# Patient Record
Sex: MALE | Race: WHITE
[De-identification: names, ages, dates, MRNs, and addresses within clinical notes are randomized per-mention and may not be internally consistent; named-entity substitution may affect disease eponyms.]

---

## 2017-02-08 ENCOUNTER — HOSPITAL ENCOUNTER (OUTPATIENT)
Dept: HOSPITAL 75 - LAB | Age: 77
End: 2017-02-08
Attending: INTERNAL MEDICINE
Payer: MEDICARE

## 2017-02-08 ENCOUNTER — HOSPITAL ENCOUNTER (OUTPATIENT)
Dept: HOSPITAL 75 - LAB | Age: 77
End: 2017-02-08
Payer: MEDICARE

## 2017-02-08 DIAGNOSIS — I10: ICD-10-CM

## 2017-02-08 DIAGNOSIS — I48.92: ICD-10-CM

## 2017-02-08 DIAGNOSIS — I21.3: ICD-10-CM

## 2017-02-08 DIAGNOSIS — I25.10: Primary | ICD-10-CM

## 2017-02-08 DIAGNOSIS — I73.9: ICD-10-CM

## 2017-02-08 DIAGNOSIS — I25.10: ICD-10-CM

## 2017-02-08 DIAGNOSIS — I10: Primary | ICD-10-CM

## 2017-02-08 DIAGNOSIS — I48.91: ICD-10-CM

## 2017-02-08 DIAGNOSIS — I47.2: ICD-10-CM

## 2017-02-08 LAB
ALBUMIN SERPL-MCNC: 3.9 G/DL (ref 3.2–4.5)
ALT SERPL-CCNC: 23 U/L (ref 0–55)
ANION GAP SERPL CALC-SCNC: 12 MMOL/L (ref 5–14)
AST SERPL-CCNC: 27 U/L (ref 5–34)
BILIRUB SERPL-MCNC: 0.4 MG/DL (ref 0.1–1)
BUN SERPL-MCNC: 16 MG/DL (ref 7–18)
BUN/CREAT SERPL: 17
CALCIUM SERPL-MCNC: 9.3 MG/DL (ref 8.5–10.1)
CHLORIDE SERPL-SCNC: 101 MMOL/L (ref 98–107)
CHOLEST SERPL-MCNC: 147 MG/DL (ref ?–200)
CO2 SERPL-SCNC: 26 MMOL/L (ref 21–32)
CREAT SERPL-MCNC: 0.92 MG/DL (ref 0.6–1.3)
GFR SERPLBLD BASED ON 1.73 SQ M-ARVRAT: > 60 ML/MIN
GLUCOSE SERPL-MCNC: 125 MG/DL (ref 70–105)
LDLC SERPL DIRECT ASSAY-MCNC: 102 MG/DL (ref 1–129)
MAGNESIUM SERPL-MCNC: 1.9 MG/DL (ref 1.8–2.4)
POTASSIUM SERPL-SCNC: 3.4 MMOL/L (ref 3.6–5)
POTASSIUM SERPL-SCNC: 3.4 MMOL/L (ref 3.6–5)
PROT SERPL-MCNC: 7.6 G/DL (ref 6.4–8.2)
SODIUM SERPL-SCNC: 139 MMOL/L (ref 135–145)
TRIGL SERPL-MCNC: 174 MG/DL (ref ?–150)
VLDLC SERPL CALC-MCNC: 35 MG/DL (ref 5–40)

## 2017-02-08 PROCEDURE — 84132 ASSAY OF SERUM POTASSIUM: CPT

## 2017-02-08 PROCEDURE — 80061 LIPID PANEL: CPT

## 2017-02-08 PROCEDURE — 83735 ASSAY OF MAGNESIUM: CPT

## 2017-02-08 PROCEDURE — 80053 COMPREHEN METABOLIC PANEL: CPT

## 2017-02-08 PROCEDURE — 36415 COLL VENOUS BLD VENIPUNCTURE: CPT

## 2017-02-24 ENCOUNTER — HOSPITAL ENCOUNTER (INPATIENT)
Dept: HOSPITAL 75 - ER | Age: 77
LOS: 6 days | Discharge: NURSING, REHAB OR LONG-TERM CARE | DRG: 481 | End: 2017-03-02
Attending: INTERNAL MEDICINE | Admitting: INTERNAL MEDICINE
Payer: MEDICARE

## 2017-02-24 VITALS — WEIGHT: 207.4 LBS | BODY MASS INDEX: 33.33 KG/M2 | HEIGHT: 66 IN

## 2017-02-24 VITALS — DIASTOLIC BLOOD PRESSURE: 72 MMHG | SYSTOLIC BLOOD PRESSURE: 157 MMHG

## 2017-02-24 DIAGNOSIS — J45.909: ICD-10-CM

## 2017-02-24 DIAGNOSIS — K21.9: ICD-10-CM

## 2017-02-24 DIAGNOSIS — G47.30: ICD-10-CM
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
## 2017-02-24 DIAGNOSIS — F03.90: ICD-10-CM

## 2017-02-24 DIAGNOSIS — R53.81: ICD-10-CM

## 2017-02-24 DIAGNOSIS — S72.142A: Primary | ICD-10-CM

## 2017-02-24 DIAGNOSIS — T40.2X5A: ICD-10-CM

## 2017-02-24 DIAGNOSIS — I25.5: ICD-10-CM

## 2017-02-24 DIAGNOSIS — Z87.891: ICD-10-CM

## 2017-02-24 DIAGNOSIS — I48.91: ICD-10-CM

## 2017-02-24 DIAGNOSIS — I25.2: ICD-10-CM

## 2017-02-24 DIAGNOSIS — J81.1: ICD-10-CM

## 2017-02-24 DIAGNOSIS — E87.6: ICD-10-CM

## 2017-02-24 DIAGNOSIS — Z79.01: ICD-10-CM

## 2017-02-24 DIAGNOSIS — Z95.810: ICD-10-CM

## 2017-02-24 DIAGNOSIS — J98.11: ICD-10-CM

## 2017-02-24 DIAGNOSIS — Z95.5: ICD-10-CM

## 2017-02-24 DIAGNOSIS — I25.10: ICD-10-CM

## 2017-02-24 DIAGNOSIS — Z99.81: ICD-10-CM

## 2017-02-24 DIAGNOSIS — G62.9: ICD-10-CM

## 2017-02-24 DIAGNOSIS — K59.03: ICD-10-CM

## 2017-02-24 DIAGNOSIS — Z95.1: ICD-10-CM

## 2017-02-24 DIAGNOSIS — J44.9: ICD-10-CM

## 2017-02-24 DIAGNOSIS — D50.9: ICD-10-CM

## 2017-02-24 LAB
ALBUMIN SERPL-MCNC: 3.9 G/DL (ref 3.2–4.5)
ALT SERPL-CCNC: 31 U/L (ref 0–55)
ANION GAP SERPL CALC-SCNC: 12 MMOL/L (ref 5–14)
APTT BLD: 27 SEC (ref 24–35)
AST SERPL-CCNC: 31 U/L (ref 5–34)
BASOPHILS # BLD AUTO: 0 10^3/UL (ref 0–0.1)
BASOPHILS NFR BLD AUTO: 0 % (ref 0–10)
BILIRUB SERPL-MCNC: 0.5 MG/DL (ref 0.1–1)
BUN SERPL-MCNC: 13 MG/DL (ref 7–18)
BUN/CREAT SERPL: 15
CALCIUM SERPL-MCNC: 9.4 MG/DL (ref 8.5–10.1)
CHLORIDE SERPL-SCNC: 98 MMOL/L (ref 98–107)
CO2 SERPL-SCNC: 28 MMOL/L (ref 21–32)
CREAT SERPL-MCNC: 0.87 MG/DL (ref 0.6–1.3)
EOSINOPHIL # BLD AUTO: 0.2 10^3/UL (ref 0–0.3)
EOSINOPHIL NFR BLD AUTO: 2 % (ref 0–10)
ERYTHROCYTE [DISTWIDTH] IN BLOOD BY AUTOMATED COUNT: 14.9 % (ref 10–14.5)
GFR SERPLBLD BASED ON 1.73 SQ M-ARVRAT: > 60 ML/MIN
GLUCOSE SERPL-MCNC: 133 MG/DL (ref 70–105)
INR PPP: 0.9 (ref 0.8–1.4)
LYMPHOCYTES # BLD AUTO: 1.7 X 10^3 (ref 1–4)
LYMPHOCYTES NFR BLD AUTO: 19 % (ref 12–44)
MCH RBC QN AUTO: 33 PG (ref 25–34)
MCHC RBC AUTO-ENTMCNC: 35 G/DL (ref 32–36)
MCV RBC AUTO: 94 FL (ref 80–99)
MONOCYTES # BLD AUTO: 1.3 X 10^3 (ref 0–1)
MONOCYTES NFR BLD AUTO: 14 % (ref 0–12)
NEUTROPHILS # BLD AUTO: 6.1 X 10^3 (ref 1.8–7.8)
NEUTROPHILS NFR BLD AUTO: 65 % (ref 42–75)
PLATELET # BLD: 154 10^3/UL (ref 130–400)
PMV BLD AUTO: 10.4 FL (ref 7.4–10.4)
POTASSIUM SERPL-SCNC: 3.1 MMOL/L (ref 3.6–5)
PROT SERPL-MCNC: 7.9 G/DL (ref 6.4–8.2)
PROTHROMBIN TIME: 12.2 SEC (ref 12.2–14.7)
RBC # BLD AUTO: 4.5 10^6/UL (ref 4.35–5.85)
SODIUM SERPL-SCNC: 138 MMOL/L (ref 135–145)
WBC # BLD AUTO: 9.4 10^3/UL (ref 4.3–11)

## 2017-02-24 PROCEDURE — 71010: CPT

## 2017-02-24 PROCEDURE — 93005 ELECTROCARDIOGRAM TRACING: CPT

## 2017-02-24 PROCEDURE — 83735 ASSAY OF MAGNESIUM: CPT

## 2017-02-24 PROCEDURE — 85025 COMPLETE CBC W/AUTO DIFF WBC: CPT

## 2017-02-24 PROCEDURE — 72170 X-RAY EXAM OF PELVIS: CPT

## 2017-02-24 PROCEDURE — 36415 COLL VENOUS BLD VENIPUNCTURE: CPT

## 2017-02-24 PROCEDURE — 94640 AIRWAY INHALATION TREATMENT: CPT

## 2017-02-24 PROCEDURE — 86901 BLOOD TYPING SEROLOGIC RH(D): CPT

## 2017-02-24 PROCEDURE — 83540 ASSAY OF IRON: CPT

## 2017-02-24 PROCEDURE — 80053 COMPREHEN METABOLIC PANEL: CPT

## 2017-02-24 PROCEDURE — 82962 GLUCOSE BLOOD TEST: CPT

## 2017-02-24 PROCEDURE — 80048 BASIC METABOLIC PNL TOTAL CA: CPT

## 2017-02-24 PROCEDURE — 86900 BLOOD TYPING SEROLOGIC ABO: CPT

## 2017-02-24 PROCEDURE — 85027 COMPLETE CBC AUTOMATED: CPT

## 2017-02-24 PROCEDURE — 96375 TX/PRO/DX INJ NEW DRUG ADDON: CPT

## 2017-02-24 PROCEDURE — 87081 CULTURE SCREEN ONLY: CPT

## 2017-02-24 PROCEDURE — 94760 N-INVAS EAR/PLS OXIMETRY 1: CPT

## 2017-02-24 PROCEDURE — 94664 DEMO&/EVAL PT USE INHALER: CPT

## 2017-02-24 PROCEDURE — 73502 X-RAY EXAM HIP UNI 2-3 VIEWS: CPT

## 2017-02-24 PROCEDURE — 84100 ASSAY OF PHOSPHORUS: CPT

## 2017-02-24 PROCEDURE — 82728 ASSAY OF FERRITIN: CPT

## 2017-02-24 PROCEDURE — 96365 THER/PROPH/DIAG IV INF INIT: CPT

## 2017-02-24 PROCEDURE — 85610 PROTHROMBIN TIME: CPT

## 2017-02-24 PROCEDURE — 85730 THROMBOPLASTIN TIME PARTIAL: CPT

## 2017-02-24 PROCEDURE — 86850 RBC ANTIBODY SCREEN: CPT

## 2017-02-24 RX ADMIN — FENTANYL CITRATE PRN MCG: 50 INJECTION, SOLUTION INTRAMUSCULAR; INTRAVENOUS at 21:26

## 2017-02-24 NOTE — DIAGNOSTIC IMAGING REPORT
INDICATION: Status post fall, left hip pain.



EXAMINATION: Pelvis, 2/24/2017.



FINDINGS: Frontal pelvis.



There is a fracture through the intertrochanteric aspect of the

left hip. Mild foreshortening at the fracture site is seen. The

hip joint is intact. The right hip joint is intact as well. No

other fractures are identified with degenerative findings seen

in both hip joints.



IMPRESSION: Acute left intertrochanteric fracture, other

findings as above.



Dictated by:



Dictated on workstation # [id number]
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
## 2017-02-25 VITALS — SYSTOLIC BLOOD PRESSURE: 111 MMHG | DIASTOLIC BLOOD PRESSURE: 84 MMHG

## 2017-02-25 VITALS — DIASTOLIC BLOOD PRESSURE: 68 MMHG | SYSTOLIC BLOOD PRESSURE: 133 MMHG

## 2017-02-25 VITALS — SYSTOLIC BLOOD PRESSURE: 144 MMHG | DIASTOLIC BLOOD PRESSURE: 71 MMHG

## 2017-02-25 VITALS — DIASTOLIC BLOOD PRESSURE: 67 MMHG | SYSTOLIC BLOOD PRESSURE: 118 MMHG

## 2017-02-25 VITALS — SYSTOLIC BLOOD PRESSURE: 158 MMHG | DIASTOLIC BLOOD PRESSURE: 73 MMHG

## 2017-02-25 VITALS — SYSTOLIC BLOOD PRESSURE: 121 MMHG | DIASTOLIC BLOOD PRESSURE: 76 MMHG

## 2017-02-25 VITALS — SYSTOLIC BLOOD PRESSURE: 129 MMHG | DIASTOLIC BLOOD PRESSURE: 69 MMHG

## 2017-02-25 VITALS — DIASTOLIC BLOOD PRESSURE: 66 MMHG | SYSTOLIC BLOOD PRESSURE: 118 MMHG

## 2017-02-25 VITALS — DIASTOLIC BLOOD PRESSURE: 69 MMHG | SYSTOLIC BLOOD PRESSURE: 113 MMHG

## 2017-02-25 VITALS — DIASTOLIC BLOOD PRESSURE: 76 MMHG | SYSTOLIC BLOOD PRESSURE: 130 MMHG

## 2017-02-25 VITALS — DIASTOLIC BLOOD PRESSURE: 75 MMHG | SYSTOLIC BLOOD PRESSURE: 135 MMHG

## 2017-02-25 VITALS — DIASTOLIC BLOOD PRESSURE: 65 MMHG | SYSTOLIC BLOOD PRESSURE: 117 MMHG

## 2017-02-25 VITALS — SYSTOLIC BLOOD PRESSURE: 141 MMHG | DIASTOLIC BLOOD PRESSURE: 68 MMHG

## 2017-02-25 LAB
ANION GAP SERPL CALC-SCNC: 15 MMOL/L (ref 5–14)
BUN SERPL-MCNC: 11 MG/DL (ref 7–18)
BUN/CREAT SERPL: 14
CALCIUM SERPL-MCNC: 8.7 MG/DL (ref 8.5–10.1)
CHLORIDE SERPL-SCNC: 101 MMOL/L (ref 98–107)
CO2 SERPL-SCNC: 22 MMOL/L (ref 21–32)
CREAT SERPL-MCNC: 0.76 MG/DL (ref 0.6–1.3)
GFR SERPLBLD BASED ON 1.73 SQ M-ARVRAT: > 60 ML/MIN
GLUCOSE SERPL-MCNC: 146 MG/DL (ref 70–105)
POTASSIUM SERPL-SCNC: 3 MMOL/L (ref 3.6–5)
SODIUM SERPL-SCNC: 138 MMOL/L (ref 135–145)

## 2017-02-25 PROCEDURE — 0QS706Z REPOSITION LEFT UPPER FEMUR WITH INTRAMEDULLARY INTERNAL FIXATION DEVICE, OPEN APPROACH: ICD-10-PCS | Performed by: ORTHOPAEDIC SURGERY

## 2017-02-25 RX ADMIN — FENTANYL CITRATE PRN MCG: 50 INJECTION, SOLUTION INTRAMUSCULAR; INTRAVENOUS at 01:11

## 2017-02-25 RX ADMIN — SODIUM CHLORIDE, SODIUM LACTATE, POTASSIUM CHLORIDE, AND CALCIUM CHLORIDE SCH MLS/HR: 600; 310; 30; 20 INJECTION, SOLUTION INTRAVENOUS at 11:06

## 2017-02-25 RX ADMIN — POTASSIUM CHLORIDE SCH MLS/HR: 200 INJECTION, SOLUTION INTRAVENOUS at 15:51

## 2017-02-25 RX ADMIN — SODIUM CHLORIDE, SODIUM LACTATE, POTASSIUM CHLORIDE, AND CALCIUM CHLORIDE SCH MLS/HR: 600; 310; 30; 20 INJECTION, SOLUTION INTRAVENOUS at 12:40

## 2017-02-25 RX ADMIN — SODIUM CHLORIDE SCH MLS/HR: 900 INJECTION, SOLUTION INTRAVENOUS at 15:57

## 2017-02-25 RX ADMIN — FENTANYL CITRATE PRN MCG: 50 INJECTION, SOLUTION INTRAMUSCULAR; INTRAVENOUS at 03:55

## 2017-02-25 RX ADMIN — FENTANYL CITRATE PRN MCG: 50 INJECTION, SOLUTION INTRAMUSCULAR; INTRAVENOUS at 22:06

## 2017-02-25 RX ADMIN — POTASSIUM CHLORIDE SCH MLS/HR: 200 INJECTION, SOLUTION INTRAVENOUS at 10:14

## 2017-02-25 RX ADMIN — IPRATROPIUM BROMIDE AND ALBUTEROL SULFATE PRN ML: .5; 3 SOLUTION RESPIRATORY (INHALATION) at 19:34

## 2017-02-25 RX ADMIN — POTASSIUM CHLORIDE SCH MLS/HR: 200 INJECTION, SOLUTION INTRAVENOUS at 09:10

## 2017-02-25 RX ADMIN — MORPHINE SULFATE PRN MG: 10 INJECTION, SOLUTION INTRAMUSCULAR; INTRAVENOUS at 13:28

## 2017-02-25 RX ADMIN — SODIUM CHLORIDE SCH MLS/HR: 900 INJECTION, SOLUTION INTRAVENOUS at 09:08

## 2017-02-25 RX ADMIN — HYDROCODONE BITARTRATE AND ACETAMINOPHEN PRN TAB: 5; 325 TABLET ORAL at 22:07

## 2017-02-25 RX ADMIN — IPRATROPIUM BROMIDE AND ALBUTEROL SULFATE PRN ML: .5; 3 SOLUTION RESPIRATORY (INHALATION) at 13:35

## 2017-02-25 RX ADMIN — POTASSIUM CHLORIDE SCH MLS/HR: 200 INJECTION, SOLUTION INTRAVENOUS at 14:24

## 2017-02-25 RX ADMIN — SENNOSIDES SCH MG: 8.6 TABLET, FILM COATED ORAL at 22:07

## 2017-02-25 RX ADMIN — FENTANYL CITRATE PRN MCG: 50 INJECTION, SOLUTION INTRAMUSCULAR; INTRAVENOUS at 07:08

## 2017-02-25 RX ADMIN — MORPHINE SULFATE PRN MG: 10 INJECTION, SOLUTION INTRAMUSCULAR; INTRAVENOUS at 13:35

## 2017-02-25 NOTE — PROGRESS NOTE-POST OPERATIVE
Post-Operative Progess Note


Assistant


none





Pre-Operative Diagnosis


left femur intertroch fracture





Post-Operative Diagnosis





Same





Post-Op Procedure Note


Date of Procedure:  Feb 25, 2017


Name of Procedure:  


Open Reduction and Internal Fixation of left femur


Procedure Note/Findings


easily reduced. placed Synthes TFN. no locking screw needed distally


Anesthesia Type


general endo tracheal


Estimated blood loss (mL):  150ml


Packing:  


no


Specimen(s) collected


no








JENNIE ROBERTS MD Feb 25, 2017 4:48 pm

## 2017-02-25 NOTE — DIAGNOSTIC IMAGING REPORT
Fluoroscopy.



INDICATION: Left hip pain.



Fluoroscopic assistance was provided for Dr. Martinez during his

left hip pinning procedure. 3 minutes and 24 seconds of

fluoroscopy time was utilized. Four spot films of the left hip

and femur were received from the OR. The left hip exam performed

on 02/24/2017 did note a displaced intertrochanteric fracture of

the left femur. On this exam, there is now an intramedullary mani

in place. There is also an orthopedic fixation screw obliquely

traversing the femoral head and neck. The orthopedic hardware

appears to be in good position and the main fracture fragments

are near anatomic.



IMPRESSION: Stable postoperative left hip.



Dictated by:



Dictated on workstation # [id number]
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
## 2017-02-25 NOTE — CONSULTATION-CARDIOLOGY
HPI-Cardiology


Cardiology Consultation:


Date of Consultation


17


Date of Admission





Attending Physician


Belkys Monte DO


Admitting Physician


Raúl Gonzalez MD


Consulting Physician


PEREZ OGLESBY MD





HPI:


Chief Complaint:


cardiovascular preoperative evaluation


this is a 76-year-old gentleman with extensive cardiac history who had a 

mechanical fall and subsequent hip fracture.  Hip surgery is planned for today.

  Cardiology is consulted for cardiovascular preop risk assessment.





The patient has history of coronary artery disease and CABG.  The CABG was in 

.  He had stents in the last few years as well; last stents were placed in 

 to the best of my knowledge.  However, none of the stents were placed in 

the last one year.  The patient also has a defibrillator placed. which was 

placed for ischemic cardiomyopathy. Lexiscan nuclear stress test was performed 

in  which showed no evidence of myocardial ischemia and an EF of 54 

percent. 





The patient denies any significant symptoms including chest pain or shortness 

of breath.





Review of Systems-Cardiology


Review of Systems


Constitutional:  No As described under HPI, No no symptoms reported, No chills, 

No fever, No lightheadedness, No malaise, No tiredness, No weight loss, No 

weight gain, No other


Eyes:  No As described under HPI, No no symptoms reported, No blindness, No 

blurred vision, No contact lenses, No drainage, No decreased acuity, No foreign 

body sensation, No glasses, No inflammation, No pain, No photophobia, No 

previous injury, No shadows, No tunnel vision, No other, No vision change


Ears/Nose/Throat:  No As described under HPI, No no symptoms reported, No 

chronic hearing loss, No epistaxis, No ear discharge, No ear pain, No loose 

teeth, No mouth pain, No mouth swelling, No nasal drainage, No nose pain, No 

recent hearing loss, No throat pain, No throat swelling, No ulcerations, No 

other


Respiratory:   no symptoms reportedNo As described under HPI, No cough, No 

orthopnea, No shortness of breath, No SOB with excertion, No SOB at rest, No 

stridor, No wheezing, No other


Cardiovascular:   no symptoms reportedNo As described under HPI, No chest pain, 

No edema, No irregular heart rate, No lightheadedness, No palpitations, No 

syncope, No other


Gastrointestinal:  No no symptoms reported, No As described under HPI, No 

abdomen distended, No abdominal pain, No blood streaked bowels, No constipation

, No diarrhea, No difficulty swallowing, No nausea, No poor appetite, No poor 

fluid intake, No rectal bleeding, No vomiting, No other, No nausea/vomiting/

diarrhea, No stool coloration changes


Genitourinary:  No no symptoms reported, No As described under HPI, No burning, 

No dysuria, No discharge, No frequency, No flank pain, No hematuria, No 

incontinence, No pain, No urgency, No other, No urine frequency changes, No 

urine coloration changes


Musculoskeletal:  No no symptoms reported, No As describe under HPI, No back 

pain, No gout,  joint painNo joint swelling, No muscle pain, No muscle stiffness

, No neck pain, No other


Skin:  No no symptoms reported, No As described under HPI, No change in color, 

No change in hair/nails, No dryness, No lesions, No lumps, No rash, No other, 

No skin related problems, No ulcerations, No rash on exposed areas, No 

ulcerations on exposed areas





PMH-Social-Family Hx


Patient Social History


Alcohol Use:  Denies Use


Recreational Drug Use:  No


Smoking Status:  Former Smoker


Former smoker/When Quit:  1988


Type Used:  Cigarettes


Recent Foreign Travel:  No


Recent Infectious Disease Expo:  No


Physical Abuse Screen:  No


Sexual Abuse:  No





Immunizations Up To Date


Tetanus Booster (TDap):  More than 5yrs


Date of Pneumonia Vaccine:  Oct 1, 2014


Date of Influenza Vaccine:  Dec 5, 2016





Past Medical History


PMH


As described under Assessment.





Family Medical History


Family History:  


Cardiovascular disease


  19 MOTHER


  G8 BROTHER


  G8 SISTER


Cervical cancer


  19 MOTHER





Allergies and Home Medications


Allergies


Coded Allergies:  


     Penicillins (Verified  Allergy, Severe, HIVES, SOB, 12/4/15)


     codeine (Verified  Allergy, Severe, SOB, HIVES, 12/4/15)





Home Medications


Amiodarone HCl 200 Mg Tablet 200 MG PO DAILY (Reported) 


Amiodarone HCl 200 Mg Tablet 400 MG PO saturday et  (Reported) 


Amlodipine Besylate 5 Mg Tablet 5 MG PO HS (Reported) 


Aspirin 81 Mg Tablet.dr 162 MG PO BID (Reported) 


Digoxin 125 Mcg Tablet 125 MCG PO DAILY (Reported) 


Esomeprazole Magnesium 40 Mg Cap 40 MG PO DAILY (Reported) 


Fluticasone Propionate 1 Ea Aero 2 PUFF IH PRN (Reported) 


Furosemide 40 Mg Tablet 40 MG PO DAILY (Reported) 


Losartan/Hydrochlorothiazide 1 Each Tablet 1 TAB PO DAILY (Reported) 


Metoprolol Tartrate 50 Mg Tablet 25 MG PO BID (Reported) 


   TAKES 1/2 OF A (50 MG) TABLET 


Mexiletine HCl 150 Mg Cap #100 150 MG PO TID (Reported) 


Potassium Chloride 8 Meq Tablet.er 8 MEQ PO DAILY (Reported) 


Sulfamethoxazole/Trimethoprim 1 Each Tablet #14 1 EA PO BID WITH MEALS 


   Prescribed by: TAMIR BUCHANAN on 12/7/15 1308





Physical Exam-Cardiology


Physical Exam


Vital Signs/I&O





 Vital Sign - Last 12Hours








 17





 00:00 04:00 08:15 08:55


 


Temp 99.4 98.7 98.8 


 


Pulse 102 100 99 


 


Resp 24 24 18 


 


B/P 144/71 141/68 158/73 


 


Pulse Ox 94 93 92 92


 


O2 Delivery Nasal Cannula Nasal Cannula Nasal Cannula 


 


O2 Flow Rate 2.00 2.00 2.00 2.00














 Intake and Output 


 


 17





 00:00


 


Intake Total 100 ml


 


Output Total 250 ml


 


Balance -150 ml





Capillary Refill : Less Than 3 SecondsLess Than 3 Seconds


Constitutional:  No appears stated age, No AAO x 3, No apparent distress, No 

PERRL, No well-developed, No well-nourished, No other


HEENT:  No PERRL, No normal ENT inspection, No TMs normal, No pharynx normal, 

No scleral icterus (R), No scleral icterus (L), No pale conjunctivae (R), No 

pale conjunctivae (L), No photophobia, No TM abnormal (R), No TM abnormal (L), 

No pharyngeal erythema, No tonsillar exudate, No other, No discharge, No EOMI, 

No hearing is well preserved, No hard of hearing, No oral hygience is good, No 

ulceration, No xanthelasmas are seen


Neck:  No non-tender, No full range of motion, No supple, No normal inspection, 

No carotid bruit, No limited range of motion, No lymphadenopathy (R), No 

lymphadenopathy (L), No tender lateral, No tender midline, No thyromegaly, No 

other, No carotid pulses are 2 + bilaterally, No with good upstrokes


Respiratory:  No accessory muscle use, No respiratory distress, No chest tender

, No chest expansion is symmetric, No chest is bilaterally symmetric, No lungs 

clear to percussion, No lungs clear to auscultation, No crackles, No rhonchi, 

No rales, No stridor, No wheezing, No pleural rub, No other


Cardiovascular:  No regular rate-rhythm, No irregularly irregular, No extra 

beats, No parasternal heave is noted, No JVD, No edema, No bradycardia, No 

tachycardia, No point of maximal impulse, No cardiac thrills are palpable, No 

S1 and S2, No gallop/S3, No gallop/S4, No diastolic murmur, No systolic murmur, 

No friction rub, No click, No other


Gastrointestinal:  No tender, No soft, No round, No distended, No pulsatile mass

, No organomegaly, No guarding, No rebound, No tenderness, No hernia, No mass, 

No audible bowel sounds, No abnormal bowel sounds, No abdominal bruits, No 

spleenomegaly, No other


Rectal:   deferred


Extremities:  No normal range of motion, No non-tender, No normal inspection, 

No pedal edema, No calf tenderness, No normal capillary refill, No pelvis stable

, No calf tenderness, No inflammation, No pedal edema, No slow capillary refill

, No swelling, No other, No abrasion, No clubbing, No cyanosis, No ecchymosis, 

No laceration, No no lower extremity edema bilateral, No significant edema, No 

tenderness, No wound


Neurologic/Psychiatric:  No CNs II-XII nml as tested, No no motor/sensory 

deficits, No alert, No normal mood/affect, No oriented x 3, No abnormal 

cerebellar tests, No abnormal CNs II-XII, No abnormal gait, No aphasia, No EOM 

palsy, No facial droop, No motor weakness, No sensory deficit, No depressed 

affect, No disoriented x 3, No other, No grossly intact, No power is 5/5 both 

on sides


Skin:  No normal color, No warm/dry, No cyanosis, No cool, No diaphoresis, No 

damp, No ecchymosis, No jaundice, No mottled, No pallor, No rash, No tattoos/

piercings, No ulcerations, No rash on exposed areas, No ulcerations on exposed 

areas, No other





Data Review


Labs


 Laboratory Tests


17 16:40: 


Activated Partial Thromboplast Time 27, Alanine Aminotransferase (ALT/SGPT) 31, 

Albumin 3.9, Alkaline Phosphatase 79, Anion Gap 12, Aspartate Amino Transf (AST/

SGOT) 31, BUN/Creatinine Ratio 15, Basophils # (Auto) 0.0, Basophils (%) (Auto) 

0, Blood Urea Nitrogen 13, Calcium Level 9.4, Carbon Dioxide Level 28, Chloride 

Level 98, Creatinine 0.87, Eosinophils # (Auto) 0.2, Eosinophils (%) (Auto) 2, 

Estimat Glomerular Filtration Rate > 60, Glucose Level 133H, Hematocrit 42, 

Hemoglobin 14.7, INR Comment 0.9, Lymphocytes # (Auto) 1.7, Lymphocytes (%) (

Auto) 19, Mean Corpuscular Hemoglobin 33, Mean Corpuscular Hemoglobin Concent 35

, Mean Corpuscular Volume 94, Mean Platelet Volume 10.4, Monocytes # (Auto) 1.3H

, Monocytes (%) (Auto) 14H, Neutrophils # (Auto) 6.1, Neutrophils (%) (Auto) 65

, Platelet Count 154, Potassium Level 3.1L, Prothrombin Time 12.2, Red Blood 

Count 4.50, Red Cell Distribution Width 14.9H, Sodium Level 138, Total 

Bilirubin 0.5, Total Protein 7.9, White Blood Count 9.4


17 04:10: 


Anion Gap 15H, BUN/Creatinine Ratio 14, Blood Urea Nitrogen 11, Calcium Level 

8.7, Carbon Dioxide Level 22, Chloride Level 101, Creatinine 0.76, Estimat 

Glomerular Filtration Rate > 60, Glucose Level 146H, Potassium Level 3.0L, 

Sodium Level 138








A/P-Cardiology


Assessment/Admission Diagnosis


preoperative cardiovascular risk assessment,


coronary artery disease,


Ischemic cardiomyopathy


ICD


previous history of atrial fibrillation





Plan


patient is at moderate risk for perioperative major adverse cardiac events 

undergoing an intermediate risk noncardiac surgery.  I see no acute cardiac 

contraindication to hip surgery.  I discussed with the patient and family that 

he does have extensive cardiac history and explained the risk of major adverse 

cardiac events in the perioperative period; however, hip surgery is required as 

well since the morbidity and mortality associated with non-operated hip 

fracture is also very high.  It may be prudent to keep the patient in the ICU 

postoperatively.





In the postoperative period, gradually his cardiac medications will be 

reintroduced.








Thank you for your consultation. Please call me if you have any questions.








JULIENNE Oglesby MD, FACP, FACC, FSCAI, FHRS, CCDS


Interventional Cardiology


Cardiac Electrophysiology


Vascular Medicine and Endovascular Interventions





Clinical Quality Measures


DVT/VTE Risk/Contraindication:


Risk Factor Score Per Nursin


RFS Level Per Nursing on Admit:  4+=Very High








PEREZ OGLESBY MD 2017 10:39 am

## 2017-02-26 VITALS — SYSTOLIC BLOOD PRESSURE: 114 MMHG | DIASTOLIC BLOOD PRESSURE: 64 MMHG

## 2017-02-26 VITALS — SYSTOLIC BLOOD PRESSURE: 110 MMHG | DIASTOLIC BLOOD PRESSURE: 63 MMHG

## 2017-02-26 VITALS — DIASTOLIC BLOOD PRESSURE: 68 MMHG | SYSTOLIC BLOOD PRESSURE: 125 MMHG

## 2017-02-26 VITALS — SYSTOLIC BLOOD PRESSURE: 106 MMHG | DIASTOLIC BLOOD PRESSURE: 69 MMHG

## 2017-02-26 VITALS — SYSTOLIC BLOOD PRESSURE: 123 MMHG | DIASTOLIC BLOOD PRESSURE: 65 MMHG

## 2017-02-26 VITALS — DIASTOLIC BLOOD PRESSURE: 64 MMHG | SYSTOLIC BLOOD PRESSURE: 127 MMHG

## 2017-02-26 VITALS — DIASTOLIC BLOOD PRESSURE: 85 MMHG | SYSTOLIC BLOOD PRESSURE: 99 MMHG

## 2017-02-26 VITALS — SYSTOLIC BLOOD PRESSURE: 119 MMHG | DIASTOLIC BLOOD PRESSURE: 60 MMHG

## 2017-02-26 VITALS — DIASTOLIC BLOOD PRESSURE: 58 MMHG | SYSTOLIC BLOOD PRESSURE: 118 MMHG

## 2017-02-26 VITALS — SYSTOLIC BLOOD PRESSURE: 159 MMHG | DIASTOLIC BLOOD PRESSURE: 101 MMHG

## 2017-02-26 VITALS — SYSTOLIC BLOOD PRESSURE: 112 MMHG | DIASTOLIC BLOOD PRESSURE: 59 MMHG

## 2017-02-26 VITALS — DIASTOLIC BLOOD PRESSURE: 62 MMHG | SYSTOLIC BLOOD PRESSURE: 112 MMHG

## 2017-02-26 VITALS — DIASTOLIC BLOOD PRESSURE: 65 MMHG | SYSTOLIC BLOOD PRESSURE: 121 MMHG

## 2017-02-26 VITALS — SYSTOLIC BLOOD PRESSURE: 118 MMHG | DIASTOLIC BLOOD PRESSURE: 77 MMHG

## 2017-02-26 VITALS — DIASTOLIC BLOOD PRESSURE: 77 MMHG | SYSTOLIC BLOOD PRESSURE: 134 MMHG

## 2017-02-26 LAB
ALBUMIN SERPL-MCNC: 3.1 G/DL (ref 3.2–4.5)
ALT SERPL-CCNC: 22 U/L (ref 0–55)
ANION GAP SERPL CALC-SCNC: 11 MMOL/L (ref 5–14)
AST SERPL-CCNC: 31 U/L (ref 5–34)
BILIRUB SERPL-MCNC: 0.8 MG/DL (ref 0.1–1)
BUN SERPL-MCNC: 10 MG/DL (ref 7–18)
BUN/CREAT SERPL: 13
CALCIUM SERPL-MCNC: 8.2 MG/DL (ref 8.5–10.1)
CHLORIDE SERPL-SCNC: 104 MMOL/L (ref 98–107)
CO2 SERPL-SCNC: 24 MMOL/L (ref 21–32)
CREAT SERPL-MCNC: 0.76 MG/DL (ref 0.6–1.3)
ERYTHROCYTE [DISTWIDTH] IN BLOOD BY AUTOMATED COUNT: 15.6 % (ref 10–14.5)
GFR SERPLBLD BASED ON 1.73 SQ M-ARVRAT: > 60 ML/MIN
GLUCOSE SERPL-MCNC: 139 MG/DL (ref 70–105)
INR PPP: 1.2 (ref 0.8–1.4)
MCH RBC QN AUTO: 32 PG (ref 25–34)
MCHC RBC AUTO-ENTMCNC: 34 G/DL (ref 32–36)
MCV RBC AUTO: 96 FL (ref 80–99)
PLATELET # BLD: 149 10^3/UL (ref 130–400)
PMV BLD AUTO: 10.8 FL (ref 7.4–10.4)
POTASSIUM SERPL-SCNC: 2.9 MMOL/L (ref 3.6–5)
PROT SERPL-MCNC: 6 G/DL (ref 6.4–8.2)
PROTHROMBIN TIME: 15.3 SEC (ref 12.2–14.7)
RBC # BLD AUTO: 3.47 10^6/UL (ref 4.35–5.85)
SODIUM SERPL-SCNC: 139 MMOL/L (ref 135–145)
WBC # BLD AUTO: 11.9 10^3/UL (ref 4.3–11)

## 2017-02-26 RX ADMIN — SODIUM CHLORIDE SCH MLS/HR: 900 INJECTION, SOLUTION INTRAVENOUS at 06:58

## 2017-02-26 RX ADMIN — IPRATROPIUM BROMIDE AND ALBUTEROL SULFATE SCH ML: .5; 3 SOLUTION RESPIRATORY (INHALATION) at 19:11

## 2017-02-26 RX ADMIN — POTASSIUM CHLORIDE SCH MEQ: 1500 TABLET, EXTENDED RELEASE ORAL at 14:05

## 2017-02-26 RX ADMIN — POTASSIUM CHLORIDE SCH MEQ: 1500 TABLET, EXTENDED RELEASE ORAL at 05:41

## 2017-02-26 RX ADMIN — SODIUM CHLORIDE SCH MLS/HR: 900 INJECTION, SOLUTION INTRAVENOUS at 00:33

## 2017-02-26 RX ADMIN — MAGNESIUM SULFATE IN DEXTROSE SCH MLS/HR: 10 INJECTION, SOLUTION INTRAVENOUS at 08:10

## 2017-02-26 RX ADMIN — HYDROCODONE BITARTRATE AND ACETAMINOPHEN PRN TAB: 5; 325 TABLET ORAL at 20:28

## 2017-02-26 RX ADMIN — IPRATROPIUM BROMIDE AND ALBUTEROL SULFATE PRN ML: .5; 3 SOLUTION RESPIRATORY (INHALATION) at 12:45

## 2017-02-26 RX ADMIN — POTASSIUM CHLORIDE SCH MEQ: 1500 TABLET, EXTENDED RELEASE ORAL at 11:09

## 2017-02-26 RX ADMIN — SODIUM CHLORIDE SCH MLS/HR: 900 INJECTION, SOLUTION INTRAVENOUS at 05:00

## 2017-02-26 RX ADMIN — IPRATROPIUM BROMIDE AND ALBUTEROL SULFATE PRN ML: .5; 3 SOLUTION RESPIRATORY (INHALATION) at 02:39

## 2017-02-26 RX ADMIN — WARFARIN SODIUM SCH MG: 5 TABLET ORAL at 17:01

## 2017-02-26 RX ADMIN — HYDROCODONE BITARTRATE AND ACETAMINOPHEN PRN TAB: 5; 325 TABLET ORAL at 16:27

## 2017-02-26 RX ADMIN — SENNOSIDES SCH MG: 8.6 TABLET, FILM COATED ORAL at 20:09

## 2017-02-26 RX ADMIN — SENNOSIDES SCH MG: 8.6 TABLET, FILM COATED ORAL at 11:09

## 2017-02-26 RX ADMIN — AMLODIPINE BESYLATE SCH MG: 5 TABLET ORAL at 20:09

## 2017-02-26 RX ADMIN — METOPROLOL TARTRATE SCH MG: 50 TABLET, FILM COATED ORAL at 20:09

## 2017-02-26 RX ADMIN — MAGNESIUM SULFATE IN DEXTROSE SCH MLS/HR: 10 INJECTION, SOLUTION INTRAVENOUS at 06:58
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
## 2017-02-26 NOTE — ANESTHESIA-GENERAL POST-OP
General


Patient Condition


Mental Status/LOC:  Same as Preop


Cardiovascular:  Satisfactory


Nausea/Vomiting:  Absent


Respiratory:  Satisfactory


Pain:  Controlled


Complications:  Absent





Post Op Complications


Complications


None





Follow Up Care/Instructions


Patient Instructions


None needed.





Anesthesia/Patient Condition


Patient Condition


Patient is doing well, no complaints, stable vital signs, no apparent adverse 

anesthesia problems.   


No complications reported per nursing.








SARAI DORANTES CRNA Feb 26, 2017 13:39
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
## 2017-02-26 NOTE — CARDIOLOGY PROGRESS NOTE
Cardiology SOAP Progress Note


Subjective:


no chest pain or shortness of breath.





Objective:


I&O/Vital Signs





 Vital Sign - Last 12Hours








 2/26/17 2/26/17 2/26/17 2/26/17





 10:00 11:00 12:00 12:00


 


Pulse 104 101  107


 


B/P 159/101 127/64  134/77


 


Pulse Ox 93 93 95 93


 


O2 Delivery Nasal Cannula Nasal Cannula Nasal Cannula Nasal Cannula


 


O2 Flow Rate 4.00 4.00 4.00 4.00





 2/26/17 2/26/17 2/26/17 2/26/17





 12:50 13:00 13:00 14:00


 


Pulse  105 104 


 


B/P  106/69  


 


Pulse Ox 92 94  


 


O2 Delivery  Nasal Cannula  Nasal Cannula


 


O2 Flow Rate 3.00   4.00





 2/26/17 2/26/17 2/26/17 2/26/17





 16:03 16:20 19:00 19:11


 


Temp  100.8  


 


Pulse  115 99 


 


Resp  22  


 


B/P  114/64  


 


Pulse Ox 94 93  93


 


O2 Delivery  Nasal Cannula  


 


O2 Flow Rate  4.00  4.00


 


    





 2/26/17   





 19:53   


 


Temp 99.3   


 


Pulse 99   


 


Resp 22   


 


B/P 118/58   


 


Pulse Ox 93   


 


O2 Delivery Nasal Cannula   


 


O2 Flow Rate 4.00   














 Intake and Output 


 


 2/26/17





 00:00


 


Intake Total 2300 ml


 


Output Total 725 ml


 


Balance 1575 ml








Weight (Pounds):  207


Weight (Ounces):  6.4


Weight (Calculated Kilograms):  94.021491


Constitutional:  No appears stated age, No AAO x 3, No apparent distress, No 

PERRL, No well-developed, No well-nourished, No other


Respiratory:  No accessory muscle use, No respiratory distress, No chest tender

, No chest expansion is symmetric, No chest is bilaterally symmetric, No lungs 

clear to percussion, No lungs clear to auscultation, No crackles, No rhonchi, 

No rales, No stridor, No wheezing, No pleural rub, No other


Cardiovascular:  No regular rate-rhythm, No irregularly irregular, No extra 

beats, No parasternal heave is noted, No JVD, No edema, No bradycardia, No 

tachycardia, No point of maximal impulse, No cardiac thrills are palpable, No 

S1 and S2, No gallop/S3, No gallop/S4, No diastolic murmur, No systolic murmur, 

No friction rub, No click, No other


Gastrointestional:  No tender, No soft, No round, No distended, No pulsatile 

mass, No organomegaly, No guarding, No rebound, No tenderness, No hernia, No 

mass, No audible bowel sounds, No abnormal bowel sounds, No abdominal bruits, 

No spleenomegaly, No other


Extremities:  No normal range of motion, No non-tender, No normal inspection, 

No pedal edema, No calf tenderness, No normal capillary refill, No pelvis stable

, No calf tenderness, No inflammation, No pedal edema, No slow capillary refill

, No swelling, No other, No abrasion, No clubbing, No cyanosis, No ecchymosis, 

No laceration, No no lower extremity edema bilateral, No significant edema, No 

tenderness, No wound


Neurologic/Psychiatric:  No CNs II-XII nml as tested, No no motor/sensory 

deficits, No alert, No normal mood/affect, No oriented x 3, No abnormal 

cerebellar tests, No abnormal CNs II-XII, No abnormal gait, No aphasia, No EOM 

palsy, No facial droop, No motor weakness, No sensory deficit, No depressed 

affect, No disoriented x 3, No other, No grossly intact, No power is 5/5 both 

on sides


Skin:  No normal color, No warm/dry, No cyanosis, No cool, No diaphoresis, No 

damp, No ecchymosis, No jaundice, No mottled, No pallor, No rash, No tattoos/

piercings, No ulcerations, No rash on exposed areas, No ulcerations on exposed 

areas, No other





Results/Procedures:


Labs


 Laboratory Tests


2/26/17 03:45: 


Alanine Aminotransferase (ALT/SGPT) 22, Albumin 3.1L, Alkaline Phosphatase 60, 

Anion Gap 11, Aspartate Amino Transf (AST/SGOT) 31, BUN/Creatinine Ratio 13, 

Blood Urea Nitrogen 10, Calcium Level 8.2L, Carbon Dioxide Level 24, Chloride 

Level 104, Creatinine 0.76, Estimat Glomerular Filtration Rate > 60, Glucose 

Level 139H, Hematocrit 33L, Hemoglobin 11.2#L, INR Comment 1.2, Magnesium Level 

1.4L, Mean Corpuscular Hemoglobin 32, Mean Corpuscular Hemoglobin Concent 34, 

Mean Corpuscular Volume 96, Mean Platelet Volume 10.8H, Phosphorus Level 2.2L, 

Platelet Count 149, Potassium Level 2.9L, Prothrombin Time 15.3H, Red Blood 

Count 3.47L, Red Cell Distribution Width 15.6H, Sodium Level 139, Total 

Bilirubin 0.8, Total Protein 6.0L, White Blood Count 11.9H





 Microbiology


2/24/17 MRSA Screen - Final, Complete


          MRSA not isolated








A/P:


Assessment/Dx:





coronary artery disease,


Ischemic cardiomyopathy


ICD


previous history of atrial fibrillation


Plan:


ischemic cardiomyopathy, ICD, history of AF: restart his outpatient medications 

when ok with surgery and primary team. 


follow up with his cardiologist as outpatient.











PEREZ OGLESBY MD Feb 26, 2017 12:15


JULIENNE Oglesby MD, FACP, FACC, FSCAI, FHRS, CCDS


Interventional Cardiology


Cardiac Electrophysiology


Vascular Medicine and Endovascular Interventions








PEREZ OGLESBY MD Feb 26, 2017 12:15


he does have extensive cardiac history and explained the risk of major adverse 

cardiac events in the perioperative period; however, hip surgery is required as 

well since the morbidity and mortality associated with non-operated hip 

fracture is also very high.  It may be prudent to keep the patient in the ICU 

postoperatively.





In the postoperative period, gradually his cardiac medications will be 

reintroduced.








Thank you for your consultation. Please call me if you have any questions.








JULIENNE Oglesby MD, FACP, FACC, FSCAI, FHRS, CCDS


Interventional Cardiology


Cardiac Electrophysiology


Vascular Medicine and Endovascular Interventions








PEREZ OGLESBY MD Feb 26, 2017 12:15

## 2017-02-26 NOTE — PROGRESS NOTE-STANDARD
Standard Progress Note


Progress Notes/Assess & Plan


Progress/Assessment & Plan


S - Pain level ok. "Doing Good"


Nursing states CXR showed infiltrates and he needs 4L Nasal cannula O2. 

Receoved 2 doses of post op Ancef.





O - dressing mild sanguinous drainage. Gentle PROM of L hip tolerated.





Lab 11.2 hgb





A - Ortho stable PODay 1 after L hip intertroch Fx ORIF , needs O2, Post Op 

Anemia





P - Con't PT attempts, Daily dressing change,








JENNIE ROBERTS MD Feb 26, 2017 11:22 am

## 2017-02-26 NOTE — DIAGNOSTIC IMAGING REPORT
INDICATION: COPD.



COMPARISON: Comparison made with prior examination 2/24/2017.



FINDINGS: There is cardiomegaly. There is some patchy right

basilar atelectasis and/or pneumonitis. There is no

pneumothorax. There has been a previous median sternotomy.

Pacemaker overlies the left hemithorax.



IMPRESSION: Cardiomegaly and some patchy right basilar

atelectasis and/or pneumonitis.



Dictated by:



Dictated on workstation # [id number]

## 2017-02-26 NOTE — OPERATIVE REPORT
PROCEDURE PHYSICIAN:   JENNIE ROBERTS

 

DATE OF PROCEDURE:  

 

 

PREOPERATIVE DIAGNOSIS: 

Left hip intertrochanteric fracture.

 

POSTOPERATIVE DIAGNOSIS: 

Left hip intertrochanteric fracture.

 

PROCEDURE:

Open reduction, internal fixation of left hip intertrochanteric

fracture using Synthes TFN nail.

 

ANESTHESIA TYPE: 

 

 

ESTIMATED BLOOD LOSS: 

150 mL.  

 

The patient was taken to the operating room after standard

nursing and anesthesia preoperative identification evaluation and

counseling.  The left lower extremity was prepared and draped in

the usual orthopedic fashion after I measured out the length of

the needed nail by using the C-arm.  At the time it was

completed, local anesthetic had been given in the approached area

for the skin incision using 0.25% Marcaine and epinephrine.  A

total of 60 mL of this solution were used for postoperative pain

control and help control bleeding.  

 

The patient did receive his preoperative antibiotics and also

received tranexamic acid preoperatively to help reduce blood

loss.

 

Attention was then turned to the patient and a longitudinal

incision was made above the greater trochanter.  Soft tissue

dissection was accomplished down through the tensor fascia lily

and tip of the greater trochanter was palpated.  A guidepin was

then placed in this tip and I took a few extra lateral x-rays to

get the position just right but this wire was run down the femur.

 This was followed by the large approach of drill and reamer. 

Once this was in place, the premeasured IM nail was then also

placed down the femur, care taken to make sure it was in the

right position by getting AP and lateral x-rays distally.  We

impacted the nail to the appropriate position.  The angle was of

anterior posterior rotation for the pins was determined with

x-ray and the guidewire was then run up the neck of the femur,

staying a little inferior  and trying to stay right in the

center.  This was followed by a reamer out of the outside cortex,

followed by a helical screw.  The helical screw was then fixed

and then compression was placed on the helical screw.  It

compressed the fracture after lending the tension off of the foot

and ankle.  Wounds were irrigated.  The wounds were closed in two

layers using Vicryl deep and staples superficially.  The patient

was then awakened and taken to the recovery room in stable

condition.    

 

 

 

 

 

Job ID: [id number]

Dictated Date: 02/25/2017 16:42:56 

Transcription Date: 02/26/2017 16:30:54 / jaime

## 2017-02-27 VITALS — SYSTOLIC BLOOD PRESSURE: 117 MMHG | DIASTOLIC BLOOD PRESSURE: 64 MMHG

## 2017-02-27 VITALS — SYSTOLIC BLOOD PRESSURE: 113 MMHG | DIASTOLIC BLOOD PRESSURE: 54 MMHG

## 2017-02-27 VITALS — DIASTOLIC BLOOD PRESSURE: 59 MMHG | SYSTOLIC BLOOD PRESSURE: 129 MMHG

## 2017-02-27 VITALS — DIASTOLIC BLOOD PRESSURE: 56 MMHG | SYSTOLIC BLOOD PRESSURE: 113 MMHG

## 2017-02-27 VITALS — DIASTOLIC BLOOD PRESSURE: 67 MMHG | SYSTOLIC BLOOD PRESSURE: 107 MMHG

## 2017-02-27 VITALS — DIASTOLIC BLOOD PRESSURE: 56 MMHG | SYSTOLIC BLOOD PRESSURE: 116 MMHG

## 2017-02-27 LAB
ANION GAP SERPL CALC-SCNC: 11 MMOL/L (ref 5–14)
BASOPHILS # BLD AUTO: 0 10^3/UL (ref 0–0.1)
BASOPHILS NFR BLD AUTO: 0 % (ref 0–10)
BUN SERPL-MCNC: 10 MG/DL (ref 7–18)
BUN/CREAT SERPL: 14
CALCIUM SERPL-MCNC: 8.4 MG/DL (ref 8.5–10.1)
CHLORIDE SERPL-SCNC: 106 MMOL/L (ref 98–107)
CO2 SERPL-SCNC: 23 MMOL/L (ref 21–32)
CREAT SERPL-MCNC: 0.71 MG/DL (ref 0.6–1.3)
EOSINOPHIL # BLD AUTO: 0.3 10^3/UL (ref 0–0.3)
EOSINOPHIL NFR BLD AUTO: 2 % (ref 0–10)
ERYTHROCYTE [DISTWIDTH] IN BLOOD BY AUTOMATED COUNT: 15.6 % (ref 10–14.5)
GFR SERPLBLD BASED ON 1.73 SQ M-ARVRAT: > 60 ML/MIN
GLUCOSE SERPL-MCNC: 126 MG/DL (ref 70–105)
INR PPP: 1.3 (ref 0.8–1.4)
LYMPHOCYTES # BLD AUTO: 1.3 X 10^3 (ref 1–4)
LYMPHOCYTES NFR BLD AUTO: 12 % (ref 12–44)
MCH RBC QN AUTO: 33 PG (ref 25–34)
MCHC RBC AUTO-ENTMCNC: 33 G/DL (ref 32–36)
MCV RBC AUTO: 99 FL (ref 80–99)
MONOCYTES # BLD AUTO: 1.4 X 10^3 (ref 0–1)
MONOCYTES NFR BLD AUTO: 13 % (ref 0–12)
NEUTROPHILS # BLD AUTO: 7.7 X 10^3 (ref 1.8–7.8)
NEUTROPHILS NFR BLD AUTO: 72 % (ref 42–75)
PLATELET # BLD: 123 10^3/UL (ref 130–400)
PMV BLD AUTO: 10.3 FL (ref 7.4–10.4)
POTASSIUM SERPL-SCNC: 3.8 MMOL/L (ref 3.6–5)
PROTHROMBIN TIME: 16 SEC (ref 12.2–14.7)
RBC # BLD AUTO: 3.2 10^6/UL (ref 4.35–5.85)
SODIUM SERPL-SCNC: 140 MMOL/L (ref 135–145)
WBC # BLD AUTO: 10.7 10^3/UL (ref 4.3–11)

## 2017-02-27 RX ADMIN — IPRATROPIUM BROMIDE AND ALBUTEROL SULFATE SCH ML: .5; 3 SOLUTION RESPIRATORY (INHALATION) at 11:18

## 2017-02-27 RX ADMIN — AMLODIPINE BESYLATE SCH MG: 5 TABLET ORAL at 20:40

## 2017-02-27 RX ADMIN — WARFARIN SODIUM SCH MG: 5 TABLET ORAL at 17:30

## 2017-02-27 RX ADMIN — IPRATROPIUM BROMIDE AND ALBUTEROL SULFATE SCH ML: .5; 3 SOLUTION RESPIRATORY (INHALATION) at 19:34

## 2017-02-27 RX ADMIN — LOSARTAN POTASSIUM SCH MG: 50 TABLET, FILM COATED ORAL at 08:55

## 2017-02-27 RX ADMIN — HYDROCODONE BITARTRATE AND ACETAMINOPHEN PRN TAB: 5; 325 TABLET ORAL at 13:40

## 2017-02-27 RX ADMIN — SENNOSIDES SCH MG: 8.6 TABLET, FILM COATED ORAL at 08:56

## 2017-02-27 RX ADMIN — AMIODARONE HYDROCHLORIDE SCH MG: 200 TABLET ORAL at 08:55

## 2017-02-27 RX ADMIN — PANTOPRAZOLE SODIUM SCH MG: 40 TABLET, DELAYED RELEASE ORAL at 06:17

## 2017-02-27 RX ADMIN — IPRATROPIUM BROMIDE AND ALBUTEROL SULFATE SCH ML: .5; 3 SOLUTION RESPIRATORY (INHALATION) at 07:14

## 2017-02-27 RX ADMIN — IPRATROPIUM BROMIDE AND ALBUTEROL SULFATE SCH ML: .5; 3 SOLUTION RESPIRATORY (INHALATION) at 14:12

## 2017-02-27 RX ADMIN — HYDROCHLOROTHIAZIDE SCH MG: 25 TABLET ORAL at 08:55

## 2017-02-27 RX ADMIN — METOPROLOL TARTRATE SCH MG: 50 TABLET, FILM COATED ORAL at 20:40

## 2017-02-27 RX ADMIN — DIGOXIN SCH MG: 125 TABLET ORAL at 08:56

## 2017-02-27 RX ADMIN — SENNOSIDES SCH MG: 8.6 TABLET, FILM COATED ORAL at 20:40

## 2017-02-27 RX ADMIN — FUROSEMIDE SCH MG: 40 TABLET ORAL at 08:55

## 2017-02-27 RX ADMIN — OXYCODONE HYDROCHLORIDE AND ACETAMINOPHEN PRN TAB: 5; 325 TABLET ORAL at 15:50

## 2017-02-27 RX ADMIN — POTASSIUM CHLORIDE SCH MEQ: 600 CAPSULE, EXTENDED RELEASE ORAL at 06:17

## 2017-02-27 RX ADMIN — IPRATROPIUM BROMIDE AND ALBUTEROL SULFATE PRN ML: .5; 3 SOLUTION RESPIRATORY (INHALATION) at 03:00

## 2017-02-27 RX ADMIN — METOPROLOL TARTRATE SCH MG: 50 TABLET, FILM COATED ORAL at 08:56

## 2017-02-27 RX ADMIN — HYDROCODONE BITARTRATE AND ACETAMINOPHEN PRN TAB: 5; 325 TABLET ORAL at 02:52
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
## 2017-02-27 NOTE — CARDIOLOGY PROGRESS NOTE
Cardiology SOAP Progress Note


Subjective:


No significant cardiac symptoms.





Objective:


I&O/Vital Signs





 Vital Sign - Last 12Hours








 2/27/17 2/27/17 2/27/17 2/27/17





 03:01 04:00 07:00 07:14


 


Temp  100.6  


 


Pulse  94 93 


 


Resp  20  


 


B/P  107/67  


 


Pulse Ox 93 92  90


 


O2 Delivery  Nasal Cannula  


 


O2 Flow Rate 4.50 4.00  4.50


 


    





 2/27/17 2/27/17 2/27/17 2/27/17





 08:00 11:18 12:00 13:00


 


Temp 98.9  100.2 


 


Pulse 95  86 86


 


Resp 20  22 


 


B/P 113/56  116/56 


 


Pulse Ox 91 91 90 


 


O2 Delivery Nasal Cannula  Nasal Cannula 


 


O2 Flow Rate 4.00 4.50 4.00 


 


    





 2/27/17   





 14:12   


 


Pulse Ox 93   


 


O2 Flow Rate 4.50   














 Intake and Output 


 


 2/27/17





 00:00


 


Intake Total 420 ml


 


Output Total 250 ml


 


Balance 170 ml








Weight (Pounds):  207


Weight (Ounces):  6.4


Weight (Calculated Kilograms):  94.701818


Constitutional:  No appears stated age, No AAO x 3, No apparent distress, No 

PERRL, No well-developed, No well-nourished, No other


Respiratory:  No accessory muscle use, No respiratory distress, No chest tender

, No chest expansion is symmetric, No chest is bilaterally symmetric, No lungs 

clear to percussion, No lungs clear to auscultation, No crackles, No rhonchi, 

No rales, No stridor, No wheezing, No pleural rub, No other


Cardiovascular:  No regular rate-rhythm, No irregularly irregular, No extra 

beats, No parasternal heave is noted, No JVD, No edema, No bradycardia, No 

tachycardia, No point of maximal impulse, No cardiac thrills are palpable, No 

S1 and S2, No gallop/S3, No gallop/S4, No diastolic murmur, No systolic murmur, 

No friction rub, No click, No other


Gastrointestional:  No tender, No soft, No round, No distended, No pulsatile 

mass, No organomegaly, No guarding, No rebound, No tenderness, No hernia, No 

mass, No audible bowel sounds, No abnormal bowel sounds, No abdominal bruits, 

No spleenomegaly, No other


Extremities:  No normal range of motion, No non-tender, No normal inspection, 

No pedal edema, No calf tenderness, No normal capillary refill, No pelvis stable

, No calf tenderness, No inflammation, No pedal edema, No slow capillary refill

, No swelling, No other, No abrasion, No clubbing, No cyanosis, No ecchymosis, 

No laceration, No no lower extremity edema bilateral, No significant edema, No 

tenderness, No wound


Neurologic/Psychiatric:  No CNs II-XII nml as tested, No no motor/sensory 

deficits, No alert, No normal mood/affect, No oriented x 3, No abnormal 

cerebellar tests, No abnormal CNs II-XII, No abnormal gait, No aphasia, No EOM 

palsy, No facial droop, No motor weakness, No sensory deficit, No depressed 

affect, No disoriented x 3, No other, No grossly intact, No power is 5/5 both 

on sides


Skin:  No normal color, No warm/dry, No cyanosis, No cool, No diaphoresis, No 

damp, No ecchymosis, No jaundice, No mottled, No pallor, No rash, No tattoos/

piercings, No ulcerations, No rash on exposed areas, No ulcerations on exposed 

areas, No other





Results/Procedures:


Labs


 Laboratory Tests


2/27/17 05:15: 


Anion Gap 11, BUN/Creatinine Ratio 14, Basophils # (Auto) 0.0, Basophils (%) (

Auto) 0, Blood Urea Nitrogen 10, Calcium Level 8.4L, Carbon Dioxide Level 23, 

Chloride Level 106, Creatinine 0.71, Eosinophils # (Auto) 0.3, Eosinophils (%) (

Auto) 2, Estimat Glomerular Filtration Rate > 60, Glucose Level 126H, 

Hematocrit 32L, Hemoglobin 10.5L, INR Comment 1.3, Lymphocytes # (Auto) 1.3, 

Lymphocytes (%) (Auto) 12, Mean Corpuscular Hemoglobin 33, Mean Corpuscular 

Hemoglobin Concent 33, Mean Corpuscular Volume 99, Mean Platelet Volume 10.3, 

Monocytes # (Auto) 1.4H, Monocytes (%) (Auto) 13H, Neutrophils # (Auto) 7.7, 

Neutrophils (%) (Auto) 72, Platelet Count 123L, Potassium Level 3.8, 

Prothrombin Time 16.0H, Red Blood Count 3.20L, Red Cell Distribution Width 15.6H

, Sodium Level 140, White Blood Count 10.7





 Microbiology


2/24/17 MRSA Screen - Final, Complete


          MRSA not isolated








A/P:


Assessment/Dx:





coronary artery disease,


Ischemic cardiomyopathy


ICD


previous history of atrial fibrillation


Plan:


ischemic cardiomyopathy, ICD, history of AF: Outpatient medications restarted 

including Coumadin, amiodarone. 


Dr. Chin to follow-up tomorrow morning if still inpatient.








Thank you for your consultation. Please call me if you have any questions.








JULIENNE Oglesby MD, FACP, FACC, FSCAI, FHRS, CCDS


Interventional Cardiology


Cardiac Electrophysiology


Vascular Medicine and Endovascular Interventions








PEREZ OGLESBY MD Feb 27, 2017 14:35
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
## 2017-02-27 NOTE — PHYSICAL THERAPY DAILY NOTE
PT Daily Note-Current


Subjective


Patient in bed pre tx, agrees to PT, has no complaints of pain.





Appearance


Patient in recliner at bedside post tx with chair alarm and legs elevated, has 

nurse call, phone, tray, wife in room.





Mental Status


Patient Orientation:  Person, Place, Situation


Attachments:  Oxygen


5L of O2 nasal canula





Transfers


              Functional [place] Measure


0=Not Assessed/NA   4=Minimal Assistance


1=Total Assistance   5=Supervision or Setup


2=Maximal Assistance   6=Modified [place]


3=Moderate Assistance   7=Complete IndependenceIRFPAI Quality Coding Scale











6 Independent with activity with or without an assistive device


 


5  Patient requires set up or clean up by helper.  Patient completes activity  

by  themselves


 


4 Supervision or touching assist (CGA). [place] provide cues , steadying assist


 


3 The helper provides less than half the effort to complete the activity


 


2 The helper provides more than half the effort to complete the activity


 


1 Dependent.  The helper does all the effort to complete an activity 


 


7 Patient refused to complete or attempt activity


 


9 The patient did not perform the activity before the current illness or injury


 


88 Not attempted due to Medical conditions or safety concerns








Transfers (B, C, W/C) (FIM):  1


Scootin


Rollin


Supine to/from Sit:  2


Sit to/from Stand:  1


Bed to/from Chair:  1


Patient very weak, almost dependent for bed mobility and transfers.  Patient 

could only help just a little when going supine to sit.  He wants to try to 

perform everything himself but he cannot.  Safety at the edge of the bed is poor

, he tends to slide forward on the bed trying to perform transfer and needed 

quick intervention from therapist in order to keep him from falling and perform 

the transfer.  Recommended to nurse to use the sit to stand machine for the 

transfer back when it is time.





Assessment


Current Status:  Poor Progress


Poor mobility, strength, safety awareness.





PT Long Term Goals


Long Term Goals


PT Long Term Goals Time Frame:  Mar 2, 2017


Transfers (B,C,W/C) (FIM):  4


Gait (FIM):  2


Gait distance (FIM):  8=258-12 ft


Gait Assistive Device:  FWW





PT Plan


Problem List


Problem List:  Activity Tolerance, Functional Strength, Safety, Balance, Gait, 

Transfer, Bed Mobility, ROM





Treatment/Plan


Treatment Plan:  Continue Plan of Care


Treatment Plan:  Bed Mobility, Functional Activity Farnaz, Functional Strength, 

Gait, Safety, Therapeutic Exercise, Transfers


Treatment Duration:  Mar 2, 2017


Visits Per Week:  11





Safety Risks/Education


Patient Education:  Transfer Techniques, Correct Positioning, Safety Issues


Teaching Recipient:  Patient


Teaching Methods:  Demonstration, Discussion


Response to Teaching:  Reinforcement Needed





Time/GCodes


Time In:  900


Time Out:  925


Total Billed Treatment Time:  30


Total Billed Treatment


1 visit


FA 25 min








JANET PEARSON PT 2017 09:31

## 2017-02-27 NOTE — PULMONARY CONSULTATION
History of Present Illness


History of Present Illness


Date of Consultation


17


 07:45


Date of Admission





History of Present Illness


77yo WM with hx of COPD oxygen dependent at night, CAD from [facility] 

presented to ED on  s/p fall out of his scooter and found to have left hip 

fracture.  He is s/p open reduction and internal fixation of left femur. CXR 

shows LLL atelectasis and questionable pneumonia. I am consulted for pulmonary 

management.





Allergies and Home Medications


Allergies


Coded Allergies:  


     Penicillins (Verified  Allergy, Severe, HIVES, SOB, 12/4/15)


     codeine (Verified  Allergy, Severe, SOB, HIVES, 12/4/15)





Home Medications


Amiodarone HCl 200 Mg Tablet 200 MG PO DAILY (Reported) 


Amiodarone HCl 200 Mg Tablet 400 MG PO saturday et  (Reported) 


Amlodipine Besylate 5 Mg Tablet 5 MG PO HS (Reported) 


Aspirin 81 Mg Tablet.dr 162 MG PO BID (Reported) 


Digoxin 125 Mcg Tablet 125 MCG PO DAILY (Reported) 


Esomeprazole Magnesium 40 Mg Cap 40 MG PO DAILY (Reported) 


Fluticasone Propionate 1 Ea Aero 2 PUFF IH PRN (Reported) 


Furosemide 40 Mg Tablet 40 MG PO DAILY (Reported) 


Losartan/Hydrochlorothiazide 1 Each Tablet 1 TAB PO DAILY (Reported) 


Metoprolol Tartrate 50 Mg Tablet 25 MG PO BID (Reported) 


   TAKES 1/2 OF A (50 MG) TABLET 


Mexiletine HCl 150 Mg Cap #100 150 MG PO TID (Reported) 


Potassium Chloride 8 Meq Tablet.er 8 MEQ PO DAILY (Reported) 


Sulfamethoxazole/Trimethoprim 1 Each Tablet #14 1 EA PO BID WITH MEALS 


   Prescribed by: TAMIR BUCHANAN on 12/7/15 1308





Past Medical-Social-Family Hx


Patient Social History


Alcohol Use:  Denies Use


Recreational Drug Use:  No


Smoking Status:  Former Smoker


Type Used:  Cigarettes


Former Smoker/When Quit:  1988


Recent Foreign Travel:  No


Contact w/Someone Who Travel:  No


Recent Infectious Disease Expo:  No


Recent Hopitalizations:  No


Physical Abuse Screen:  No


Sexual Abuse:  No





Immunizations Up To Date


Tetanus Booster (TDap):  More than 5yrs


Date of Pneumonia Vaccine:  Oct 1, 2014


Date of Influenza Vaccine:  Dec 5, 2016





Seasonal Allergies


Seasonal Allergies:  No





Surgeries


HX Surgeries:  Yes (skin cancer removal, cataract surgery, cardiac stents )


Surgeries:  CABG, Coronary Stent, Defibrillator, Eye Surgery





Respiratory


Hx Respiratory Disorders:  Yes


Respiratory Disorders:  Asthma, Chronic Bronchitis, Sleep Apnea





Cardiovascular


Hx Cardiac Disorders:  Yes (CABG)


Cardiac Disorders:  Coronary Artery Disease, Heart Attack





Neurological


Hx Neurological Disorders:  Yes


Neurological Disorders:  Dementia, Neuropathy





Reproductive System


Hx Reproductive Disorders:  Yes (unable to have children- mumps as a child)


Sexually Transmitted Disease:  No


HIV/AIDS:  No





Genitourinary


Hx Genitourinary Disorders:  Yes (current uti)


Genitourinary Disorders:  Kidney Stones





Gastrointestinal


Hx Gastrointestinal Disorders:  Yes


Gastrointestinal Disorders:  Gastroesophageal Reflux, Ulcer





Musculoskeletal


Hx Musculoskeletal Disorders:  Yes


Musculoskeletal Disorders:  Arthritis, Fractures





Endocrine


Hx Endocrine Disorders:  No





HEENT


HX ENT Disorders:  Yes


HEENT Disorders:  Cataract, Glaucoma


Loss of Vision:  Bilateral


Hearing Impairment:  Hard of Hearing





Cancer


Hx Cancer:  Yes


Cancer:  Skin





Psychosocial


Hx Psychiatric Problems:  Yes


Behavioral Health Disorders:  Depression





Integumentary


HX Skin/Integumentary Disorder:  Yes (shingles )





Blood Transfusions


Hx Blood Disorders:  No


Adverse Reaction to a Blood Tr:  No





Family Medical History


Significant Family History:  No Pertinent Family Hx


Family Medial History:  


Cardiovascular disease


  19 MOTHER


  G8 BROTHER


  G8 SISTER


Cervical cancer


  19 MOTHER





Exam


Exam





 Vital Signs








  Date Time  Temp Pulse Resp B/P Pulse Ox O2 Delivery O2 Flow Rate FiO2


 


17 07:14     90  4.50 


 


17 07:00  93      


 


17 04:00 100.6 94 20 107/67 92 Nasal Cannula 4.00 


 


17 03:01     93  4.50 


 


17 01:00  89      


 


17 00:00 98.5 86 18 117/64 92 Nasal Cannula 4.00 


 


17 20:00      Nasal Cannula 4.00 


 


17 19:53 99.3 99 22 118/58 93 Nasal Cannula 4.00 


 


17 19:11     93  4.00 


 


17 19:00  99      


 


17 16:20 100.8 115 22 114/64 93 Nasal Cannula 4.00 


 


17 16:03     94   


 


17 14:00      Nasal Cannula 4.00 


 


17 13:00  104      


 


17 13:00  105  106/69 94 Nasal Cannula  


 


17 12:50     92  3.00 


 


17 12:00  107  134/77 93 Nasal Cannula 4.00 


 


17 12:00     95 Nasal Cannula 4.00 


 


17 11:00  101  127/64 93 Nasal Cannula 4.00 


 


17 10:00  104  159/101 93 Nasal Cannula 4.00 


 


17 09:00  110  99/85 91 Nasal Cannula 4.00 


 


17 08:00     93 Nasal Cannula 4.00 


 


17 08:00 98.7 98 22 123/65 92 Nasal Cannula 4.00 














 I & O 


 


 17





 07:00


 


Intake Total 1100 ml


 


Output Total 725 ml


 


Balance 375 ml








General Appearance:   No Apparent Distress WD/WN Chronically ill Obese Other (

severely chronically ill)


HEENT:   PERRL/EOMI Normal ENT Inspection Pharynx Normal


Neck:   Full Range of Motion Normal Inspection Non Tender Supple Carotid Bruit


Respiratory:   Chest Non Tender No Accessory Muscle Use No Respiratory Distress 

Crackles (subtle in the bases) Decreased Breath Sounds


Cardiovascular:   No Edema No Gallop No JVD No Murmur Normal Peripheral Pulses 

Irregularly Irregular


Capillary Refill:  Less Than 3 Seconds


Gastrointestinal:   non tender soft


Extremity:   Normal Capillary Refill Normal Inspection Non Tender No Calf 

Tenderness No Pedal Edema Other (minimal movement of pelvis due to left hip pain

)


Neurologic/Psychiatric:   Alert Oriented x3 No Motor/Sensory Deficits 

Disoriented x3


Skin:   Normal Color Warm/Dry


Lymphatic:   No Adenopathy





Results


Lab


Laboratory Tests


17 03:45








17 05:15











Assessment/Plan


Assessment/Plan


Acute left hip fracture sustained in fall from scooter


Severe COPD nighttime oxygen


   -SVNS, oxygen 


pulmonary edema


   -lasix


Severe CAD with ischemic cardiomyopathy with defibrillator placement 1 year ago


Atrial fibrillation


Severe debility with poor prognosis long-term





Clinical Quality Measures


DVT/VTE Risk/Contraindication:


Risk Factor Score Per Nursin


RFS Level Per Nursing on Admit:  4+=Very High








JOSE A ZAMORA DO 2017 07:51

## 2017-02-27 NOTE — PHYSICAL THERAPY DAILY NOTE
PT Daily Note-Current


Subjective


Patient and wife states that he just was put back to bed, he had been up all 

morning.  He does not want to get back up but will participate in bed 

exercises.  Patient states he has no pain at rest.





Appearance


Patient in bed post tx with nurse call, phone, tray, SCD's on, wife in room.





Mental Status


Patient Orientation:  Person, Situation





Transfers


              Functional [place] Measure


0=Not Assessed/NA   4=Minimal Assistance


1=Total Assistance   5=Supervision or Setup


2=Maximal Assistance   6=Modified [place]


3=Moderate Assistance   7=Complete IndependenceIRFPAI Quality Coding Scale











6 Independent with activity with or without an assistive device


 


5  Patient requires set up or clean up by helper.  Patient completes activity  

by  themselves


 


4 Supervision or touching assist (CGA). [place] provide cues , steadying assist


 


3 The helper provides less than half the effort to complete the activity


 


2 The helper provides more than half the effort to complete the activity


 


1 Dependent.  The helper does all the effort to complete an activity 


 


7 Patient refused to complete or attempt activity


 


9 The patient did not perform the activity before the current illness or injury


 


88 Not attempted due to Medical conditions or safety concerns











Exercises


Supine Ex:  Ankle pumps, Quad Set, Heel Slides, Short Arc Quads, Straight leg 

raise, Hip abd/add


Supine Reps:  10





Treatments


functional strengthening, ROM





Assessment


Current Status:  Poor Progress


no change in mobility, patient resists practically any movement in his left leg





PT Long Term Goals


Long Term Goals


PT Long Term Goals Time Frame:  Mar 2, 2017


Transfers (B,C,W/C) (FIM):  4


Gait (FIM):  2


Gait distance (FIM):  2=646-03 ft


Gait Assistive Device:  FWW





PT Plan


Problem List


Problem List:  Activity Tolerance, Functional Strength, Safety, Balance, Gait, 

Transfer, Bed Mobility, ROM





Treatment/Plan


Treatment Plan:  Continue Plan of Care


Treatment Plan:  Bed Mobility, Functional Activity Farnaz, Functional Strength, 

Gait, Safety, Therapeutic Exercise, Transfers


Treatment Duration:  Mar 2, 2017


Visits Per Week:  11





Safety Risks/Education


Patient Education:  Disease Process


Teaching Recipient:  Patient, Significant Other


Teaching Methods:  Demonstration, Discussion


Response to Teaching:  Reinforcement Needed





Time/GCodes


Time In:  1345


Time Out:  1400


Total Billed Treatment Time:  15


Total Billed Treatment


1 visit


EX 15 min








JANET PEARSON PT Feb 27, 2017 14:06

## 2017-02-27 NOTE — DIAGNOSTIC IMAGING REPORT
INDICATION: COPD. Compared 2/26/2017.



FINDINGS:



There is poor inspiratory volume crowding the lung markings. The

interstitial opacities bilaterally showed mild progressive

density. This may merely be reflective of the decreased lung

volumes; however, increased edema or interstitial pneumonia

could not be excluded. Elevation of the right diaphragm again

noted. The heart mildly increased in size. There is mild

vascular congestion.



IMPRESSION: Slight increased heart size and venous caliber.

Decreased lung volumes. Elevated right diaphragm. Interstitial

opacities showing mild progression as discussed. No

pneumothorax.



Dictated by:



Dictated on workstation # [id number]

## 2017-02-27 NOTE — DIAGNOSTIC IMAGING REPORT
EXAMINATION:

AP and frog lateral views of the left hip.



INDICATION:

Left hip fracture status post ORIF.



FINDINGS:

There is an intertrochanteric fracture with minimal

displacement. There is an internal fixation medullary nail in

the femur with an interlocking screw into the femoral neck. No

subluxation or dislocation. Hip joint moderate osteoarthritis is

seen.



IMPRESSION:

Intact appearing hardware is noted with a reduced left

intertrochanteric fracture. There is minimal remaining

displacement.



Dictated by:



Dictated on workstation # [id number]

## 2017-02-27 NOTE — PROGRESS NOTE (SOAP)
Subjective


Subjective/Events-last exam


Kendall is a 75 y/o male who is POD #1 s/p left TFN which he tolerated well.  He 

has a significant hx of CAD with cardiomyopathy and Dr Joya and Dr Chin 

have been consulted.  He has not yet been OOB.  He is on coumadin.


Review of Systems


General:  No Chills, No Night Sweats


HEENT:  No Head Aches, No Visual Changes


Pulmonary:   Dyspnea CoughNo Pleuritic Chest Pain


Cardiovascular:  No: Chest Pain, Palpitations


Gastrointestinal:  No: Abdominal Pain, Nausea, Vomiting


Genitourinary:  No Dysuria,  Frequency Incontinence Hematuria Retention Other


Musculoskeletal:  : leg pain


Neurological:  : WeaknessNo: Change in speech, Confusion, Incoordination, 

Numbness





Objective


Exam





 Vital Signs








  Date Time  Temp Pulse Resp B/P Pulse Ox O2 Delivery O2 Flow Rate FiO2


 


17 07:14     90  4.50 


 


17 04:00 100.6 94 20 107/67 92 Nasal Cannula 4.00 


 


17 03:01     93  4.50 


 


17 01:00  89      


 


17 00:00 98.5 86 18 117/64 92 Nasal Cannula 4.00 


 


17 20:00      Nasal Cannula 4.00 


 


17 19:53 99.3 99 22 118/58 93 Nasal Cannula 4.00 


 


17 19:11     93  4.00 


 


17 19:00  99      


 


17 16:20 100.8 115 22 114/64 93 Nasal Cannula 4.00 


 


17 16:03     94   


 


17 14:00      Nasal Cannula 4.00 


 


17 13:00  104      


 


17 13:00  105  106/69 94 Nasal Cannula  


 


17 12:50     92  3.00 


 


17 12:00  107  134/77 93 Nasal Cannula 4.00 


 


17 12:00     95 Nasal Cannula 4.00 


 


17 11:00  101  127/64 93 Nasal Cannula 4.00 


 


17 10:00  104  159/101 93 Nasal Cannula 4.00 


 


17 09:00  110  99/85 91 Nasal Cannula 4.00 


 


17 08:00     93 Nasal Cannula 4.00 


 


17 08:00 98.7 98 22 123/65 92 Nasal Cannula 4.00 














 I & O 


 


 17





 07:00


 


Intake Total 1100 ml


 


Output Total 725 ml


 


Balance 375 ml





Capillary Refill : Less Than 3 SecondsLess Than 3 Seconds


General Appearance:   WD/WN Chronically ill Mild Distress


Neck:   Normal Inspection


Respiratory:   Chest Non Tender No Accessory Muscle Use Rhonci Wheezing


Cardiovascular:   Regular Rate, Rhythm No Edema


Gastrointestinal:   normal bowel sounds non tender soft no pulsatile mass


Extremity:   Normal Inspection No Calf Tenderness No Pedal Edema Other (bandage 

to left hip)


Neurologic/Psychiatric:   Alert Oriented x3





Results


Lab


 Laboratory Tests


17 05:15: 


Anion Gap 11, BUN/Creatinine Ratio 14, Basophils # (Auto) 0.0, Basophils (%) (

Auto) 0, Blood Urea Nitrogen 10, Calcium Level 8.4L, Carbon Dioxide Level 23, 

Chloride Level 106, Creatinine 0.71, Eosinophils # (Auto) 0.3, Eosinophils (%) (

Auto) 2, Estimat Glomerular Filtration Rate > 60, Glucose Level 126H, 

Hematocrit 32L, Hemoglobin 10.5L, INR Comment 1.3, Lymphocytes # (Auto) 1.3, 

Lymphocytes (%) (Auto) 12, Mean Corpuscular Hemoglobin 33, Mean Corpuscular 

Hemoglobin Concent 33, Mean Corpuscular Volume 99, Mean Platelet Volume 10.3, 

Monocytes # (Auto) 1.4H, Monocytes (%) (Auto) 13H, Neutrophils # (Auto) 7.7, 

Neutrophils (%) (Auto) 72, Platelet Count 123L, Potassium Level 3.8, 

Prothrombin Time 16.0H, Red Blood Count 3.20L, Red Cell Distribution Width 15.6H

, Sodium Level 140, White Blood Count 10.7





 Microbiology


17 MRSA Screen - Final, Complete


          MRSA not isolated





Assessment/Plan


Assessment/Plan


Assess & Plan/Chief Complaint


POD #1 s/p left TFN


CAD with cardiomyopathy


COPD





Plan:





OOB with PT full WBAT


pain control


compression stockings for DVT prophylaxis - has resumed coumadin


IS at bedside and encouraged


left hip x-ray today


Diagnosis/Problems:  





Clinical Quality Measures


DVT/VTE Risk/Contraindication:


Risk Factor Score Per Nursin


RFS Level Per Nursing on Admit:  4+=Very High








CHAPIS SCHOFIELD 2017 07:46

## 2017-02-28 VITALS — SYSTOLIC BLOOD PRESSURE: 111 MMHG | DIASTOLIC BLOOD PRESSURE: 62 MMHG

## 2017-02-28 VITALS — DIASTOLIC BLOOD PRESSURE: 73 MMHG | SYSTOLIC BLOOD PRESSURE: 134 MMHG

## 2017-02-28 VITALS — DIASTOLIC BLOOD PRESSURE: 55 MMHG | SYSTOLIC BLOOD PRESSURE: 114 MMHG

## 2017-02-28 VITALS — DIASTOLIC BLOOD PRESSURE: 58 MMHG | SYSTOLIC BLOOD PRESSURE: 112 MMHG

## 2017-02-28 VITALS — DIASTOLIC BLOOD PRESSURE: 57 MMHG | SYSTOLIC BLOOD PRESSURE: 124 MMHG

## 2017-02-28 VITALS — DIASTOLIC BLOOD PRESSURE: 72 MMHG | SYSTOLIC BLOOD PRESSURE: 135 MMHG

## 2017-02-28 LAB
%SAT TOTAL IRON BINDING CAPIC: 6 % (ref 15–50)
ANION GAP SERPL CALC-SCNC: 11 MMOL/L (ref 5–14)
BASOPHILS # BLD AUTO: 0 10^3/UL (ref 0–0.1)
BASOPHILS NFR BLD AUTO: 0 % (ref 0–10)
BUN SERPL-MCNC: 19 MG/DL (ref 7–18)
BUN/CREAT SERPL: 23
CALCIUM SERPL-MCNC: 8.7 MG/DL (ref 8.5–10.1)
CHLORIDE SERPL-SCNC: 105 MMOL/L (ref 98–107)
CO2 SERPL-SCNC: 24 MMOL/L (ref 21–32)
CREAT SERPL-MCNC: 0.82 MG/DL (ref 0.6–1.3)
EOSINOPHIL # BLD AUTO: 0.5 10^3/UL (ref 0–0.3)
EOSINOPHIL NFR BLD AUTO: 5 % (ref 0–10)
ERYTHROCYTE [DISTWIDTH] IN BLOOD BY AUTOMATED COUNT: 15.8 % (ref 10–14.5)
GFR SERPLBLD BASED ON 1.73 SQ M-ARVRAT: > 60 ML/MIN
GLUCOSE SERPL-MCNC: 127 MG/DL (ref 70–105)
INR PPP: 1.4 (ref 0.8–1.4)
LYMPHOCYTES # BLD AUTO: 1.4 X 10^3 (ref 1–4)
LYMPHOCYTES NFR BLD AUTO: 16 % (ref 12–44)
MCH RBC QN AUTO: 32 PG (ref 25–34)
MCHC RBC AUTO-ENTMCNC: 32 G/DL (ref 32–36)
MCV RBC AUTO: 100 FL (ref 80–99)
MONOCYTES # BLD AUTO: 1 X 10^3 (ref 0–1)
MONOCYTES NFR BLD AUTO: 11 % (ref 0–12)
NEUTROPHILS # BLD AUTO: 6.1 X 10^3 (ref 1.8–7.8)
NEUTROPHILS NFR BLD AUTO: 68 % (ref 42–75)
PLATELET # BLD: 137 10^3/UL (ref 130–400)
PMV BLD AUTO: 10.6 FL (ref 7.4–10.4)
POTASSIUM SERPL-SCNC: 3.7 MMOL/L (ref 3.6–5)
PROTHROMBIN TIME: 17.2 SEC (ref 12.2–14.7)
RBC # BLD AUTO: 3.09 10^6/UL (ref 4.35–5.85)
SODIUM SERPL-SCNC: 140 MMOL/L (ref 135–145)
TIBC SERPL-MCNC: 196 UG/DL (ref 280–380)
UIBC SERPL-MCNC: 185 UG/DL (ref 55–450)
WBC # BLD AUTO: 8.9 10^3/UL (ref 4.3–11)

## 2017-02-28 RX ADMIN — HYDROCODONE BITARTRATE AND ACETAMINOPHEN PRN TAB: 5; 325 TABLET ORAL at 01:06

## 2017-02-28 RX ADMIN — HYDROCODONE BITARTRATE AND ACETAMINOPHEN PRN TAB: 5; 325 TABLET ORAL at 16:00

## 2017-02-28 RX ADMIN — IPRATROPIUM BROMIDE AND ALBUTEROL SULFATE SCH ML: .5; 3 SOLUTION RESPIRATORY (INHALATION) at 15:23

## 2017-02-28 RX ADMIN — SENNOSIDES SCH MG: 8.6 TABLET, FILM COATED ORAL at 20:21

## 2017-02-28 RX ADMIN — HYDROCHLOROTHIAZIDE SCH MG: 25 TABLET ORAL at 09:01

## 2017-02-28 RX ADMIN — IPRATROPIUM BROMIDE AND ALBUTEROL SULFATE PRN ML: .5; 3 SOLUTION RESPIRATORY (INHALATION) at 03:06

## 2017-02-28 RX ADMIN — SENNOSIDES SCH MG: 8.6 TABLET, FILM COATED ORAL at 09:02

## 2017-02-28 RX ADMIN — METOPROLOL TARTRATE SCH MG: 50 TABLET, FILM COATED ORAL at 09:01

## 2017-02-28 RX ADMIN — WARFARIN SODIUM SCH MG: 5 TABLET ORAL at 19:04

## 2017-02-28 RX ADMIN — LOSARTAN POTASSIUM SCH MG: 50 TABLET, FILM COATED ORAL at 09:01

## 2017-02-28 RX ADMIN — IPRATROPIUM BROMIDE AND ALBUTEROL SULFATE SCH ML: .5; 3 SOLUTION RESPIRATORY (INHALATION) at 10:18

## 2017-02-28 RX ADMIN — HYDROCODONE BITARTRATE AND ACETAMINOPHEN PRN TAB: 5; 325 TABLET ORAL at 06:01

## 2017-02-28 RX ADMIN — METOPROLOL TARTRATE SCH MG: 50 TABLET, FILM COATED ORAL at 20:21

## 2017-02-28 RX ADMIN — AMIODARONE HYDROCHLORIDE SCH MG: 200 TABLET ORAL at 09:01

## 2017-02-28 RX ADMIN — AMLODIPINE BESYLATE SCH MG: 5 TABLET ORAL at 20:21

## 2017-02-28 RX ADMIN — FUROSEMIDE SCH MG: 40 TABLET ORAL at 09:02

## 2017-02-28 RX ADMIN — POTASSIUM CHLORIDE SCH MEQ: 600 CAPSULE, EXTENDED RELEASE ORAL at 06:01

## 2017-02-28 RX ADMIN — PANTOPRAZOLE SODIUM SCH MG: 40 TABLET, DELAYED RELEASE ORAL at 06:01

## 2017-02-28 RX ADMIN — DIGOXIN SCH MG: 125 TABLET ORAL at 09:01

## 2017-02-28 RX ADMIN — IPRATROPIUM BROMIDE AND ALBUTEROL SULFATE SCH ML: .5; 3 SOLUTION RESPIRATORY (INHALATION) at 19:38

## 2017-02-28 RX ADMIN — IPRATROPIUM BROMIDE AND ALBUTEROL SULFATE SCH ML: .5; 3 SOLUTION RESPIRATORY (INHALATION) at 06:55

## 2017-02-28 NOTE — PHYSICAL THERAPY DAILY NOTE
PT Daily Note-Current


Subjective


Patient in bed pre tx, he and his wife state that nursing just got him back to 

bed using the sit to stand machine and he will not be able to get up right now.

  Patient agrees to hip protocol in bed.





Pain





   Numeric Pain Scale:  3


   Location:  Left


   Location Body Site:  Hip





Appearance


Patient in bed post tx with nurse call, phone, tray, wife in room.





Mental Status


Patient Orientation:  Person, Place, Situation


Attachments:  Oxygen





Transfers


              Functional [place] Measure


0=Not Assessed/NA   4=Minimal Assistance


1=Total Assistance   5=Supervision or Setup


2=Maximal Assistance   6=Modified [place]


3=Moderate Assistance   7=Complete IndependenceIRFPAI Quality Coding Scale











6 Independent with activity with or without an assistive device


 


5  Patient requires set up or clean up by helper.  Patient completes activity  

by  themselves


 


4 Supervision or touching assist (CGA). [place] provide cues , steadying assist


 


3 The helper provides less than half the effort to complete the activity


 


2 The helper provides more than half the effort to complete the activity


 


1 Dependent.  The helper does all the effort to complete an activity 


 


7 Patient refused to complete or attempt activity


 


9 The patient did not perform the activity before the current illness or injury


 


88 Not attempted due to Medical conditions or safety concerns











Exercises


Supine Ex:  Ankle pumps, Quad Set, Glut sets, Heel Slides, Short Arc Quads, 

Straight leg raise, Hip abd/add


Supine Reps:  10





Treatments


functional strengthening, patient states his left heel is getting sore, no 

excessive redness or sores upon inspection, pillow used to elevate left heel 

post tx





Assessment


Current Status:  Poor Progress


no change in mobility, patient is barely able to more his left leg and resists 

movement due to pain at the end of tx he states "I think I'm doing pretty good, 

what do you think".  Still difficulty with breathing just laying in bed.





PT Long Term Goals


Long Term Goals


PT Long Term Goals Time Frame:  Mar 2, 2017


Transfers (B,C,W/C) (FIM):  4


Gait (FIM):  2


Gait distance (FIM):  0=586-84 ft


Gait Assistive Device:  FWW





PT Plan


Problem List


Problem List:  Activity Tolerance, Functional Strength, Safety, Balance, Gait, 

Transfer, Bed Mobility, ROM





Treatment/Plan


Treatment Plan:  Continue Plan of Care


Treatment Plan:  Bed Mobility, Functional Activity Farnaz, Functional Strength, 

Gait, Safety, Therapeutic Exercise, Transfers


Treatment Duration:  Mar 2, 2017


Visits Per Week:  11





Safety Risks/Education


Patient Education:  Correct Positioning, Safety Issues


Teaching Recipient:  Patient


Teaching Methods:  Demonstration, Discussion


Response to Teaching:  Reinforcement Needed





Time/GCodes


Time In:  1430


Time Out:  1445


Total Billed Treatment Time:  15


Total Billed Treatment


1 visit


EX 15 min








JANET PEARSON PT Feb 28, 2017 14:54

## 2017-02-28 NOTE — PHYSICIAN QUERY
PQ-Link Manifestation-Etiology


Admission/Discharge


Admission Date: Feb 24, 2017 at 18:44 


Discharge Date:


The medical record reflects the following clinical scenario:


History/Risk Factors:


Ischemic cardiomyopathy,Cardiac defibrillator and Severe COPD.


Clinical Findings:


Pulmonary edema per your 2/27 progress note, 2/28 chest x ray impression: 

Improving congestive heart failure.


Treatment:


40mg Lasix, IS, DuoNeb breathing treatments.





Question: 1.  Can you specify if the pulmonary edema is due to/associated with 

congestive heart failure?


                2.  If so, please specify, acute,chronic or acute on chronic.


                3.  If so, please specify if systolic, diastolic, combined or 

unspecified or unable to clinically determine?


                4.  If pulmonary edema only, please clarify if acute,chronic, 

unspecified or unable to clinically determine? Please document a response below


Manifestation due to/assoic:  Clinically undetermined














In responding to this query, please exercise your independent professional 

judgment.  The purpose of this communication is to more accurately reflect the 

complexity of your patients condition. The fact that a question is asked does 

not imply that any particular answer is desired or expected.  





Thank you for your timely response to this clarification.      


   


Requestors name: Danita Gonzalez [facility]   





Phone # ext. [phone] or [phone]








THIS PHYSICIAN QUERY FORM IS A PERMANENT PART OF THE MEDICAL RECORD











DANITA GONZALEZ Feb 28, 2017 11:34


JOSE A ZAMORA DO Mar 29, 2017 09:38

## 2017-02-28 NOTE — PULMONARY PROGRESS NOTE
Subjective


Subjective/Events-last exam


NO complications noted. Pt denies SOB or productive cough..





Exam


Exam





 Vital Signs








  Date Time  Temp Pulse Resp B/P Pulse Ox O2 Delivery O2 Flow Rate FiO2


 


17 07:09     93  5.00 


 


17 04:00 99.5 85 18 112/58 95 Nasal Cannula 4.00 


 


17 03:06     95  5.00 


 


17 01:00  86      


 


17 00:00 100.2 85 18 111/62 94 Nasal Cannula 4.00 


 


17 21:01 98.8 85 22 113/54 95 Nasal Cannula 4.00 


 


17 20:59 98.8       


 


17 20:40      Nasal Cannula 4.00 


 


17 19:34     94  5.00 


 


17 19:00  80      


 


17 16:20 99.4       


 


17 15:50 100.2       


 


17 15:29 100.2 90 22 129/59 94 Nasal Cannula 4.00 


 


17 14:12     93  4.50 


 


17 13:00  86      


 


17 12:00 100.2 86 22 116/56 90 Nasal Cannula 4.00 


 


17 11:18     91  4.50 


 


17 08:37     94 Nasal Cannula 4.00 


 


17 08:00 98.9 95 20 113/56 91 Nasal Cannula 4.00 














 I & O 


 


 17





 07:00


 


Intake Total 1840 ml


 


Output Total 925 ml


 


Balance 915 ml








General Appearance:   No Apparent Distress WD/WN Chronically ill Obese Other (

severely chronically ill)


HEENT:   PERRL/EOMI Normal ENT Inspection Pharynx Normal


Neck:   Full Range of Motion Normal Inspection Non Tender Supple Carotid Bruit


Respiratory:   Chest Non Tender No Accessory Muscle Use No Respiratory Distress 

Crackles (subtle in the bases) Decreased Breath Sounds


Cardiovascular:   No Edema No Gallop No JVD No Murmur Normal Peripheral Pulses 

Irregularly Irregular


Capillary Refill:  Less Than 3 Seconds


Gastrointestinal:   non tender soft


Extremity:   Normal Capillary Refill Normal Inspection Non Tender No Calf 

Tenderness No Pedal Edema Other (minimal movement of pelvis due to left hip pain

)


Neurologic/Psychiatric:   Alert Oriented x3 No Motor/Sensory Deficits 

Disoriented x3


Skin:   Normal Color Warm/Dry


Lymphatic:   No Adenopathy





Results


Lab


Laboratory Tests


17 05:15








17 04:40











Assessment/Plan


Assessment/Plan


Acute left hip fracture sustained in fall from scooter


Severe COPD nighttime oxygen


   -SVNS, oxygen 


Atelectasis


   -increase activity, IS


pulmonary edema


   -lasix


Severe CAD with ischemic cardiomyopathy with defibrillator placement 1 year ago


Atrial fibrillation


Severe debility with poor prognosis long-term








Pt is ok for discharge from pulmonary standpoint.





Clinical Quality Measures


DVT/VTE Risk/Contraindication:


Risk Factor Score Per Nursin


RFS Level Per Nursing on Admit:  4+=Very High








JOSE A ZAMORA DO 2017 07:36

## 2017-02-28 NOTE — PROGRESS NOTE (SOAP)
Subjective


Subjective/Events-last exam


Increasing productive cough at this time. Not able to titrate down on oxygen at 

all. Tolerating PO. Pain well controlled on PO medications. BM yesterday.


Date seen by provider:  2017





Objective


Exam


Last Set of Vital Signs





Vital Signs








  Date Time  Temp Pulse Resp B/P Pulse Ox O2 Delivery O2 Flow Rate FiO2


 


17 10:19     88  5.00 


 


17 08:37 97.3 84 20 124/57  Nasal Cannula  





Capillary Refill : Less Than 3 SecondsLess Than 3 Seconds


I&O











 Intake and Output 


 


 17





 00:00


 


Intake Total 1740 ml


 


Output Total 850 ml


 


Balance 890 ml


 


 


 


Intake Oral 1740 ml


 


Output Urine Total 850 ml








General:  Alert, Oriented X3, Cooperative, No Acute Distress


Neck:  Supple, No JVD


Lungs:  Other (+ wheezing scattered)


Heart:  Regular Rate, Normal S1, Normal S2, No Murmurs


Abdomen:  Normal Bowel Sounds, Soft, No Tenderness, No Hepatosplenomegaly


Skin:  No Rashes


Psych/Mental Status:  Mental Status NL, Mood NL





Results/Procedures


Lab


 Laboratory Tests


17 04:40: 


Anion Gap 11, BUN/Creatinine Ratio 23, Basophils # (Auto) 0.0, Basophils (%) (

Auto) 0, Blood Urea Nitrogen 19H, Calcium Level 8.7, Carbon Dioxide Level 24, 

Chloride Level 105, Creatinine 0.82, Eosinophils # (Auto) 0.5H, Eosinophils (%) 

(Auto) 5, Estimat Glomerular Filtration Rate > 60, Glucose Level 127H, 

Hematocrit 31L, Hemoglobin 9.9L, INR Comment 1.4, Lymphocytes # (Auto) 1.4, 

Lymphocytes (%) (Auto) 16, Mean Corpuscular Hemoglobin 32, Mean Corpuscular 

Hemoglobin Concent 32, Mean Corpuscular Volume 100H, Mean Platelet Volume 10.6H

, Monocytes # (Auto) 1.0, Monocytes (%) (Auto) 11, Neutrophils # (Auto) 6.1, 

Neutrophils (%) (Auto) 68, Platelet Count 137, Potassium Level 3.7, Prothrombin 

Time 17.2H, Red Blood Count 3.09L, Red Cell Distribution Width 15.8H, Sodium 

Level 140, White Blood Count 8.9





 Microbiology


17 MRSA Screen - Final, Complete


          MRSA not isolated





Assessment/Plan


Assessment/Plan


Plan


77 yo M that was admitted after he fell from his scooter and was found to have 

Left hip fracture, now POD #3





Plan


Left hip fracture s/p ORIF by Ortho now POD #3


   - Pain is well controlled, Continue Stool softeners and stimulates


   - Encouraged up to chair at least TID


   - Continue IS


   - Accepted by Inpatient Rehab


   - Continue Coumadin, PT/INR tomorrow


CAD with ischemic Cardiomyopathy


   - Denies chest pain, Stable post surgery


COPD with oxygen dependence at night


   - Currently on oxygen during the day as well


   - Continue PRN and scheduled breathing treatments


   - Patient high risk for Post op PNA, Encouraged activity and frequent IS


   - Started on steroid burst today


Severe Debility


   - patient to inpatient rehab


FEN: Heart healthy diet


DVT: Lovenox/coumadin


Dispo: D/c to IRF tomorrow


Diagnosis/Problems:  





Clinical Quality Measures


DVT/VTE Risk/Contraindication:


Risk Factor Score Per Nursin


RFS Level Per Nursing on Admit:  4+=Very High








ARSEN LAWS MD 2017 12:04

## 2017-02-28 NOTE — PHYSICAL THERAPY DAILY NOTE
PT Daily Note-Current


Subjective


Patient in bed pre tx, agrees to PT, states he has pain of 8/10 in left hip.  

Patient is wheezing and SOB just laying in the bed.





Appearance


Patient in recliner post tx with nurse call, phone, tray, wife in room.





Mental Status


Patient Orientation:  Person, Place, Situation


Attachments:  Oxygen





Transfers


              Functional [place] Measure


0=Not Assessed/NA   4=Minimal Assistance


1=Total Assistance   5=Supervision or Setup


2=Maximal Assistance   6=Modified [place]


3=Moderate Assistance   7=Complete IndependenceIRFPAI Quality Coding Scale











6 Independent with activity with or without an assistive device


 


5  Patient requires set up or clean up by helper.  Patient completes activity  

by  themselves


 


4 Supervision or touching assist (CGA). [place] provide cues , steadying assist


 


3 The helper provides less than half the effort to complete the activity


 


2 The helper provides more than half the effort to complete the activity


 


1 Dependent.  The helper does all the effort to complete an activity 


 


7 Patient refused to complete or attempt activity


 


9 The patient did not perform the activity before the current illness or injury


 


88 Not attempted due to Medical conditions or safety concerns








Transfers (B, C, W/C) (FIM):  1


Scootin


Rollin


Supine to/from Sit:  2


Sit to/from Stand:  1


Bed to/from Chair:  1


Patient was transferred to a chair from the bed with therapist doing about 90% 

of the work.  After the transfer patient states "well that wasn't too bad, how 

did I do".





Exercises


Seated Therapy Exercises:  Ankle pumps, Long arc quads, Hip flexion


Seated Reps:  20





Treatments


bed mobility and transfers, functional strengthening





Assessment


Current Status:  Poor Progress


Patient has had no changes in mobility.  He seems unaware of his deficits.  

Patient has a lot of trouble breathing.





PT Long Term Goals


Long Term Goals


PT Long Term Goals Time Frame:  Mar 2, 2017


Transfers (B,C,W/C) (FIM):  4


Gait (FIM):  2


Gait distance (FIM):  7=079-79 ft


Gait Assistive Device:  FWW





PT Plan


Problem List


Problem List:  Activity Tolerance, Functional Strength, Safety, Balance, Gait, 

Transfer, Bed Mobility, ROM





Treatment/Plan


Treatment Plan:  Continue Plan of Care


Treatment Plan:  Bed Mobility, Functional Activity Farnaz, Functional Strength, 

Gait, Safety, Therapeutic Exercise, Transfers


Treatment Duration:  Mar 2, 2017


Visits Per Week:  11





Safety Risks/Education


Patient Education:  Transfer Techniques, Correct Positioning, Safety Issues


Teaching Recipient:  Patient


Teaching Methods:  Demonstration, Discussion


Response to Teaching:  Reinforcement Needed





Time/GCodes


Time In:  930


Time Out:  950


Total Billed Treatment Time:  20


Total Billed Treatment


1 visit


FA 20 min








JANET PEARSON PT 2017 09:54

## 2017-02-28 NOTE — PROGRESS NOTE (SOAP)
Subjective


Subjective/Events-last exam


POD #2 s/p left TFN.  Has some hip pain.  He has hx of COPD and CAD and has 

been seen by Dr Van who is ok with d/c.  Will likely need rehab placement 

for strengthening.


Review of Systems


General:  No Chills, No Night Sweats


Pulmonary:   Cough


Cardiovascular:  No: Chest Pain, Palpitations


Gastrointestinal:  No: Abdominal Pain, Nausea, Vomiting


Musculoskeletal:  : leg pain


Neurological:  : WeaknessNo: Change in speech, Confusion, Incoordination, 

Numbness





Objective


Exam





 Vital Signs








  Date Time  Temp Pulse Resp B/P Pulse Ox O2 Delivery O2 Flow Rate FiO2


 


17 10:19     88  5.00 


 


17 08:37 97.3 84 20 124/57 91 Nasal Cannula 4.00 


 


17 08:00     91 Nasal Cannula 4.00 


 


17 07:09     93  5.00 


 


17 07:00  80      


 


17 04:00 99.5 85 18 112/58 95 Nasal Cannula 4.00 


 


17 03:06     95  5.00 


 


17 01:00  86      


 


17 00:00 100.2 85 18 111/62 94 Nasal Cannula 4.00 


 


17 21:01 98.8 85 22 113/54 95 Nasal Cannula 4.00 


 


17 20:59 98.8       


 


17 20:40      Nasal Cannula 4.00 


 


17 19:34     94  5.00 


 


17 19:00  80      


 


17 16:20 99.4       


 


17 15:50 100.2       


 


17 15:29 100.2 90 22 129/59 94 Nasal Cannula 4.00 


 


17 14:12     93  4.50 


 


17 13:00  86      


 


17 12:00 100.2 86 22 116/56 90 Nasal Cannula 4.00 














 I & O 


 


 17





 07:00


 


Intake Total 1840 ml


 


Output Total 925 ml


 


Balance 915 ml





Capillary Refill : Less Than 3 SecondsLess Than 3 Seconds


General Appearance:   No Apparent Distress


Neck:   Normal Inspection


Respiratory:   No Respiratory Distress


Cardiovascular:   Normal Peripheral Pulses


Peripheral Pulses:  2+ Dorsalis Pedis (R), 2+ Left Dors-Pedis (L)


Gastrointestinal:   soft no pulsatile mass


Extremity:   Non Tender No Calf Tenderness


Neurologic/Psychiatric:   Alert Oriented x3 No Motor/Sensory Deficits Normal 

Mood/Affect


Skin:   Normal Color Warm/Dry





Results


Lab


 Laboratory Tests


17 04:40: 


Anion Gap 11, BUN/Creatinine Ratio 23, Basophils # (Auto) 0.0, Basophils (%) (

Auto) 0, Blood Urea Nitrogen 19H, Calcium Level 8.7, Carbon Dioxide Level 24, 

Chloride Level 105, Creatinine 0.82, Eosinophils # (Auto) 0.5H, Eosinophils (%) 

(Auto) 5, Estimat Glomerular Filtration Rate > 60, Glucose Level 127H, 

Hematocrit 31L, Hemoglobin 9.9L, INR Comment 1.4, Lymphocytes # (Auto) 1.4, 

Lymphocytes (%) (Auto) 16, Mean Corpuscular Hemoglobin 32, Mean Corpuscular 

Hemoglobin Concent 32, Mean Corpuscular Volume 100H, Mean Platelet Volume 10.6H

, Monocytes # (Auto) 1.0, Monocytes (%) (Auto) 11, Neutrophils # (Auto) 6.1, 

Neutrophils (%) (Auto) 68, Platelet Count 137, Potassium Level 3.7, Prothrombin 

Time 17.2H, Red Blood Count 3.09L, Red Cell Distribution Width 15.8H, Sodium 

Level 140, White Blood Count 8.9





 Microbiology


17 MRSA Screen - Final, Complete


          MRSA not isolated





Assessment/Plan


Assessment/Plan


Assess & Plan/Chief Complaint


POD #1 s/p left TFN


CAD with cardiomyopathy


COPD





Plan:





OOB with PT full WBAT


continue working with PT


pain control


compression stockings for DVT prophylaxis - has resumed coumadin


IS at bedside and encouraged


follow up in clinic in 2 weeks


Diagnosis/Problems:  





Clinical Quality Measures


DVT/VTE Risk/Contraindication:


Risk Factor Score Per Nursin


RFS Level Per Nursing on Admit:  4+=Very High








CHAPIS SCHOFIELD 2017 11:55

## 2017-02-28 NOTE — DIAGNOSTIC IMAGING REPORT
INDICATION:

COPD, ICU management.



COMPARISON STUDY:

Chest from yesterday.



FINDINGS:

A portable upright view of the chest demonstrates stable

cardiomegaly and post operative changes. Some edema and

pulmonary congestion have decreased.



IMPRESSION:

Improving congestive heart failure.



Dictated by:



Dictated on workstation # [id number]

## 2017-02-28 NOTE — CARDIOLOGY PROGRESS NOTE
Cardiology SOAP Progress Note


Subjective:


No cardiac complaints





Objective:


I&O/Vital Signs





 Vital Sign - Last 12Hours








 2/28/17 2/28/17 2/28/17 2/28/17





 01:00 03:06 04:00 07:00


 


Temp   99.5 


 


Pulse 86  85 80


 


Resp   18 


 


B/P   112/58 


 


Pulse Ox  95 95 


 


O2 Delivery   Nasal Cannula 


 


O2 Flow Rate  5.00 4.00 


 


    





 2/28/17 2/28/17 2/28/17 2/28/17





 07:09 08:00 08:37 10:19


 


Temp   97.3 


 


Pulse   84 


 


Resp   20 


 


B/P   124/57 


 


Pulse Ox 93 91 91 88


 


O2 Delivery  Nasal Cannula Nasal Cannula 


 


O2 Flow Rate 5.00 4.00 4.00 5.00














 Intake and Output 


 


 2/28/17





 00:00


 


Intake Total 1540 ml


 


Output Total 600 ml


 


Balance 940 ml








Weight (Pounds):  207


Weight (Ounces):  6.4


Weight (Calculated Kilograms):  94.459962


Constitutional:  No appears stated age, No AAO x 3, No apparent distress, No 

PERRL, No well-developed, No well-nourished, No other


Respiratory:  No accessory muscle use, No respiratory distress, No chest tender

, No chest expansion is symmetric, No chest is bilaterally symmetric, No lungs 

clear to percussion, No lungs clear to auscultation, No crackles, No rhonchi, 

No rales, No stridor, No wheezing, No pleural rub, No other


Cardiovascular:  No regular rate-rhythm, No irregularly irregular, No extra 

beats, No parasternal heave is noted, No JVD, No edema, No bradycardia, No 

tachycardia, No point of maximal impulse, No cardiac thrills are palpable, No 

S1 and S2, No gallop/S3, No gallop/S4, No diastolic murmur, No systolic murmur, 

No friction rub, No click, No other


Gastrointestional:  No tender, No soft, No round, No distended, No pulsatile 

mass, No organomegaly, No guarding, No rebound, No tenderness, No hernia, No 

mass, No audible bowel sounds, No abnormal bowel sounds, No abdominal bruits, 

No spleenomegaly, No other


Extremities:  No normal range of motion, No non-tender, No normal inspection, 

No pedal edema, No calf tenderness, No normal capillary refill, No pelvis stable

, No calf tenderness, No inflammation, No pedal edema, No slow capillary refill

, No swelling, No other, No abrasion, No clubbing, No cyanosis, No ecchymosis, 

No laceration, No no lower extremity edema bilateral, No significant edema, No 

tenderness, No wound


Neurologic/Psychiatric:  No CNs II-XII nml as tested, No no motor/sensory 

deficits, No alert, No normal mood/affect, No oriented x 3, No abnormal 

cerebellar tests, No abnormal CNs II-XII, No abnormal gait, No aphasia, No EOM 

palsy, No facial droop, No motor weakness, No sensory deficit, No depressed 

affect, No disoriented x 3, No other, No grossly intact, No power is 5/5 both 

on sides


Skin:  No normal color, No warm/dry, No cyanosis, No cool, No diaphoresis, No 

damp, No ecchymosis, No jaundice, No mottled, No pallor, No rash, No tattoos/

piercings, No ulcerations, No rash on exposed areas, No ulcerations on exposed 

areas, No other





Results/Procedures:


Labs


 Laboratory Tests


2/28/17 04:40: 


Anion Gap 11, BUN/Creatinine Ratio 23, Basophils # (Auto) 0.0, Basophils (%) (

Auto) 0, Blood Urea Nitrogen 19H, Calcium Level 8.7, Carbon Dioxide Level 24, 

Chloride Level 105, Creatinine 0.82, Eosinophils # (Auto) 0.5H, Eosinophils (%) 

(Auto) 5, Estimat Glomerular Filtration Rate > 60, Glucose Level 127H, 

Hematocrit 31L, Hemoglobin 9.9L, INR Comment 1.4, Lymphocytes # (Auto) 1.4, 

Lymphocytes (%) (Auto) 16, Mean Corpuscular Hemoglobin 32, Mean Corpuscular 

Hemoglobin Concent 32, Mean Corpuscular Volume 100H, Mean Platelet Volume 10.6H

, Monocytes # (Auto) 1.0, Monocytes (%) (Auto) 11, Neutrophils # (Auto) 6.1, 

Neutrophils (%) (Auto) 68, Platelet Count 137, Potassium Level 3.7, Prothrombin 

Time 17.2H, Red Blood Count 3.09L, Red Cell Distribution Width 15.8H, Sodium 

Level 140, White Blood Count 8.9





 Microbiology


2/24/17 MRSA Screen - Final, Complete


          MRSA not isolated








A/P:


Assessment/Dx:





coronary artery disease,


Ischemic cardiomyopathy


ICD


previous history of atrial fibrillation


Plan:


ischemic cardiomyopathy, ICD, history of AF: Outpatient medications restarted 

including Coumadin, amiodarone.








Thank you for your consultation. Please call me if you have any questions.








JULIENNE Oglesby MD, FACP, FACC, FSCAI, FHRS, CCDS


Interventional Cardiology


Cardiac Electrophysiology


Vascular Medicine and Endovascular Interventions








PEREZ OGLESBY MD Feb 28, 2017 12:39

## 2017-02-28 NOTE — OCCUPATIONAL THER DAILY NOTE
OT Current Status-Daily Note


Subjective


t seen in room, up in recliner, agreeable to OT. No pain mentioned





Appearance


Alert, cooperative, easily fatigued and SOB





Mental Status/Objective


              Functional [place] Measure


0=Not Assessed/NA   4=Minimal Assistance


1=Total Assistance   5=Supervision or Setup


2=Maximal Assistance   6=Modified [place]


3=Moderate Assistance   7=Complete [place]


Attachments:  Novoa Catheter, IV, Oxygen, Telemetry





ADL-Treatment


Pt agreed to sponge bath while seated up in recliner. Some difficulty leaning 

forward to sit upright edge of chair - he was able to maintain position once 

there. Pt was able to wash face and hands, arms, chest, thighs. (50%). Pt was 

unable to wash under tummy or in groin/netta area. No redness noted under tummy 

folds or groin. Unable to wash penis/scrotum and inner thighs due to edema. Pt'

s breathing was gurgly and he had to take multiple rest breaks throughout the 

process. Pt was able to get hospital gown on. ADLs took much longer than usual 

due to decreased activity tolerance.  Question if patient will be able to 

tolerate the intensity of inpatient rehab. Pt left up in recliner, all needs 

met.





Education


OT Patient Education:  Progress toward Goal/Update tx plan


Teaching Recipient:  Patient


Teaching Methods:  Discussion





OT Short Term Goals


Short Term Goals


1=Demonstrate adherence to instructed precautions during ADL tasks.


2=Patient will verbalize/demonstrate understanding of assistive devices/

modifications for ADL.


3=Patient will improve strength/tolerance for activity to enable patient to 

perform ADL's.





OT Long Term Goals


Long Term Goals


Time Frame:  Mar 6, 2017


Eating (FIM):  6


Grooming(FIM):  5


Bathing(FIM):  3


Upper Body Dressing(FIM):  5


Lower Body Dressing(FIM):  3


Toileting(FIM):  3


Toilet/Commode Transfer(FIM):  3


Additional Goals:  2-Verbalize Understanding, 3-ImproveStrength/Farnaz


1=Demonstrate adherence to instructed precautions during ADL tasks.


2=Patient will verbalize/demonstrate understanding of assistive devices/

modifications for ADL.


3=Patient will improve strength/tolerance for activity to enable patient to 

perform ADL's.





OT Education/Plan


Problem List/Assessment


PT zarina benefit from skilled OT to increase his independence in basic self care 

to allow him to return home to live safely with wife after a fall with L hip fx 

and repain





Discharge Recommendations


Plan/Recommendations:  Continue POC





Treatment Plan/Plan of Care


Patient would benefit from OT for education, treatment and training to promote 

independence in ADL's, mobility, safety and/or upper extremity function for ADL'

s.


Plan of Care:  ADL Retraining, Functional Mobility, UE Funct Exercise/Act, UE 

Neuromus Re-Ed/Coord


Treatment Duration:  Mar 6, 2017


Visits Per Week:  5


Agreement:  Yes


Rehab Potential:  Fair





Time/GCodes


Start Time:  11:30


Stop Time:  11:59


Total Time Billed (hr/min):  29


Billed Treatment Time


visit, 29 minutes ADL








JOCELINE MORALES OT Feb 28, 2017 13:03

## 2017-03-01 VITALS — DIASTOLIC BLOOD PRESSURE: 75 MMHG | SYSTOLIC BLOOD PRESSURE: 136 MMHG

## 2017-03-01 VITALS — SYSTOLIC BLOOD PRESSURE: 131 MMHG | DIASTOLIC BLOOD PRESSURE: 71 MMHG

## 2017-03-01 VITALS — DIASTOLIC BLOOD PRESSURE: 55 MMHG | SYSTOLIC BLOOD PRESSURE: 113 MMHG

## 2017-03-01 VITALS — DIASTOLIC BLOOD PRESSURE: 57 MMHG | SYSTOLIC BLOOD PRESSURE: 122 MMHG

## 2017-03-01 VITALS — DIASTOLIC BLOOD PRESSURE: 73 MMHG | SYSTOLIC BLOOD PRESSURE: 132 MMHG

## 2017-03-01 VITALS — SYSTOLIC BLOOD PRESSURE: 129 MMHG | DIASTOLIC BLOOD PRESSURE: 70 MMHG

## 2017-03-01 LAB
ANION GAP SERPL CALC-SCNC: 12 MMOL/L (ref 5–14)
BASOPHILS # BLD AUTO: 0 10^3/UL (ref 0–0.1)
BASOPHILS NFR BLD AUTO: 0 % (ref 0–10)
BUN SERPL-MCNC: 25 MG/DL (ref 7–18)
BUN/CREAT SERPL: 32
CALCIUM SERPL-MCNC: 9.1 MG/DL (ref 8.5–10.1)
CHLORIDE SERPL-SCNC: 104 MMOL/L (ref 98–107)
CO2 SERPL-SCNC: 26 MMOL/L (ref 21–32)
CREAT SERPL-MCNC: 0.79 MG/DL (ref 0.6–1.3)
EOSINOPHIL # BLD AUTO: 0 10^3/UL (ref 0–0.3)
EOSINOPHIL NFR BLD AUTO: 1 % (ref 0–10)
ERYTHROCYTE [DISTWIDTH] IN BLOOD BY AUTOMATED COUNT: 15.4 % (ref 10–14.5)
FERRITIN SERPL-MCNC: 382 NG/ML (ref 25–300)
GFR SERPLBLD BASED ON 1.73 SQ M-ARVRAT: > 60 ML/MIN
GLUCOSE SERPL-MCNC: 115 MG/DL (ref 70–105)
INR PPP: 1.5 (ref 0.8–1.4)
LYMPHOCYTES # BLD AUTO: 0.9 X 10^3 (ref 1–4)
LYMPHOCYTES NFR BLD AUTO: 11 % (ref 12–44)
MCH RBC QN AUTO: 32 PG (ref 25–34)
MCHC RBC AUTO-ENTMCNC: 32 G/DL (ref 32–36)
MCV RBC AUTO: 99 FL (ref 80–99)
MONOCYTES # BLD AUTO: 1 X 10^3 (ref 0–1)
MONOCYTES NFR BLD AUTO: 12 % (ref 0–12)
NEUTROPHILS # BLD AUTO: 6.5 X 10^3 (ref 1.8–7.8)
NEUTROPHILS NFR BLD AUTO: 77 % (ref 42–75)
PLATELET # BLD: 162 10^3/UL (ref 130–400)
PMV BLD AUTO: 10.6 FL (ref 7.4–10.4)
POTASSIUM SERPL-SCNC: 4.1 MMOL/L (ref 3.6–5)
PROTHROMBIN TIME: 17.6 SEC (ref 12.2–14.7)
RBC # BLD AUTO: 3.02 10^6/UL (ref 4.35–5.85)
SODIUM SERPL-SCNC: 142 MMOL/L (ref 135–145)
WBC # BLD AUTO: 8.5 10^3/UL (ref 4.3–11)

## 2017-03-01 RX ADMIN — METOPROLOL TARTRATE SCH MG: 50 TABLET, FILM COATED ORAL at 20:27

## 2017-03-01 RX ADMIN — IPRATROPIUM BROMIDE AND ALBUTEROL SULFATE SCH ML: .5; 3 SOLUTION RESPIRATORY (INHALATION) at 15:13

## 2017-03-01 RX ADMIN — HYDROCODONE BITARTRATE AND ACETAMINOPHEN PRN TAB: 5; 325 TABLET ORAL at 00:13

## 2017-03-01 RX ADMIN — IPRATROPIUM BROMIDE AND ALBUTEROL SULFATE SCH ML: .5; 3 SOLUTION RESPIRATORY (INHALATION) at 10:42

## 2017-03-01 RX ADMIN — LOSARTAN POTASSIUM SCH MG: 50 TABLET, FILM COATED ORAL at 09:16

## 2017-03-01 RX ADMIN — WARFARIN SODIUM SCH MG: 5 TABLET ORAL at 18:08

## 2017-03-01 RX ADMIN — SENNOSIDES SCH MG: 8.6 TABLET, FILM COATED ORAL at 20:27

## 2017-03-01 RX ADMIN — SENNOSIDES SCH MG: 8.6 TABLET, FILM COATED ORAL at 09:16

## 2017-03-01 RX ADMIN — POTASSIUM CHLORIDE SCH MEQ: 600 CAPSULE, EXTENDED RELEASE ORAL at 06:17

## 2017-03-01 RX ADMIN — METOPROLOL TARTRATE SCH MG: 50 TABLET, FILM COATED ORAL at 09:17

## 2017-03-01 RX ADMIN — IPRATROPIUM BROMIDE AND ALBUTEROL SULFATE SCH ML: .5; 3 SOLUTION RESPIRATORY (INHALATION) at 20:02

## 2017-03-01 RX ADMIN — HYDROCODONE BITARTRATE AND ACETAMINOPHEN PRN TAB: 5; 325 TABLET ORAL at 15:28

## 2017-03-01 RX ADMIN — HYDROCODONE BITARTRATE AND ACETAMINOPHEN PRN TAB: 5; 325 TABLET ORAL at 09:17

## 2017-03-01 RX ADMIN — FUROSEMIDE SCH MG: 40 TABLET ORAL at 09:16

## 2017-03-01 RX ADMIN — HYDROCHLOROTHIAZIDE SCH MG: 25 TABLET ORAL at 09:16

## 2017-03-01 RX ADMIN — DIGOXIN SCH MG: 125 TABLET ORAL at 09:16

## 2017-03-01 RX ADMIN — AMIODARONE HYDROCHLORIDE SCH MG: 200 TABLET ORAL at 09:16

## 2017-03-01 RX ADMIN — PANTOPRAZOLE SODIUM SCH MG: 40 TABLET, DELAYED RELEASE ORAL at 06:17

## 2017-03-01 RX ADMIN — AMLODIPINE BESYLATE SCH MG: 5 TABLET ORAL at 20:27

## 2017-03-01 RX ADMIN — IPRATROPIUM BROMIDE AND ALBUTEROL SULFATE SCH ML: .5; 3 SOLUTION RESPIRATORY (INHALATION) at 07:00

## 2017-03-01 NOTE — DIAGNOSTIC IMAGING REPORT
EXAMINATION: Portable upright radiograph of the chest.



INDICATION: Shortness of breath and cough.



COMPARISON: 2/28/17.



FINDINGS:

There is moderate cardiomegaly with decreased lung volumes and

with pulmonary vascular congestion, minimally increased from the

prior exam. There is right basilar subsegmental infiltrate or

atelectasis seen. Air density under the right hemidiaphragm is

probably related to colonic air rather than pneumoperitoneum.

The pacemaker and associated cardiac leads and sternotomy wires

are again noted.



IMPRESSION:

1. Cardiomegaly with pulmonary vascular congestion. There is

subsegmental atelectasis or infiltrate in the right lung base

and low lung volumes.



2. Air density under the right hemidiaphragm is the favored to

be related to colonic interposition.

If there is clinical suspicion for from pneumoperitoneum, then

an upright or a right lateral decubitus views of the abdomen are

suggested.



Report was faxed to the [facility] by mary

at 8:10 am.



Dictated by:



Dictated on workstation # [id number]

## 2017-03-01 NOTE — OCCUPATIONAL THER DAILY NOTE
OT Current Status-Daily Note


Subjective


Pt seen in room, up in recliner, agreeable to OT. No pain mentioned.





Mental Status/Objective


              Functional [place] Measure


0=Not Assessed/NA   4=Minimal Assistance


1=Total Assistance   5=Supervision or Setup


2=Maximal Assistance   6=Modified [place]


3=Moderate Assistance   7=Complete [place]





ADL-Treatment


Pt still has Novoa catheter. Will not do lower body ADLs because removing pants 

will be difficult if pt needs to have BM and get on BSC, due to decreased 

mobility. Pt cannot reach his feet - has difficulty coming to sitting away from 

back of recliner.





Other Treatment


Pt education several different bilat UE exercises done without additional 

resistance, to help strengthen arms to help with sit to stand and transfers. Pt 

did 12 reps each exercise and his wife followed along (she was encouraged to 

lock the brakes on her w/c for stability), working on shoulders, elbows, 

forearms and wrists. Pt also did bilat chair pushups x 12 reps, with visible 

shoulder muscle contractions, to stabilize shoulders for transfers. Pt left up 

in recliner, all needs met.





Education


OT Patient Education:  Exercise program


Teaching Recipient:  Patient


Teaching Methods:  Demonstration


Response to Teaching:  Return Demonstration, Reinforcement Needed





OT Short Term Goals


Short Term Goals


1=Demonstrate adherence to instructed precautions during ADL tasks.


2=Patient will verbalize/demonstrate understanding of assistive devices/

modifications for ADL.


3=Patient will improve strength/tolerance for activity to enable patient to 

perform ADL's.





OT Long Term Goals


Long Term Goals


Time Frame:  Mar 6, 2017


Eating (FIM):  6


Grooming(FIM):  5


Bathing(FIM):  3


Upper Body Dressing(FIM):  5


Lower Body Dressing(FIM):  3


Toileting(FIM):  3


Toilet/Commode Transfer(FIM):  3


Additional Goals:  2-Verbalize Understanding, 3-ImproveStrength/Farnaz


1=Demonstrate adherence to instructed precautions during ADL tasks.


2=Patient will verbalize/demonstrate understanding of assistive devices/

modifications for ADL.


3=Patient will improve strength/tolerance for activity to enable patient to 

perform ADL's.





OT Education/Plan


Problem List/Assessment


PT zarina benefit from skilled OT to increase his independence in basic self care 

to allow him to return home to live safely with wife after a fall with L hip fx 

and repain





Discharge Recommendations


Plan/Recommendations:  Continue POC





Treatment Plan/Plan of Care


Patient would benefit from OT for education, treatment and training to promote 

independence in ADL's, mobility, safety and/or upper extremity function for ADL'

s.


Plan of Care:  ADL Retraining, Functional Mobility, UE Funct Exercise/Act, UE 

Neuromus Re-Ed/Coord


Treatment Duration:  Mar 6, 2017


Visits Per Week:  5


Agreement:  Yes


Rehab Potential:  Fair





Time/GCodes


Start Time:  10:45


Stop Time:  11:05


Total Time Billed (hr/min):  20


Billed Treatment Time


visit, 20 minutes exercise








JOCELINE MORALES OT Mar 1, 2017 11:19

## 2017-03-01 NOTE — PROGRESS NOTE (SOAP)
Subjective


Subjective/Events-last exam


States that he feels the same this AM. States that he is still having coughing. 

Not really moving around very much. Seating up in chair today.


Date seen by provider:  Mar 1, 2017





Objective


Exam


Last Set of Vital Signs





Vital Signs








  Date Time  Temp Pulse Resp B/P Pulse Ox O2 Delivery O2 Flow Rate FiO2


 


3/1/17 12:35  76      


 


3/1/17 12:13 97.6  12 113/55 91 Nasal Cannula 5.00 





Capillary Refill : Less Than 3 SecondsLess Than 3 Seconds


I&O











 Intake and Output 


 


 3/1/17





 00:00


 


Intake Total 1900 ml


 


Output Total 1375 ml


 


Balance 525 ml


 


 


 


Intake Oral 1900 ml


 


Output Urine Total 1375 ml








General:  Alert, Oriented X3, Cooperative, Mild Distress


Lungs:  Clear to Auscultation, Normal Air Movement


Heart:  Regular Rate, Normal S1, Normal S2


Abdomen:  Normal Bowel Sounds, Soft, No Tenderness, No Hepatosplenomegaly


Extremities:  No Tenderness/Swelling, Other (trace edema bilaterally, [place]wey hose 

in place)


Neuro:  Normal Speech, Sensation Intact


Psych/Mental Status:  Mental Status NL, Mood NL





Results/Procedures


Lab


 Laboratory Tests


17 19:13: Glucometer 209H


3/1/17 04:30: 


Anion Gap 12, BUN/Creatinine Ratio 32, Basophils # (Auto) 0.0, Basophils (%) (

Auto) 0, Blood Urea Nitrogen 25H, Calcium Level 9.1, Carbon Dioxide Level 26, 

Chloride Level 104, Creatinine 0.79, Eosinophils # (Auto) 0.0, Eosinophils (%) (

Auto) 1, Estimat Glomerular Filtration Rate > 60, Glucose Level 115H, 

Hematocrit 30L, Hemoglobin 9.6L, INR Comment 1.5H, Lymphocytes # (Auto) 0.9L, 

Lymphocytes (%) (Auto) 11L, Mean Corpuscular Hemoglobin 32, Mean Corpuscular 

Hemoglobin Concent 32, Mean Corpuscular Volume 99, Mean Platelet Volume 10.6H, 

Monocytes # (Auto) 1.0, Monocytes (%) (Auto) 12, Neutrophils # (Auto) 6.5, 

Neutrophils (%) (Auto) 77H, Platelet Count 162, Potassium Level 4.1, 

Prothrombin Time 17.6H, Red Blood Count 3.02L, Red Cell Distribution Width 15.4H

, Sodium Level 142, White Blood Count 8.5





 Microbiology


17 MRSA Screen - Final, Complete


          MRSA not isolated





Assessment/Plan


Assessment/Plan


Plan


75 yo M that was admitted after he fell from his scooter and was found to have 

Left hip fracture, now POD #4





Plan


Left hip fracture s/p ORIF by Ortho now POD #4


   - Pain is well controlled, Continue Stool softeners and stimulates


   - Encouraged up to chair at least TID


   - Continue IS


   - Accepted by Inpatient Rehab


   - Continue Coumadin, INR trending up, not to goal yet


CAD with ischemic Cardiomyopathy


   - Denies chest pain, Stable post surgery


COPD with oxygen dependence at night


   - Currently on oxygen during the day as well


   - Continue PRN and scheduled breathing treatments


   - Patient high risk for Post op PNA, Encouraged activity and frequent IS


   - Continue steroid burst


Fe deficiency Anemia


   - Will give IV iron replacement


Severe Debility


   - patient to inpatient rehab but may need swing bed in the meantime


FEN: Heart healthy diet


DVT: Lovenox/coumadin


Dispo: Swing bed evaluation pending


Diagnosis/Problems:  





Clinical Quality Measures


DVT/VTE Risk/Contraindication:


Risk Factor Score Per Nursin


RFS Level Per Nursing on Admit:  4+=Very High








ARSEN LAWS MD Mar 1, 2017 14:52

## 2017-03-01 NOTE — PHYSICAL THERAPY DAILY NOTE
PT Daily Note-Current


Subjective


Patient in bed pre tx, agrees to PT, states he has pain of 5/10  in left hip.





Appearance


Patient in recliner post tx with nurse call, phone, tray, wife in the room.





Mental Status


Patient Orientation:  Person, Place, Situation


Attachments:  Oxygen





Transfers


              Functional [place] Measure


0=Not Assessed/NA   4=Minimal Assistance


1=Total Assistance   5=Supervision or Setup


2=Maximal Assistance   6=Modified [place]


3=Moderate Assistance   7=Complete [place]IRFPA[place] Quality Coding Scale











6 Independent with activity with or without an assistive device


 


5  Patient requires set up or clean up by helper.  Patient completes activity  

by  themselves


 


4 Supervision or touching assist (CGA). [place] provide cues , steadying assist


 


3 The helper provides less than half the effort to complete the activity


 


2 The helper provides more than half the effort to complete the activity


 


1 Dependent.  The helper does all the effort to complete an activity 


 


7 Patient refused to complete or attempt activity


 


9 The patient did not perform the activity before the current illness or injury


 


88 Not attempted due to Medical conditions or safety concerns








Transfers (B, C, W/C) (FIM):  1


Scootin


Rollin


Supine to/from Sit:  2


Sit to/from Stand:  1


Bed to/from Chair:  1


Patient did seem to be able to bear a little weight through his left leg this 

time but transfer was still more than max assist.





Exercises


Seated Therapy Exercises:  Ankle pumps, Long arc quads, Hip flexion, Hamstring 

Curls


Seated Reps:  10





Treatments


bed mobility and transfers, functional strengthening





Assessment


Current Status:  Poor Progress


Patient is not making gains with mobility, in fact after PT left the room we 

were called back to his room because he needed assistance to scoot back in the 

recliner a little more, even with his legs elevated.





PT Long Term Goals


Long Term Goals


PT Long Term Goals Time Frame:  Mar 2, 2017


Transfers (B,C,W/C) (FIM):  4


Gait (FIM):  2


Gait distance (FIM):  4=012-95 ft


Gait Assistive Device:  FWW





PT Plan


Problem List


Problem List:  Activity Tolerance, Functional Strength, Safety, Balance, Gait, 

Transfer, Bed Mobility, ROM





Treatment/Plan


Treatment Plan:  Continue Plan of Care


Treatment Plan:  Bed Mobility, Education, Functional Activity Farnaz, Functional 

Strength, Gait, Safety, Therapeutic Exercise, Transfers


Treatment Duration:  Mar 2, 2017


Visits Per Week:  11





Safety Risks/Education


Patient Education:  Transfer Techniques, Correct Positioning, Safety Issues


Teaching Recipient:  Patient


Teaching Methods:  Demonstration, Discussion


Response to Teaching:  Reinforcement Needed





Time/GCodes


Time In:  920


Time Out:  940


Total Billed Treatment Time:  20


Total Billed Treatment


1 visit


FA 20 min








JANET PEARSON PT Mar 1, 2017 09:41

## 2017-03-01 NOTE — PHYSICAL THERAPY DAILY NOTE
PT Daily Note-Current


Subjective


Patient in recliner pre tx, asks to help him get back to bed.  States pain is 8/

10 in left hip.





Appearance


Patient in bed post tx with nurse call, phone, tray, wife in room, has SCD's on

, pillow underneath left foot for heel elevation because he says it is sore.





Mental Status


Patient Orientation:  Person, Place, Situation


Attachments:  Oxygen





Transfers


              Functional [place] Measure


0=Not Assessed/NA   4=Minimal Assistance


1=Total Assistance   5=Supervision or Setup


2=Maximal Assistance   6=Modified [place]


3=Moderate Assistance   7=Complete IndependenceIRFPAI Quality Coding Scale











6 Independent with activity with or without an assistive device


 


5  Patient requires set up or clean up by helper.  Patient completes activity  

by  themselves


 


4 Supervision or touching assist (CGA). [place] provide cues , steadying assist


 


3 The helper provides less than half the effort to complete the activity


 


2 The helper provides more than half the effort to complete the activity


 


1 Dependent.  The helper does all the effort to complete an activity 


 


7 Patient refused to complete or attempt activity


 


9 The patient did not perform the activity before the current illness or injury


 


88 Not attempted due to Medical conditions or safety concerns








Transfers (B, C, W/C) (FIM):  1


Scootin


Rollin


Supine to/from Sit:  1


Sit to/from Stand:  1


Bed to/from Chair:  1


patient was very tired/fatigued but seemed to be able to bear a little weight 

through his legs during the transfer, he could not take any steps and for the 

most part resisted movement





Exercises


Supine Ex:  Ankle pumps, Quad Set, Heel Slides, Short Arc Quads, Straight leg 

raise, Hip abd/add


Supine Reps:  10





Treatments


bed mobility and transfers, functional strengthening





Assessment


Current Status:  Poor Progress


no functional change in mobility





PT Long Term Goals


Long Term Goals


PT Long Term Goals Time Frame:  Mar 2, 2017


Transfers (B,C,W/C) (FIM):  4


Gait (FIM):  2


Gait distance (FIM):  0=049-75 ft


Gait Assistive Device:  FWW





PT Plan


Problem List


Problem List:  Activity Tolerance, Functional Strength, Safety, Balance, Gait, 

Transfer, Bed Mobility, ROM





Treatment/Plan


Treatment Plan:  Continue Plan of Care


Treatment Plan:  Bed Mobility, Education, Functional Activity Farnaz, Functional 

Strength, Gait, Safety, Therapeutic Exercise, Transfers


Treatment Duration:  Mar 2, 2017


Visits Per Week:  11





Safety Risks/Education


Patient Education:  Transfer Techniques, Correct Positioning, Safety Issues


Teaching Recipient:  Patient


Teaching Methods:  Demonstration, Discussion


Response to Teaching:  Reinforcement Needed





Time/GCodes


Time In:  1310


Time Out:  1330


Total Billed Treatment Time:  20


Total Billed Treatment


1 visit


FA 20 min








JANET PEARSON PT Mar 1, 2017 13:35

## 2017-03-02 ENCOUNTER — HOSPITAL ENCOUNTER (INPATIENT)
Dept: HOSPITAL 75 - 4TH | Age: 77
LOS: 6 days | Discharge: NURSING, REHAB OR LONG-TERM CARE | DRG: 191 | End: 2017-03-08
Attending: FAMILY MEDICINE | Admitting: FAMILY MEDICINE
Payer: MEDICARE

## 2017-03-02 VITALS — SYSTOLIC BLOOD PRESSURE: 133 MMHG | DIASTOLIC BLOOD PRESSURE: 62 MMHG

## 2017-03-02 VITALS — DIASTOLIC BLOOD PRESSURE: 60 MMHG | SYSTOLIC BLOOD PRESSURE: 127 MMHG

## 2017-03-02 VITALS — DIASTOLIC BLOOD PRESSURE: 64 MMHG | SYSTOLIC BLOOD PRESSURE: 111 MMHG

## 2017-03-02 VITALS — DIASTOLIC BLOOD PRESSURE: 75 MMHG | SYSTOLIC BLOOD PRESSURE: 163 MMHG

## 2017-03-02 VITALS — DIASTOLIC BLOOD PRESSURE: 68 MMHG | SYSTOLIC BLOOD PRESSURE: 122 MMHG

## 2017-03-02 VITALS — WEIGHT: 207.4 LBS | BODY MASS INDEX: 33.33 KG/M2 | HEIGHT: 66 IN

## 2017-03-02 DIAGNOSIS — I48.91: ICD-10-CM

## 2017-03-02 DIAGNOSIS — S72.142D: ICD-10-CM

## 2017-03-02 DIAGNOSIS — Z99.81: ICD-10-CM

## 2017-03-02 DIAGNOSIS — G62.9: ICD-10-CM

## 2017-03-02 DIAGNOSIS — Z95.5: ICD-10-CM

## 2017-03-02 DIAGNOSIS — K59.00: ICD-10-CM

## 2017-03-02 DIAGNOSIS — F03.90: ICD-10-CM
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
## 2017-03-02 DIAGNOSIS — Z95.1: ICD-10-CM

## 2017-03-02 DIAGNOSIS — K21.9: ICD-10-CM

## 2017-03-02 DIAGNOSIS — Z87.891: ICD-10-CM

## 2017-03-02 DIAGNOSIS — Z79.01: ICD-10-CM

## 2017-03-02 DIAGNOSIS — I25.2: ICD-10-CM

## 2017-03-02 DIAGNOSIS — J44.1: Primary | ICD-10-CM

## 2017-03-02 DIAGNOSIS — Z95.810: ICD-10-CM

## 2017-03-02 DIAGNOSIS — J98.11: ICD-10-CM

## 2017-03-02 DIAGNOSIS — I25.5: ICD-10-CM

## 2017-03-02 DIAGNOSIS — I25.10: ICD-10-CM

## 2017-03-02 DIAGNOSIS — G47.30: ICD-10-CM

## 2017-03-02 DIAGNOSIS — J45.909: ICD-10-CM

## 2017-03-02 LAB
ANION GAP SERPL CALC-SCNC: 13 MMOL/L (ref 5–14)
BASOPHILS # BLD AUTO: 0 10^3/UL (ref 0–0.1)
BASOPHILS NFR BLD AUTO: 0 % (ref 0–10)
BUN SERPL-MCNC: 21 MG/DL (ref 7–18)
BUN/CREAT SERPL: 28
CALCIUM SERPL-MCNC: 9.1 MG/DL (ref 8.5–10.1)
CHLORIDE SERPL-SCNC: 102 MMOL/L (ref 98–107)
CO2 SERPL-SCNC: 26 MMOL/L (ref 21–32)
CREAT SERPL-MCNC: 0.76 MG/DL (ref 0.6–1.3)
EOSINOPHIL # BLD AUTO: 0.1 10^3/UL (ref 0–0.3)
EOSINOPHIL NFR BLD AUTO: 2 % (ref 0–10)
ERYTHROCYTE [DISTWIDTH] IN BLOOD BY AUTOMATED COUNT: 15 % (ref 10–14.5)
GFR SERPLBLD BASED ON 1.73 SQ M-ARVRAT: > 60 ML/MIN
GLUCOSE SERPL-MCNC: 100 MG/DL (ref 70–105)
LYMPHOCYTES # BLD AUTO: 1.3 X 10^3 (ref 1–4)
LYMPHOCYTES NFR BLD AUTO: 17 % (ref 12–44)
MCH RBC QN AUTO: 32 PG (ref 25–34)
MCHC RBC AUTO-ENTMCNC: 33 G/DL (ref 32–36)
MCV RBC AUTO: 98 FL (ref 80–99)
MONOCYTES # BLD AUTO: 1 X 10^3 (ref 0–1)
MONOCYTES NFR BLD AUTO: 12 % (ref 0–12)
NEUTROPHILS # BLD AUTO: 5.5 X 10^3 (ref 1.8–7.8)
NEUTROPHILS NFR BLD AUTO: 69 % (ref 42–75)
PLATELET # BLD: 198 10^3/UL (ref 130–400)
PMV BLD AUTO: 10.1 FL (ref 7.4–10.4)
POTASSIUM SERPL-SCNC: 3.7 MMOL/L (ref 3.6–5)
RBC # BLD AUTO: 3.15 10^6/UL (ref 4.35–5.85)
SODIUM SERPL-SCNC: 141 MMOL/L (ref 135–145)
WBC # BLD AUTO: 8 10^3/UL (ref 4.3–11)

## 2017-03-02 PROCEDURE — 71020: CPT

## 2017-03-02 PROCEDURE — 36415 COLL VENOUS BLD VENIPUNCTURE: CPT

## 2017-03-02 PROCEDURE — 85025 COMPLETE CBC W/AUTO DIFF WBC: CPT

## 2017-03-02 PROCEDURE — 94760 N-INVAS EAR/PLS OXIMETRY 1: CPT

## 2017-03-02 PROCEDURE — 80053 COMPREHEN METABOLIC PANEL: CPT

## 2017-03-02 PROCEDURE — 85610 PROTHROMBIN TIME: CPT

## 2017-03-02 PROCEDURE — 80048 BASIC METABOLIC PNL TOTAL CA: CPT

## 2017-03-02 PROCEDURE — 94640 AIRWAY INHALATION TREATMENT: CPT

## 2017-03-02 RX ADMIN — POTASSIUM CHLORIDE SCH MEQ: 600 CAPSULE, EXTENDED RELEASE ORAL at 06:04

## 2017-03-02 RX ADMIN — POLYETHYLENE GLYCOL (3350) SCH GM: 17 POWDER, FOR SOLUTION ORAL at 15:14

## 2017-03-02 RX ADMIN — SENNOSIDES SCH MG: 8.6 TABLET, FILM COATED ORAL at 20:51

## 2017-03-02 RX ADMIN — IPRATROPIUM BROMIDE AND ALBUTEROL SULFATE SCH ML: .5; 3 SOLUTION RESPIRATORY (INHALATION) at 10:24

## 2017-03-02 RX ADMIN — WARFARIN SODIUM SCH MG: 5 TABLET ORAL at 18:11

## 2017-03-02 RX ADMIN — HYDROCHLOROTHIAZIDE SCH MG: 25 TABLET ORAL at 09:00

## 2017-03-02 RX ADMIN — METOPROLOL TARTRATE SCH MG: 50 TABLET, FILM COATED ORAL at 09:00

## 2017-03-02 RX ADMIN — DOCUSATE SODIUM SCH MG: 100 CAPSULE ORAL at 20:51

## 2017-03-02 RX ADMIN — SENNOSIDES SCH MG: 8.6 TABLET, FILM COATED ORAL at 09:00

## 2017-03-02 RX ADMIN — IPRATROPIUM BROMIDE AND ALBUTEROL SULFATE SCH ML: .5; 3 SOLUTION RESPIRATORY (INHALATION) at 15:38

## 2017-03-02 RX ADMIN — ALFUZOSIN HYDROCHLORIDE SCH MG: 10 TABLET, EXTENDED RELEASE ORAL at 18:11

## 2017-03-02 RX ADMIN — OXYCODONE HYDROCHLORIDE AND ACETAMINOPHEN PRN TAB: 5; 325 TABLET ORAL at 23:56

## 2017-03-02 RX ADMIN — OXYCODONE HYDROCHLORIDE AND ACETAMINOPHEN PRN TAB: 5; 325 TABLET ORAL at 06:05

## 2017-03-02 RX ADMIN — AMIODARONE HYDROCHLORIDE SCH MG: 200 TABLET ORAL at 09:00

## 2017-03-02 RX ADMIN — IPRATROPIUM BROMIDE AND ALBUTEROL SULFATE SCH ML: .5; 3 SOLUTION RESPIRATORY (INHALATION) at 20:39

## 2017-03-02 RX ADMIN — POLYETHYLENE GLYCOL (3350) SCH GM: 17 POWDER, FOR SOLUTION ORAL at 20:51

## 2017-03-02 RX ADMIN — PANTOPRAZOLE SODIUM SCH MG: 40 TABLET, DELAYED RELEASE ORAL at 06:05

## 2017-03-02 RX ADMIN — IPRATROPIUM BROMIDE AND ALBUTEROL SULFATE SCH ML: .5; 3 SOLUTION RESPIRATORY (INHALATION) at 06:37

## 2017-03-02 RX ADMIN — METOPROLOL TARTRATE SCH MG: 50 TABLET, FILM COATED ORAL at 20:51

## 2017-03-02 RX ADMIN — DIGOXIN SCH MG: 125 TABLET ORAL at 09:00

## 2017-03-02 RX ADMIN — LOSARTAN POTASSIUM SCH MG: 50 TABLET, FILM COATED ORAL at 09:00

## 2017-03-02 RX ADMIN — AMLODIPINE BESYLATE SCH MG: 5 TABLET ORAL at 20:50

## 2017-03-02 RX ADMIN — FUROSEMIDE SCH MG: 40 TABLET ORAL at 09:00

## 2017-03-02 NOTE — PHYSICAL THERAPY EVALUATION
PT Evaluation-General


Medical Diagnosis


Admission Date


Mar 2, 2017 at 12:38


Medical Diagnosis:  Left hip fx, s/p ORIF


Onset Date:  2017





Therapy Diagnosis


Therapy Diagnosis:  weakness; abn gait





Height/Weight


Height (Feet):  5


Height (Inches):  6.00


Weight (Pounds):  207


Weight (Ounces):  6.4





Precautions


Precautions/Isolations:  Standard Precautions





Weight Bear Status


Weight Bearing Restriction:  Weight Bearing/Tolerated


Location Restriction:  L LE





Referral


Physician:  Dr. Daniels


Reason for Referral:  Evaluation/Treatment





Medical History


Pertinent Medical History:  Atrial Fib, Arthritis, CABG, CAD, COPD, Dementia, 

GERD, MI, Neuropathy


Current History


Post repair of left hip fracture; transferred to [facility] status for continued 

medical management as well as for therapy services to address functional 

mobility progression.


Reviewed History:  Yes





Social History


Home:  Apartment


Current Living Status:  Spouse


Entry Into Home:  Level Entry





Prior/Core FIM


Prior Level of Function


              Functional [place] Measure


0=Not Assessed/NA   4=Minimal Assistance


1=Total Assistance   5=Supervision or Setup


2=Maximal Assistance   6=Modified [place]


3=Moderate Assistance   7=Complete [place]


Bed Mobility:  7


Transfers (B,C,W/C) (FIM):  6


Gait:  5 (household)


Locomotion:  6 (scooter outdoor surfaces)


Wheelchair Mobility:  6


Pt was ambulatory in his apartment with FWW; uses a scooter outside of the home.





PT Evaluation-Current


Subjective


Pt agreeable and voices that he needs to get stronger.





Pain





   Numeric Pain Scale:  5-Moderate Pain


   Location:  Left


   Location Body Site:  Hip


   Pain Description:  Ache


   Comment:  with standing and transfers





Pt/Family Goals


Pt's goals are to return home as before.





Objective


Patient Orientation:  Person, Place, Time, Situation


Problem Solving:  Fair


Attachments:  Oxygen (in situ during and post treatmeht)





ROM/Strength


ROM Lower Extremities


WFL


Strenght Lower Extremities


right LE grossly 4-/5; left LE grossly 3-/5





Integumentary/Posture


Integumentary


refer to nursing notes.


Bowel Incontinence:  No


Posture


rounded shoulders in standing.





Neuromuscular


(Tone, Coordination, Reflexes)


intact and functional





Sensory


Vision:  Wears Glasses


Hearing:  Functional


Hand Dominance:  Right


Sensation Right Lower Extremit:  Intact


Sensation Left Lower Extremity:  Intact





Transfers


              Functional [place] Measure


0=Not Assessed/NA   4=Minimal Assistance


1=Total Assistance   5=Supervision or Setup


2=Maximal Assistance   6=Modified [place]


3=Moderate Assistance   7=Complete [place]


Transfers (B, C, W/C) (FIM):  2


Scootin


Rolling:  3


Supine to/from Sit:  3 (asssit with both legs and trunk to sit up EOB)


Sit to/from Stand:  3 (mod assist to come to a stand. )


bed t/f WC(FIM only if WC use):  3 (mod assist to assist with maintaining 

standing. )


Sit to Lying (QC):  3


Lying to Sitting/Side of Bed(Q:  3


Sit to Stand (QC):  3


Chair/Bed-to-Chair Xfer(QC):  3


Pt requires heavy cues and assist with transfers at this time.  Becomes SOA 

quickly but recovers quickly as well.   Pt needs cues for sequencing.


SPT x 2 with mod assist; used fWW once and dance style once.  Sit to  /

/ bars with min assist and cues to sequence





Gait


Does the Patient Walk?:  Yes


Mode of Locomotion:  Both


Anticipated Mode of Locomotion:  Walk


Gait (FIM):  1


Distance:  steps only in // bars


Walk 50 ft with 2 Turns(QC):  88 (unable to ambulate )


Walk 150 ft (QC):  88


Comments/Gait Description


Pt took 2-3 steps in // bars, short and small steps with close CGA x 2 reps.





Wheelchair Training


Wheel 50 ft with 2 turns (QC):  88


Wheel 150 ft (QC):  88





Balance


Sitting Static:  Good


Sitting Dynamic:  Fair


Standing Static:  Fair


 Standing Dynamic:  Fair





Treatment


Functional transfers; progression of gait in // bars; bed mobility with 

transfer.





Assessment/Needs


Post fall from scooter and sustained left hip fracture that has been repaired.  

He will benefit from skilled PT to work on functional transfers and mobility to 

allow him to return home as before.  Pt has B LE weakness, balance deficits, 

transfer deficits, unable to ambulate and limited functional act tolerance.  

His presentation is changing at this time as he continues to require medical 

management .  He will benefit from skilled PT services and has potential to 

make functional gains.


Rehab Potential:  Good





PT Short Term Goals


Short Term Goals


Time Frame:  Mar 7, 2017


Transfers (B,C,W/C) (FIM):  4


Gait (FIM):  2


Distance (FIM):  9=341-44 ft


Gait Assistive Device:  FWW





PT Long Term Goals


Long Term Goals


PT Long Term Goals Time Frame:  Mar 17, 2017


Transfers (B,C,W/C) (FIM):  5


Sit to Lying (QC):  5


Lying-Sitting on Side/Bed(QC):  5


Sit to Stand (QC):  5


Chair/Bed-to-Chair Xfer(QC):  5


Does the Patient Walk:  Yes


Gait (FIM):  4


Gait distance (FIM):  5=235-47 ft


Walk 50ft with 2 Turns (QC):  4


Walk 150 ft (QC):  88


Gait Assistive Device:  FWW


Does the Pt use WC or Scooter?:  Yes


Wheelchair (FIM):  88 (NT)





PT Plan


Problem List


Problem List:  Activity Tolerance, Functional Strength, Safety, Balance, Gait, 

Transfer, Bed Mobility





Treatment/Plan


Treatment Plan:  Continue Plan of Care


Treatment Plan:  Bed Mobility, Education, Functional Activity Farnaz, Functional 

Strength, Gait, Safety, Therapeutic Exercise, Transfers


Treatment Duration:  Mar 17, 2017


# of days/week


5-6


Visits Per Week:  11


Pt/Family Agrees w/Plan:  Yes





Safety Risks/Education


Patient Education:  Transfer Techniques, Safety Issues


Teaching Recipient:  Patient


Teaching Methods:  Demonstration, Discussion


Response to Teaching:  Return Demonstration, Reinforcement Needed





Time/GCodes


Time In:  1336


Time Out:  1408


Total Billed Treatment Time:  32


Total Billed Treatment


visit


EVM 15


FA 17








ELMER CATALAN PT Mar 2, 2017 15:17

## 2017-03-02 NOTE — PHYSICAL THERAPY DAILY NOTE
PT Daily Note-Current


Subjective


Patient in bed pre tx, agrees to PT, he states that he doesnt really want to 

but he will anyway.  Patient states he has pain of 8/10 in left hip.





Appearance


Patient in recliner post tx with nurse call, tray, wife in room.  Legs elevated.





Mental Status


Patient Orientation:  Person, Place, Situation


Attachments:  Oxygen





Transfers


              Functional [place] Measure


0=Not Assessed/NA   4=Minimal Assistance


1=Total Assistance   5=Supervision or Setup


2=Maximal Assistance   6=Modified [place]


3=Moderate Assistance   7=Complete IndependenceIRFPAI Quality Coding Scale











6 Independent with activity with or without an assistive device


 


5  Patient requires set up or clean up by helper.  Patient completes activity  

by  themselves


 


4 Supervision or touching assist (CGA). [place] provide cues , steadying assist


 


3 The helper provides less than half the effort to complete the activity


 


2 The helper provides more than half the effort to complete the activity


 


1 Dependent.  The helper does all the effort to complete an activity 


 


7 Patient refused to complete or attempt activity


 


9 The patient did not perform the activity before the current illness or injury


 


88 Not attempted due to Medical conditions or safety concerns








Transfers (B, C, W/C) (FIM):  3


Scootin


Rollin


Supine to/from Sit:  3


Sit to/from Stand:  4


Bed to/from Chair:  3


Patient needs assist with both legs getting to the edge of the bed, min assist 

for sit to stand and mod assist to transfer using a rolling walker, he tends to 

lean to the right side





Gait Training


Gait (FIM):  1


Distance:  2'


Gait Level of Assist:  3


Gait Persons Needed:  2


Gait Assistive Device:  FWW


antalgic, patient has a lot of difficulty stepping with either leg, tends to 

lean over the walker and to the right side





Exercises


Seated Therapy Exercises:  Ankle pumps, Long arc quads, Hip flexion, Hamstring 

Curls


Seated Reps:  20





Treatments


bed mobility and transfers, ambulation, functional strengthening





Assessment


Current Status:  Fair Progress


patient did make some progress with mobility, he still has a lot of difficulty 

breathing during minimal activity





PT Long Term Goals


Long Term Goals


PT Long Term Goals Time Frame:  Mar 2, 2017


Transfers (B,C,W/C) (FIM):  4


Gait (FIM):  2


Gait distance (FIM):  1=268-10 ft


Gait Assistive Device:  FWW





PT Plan


Problem List


Problem List:  Activity Tolerance, Functional Strength, Safety, Balance, Gait, 

Transfer, Bed Mobility, ROM





Treatment/Plan


Treatment Plan:  Continue Plan of Care


Treatment Plan:  Bed Mobility, Education, Functional Activity Farnaz, Functional 

Strength, Gait, Safety, Therapeutic Exercise, Transfers


Treatment Duration:  Mar 2, 2017


Visits Per Week:  11





Safety Risks/Education


Patient Education:  Gait Training, Transfer Techniques, Safety Issues


Teaching Recipient:  Patient


Teaching Methods:  Demonstration, Discussion


Response to Teaching:  Reinforcement Needed





Time/GCodes


Time In:  845


Time Out:  900


Total Billed Treatment Time:  15


Total Billed Treatment


1 visit


FA 15 min








JANET PEARSON PT Mar 2, 2017 09:07

## 2017-03-02 NOTE — DIAGNOSTIC IMAGING REPORT
INDICATION:  COPD.



TECHNIQUE:  Single view chest 4:40 AM.



CORRELATION STUDY:  3/1/2017



FINDINGS:

Patient is poststernotomy. Heart size enlarged but stable.

Vasculature is mildly prominent, less severe from prior study.

Overall, low ventilation with atelectasis or infiltrate

increasing about the right lung base and perihilar region. Left

lung relatively clear.



IMPRESSION:

1. Cardiac enlargement with vasculature improved on followup.

2. Increasing atelectasis and/or infiltrate at the right lung

base and perihilar region.



Dictated by:



Dictated on workstation # [id number]

## 2017-03-02 NOTE — OCCUPATIONAL THERAPY EVAL
OT Evaluation-General/PLF


Medical Diagnosis


Admission Date


Mar 2, 2017 at 12:38


Medical Diagnosis:  Left hip fx, s/p ORIF


Onset Date:  2017





Therapy Diagnosis


Therapy Diagnosis:  Weakness





Height/Weight


Height (Feet):  5


Height (Inches):  6.00


Weight (Pounds):  207


Weight (Ounces):  6.4





Weight Bear Status


Weight Bearing Restriction:  Weight Bearing/Tolerated





Referral


Physician:  Dr. Daniels


Referral Reason:  Activity Tolerance, Self Care, Evaluation/Treatment, 

Strengthening/ROM





Medical History


Pertinent Medical History:  Atrial Fib, Arthritis, CABG, CAD, COPD, Dementia, 

GERD, MI, Neuropathy


Current History


Pt. was ambulating outside.  Was knocked over by [employer].  Lives at 

[facility] with his spouse.


Reviewed History:  Yes





Social History


Home:  Apartment


Current Living Status:  Spouse


Entry Into Home:  Level Entry





ADL-Prior Level of Function


ADL PLOF Comments


Pt. was independent with daily tasks.  Has someone that drives and cleans for 

him and his spouse.


DME/Equipment:  Bath Chair, Grab Bars, Tub/Shower


DME/Equipment Comments


Pt. uses a walker.


Drive Self:  No





OT Current Status


Subjective


No pain reported at this time.





Appearance


Pt. is in bed.  Nursing had just helped him back to bed after toileting on [place].





Mental Status/Objective


Patient Orientation:  Confused


Spouse answered most questions for pt.  When he did answer them, occasionally 

she would correct him, as he had stated it wrong.





Current


Hand Dominance:  Right


Upper Extremity ROM


WFL


Upper Extremity Strength


5/5 bilateral UE throughout.





ADL-Treatment


              Functional [place] Measure


0=Not Assessed/NA   4=Minimal Assistance


1=Total Assistance   5=Supervision or Setup


2=Maximal Assistance   6=Modified [place]


3=Moderate Assistance   7=Complete IndependenceIRFPAI Quality Coding Scale











6 Independent with activity with or without an assistive device


 


5  Patient requires set up or clean up by helper.  Patient completes activity  

by  themselves


 


4 Supervision or touching assist (CGA). [place] provide cues , steadying assist


 


3 The helper provides less than half the effort to complete the activity


 


2 The helper provides more than half the effort to complete the activity


 


1 Dependent.  The helper does all the effort to complete an activity 


 


7 Patient refused to complete or attempt activity


 


9 The patient did not perform the activity before the current illness or injury


 


88 Not attempted due to Medical conditions or safety concerns








Pt. had just returned to bed after toileting.  Nursing reported that pt. was 

dependent for toileting.  Spouse states that she has brought lots of clothing 

items in for pt.  Educated that it might be more beneficial to wear pants when 

has gained a little more strength, and is more mobile, such as for toileting.  

Pt. is able to participate in UE assessment with therapist.  Note that he 

demonstrates 5/5 strength throughout in bilateral UE.  Pt. reports that he has 

been feeding himself.  Spouse and nursing both state that pt. is somewhat 

"fearful" when up during transfer.  They report what sounds like retropulsion 

during transfers, in which pt. pushes away.  Pt. smiles throughout evaluation, 

but does not verbalize much, as his spouse does speak for him.  Pt.'s spouse 

states that her goals are for him to return home.  Pt. does indicate that this 

is his goal as well.  All needs are met in room.  Call light and phone within 

reach, bed rails up.





Education


OT Patient Education:  Correct positioning, Instructions to caregiver, Progress 

toward Goal/Update tx plan, Purpose of tx/functional activities, Reviewed 

precautions, Rehab process


Teaching Recipient:  Patient, Significant Other


Teaching Methods:  Demonstration, Discussion


Response to Teaching:  Verbalize Understanding, Return Demonstration





OT Short Term Goals


Short Term Goals


Time Frame:  Mar 9, 2017


Eating(FIM):  5


Grooming(FIM):  4


Bathing(FIM):  3


Upper Body Dressing(FIM):  5


Lower Body Dressing(FIM):  3


Toileting(FIM):  3


Transfers (B,C,W/C) (FIM):  4


Toilet/Commode Transfer(FIM):  4


Additional Short Term Goals:  1-Demonstrate ADL Tasks, 2-Verbalize Understanding

, 3-ImproveStrength/Farnaz


1=Demonstrate adherence to instructed precautions during ADL tasks.


2=Patient will verbalize/demonstrate understanding of assistive devices/

modifications for ADL.


3=Patient will improve strength/tolerance for activity to enable patient to 

perform ADL's.





OT Long Term Goals


Long Term Goals


Time Frame:  Mar 16, 2017


Eating (FIM):  6


Eating (QC):  6


Groomin


Oral Hygiene (QC):  5


Bathing(FIM):  4


Upper Body Dressing(FIM):  5


Lower Body Dressing(FIM):  5


Toileting(FIM):  5


Toileting Hygiene (QC):  5


Transfers (B,C,W/C) (FIM):  5


Toilet/Commode Transfer(FIM):  5


Toilet/Commode Transfer (QC):  5


Additional Goals:  1-Demonstrate ADL Tasks, 2-Verbalize Understanding, 3-

ImproveStrength/Farnaz


1=Demonstrate adherence to instructed precautions during ADL tasks.


2=Patient will verbalize/demonstrate understanding of assistive devices/

modifications for ADL.


3=Patient will improve strength/tolerance for activity to enable patient to 

perform ADL's.





OT Education/Plan


Problem List/Assessment


Assessment:  Decreased Activ Tolerance, Decreased Safety Aware, Decreased UE 

Strength, Dependent Transfers, Impaired Bed Mobility, Impaired Funct Balance, 

Impaired I ADL's, Impaired Self-Care Skills, Restricted Funct UE ROM





Discharge Recommendations


Plan/Recommendations:  Continue POC


Therapy D/C Recommendations:  Home w/ Family Support, Occupational Therapy Home 

Care, Scheduled Assistance


Equpiment Recommendations-D/C:  Extended Bath Bench, Hip Kit


Barriers to Progress


Cognition, fear, pain.


Patient/Family Goals


Pt. and spouse state that their goal is for him to return home.





Treatment Plan/Plan of Care


Treatment,Training & Education:  Yes


Patient would benefit from OT for education, treatment and training to promote 

independence in ADL's, mobility, safety and/or upper extremity function for ADL'

s.


Plan of Care:  ADL Retraining, Caregiver Training, Functional Mobility


Treatment Duration:  Mar 16, 2017


# of days/week


5-6


Visits Per Week:  5-6


Agreement:  Yes


Rehab Potential:  Fair





Time/GCodes


Start Time:  13:00


Stop Time:  13:15


Total Time Billed (hr/min):  15


Billed Treatment Time


1, ALCIDES San OT Mar 2, 2017 14:45

## 2017-03-02 NOTE — PROGRESS NOTE (SOAP)
Subjective


Subjective/Events-last exam


Patient states that he is still having significant shortness of breath. Wife 

states that he has not had a bowel movement in several days. Tolerating PO 

diet. Still needing significant help with transfers.


Date seen by provider:  Mar 2, 2017





Objective


Exam


Last Set of Vital Signs





Vital Signs








  Date Time  Temp Pulse Resp B/P Pulse Ox O2 Delivery O2 Flow Rate FiO2


 


3/2/17 10:26     95  4.00 


 


3/2/17 08:03 97.5 78 12 133/62  Nasal Cannula  





Capillary Refill : Less Than 3 SecondsLess Than 3 Seconds


I&O











 Intake and Output 


 


 3/2/17





 00:00


 


Intake Total 2750 ml


 


Output Total 2400 ml


 


Balance 350 ml


 


 


 


Intake Oral 2750 ml


 


Output Urine Total 2400 ml








General:  Alert, Oriented X3, Cooperative, Mild Distress (with minimal activity)


Lungs:  Other (+ diffuse wheezing with some basilar crackles)


Heart:  Regular Rate, Normal S1, Normal S2


Abdomen:  Normal Bowel Sounds, Soft, No Tenderness


Extremities:  No Tenderness/Swelling, Other (pedro hose present bilaterally)


Skin:  No Rashes


Neuro:  Normal Speech, Cranial Nerves 3-12 NL


Psych/Mental Status:  Mental Status NL, Mood NL





Results/Procedures


Lab


 Laboratory Tests


3/2/17 06:15: 


Anion Gap 13, BUN/Creatinine Ratio 28, Basophils # (Auto) 0.0, Basophils (%) (

Auto) 0, Blood Urea Nitrogen 21H, Calcium Level 9.1, Carbon Dioxide Level 26, 

Chloride Level 102, Creatinine 0.76, Eosinophils # (Auto) 0.1, Eosinophils (%) (

Auto) 2, Estimat Glomerular Filtration Rate > 60, Glucose Level 100, Hematocrit 

31L, Hemoglobin 10.0L, Lymphocytes # (Auto) 1.3, Lymphocytes (%) (Auto) 17, 

Mean Corpuscular Hemoglobin 32, Mean Corpuscular Hemoglobin Concent 33, Mean 

Corpuscular Volume 98, Mean Platelet Volume 10.1, Monocytes # (Auto) 1.0, 

Monocytes (%) (Auto) 12, Neutrophils # (Auto) 5.5, Neutrophils (%) (Auto) 69, 

Platelet Count 198, Potassium Level 3.7, Red Blood Count 3.15L, Red Cell 

Distribution Width 15.0H, Sodium Level 141, White Blood Count 8.0





 Microbiology


17 MRSA Screen - Final, Complete


          MRSA not isolated





Assessment/Plan


Assessment/Plan


Plan


75 yo M that was admitted after he fell from his scooter and was found to have 

Left hip fracture, now POD #5





Plan


Left hip fracture s/p ORIF by Ortho now POD #5


   - Pain is well controlled, Continue Stool softeners and stimulates


   - Encouraged up to chair at least TID


   - Continue IS


   - Accepted by Inpatient Rehab but not able to complete 3 hrs of therapy per 

day


   - Continue Coumadin, INR trending up, not to goal yet


   - Novoa D/c, use bedside commode, Started flomax


Constipation


   - Started Colace BID and Miralax TID until BM then will titrate down


   - Good bowel signs


   - Encourage activity


CAD with ischemic Cardiomyopathy


   - Denies chest pain, Stable post surgery


COPD with oxygen dependence at night


   - Currently on oxygen during the day as well


   - Continue PRN and scheduled breathing treatments


   - Patient high risk for Post op PNA, Encouraged activity and frequent IS


   - Continue steroid burst


   - Will start Levaquin due to developing consolidation seen on CXR


Fe deficiency Anemia: hgb stabilized


   - Will give IV iron replacement


Severe Debility


   - patient to inpatient rehab but may need swing bed in the meantime


FEN: Heart healthy diet


DVT: Lovenox/coumadin


Dispo: Swing bed evaluation pending


Diagnosis/Problems:  





Clinical Quality Measures


DVT/VTE Risk/Contraindication:


Risk Factor Score Per Nursin


RFS Level Per Nursing on Admit:  4+=Very High








ARSEN LAWS MD Mar 2, 2017 12:12

## 2017-03-03 VITALS — DIASTOLIC BLOOD PRESSURE: 58 MMHG | SYSTOLIC BLOOD PRESSURE: 131 MMHG

## 2017-03-03 VITALS — SYSTOLIC BLOOD PRESSURE: 134 MMHG | DIASTOLIC BLOOD PRESSURE: 71 MMHG

## 2017-03-03 VITALS — DIASTOLIC BLOOD PRESSURE: 61 MMHG | SYSTOLIC BLOOD PRESSURE: 112 MMHG

## 2017-03-03 RX ADMIN — POTASSIUM CHLORIDE SCH MEQ: 600 CAPSULE, EXTENDED RELEASE ORAL at 06:14

## 2017-03-03 RX ADMIN — LOSARTAN POTASSIUM SCH MG: 50 TABLET, FILM COATED ORAL at 08:33

## 2017-03-03 RX ADMIN — OXYCODONE HYDROCHLORIDE AND ACETAMINOPHEN PRN TAB: 5; 325 TABLET ORAL at 13:51

## 2017-03-03 RX ADMIN — IPRATROPIUM BROMIDE AND ALBUTEROL SULFATE SCH ML: .5; 3 SOLUTION RESPIRATORY (INHALATION) at 14:41

## 2017-03-03 RX ADMIN — DOCUSATE SODIUM SCH MG: 100 CAPSULE ORAL at 08:31

## 2017-03-03 RX ADMIN — IPRATROPIUM BROMIDE AND ALBUTEROL SULFATE SCH ML: .5; 3 SOLUTION RESPIRATORY (INHALATION) at 07:14

## 2017-03-03 RX ADMIN — DIGOXIN SCH MG: 125 TABLET ORAL at 08:31

## 2017-03-03 RX ADMIN — POLYETHYLENE GLYCOL (3350) SCH GM: 17 POWDER, FOR SOLUTION ORAL at 08:33

## 2017-03-03 RX ADMIN — SENNOSIDES SCH MG: 8.6 TABLET, FILM COATED ORAL at 08:32

## 2017-03-03 RX ADMIN — AMLODIPINE BESYLATE SCH MG: 5 TABLET ORAL at 20:22

## 2017-03-03 RX ADMIN — SENNOSIDES SCH MG: 8.6 TABLET, FILM COATED ORAL at 20:22

## 2017-03-03 RX ADMIN — METOPROLOL TARTRATE SCH MG: 50 TABLET, FILM COATED ORAL at 20:23

## 2017-03-03 RX ADMIN — METOPROLOL TARTRATE SCH MG: 50 TABLET, FILM COATED ORAL at 08:31

## 2017-03-03 RX ADMIN — IPRATROPIUM BROMIDE AND ALBUTEROL SULFATE SCH ML: .5; 3 SOLUTION RESPIRATORY (INHALATION) at 18:52

## 2017-03-03 RX ADMIN — POLYETHYLENE GLYCOL (3350) SCH GM: 17 POWDER, FOR SOLUTION ORAL at 20:22

## 2017-03-03 RX ADMIN — HYDROCHLOROTHIAZIDE SCH MG: 25 TABLET ORAL at 08:33

## 2017-03-03 RX ADMIN — PANTOPRAZOLE SODIUM SCH MG: 40 TABLET, DELAYED RELEASE ORAL at 06:14

## 2017-03-03 RX ADMIN — DOCUSATE SODIUM SCH MG: 100 CAPSULE ORAL at 20:22

## 2017-03-03 RX ADMIN — LEVOFLOXACIN SCH MG: 750 TABLET, FILM COATED ORAL at 13:51

## 2017-03-03 RX ADMIN — IPRATROPIUM BROMIDE AND ALBUTEROL SULFATE SCH ML: .5; 3 SOLUTION RESPIRATORY (INHALATION) at 11:31

## 2017-03-03 RX ADMIN — ALFUZOSIN HYDROCHLORIDE SCH MG: 10 TABLET, EXTENDED RELEASE ORAL at 18:09

## 2017-03-03 RX ADMIN — FUROSEMIDE SCH MG: 40 TABLET ORAL at 08:33

## 2017-03-03 RX ADMIN — WARFARIN SODIUM SCH MG: 5 TABLET ORAL at 18:09

## 2017-03-03 RX ADMIN — AMIODARONE HYDROCHLORIDE SCH MG: 200 TABLET ORAL at 08:33

## 2017-03-03 RX ADMIN — POLYETHYLENE GLYCOL (3350) SCH GM: 17 POWDER, FOR SOLUTION ORAL at 13:51

## 2017-03-03 NOTE — PHYSICAL THERAPY DAILY NOTE
PT Daily Note-Current


Subjective


Patient in bed pre tx, with nursing just finishing up.  Patient does not want 

to get out of bed, he states he has just gotten a shower and nursing just got 

him back to bed after using the bedside commode, nursing states this is true.  

Patient states he is just too worn out to participate out of bed.  Will perform 

bed exercises.





Pain





   Numeric Pain Scale:  6


   Location:  Left


   Location Body Site:  Hip





Appearance


Patient in bed post tx with nurse call, phone, tray, all needs met.  Wife in 

the room.





Mental Status


Patient Orientation:  Person, Place, Situation


Attachments:  Oxygen





Transfers


              Functional [place] Measure


0=Not Assessed/NA   4=Minimal Assistance


1=Total Assistance   5=Supervision or Setup


2=Maximal Assistance   6=Modified [place]


3=Moderate Assistance   7=Complete IndependenceIRFPAI Quality Coding Scale











6 Independent with activity with or without an assistive device


 


5  Patient requires set up or clean up by helper.  Patient completes activity  

by  themselves


 


4 Supervision or touching assist (CGA). [place] provide cues , steadying assist


 


3 The helper provides less than half the effort to complete the activity


 


2 The helper provides more than half the effort to complete the activity


 


1 Dependent.  The helper does all the effort to complete an activity 


 


7 Patient refused to complete or attempt activity


 


9 The patient did not perform the activity before the current illness or injury


 


88 Not attempted due to Medical conditions or safety concerns











Exercises


Supine Ex:  Ankle pumps, Quad Set, Glut sets, Heel Slides, Short Arc Quads, 

Straight leg raise, Hip abd/add


Supine Reps:  10





Treatments


functional strengthening





Assessment


Current Status:  Fair Progress


patient is making progress with mobility, pillow put underneath left lower leg 

post tx to keep heel off of bed





PT Short Term Goals


Short Term Goals


Time Frame:  Mar 7, 2017


Transfers (B,C,W/C) (FIM):  4


Gait (FIM):  2


Distance (FIM):  9=640-43 ft


Gait Assistive Device:  FWW





PT Long Term Goals


Long Term Goals


PT Long Term Goals Time Frame:  Mar 17, 2017


Transfers (B,C,W/C) (FIM):  5


Sit to Lying (QC):  5


Lying-Sitting on Side/Bed(QC):  5


Sit to Stand (QC):  5


Rolling:  3


Chair/Bed-to-Chair Xfer(QC):  5


Does the Patient Walk:  Yes


Gait (FIM):  4


Gait distance (FIM):  8=942-24 ft


Walk 50ft with 2 Turns (QC):  4


Walk 150 ft (QC):  88


Gait Assistive Device:  FWW


Does the Pt use WC or Scooter?:  Yes


Wheelchair (FIM):  88 (NT)





PT Plan


Problem List


Problem List:  Activity Tolerance, Functional Strength, Safety, Balance, Gait, 

Transfer, Bed Mobility, ROM





Treatment/Plan


Treatment Plan:  Continue Plan of Care


Treatment Plan:  Bed Mobility, Education, Functional Activity Farnaz, Functional 

Strength, Gait, Safety, Therapeutic Exercise, Transfers


Treatment Duration:  Mar 17, 2017


Visits Per Week:  11





Safety Risks/Education


Patient Education:  Correct Positioning, Disease Process, Safety Issues


Teaching Recipient:  Patient


Teaching Methods:  Demonstration, Discussion


Response to Teaching:  Reinforcement Needed





Time/GCodes


Time In:  1430


Time Out:  1445


Total Billed Treatment Time:  15


Total Billed Treatment


1 visit


EX 15 min








JANET PEARSON PT Mar 3, 2017 14:49

## 2017-03-03 NOTE — ST COGNITIVE LINGUISTIC EVAL
Speech Evaluation-General


Medical Diagnosis


Left hip fx, s/p ORIF


Onset Date:  Feb 27, 2017





Therapy Diagnosis


Therapy Diagnosis:  Moderate Cognitive Impairment





Precautions


Precautions/Isolations:  Fall Prevention, Standard Precautions





Referral


Referring Physician:  Dr. Alba Daniels


Reason for Referral:  Evaluation/Treatment


Cognitive Assessment





Medical History


Pertinent Medical History:  Atrial Fib, Arthritis, CABG, CAD, COPD, Dementia, 

GERD, MI, Neuropathy


Reviewed History:  Yes





Social History


Current Living Status:  Spouse





Speech PLF-Current Status


Prior Level of Function





The patient denied prior challenges with speech, language, and cognition,


however, the clinician questions the patient's reliability regarding his


past medical history.





Subjective


The patient was recently admitted to [facility] following a left hip 

fracture with repair. The patient greeted the clinician appropriately and 

agreed to participate in the cognitive evaluation on this date.





Language Eval: Auditory


Comprehends Simple Yes/No Ques:  Mild (The patient was able to reply accurately 

to simple yes/no, however, demonstrated reduced accuracy with complex yes/no 

questions.)


Indent/Objects Multiple Fields:  Functional


Ident/Pics in Multiple Fields:  Mild (The patient was able to identify two of 

three black and white photographs provided.)


Follows 1-Step Commands:  Mild


Follows Complex Directions:  Moderate


Follows General Conversations:  Moderate (Frequent repetition and redirection 

to conversational topics were provided by the clinician.)





Language Eval: Verbal Language


Completes Spontaneous Greeting:  Functional


Produces Auto, Serial Info:  Mild


Imitates Simple Words/Phrases:  Moderate (The patient was able to repeat single 

words, however, was unable to repeat short phrases (immediately).)


Word Finding:  Moderate


Requests Basic Needs:  Mild


States Basic Personal Info:  Mild


Expresses Complex Ideas:  Moderate





Cognitive


Patient Orientation


The patient was oriented to month, year, place, and city. The patient was 

unable to state the date or day of week.





Objective Cognitive Domain


Attention:  Moderate


Memory:  Severe


Problem Solving:  Moderate


Executive Functions:  Moderate


Clock Drawing Severity Rating:  Moderate





Objective


Formal/Standardized Tests


[place] Cognitive Assessment (MoCA) Version One


Oral Motor/Speech Production


The patient demonstrated mildly imprecise articulation (slurred) speech.


Impression


The patient demonstrated moderate cognitive impairment in the areas of 

executive functioning, memory (immediate and delayed recall), attention, and 

language. The patient demonstrated a score of +12/30 on the MoCA.





Speech Short Term Goals


Short Term Goals


Short Term Goals


1. The patient will recall and demonstrate two functional memory strategies for 

use at home.


2. The patient will accurately sequence simple events contained within his 

environment.


3. The patient will demonstrate 90% accuracy with basic safety problem solving.


Time Frame-STG:  Two Weeks





Speech Long Term Goals


Long Term Goals


1. The patient will demonstrate improved cognitive linguistic skills for 

increased function and safety with ADL's.


Time Frame:  One Month





Speech-Plan


Treatment Plan


Speech Therapy Treatment Plan:  Continue Plan of Care


Continue skilled speech services to target sequencing, problem solving, and 

memory.


Treatment Duration:  Mar 31, 2017


# of days/week


One to Three


Visits Per Week:  One to Three


Minutes/Day (M-F):  20


Rehab Potential:  Guarded





Safety Risks/Education


Teaching Recipient:  Patient


Teaching Methods:  Discussion


Response to Teaching:  Verbalize Understanding, Reinforcement Needed


Education Topics Provided:  


Plan of Care





Time


Speech Therapy Time In:  09:45


Speech Therapy Time Out:  10:00


Total Billed Time:  15


Billed Treatment Time


1, MELY BUNCH Mar 3, 2017 11:01

## 2017-03-03 NOTE — PHYSICAL THERAPY DAILY NOTE
PT Daily Note-Current


Subjective


Agrees to PT.  "I'm tired of this hip".





Mental Status


Patient Orientation:  Person, Place, Time, Situation





Transfers


              Functional [place] Measure


0=Not Assessed/NA   4=Minimal Assistance


1=Total Assistance   5=Supervision or Setup


2=Maximal Assistance   6=Modified [place]


3=Moderate Assistance   7=Complete IndependenceIRFPAI Quality Coding Scale











6 Independent with activity with or without an assistive device


 


5  Patient requires set up or clean up by helper.  Patient completes activity  

by  themselves


 


4 Supervision or touching assist (CGA). [place] provide cues , steadying assist


 


3 The helper provides less than half the effort to complete the activity


 


2 The helper provides more than half the effort to complete the activity


 


1 Dependent.  The helper does all the effort to complete an activity 


 


7 Patient refused to complete or attempt activity


 


9 The patient did not perform the activity before the current illness or injury


 


88 Not attempted due to Medical conditions or safety concerns








Transfers (B, C, W/C) (FIM):  3


Roll Left to Right (QC):  3


Supine to/from Sit:  3 (mod assist to assist with trunk and left LE)


Sit to/from Stand:  4 (CGA with skilled cues to sequence)


Sit to Stand (QC):  4


Chair/Bed-to-Chair Xfer(QC):  4


SPT x 1 with FWW with min assist and cues for sequencing and safety.  Sit to 

stand x 3 in // bars with min assist.





Weight Bearing


Weight Bearing Restriction:  Weight Bearing/Tolerated


Location Restriction:  L LE





Gait Training


Gait in // bars 5ft x 3 reps with close CGA; short step length and foot flat 

steps.  Unsteady and fatigued with 3rd trial.





Treatments


transfer and gait in // bars. Pt with OT post treatment.





Assessment


Current Status:  Good Progress


Mobility and transfers are improving.  Pt making functional gains towards est 

goals.





PT Short Term Goals


Short Term Goals


Time Frame:  Mar 7, 2017


Transfers (B,C,W/C) (FIM):  4


Gait (FIM):  2


Distance (FIM):  6=148-78 ft


Gait Assistive Device:  FWW





PT Long Term Goals


Long Term Goals


PT Long Term Goals Time Frame:  Mar 17, 2017


Transfers (B,C,W/C) (FIM):  5


Sit to Lying (QC):  5


Lying-Sitting on Side/Bed(QC):  5


Sit to Stand (QC):  5


Rolling:  3


Chair/Bed-to-Chair Xfer(QC):  5


Does the Patient Walk:  Yes


Gait (FIM):  4


Gait distance (FIM):  1=760-34 ft


Walk 50ft with 2 Turns (QC):  4


Walk 150 ft (QC):  88


Gait Assistive Device:  FWW


Does the Pt use WC or Scooter?:  Yes


Wheelchair (FIM):  88 (NT)





PT Plan


Problem List


Problem List:  Activity Tolerance, Functional Strength, Safety, Gait, Transfer, 

Bed Mobility





Treatment/Plan


Treatment Plan:  Continue Plan of Care


Treatment Plan:  Bed Mobility, Education, Functional Activity Farnaz, Functional 

Strength, Gait, Safety, Therapeutic Exercise, Transfers


Treatment Duration:  Mar 17, 2017


Visits Per Week:  11





Safety Risks/Education


Patient Education:  Transfer Techniques, Safety Issues


Teaching Recipient:  Patient


Teaching Methods:  Demonstration, Discussion


Response to Teaching:  Reinforcement Needed





Time/GCodes


Time In:  1034


Time Out:  1102


Total Billed Treatment Time:  28


Total Billed Treatment


visit


FA 28








ELMER CATALAN PT Mar 3, 2017 11:23

## 2017-03-03 NOTE — OCCUPATIONAL THER DAILY NOTE
OT Current Status-Daily Note


Subjective


Pt seen in gym after PT. Agreeable to OT. No pain mentioned





Appearance


Alert, cooperative





Mental Status/Objective


              Functional [place] Measure


0=Not Assessed/NA   4=Minimal Assistance


1=Total Assistance   5=Supervision or Setup


2=Maximal Assistance   6=Modified [place]


3=Moderate Assistance   7=Complete [place]





ADL-Treatment


              Functional [place] Measure


0=Not Assessed/NA   4=Minimal Assistance


1=Total Assistance   5=Supervision or Setup


2=Maximal Assistance   6=Modified [place]


3=Moderate Assistance   7=Complete IndependenceIRFPAI Quality Coding Scale











6 Independent with activity with or without an assistive device


 


5  Patient requires set up or clean up by helper.  Patient completes activity  

by  themselves


 


4 Supervision or touching assist (CGA). [place] provide cues , steadying assist


 


3 The helper provides less than half the effort to complete the activity


 


2 The helper provides more than half the effort to complete the activity


 


1 Dependent.  The helper does all the effort to complete an activity 


 


7 Patient refused to complete or attempt activity


 


9 The patient did not perform the activity before the current illness or injury


 


88 Not attempted due to Medical conditions or safety concerns











Other Treatment


In gym, pt did 6 minutes bilat UE exercise on arm bike set at 10W resistance. 

He said that he uses this device when he goes to pulmonary rehab and 3 to 6 

minutes is his usual length of exercise. He was transported back to room per w/

 and positioned for transfer to recliner. Pt transferred from w/ to recliner 

with mod assist but with 2 people for safety. Pt did stand pivot transfer mod 

assist moving to R side, taking tiny steps. Cues for safety to sit back down in 

recliner - he tended to "plop" into chair. legs elevated, pillow under lower 

legs to float heels. Discussed showering with his nurse and setup shower chair. 

Also shared information on how best to transfer him to the shower chair (to be 

bathed in shower room - shower in his room not accessible to him due to 

decreased transfer skills). Also changed out BSC to one that is wider, since 

Novoa catheter has been removed.  All needs met.





Education


OT Patient Education:  Exercise program, Instructions to caregiver, Progress 

toward Goal/Update tx plan, Purpose of tx/functional activities, Transfer 

techniques, Use of adapted equipment


Teaching Recipient:  Patient


Teaching Methods:  Discussion


Response to Teaching:  Verbalize Understanding, Return Demonstration, 

Reinforcement Needed





OT Short Term Goals


Short Term Goals


Time Frame:  Mar 9, 2017


Eating(FIM):  5


Grooming(FIM):  4


Bathing(FIM):  3


Upper Body Dressing(FIM):  5


Lower Body Dressing(FIM):  3


Toileting(FIM):  3


Transfers (B,C,W/C) (FIM):  4


Toilet/Commode Transfer(FIM):  4


Additional Short Term Goals:  1-Demonstrate ADL Tasks, 2-Verbalize Understanding

, 3-ImproveStrength/Farnaz


1=Demonstrate adherence to instructed precautions during ADL tasks.


2=Patient will verbalize/demonstrate understanding of assistive devices/

modifications for ADL.


3=Patient will improve strength/tolerance for activity to enable patient to 

perform ADL's.





OT Long Term Goals


Long Term Goals


Time Frame:  Mar 16, 2017


Eating (FIM):  6


Eating (QC):  6


Groomin


Oral Hygiene (QC):  5


Bathing(FIM):  4


Upper Body Dressing(FIM):  5


Lower Body Dressing(FIM):  5


Toileting(FIM):  5


Toileting Hygiene (QC):  5


Transfers (B,C,W/C) (FIM):  5


Toilet/Commode Transfer(FIM):  5


Toilet/Commode Transfer (QC):  5


Additional Goals:  1-Demonstrate ADL Tasks, 2-Verbalize Understanding, 3-

ImproveStrength/Farnaz


1=Demonstrate adherence to instructed precautions during ADL tasks.


2=Patient will verbalize/demonstrate understanding of assistive devices/

modifications for ADL.


3=Patient will improve strength/tolerance for activity to enable patient to 

perform ADL's.





OT Education/Plan


Discharge Recommendations


Plan/Recommendations:  Continue POC





Treatment Plan/Plan of Care


Patient would benefit from OT for education, treatment and training to promote 

independence in ADL's, mobility, safety and/or upper extremity function for ADL'

s.


Plan of Care:  ADL Retraining, Caregiver Training, Functional Mobility


Treatment Duration:  Mar 16, 2017


Visits Per Week:  5-6


Agreement:  Yes


Rehab Potential:  Guarded





Time/GCodes


Start Time:  11:02


Stop Time:  11:32


Total Time Billed (hr/min):  30


Billed Treatment Time


visit, exercise 15 minutes, functional activity 15 minutes








JOCELINE MORALES OT Mar 3, 2017 11:55

## 2017-03-04 VITALS — SYSTOLIC BLOOD PRESSURE: 112 MMHG | DIASTOLIC BLOOD PRESSURE: 66 MMHG

## 2017-03-04 VITALS — SYSTOLIC BLOOD PRESSURE: 131 MMHG | DIASTOLIC BLOOD PRESSURE: 67 MMHG

## 2017-03-04 LAB
ANION GAP SERPL CALC-SCNC: 12 MMOL/L (ref 5–14)
BASOPHILS # BLD AUTO: 0 10^3/UL (ref 0–0.1)
BASOPHILS NFR BLD AUTO: 0 % (ref 0–10)
BUN SERPL-MCNC: 25 MG/DL (ref 7–18)
BUN/CREAT SERPL: 31
CALCIUM SERPL-MCNC: 9.2 MG/DL (ref 8.5–10.1)
CHLORIDE SERPL-SCNC: 99 MMOL/L (ref 98–107)
CO2 SERPL-SCNC: 27 MMOL/L (ref 21–32)
CREAT SERPL-MCNC: 0.8 MG/DL (ref 0.6–1.3)
EOSINOPHIL # BLD AUTO: 0.1 10^3/UL (ref 0–0.3)
EOSINOPHIL NFR BLD AUTO: 2 % (ref 0–10)
ERYTHROCYTE [DISTWIDTH] IN BLOOD BY AUTOMATED COUNT: 15.1 % (ref 10–14.5)
GFR SERPLBLD BASED ON 1.73 SQ M-ARVRAT: > 60 ML/MIN
GLUCOSE SERPL-MCNC: 106 MG/DL (ref 70–105)
INR PPP: 2.3 (ref 0.8–1.4)
LYMPHOCYTES # BLD AUTO: 1.3 X 10^3 (ref 1–4)
LYMPHOCYTES NFR BLD AUTO: 16 % (ref 12–44)
MCH RBC QN AUTO: 32 PG (ref 25–34)
MCHC RBC AUTO-ENTMCNC: 33 G/DL (ref 32–36)
MCV RBC AUTO: 98 FL (ref 80–99)
MONOCYTES # BLD AUTO: 1 X 10^3 (ref 0–1)
MONOCYTES NFR BLD AUTO: 13 % (ref 0–12)
NEUTROPHILS # BLD AUTO: 5.7 X 10^3 (ref 1.8–7.8)
NEUTROPHILS NFR BLD AUTO: 70 % (ref 42–75)
PLATELET # BLD: 258 10^3/UL (ref 130–400)
PMV BLD AUTO: 9.9 FL (ref 7.4–10.4)
POTASSIUM SERPL-SCNC: 3.8 MMOL/L (ref 3.6–5)
PROTHROMBIN TIME: 25.3 SEC (ref 12.2–14.7)
RBC # BLD AUTO: 3.21 10^6/UL (ref 4.35–5.85)
SODIUM SERPL-SCNC: 138 MMOL/L (ref 135–145)
WBC # BLD AUTO: 8.2 10^3/UL (ref 4.3–11)

## 2017-03-04 RX ADMIN — IPRATROPIUM BROMIDE AND ALBUTEROL SULFATE SCH ML: .5; 3 SOLUTION RESPIRATORY (INHALATION) at 07:16

## 2017-03-04 RX ADMIN — LOSARTAN POTASSIUM SCH MG: 50 TABLET, FILM COATED ORAL at 09:41

## 2017-03-04 RX ADMIN — HYDROCHLOROTHIAZIDE SCH MG: 25 TABLET ORAL at 09:41

## 2017-03-04 RX ADMIN — DOCUSATE SODIUM SCH MG: 100 CAPSULE ORAL at 20:50

## 2017-03-04 RX ADMIN — POLYETHYLENE GLYCOL (3350) SCH GM: 17 POWDER, FOR SOLUTION ORAL at 09:41

## 2017-03-04 RX ADMIN — SENNOSIDES SCH MG: 8.6 TABLET, FILM COATED ORAL at 20:50

## 2017-03-04 RX ADMIN — IPRATROPIUM BROMIDE AND ALBUTEROL SULFATE SCH ML: .5; 3 SOLUTION RESPIRATORY (INHALATION) at 14:17

## 2017-03-04 RX ADMIN — METOPROLOL TARTRATE SCH MG: 50 TABLET, FILM COATED ORAL at 09:41

## 2017-03-04 RX ADMIN — ALFUZOSIN HYDROCHLORIDE SCH MG: 10 TABLET, EXTENDED RELEASE ORAL at 17:59

## 2017-03-04 RX ADMIN — OXYCODONE HYDROCHLORIDE AND ACETAMINOPHEN PRN TAB: 5; 325 TABLET ORAL at 11:43

## 2017-03-04 RX ADMIN — AMLODIPINE BESYLATE SCH MG: 5 TABLET ORAL at 20:50

## 2017-03-04 RX ADMIN — PANTOPRAZOLE SODIUM SCH MG: 40 TABLET, DELAYED RELEASE ORAL at 06:31

## 2017-03-04 RX ADMIN — DIGOXIN SCH MG: 125 TABLET ORAL at 09:41

## 2017-03-04 RX ADMIN — POLYETHYLENE GLYCOL (3350) SCH GM: 17 POWDER, FOR SOLUTION ORAL at 20:50

## 2017-03-04 RX ADMIN — SENNOSIDES SCH MG: 8.6 TABLET, FILM COATED ORAL at 09:41

## 2017-03-04 RX ADMIN — FUROSEMIDE SCH MG: 40 TABLET ORAL at 09:42

## 2017-03-04 RX ADMIN — POLYETHYLENE GLYCOL (3350) SCH GM: 17 POWDER, FOR SOLUTION ORAL at 13:15

## 2017-03-04 RX ADMIN — WARFARIN SODIUM SCH MG: 5 TABLET ORAL at 17:59

## 2017-03-04 RX ADMIN — AMIODARONE HYDROCHLORIDE SCH MG: 200 TABLET ORAL at 09:41

## 2017-03-04 RX ADMIN — LEVOFLOXACIN SCH MG: 750 TABLET, FILM COATED ORAL at 13:15

## 2017-03-04 RX ADMIN — IPRATROPIUM BROMIDE AND ALBUTEROL SULFATE SCH ML: .5; 3 SOLUTION RESPIRATORY (INHALATION) at 11:04

## 2017-03-04 RX ADMIN — IPRATROPIUM BROMIDE AND ALBUTEROL SULFATE SCH ML: .5; 3 SOLUTION RESPIRATORY (INHALATION) at 19:14

## 2017-03-04 RX ADMIN — POTASSIUM CHLORIDE SCH MEQ: 600 CAPSULE, EXTENDED RELEASE ORAL at 06:30

## 2017-03-04 RX ADMIN — DOCUSATE SODIUM SCH MG: 100 CAPSULE ORAL at 09:41

## 2017-03-04 RX ADMIN — METOPROLOL TARTRATE SCH MG: 50 TABLET, FILM COATED ORAL at 20:50

## 2017-03-04 NOTE — PHYSICAL THERAPY DAILY NOTE
PT Daily Note-Current


Subjective


Patient in bed pre tx, agrees to PT with some encouragement.  Patient states he 

has pain of 5/10 in left hip.





Appearance


Patient in recliner post tx with nurse call, phone, tray, wife in room, all 

needs met.





Mental Status


Patient Orientation:  Person, Place, Situation


Attachments:  Oxygen





Transfers


              Functional [place] Measure


0=Not Assessed/NA   4=Minimal Assistance


1=Total Assistance   5=Supervision or Setup


2=Maximal Assistance   6=Modified [place]


3=Moderate Assistance   7=Complete IndependenceIRFPAI Quality Coding Scale











6 Independent with activity with or without an assistive device


 


5  Patient requires set up or clean up by helper.  Patient completes activity  

by  themselves


 


4 Supervision or touching assist (CGA). [place] provide cues , steadying assist


 


3 The helper provides less than half the effort to complete the activity


 


2 The helper provides more than half the effort to complete the activity


 


1 Dependent.  The helper does all the effort to complete an activity 


 


7 Patient refused to complete or attempt activity


 


9 The patient did not perform the activity before the current illness or injury


 


88 Not attempted due to Medical conditions or safety concerns








Transfers (B, C, W/C) (FIM):  4


Scootin


Supine to/from Sit:  4


Sit to/from Stand:  4


Bed to/from Chair:  4


Patient min assist for bed mobility and transfers, he does need cues for safety 

and hand placement, he tends to pull up from the walker and he tries to sit 

before turning completely





Gait Training


Gait (FIM):  1


Distance:  8', 5'x2


Gait Level of Assist:  4


Gait Persons Needed:  1


Gait Assistive Device:  FWW


wheelchair follow, slow, antalgic, does not step through with right leg, 

patient gets very SOB





Treatments


bed mobility and transfer training, ambulation





Assessment


Current Status:  Fair Progress


improving mobility and gait





PT Short Term Goals


Short Term Goals


Time Frame:  Mar 7, 2017


Transfers (B,C,W/C) (FIM):  4


Gait (FIM):  2


Distance (FIM):  1=669-34 ft


Gait Assistive Device:  FWW





PT Long Term Goals


Long Term Goals


PT Long Term Goals Time Frame:  Mar 17, 2017


Transfers (B,C,W/C) (FIM):  5


Sit to Lying (QC):  5


Lying-Sitting on Side/Bed(QC):  5


Sit to Stand (QC):  5


Rolling:  3


Chair/Bed-to-Chair Xfer(QC):  5


Does the Patient Walk:  Yes


Gait (FIM):  4


Gait distance (FIM):  7=153-54 ft


Walk 50ft with 2 Turns (QC):  4


Walk 150 ft (QC):  88


Gait Assistive Device:  FWW


Does the Pt use WC or Scooter?:  Yes


Wheelchair (FIM):  88 (NT)





PT Plan


Problem List


Problem List:  Activity Tolerance, Functional Strength, Safety, Balance, Gait, 

Transfer, Bed Mobility, ROM





Treatment/Plan


Treatment Plan:  Continue Plan of Care


Treatment Plan:  Bed Mobility, Education, Functional Activity Farnaz, Functional 

Strength, Gait, Safety, Therapeutic Exercise, Transfers


Treatment Duration:  Mar 17, 2017


Visits Per Week:  11





Safety Risks/Education


Patient Education:  Gait Training, Transfer Techniques, Correct Positioning, 

Safety Issues


Teaching Recipient:  Patient


Teaching Methods:  Demonstration, Discussion


Response to Teaching:  Reinforcement Needed





Time/GCodes


Time In:  950


Time Out:  1005


Total Billed Treatment Time:  15


Total Billed Treatment


1 visit


GT 15 min








JANET PEARSON PT Mar 4, 2017 10:10

## 2017-03-05 VITALS — SYSTOLIC BLOOD PRESSURE: 100 MMHG | DIASTOLIC BLOOD PRESSURE: 62 MMHG

## 2017-03-05 VITALS — SYSTOLIC BLOOD PRESSURE: 124 MMHG | DIASTOLIC BLOOD PRESSURE: 62 MMHG

## 2017-03-05 VITALS — SYSTOLIC BLOOD PRESSURE: 100 MMHG | DIASTOLIC BLOOD PRESSURE: 58 MMHG

## 2017-03-05 VITALS — SYSTOLIC BLOOD PRESSURE: 97 MMHG | DIASTOLIC BLOOD PRESSURE: 50 MMHG

## 2017-03-05 RX ADMIN — OXYCODONE HYDROCHLORIDE AND ACETAMINOPHEN PRN TAB: 5; 325 TABLET ORAL at 14:30

## 2017-03-05 RX ADMIN — POLYETHYLENE GLYCOL (3350) SCH GM: 17 POWDER, FOR SOLUTION ORAL at 09:00

## 2017-03-05 RX ADMIN — PANTOPRAZOLE SODIUM SCH MG: 40 TABLET, DELAYED RELEASE ORAL at 06:22

## 2017-03-05 RX ADMIN — ALFUZOSIN HYDROCHLORIDE SCH MG: 10 TABLET, EXTENDED RELEASE ORAL at 17:37

## 2017-03-05 RX ADMIN — IPRATROPIUM BROMIDE AND ALBUTEROL SULFATE SCH ML: .5; 3 SOLUTION RESPIRATORY (INHALATION) at 06:49

## 2017-03-05 RX ADMIN — LEVOFLOXACIN SCH MG: 750 TABLET, FILM COATED ORAL at 13:38

## 2017-03-05 RX ADMIN — HYDROCHLOROTHIAZIDE SCH MG: 25 TABLET ORAL at 09:28

## 2017-03-05 RX ADMIN — IPRATROPIUM BROMIDE AND ALBUTEROL SULFATE SCH ML: .5; 3 SOLUTION RESPIRATORY (INHALATION) at 19:40

## 2017-03-05 RX ADMIN — IPRATROPIUM BROMIDE AND ALBUTEROL SULFATE SCH ML: .5; 3 SOLUTION RESPIRATORY (INHALATION) at 14:09

## 2017-03-05 RX ADMIN — POLYETHYLENE GLYCOL (3350) SCH GM: 17 POWDER, FOR SOLUTION ORAL at 21:00

## 2017-03-05 RX ADMIN — IPRATROPIUM BROMIDE AND ALBUTEROL SULFATE SCH ML: .5; 3 SOLUTION RESPIRATORY (INHALATION) at 11:17

## 2017-03-05 RX ADMIN — METOPROLOL TARTRATE SCH MG: 50 TABLET, FILM COATED ORAL at 09:28

## 2017-03-05 RX ADMIN — WARFARIN SODIUM SCH MG: 5 TABLET ORAL at 17:37

## 2017-03-05 RX ADMIN — AMLODIPINE BESYLATE SCH MG: 5 TABLET ORAL at 21:00

## 2017-03-05 RX ADMIN — FUROSEMIDE SCH MG: 40 TABLET ORAL at 09:29

## 2017-03-05 RX ADMIN — SENNOSIDES SCH MG: 8.6 TABLET, FILM COATED ORAL at 09:28

## 2017-03-05 RX ADMIN — METOPROLOL TARTRATE SCH MG: 50 TABLET, FILM COATED ORAL at 21:00

## 2017-03-05 RX ADMIN — SENNOSIDES SCH MG: 8.6 TABLET, FILM COATED ORAL at 21:00

## 2017-03-05 RX ADMIN — POLYETHYLENE GLYCOL (3350) SCH GM: 17 POWDER, FOR SOLUTION ORAL at 13:00

## 2017-03-05 RX ADMIN — DOCUSATE SODIUM SCH MG: 100 CAPSULE ORAL at 21:00

## 2017-03-05 RX ADMIN — AMIODARONE HYDROCHLORIDE SCH MG: 200 TABLET ORAL at 09:29

## 2017-03-05 RX ADMIN — IPRATROPIUM BROMIDE AND ALBUTEROL SULFATE SCH ML: .5; 3 SOLUTION RESPIRATORY (INHALATION) at 22:15

## 2017-03-05 RX ADMIN — DOCUSATE SODIUM SCH MG: 100 CAPSULE ORAL at 09:28

## 2017-03-05 RX ADMIN — POTASSIUM CHLORIDE SCH MEQ: 600 CAPSULE, EXTENDED RELEASE ORAL at 06:22

## 2017-03-05 RX ADMIN — LOSARTAN POTASSIUM SCH MG: 50 TABLET, FILM COATED ORAL at 09:28

## 2017-03-05 RX ADMIN — DIGOXIN SCH MG: 125 TABLET ORAL at 09:28
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
## 2017-03-06 VITALS — SYSTOLIC BLOOD PRESSURE: 110 MMHG | DIASTOLIC BLOOD PRESSURE: 61 MMHG

## 2017-03-06 VITALS — SYSTOLIC BLOOD PRESSURE: 132 MMHG | DIASTOLIC BLOOD PRESSURE: 62 MMHG

## 2017-03-06 LAB
BASOPHILS # BLD AUTO: 0 10^3/UL (ref 0–0.1)
BASOPHILS NFR BLD AUTO: 0 % (ref 0–10)
EOSINOPHIL # BLD AUTO: 0.1 10^3/UL (ref 0–0.3)
EOSINOPHIL NFR BLD AUTO: 1 % (ref 0–10)
ERYTHROCYTE [DISTWIDTH] IN BLOOD BY AUTOMATED COUNT: 15.4 % (ref 10–14.5)
INR PPP: 2.6 (ref 0.8–1.4)
LYMPHOCYTES # BLD AUTO: 1.7 X 10^3 (ref 1–4)
LYMPHOCYTES NFR BLD AUTO: 18 % (ref 12–44)
MCH RBC QN AUTO: 32 PG (ref 25–34)
MCHC RBC AUTO-ENTMCNC: 33 G/DL (ref 32–36)
MCV RBC AUTO: 98 FL (ref 80–99)
MONOCYTES # BLD AUTO: 0.9 X 10^3 (ref 0–1)
MONOCYTES NFR BLD AUTO: 9 % (ref 0–12)
NEUTROPHILS # BLD AUTO: 7 X 10^3 (ref 1.8–7.8)
NEUTROPHILS NFR BLD AUTO: 73 % (ref 42–75)
PLATELET # BLD: 287 10^3/UL (ref 130–400)
PMV BLD AUTO: 9.7 FL (ref 7.4–10.4)
PROTHROMBIN TIME: 27.8 SEC (ref 12.2–14.7)
RBC # BLD AUTO: 3.33 10^6/UL (ref 4.35–5.85)
WBC # BLD AUTO: 9.7 10^3/UL (ref 4.3–11)

## 2017-03-06 RX ADMIN — AMLODIPINE BESYLATE SCH MG: 5 TABLET ORAL at 20:08

## 2017-03-06 RX ADMIN — LEVOFLOXACIN SCH MG: 750 TABLET, FILM COATED ORAL at 13:10

## 2017-03-06 RX ADMIN — POLYETHYLENE GLYCOL (3350) SCH GM: 17 POWDER, FOR SOLUTION ORAL at 13:00

## 2017-03-06 RX ADMIN — DIGOXIN SCH MG: 125 TABLET ORAL at 08:51

## 2017-03-06 RX ADMIN — FUROSEMIDE SCH MG: 40 TABLET ORAL at 08:51

## 2017-03-06 RX ADMIN — PANTOPRAZOLE SODIUM SCH MG: 40 TABLET, DELAYED RELEASE ORAL at 06:24

## 2017-03-06 RX ADMIN — HYDROCHLOROTHIAZIDE SCH MG: 25 TABLET ORAL at 08:51

## 2017-03-06 RX ADMIN — OXYCODONE HYDROCHLORIDE AND ACETAMINOPHEN PRN TAB: 5; 325 TABLET ORAL at 23:31

## 2017-03-06 RX ADMIN — IPRATROPIUM BROMIDE AND ALBUTEROL SULFATE SCH ML: .5; 3 SOLUTION RESPIRATORY (INHALATION) at 02:21

## 2017-03-06 RX ADMIN — LOSARTAN POTASSIUM SCH MG: 50 TABLET, FILM COATED ORAL at 08:51

## 2017-03-06 RX ADMIN — WARFARIN SODIUM SCH MG: 5 TABLET ORAL at 17:42

## 2017-03-06 RX ADMIN — METOPROLOL TARTRATE SCH MG: 50 TABLET, FILM COATED ORAL at 20:08

## 2017-03-06 RX ADMIN — DOCUSATE SODIUM SCH MG: 100 CAPSULE ORAL at 20:09

## 2017-03-06 RX ADMIN — OXYCODONE HYDROCHLORIDE AND ACETAMINOPHEN PRN TAB: 5; 325 TABLET ORAL at 14:54

## 2017-03-06 RX ADMIN — POTASSIUM CHLORIDE SCH MEQ: 600 CAPSULE, EXTENDED RELEASE ORAL at 06:24

## 2017-03-06 RX ADMIN — POLYETHYLENE GLYCOL (3350) SCH GM: 17 POWDER, FOR SOLUTION ORAL at 20:09

## 2017-03-06 RX ADMIN — SENNOSIDES SCH MG: 8.6 TABLET, FILM COATED ORAL at 20:09

## 2017-03-06 RX ADMIN — IPRATROPIUM BROMIDE AND ALBUTEROL SULFATE SCH ML: .5; 3 SOLUTION RESPIRATORY (INHALATION) at 06:46

## 2017-03-06 RX ADMIN — POLYETHYLENE GLYCOL (3350) SCH GM: 17 POWDER, FOR SOLUTION ORAL at 08:51

## 2017-03-06 RX ADMIN — AMIODARONE HYDROCHLORIDE SCH MG: 200 TABLET ORAL at 08:50

## 2017-03-06 RX ADMIN — METOPROLOL TARTRATE SCH MG: 50 TABLET, FILM COATED ORAL at 08:51

## 2017-03-06 RX ADMIN — OXYCODONE HYDROCHLORIDE AND ACETAMINOPHEN PRN TAB: 5; 325 TABLET ORAL at 00:02

## 2017-03-06 RX ADMIN — IPRATROPIUM BROMIDE AND ALBUTEROL SULFATE SCH ML: .5; 3 SOLUTION RESPIRATORY (INHALATION) at 11:13

## 2017-03-06 RX ADMIN — IPRATROPIUM BROMIDE AND ALBUTEROL SULFATE SCH ML: .5; 3 SOLUTION RESPIRATORY (INHALATION) at 14:59

## 2017-03-06 RX ADMIN — SENNOSIDES SCH MG: 8.6 TABLET, FILM COATED ORAL at 08:52

## 2017-03-06 RX ADMIN — DOCUSATE SODIUM SCH MG: 100 CAPSULE ORAL at 08:52

## 2017-03-06 RX ADMIN — IPRATROPIUM BROMIDE AND ALBUTEROL SULFATE SCH ML: .5; 3 SOLUTION RESPIRATORY (INHALATION) at 19:03

## 2017-03-06 RX ADMIN — OXYCODONE HYDROCHLORIDE AND ACETAMINOPHEN PRN TAB: 5; 325 TABLET ORAL at 06:25

## 2017-03-06 RX ADMIN — IPRATROPIUM BROMIDE AND ALBUTEROL SULFATE SCH ML: .5; 3 SOLUTION RESPIRATORY (INHALATION) at 22:28

## 2017-03-06 RX ADMIN — ALFUZOSIN HYDROCHLORIDE SCH MG: 10 TABLET, EXTENDED RELEASE ORAL at 17:42

## 2017-03-06 NOTE — PHYSICAL THERAPY DAILY NOTE
PT Daily Note-Current


Subjective


Patient is up in recliner and agrees to PT.  Patient rates left hip pain 5/10.





Pain





   Numeric Pain Scale:  5-Moderate Pain


   Location:  Left


   Location Body Site:  Hip


   Pain Description:  Ache, Acute





Mental Status


Patient Orientation:  Person, Time, Situation


Attachments:  Oxygen (1.5 L O2)





Transfers


              Functional [place] Measure


0=Not Assessed/NA   4=Minimal Assistance


1=Total Assistance   5=Supervision or Setup


2=Maximal Assistance   6=Modified [place]


3=Moderate Assistance   7=Complete IndependenceIRFPAI Quality Coding Scale











6 Independent with activity with or without an assistive device


 


5  Patient requires set up or clean up by helper.  Patient completes activity  

by  themselves


 


4 Supervision or touching assist (CGA). [place] provide cues , steadying assist


 


3 The helper provides less than half the effort to complete the activity


 


2 The helper provides more than half the effort to complete the activity


 


1 Dependent.  The helper does all the effort to complete an activity 


 


7 Patient refused to complete or attempt activity


 


9 The patient did not perform the activity before the current illness or injury


 


88 Not attempted due to Medical conditions or safety concerns








Transfers (B, C, W/C) (FIM):  4


Scootin


Sit to/from Stand:  4


Sit to Stand (QC):  4


Patient requires verbal instruction for hand placement for transfers.  Patient 

moves to use FWW to stand and does not reach back  to chair and is unaware of 

safety concerns.





Weight Bearing


Weight Bearing Restriction:  Weight Bearing/Tolerated


Location Restriction:  L LE





Gait Training


Does the Patient Walk?:  Yes


Gait (FIM):  2


Distance (FIM):  0=159-01 ft


Distance:  75'


Gait Level of Assist:  2


Gait Persons Needed:  1


Gait Assistive Device:  FWW


decrease ruma and step length.  No foot clearance bilaterally and presents 

with step to gait sequence.  Patient places most of weight through bilateral UE'

s with FWW and limites weight bearing bilateral LE's. Patient fatigued toward 

the last 25' and required max assist to return to room and sit at the end of 

the bed due to extreme weakness and inability to perform minimal activity.





Exercises


Supine Ex:  Ankle pumps, Quad Set, Heel Slides, Straight leg raise


Supine Reps:  10 (in recliner; AAROM SLR)


Seated Therapy Exercises:  Ankle pumps, Long arc quads


Seated Reps:  20





Assessment


Patient tolerates minimal activity and wished to cease treatment after 10 

minutes, however, PT encouraged patient to continue.  Patient requires AAROM 

with SLR and is unable to actively perform.  Patient also continues to present 

with increased SOA with minimal activity on 2L O2 NC in place with activity.  

From a PT standpoint, patient will require LTC to allow gradual progression due 

to patient inability to tolerate intense treatment.





PT Short Term Goals


Short Term Goals


Time Frame:  Mar 7, 2017


Transfers (B,C,W/C) (FIM):  4


Gait (FIM):  2


Distance (FIM):  8=457-18 ft


Gait Assistive Device:  FWW





PT Long Term Goals


Long Term Goals


PT Long Term Goals Time Frame:  Mar 17, 2017


Transfers (B,C,W/C) (FIM):  5


Sit to Lying (QC):  5


Lying-Sitting on Side/Bed(QC):  5


Sit to Stand (QC):  5


Rolling:  3


Chair/Bed-to-Chair Xfer(QC):  5


Does the Patient Walk:  Yes


Gait (FIM):  4


Gait distance (FIM):  5=682-44 ft


Walk 50ft with 2 Turns (QC):  4


Walk 150 ft (QC):  88


Gait Assistive Device:  FWW


Does the Pt use WC or Scooter?:  Yes


Wheelchair (FIM):  88 (NT)





PT Plan


Treatment/Plan


Treatment Plan:  Continue Plan of Care


Treatment Plan:  Bed Mobility, Education, Functional Activity Farnaz, Functional 

Strength, Gait, Safety, Therapeutic Exercise, Transfers


Treatment Duration:  Mar 17, 2017


Visits Per Week:  11





Safety Risks/Education


Patient Education:  Gait Training


Teaching Recipient:  Patient, Significant Other


Teaching Methods:  Demonstration, Discussion


Response to Teaching:  Verbalize Understanding, Reinforcement Needed





Discharge Recommendations


Therapy D/C Recommendations:  Skilled Nursing (TCU/NH)





Time/GCodes


Time In:  910


Time Out:  933


Total Billed Treatment Time:  23


Total Billed Treatment


1 visit


EX 10 min


GT 13 min








MIKAYLA JOHNSON PT Mar 6, 2017 10:13

## 2017-03-06 NOTE — SPEECH THERAPY DAILY NOTE
Speech Daily Progress Note


Subjective


The patient was sitting upright in recliner upon entrance. The patient's wife 

was present throughout the session. The patient agreed to participate in the 

cognitive treatment session on this date.





Objective


Functional Memory Strategies: External memory strategies were discussed on this 

date. External memory strategies included calendars, planners, address books, 

memory journals, routines, pill boxes, and alarms. Per patient (and wife), the 

patient uses physician cards, calendars, and his  (who records 

appointments in her phone) to recall appointments. At this time, the patient 

does not use a pill box and is not interested in using one in the future (per 

wife, "I am his pill box. It works fine.").





Assessment


Assessment Current Status:  Fair Progress





Treatment Plan


Continue Plan of Care





Speech Short Term Goals


Short Term Goals


Short Term Goals


1. The patient will recall and demonstrate two functional memory strategies for 

use at home.


2. The patient will accurately sequence simple events contained within his 

environment.


3. The patient will demonstrate 90% accuracy with basic safety problem solving.


Time Frame-STG:  Two Weeks





Speech Long Term Goals


Long Term Goals


1. The patient will demonstrate improved cognitive linguistic skills for 

increased function and safety with ADL's.


Time Frame:  One Month





Speech-Plan


Treatment Plan


Speech Therapy Treatment Plan:  Continue Plan of Care


Continue skilled speech therapy to target functional memory strategies, 

sequencing, and problem solving.


Treatment Duration:  Mar 31, 2017


# of days/week


One to three.


Visits Per Week:  One to Three


Minutes/Day (M-F):  20


Rehab Potential:  Guarded





Safety Risks/Education


Teaching Recipient:  Patient


Teaching Methods:  Demonstration, Handout, Discussion


Response to Teaching:  Verbalize Understanding, Reinforcement Needed


Education Topics Provided:  


External Memory Strategies





Time


Speech Therapy Time In:  09:50


Speech Therapy Time Out:  10:10


Total Billed Time:  20


Billed Treatment Time


1ANNABEL ELIZABETH  Mar 6, 2017 10:28

## 2017-03-06 NOTE — PHYSICAL THERAPY DAILY NOTE
PT Daily Note-Current


Subjective


Agrees to PT.  His wife reports that at home he was able to clear his feet when 

walking.





Mental Status


Patient Orientation:  Person, Place, Time, Situation


Attachments:  Oxygen ( 3 l/min; in situ during and post treatment)





Transfers


              Functional [place] Measure


0=Not Assessed/NA   4=Minimal Assistance


1=Total Assistance   5=Supervision or Setup


2=Maximal Assistance   6=Modified [place]


3=Moderate Assistance   7=Complete IndependenceIRFPAI Quality Coding Scale











6 Independent with activity with or without an assistive device


 


5  Patient requires set up or clean up by helper.  Patient completes activity  

by  themselves


 


4 Supervision or touching assist (CGA). [place] provide cues , steadying assist


 


3 The helper provides less than half the effort to complete the activity


 


2 The helper provides more than half the effort to complete the activity


 


1 Dependent.  The helper does all the effort to complete an activity 


 


7 Patient refused to complete or attempt activity


 


9 The patient did not perform the activity before the current illness or injury


 


88 Not attempted due to Medical conditions or safety concerns








Sit to stand with min assist before gait.  


Practiced sit to stand transfers post gait x 5 reps with CGA and skilled cues 

for hand placement and sequencing.





Weight Bearing


Weight Bearing Restriction:  Weight Bearing/Tolerated


Location Restriction:  L LE





Gait Training


Does the Patient Walk?:  Yes


Gait (FIM):  2


Distance:  20 ft x 3


Gait Level of Assist:  4 (close CGA to min assit)


Gait Assistive Device:  FWW


slow gait with limited heel strike and toe off and limited foot clearance 

bilaterally.  Step to gait with the right LE due to difficulty with WB through 

the left LE during stance phase of gait.





Exercises


Seated Therapy Exercises:  Ankle pumps, Long arc quads, Hip flexion, Hamstring 

Curls


Seated Reps:  10 (2 sets)





Assessment


Current Status:  Good Progress


Progressing with ability to transfer and mobilize.  Gait very unsafe with 

limited foot clearance while walking and seems unsteady.  Very close CGA 

provided with gait with skilled cues for safety.





PT Short Term Goals


Short Term Goals


Time Frame:  Mar 7, 2017


Transfers (B,C,W/C) (FIM):  4


Gait (FIM):  2


Distance (FIM):  4=347-54 ft


Gait Assistive Device:  FWW





PT Long Term Goals


Long Term Goals


PT Long Term Goals Time Frame:  Mar 17, 2017


Transfers (B,C,W/C) (FIM):  5


Sit to Lying (QC):  5


Lying-Sitting on Side/Bed(QC):  5


Sit to Stand (QC):  5


Rolling:  3


Chair/Bed-to-Chair Xfer(QC):  5


Does the Patient Walk:  Yes


Gait (FIM):  4


Gait distance (FIM):  4=864-50 ft


Walk 50ft with 2 Turns (QC):  4


Walk 150 ft (QC):  88


Gait Assistive Device:  FWW


Does the Pt use WC or Scooter?:  Yes


Wheelchair (FIM):  88 (NT)





PT Plan


Problem List


Problem List:  Activity Tolerance, Functional Strength, Safety, Balance, Gait, 

Transfer, Bed Mobility





Treatment/Plan


Treatment Plan:  Continue Plan of Care


Treatment Plan:  Bed Mobility, Education, Functional Activity Farnaz, Functional 

Strength, Gait, Safety, Therapeutic Exercise, Transfers


Treatment Duration:  Mar 17, 2017


Visits Per Week:  11





Safety Risks/Education


Patient Education:  Transfer Techniques, Safety Issues


Teaching Recipient:  Patient


Teaching Methods:  Demonstration, Discussion


Response to Teaching:  Reinforcement Needed





Time/GCodes


Time In:  1310


Time Out:  1341


Total Billed Treatment Time:  31


Total Billed Treatment


visit


EX 15


GT 16








ELMER CATALAN PT Mar 6, 2017 14:00

## 2017-03-06 NOTE — PROGRESS NOTE-HOSPITALIST
Progress Note


Progress Notes/Assess & Plan


Date Seen


3/6/17


Diagonsis/Assessment & Plan


Chart Review:


No fever


Vitals stable


WBC 9.7


Hgb 10.8


CMP normal


INR 2.6





RN Review:


RN states that pt will likely not qualify for rehab, and a nursing facility is 

being sought.





Patient Interview:


Pt states that he is unable to work with PT.


Pt reports regular BMs and urination.


Physical exam stable.


Pt is using IS and receiving breathing treatments.


Dr. Monte discusses nursing facility options. Pt's wife states that she is 

concerned regarding finances.





no fever, vital signs stable, fatigued, sitting in chair, wife at bedside in 

her wheelchair


regular rhythm, clear to auscultation bilaterally but diminished in the bases


Trace edema





Plan:


SW consult regarding nursing facility.


PT/OT


Neb treatments and oxygen supplementation


Coumadin for DVT prophylaxis longer term due to severe debility





Scribed by Buddy Heard under the direct supervision of Dr. Monte.








MICHELE MONTE DO Mar 6, 2017 10:52

## 2017-03-06 NOTE — OCCUPATIONAL THER DAILY NOTE
OT Current Status-Daily Note


Subjective


Pt seen in room, up in recliner, agreeable to OT. No pain mentioned





Appearance


Alert, cooperative, still SOB, on O2





Mental Status/Objective


              Functional [place] Measure


0=Not Assessed/NA   4=Minimal Assistance


1=Total Assistance   5=Supervision or Setup


2=Maximal Assistance   6=Modified [place]


3=Moderate Assistance   7=Complete [place]





ADL-Treatment


Pt was seen at 11:30 but getting ready to shower with nursing. Pt given 

"homework" to wash arms, chest, abdomen, thighs and netta in front. After lunch, 

pt reported that he did his "homework". He was able to feed himself. Pt able to 

turn t shirt right side out and don t shirt with setup. pt educ modified 

technique for lower body dressing. Pt was able to get pants on L foot but 

needed a little help on R foot. Unable to get slipper socks off or slippers on. 

Became SOB when leaning forward. Sit to stand min assist and pt was able to 

balance CGA, FWW to pull pants up. Followed skilled cues for hand placement to 

reach back before sitting. Pt's wife had already brushed his teeth. Pt required 

recovery period between pants legs and after shirt and then pants. Decreased 

activity tolerance.  Pt's feet elevated in recliner, pillow underneath to float 

heels. All needs met. O2 in place except when donning shirt.


              Functional [place] Measure


0=Not Assessed/NA   4=Minimal Assistance


1=Total Assistance   5=Supervision or Setup


2=Maximal Assistance   6=Modified [place]


3=Moderate Assistance   7=Complete IndependenceIRFPAI Quality Coding Scale











6 Independent with activity with or without an assistive device


 


5  Patient requires set up or clean up by helper.  Patient completes activity  

by  themselves


 


4 Supervision or touching assist (CGA). [place] provide cues , steadying assist


 


3 The helper provides less than half the effort to complete the activity


 


2 The helper provides more than half the effort to complete the activity


 


1 Dependent.  The helper does all the effort to complete an activity 


 


7 Patient refused to complete or attempt activity


 


9 The patient did not perform the activity before the current illness or injury


 


88 Not attempted due to Medical conditions or safety concerns











OT Short Term Goals


Short Term Goals


Time Frame:  Mar 9, 2017


Eating(FIM):  5


Grooming(FIM):  4


Bathing(FIM):  3


Upper Body Dressing(FIM):  5


Lower Body Dressing(FIM):  3


Toileting(FIM):  3


Transfers (B,C,W/C) (FIM):  4


Toilet/Commode Transfer(FIM):  4


Additional Short Term Goals:  1-Demonstrate ADL Tasks, 2-Verbalize Understanding

, 3-ImproveStrength/Farnaz


1=Demonstrate adherence to instructed precautions during ADL tasks.


2=Patient will verbalize/demonstrate understanding of assistive devices/

modifications for ADL.


3=Patient will improve strength/tolerance for activity to enable patient to 

perform ADL's.





OT Long Term Goals


Long Term Goals


Time Frame:  Mar 16, 2017


Eating (FIM):  6


Eating (QC):  6


Groomin


Oral Hygiene (QC):  5


Bathing(FIM):  4


Upper Body Dressing(FIM):  5


Lower Body Dressing(FIM):  5


Toileting(FIM):  5


Toileting Hygiene (QC):  5


Transfers (B,C,W/C) (FIM):  5


Toilet/Commode Transfer(FIM):  5


Toilet/Commode Transfer (QC):  5


Additional Goals:  1-Demonstrate ADL Tasks, 2-Verbalize Understanding, 3-

ImproveStrength/Farnaz


1=Demonstrate adherence to instructed precautions during ADL tasks.


2=Patient will verbalize/demonstrate understanding of assistive devices/

modifications for ADL.


3=Patient will improve strength/tolerance for activity to enable patient to 

perform ADL's.





OT Education/Plan


Discharge Recommendations


Plan/Recommendations:  Continue POC





Treatment Plan/Plan of Care


Patient would benefit from OT for education, treatment and training to promote 

independence in ADL's, mobility, safety and/or upper extremity function for ADL'

s.


Plan of Care:  ADL Retraining, Caregiver Training, Functional Mobility


Treatment Duration:  Mar 16, 2017


Visits Per Week:  5-6


Agreement:  Yes


Rehab Potential:  Guarded





Time/GCodes


Start Time:  12:37


Stop Time:  13:00


Total Time Billed (hr/min):  23


Billed Treatment Time


visit, 23 minutes ADL








JOCELINE MORALES OT Mar 6, 2017 13:15

## 2017-03-07 VITALS — SYSTOLIC BLOOD PRESSURE: 113 MMHG | DIASTOLIC BLOOD PRESSURE: 55 MMHG

## 2017-03-07 VITALS — DIASTOLIC BLOOD PRESSURE: 52 MMHG | SYSTOLIC BLOOD PRESSURE: 103 MMHG

## 2017-03-07 RX ADMIN — DOCUSATE SODIUM SCH MG: 100 CAPSULE ORAL at 09:34

## 2017-03-07 RX ADMIN — SENNOSIDES SCH MG: 8.6 TABLET, FILM COATED ORAL at 09:00

## 2017-03-07 RX ADMIN — ALFUZOSIN HYDROCHLORIDE SCH MG: 10 TABLET, EXTENDED RELEASE ORAL at 17:56

## 2017-03-07 RX ADMIN — IPRATROPIUM BROMIDE AND ALBUTEROL SULFATE SCH ML: .5; 3 SOLUTION RESPIRATORY (INHALATION) at 22:42

## 2017-03-07 RX ADMIN — POTASSIUM CHLORIDE SCH MEQ: 600 CAPSULE, EXTENDED RELEASE ORAL at 06:06

## 2017-03-07 RX ADMIN — PANTOPRAZOLE SODIUM SCH MG: 40 TABLET, DELAYED RELEASE ORAL at 06:06

## 2017-03-07 RX ADMIN — LEVOFLOXACIN SCH MG: 750 TABLET, FILM COATED ORAL at 13:19

## 2017-03-07 RX ADMIN — METOPROLOL TARTRATE SCH MG: 50 TABLET, FILM COATED ORAL at 09:37

## 2017-03-07 RX ADMIN — SENNOSIDES SCH MG: 8.6 TABLET, FILM COATED ORAL at 20:18

## 2017-03-07 RX ADMIN — DOCUSATE SODIUM SCH MG: 100 CAPSULE ORAL at 20:18

## 2017-03-07 RX ADMIN — OXYCODONE HYDROCHLORIDE AND ACETAMINOPHEN PRN TAB: 5; 325 TABLET ORAL at 09:34

## 2017-03-07 RX ADMIN — LOSARTAN POTASSIUM SCH MG: 50 TABLET, FILM COATED ORAL at 09:34

## 2017-03-07 RX ADMIN — IPRATROPIUM BROMIDE AND ALBUTEROL SULFATE SCH ML: .5; 3 SOLUTION RESPIRATORY (INHALATION) at 19:07

## 2017-03-07 RX ADMIN — FUROSEMIDE SCH MG: 40 TABLET ORAL at 09:34

## 2017-03-07 RX ADMIN — WARFARIN SODIUM SCH MG: 5 TABLET ORAL at 17:56

## 2017-03-07 RX ADMIN — METOPROLOL TARTRATE SCH MG: 50 TABLET, FILM COATED ORAL at 20:17

## 2017-03-07 RX ADMIN — POLYETHYLENE GLYCOL (3350) SCH GM: 17 POWDER, FOR SOLUTION ORAL at 09:00

## 2017-03-07 RX ADMIN — IPRATROPIUM BROMIDE AND ALBUTEROL SULFATE SCH ML: .5; 3 SOLUTION RESPIRATORY (INHALATION) at 02:46

## 2017-03-07 RX ADMIN — IPRATROPIUM BROMIDE AND ALBUTEROL SULFATE SCH ML: .5; 3 SOLUTION RESPIRATORY (INHALATION) at 14:41

## 2017-03-07 RX ADMIN — HYDROCHLOROTHIAZIDE SCH MG: 25 TABLET ORAL at 09:34

## 2017-03-07 RX ADMIN — OXYCODONE HYDROCHLORIDE AND ACETAMINOPHEN PRN TAB: 5; 325 TABLET ORAL at 13:33

## 2017-03-07 RX ADMIN — AMLODIPINE BESYLATE SCH MG: 5 TABLET ORAL at 20:17

## 2017-03-07 RX ADMIN — IPRATROPIUM BROMIDE AND ALBUTEROL SULFATE SCH ML: .5; 3 SOLUTION RESPIRATORY (INHALATION) at 10:28

## 2017-03-07 RX ADMIN — DIGOXIN SCH MG: 125 TABLET ORAL at 09:34

## 2017-03-07 RX ADMIN — AMIODARONE HYDROCHLORIDE SCH MG: 200 TABLET ORAL at 09:34

## 2017-03-07 RX ADMIN — IPRATROPIUM BROMIDE AND ALBUTEROL SULFATE SCH ML: .5; 3 SOLUTION RESPIRATORY (INHALATION) at 06:41

## 2017-03-07 NOTE — PHYSICAL THERAPY DAILY NOTE
PT Daily Note-Current


Subjective


Patient reports he is tired, however, agrees to PT.





Pain





   Numeric Pain Scale:  5-Moderate Pain


   Location:  Left


   Location Body Site:  Hip


   Pain Description:  Acute





Mental Status


Patient Orientation:  Normal For Age


Attachments:  Oxygen





Transfers


              Functional [place] Measure


0=Not Assessed/NA   4=Minimal Assistance


1=Total Assistance   5=Supervision or Setup


2=Maximal Assistance   6=Modified [place]


3=Moderate Assistance   7=Complete IndependenceIRFPAI Quality Coding Scale











6 Independent with activity with or without an assistive device


 


5  Patient requires set up or clean up by helper.  Patient completes activity  

by  themselves


 


4 Supervision or touching assist (CGA). [place] provide cues , steadying assist


 


3 The helper provides less than half the effort to complete the activity


 


2 The helper provides more than half the effort to complete the activity


 


1 Dependent.  The helper does all the effort to complete an activity 


 


7 Patient refused to complete or attempt activity


 


9 The patient did not perform the activity before the current illness or injury


 


88 Not attempted due to Medical conditions or safety concerns








Transfers (B, C, W/C) (FIM):  3


Scooting:  3


Sit to/from Stand:  3


Sit to Stand (QC):  3





Weight Bearing


Weight Bearing Restriction:  Weight Bearing/Tolerated


Location Restriction:  L LE





Gait Training


Does the Patient Walk?:  Yes


Gait (FIM):  1


Distance (FIM):  1=up to 49 ft


Distance:  15' x 2; 5' x 1


Gait Level of Assist:  3


Gait Persons Needed:  2


Gait Assistive Device:  FWW


step to, shuffle gait, minimal to no weight shifting to left to advance right LE





Exercises


Seated Therapy Exercises:  Ankle pumps, Long arc quads


Seated Reps:  15 (x 2 sets)





Assessment


Patient tolerates minimal activity and displays increase SOA with activity.  PT 

to continue to increase activity as tolerated.





PT Short Term Goals


Short Term Goals


Time Frame:  Mar 7, 2017


Transfers (B,C,W/C) (FIM):  4


Gait (FIM):  2


Distance (FIM):  9=384-89 ft


Gait Assistive Device:  FWW





PT Long Term Goals


Long Term Goals


PT Long Term Goals Time Frame:  Mar 17, 2017


Transfers (B,C,W/C) (FIM):  5


Sit to Lying (QC):  5


Lying-Sitting on Side/Bed(QC):  5


Sit to Stand (QC):  5


Rolling:  3


Chair/Bed-to-Chair Xfer(QC):  5


Does the Patient Walk:  Yes


Gait (FIM):  4


Gait distance (FIM):  2=336-82 ft


Walk 50ft with 2 Turns (QC):  4


Walk 150 ft (QC):  88


Gait Assistive Device:  FWW


Does the Pt use WC or Scooter?:  Yes


Wheelchair (FIM):  88 (NT)





PT Plan


Treatment/Plan


Treatment Plan:  Continue Plan of Care


Treatment Plan:  Bed Mobility, Education, Functional Activity Farnaz, Functional 

Strength, Gait, Safety, Therapeutic Exercise, Transfers


Treatment Duration:  Mar 17, 2017


Visits Per Week:  11





Time/GCodes


Time In:  1445


Time Out:  1508


Total Billed Treatment Time:  23


Total Billed Treatment


1 visit


GT 15 min


EX 8 min








MIKAYLA JOHNSON PT Mar 7, 2017 15:23

## 2017-03-07 NOTE — PHYSICAL THERAPY DAILY NOTE
PT Daily Note-Current


Subjective


Patient sitting in recliner pre tx, agrees to PT reluctantly.  Wife encourages 

him.  Patient states he has pain in his left hip at 5-6/10.  He also states his 

right leg has been giving him trouble because it is weak.





Appearance


Patient in recliner post tx with nurse call, phone, tray, wife in room, all 

needs met.





Mental Status


Patient Orientation:  Person, Place, Situation





Transfers


              Functional [place] Measure


0=Not Assessed/NA   4=Minimal Assistance


1=Total Assistance   5=Supervision or Setup


2=Maximal Assistance   6=Modified [place]


3=Moderate Assistance   7=Complete IndependenceIRFPAI Quality Coding Scale











6 Independent with activity with or without an assistive device


 


5  Patient requires set up or clean up by helper.  Patient completes activity  

by  themselves


 


4 Supervision or touching assist (CGA). [place] provide cues , steadying assist


 


3 The helper provides less than half the effort to complete the activity


 


2 The helper provides more than half the effort to complete the activity


 


1 Dependent.  The helper does all the effort to complete an activity 


 


7 Patient refused to complete or attempt activity


 


9 The patient did not perform the activity before the current illness or injury


 


88 Not attempted due to Medical conditions or safety concerns








Transfers (B, C, W/C) (FIM):  4


Sit to/from Stand:  4


Patient performed sit to stand transfer with CGA but needs cues for safety and 

hand placement.





Gait Training


Gait (FIM):  1


Distance:  20', 5', 10'


Gait Level of Assist:  4


Gait Persons Needed:  1


Gait Assistive Device:  FWW


Wheelchair follow.  Patient had a lot of trouble ambulating today, he could 

barely advance his right leg.  He would state over and over "I just can't do it

" even with encouragement.  [place]alg[place], poor endurance.





Exercises


Seated Therapy Exercises:  Ankle pumps, Long arc quads, Hip flexion


Seated Reps:  20





Treatments


transfers, ambulation, functional strengthening





Assessment


Current Status:  Fair Progress


Patient did a little worse today, but overall he has seen improvement in 

mobility.





PT Short Term Goals


Short Term Goals


Time Frame:  Mar 7, 2017


Transfers (B,C,W/C) (FIM):  4


Gait (FIM):  2


Distance (FIM):  6=111-33 ft


Gait Assistive Device:  FWW





PT Long Term Goals


Long Term Goals


PT Long Term Goals Time Frame:  Mar 17, 2017


Transfers (B,C,W/C) (FIM):  5


Sit to Lying (QC):  5


Lying-Sitting on Side/Bed(QC):  5


Sit to Stand (QC):  5


Rolling:  3


Chair/Bed-to-Chair Xfer(QC):  5


Does the Patient Walk:  Yes


Gait (FIM):  4


Gait distance (FIM):  0=074-48 ft


Walk 50ft with 2 Turns (QC):  4


Walk 150 ft (QC):  88


Gait Assistive Device:  FWW


Does the Pt use WC or Scooter?:  Yes


Wheelchair (FIM):  88 (NT)





PT Plan


Problem List


Problem List:  Activity Tolerance, Functional Strength, Safety, Balance, Gait, 

Transfer, Bed Mobility, ROM





Treatment/Plan


Treatment Plan:  Continue Plan of Care


Treatment Plan:  Bed Mobility, Education, Functional Activity Farnaz, Functional 

Strength, Gait, Safety, Therapeutic Exercise, Transfers


Treatment Duration:  Mar 17, 2017


Visits Per Week:  11





Safety Risks/Education


Patient Education:  Gait Training, Transfer Techniques, Correct Positioning, 

Safety Issues


Teaching Recipient:  Patient


Teaching Methods:  Demonstration, Discussion


Response to Teaching:  Reinforcement Needed





Time/GCodes


Time In:  1100


Time Out:  1125


Total Billed Treatment Time:  25


Total Billed Treatment


1 visit


GT 15 min


EX 10 min








JANET PEARSON PT Mar 7, 2017 11:30

## 2017-03-07 NOTE — PULMONARY PROGRESS NOTE
Subjective


Subjective/Events-last exam


Pt feels improved and is looking better.





Exam


Exam





 Vital Signs








  Date Time  Temp Pulse Resp B/P Pulse Ox O2 Delivery O2 Flow Rate FiO2


 


3/7/17 06:41     96  3.00 


 


3/7/17 02:47     92  3.00 


 


3/6/17 22:28     94  3.00 


 


3/6/17 20:10      Nasal Cannula 3.00 


 


3/6/17 19:05     96  3.00 


 


3/6/17 18:00 98.9 82 22 110/61 96 Nasal Cannula 3.00 


 


3/6/17 14:59     92  3.00 


 


3/6/17 11:13     96  3.00 


 


3/6/17 08:00      Nasal Cannula 3.00 














 I & O 


 


 3/7/17





 07:00


 


Intake Total 1630 ml


 


Output Total 1525 ml


 


Balance 105 ml








General Appearance:   No Apparent Distress WD/WN Anxious


Respiratory:   Chest Non Tender Crackles Decreased Breath Sounds


Cardiovascular:   Regular Rate, Rhythm


Neurologic/Psychiatric:   Alert Oriented x3


Skin:   Normal Color Warm/Dry





Results


Lab


Laboratory Tests


3/6/17 06:25











Assessment/Plan


Assessment/Plan


Acute left hip fracture sustained in fall from scooter


Severe COPD nighttime oxygen


   -SVNS, oxygen 


   -D/C Abx after tomorrow 


   -decrease prednisone to 10mg and continue to taper


Atelectasis


   -increase activity, IS


pulmonary edema


   -lasix


Severe CAD with ischemic cardiomyopathy with defibrillator placement 1 year ago


Atrial fibrillation


Severe debility with poor prognosis long-term








JOSE A ZAMORA DO Mar 7, 2017 07:34

## 2017-03-07 NOTE — OCCUPATIONAL THER DAILY NOTE
OT Current Status-Daily Note


Subjective


Pt alert, sitting in recliner.  Wife present in room.  Pt agreed to therapy.  

No c/o pain at this time.





Mental Status/Objective


Patient Orientation:  Person, Place


              Functional [place] Measure


0=Not Assessed/NA   4=Minimal Assistance


1=Total Assistance   5=Supervision or Setup


2=Maximal Assistance   6=Modified [place]


3=Moderate Assistance   7=Complete [place]





ADL-Treatment


Pt was set up for sponge bath.  Pt was able to cleanse/dry upper body by self.  

SBA to cleanse and dry netta area/buttocks. Sitting to bathe LE by self, assist 

for feet.  Pt was able don/doff shirt by self then SBA to don/doff pants when 

standing to hike over hips.  Assist to don socks/shoes.  Pt was able to wash 

hands and face with cloth and comb hair.  After therapy, pt sitting in recliner 

with nrsg and wife present in room.  All needs met in room.


              Functional [place] Measure


0=Not Assessed/NA   4=Minimal Assistance


1=Total Assistance   5=Supervision or Setup


2=Maximal Assistance   6=Modified [place]


3=Moderate Assistance   7=Complete IndependenceIRFPAI Quality Coding Scale











6 Independent with activity with or without an assistive device


 


5  Patient requires set up or clean up by helper.  Patient completes activity  

by  themselves


 


4 Supervision or touching assist (CGA). [place] provide cues , steadying assist


 


3 The helper provides less than half the effort to complete the activity


 


2 The helper provides more than half the effort to complete the activity


 


1 Dependent.  The helper does all the effort to complete an activity 


 


7 Patient refused to complete or attempt activity


 


9 The patient did not perform the activity before the current illness or injury


 


88 Not attempted due to Medical conditions or safety concerns








Grooming (FIM):  5


Bathing (FIM):  4


Bathing Location:  L Arm, R Arm, L Upper Leg, R Upper Leg, Chest, Abdomen, 

Buttocks, Perineal Area


Upper Body (FIM):  5


Lower Body Dressing (FIM):  4





OT Short Term Goals


Short Term Goals


Time Frame:  Mar 9, 2017


Eating(FIM):  5


Grooming(FIM):  4


Bathing(FIM):  3


Upper Body Dressing(FIM):  5


Lower Body Dressing(FIM):  3


Toileting(FIM):  3


Transfers (B,C,W/C) (FIM):  4


Toilet/Commode Transfer(FIM):  4


Additional Short Term Goals:  1-Demonstrate ADL Tasks, 2-Verbalize Understanding

, 3-ImproveStrength/Farnaz


1=Demonstrate adherence to instructed precautions during ADL tasks.


2=Patient will verbalize/demonstrate understanding of assistive devices/

modifications for ADL.


3=Patient will improve strength/tolerance for activity to enable patient to 

perform ADL's.





OT Long Term Goals


Long Term Goals


Time Frame:  Mar 16, 2017


Eating (FIM):  6


Eating (QC):  6


Groomin


Oral Hygiene (QC):  5


Bathing(FIM):  4


Upper Body Dressing(FIM):  5


Lower Body Dressing(FIM):  5


Toileting(FIM):  5


Toileting Hygiene (QC):  5


Transfers (B,C,W/C) (FIM):  5


Toilet/Commode Transfer(FIM):  5


Toilet/Commode Transfer (QC):  5


Additional Goals:  1-Demonstrate ADL Tasks, 2-Verbalize Understanding, 3-

ImproveStrength/Farnaz


1=Demonstrate adherence to instructed precautions during ADL tasks.


2=Patient will verbalize/demonstrate understanding of assistive devices/

modifications for ADL.


3=Patient will improve strength/tolerance for activity to enable patient to 

perform ADL's.





OT Education/Plan


Discharge Recommendations


Plan/Recommendations:  Continue POC





Treatment Plan/Plan of Care


Patient would benefit from OT for education, treatment and training to promote 

independence in ADL's, mobility, safety and/or upper extremity function for ADL'

s.


Plan of Care:  ADL Retraining, Caregiver Training, Functional Mobility


Treatment Duration:  Mar 16, 2017


Visits Per Week:  5-6


Agreement:  Yes


Rehab Potential:  Guarded





Time/GCodes


Start Time:  09:25


Stop Time:  09:48


Total Time Billed (hr/min):  23


Billed Treatment Time


1 visit-ADL 2 (23 min)








ELMER WHITING Mar 7, 2017 09:48

## 2017-03-07 NOTE — PROGRESS NOTE-HOSPITALIST
Progress Note


Progress Notes/Assess & Plan


Date Seen


3/7/17


Diagonsis/Assessment & Plan


Patient Interview:


Pt states that he has not seen SW yet.


Pt states that he feels better today, but is still sore.


Physical exam stable.


Pt states that he is having regular BMs.





no fever, vital signs stable, fatigued, sitting in chair, wife at bedside in 

her wheelchair


regular rhythm, clear to auscultation bilaterally but diminished in the bases 

with subtle crackles


Trace edema





Assessment:


Severe debility following hip fracture repair


Chronic debility


Severe COPD with exacerbation placed on prednisone and Levaquin for bronchitis 

completing tomorrow


Sleep apnea


Slow recovery





Plan:


SW consult regarding nursing facility.


PT/OT


Neb treatments and oxygen supplementation


Coumadin for DVT prophylaxis longer term due to severe debility


check labs in a.m.





Scribed by Buddy Heard under the direct supervision of Dr. Monte.








MICHELE MONTE DO Mar 7, 2017 11:01

## 2017-03-08 VITALS — SYSTOLIC BLOOD PRESSURE: 176 MMHG | DIASTOLIC BLOOD PRESSURE: 84 MMHG

## 2017-03-08 VITALS — DIASTOLIC BLOOD PRESSURE: 84 MMHG | SYSTOLIC BLOOD PRESSURE: 176 MMHG

## 2017-03-08 LAB
ALBUMIN SERPL-MCNC: 3.1 G/DL (ref 3.2–4.5)
ALT SERPL-CCNC: 22 U/L (ref 0–55)
ANION GAP SERPL CALC-SCNC: 10 MMOL/L (ref 5–14)
AST SERPL-CCNC: 24 U/L (ref 5–34)
BASOPHILS # BLD AUTO: 0 10^3/UL (ref 0–0.1)
BASOPHILS NFR BLD AUTO: 0 % (ref 0–10)
BILIRUB SERPL-MCNC: 0.6 MG/DL (ref 0.1–1)
BUN SERPL-MCNC: 26 MG/DL (ref 7–18)
BUN/CREAT SERPL: 30
CALCIUM SERPL-MCNC: 8.7 MG/DL (ref 8.5–10.1)
CHLORIDE SERPL-SCNC: 100 MMOL/L (ref 98–107)
CO2 SERPL-SCNC: 27 MMOL/L (ref 21–32)
CREAT SERPL-MCNC: 0.86 MG/DL (ref 0.6–1.3)
EOSINOPHIL # BLD AUTO: 0.1 10^3/UL (ref 0–0.3)
EOSINOPHIL NFR BLD AUTO: 1 % (ref 0–10)
ERYTHROCYTE [DISTWIDTH] IN BLOOD BY AUTOMATED COUNT: 15.7 % (ref 10–14.5)
GFR SERPLBLD BASED ON 1.73 SQ M-ARVRAT: > 60 ML/MIN
GLUCOSE SERPL-MCNC: 98 MG/DL (ref 70–105)
INR PPP: 2.8 (ref 0.8–1.4)
LYMPHOCYTES # BLD AUTO: 1.5 X 10^3 (ref 1–4)
LYMPHOCYTES NFR BLD AUTO: 17 % (ref 12–44)
MCH RBC QN AUTO: 32 PG (ref 25–34)
MCHC RBC AUTO-ENTMCNC: 33 G/DL (ref 32–36)
MCV RBC AUTO: 98 FL (ref 80–99)
MONOCYTES # BLD AUTO: 0.7 X 10^3 (ref 0–1)
MONOCYTES NFR BLD AUTO: 8 % (ref 0–12)
NEUTROPHILS # BLD AUTO: 6.7 X 10^3 (ref 1.8–7.8)
NEUTROPHILS NFR BLD AUTO: 74 % (ref 42–75)
PLATELET # BLD: 274 10^3/UL (ref 130–400)
PMV BLD AUTO: 9.7 FL (ref 7.4–10.4)
POTASSIUM SERPL-SCNC: 3.5 MMOL/L (ref 3.6–5)
PROT SERPL-MCNC: 6.2 G/DL (ref 6.4–8.2)
PROTHROMBIN TIME: 29.1 SEC (ref 12.2–14.7)
RBC # BLD AUTO: 3.27 10^6/UL (ref 4.35–5.85)
SODIUM SERPL-SCNC: 137 MMOL/L (ref 135–145)
WBC # BLD AUTO: 9.1 10^3/UL (ref 4.3–11)

## 2017-03-08 RX ADMIN — IPRATROPIUM BROMIDE AND ALBUTEROL SULFATE SCH ML: .5; 3 SOLUTION RESPIRATORY (INHALATION) at 02:38

## 2017-03-08 RX ADMIN — DIGOXIN SCH MG: 125 TABLET ORAL at 10:01

## 2017-03-08 RX ADMIN — IPRATROPIUM BROMIDE AND ALBUTEROL SULFATE SCH ML: .5; 3 SOLUTION RESPIRATORY (INHALATION) at 10:40

## 2017-03-08 RX ADMIN — METOPROLOL TARTRATE SCH MG: 50 TABLET, FILM COATED ORAL at 10:00

## 2017-03-08 RX ADMIN — OXYCODONE HYDROCHLORIDE AND ACETAMINOPHEN PRN TAB: 5; 325 TABLET ORAL at 00:24

## 2017-03-08 RX ADMIN — AMIODARONE HYDROCHLORIDE SCH MG: 200 TABLET ORAL at 10:00

## 2017-03-08 RX ADMIN — PANTOPRAZOLE SODIUM SCH MG: 40 TABLET, DELAYED RELEASE ORAL at 06:12

## 2017-03-08 RX ADMIN — LOSARTAN POTASSIUM SCH MG: 50 TABLET, FILM COATED ORAL at 10:00

## 2017-03-08 RX ADMIN — FUROSEMIDE SCH MG: 40 TABLET ORAL at 10:00

## 2017-03-08 RX ADMIN — POTASSIUM CHLORIDE SCH MEQ: 600 CAPSULE, EXTENDED RELEASE ORAL at 06:12

## 2017-03-08 RX ADMIN — DOCUSATE SODIUM SCH MG: 100 CAPSULE ORAL at 09:00

## 2017-03-08 RX ADMIN — POLYETHYLENE GLYCOL (3350) SCH GM: 17 POWDER, FOR SOLUTION ORAL at 09:00

## 2017-03-08 RX ADMIN — IPRATROPIUM BROMIDE AND ALBUTEROL SULFATE SCH ML: .5; 3 SOLUTION RESPIRATORY (INHALATION) at 07:35

## 2017-03-08 RX ADMIN — HYDROCHLOROTHIAZIDE SCH MG: 25 TABLET ORAL at 10:00

## 2017-03-08 RX ADMIN — SENNOSIDES SCH MG: 8.6 TABLET, FILM COATED ORAL at 09:00

## 2017-03-08 NOTE — SPEECH THERAPY DAILY NOTE
Speech Daily Progress Note


Subjective


The patient was seated upright in recliner upon entrance. The patient's wife 

was present at bedside. The patient reported he would be discharging to [facility] on this date and agreed to participate in cognitive therapy 

with the clinician.





Objective


Safety Problem Solving: The patient was provided pictures depicting safety 

concerns in common environments. The patient was asked to identify the safety 

concern and provide an appropriate solution to each. The patient demonstrated 

fair to good accuracy with this task with mild clinician verbal cueing. To note

, the patient's wife continued to answer questions for the patient, however, 

the patient would add additional information following.





Assessment


Assessment Current Status:  Fair Progress





Treatment Plan


Continue Plan of Care





Speech Short Term Goals


Short Term Goals


Short Term Goals


1. The patient will recall and demonstrate two functional memory strategies for 

use at home.


2. The patient will accurately sequence simple events contained within his 

environment.


3. The patient will demonstrate 90% accuracy with basic safety problem solving.


Time Frame-STG:  Two Weeks





Speech Long Term Goals


Long Term Goals


1. The patient will demonstrate improved cognitive linguistic skills for 

increased function and safety with ADL's.


Time Frame:  One Month





Speech-Plan


Treatment Plan


Speech Therapy Treatment Plan:  Continue Plan of Care


Continue skilled speech therapy to target functional problem solving and memory 

strategies.


Treatment Duration:  Mar 31, 2017


# of days/week


One to Three.


Visits Per Week:  One to Three


Minutes/Day (M-F):  20


Rehab Potential:  Guarded





Safety Risks/Education


Teaching Recipient:  Patient, Significant Other


Teaching Methods:  Discussion


Response to Teaching:  Return Demonstration, Reinforcement Needed


Education Topics Provided:  


Safety Procedures for Home (Life Alert, 911)





Time


Speech Therapy Time In:  10:20


Speech Therapy Time Out:  10:35


Total Billed Time:  15


Billed Treatment Time


ThiANNABEL ELIZABETH ST Mar 8, 2017 11:09

## 2017-03-08 NOTE — THERAPY TEAM DISCHARGE SUMMARY
Therapy Discharge Summary


Discharge Recommendations


Date of Discharge


3-8-17


Therapy D/C Recommendations:  Skilled Nursing (TCU/NH)





Occupational Therapy


Pt. has been seen by occupational therapy to increase overall strength and 

mobility through functional tasks.  During ADLs yesterday, pt. was able to 

bathe and dress with spongebath with min/set up assist only.  Per physical 

therapy note, pt. required mod assist for transfers and mobility.  Not all 

goals were met at this time, as pt. is discharging to [facility] for 

skilled care, to increase strength to return home with spouse.  Please see goal 

list for goals met and not met.





PT Long Term Goals


Long Term Goals


PT Long Term Goals Time Frame:  Mar 17, 2017


Transfers (B,C,W/C) (FIM):  5


Sit to Lying (QC):  5


Lying-Sitting on Side/Bed(QC):  5


Sit to Stand (QC):  5


Rolling:  3


Chair/Bed-to-Chair Xfer(QC):  5


Does the Patient Walk:  Yes


Gait (FIM):  4


Gait distance (FIM):  0=209-08 ft


Walk 50ft with 2 Turns (QC):  4


Walk 150 ft (QC):  88


Gait Assistive Device:  FWW


Does the Pt use WC or Scooter?:  Yes


Wheelchair (FIM):  88 (NT)





OT Long Term Goals


Long Term Goals


Time Frame:  Mar 16, 2017


Eating (FIM):  6 (not met)


Eating (QC):  6 (not met)


Groomin (met)


Oral Hygiene (QC):  5 (met)


Bathing(FIM):  4 (met)


Upper Body Dressing(FIM):  5 (met)


Lower Body Dressing(FIM):  5 (not met)


Toileting(FIM):  5 (not met)


Toileting Hygiene (QC):  5 (not met)


Transfers (B,C,W/C) (FIM):  5 (not met)


Toilet/Commode Transfer(FIM):  5 (not met)


Toilet/Commode Transfer (QC):  5 (not met)


Additional Goals:  1-Demonstrate ADL Tasks, 2-Verbalize Understanding, 3-

ImproveStrength/Farnaz


1=Demonstrate adherence to instructed precautions during ADL tasks.


2=Patient will verbalize/demonstrate understanding of assistive devices/

modifications for ADL.


3=Patient will improve strength/tolerance for activity to enable patient to 

perform ADL's.





Speech Long Term Goals


Long Term Goals


1. The patient will demonstrate improved cognitive linguistic skills for 

increased function and safety with ADL's.


Time Frame:  One Month








ALCIDES OSUNA OT Mar 8, 2017 14:35

## 2017-03-08 NOTE — DISCHARGE INST-SKILLED NURSING
Discharge Inst-Skilled NF


Patient Instructions


Patient Problems:  


Debility


Hip fracture


COPD


DVT Prophylaxis with Coumadin


Goal:  


Return to independent living





Consult/Follow Up/Orders


Follow Up Appt.:  


[facility] in 1 week


Skilled NF Admit to:  [facility]


Certification ([facility])


I certify that SNF services are required to be given on an inpatient basis 

because of the above named patient's need for skilled nursing care on a 

continuing basis for the conditions(s) for which he/she was receiving inpatient 

hospital services prior to his/her transfer to the SNF.


Skilled Nursing Facility Order:  Nursing Services, Occupational Ther-Evaluate & 

Treat, Physical Therapy-Evaluate & Treat, Speech Language-Evaluate & Treat


Discharge Diet:  Cardiac Diet


Daily Activity as Tolerated:  Yes





New & Resume Previous Orders


New Medications:  


Tamsulosin HCl (Flomax) 0.4 Mg Cap


0.4 MG PO HS Days 30 CAP


Amiodarone HCl (Amiodarone HCl) 200 Mg Tablet


200 MG PO DAILY Days 30 TAB


Losartan Potassium (Losartan Potassium) 50 Mg Tablet


100 MG PO DAILY Days 30 TAB


Metoprolol Tartrate (Metoprolol Tartrate) 50 Mg Tablet


25 MG PO BID Days 30 TAB


Oxycodone HCl/Acetaminophen (Oxycodone-Acetaminophen 5-325) 1 Each Tablet


1-2 TAB PO Q4H PRN SEVERE PAIN #60 TAB


Polyethylene Glycol 3350 (Polyethylene Glycol 3350) 17 Gm Powd.pack


17 GM PO DAILY Days 30 EACH


Sennosides (Senna) 8.6 Mg Tablet


8.6 MG PO BID Days 30 TAB


Tramadol HCl (Tramadol HCl) 50 Mg Tablet


50 MG PO Q4H PRN MODERATE PAIN #30 TAB


Warfarin Sodium (Coumadin) 5 Mg Tablet


5 MG PO DAILY@1800 Days 30 TAB


 


Continued Medications:  


Amlodipine Besylate (Amlodipine Besylate) 5 Mg Tablet


5 MG PO HS


Aspirin ([place] Aspirin) 81 Mg Tablet.dr


162 MG PO BID


Digoxin (Digoxin) 125 Mcg Tablet


125 MCG PO DAILY


Esomeprazole Magnesium (Nexium) 40 Mg Cap


40 MG PO DAILY


Fluticasone Propionate (Flovent Hfa 220 mcg) 1 Ea Aero


2 PUFF IH PRN EA


Furosemide (Furosemide) 40 Mg Tablet


40 MG PO DAILY TAB


Mexiletine HCl (Mexiletine HCl) 150 Mg Cap


150 MG PO TID


Potassium Chloride (Potassium Chloride) 8 Meq Tablet.er


8 MEQ PO DAILY


 


Discontinued Medications:  


Amiodarone HCl (Pacerone) 200 Mg Tablet


200 MG PO MoTuWeThFr TAB


Amiodarone HCl (Amiodarone HCl) 200 Mg Tablet


400 MG PO SuSa TAKES 2 (200 MG) TABLETS TAB


Losartan/Hydrochlorothiazide (Losartan-Hctz 100-25 mg Tab) 1 Each Tablet


1 TAB PO DAILY


Metoprolol Tartrate (Metoprolol Tartrate) 50 Mg Tablet


25 MG PO BID TAKES 1/2 OF A (50 MG) TABLET





Belkys Monte 


Mar 8, 2017 


11:41








BELKYS MONTE DO Mar 8, 2017 11:43

## 2017-03-08 NOTE — DISCHARGE SUMMARY-HOSPITALIST
Diagnosis/Chief Complaint


Date of Admission


Mar 2, 2017 at 12:38


Date of Discharge





Discharge Date:  Mar 8, 2017


Discharge Diagnosis


Patient Interview:


Pt states that he has not seen SW yet.


Pt states that he feels better today, but is still sore.


Physical exam stable.


Pt states that he is having regular BMs.





no fever, vital signs stable, fatigued, sitting in chair, wife at bedside in 

her wheelchair


regular rhythm, clear to auscultation bilaterally but diminished in the bases 

with subtle crackles


Trace edema





Assessment:


Severe debility following hip fracture repair


Chronic debility


Severe COPD with exacerbation placed on prednisone and Levaquin for bronchitis 

completing tomorrow


Sleep apnea


Slow recovery





Plan:


SW consult regarding nursing facility.


PT/OT


Neb treatments and oxygen supplementation


Coumadin for DVT prophylaxis longer term due to severe debility


check labs in a.m.





Scribed by Buddy Heard under the direct supervision of Dr. Monte.








Reason Hospital Visit/Course


Hospital course: Patient had a lengthy hospital course on swing bed but severe 

debility required nursing home transfer since he was so slow to recover and 

just overall comorbidities precluded anything other than a long-term poor 

prognosis.  He completed Levaquin and steroid taper while hospitalized in swing 

bed and overall improved enough to be able to go over to [facility] 

for skilled therapy.





Discharge Summary


Discharge Physical Examination


Allergies:  


Coded Allergies:  


     Penicillins (Verified  Allergy, Severe, HIVES, SOB, 12/4/15)


     codeine (Verified  Allergy, Severe, SOB, HIVES, 12/4/15)


Vitals & I&Os





Vital Signs








  Date Time  Temp Pulse Resp B/P Pulse Ox O2 Delivery O2 Flow Rate FiO2


 


3/8/17 10:41       1.00 96


 


3/8/17 08:00 98.4 95 20 176/84 97 Nasal Cannula  











Hospital Course


Labs (last 24 hrs)


 Laboratory Tests


3/8/17 04:32: 


Alanine Aminotransferase (ALT/SGPT) 22, Albumin 3.1L, Alkaline Phosphatase 81, 

Anion Gap 10, Aspartate Amino Transf (AST/SGOT) 24, BUN/Creatinine Ratio 30, 

Blood Urea Nitrogen 26H, Calcium Level 8.7, Carbon Dioxide Level 27, Chloride 

Level 100, Creatinine 0.86, Estimat Glomerular Filtration Rate > 60, Glucose 

Level 98, Potassium Level 3.5L, Sodium Level 137, Total Bilirubin 0.6, Total 

Protein 6.2L


3/8/17 04:37: 


Basophils # (Auto) 0.0, Basophils (%) (Auto) 0, Eosinophils # (Auto) 0.1, 

Eosinophils (%) (Auto) 1, Hematocrit 32L, Hemoglobin 10.4L, INR Comment 2.8H, 

Lymphocytes # (Auto) 1.5, Lymphocytes (%) (Auto) 17, Mean Corpuscular 

Hemoglobin 32, Mean Corpuscular Hemoglobin Concent 33, Mean Corpuscular Volume 

98, Mean Platelet Volume 9.7, Monocytes # (Auto) 0.7, Monocytes (%) (Auto) 8, 

Neutrophils # (Auto) 6.7, Neutrophils (%) (Auto) 74, Platelet Count 274, 

Prothrombin Time 29.1H, Red Blood Count 3.27L, Red Cell Distribution Width 15.7H

, White Blood Count 9.1





Pending Labs








Discharge


Home Medications:





 Active Scripts


 Active


Iprat-Albut 0.5-3(2.5) mg/3 ml (Ipratropium/Albuterol Sulfate) 3 Ml Ampul.neb 3 

Ml INH RTQ4HR 30 Days


Senna (Sennosides) 8.6 Mg Tablet 8.6 Mg PO BID 30 Days


Polyethylene Glycol 3350 17 Gm Powd.pack 17 Gm PO DAILY 30 Days


Tramadol HCl 50 Mg Tablet 50 Mg PO Q4H PRN


Oxycodone-Acetaminophen 5-325 (Oxycodone HCl/Acetaminophen) 1 Each Tablet 1-2 

Tab PO Q4H PRN


Losartan Potassium 50 Mg Tablet 100 Mg PO DAILY 30 Days


Metoprolol Tartrate 50 Mg Tablet 25 Mg PO BID 30 Days


Amiodarone HCl 200 Mg Tablet 200 Mg PO DAILY 30 Days


Flomax (Tamsulosin HCl) 0.4 Mg Cap 0.4 Mg PO HS 30 Days


Coumadin (Warfarin Sodium) 5 Mg Tablet 5 Mg PO DAILY@1800 30 Days


 Reported


Digoxin 125 Mcg Tablet 125 Mcg PO DAILY


Mexiletine HCl 150 Mg Cap 150 Mg PO TID


Flovent Hfa 220 mcg (Fluticasone Propionate) 1 Ea Aero 2 Puff IH PRN


Potassium Chloride 8 Meq Tablet.er 8 Meq PO DAILY


Amlodipine Besylate 5 Mg Tablet 5 Mg PO HS


Nexium (Esomeprazole Magnesium) 40 Mg Cap 40 Mg PO DAILY


Furosemide 40 Mg Tablet 40 Mg PO DAILY


[employer] Aspirin (Aspirin) 81 Mg Tablet.dr 162 Mg PO BID





Instructions to patient/family


Please see electonic discharge instructions given to patient.








MICHELE MONTE DO Mar 8, 2017 11:46


Potassium Chloride 8 Meq Tablet.er 8 Meq PO DAILY


Metoprolol Tartrate 50 Mg Tablet 25 Mg PO BID


     TAKES 1/2 OF A (50 MG) TABLET


Losartan-Hctz 100-25 mg Tab (Losartan/Hydrochlorothiazide) 1 Each Tablet 1 Tab 

PO DAILY


Amlodipine Besylate 5 Mg Tablet 5 Mg PO HS


Nexium (Esomeprazole Magnesium) 40 Mg Cap 40 Mg PO DAILY


Furosemide 40 Mg Tablet 40 Mg PO DAILY


Pacerone (Amiodarone HCl) 200 Mg Tablet 200 Mg PO [facility] Aspirin (Aspirin) 81 Mg Tablet.dr 162 Mg PO BID





Instructions to patient/family


Please see electonic discharge instructions given to patient.








MICHELE MONTE DO Mar 8, 2017 11:46

## 2017-05-16 ENCOUNTER — HOSPITAL ENCOUNTER (OUTPATIENT)
Dept: HOSPITAL 75 - HH | Age: 77
End: 2017-05-16
Attending: INTERNAL MEDICINE
Payer: MEDICARE

## 2017-05-16 DIAGNOSIS — I50.9: Primary | ICD-10-CM

## 2017-05-16 LAB
ANION GAP SERPL CALC-SCNC: 10 MMOL/L (ref 5–14)
BUN SERPL-MCNC: 10 MG/DL (ref 7–18)
BUN/CREAT SERPL: 13
CALCIUM SERPL-MCNC: 9.7 MG/DL (ref 8.5–10.1)
CHLORIDE SERPL-SCNC: 96 MMOL/L (ref 98–107)
CO2 SERPL-SCNC: 31 MMOL/L (ref 21–32)
CREAT SERPL-MCNC: 0.79 MG/DL (ref 0.6–1.3)
GFR SERPLBLD BASED ON 1.73 SQ M-ARVRAT: > 60 ML/MIN
GLUCOSE SERPL-MCNC: 100 MG/DL (ref 70–105)
POTASSIUM SERPL-SCNC: 3.1 MMOL/L (ref 3.6–5)
SODIUM SERPL-SCNC: 137 MMOL/L (ref 135–145)

## 2017-05-16 PROCEDURE — 80048 BASIC METABOLIC PNL TOTAL CA: CPT

## 2017-06-01 ENCOUNTER — HOSPITAL ENCOUNTER (OUTPATIENT)
Dept: HOSPITAL 75 - HH | Age: 77
End: 2017-06-01
Attending: INTERNAL MEDICINE
Payer: MEDICARE

## 2017-06-01 DIAGNOSIS — I50.32: Primary | ICD-10-CM

## 2017-06-01 LAB
ANION GAP SERPL CALC-SCNC: 14 MMOL/L (ref 5–14)
BUN SERPL-MCNC: 13 MG/DL (ref 7–18)
BUN/CREAT SERPL: 14
CALCIUM SERPL-MCNC: 9.5 MG/DL (ref 8.5–10.1)
CHLORIDE SERPL-SCNC: 97 MMOL/L (ref 98–107)
CO2 SERPL-SCNC: 26 MMOL/L (ref 21–32)
CREAT SERPL-MCNC: 0.94 MG/DL (ref 0.6–1.3)
GFR SERPLBLD BASED ON 1.73 SQ M-ARVRAT: > 60 ML/MIN
GLUCOSE SERPL-MCNC: 162 MG/DL (ref 70–105)
POTASSIUM SERPL-SCNC: 3 MMOL/L (ref 3.6–5)
SODIUM SERPL-SCNC: 137 MMOL/L (ref 135–145)

## 2017-06-01 PROCEDURE — 80048 BASIC METABOLIC PNL TOTAL CA: CPT

## 2017-08-19 ENCOUNTER — HOSPITAL ENCOUNTER (EMERGENCY)
Dept: HOSPITAL 75 - ER | Age: 77
Discharge: HOME | End: 2017-08-19
Payer: MEDICARE

## 2017-08-19 VITALS — HEIGHT: 66 IN | WEIGHT: 207.4 LBS | BODY MASS INDEX: 33.33 KG/M2

## 2017-08-19 VITALS — DIASTOLIC BLOOD PRESSURE: 70 MMHG | SYSTOLIC BLOOD PRESSURE: 150 MMHG

## 2017-08-19 DIAGNOSIS — K21.9: ICD-10-CM

## 2017-08-19 DIAGNOSIS — Z95.5: ICD-10-CM

## 2017-08-19 DIAGNOSIS — Z95.1: ICD-10-CM

## 2017-08-19 DIAGNOSIS — Z87.19: ICD-10-CM

## 2017-08-19 DIAGNOSIS — Z95.810: ICD-10-CM

## 2017-08-19 DIAGNOSIS — I25.10: ICD-10-CM

## 2017-08-19 DIAGNOSIS — Z87.442: ICD-10-CM

## 2017-08-19 DIAGNOSIS — R04.0: Primary | ICD-10-CM

## 2017-08-19 DIAGNOSIS — Z87.891: ICD-10-CM

## 2017-08-19 DIAGNOSIS — Z79.01: ICD-10-CM

## 2017-08-19 DIAGNOSIS — M19.90: ICD-10-CM

## 2017-08-19 DIAGNOSIS — G47.30: ICD-10-CM

## 2017-08-19 DIAGNOSIS — Z79.82: ICD-10-CM

## 2017-08-19 DIAGNOSIS — I25.2: ICD-10-CM

## 2017-08-19 DIAGNOSIS — F03.90: ICD-10-CM

## 2017-08-19 DIAGNOSIS — F32.9: ICD-10-CM

## 2017-08-19 DIAGNOSIS — Z82.49: ICD-10-CM

## 2017-08-19 DIAGNOSIS — J45.909: ICD-10-CM

## 2017-08-19 DIAGNOSIS — Z85.828: ICD-10-CM

## 2017-08-19 DIAGNOSIS — N39.0: ICD-10-CM

## 2017-08-19 LAB
BASOPHILS # BLD AUTO: 0 10^3/UL (ref 0–0.1)
BASOPHILS NFR BLD AUTO: 0 % (ref 0–10)
BILIRUB UR QL STRIP: NEGATIVE
EOSINOPHIL # BLD AUTO: 0.3 10^3/UL (ref 0–0.3)
EOSINOPHIL NFR BLD AUTO: 3 % (ref 0–10)
ERYTHROCYTE [DISTWIDTH] IN BLOOD BY AUTOMATED COUNT: 17.8 % (ref 10–14.5)
KETONES UR QL STRIP: NEGATIVE
LEUKOCYTE ESTERASE UR QL STRIP: (no result)
LYMPHOCYTES # BLD AUTO: 1.4 X 10^3 (ref 1–4)
LYMPHOCYTES NFR BLD AUTO: 15 % (ref 12–44)
MCH RBC QN AUTO: 29 PG (ref 25–34)
MCHC RBC AUTO-ENTMCNC: 32 G/DL (ref 32–36)
MCV RBC AUTO: 92 FL (ref 80–99)
MONOCYTES # BLD AUTO: 1.1 X 10^3 (ref 0–1)
MONOCYTES NFR BLD AUTO: 12 % (ref 0–12)
NEUTROPHILS # BLD AUTO: 6.6 X 10^3 (ref 1.8–7.8)
NEUTROPHILS NFR BLD AUTO: 70 % (ref 42–75)
NITRITE UR QL STRIP: POSITIVE
PH UR STRIP: 7 [PH] (ref 5–9)
PLATELET # BLD: 228 10^3/UL (ref 130–400)
PMV BLD AUTO: 9.9 FL (ref 7.4–10.4)
PROT UR QL STRIP: (no result)
RBC # BLD AUTO: 4.46 10^6/UL (ref 4.35–5.85)
SP GR UR STRIP: 1.01 (ref 1.02–1.02)
UROBILINOGEN UR-MCNC: NORMAL MG/DL
WBC # BLD AUTO: 9.4 10^3/UL (ref 4.3–11)
WBC #/AREA URNS HPF: (no result) /HPF

## 2017-08-19 PROCEDURE — 87186 SC STD MICRODIL/AGAR DIL: CPT

## 2017-08-19 PROCEDURE — 81000 URINALYSIS NONAUTO W/SCOPE: CPT

## 2017-08-19 PROCEDURE — 36415 COLL VENOUS BLD VENIPUNCTURE: CPT

## 2017-08-19 PROCEDURE — 87088 URINE BACTERIA CULTURE: CPT

## 2017-08-19 PROCEDURE — 99283 EMERGENCY DEPT VISIT LOW MDM: CPT

## 2017-08-19 PROCEDURE — 87077 CULTURE AEROBIC IDENTIFY: CPT

## 2017-08-19 PROCEDURE — 85025 COMPLETE CBC W/AUTO DIFF WBC: CPT

## 2017-08-19 NOTE — ED EENT
History of Present Illness


General


Chief Complaint:  Nasal Problems


Stated Complaint:  NOSE BLEED


Nursing Triage Note:  


Third nose bleed this week.  On O2 at night without humidity.  When ems arrived

, 


family was dabbing nose, no pressure, trickle of blood noted.  pressure held 


times 10 min for hemostasis, nose has crusting blood but no active bleed.  


denies c/o


Source:  patient, spouse


Exam Limitations:  no limitations





History of Present Illness


Time seen by provider:  12:18





Allergies and Home Medications


Allergies


Coded Allergies:  


     Penicillins (Verified  Allergy, Severe, HIVES, SOB, 12/4/15)


     codeine (Verified  Allergy, Severe, SOB, HIVES, 12/4/15)





Home Medications


Amiodarone HCl 200 Mg Tablet, 200 MG PO DAILY for 30 Days


   Prescribed by: MICHELE WILKINS on 3/8/17 1140


Amlodipine Besylate 5 Mg Tablet, 5 MG PO HS, (Reported)


Aspirin 81 Mg Tablet.dr, 162 MG PO BID, (Reported)


Digoxin 125 Mcg Tablet, 125 MCG PO DAILY, (Reported)


Esomeprazole Magnesium 40 Mg Cap, 40 MG PO DAILY, (Reported)


Fluticasone Propionate 1 Ea Aero, 2 PUFF IH PRN, (Reported)


Furosemide 40 Mg Tablet, 40 MG PO DAILY, (Reported)


Ipratropium/Albuterol Sulfate 3 Ml Ampul.neb, 3 ML INH RTQ4HR for 30 Days


   Prescribed by: MICHELE WILKINS on 3/8/17 1146


Losartan Potassium 50 Mg Tablet, 100 MG PO DAILY for 30 Days


   Prescribed by: MICHELE WILKINS on 3/8/17 1140


Metoprolol Tartrate 50 Mg Tablet, 25 MG PO BID for 30 Days


   Prescribed by: MICHELE WILKINS on 3/8/17 1140


Mexiletine HCl 150 Mg Cap, 150 MG PO TID, (Reported)


Oxycodone HCl/Acetaminophen 1 Each Tablet, 1-2 TAB PO Q4H PRN for SEVERE PAIN, #

60


   Prescribed by: MICHELE WILKINS on 3/8/17 1140


Polyethylene Glycol 3350 17 Gm Powd.pack, 17 GM PO DAILY for 30 Days


   Prescribed by: MICHELE WILKINS on 3/8/17 1140


Potassium Chloride 8 Meq Tablet.er, 8 MEQ PO DAILY, (Reported)


Sennosides 8.6 Mg Tablet, 8.6 MG PO BID for 30 Days


   Prescribed by: MICHELE WILKINS on 3/8/17 1140


Tamsulosin HCl 0.4 Mg Cap, 0.4 MG PO HS for 30 Days


   Prescribed by: MICHELE WILKINS on 3/8/17 1140


Tramadol HCl 50 Mg Tablet, 50 MG PO Q4H PRN for MODERATE PAIN, #30


   Prescribed by: MICHELE WILKINS on 3/8/17 1140


Warfarin Sodium 5 Mg Tablet, 5 MG PO DAILY@1800 for 30 Days


   Prescribed by: MICHELE WILKINS on 3/8/17 1140





Past Medical-Social-Family Hx


Patient Social History


Alcohol Use:  Denies Use


Recreational Drug Use:  No


Type Used:  Cigarettes


Former Smoker/When Quit:  Jan 4, 1988


2nd Hand Smoke Exposure:  No


Recent Foreign Travel:  No


Contact w/Someone Who Travel:  No


Recent Infectious Disease Expo:  No


Recent Hopitalizations:  Yes (fx hip)





Immunizations Up To Date


Tetanus Booster (TDap):  More than 5yrs


Date of Pneumonia Vaccine:  Oct 1, 2014


Date of Influenza Vaccine:  Dec 5, 2016





Seasonal Allergies


Seasonal Allergies:  No





Surgeries


HX Surgeries:  Yes (skin cancer removal, cataract surgery, cardiac stents )


Surgeries:  CABG, Coronary Stent, Defibrillator, Eye Surgery





Respiratory


Hx Respiratory Disorders:  Yes


Respiratory Disorders:  Asthma, Chronic Bronchitis, Sleep Apnea





Cardiovascular


Hx Cardiac Disorders:  Yes (CABG)


Cardiac Disorders:  Coronary Artery Disease, Heart Attack





Neurological


Hx Neurological Disorders:  Yes


Neurological Disorders:  Dementia, Neuropathy





Reproductive System


Hx Reproductive Disorders:  Yes (unable to have children- mumps as a child)


Sexually Transmitted Disease:  No


HIV/AIDS:  No





Genitourinary


Hx Genitourinary Disorders:  Yes (current uti)


Genitourinary Disorders:  Kidney Stones





Gastrointestinal


Hx Gastrointestinal Disorders:  Yes


Gastrointestinal Disorders:  Gastroesophageal Reflux, Ulcer





Musculoskeletal


Hx Musculoskeletal Disorders:  Yes


Musculoskeletal Disorders:  Arthritis, Fractures





Endocrine


Hx Endocrine Disorders:  No





HEENT


HX ENT Disorders:  Yes


HEENT Disorders:  Cataract, Glaucoma


Loss of Vision:  Bilateral


Hearing Impairment:  Hard of Hearing





Cancer


Hx Cancer:  Yes


Cancer:  Skin





Psychosocial


Hx Psychiatric Problems:  Yes


Behavioral Health Disorders:  Depression





Integumentary


HX Skin/Integumentary Disorder:  Yes (shingles )





Blood Transfusions


Hx Blood Disorders:  No


Adverse Reaction to a Blood Tr:  No





Family Medical History


Significant Family History:  No Pertinent Family Hx


Family Medial History:  


Cardiovascular disease


  19 MOTHER


  G8 BROTHER


  G8 SISTER


Cervical cancer


  19 MOTHER





Physical Exam


Vital Signs





Vital Sign - Last 12Hours








 8/19/17





 11:20


 


Temp 98.0


 


Pulse 94


 


Resp 18


 


B/P (MAP) 164/84


 


Pulse Ox 91











Progress/Results/Core Measures


Results/Orders


Lab Results





Laboratory Tests








Test


  8/19/17


11:30 8/19/17


11:32 Range/Units


 


 


White Blood Count


  9.4 


  


  4.3-11.0


10^3/uL


 


Red Blood Count


  4.46 


  


  4.35-5.85


10^6/uL


 


Hemoglobin 13.1 L  13.3-17.7  G/DL


 


Hematocrit 41   40-54  %


 


Mean Corpuscular Volume 92   80-99  FL


 


Mean Corpuscular Hemoglobin 29   25-34  PG


 


Mean Corpuscular Hemoglobin


Concent 32 


  


  32-36  G/DL


 


 


Red Cell Distribution Width 17.8 H  10.0-14.5  %


 


Platelet Count


  228 


  


  130-400


10^3/uL


 


Mean Platelet Volume 9.9   7.4-10.4  FL


 


Neutrophils (%) (Auto) 70   42-75  %


 


Lymphocytes (%) (Auto) 15   12-44  %


 


Monocytes (%) (Auto) 12   0-12  %


 


Eosinophils (%) (Auto) 3   0-10  %


 


Basophils (%) (Auto) 0   0-10  %


 


Neutrophils # (Auto) 6.6   1.8-7.8  X 10^3


 


Lymphocytes # (Auto) 1.4   1.0-4.0  X 10^3


 


Monocytes # (Auto) 1.1 H  0.0-1.0  X 10^3


 


Eosinophils # (Auto)


  0.3 


  


  0.0-0.3


10^3/uL


 


Basophils # (Auto)


  0.0 


  


  0.0-0.1


10^3/uL


 


Urine Color  YELLOW   


 


Urine Clarity


  


  SLIGHTLY


CLOUDY  


 


 


Urine pH  7  5-9  


 


Urine Specific Gravity  1.010 L 1.016-1.022  


 


Urine Protein  1+ H NEGATIVE  


 


Urine Glucose (UA)  NEGATIVE  NEGATIVE  


 


Urine Ketones  NEGATIVE  NEGATIVE  


 


Urine Nitrite  POSITIVE H NEGATIVE  


 


Urine Bilirubin  NEGATIVE  NEGATIVE  


 


Urine Urobilinogen  NORMAL  NORMAL  MG/DL


 


Urine Leukocyte Esterase  3+ H NEGATIVE  


 


Urine RBC (Auto)  4+ H NEGATIVE  


 


Urine RBC  NONE   /HPF


 


Urine WBC  TNTC H  /HPF


 


Urine Crystals  NONE   /LPF


 


Urine Bacteria  LARGE H  /HPF


 


Urine Casts  NONE   /LPF


 


Urine Mucus  NEGATIVE   /LPF


 


Urine Culture Indicated  YES   








My Orders





Orders - KATE MORALES


Oxymetazoline 0.05% Nasal Spry (Afrin 0. (8/19/17 21:00)


Benzonatate Capsule (Tessalon Perles) (8/19/17 12:45)





Vital Signs/I&O





Vital Sign - Last 12Hours








 8/19/17





 11:20


 


Temp 98.0


 


Pulse 94


 


Resp 18


 


B/P (MAP) 164/84


 


Pulse Ox 91














Blood Pressure Mean:  110











Departure


Impression


Impression:  


 Primary Impression:  


 Epistaxis


 Additional Impressions:  


 Urinary tract infection


 History of COPD


Disposition:  01 HOME, SELF-CARE


Condition:  Improved





Departure-Patient Inst.


Decision time for Depature:  12:57


Referrals:  


MAHOGANY GREENWOOD MD (PCP/Family)


Primary Care Physician


Patient Instructions:  Nosebleeds (DC), Urinary Tract Infection, Adult (DC)





Add. Discharge Instructions:  


All discharge instructions reviewed with patient and/or family. Voiced 

understanding.  Medications as instructed.


Stop aspirin for 7 days.


Do NOT blow your nose for 3-4 days. 


If nosebleed recurs, apply as clamp for 5-10 minutes and apply an ice pack.


Use humidified air with your oxygen.


Afrin nasal spray 2 sprays in each nostril twice daily for 3 days.


Saline nasal spray over-the-counter as 4-6 times daily as needed.


Follow-up with Dr. Greenwood as previously scheduled.


Return to the emergency department for worsened symptoms or any other concerns.


Scripts


Benzonatate (Benzonatate) 200 Mg Capsule


200 MG PO BID Y for COUGH, #30 CAP 0 Refills


   Prov: KATE MORALES         8/19/17 


Nitrofurantoin Monohyd/M-Cryst (Macrobid 100 mg Capsule) 100 Mg Capsule


1 TAB PO BID, #20 CAP 0 Refills


   Prov: KATE MORALES         8/19/17











KATE MORALES Aug 19, 2017 12:18

## 2017-08-20 NOTE — XMS REPORT
[facility]

 Created on: 2016



Kendall Snowden

External Reference #: [id number]

: 1940

Sex: Male



Demographics







 Address  [address] [place]  [address]

 

 Home Phone  [phone]

 

 Preferred Language  Unknown

 

 Marital Status  Unknown

 

 Sabianist Affiliation  Unknown

 

 Race   or 

 

 Ethnic Group  Not  or 





Author







 Author  DANIELLA REMY

 

 [facility]  [employer]

 

 Address  Unknown

 

 Phone  Unavailable







Care Team Providers







 Care Team Member Name  Role  Phone

 

 DANIELLA REMY  CP  Unavailable



                                                                



Allergies, Adverse Reactions, Alerts

          





 Substance  Reaction  Event Type

 

 Penicillin V Potassium  Info Not Available  Drug Allergy

 

 Codeine Phosphate  Info Not Available  Drug Allergy



                                                                               
                   



Problems

          





 Problem Type  Condition  Code  Onset Dates  Condition Status

 

 Problem  Chronic congestive heart failure, unspecified congestive heart 
failure type  I50.9     Active

 

 Problem  Cardiac defibrillator in place  Z95.810     Active

 

 Problem  Coronary artery disease involving native coronary artery of native 
heart without angina pectoris  I25.10     Active

 

 Assessment  Acute deep vein thrombosis (DVT) of femoral vein of right lower 
extremity  I82.411     Active

 

 Assessment  Pain of right lower extremity  M79.604     Active

 

 Assessment  History of atrial fibrillation without current medication  Z86.79 
    Active



                                                                               
                                                           



Medications

          





 Medication  Code System  Code  Instructions  Start Date  End Date  Status  
Dosage

 

 Metoprolol Tartrate  NDC  [address]-93  50 mg Orally Twice a day           1/
2 tablet with food

 

 Amlodipine Besylate  NDC  [address]-50  5 MG Orally Once a day           1 
tablet

 

 Furosemide  NDC  [address]-60  40 MG Orally Once a day           1 tablet

 

 Potassium  NDC  0   Oral            1 tab

 

 Losartan Potassium-HCTZ  NDC  [address]-77  100-25 MG Orally Once a day      
     1 tablet

 

 Aspirin  NDC  [address]-20  81 MG Orally Once a day           1 tablet

 

 Amiodarone HCl  NDC  [address]-87  200 MG Orally Once a day           1 tablet

 

 Flovent HFA  NDC  [address]-84  220 MCG/ACT Inhalation Twice a day           
1 puff

 

 Clobetasol Prop Emollient Base  NDC  [address]-15  0.05 % Externally Twice a 
day           1 application to affected area

 

 Nexium  NDC  [address]-77  40 MG Orally Once a day           1 capsule

 

 Digoxin  NDC  [address]-53  125 MCG Orally Once a day           1 tablet



                                                                               
                                                                               
                              



Procedures

          





 Procedure  Coding System  Code  Date

 

 Office Visit, Est Pt., Level 3  CPT-4  30978  2016

 

 [facility] VISIT ESTABLISHED PATIENT  CPT-4    2016



                                                                               
                             



Vital Signs

          





 Date/Time:  2016

 

 Cardiac Monitoring Heart Rate  84 bpm

 

 Weight  216.0 lbs

 

 Height  65 in

 

 BMI  35.94 Index

 

 Blood Pressure Diastolic  78 mmHg

 

 Blood Pressure Systolic  150 mmHg



                                                                    



Results

          





 Name  Result  Date  Reference Range  Unit  Abnormality Flag

 

 Ultrasound : Arterial Doppler, Lower Extremity               



                                                                    



Summary Purpose

          eClinicalWorks Submission

## 2017-08-20 NOTE — XMS REPORT
[facility]

 Created on: 2016



Kendall Snowden

External Reference #: [id number]

: 1940

Sex: Male



Demographics







 Address  [address] [place]  [address]

 

 Home Phone  [phone]

 

 Preferred Language  Unknown

 

 Marital Status  Unknown

 

 Confucianism Affiliation  Unknown

 

 Race   or 

 

 Ethnic Group  Not  or 





Author







 Author  DANIELLA REMY

 

 [facility]  [employer]

 

 Address  Unknown

 

 Phone  Unavailable







Care Team Providers







 Care Team Member Name  Role  Phone

 

 DANIELLA REMY    Unavailable



                                                                



Allergies

          No Known Allergies                                                   
                                     



Problems

          





 Problem Type  Condition  Code  Onset Dates  Condition Status

 

 Problem  Chronic congestive heart failure, unspecified congestive heart 
failure type  I50.9     Active

 

 Problem  Cardiac defibrillator in place  Z95.810     Active

 

 Problem  Coronary artery disease involving native coronary artery of native 
heart without angina pectoris  I25.10     Active



                                                                               
                             



Medications

          No Known Medications                                                 
                             



Results

          No Known Results                                                     
               



Summary Purpose

          eClinicalWorks Submission

## 2017-08-20 NOTE — XMS REPORT
[facility]

 Created on: 2016



Kendall Snowden

External Reference #: [id number]

: 1940

Sex: Male



Demographics







 Address  [address] [place]  [address]

 

 Home Phone  [phone]

 

 Preferred Language  Unknown

 

 Marital Status  Unknown

 

 Congregational Affiliation  Unknown

 

 Race   or 

 

 Ethnic Group  Not  or 





Author







 Author  MAHOGANY GREENWOOD

 

 Organization  [employer]

 

 Address  Unknown

 

 Phone  Unavailable







Care Team Providers







 Care Team Member Name  Role  Phone

 

 MAHOGANY GREENWOOD    Unavailable



                                                                



Allergies

          No Known Allergies                                                   
                                     



Problems

          





 Problem Type  Condition  Code  Onset Dates  Condition Status

 

 Problem  Chronic congestive heart failure, unspecified congestive heart 
failure type  I50.9     Active

 

 Problem  Cardiac defibrillator in place  Z95.810     Active

 

 Problem  Coronary artery disease involving native coronary artery of native 
heart without angina pectoris  I25.10     Active



                                                                               
                             



Medications

          No Known Medications                                                 
                             



Results

          No Known Results                                                     
               



Summary Purpose

          eClinicalWorks Submission fever control; pmd follow up; s/s dehydration

## 2017-08-20 NOTE — XMS REPORT
[facility]

 Created on: 2016



Kendall Snowden

External Reference #: [id number]

: 1940

Sex: Male



Demographics







 Address  [address] [place]  [address]

 

 Home Phone  [phone]

 

 Preferred Language  Unknown

 

 Marital Status  Unknown

 

 Buddhist Affiliation  Unknown

 

 Race   or 

 

 Ethnic Group  Not  or 





Author







 Author  MAHOGANY GREENWOOD

 

 Organization  [employer]

 

 Address  Unknown

 

 Phone  Unavailable







Care Team Providers







 Care Team Member Name  Role  Phone

 

 MAHOGANY GREENWOOD    Unavailable



                                                                



Allergies

          No Known Allergies                                                   
                                     



Problems

          





 Problem Type  Condition  Code  Onset Dates  Condition Status

 

 Problem  Chronic congestive heart failure, unspecified congestive heart 
failure type  I50.9     Active

 

 Problem  Cardiac defibrillator in place  Z95.810     Active

 

 Problem  Coronary artery disease involving native coronary artery of native 
heart without angina pectoris  I25.10     Active



                                                                               
                             



Medications

          No Known Medications                                                 
                             



Results

          No Known Results                                                     
               



Summary Purpose

          [employer] Submission

## 2017-08-20 NOTE — XMS REPORT
[facility]

 Created on: 2017



Kendall Snowden

External Reference #: [id number]

: 1940

Sex: Male



Demographics







 Address  [address] [place]  [address]

 

 Preferred Language  Unknown

 

 Marital Status  Unknown

 

 Sikhism Affiliation  Unknown

 

 Race  Unknown

 

 Ethnic Group  Unknown





Author







 Author  MAHOGANY GREENWOOD

 

 [facility]

 

 Address  [address] [place]  [address]



 

 Phone  [phone]







Care Team Providers







 Care Team Member Name  Role  Phone

 

 MAHOGANY GREENWOOD  Unavailable  [phone]







PROBLEMS







 Type  Condition  ICD9-CM Code  VXB72-ET Code  Onset Dates  Condition Status  
SNOMED Code

 

 Problem  Stasis dermatitis of both legs     I87.2     Active  75953170

 

 Problem  Chronic obstructive pulmonary disease, unspecified COPD type     
J44.9     Active  79640487

 

 Problem  Chronic congestive heart failure, unspecified congestive heart 
failure type     I50.9     Active  76591863

 

 Problem  Cardiac defibrillator in place     Z95.810     Active  572654685

 

 Problem  Essential hypertension     I10     Active  59771596

 

 Problem  Coronary artery disease involving native coronary artery of native 
heart without angina pectoris     I25.10     Active  [id number]







ALLERGIES

Unknown Allergies



SOCIAL HISTORY

No smoking Hx information available



PLAN OF CARE





VITAL SIGNS





MEDICATIONS

Unknown Medications



RESULTS

No Results



PROCEDURES

No Known procedures



IMMUNIZATIONS

No Known Immunizations

## 2017-08-20 NOTE — XMS REPORT
[facility]

 Created on: 2016



Kendall Snowden

External Reference #: [id number]

: 1940

Sex: Male



Demographics







 Address  [address] [place]  [address]

 

 Home Phone  [phone]

 

 Preferred Language  Unknown

 

 Marital Status  Unknown

 

 Yazidi Affiliation  Unknown

 

 Race   or 

 

 Ethnic Group  Not  or 





Author







 Author  DANIELLA REMY

 

 [facility]  [employer]

 

 Address  Unknown

 

 Phone  Unavailable







Care Team Providers







 Care Team Member Name  Role  Phone

 

 DANIELLA REMY    Unavailable



                                                                



Allergies

          No Known Allergies                                                   
                                     



Problems

          





 Problem Type  Condition  Code  Onset Dates  Condition Status

 

 Problem  Chronic congestive heart failure, unspecified congestive heart 
failure type  I50.9     Active

 

 Problem  Cardiac defibrillator in place  Z95.810     Active

 

 Problem  Coronary artery disease involving native coronary artery of native 
heart without angina pectoris  I25.10     Active



                                                                               
                             



Medications

          No Known Medications                                                 
                             



Results

          No Known Results                                                     
               



Summary Purpose

          [employer] Submission

## 2017-08-20 NOTE — XMS REPORT
[facility]

 Created on: 2016



Kendall Snowden

External Reference #: [id number]

: 1940

Sex: Male



Demographics







 Address  [address] [place]  [address]

 

 Home Phone  [phone]

 

 Preferred Language  Unknown

 

 Marital Status  Unknown

 

 Anglican Affiliation  Unknown

 

 Race   or 

 

 Ethnic Group  Not  or 





Author







 Author  DANIELLA REMY

 

 [facility]  [employer]

 

 Address  Unknown

 

 Phone  Unavailable







Care Team Providers







 Care Team Member Name  Role  Phone

 

 DANIELLA REMY    Unavailable



                                                                



Allergies

          No Known Allergies                                                   
                                     



Problems

          





 Problem Type  Condition  Code  Onset Dates  Condition Status

 

 Problem  Chronic congestive heart failure, unspecified congestive heart 
failure type  I50.9     Active

 

 Problem  Cardiac defibrillator in place  Z95.810     Active

 

 Problem  Coronary artery disease involving native coronary artery of native 
heart without angina pectoris  I25.10     Active



                                                                               
                             



Medications

          No Known Medications                                                 
                             



Results

          No Known Results                                                     
               



Summary Purpose

          [employer] Submission

## 2017-08-20 NOTE — XMS REPORT
[facility]

 Created on: 2017



Kendall Snowden

External Reference #: [id number]

: 1940

Sex: Male



Demographics







 Address  [address] [place]  [address]

 

 Preferred Language  Unknown

 

 Marital Status  Unknown

 

 Sabianism Affiliation  Unknown

 

 Race  Unknown

 

 Ethnic Group  Unknown





Author







 Author  MAHOGANY GREENWOOD

 

 [facility]

 

 Address  [address] [place]  [address]



 

 Phone  [phone]







Care Team Providers







 Care Team Member Name  Role  Phone

 

 MAHOGANY GREENWOOD  Unavailable  [phone]







PROBLEMS







 Type  Condition  ICD9-CM Code  HLQ58-FQ Code  Onset Dates  Condition Status  
SNOMED Code

 

 Problem  Coronary artery disease involving native coronary artery of native 
heart without angina pectoris     I25.10     Active  [id number]

 

 Problem  Chronic congestive heart failure, unspecified congestive heart 
failure type     I50.9     Active  63815756

 

 Problem  Cardiac defibrillator in place     Z95.810     Active  936861180

 

 Assessment  Chronic congestive heart failure, unspecified congestive heart 
failure type     I50.9    Active  36722149







ALLERGIES







 Substance  Reaction  Event Type  Date  Status

 

 Penicillin V Potassium  Unknown  Drug Allergy    Active

 

 Codeine Phosphate  Unknown  Drug Allergy    Active







SOCIAL HISTORY

No smoking Hx information available



PLAN OF CARE





VITAL SIGNS







 Height  65 in  2016

 

 Weight  214.5 lbs  2016

 

 Heart Rate  84 bpm  2016

 

 Respiratory Rate  22   2016

 

 BMI  35.69 kg/m2  2016

 

 Blood pressure systolic  134 mmHg  2016

 

 Blood pressure diastolic  80 mmHg  2016







MEDICATIONS







 Medication  Instructions  Dosage  Frequency  Start Date  End Date  Duration  
Status

 

 Amlodipine Besylate 5 MG  Orally Once a day  1 tablet  24h           Active

 

 Metoprolol Tartrate 50 mg  Orally Twice a day  1/2 tablet with food  12h      
     Active

 

 Digoxin 125 MCG  Orally Once a day  1 tablet  24h           Active

 

 Clobetasol Prop Emollient Base 0.05 %  Externally Twice a day  1 application 
to affected area  12h           Active

 

 Nexium 40 MG  Orally Once a day  1 capsule  24h           Active

 

 Losartan Potassium-HCTZ 100-25 MG  Orally Once a day  1 tablet  24h           
Active

 

 Potassium     1 tab              Active

 

 Furosemide 40 MG  Orally Once a day  1 tablet  24h           Active

 

 Aspirin 81 MG  Orally Once a day  1 tablet  24h           Active

 

 Amiodarone HCl 200 MG  Orally Once a day  1 tablet  24h           Active

 

 Flovent  MCG/ACT  Inhalation Twice a day  1 puff  12h           Active







RESULTS

No Results



PROCEDURES







 Procedure  Date Ordered  Related Diagnosis  Body Site

 

 [facility] VISIT ESTABLISHED PATIENT  2016      

 

 Office Visit, Est Pt., Level 2  2016      







IMMUNIZATIONS

No Known Immunizations

## 2017-08-20 NOTE — XMS REPORT
[facility]

 Created on: 2017



Kendall Snowden

External Reference #: [id number]

: 1940

Sex: Male



Demographics







 Address  [address] [place]  [address]

 

 Preferred Language  Unknown

 

 Marital Status  Unknown

 

 Moravian Affiliation  Unknown

 

 Race  Unknown

 

 Ethnic Group  Unknown





Author







 Author  MAHOGANY GREENWOOD

 

 [facility]

 

 Address  [address] [place]  [address]



 

 Phone  [phone]







Care Team Providers







 Care Team Member Name  Role  Phone

 

 MAHOGANY GREENWOOD  Unavailable  [phone]







PROBLEMS







 Type  Condition  ICD9-CM Code  WJD04-JT Code  Onset Dates  Condition Status  
SNOMED Code

 

 Problem  Stasis dermatitis of both legs     I87.2     Active  30916384

 

 Problem  Chronic obstructive pulmonary disease, unspecified COPD type     
J44.9     Active  52838203

 

 Problem  Chronic congestive heart failure, unspecified congestive heart 
failure type     I50.9     Active  54231893

 

 Problem  Cardiac defibrillator in place     Z95.810     Active  068947615

 

 Problem  Essential hypertension     I10     Active  13583128

 

 Problem  Coronary artery disease involving native coronary artery of native 
heart without angina pectoris     I25.10     Active  [id number]







ALLERGIES

Unknown Allergies



SOCIAL HISTORY

No smoking Hx information available



PLAN OF CARE





VITAL SIGNS





MEDICATIONS

Unknown Medications



RESULTS

No Results



PROCEDURES

No Known procedures



IMMUNIZATIONS

No Known Immunizations

## 2017-11-23 ENCOUNTER — HOSPITAL ENCOUNTER (INPATIENT)
Dept: HOSPITAL 75 - ER | Age: 77
LOS: 2 days | Discharge: HOME HEALTH | DRG: 871 | End: 2017-11-25
Attending: INTERNAL MEDICINE | Admitting: INTERNAL MEDICINE
Payer: MEDICARE

## 2017-11-23 VITALS — SYSTOLIC BLOOD PRESSURE: 144 MMHG | DIASTOLIC BLOOD PRESSURE: 67 MMHG

## 2017-11-23 VITALS — HEIGHT: 65 IN | BODY MASS INDEX: 34.66 KG/M2 | WEIGHT: 208 LBS

## 2017-11-23 VITALS — DIASTOLIC BLOOD PRESSURE: 68 MMHG | SYSTOLIC BLOOD PRESSURE: 149 MMHG

## 2017-11-23 VITALS — DIASTOLIC BLOOD PRESSURE: 76 MMHG | SYSTOLIC BLOOD PRESSURE: 168 MMHG

## 2017-11-23 DIAGNOSIS — Z95.5: ICD-10-CM

## 2017-11-23 DIAGNOSIS — F03.90: ICD-10-CM

## 2017-11-23 DIAGNOSIS — F17.210: ICD-10-CM

## 2017-11-23 DIAGNOSIS — N39.0: ICD-10-CM

## 2017-11-23 DIAGNOSIS — I25.10: ICD-10-CM

## 2017-11-23 DIAGNOSIS — I87.8: ICD-10-CM

## 2017-11-23 DIAGNOSIS — Z95.1: ICD-10-CM

## 2017-11-23 DIAGNOSIS — Z95.810: ICD-10-CM

## 2017-11-23 DIAGNOSIS — J44.9: ICD-10-CM

## 2017-11-23 DIAGNOSIS — I10: ICD-10-CM

## 2017-11-23 DIAGNOSIS — K21.9: ICD-10-CM

## 2017-11-23 DIAGNOSIS — A41.9: Primary | ICD-10-CM

## 2017-11-23 DIAGNOSIS — J18.9: ICD-10-CM

## 2017-11-23 DIAGNOSIS — I50.22: ICD-10-CM

## 2017-11-23 DIAGNOSIS — E78.00: ICD-10-CM

## 2017-11-23 LAB
ALBUMIN SERPL-MCNC: 3.9 GM/DL (ref 3.2–4.5)
ALT SERPL-CCNC: 23 U/L (ref 0–55)
ANION GAP SERPL CALC-SCNC: 13 MMOL/L (ref 5–14)
ANISOCYTOSIS BLD QL SMEAR: SLIGHT
APTT BLD: 31 SEC (ref 24–35)
AST SERPL-CCNC: 27 U/L (ref 5–34)
BASOPHILS # BLD AUTO: 0 10^3/UL (ref 0–0.1)
BASOPHILS NFR BLD AUTO: 0 % (ref 0–10)
BASOPHILS NFR BLD MANUAL: 0 %
BILIRUB SERPL-MCNC: 1 MG/DL (ref 0.1–1)
BILIRUB UR QL STRIP: NEGATIVE
BUN SERPL-MCNC: 12 MG/DL (ref 7–18)
BUN/CREAT SERPL: 12
CALCIUM SERPL-MCNC: 9.5 MG/DL (ref 8.5–10.1)
CHLORIDE SERPL-SCNC: 96 MMOL/L (ref 98–107)
CO2 SERPL-SCNC: 28 MMOL/L (ref 21–32)
CREAT SERPL-MCNC: 0.99 MG/DL (ref 0.6–1.3)
DIGOXIN SERPL-MCNC: 0.75 NG/ML (ref 0.8–2)
EOSINOPHIL # BLD AUTO: 0 10^3/UL (ref 0–0.3)
EOSINOPHIL NFR BLD AUTO: 0 % (ref 0–10)
EOSINOPHIL NFR BLD MANUAL: 0 %
ERYTHROCYTE [DISTWIDTH] IN BLOOD BY AUTOMATED COUNT: 16.4 % (ref 10–14.5)
GFR SERPLBLD BASED ON 1.73 SQ M-ARVRAT: > 60 ML/MIN
GLUCOSE SERPL-MCNC: 104 MG/DL (ref 70–105)
INR PPP: 1.1 (ref 0.8–1.4)
KETONES UR QL STRIP: NEGATIVE
LEUKOCYTE ESTERASE UR QL STRIP: (no result)
LYMPHOCYTES # BLD AUTO: 1.5 X 10^3 (ref 1–4)
LYMPHOCYTES NFR BLD AUTO: 10 % (ref 12–44)
MAGNESIUM SERPL-MCNC: 1.6 MG/DL (ref 1.8–2.4)
MCH RBC QN AUTO: 30 PG (ref 25–34)
MCHC RBC AUTO-ENTMCNC: 33 G/DL (ref 32–36)
MCV RBC AUTO: 89 FL (ref 80–99)
MONOCYTES # BLD AUTO: 1.8 X 10^3 (ref 0–1)
MONOCYTES NFR BLD AUTO: 12 % (ref 0–12)
NEUTROPHILS # BLD AUTO: 11.8 X 10^3 (ref 1.8–7.8)
NEUTROPHILS NFR BLD AUTO: 78 % (ref 42–75)
NEUTS BAND NFR BLD MANUAL: 74 %
NEUTS BAND NFR BLD: 0 %
NITRITE UR QL STRIP: NEGATIVE
PH UR STRIP: 7 [PH] (ref 5–9)
PLATELET # BLD: 200 10^3/UL (ref 130–400)
PMV BLD AUTO: 10.5 FL (ref 7.4–10.4)
POTASSIUM SERPL-SCNC: 3.4 MMOL/L (ref 3.6–5)
PROT SERPL-MCNC: 8.7 GM/DL (ref 6.4–8.2)
PROT UR QL STRIP: (no result)
PROTHROMBIN TIME: 14.2 SEC (ref 12.2–14.7)
RBC # BLD AUTO: 4.52 10^6/UL (ref 4.35–5.85)
SODIUM SERPL-SCNC: 137 MMOL/L (ref 135–145)
SP GR UR STRIP: 1.01 (ref 1.02–1.02)
SQUAMOUS #/AREA URNS HPF: (no result) /HPF
UROBILINOGEN UR-MCNC: NORMAL MG/DL
VARIANT LYMPHS NFR BLD MANUAL: 14 %
WBC # BLD AUTO: 15.1 10^3/UL (ref 4.3–11)
WBC #/AREA URNS HPF: (no result) /HPF

## 2017-11-23 PROCEDURE — 85730 THROMBOPLASTIN TIME PARTIAL: CPT

## 2017-11-23 PROCEDURE — 94640 AIRWAY INHALATION TREATMENT: CPT

## 2017-11-23 PROCEDURE — 85007 BL SMEAR W/DIFF WBC COUNT: CPT

## 2017-11-23 PROCEDURE — 87804 INFLUENZA ASSAY W/OPTIC: CPT

## 2017-11-23 PROCEDURE — 83735 ASSAY OF MAGNESIUM: CPT

## 2017-11-23 PROCEDURE — 85025 COMPLETE CBC W/AUTO DIFF WBC: CPT

## 2017-11-23 PROCEDURE — 80053 COMPREHEN METABOLIC PANEL: CPT

## 2017-11-23 PROCEDURE — 94664 DEMO&/EVAL PT USE INHALER: CPT

## 2017-11-23 PROCEDURE — 87088 URINE BACTERIA CULTURE: CPT

## 2017-11-23 PROCEDURE — 85027 COMPLETE CBC AUTOMATED: CPT

## 2017-11-23 PROCEDURE — 96365 THER/PROPH/DIAG IV INF INIT: CPT

## 2017-11-23 PROCEDURE — 80162 ASSAY OF DIGOXIN TOTAL: CPT

## 2017-11-23 PROCEDURE — 71010: CPT

## 2017-11-23 PROCEDURE — 87077 CULTURE AEROBIC IDENTIFY: CPT

## 2017-11-23 PROCEDURE — 93041 RHYTHM ECG TRACING: CPT

## 2017-11-23 PROCEDURE — 36415 COLL VENOUS BLD VENIPUNCTURE: CPT

## 2017-11-23 PROCEDURE — 83880 ASSAY OF NATRIURETIC PEPTIDE: CPT

## 2017-11-23 PROCEDURE — 71020: CPT

## 2017-11-23 PROCEDURE — 96375 TX/PRO/DX INJ NEW DRUG ADDON: CPT

## 2017-11-23 PROCEDURE — 87040 BLOOD CULTURE FOR BACTERIA: CPT

## 2017-11-23 PROCEDURE — 83605 ASSAY OF LACTIC ACID: CPT

## 2017-11-23 PROCEDURE — 85610 PROTHROMBIN TIME: CPT

## 2017-11-23 PROCEDURE — 81000 URINALYSIS NONAUTO W/SCOPE: CPT

## 2017-11-23 PROCEDURE — 87186 SC STD MICRODIL/AGAR DIL: CPT

## 2017-11-23 PROCEDURE — 94760 N-INVAS EAR/PLS OXIMETRY 1: CPT

## 2017-11-23 RX ADMIN — DEXTROSE MONOHYDRATE AND SODIUM CHLORIDE SCH MLS/HR: 5; .45 INJECTION, SOLUTION INTRAVENOUS at 21:21

## 2017-11-23 RX ADMIN — IPRATROPIUM BROMIDE AND ALBUTEROL SULFATE SCH ML: .5; 3 SOLUTION RESPIRATORY (INHALATION) at 21:51

## 2017-11-23 NOTE — XMS REPORT
[facility]

 Created on: 2017



Kendall Snowden

External Reference #: [id number]

: 1940

Sex: Male



Demographics







 Address  [address] [place]  [address]

 

 Preferred Language  Unknown

 

 Marital Status  Unknown

 

 Shinto Affiliation  Unknown

 

 Race  Unknown

 

 Ethnic Group  Unknown





Author







 Author  MAHOGANY GREENWOOD

 

 [facility]

 

 Address  [address] [place]  [address]



 

 Phone  [phone]







Care Team Providers







 Care Team Member Name  Role  Phone

 

 MAHOGANY GREENWOOD  Unavailable  [phone]







PROBLEMS







 Type  Condition  ICD9-CM Code  GRQ90-TT Code  Onset Dates  Condition Status  
SNOMED Code

 

 Problem  Chronic congestive heart failure, unspecified congestive heart 
failure type     I50.9     Active  21729468

 

 Problem  Ventricular arrhythmia     I49.9     Active  51400858

 

 Problem  Stasis dermatitis of both legs     I87.2     Active  25599405

 

 Problem  Coronary artery disease involving native coronary artery of native 
heart without angina pectoris     I25.10     Active  8403538132997

 

 Problem  Cardiac defibrillator in place     Z95.810     Active  512877820

 

 Problem  Chronic obstructive pulmonary disease, unspecified COPD type     
J44.9     Active  07764098

 

 Problem  Essential hypertension     I10     Active  77436261







ALLERGIES







 Substance  Reaction  Event Type  Date  Status

 

 Penicillin V Potassium  Unknown  Drug Allergy  05 May, 2017  Active

 

 Codeine Phosphate  Unknown  Drug Allergy  05 May, 2017  Active







SOCIAL HISTORY

Never Assessed



PLAN OF CARE







 Activity  Details









  









 Follow Up  prn Reason:







VITAL SIGNS







 Height  65 in  2017

 

 Temperature  98.3 degrees Fahrenheit  2017

 

 Heart Rate  82 bpm  2017

 

 Respiratory Rate  20   2017

 

 Blood pressure systolic  112 mmHg  2017

 

 Blood pressure diastolic  68 mmHg  2017







MEDICATIONS







 Medication  Instructions  Dosage  Frequency  Start Date  End Date  Duration  
Status

 

 Amiodarone HCl 200 MG  Orally Once a day  1 tablet  24h           Active

 

 Nexium 40 MG  Orally Once a day  1 capsule  24h           Active

 

 ProAir  (90 Base) MCG/ACT  Inhalation 4 times a day  2 puffs as needed  
6h  05 May, 2017     30 days  Active

 

 Digoxin 125 MCG  Orally Once a day  1 tablet  24h           Active

 

 Tramadol HCl 50 mg  Orally every 4 hrs  1 tablet as needed  4h  09 Mar, 2017  
   30 days  Active

 

 Klor-Con 8 MEQ  Orally Once a day  1 tablet  24h           Active

 

 Aspirin 162.5 MG  Orally twice a day  1 tablet  12h           Active

 

 Flovent  MCG/ACT  Inhalation Twice a day  1 puff  12h           Active

 

 Furosemide 40 MG  Orally Once a day  1 tablet  24h           Active

 

 Mexiletine HCl 150 MG  Orally every 8 hrs  1 capsule  8h           Active

 

 Amlodipine Besylate 5 MG  Orally Once a day  1 tablet  24h           Active

 

 MiraLax 17 gm/dose  Orally Once a day  17 grams mixed in 8 oz of water or 
juice  24h           Active

 

 Oxycodone-Acetaminophen 5-325 MG  Orally every 6 hours as needed  1 tablet     
14 Mar, 2017        Active







RESULTS







 Name  Result  Date  Reference Range

 

 [facility]     2017   

 

 Glucose, Serum  95     65-99

 

 BUN  9     8-27

 

 Creatinine, Serum  0.74     0.76-1.27

 

 eGFR If NonAfricn Am  91         >59

 

 eGFR If Africn Am  105         >59

 

 BUN/Creatinine Ratio  12     10-24

 

 Sodium, Serum  140     134-144

 

 Potassium, Serum  3.5     3.5-5.2

 

 Chloride, Serum  95     

 

 Carbon Dioxide, Total  25     18-29

 

 Calcium, Serum  9.4     8.6-10.2







PROCEDURES







 Procedure  Date Ordered  Result  Body Site

 

 [facility]  [facility]      

 

 VENIPERASMO, ROUTINE*  May 05, 2017      

 

 [facility] VISIT ESTABLISHED PATIENT  May 05, 2017      







IMMUNIZATIONS

No Known Immunizations



MEDICAL (GENERAL) HISTORY







 Type  Description  Date

 

 Medical History  hypertension   

 

 Medical History  skin cancer-arms, face   

 

 Medical History  MI   

 

 Surgical History  heart cath-2 stents, multiple balloons   

 

 Surgical History  open heart surgery  

 

 Surgical History  defibrillator placed  

 

 Hospitalization History  surgery   

 

 Hospitalization History  pneumonia  

 

 Hospitalization History  broken left hip at   March

## 2017-11-23 NOTE — DIAGNOSTIC IMAGING REPORT
EXAM: Chest 1 view, AP/PA only.



INDICATION: Cough. Fever.



COMPARISON: Chest radiograph, 3/7/2017.



FINDINGS: Low lung volumes accentuate the cardiomegaly and

central pulmonary vascularity. Elevation of the right

hemidiaphragm, similar to the prior exam. Bronchial wall

thickening. No new dense consolidation, pleural effusion or

pneumothorax. Sternotomy. Cardiac pacer. No acute osseous

findings.



IMPRESSION: Examination limited by low lung volumes. Bronchial

wall thickening consistent with small airway inflammation.

Remainder stable. 



Dictated by: 



  Dictated on workstation # [id number]

## 2017-11-23 NOTE — XMS REPORT
[facility]

 Created on: 10/03/2017



Kendall Snowden

External Reference #: [id number]

: 1940

Sex: Male



Demographics







 Address  [address] [place]  [address]

 

 Preferred Language  Unknown

 

 Marital Status  Unknown

 

 Moravian Affiliation  Unknown

 

 Race  Unknown

 

 Ethnic Group  Unknown





Author







 Author  MAHOGANY GREENWOOD

 

 [facility]

 

 Address  [address] [place]  [address]



 

 Phone  [phone]







Care Team Providers







 Care Team Member Name  Role  Phone

 

 MAHOGANY GREENWOOD  Unavailable  [phone]







PROBLEMS







 Type  Condition  ICD9-CM Code  BPE97-KR Code  Onset Dates  Condition Status  
SNOMED Code

 

 Problem  Stasis dermatitis of both legs     I87.2     Active  37935230

 

 Problem  Chronic obstructive pulmonary disease, unspecified COPD type     
J44.9     Active  56476700

 

 Problem  Cardiac defibrillator in place     Z95.810     Active  703553981

 

 Problem  Chronic congestive heart failure, unspecified congestive heart 
failure type     I50.9     Active  87362498

 

 Problem  Essential hypertension     I10     Active  73024902

 

 Problem  Coronary artery disease involving native coronary artery of native 
heart without angina pectoris     I25.10     Active  [id number]







ALLERGIES

No Information



SOCIAL HISTORY

Never Assessed



PLAN OF CARE





VITAL SIGNS





MEDICATIONS







 Medication  Instructions  Dosage  Frequency  Start Date  End Date  Duration  
Status

 

 Coumadin 5 MG  Orally Once a day  1 tablet  24h           Active

 

 Furosemide 40 MG  Orally Once a day  1 tablet  24h           Active

 

 Amlodipine Besylate 5 MG  Orally Once a day  1 tablet  24h           Active

 

 Klor-Con 8 MEQ  Orally Once a day  1 tablet  24h           Active

 

 Digoxin 125 MCG  Orally Once a day  1 tablet  24h           Active

 

 Losartan Potassium 100 MG  Orally Once a day  1 tablet  24h           Active

 

 Tramadol HCl 50 mg  Orally every 4 hrs  1 tablet as needed  4h  09 Mar, 2017  
   30 days  Active

 

 Aspirin 162.5 MG  Orally twice a day  1 tablet  12h           Active

 

 Amiodarone HCl 200 MG  Orally Once a day  1 tablet  24h           Active

 

 Flovent  MCG/ACT  Inhalation Twice a day  1 puff  12h           Active

 

 Nexium 40 MG  Orally Once a day  1 capsule  24h           Active

 

 Oxycodone-Acetaminophen 5-325 MG  Orally every 6 hrs  1-2 tablets as needed  
6h  10 Mar, 2017     30 days  Active

 

 Metoprolol Tartrate 50 mg  Orally Twice a day  1/2 tablet with food  12h      
     Active

 

 Mexiletine HCl 150 MG  Orally every 8 hrs  1 capsule  8h           Active

 

 Tamsulosin HCl 0.4 MG  Orally Once a day  1 capsule  24h           Active

 

 Senna 8.6 MG  Orally twice a day  1 tablet  12h           Active

 

 MiraLax 17 gm/dose  Orally Once a day  17 grams mixed in 8 oz of water or 
juice  24h           Active







RESULTS

No Results



PROCEDURES

No Known procedures



IMMUNIZATIONS

No Known Immunizations



MEDICAL (GENERAL) HISTORY







 Type  Description  Date

 

 Medical History  hypertension   

 

 Medical History  skin cancer-arms, face   

 

 Medical History  MI   

 

 Surgical History  heart cath-2 stents, multiple balloons   

 

 Surgical History  open heart surgery  

 

 Surgical History  defibrillator placed  

 

 Hospitalization History  surgery   

 

 Hospitalization History  pneumonia  

 

 Hospitalization History  broken left hip at   March

## 2017-11-23 NOTE — ED RESPIRATORY
General


Stated Complaint:  FEVER,COUGH


Source:  patient (PT IS LIMITED HISTORIAN--HX OF DEMENTIA), old records, spouse 

(WIFE GIVES MOST INFORMATION)





History of Present Illness


Time seen by provider:  16:40


Initial Comments


PT ARRIVES VIA POV FROM HOME


PT HAS HAD SUBJECTIVE FEVER SINCE YESTERDAY-- DEGREES JUST PRIOR TO 

ARRIVAL, AND WIFE GAVE HIM TYLENOL JUST PRIOR TO ARRIVAL


PT HAS HAD PRODUCTIVE COUGH SINCE HE MOVED BACK HOME IN MAY, FROM THE NURSING 

HOME ( HAD HIP FRACTURE AND WENT TO NURSING HOME FOR PHYSICAL THERAPY) --COUGH 

COMES AND GOES, AND IS PRESENT NOW AND WORSE THE  LAST FEW DAYS


HAS WHITE SPUTUM


PT NORMALLY IS SHORT OF BREATH--HAS COPD, AND PT STATES SHORTNESS OF BREATH IS 

NO WORSE THAN NORMAL. PT NORMALLY WEARS O2 AT HS, BUT NOT AT ALL DURING THE DAY


NO CHEST PAIN 


PT HAS CHRONIC SWELLING AND REDNESS TO LEGS--WIFE STATES IS BETTER THAN IT HAS 

BEEN. 


PT HAS HX OF CHF, HIS NOON DIURETIC WAS HELD TODAY DUE TO PT NOT WANTING TO GET 

UP AND GO TO BATHROOM ALL THE TIME





PCP: DR. GREENWOOD AT [facility]


CARDIOLOGIST: DR. BURGER





Allergies and Home Medications


Allergies


Coded Allergies:  


     Penicillins (Verified  Allergy, Severe, HIVES, SOB, 12/4/15)


     codeine (Verified  Allergy, Severe, SOB, HIVES, 12/4/15)





Home Medications


Amiodarone HCl 200 Mg Tablet, 200 MG PO DAILY for 30 Days


   Prescribed by: MICHELE WILKINS on 3/8/17 1140


Amlodipine Besylate 5 Mg Tablet, 5 MG PO HS, (Reported)


Aspirin 81 Mg Tablet.dr, 162 MG PO BID, (Reported)


Benzonatate 200 Mg Capsule, 200 MG PO BID PRN for COUGH, #30 Ref 0


   Prescribed by: KATE MORALES on 8/19/17 1257


Digoxin 125 Mcg Tablet, 125 MCG PO DAILY, (Reported)


Esomeprazole Magnesium 40 Mg Cap, 40 MG PO DAILY, (Reported)


Fluticasone Propionate 1 Ea Aero, 2 PUFF IH PRN, (Reported)


Furosemide 40 Mg Tablet, 40 MG PO DAILY, (Reported)


Ipratropium/Albuterol Sulfate 3 Ml Ampul.neb, 3 ML INH RTQ4HR for 30 Days


   Prescribed by: MICHELE WILKINS on 3/8/17 1146


Losartan Potassium 50 Mg Tablet, 100 MG PO DAILY for 30 Days


   Prescribed by: MICHELE WILKINS on 3/8/17 1140


Metoprolol Tartrate 50 Mg Tablet, 25 MG PO BID for 30 Days


   Prescribed by: MICHELE WILKINS on 3/8/17 1140


Mexiletine HCl 150 Mg Cap, 150 MG PO TID, (Reported)


Nitrofurantoin Monohyd/M-Cryst 100 Mg Capsule, 1 TAB PO BID, #20 Ref 0


   Prescribed by: KATE MORALES on 8/19/17 1257


Oxycodone HCl/Acetaminophen 1 Each Tablet, 1-2 TAB PO Q4H PRN for SEVERE PAIN, #

60


   Prescribed by: MICHELE WILKINS on 3/8/17 1140


Polyethylene Glycol 3350 17 Gm Powd.pack, 17 GM PO DAILY for 30 Days


   Prescribed by: MICHELE WILKINS on 3/8/17 1140


Potassium Chloride 8 Meq Tablet.er, 8 MEQ PO DAILY, (Reported)


Sennosides 8.6 Mg Tablet, 8.6 MG PO BID for 30 Days


   Prescribed by: MICHELE WILKINS on 3/8/17 1140


Tamsulosin HCl 0.4 Mg Cap, 0.4 MG PO HS for 30 Days


   Prescribed by: MICHELE WILKINS on 3/8/17 1140


Tramadol HCl 50 Mg Tablet, 50 MG PO Q4H PRN for MODERATE PAIN, #30


   Prescribed by: MICHELE WILKINS on 3/8/17 1140


Warfarin Sodium 5 Mg Tablet, 5 MG PO DAILY@1800 for 30 Days


   Prescribed by: MICHELE WILKINS on 3/8/17 1140





Constitutional:  see HPI, fever, malaise, weakness


EENTM:  no symptoms reported


Respiratory:  see HPI, cough, dyspnea on exertion, short of breath


Cardiovascular:  No chest pain, edema, No palpitations


Gastrointestinal:  no symptoms reported, No abdominal pain, No loss of appetite

, No nausea, No vomiting


Genitourinary:  see HPI


Musculoskeletal:  see HPI


Skin:  see HPI


Psychiatric/Neurological:  No Symptoms Reported


Hematologic/Lymphatic:  No Symptoms Reported


Immunological/Allergic:  no symptoms reported





Past Medical-Social-Family Hx


Patient Social History


Alcohol Use:  Denies Use


Recreational Drug Use:  No


Smoking Status:  Former Smoker (3 1/2 TO 4 PPD--QUIT 1993)


Type Used:  Cigarettes


Former Smoker, Quit:  Jan 1, 1993


2nd Hand Smoke Exposure:  No


Recent Foreign Travel:  No


Contact w/Someone Who Travel:  No


Recent Hopitalizations:  No





Immunizations Up To Date


Tetanus Booster (TDap):  More than 5yrs


Date of Pneumonia Vaccine:  Oct 1, 2014


Date of Influenza Vaccine:  Dec 5, 2016





Seasonal Allergies


Seasonal Allergies:  No





Surgeries


History of Surgeries:  Yes (SKIN CANCER REMOVALS; CATARACT SURGERY; CARDIAC 

CATHS WITH CARDIAC STENTS  X 2; LEFT HIP FRACTURE 02/2017; LITHOTRIPSY; HERNIA 

REPAIR)


Surgeries:  Cardiac, CABG, Coronary Stent, Defibrillator, Eye Surgery, Joint 

Replacement, Orthopedic, Renal





Respiratory


History of Respiratory Disorde:  Yes (O2 3L/NC AT HS)


Respiratory Disorders:  Asthma, Pneumonia, Chronic Bronchitis, Sleep Apnea, COPD


Currently Using CPAP:  No


Currently Using BIPAP:  No





Cardiovascular


History of Cardiac Disorders:  Yes (CABG, DEFIBRILLATOR, CARDIAC CATHS-STENTS X2

; CHF)


Cardiac Disorders:  Coronary Artery Disease, Heart Attack, High Cholesterol, 

Hypertension





Neurological


History of Neurological Disord:  Yes


Neurological Disorders:  Dementia, Neuropathy





Reproductive System


Hx Reproductive Disorders:  Yes (unable to have children- mumps as a child)


Sexually Transmitted Disease:  No


HIV/AIDS:  No





Genitourinary


History of Genitourinary Disor:  Yes


Genitourinary Disorders:  Bladder Infection, Kidney Stones





Gastrointestinal


History of Gastrointestinal Di:  Yes


Gastrointestinal Disorders:  Gastroesophageal Reflux, Ulcer





Musculoskeletal


History of Musculoskeletal Dis:  Yes (POOR MOBILITY)


Musculoskeletal Disorders:  Arthritis, Fractures





Endocrine


History of Endocrine Disorders:  No





HEENT


History of HEENT Disorders:  Yes


HEENT Disorders:  Cataract, Glaucoma


Loss of Vision:  Bilateral


Hearing Impairment:  Hard of Hearing





Cancer


History of Cancer:  Yes


Cancer:  Skin


Type of Tx Receive:  Surgical Intervention





Psychosocial


History of Psychiatric Problem:  Yes


Behavioral Health Disorders:  Depression





Integumentary


History of Skin or Integumenta:  Yes (shingles , SKIN CANCER)





Blood Transfusions


History of Blood Disorders:  No


Adverse Reaction to a Blood Tr:  No





Family Medical History


Significant Family History:  No Pertinent Family Hx


Family Medial History:  


Cardiovascular disease


  19 MOTHER


  G8 BROTHER


  G8 SISTER


Cervical cancer


  19 MOTHER





Physical Exam


Vital Signs





Vital Sign - Last 12Hours








 11/23/17





 16:36


 


Temp 101.3


 


Pulse 102


 


Resp 20


 


B/P (MAP) 166/79


 


Pulse Ox 88


 


O2 Delivery Room Air


 


O2 Flow Rate 2.00





Capillary Refill :


General Appearance:  WD/WN, no apparent distress, other (FREQUENT MOIST COUGH, 

LETHARGIC)


HEENT:  PERRL/EOMI, other (ORAL MUCOSA DRY)


Neck:  normal inspection


Respiratory:  no respiratory distress, no accessory muscle use, rales (RLL), 

rhonchi (RLL)


Cardiovascular:  regular rate, rhythm, no murmur


Gastrointestinal:  normal bowel sounds, non tender, soft


Extremities:  pedal edema (3+ TO 4+ EDEMA BILATERALLY WITH CHRONIC VENOUS 

STASIS CHANGES AND ERYTHEMA, WITH SCALING/FISSURING/SCABS--NON-TENDER. WIFE 

STATES IS NORMAL/ BETTER THAN USUAL TODAY)


Neurologic/Psychiatric:  CNs II-XII nml as tested, no motor/sensory deficits (

GROSSLY INTACT), alert, normal mood/affect, oriented x 3 (BUT POOR MEMORY)


Skin:  normal color, warm/dry, No rash, other (MULTIPLE SCABS, CHRONIC 

APPEARING SKIN LESIONS TO FACE ARMS AND HANDS)





Focused Exam


Evaluation


Lactate Level


Laboratory Tests


11/23/17 16:46: Lactic Acid Level 1.25





Lactic Acid Level





Laboratory Tests








Test


  11/23/17


16:46


 


Lactic Acid Level


  1.25 MMOL/L


(0.50-2.00)











Progress/Results/Core Measures


Suspected Sepsis


SIRS


Temperature: 


Pulse:  


Respiratory Rate: 


 


Laboratory Tests


11/23/17 16:46: White Blood Count 15.1H


Blood Pressure  / 


Mean: 


 





Laboratory Tests


11/23/17 16:46: Lactic Acid Level 1.25








Laboratory Tests


11/23/17 16:46: 


Creatinine 0.99, INR Comment 1.1, Platelet Count 200, Total Bilirubin 1.0





Results/Orders


Lab Results





Laboratory Tests








Test


  11/23/17


16:46 11/23/17


16:58 Range/Units


 


 


White Blood Count


  15.1 H


  


  4.3-11.0


10^3/uL


 


Red Blood Count


  4.52 


  


  4.35-5.85


10^6/uL


 


Hemoglobin 13.5   13.3-17.7  G/DL


 


Hematocrit 40   40-54  %


 


Mean Corpuscular Volume 89   80-99  FL


 


Mean Corpuscular Hemoglobin 30   25-34  PG


 


Mean Corpuscular Hemoglobin


Concent 33 


  


  32-36  G/DL


 


 


Red Cell Distribution Width 16.4 H  10.0-14.5  %


 


Platelet Count


  200 


  


  130-400


10^3/uL


 


Mean Platelet Volume 10.5 H  7.4-10.4  FL


 


Neutrophils (%) (Auto) 78 H  42-75  %


 


Lymphocytes (%) (Auto) 10 L  12-44  %


 


Monocytes (%) (Auto) 12   0-12  %


 


Eosinophils (%) (Auto) 0   0-10  %


 


Basophils (%) (Auto) 0   0-10  %


 


Neutrophils # (Auto) 11.8 H  1.8-7.8  X 10^3


 


Lymphocytes # (Auto) 1.5   1.0-4.0  X 10^3


 


Monocytes # (Auto) 1.8 H  0.0-1.0  X 10^3


 


Eosinophils # (Auto)


  0.0 


  


  0.0-0.3


10^3/uL


 


Basophils # (Auto)


  0.0 


  


  0.0-0.1


10^3/uL


 


Neutrophils % (Manual) 74    %


 


Lymphocytes % (Manual) 14    %


 


Monocytes % (Manual) 12    %


 


Eosinophils % (Manual) 0    %


 


Basophils % (Manual) 0    %


 


Band Neutrophils 0    %


 


Anisocytosis SLIGHT    


 


Prothrombin Time 14.2   12.2-14.7  SEC


 


INR Comment 1.1   0.8-1.4  


 


Activated Partial


Thromboplast Time 31 


  


  24-35  SEC


 


 


Sodium Level 137   135-145  MMOL/L


 


Potassium Level 3.4 L  3.6-5.0  MMOL/L


 


Chloride Level 96 L    MMOL/L


 


Carbon Dioxide Level 28   21-32  MMOL/L


 


Anion Gap 13   5-14  MMOL/L


 


Blood Urea Nitrogen 12   7-18  MG/DL


 


Creatinine


  0.99 


  


  0.60-1.30


MG/DL


 


Estimat Glomerular Filtration


Rate > 60 


  


   


 


 


BUN/Creatinine Ratio 12    


 


Glucose Level 104     MG/DL


 


Lactic Acid Level


  1.25 


  


  0.50-2.00


MMOL/L


 


Calcium Level 9.5   8.5-10.1  MG/DL


 


Magnesium Level 1.6 L  1.8-2.4  MG/DL


 


Total Bilirubin 1.0   0.1-1.0  MG/DL


 


Aspartate Amino Transf


(AST/SGOT) 27 


  


  5-34  U/L


 


 


Alanine Aminotransferase


(ALT/SGPT) 23 


  


  0-55  U/L


 


 


Alkaline Phosphatase 113     U/L


 


B-Type Natriuretic Peptide 137.6 H  <100.0  PG/ML


 


Total Protein 8.7 H  6.4-8.2  GM/DL


 


Albumin 3.9   3.2-4.5  GM/DL


 


Digoxin Level


  0.75 L


  


  0.80-2.00


NG/ML


 


Urine Color  YELLOW   


 


Urine Clarity  SL CLOUDY   


 


Urine pH  7  5-9  


 


Urine Specific Gravity  1.010 L 1.016-1.022  


 


Urine Protein  1+ H NEGATIVE  


 


Urine Glucose (UA)  NEGATIVE  NEGATIVE  


 


Urine Ketones  NEGATIVE  NEGATIVE  


 


Urine Nitrite  NEGATIVE  NEGATIVE  


 


Urine Bilirubin  NEGATIVE  NEGATIVE  


 


Urine Urobilinogen  NORMAL  NORMAL  MG/DL


 


Urine Leukocyte Esterase  3+ H NEGATIVE  


 


Urine RBC (Auto)  5+ H NEGATIVE  


 


Urine RBC  2-5 H  /HPF


 


Urine WBC  25-50 H  /HPF


 


Urine Squamous Epithelial


Cells 


  NONE 


   /HPF


 


 


Urine Crystals  NONE   /LPF


 


Urine Bacteria  FEW H  /HPF


 


Urine Casts  NONE   /LPF


 


Urine Mucus  NEGATIVE   /LPF


 


Urine Culture Indicated  YES   








Micro Results





Microbiology


11/23/17 Influenza Types A,B Antigen (AYALA) - Final, Complete


           





My Orders





Orders - MARCOS CAMARENA DO


Saline Lock/Iv-Start (11/23/17 16:48)


O2 (11/23/17 16:48)


Monitor-Rhythm Ecg Trace Only (11/23/17 16:48)


BNP (11/23/17 16:48)


Cbc With Automated Diff (11/23/17 16:48)


Comprehensive Metabolic Panel (11/23/17 16:48)


Digoxin (11/23/17 16:48)


Lactic Acid Analyzer (11/23/17 16:48)


Magnesium (11/23/17 16:48)


Protime With Inr (11/23/17 16:48)


Partial Thromboplastin Time (11/23/17 16:48)


Ua Culture If Indicated (11/23/17 16:48)


Blood Culture (11/23/17 16:48)


Influenza A And B Antigens (11/23/17 16:48)


Chest 1 View, Ap/Pa Only (11/23/17 16:48)


Albuterol/Ipra Inhalation Soln (Duoneb I (11/23/17 17:00)


Dexamethasone Injection (Decadron Inject (11/23/17 17:00)


Rt Request For Service (11/23/17 16:48)


Svn Sm Volume Nebulizer Rt-Rfs (11/23/17 16:48)


Sputum Culture (11/23/17 16:48)


Manual Differential (11/23/17 16:46)


Urine Culture (11/23/17 16:58)


Methylprednisolone Sod Succ (Solu-Medrol (11/23/17 18:30)


Ceftriaxone Injection (Rocephin Injectio (11/23/17 18:30)





Medications Given in ED





Current Medications








 Medications  Dose


 Ordered  Sig/Silvana


 Route  Start Time


 Stop Time Status Last Admin


Dose Admin


 


 Albuterol/


 Ipratropium  3 ml  ONCE  ONCE


 INH  11/23/17 17:00


 11/23/17 17:01 DC 11/23/17 17:10


3 ML


 


 Ceftriaxone


 Sodium 1000 mg/


 Sodium Chloride  50 ml @ 


 100 mls/hr  ONCE  ONCE


 IV  11/23/17 18:30


 11/23/17 18:59  11/23/17 18:37


100 MLS/HR


 


 Dexamethasone


 Sodium Phosphate  20 mg  ONCE  ONCE


 IH  11/23/17 17:00


 11/23/17 17:01 DC 11/23/17 17:10


20 MG


 


 Methylprednisolone


 Sodium Succinate  125 mg  ONCE  ONCE


 IVP  11/23/17 18:30


 11/23/17 18:31 DC 11/23/17 18:36


125 MG








Vital Signs/I&O





Vital Sign - Last 12Hours








 11/23/17 11/23/17 11/23/17 11/23/17





 16:36 16:36 17:11 17:41


 


Temp 101.3   


 


Pulse 102   94


 


Resp 20   18


 


B/P (MAP) 166/79   149/68


 


Pulse Ox 88  95 96


 


O2 Delivery Room Air Nasal Cannula Nasal Cannula Nasal Cannula


 


O2 Flow Rate  2.00 4.00 2.00





Capillary Refill :


Progress Note :  


Progress Note


O2 SATS 87-89% ON ROOM AIR ON ARRIVAL. UP TO 96% ON 3L/NC


INCREASED AERATION AFTER NEB TREATMENT BUT STILL WITH RALES/RHONCHI IN RLL





Diagnostic Imaging





Comments


CXR--LOW LUNG VOLUMES WITH BRONCHIAL WALL THICKENING C/W SMALL AIRWAY 

INFLAMMATION--PER RADIOLOGIST REPORT @ 1755


   Reviewed:  Reviewed by Me





Departure


Communication (Admissions)


Progress Notes


2575--SPOKE WITH DR. SHAILESH KENT. ACCEPTS PT FOR ADMIT.





Impression


Impression:  


 Primary Impression:  


 Bronchitis


 Additional Impressions:  


 CLINICAL PNEUMONIA


 Hypoxia


 COPD (chronic obstructive pulmonary disease)


 Sepsis


 UTI (urinary tract infection)


 CHRONIC LEG EDEMA AND CELLULITIS


Disposition:  09 ADMITTED AS INPATIENT


Condition:  Improved





Admissions


Decision to Admit Reason:  Admit from ER (General)


Decision to Admit/Date:  Nov 23, 2017


Time/Decision to Admit Time:  18:00





Departure-Patient Inst.


Referrals:  


MAHOGANY GREENWOOD MD (PCP/Family)


Primary Care Physician











MARCOS CAMARENA DO Nov 23, 2017 16:56

## 2017-11-23 NOTE — XMS REPORT
[facility]

 Created on: 10/04/2017



Kendall Snowden

External Reference #: [id number]

: 1940

Sex: Male



Demographics







 Address  [address] [place]  [address]

 

 Preferred Language  Unknown

 

 Marital Status  Unknown

 

 Congregation Affiliation  Unknown

 

 Race  Unknown

 

 Ethnic Group  Unknown





Author







 Author  MAHOGANY GREENWOOD

 

 [facility]

 

 Address  [address] [place]  [address]



 

 Phone  [phone]







Care Team Providers







 Care Team Member Name  Role  Phone

 

 MAHOGANY GREENWOOD  Unavailable  [phone]







PROBLEMS







 Type  Condition  ICD9-CM Code  ANE39-PQ Code  Onset Dates  Condition Status  
SNOMED Code

 

 Problem  Stasis dermatitis of both legs     I87.2     Active  39023815

 

 Problem  Chronic obstructive pulmonary disease, unspecified COPD type     
J44.9     Active  52623154

 

 Problem  Cardiac defibrillator in place     Z95.810     Active  [id number]

 

 Problem  Chronic congestive heart failure, unspecified congestive heart 
failure type     I50.9     Active  37677249

 

 Problem  Essential hypertension     I10     Active  68338113

 

 Problem  Coronary artery disease involving native coronary artery of native 
heart without angina pectoris     I25.10     Active  [id number]







ALLERGIES

No Information



SOCIAL HISTORY

Never Assessed



PLAN OF CARE





VITAL SIGNS





MEDICATIONS







 Medication  Instructions  Dosage  Frequency  Start Date  End Date  Duration  
Status

 

 Oxycodone-Acetaminophen 5-325 MG  Orally 2 times a day  1 tablet  12h  14 Mar, 
2017     28 days  Active







RESULTS

No Results



PROCEDURES

No Known procedures



IMMUNIZATIONS

No Known Immunizations



MEDICAL (GENERAL) HISTORY







 Type  Description  Date

 

 Medical History  hypertension   

 

 Medical History  skin cancer-arms, face   

 

 Medical History  MI   

 

 Surgical History  heart cath-2 stents, multiple balloons   

 

 Surgical History  open heart surgery  

 

 Surgical History  defibrillator placed  

 

 Hospitalization History  surgery   

 

 Hospitalization History  pneumonia  

 

 Hospitalization History  broken left hip at   March

## 2017-11-24 VITALS — DIASTOLIC BLOOD PRESSURE: 67 MMHG | SYSTOLIC BLOOD PRESSURE: 137 MMHG

## 2017-11-24 VITALS — SYSTOLIC BLOOD PRESSURE: 133 MMHG | DIASTOLIC BLOOD PRESSURE: 68 MMHG

## 2017-11-24 VITALS — SYSTOLIC BLOOD PRESSURE: 132 MMHG | DIASTOLIC BLOOD PRESSURE: 64 MMHG

## 2017-11-24 VITALS — DIASTOLIC BLOOD PRESSURE: 53 MMHG | SYSTOLIC BLOOD PRESSURE: 119 MMHG

## 2017-11-24 VITALS — DIASTOLIC BLOOD PRESSURE: 62 MMHG | SYSTOLIC BLOOD PRESSURE: 140 MMHG

## 2017-11-24 VITALS — SYSTOLIC BLOOD PRESSURE: 110 MMHG | DIASTOLIC BLOOD PRESSURE: 63 MMHG

## 2017-11-24 VITALS — DIASTOLIC BLOOD PRESSURE: 68 MMHG | SYSTOLIC BLOOD PRESSURE: 137 MMHG

## 2017-11-24 LAB
ALBUMIN SERPL-MCNC: 3.2 GM/DL (ref 3.2–4.5)
ALT SERPL-CCNC: 18 U/L (ref 0–55)
ANION GAP SERPL CALC-SCNC: 9 MMOL/L (ref 5–14)
AST SERPL-CCNC: 19 U/L (ref 5–34)
BASOPHILS # BLD AUTO: 0 10^3/UL (ref 0–0.1)
BASOPHILS NFR BLD AUTO: 0 % (ref 0–10)
BILIRUB SERPL-MCNC: 0.5 MG/DL (ref 0.1–1)
BUN SERPL-MCNC: 13 MG/DL (ref 7–18)
BUN/CREAT SERPL: 16
CALCIUM SERPL-MCNC: 9 MG/DL (ref 8.5–10.1)
CHLORIDE SERPL-SCNC: 101 MMOL/L (ref 98–107)
CO2 SERPL-SCNC: 29 MMOL/L (ref 21–32)
CREAT SERPL-MCNC: 0.82 MG/DL (ref 0.6–1.3)
EOSINOPHIL # BLD AUTO: 0 10^3/UL (ref 0–0.3)
EOSINOPHIL NFR BLD AUTO: 0 % (ref 0–10)
ERYTHROCYTE [DISTWIDTH] IN BLOOD BY AUTOMATED COUNT: 16 % (ref 10–14.5)
GFR SERPLBLD BASED ON 1.73 SQ M-ARVRAT: > 60 ML/MIN
GLUCOSE SERPL-MCNC: 250 MG/DL (ref 70–105)
LYMPHOCYTES # BLD AUTO: 0.4 X 10^3 (ref 1–4)
LYMPHOCYTES NFR BLD AUTO: 4 % (ref 12–44)
MCH RBC QN AUTO: 30 PG (ref 25–34)
MCHC RBC AUTO-ENTMCNC: 33 G/DL (ref 32–36)
MCV RBC AUTO: 90 FL (ref 80–99)
MONOCYTES # BLD AUTO: 0.1 X 10^3 (ref 0–1)
MONOCYTES NFR BLD AUTO: 1 % (ref 0–12)
NEUTROPHILS # BLD AUTO: 9.2 X 10^3 (ref 1.8–7.8)
NEUTROPHILS NFR BLD AUTO: 95 % (ref 42–75)
PLATELET # BLD: 162 10^3/UL (ref 130–400)
PMV BLD AUTO: 10.3 FL (ref 7.4–10.4)
POTASSIUM SERPL-SCNC: 3.1 MMOL/L (ref 3.6–5)
PROT SERPL-MCNC: 7.2 GM/DL (ref 6.4–8.2)
RBC # BLD AUTO: 4.02 10^6/UL (ref 4.35–5.85)
SODIUM SERPL-SCNC: 139 MMOL/L (ref 135–145)
WBC # BLD AUTO: 9.7 10^3/UL (ref 4.3–11)

## 2017-11-24 RX ADMIN — IPRATROPIUM BROMIDE AND ALBUTEROL SULFATE SCH ML: .5; 3 SOLUTION RESPIRATORY (INHALATION) at 01:55

## 2017-11-24 RX ADMIN — METHYLPREDNISOLONE SODIUM SUCCINATE SCH MG: 125 INJECTION, POWDER, FOR SOLUTION INTRAMUSCULAR; INTRAVENOUS at 10:25

## 2017-11-24 RX ADMIN — IPRATROPIUM BROMIDE AND ALBUTEROL SULFATE SCH ML: .5; 3 SOLUTION RESPIRATORY (INHALATION) at 18:49

## 2017-11-24 RX ADMIN — METHYLPREDNISOLONE SODIUM SUCCINATE SCH MG: 125 INJECTION, POWDER, FOR SOLUTION INTRAMUSCULAR; INTRAVENOUS at 02:01

## 2017-11-24 RX ADMIN — DEXTROSE MONOHYDRATE AND SODIUM CHLORIDE SCH MLS/HR: 5; .45 INJECTION, SOLUTION INTRAVENOUS at 06:21

## 2017-11-24 RX ADMIN — IPRATROPIUM BROMIDE AND ALBUTEROL SULFATE SCH ML: .5; 3 SOLUTION RESPIRATORY (INHALATION) at 14:42

## 2017-11-24 RX ADMIN — IPRATROPIUM BROMIDE AND ALBUTEROL SULFATE SCH ML: .5; 3 SOLUTION RESPIRATORY (INHALATION) at 07:04

## 2017-11-24 RX ADMIN — METOPROLOL TARTRATE SCH MG: 50 TABLET, FILM COATED ORAL at 20:12

## 2017-11-24 RX ADMIN — DEXTROSE MONOHYDRATE AND SODIUM CHLORIDE SCH MLS/HR: 5; .45 INJECTION, SOLUTION INTRAVENOUS at 08:29

## 2017-11-24 RX ADMIN — IPRATROPIUM BROMIDE AND ALBUTEROL SULFATE SCH ML: .5; 3 SOLUTION RESPIRATORY (INHALATION) at 10:36

## 2017-11-24 RX ADMIN — IPRATROPIUM BROMIDE AND ALBUTEROL SULFATE SCH ML: .5; 3 SOLUTION RESPIRATORY (INHALATION) at 23:10

## 2017-11-24 RX ADMIN — MEXILETINE HYDROCHLORIDE SCH MG: 150 CAPSULE ORAL at 20:12

## 2017-11-24 NOTE — DIAGNOSTIC IMAGING REPORT
INDICATION: Pneumonia.



COMPARISON STUDY: Chest from 11/23/2017.



FINDINGS: Frontal and lateral views of the chest again

demonstrates poor inspiration with some elevation of the right

diaphragm. Cardiac pacemaker is present with coronary artery

bypass graft changes. Peribronchial thickening has nearly

resolved.



IMPRESSION: The peribronchial thickening has nearly resolved.



Dictated by: 



  Dictated on workstation # [id number]

## 2017-11-25 VITALS — DIASTOLIC BLOOD PRESSURE: 61 MMHG | SYSTOLIC BLOOD PRESSURE: 121 MMHG

## 2017-11-25 VITALS — DIASTOLIC BLOOD PRESSURE: 58 MMHG | SYSTOLIC BLOOD PRESSURE: 117 MMHG

## 2017-11-25 RX ADMIN — METOPROLOL TARTRATE SCH MG: 50 TABLET, FILM COATED ORAL at 09:29

## 2017-11-25 RX ADMIN — IPRATROPIUM BROMIDE AND ALBUTEROL SULFATE SCH ML: .5; 3 SOLUTION RESPIRATORY (INHALATION) at 02:38

## 2017-11-25 RX ADMIN — IPRATROPIUM BROMIDE AND ALBUTEROL SULFATE SCH ML: .5; 3 SOLUTION RESPIRATORY (INHALATION) at 07:12

## 2017-11-25 RX ADMIN — IPRATROPIUM BROMIDE AND ALBUTEROL SULFATE SCH ML: .5; 3 SOLUTION RESPIRATORY (INHALATION) at 10:46

## 2017-11-25 RX ADMIN — MEXILETINE HYDROCHLORIDE SCH MG: 150 CAPSULE ORAL at 09:29

## 2017-11-25 NOTE — D/C HH FACE TO FACE ORDER
D/C  Face to Face Orders


Instructions for Patient


Patient Instructions/FollowUp:  


[facility] WILL CALL YOU ON MONDAY WITH A FOLLOW UP APPOINTMENT WITH DR GREENWOOD


Physician to follow Patient:  KRYSTYNA


Discharge Diet for Home:  Cardiac Diet, Low Sodium Diet


Patient Problems:  


COMMUNITY ACQUIRED PNEUMONIA


CONGESTIVE HEART FAILURE, CHRONIC, SYSTOLIC


Goals for Patient:  


REDUCED O2 REQUIREMENT DURING THE DAY





Patient Data-Allergies,Ht & Wt


Patient Allergies:  


Coded Allergies:  


     Penicillins (Verified  Allergy, Severe, HIVES, SOB, 12/4/15)


     codeine (Verified  Allergy, Severe, SOB, HIVES, 12/4/15)


Height (Feet):  5


Height (Inches):  5.00


Weight (Pounds):  208


Weight (Ounces):  0.0





Home Health Need/Face to Face


Date of Face to Face:  Nov 25, 2017


Clinical Findings:  Shortness of breath


I have seen Pt face-to-face:  Yes


Discharged To:  Home


Diagnosis/Conditions:  


COMMUNITY ACQUIRED PNEUMONIA


CHRONIC CONGESTIVE HEART FAILURE


Problems/Diagnosis/Condition:  


Patient is Homebound due to:  Shortness of breath/distress


Homebound Status


   Due to the above stated illness, injury or surgical procedure (medical 

condition or diagnosis) and associated clinical findings, the patient is 

homebound because of his/her inability to leave home except with aid of a 

supportive device and/or person AND leaving the home requires a considerable 

and taxing effort or is medically contraindicated.


Pt req the following assistanc:  Aid of another person, Walker





Home Health Nursing Orders


Home Health Services Order:  Nursing Services, Occupational Ther-Evaluate & 

Treat, Physical Therapy-Evaluate & Treat





Home Health Infusion Therapy


Line Start Date:  Nov 23, 2017


Line Start Time:  1645


Line Type:  


Site Location:  





Therapy Orders


Therapy Orders:  OT (must have SN or PT order), Physical Therapy, PT to assess 

for OT


Therapy Specific Orders:  Eval assistive deivces, Increase strength/endurance


Certify Stmt


I certify that this patient is under my care and that I, a nurse practitioner 

or a physician; a assistant working with me, had a face to face encounter that -

meets the physician face to face encounter requirements with this patient as 

dated.





Copy


Copies To 1:   MAHOGANY GREENWOOD MD, JULIE A MD Nov 25, 2017 10:32 am

## 2017-11-25 NOTE — HISTORY & PHYSICIAL ([facility])
HPI


History of Present Illness:


Kendall is a pleasant 76yo gentleman who has had increasing shortness of breath 

over the past week according to his wife, his main caretaker.  He has a history 

of COPD and CHF and usually wears oxygen (3LPM NC) at night.  However, he has 

started wearing it during irene day the past few days.  He had a temp of 100 so 

his wife gave him tylenol and brought him to ER.  HE has had a dry cough but no 

sputum and no hemoptysis.  He states his legs are always bright red and look 

better than they have been; he has not noticed any edema.


Of note, Mr Snowden was hospitalized earlier this year for a hip fracture and 

was in a nursing home for rehab.  He has been home since May.  His wife has a 

PCA that helps. THey do not currently have home health.


Source:  patient, family


Exam Limitations:  no limitations


Date seen by provider:  2017


Time Seen by Provider:  09:00


Attending Physician


Moiz Kent MD


PCP


Mahogany Greenwood MD


Consult





Date of Admission


2017 at 18:00





Home Medications


Home Medications


Reviewed patient Home Medication Reconciliation Form





Allergies


Coded Allergies:  


     Penicillins (Verified  Allergy, Severe, HIVES, SOB, 12/4/15)


     codeine (Verified  Allergy, Severe, SOB, HIVES, 12/4/15)





PMH-Social-Family Hx


Patient Social History


Alcohol Use:  Denies Use


Recreational Drug Use:  No


Smoking Status:  Former Smoker


Former smoker/When Quit:  1988


Type Used:  Cigarettes


2nd Hand Smoke Exposure:  No


Recent Foreign Travel:  No


Contact w/other who traveled:  No


Recent Hopitalizations:  No


Recent Infectious Disease Expo:  No


Physical Abuse Screen:  No


Sexual Abuse:  No





Immunizations Up To Date


Tetanus Booster (TDap):  More than 5yrs


Date of Pneumonia Vaccine:  Oct 1, 2014


Date of Influenza Vaccine:  Nov 10, 2017





Family Medical History


Significant Family History:  No Pertinent Family Hx


Family History:  


Cardiovascular disease


  19 MOTHER


  G8 BROTHER


  G8 SISTER


Cervical cancer


  19 MOTHER





Review of Systems ([facility])


Constitutional:  see HPI


All Other Systems Reviewed


Negative Unless Noted:  Yes





Reviewed Test Results


Reviewed Test Results


Lab





Laboratory Tests








Test


  17


16:46 17


16:58 17


05:50 Range/Units


 


 


White Blood Count


  15.1 H


  


  9.7 


  4.3-11.0


10^3/uL


 


Red Blood Count


  4.52 


  


  4.02 L


  4.35-5.85


10^6/uL


 


Hemoglobin 13.5   12.0 L 13.3-17.7  G/DL


 


Hematocrit 40   36 L 40-54  %


 


Mean Corpuscular Volume 89   90  80-99  FL


 


Mean Corpuscular Hemoglobin 30   30  25-34  PG


 


Mean Corpuscular Hemoglobin


Concent 33 


  


  33 


  32-36  G/DL


 


 


Red Cell Distribution Width 16.4 H  16.0 H 10.0-14.5  %


 


Platelet Count


  200 


  


  162 


  130-400


10^3/uL


 


Mean Platelet Volume 10.5 H  10.3  7.4-10.4  FL


 


Neutrophils (%) (Auto) 78 H  95 H 42-75  %


 


Lymphocytes (%) (Auto) 10 L  4 L 12-44  %


 


Monocytes (%) (Auto) 12   1  0-12  %


 


Eosinophils (%) (Auto) 0   0  0-10  %


 


Basophils (%) (Auto) 0   0  0-10  %


 


Neutrophils # (Auto) 11.8 H  9.2 H 1.8-7.8  X 10^3


 


Lymphocytes # (Auto) 1.5   0.4 L 1.0-4.0  X 10^3


 


Monocytes # (Auto) 1.8 H  0.1  0.0-1.0  X 10^3


 


Eosinophils # (Auto)


  0.0 


  


  0.0 


  0.0-0.3


10^3/uL


 


Basophils # (Auto)


  0.0 


  


  0.0 


  0.0-0.1


10^3/uL


 


Neutrophils % (Manual) 74     %


 


Lymphocytes % (Manual) 14     %


 


Monocytes % (Manual) 12     %


 


Eosinophils % (Manual) 0     %


 


Basophils % (Manual) 0     %


 


Band Neutrophils 0     %


 


Anisocytosis SLIGHT     


 


Prothrombin Time 14.2    12.2-14.7  SEC


 


INR Comment 1.1    0.8-1.4  


 


Activated Partial


Thromboplast Time 31 


  


  


  24-35  SEC


 


 


Sodium Level 137   139  135-145  MMOL/L


 


Potassium Level 3.4 L  3.1 L 3.6-5.0  MMOL/L


 


Chloride Level 96 L  101    MMOL/L


 


Carbon Dioxide Level 28   29  21-32  MMOL/L


 


Anion Gap 13   9  5-14  MMOL/L


 


Blood Urea Nitrogen 12   13  7-18  MG/DL


 


Creatinine


  0.99 


  


  0.82 


  0.60-1.30


MG/DL


 


Estimat Glomerular Filtration


Rate > 60 


  


  > 60 


   


 


 


BUN/Creatinine Ratio 12   16   


 


Glucose Level 104   250 H   MG/DL


 


Lactic Acid Level


  1.25 


  


  


  0.50-2.00


MMOL/L


 


Calcium Level 9.5   9.0  8.5-10.1  MG/DL


 


Magnesium Level 1.6 L   1.8-2.4  MG/DL


 


Total Bilirubin 1.0   0.5  0.1-1.0  MG/DL


 


Aspartate Amino Transf


(AST/SGOT) 27 


  


  19 


  5-34  U/L


 


 


Alanine Aminotransferase


(ALT/SGPT) 23 


  


  18 


  0-55  U/L


 


 


Alkaline Phosphatase 113   93    U/L


 


B-Type Natriuretic Peptide 137.6 H   <100.0  PG/ML


 


Total Protein 8.7 H  7.2  6.4-8.2  GM/DL


 


Albumin 3.9   3.2  3.2-4.5  GM/DL


 


Digoxin Level


  0.75 L


  


  


  0.80-2.00


NG/ML


 


Urine Color  YELLOW    


 


Urine Clarity  SL CLOUDY    


 


Urine pH  7   5-9  


 


Urine Specific Gravity  1.010 L  1.016-1.022  


 


Urine Protein  1+ H  NEGATIVE  


 


Urine Glucose (UA)  NEGATIVE   NEGATIVE  


 


Urine Ketones  NEGATIVE   NEGATIVE  


 


Urine Nitrite  NEGATIVE   NEGATIVE  


 


Urine Bilirubin  NEGATIVE   NEGATIVE  


 


Urine Urobilinogen  NORMAL   NORMAL  MG/DL


 


Urine Leukocyte Esterase  3+ H  NEGATIVE  


 


Urine RBC (Auto)  5+ H  NEGATIVE  


 


Urine RBC  2-5 H   /HPF


 


Urine WBC  25-50 H   /HPF


 


Urine Squamous Epithelial


Cells 


  NONE 


  


   /HPF


 


 


Urine Crystals  NONE    /LPF


 


Urine Bacteria  FEW H   /HPF


 


Urine Casts  NONE    /LPF


 


Urine Mucus  NEGATIVE    /LPF


 


Urine Culture Indicated  YES    








Radiology


Date of Exam:   17





CHEST PA/LAT (2 VIEW)


 





INDICATION: Pneumonia.





COMPARISON STUDY: Chest from 2017.





FINDINGS: Frontal and lateral views of the chest again


demonstrates poor inspiration with some elevation of the right


diaphragm. Cardiac pacemaker is present with coronary artery


bypass graft changes. Peribronchial thickening has nearly


resolved.





IMPRESSION: The peribronchial thickening has nearly resolved.





Physical Exam-([facility])


Physical Exam


Vital Signs





 VS - Last 72 Hours, by Label








 17





 16:36 16:36 17:11 17:41


 


Temp 101.3   


 


Pulse 102   94


 


Resp 20   18


 


B/P (MAP) 166/79   149/68


 


Pulse Ox 88  95 96


 


O2 Delivery Room Air Nasal Cannula Nasal Cannula Nasal Cannula


 


O2 Flow Rate  2.00 4.00 2.00


 


    





 17





 18:59 19:20 19:25 19:59


 


Temp  98.4  


 


Pulse 86 85  78


 


Resp 18 24  


 


B/P (MAP)  168/76  


 


Pulse Ox 95 96  97


 


O2 Delivery Nasal Cannula Nasal Cannula Nasal Cannula 


 


O2 Flow Rate 2.00 4.00 4.00 


 


FiO2    36


 


    





 17





 20:15 21:00 21:31 21:55


 


Temp   98.4 


 


Pulse 85 80 85 


 


Resp   24 


 


B/P (MAP)   144/67 


 


Pulse Ox   95 95


 


O2 Delivery   Nasal Cannula Nasal Cannula


 


O2 Flow Rate   4.00 4.00


 


    





 17





 00:42 01:00 01:56 04:44


 


Temp 97.7   97.8


 


Pulse 75 77  70


 


Resp 20   18


 


B/P (MAP) 132/64   133/68


 


Pulse Ox 96  95 95


 


O2 Delivery Nasal Cannula  Nasal Cannula Nasal Cannula


 


O2 Flow Rate 4.00  4.00 4.00


 


    





 17





 07:04 08:11 10:36 12:00


 


Temp  96.8  98.0


 


Pulse  78  83


 


Resp  18  18


 


B/P (MAP)  137/67  140/62


 


Pulse Ox 98 94 97 95


 


O2 Delivery Nasal Cannula Nasal Cannula Nasal Cannula Nasal Cannula


 


O2 Flow Rate 4.00 4.00 4.00 4.00


 


    





 17





 14:42 16:18 18:49 19:00


 


Temp  98.0  


 


Pulse  90  90


 


Resp  18  


 


B/P (MAP)  119/53  


 


Pulse Ox 95 96 96 


 


O2 Delivery Nasal Cannula Room Air Nasal Cannula 


 


O2 Flow Rate 3.00  3.00 


 


    





 17





 20:15 21:00 23:10 23:10


 


Temp 98.5   98.4


 


Pulse 94   80


 


Resp 18   20


 


B/P (MAP) 137/68   110/63


 


Pulse Ox 97  94 95


 


O2 Delivery Room Air Nasal Cannula Nasal Cannula Room Air


 


O2 Flow Rate  3.00 3.00 


 


    





 17





 01:00 02:38 04:00 07:12


 


Temp   97.4 


 


Pulse 85  82 


 


Resp   20 


 


B/P (MAP)   117/58 


 


Pulse Ox  94 96 94


 


O2 Delivery  Nasal Cannula Nasal Cannula Nasal Cannula


 


O2 Flow Rate  3.00 3.00 3.00


 


    





 17 





 08:00 09:00 10:46 


 


Temp 97.5   


 


Pulse 81   


 


Resp 20   


 


B/P (MAP) 121/61   


 


Pulse Ox 93  94 


 


O2 Delivery Nasal Cannula Nasal Cannula Nasal Cannula 


 


O2 Flow Rate 3.00 3.00 3.00 





Capillary Refill : Less Than 3 Seconds


General Appearance:  WD/WN, no apparent distress, other (chronically ill)


HEENT:  PERRL/EOMI, normal ENT inspection, pharynx normal


Neck:  non-tender, full range of motion, supple, normal inspection


Respiratory:  normal breath sounds, no respiratory distress, no accessory 

muscle use, crackles


Cardiovascular:  regular rate, rhythm, no edema, no gallop, no JVD, no murmur


Gastrointestinal:  normal bowel sounds, non tender, soft, no organomegaly, no 

pulsatile mass


Back:  normal inspection, no CVA tenderness, no vertebral tenderness


Extremities:  normal range of motion, non-tender, no pedal edema, no calf 

tenderness, normal capillary refill, other (both legs with erythema, c/w stasis 

dermatitis)


Neurologic/Psychiatric:  CNs II-XII nml as tested, no motor/sensory deficits, 

alert, normal mood/affect, oriented x 3


Skin:  normal color, warm/dry





Clinical Quality Measures


DVT/VTE Risk/Contraindication:


Risk Factor Score Per Nursin


RFS Level Per Nursing on Admit:  4+=Very High





Copy


Copies To 1:   MAHOGANY GREENWOOD MD





Assessment/Plan


Assessment/Plan


Admission Dx


SEE BELOW


Plan


COMMUNITY ACQUIRED PNEUMONIA


COPD


CHRONIC SYSTOLIC CHF


CAD





ADM:  Patient has a new oxygen requirement with an elevated WBC count.  Will go 

ahead and treat presumptively given his chronic lung disease.  Will hold on 

steroids for now.  Recheck labs in AM.  Lactate WNL; no signs of sepsis today.  

Breathing tx QID.  Will restart home meds as well.  Patient's wife asking about 

home health which sounds like a great idea; will do what we can to arrange on 

Monday if he is discharged by then.





DVT PROPH: SCDs, early ambulation














MOIZ KENT MD 2017 08:52

## 2017-11-25 NOTE — DISCHARGE SUMMARY
Diagnosis/Chief Complaint


Date of Admission


2017 at 18:00


Date of Discharge


2017 at 13:10


Admission Diagnosis


Admission Diagnosis


SEE BELOW





Discharge Diagnosis


COMMUNITY ACQUIRED PNEUMONIA


COPD


CHRONIC SYSTOLIC CHF


CAD





ADM:  Patient has a new oxygen requirement with an elevated WBC count.  Will go 

ahead and treat presumptively given his chronic lung disease.  Will hold on 

steroids for now.  Recheck labs in AM.  Lactate WNL; no signs of sepsis today.  

Breathing tx QID.  Will restart home meds as well.  Patient's wife asking about 

home health which sounds like a great idea; will do what we can to arrange on 

Monday if he is discharged by then.





DIS: Kendall was noticeably improved this morning.  He is on his usual O2 regimen 

of 2-3L without shortness of breath.  His WBC are down.  No sign of acute heart 

failure as he has had no edema and lungs are clear this morning.  Will send out 

on Vantin and Azithromycin.  Should contineu his nebs. HOlding steroids.  

Restart usual cardiac regimen at home as well.  We will arrange home health on 

MOnday; order written today.





Chief Complaint/HPI


Chief Complaint/HPI


Kendall is a pleasant 78yo gentleman who has had increasing shortness of breath 

over the past week according to his wife, his main caretaker.  He has a history 

of COPD and CHF and usually wears oxygen (3LPM NC) at night.  However, he has 

started wearing it during irene day the past few days.  He had a temp of 100 so 

his wife gave him tylenol and brought him to ER.  HE has had a dry cough but no 

sputum and no hemoptysis.  He states his legs are always bright red and look 

better than they have been; he has not noticed any edema.


Of note, Mr Snowden was hospitalized earlier this year for a hip fracture and 

was in a nursing home for rehab.  He has been home since May.  His wife has a 

PCA that helps. THey do not currently have home health.





Discharge Summary-Simple/Stand


Consultations





Discharge Physical Examination


Allergies:  


Coded Allergies:  


     Penicillins (Verified  Allergy, Severe, HIVES, SOB, 12/4/15)


     codeine (Verified  Allergy, Severe, SOB, HIVES, 12/4/15)


Vitals & I&Os





Vital Sign - Last 12Hours








  Date Time  Temp Pulse Resp B/P (MAP) Pulse Ox O2 Delivery O2 Flow Rate FiO2


 


17 10:46     94 Nasal Cannula 3.00 


 


17 08:00 97.5 81 20 121/61    


 


17 19:59        36








General Appearance:  Alert, Oriented X3, Cooperative, No Acute Distress


Respiratory:  Clear to Auscultation, Normal Air Movement


Cardiovascular:  Regular Rate, Normal S1, Normal S2, No Murmurs, Gallops, Rubs


Abdominal:  Normal Bowel Sounds, Soft, No Tenderness, No Hepatosplenomegaly, No 

Masses


Extremities:  No Clubbing, No Cyanosis, No Edema


Skin:  No Breakdown, No Significant Lesion, Other (slight erythema of legs, 

unchanged since admission)


Neuro:  Normal Speech, Strength at 5/5 X4 Ext, Normal Tone


Psych/Mental Status:  Mental Status NL, Mood NL





Hospital Course


See final discharge diagnosis.


Radiology Reviewed


Date of Exam:   17





CHEST PA/LAT (2 VIEW)


 





INDICATION: Pneumonia.





COMPARISON STUDY: Chest from 2017.





FINDINGS: Frontal and lateral views of the chest again


demonstrates poor inspiration with some elevation of the right


diaphragm. Cardiac pacemaker is present with coronary artery


bypass graft changes. Peribronchial thickening has nearly


resolved.





IMPRESSION: The peribronchial thickening has nearly resolved.





Discharge


Instructions to patient/family


Please see electronic discharge instructions given to patient.


Discharge Medications


Reviewed and agree with Discharge Medication list on patient's Discharge 

Instruction sheet





Clinical Quality Measures


DVT/VTE Risk/Contraindication:


Risk Factor Score Per Nursin


RFS Level Per Nursing on Admit:  4+=Very High





Copy


Copies To 1:   MAHOGANY GREENWOOD MD, JULIE A MD 2017 17:58

## 2017-11-27 NOTE — PHYSICIAN QUERY CLARIFICATION
PQ-Conflicting Diagnosis


Admission/Discharge


Admission Date: Nov 23, 2017 at 18:00 


Discharge Date:  Nov 25, 2017 at 13:10








The medical record reflects the following clinical scenario:





History/Risk Factors:


Pneumonia





Clinical Findings:


T101.3, P102, R20, WBC 15.1, Lactic 1.25





Treatment:


IV Rocephin, IV Azithromycin





Question:


Do you agree with the impression of the sepsis per Dr. Hughes. Please document a 

response below.





PHYSICIAN RESPONSE


Do you agree w/Consulting Dx?:  Yes


Explanation of clincal finding


my error of omission, thank you for checking.








In responding to this query, please exercise your independent professional 

judgment.  The purpose of this communication is to more accurately reflect the 

complexity of your patients condition. The fact that a question is asked does 

not imply that any particular answer is desired or expected.  





Thank you for your timely response to this clarification.      


   


Requestors name: Jennifer   





Phone # [phone]








THIS PHYSICIAN QUERY FORM IS A PERMANENT PART OF THE MEDICAL RECORD











JENNIFER MCCLENDON Nov 27, 2017 10:14


MOIZ KENT MD Nov 27, 2017 11:30

## 2017-12-21 ENCOUNTER — HOSPITAL ENCOUNTER (OUTPATIENT)
Dept: HOSPITAL 75 - HH | Age: 77
End: 2017-12-21
Attending: INTERNAL MEDICINE
Payer: MEDICARE

## 2017-12-21 DIAGNOSIS — E78.00: Primary | ICD-10-CM

## 2017-12-21 DIAGNOSIS — I50.22: ICD-10-CM

## 2017-12-21 LAB
BUN/CREAT SERPL: 11
CALCIUM SERPL-MCNC: 10 MG/DL (ref 8.5–10.1)
CHLORIDE SERPL-SCNC: 99 MMOL/L (ref 98–107)
CHOLEST SERPL-MCNC: 180 MG/DL (ref ?–200)
CO2 SERPL-SCNC: 30 MMOL/L (ref 21–32)
CREAT SERPL-MCNC: 0.97 MG/DL (ref 0.6–1.3)
GFR SERPLBLD BASED ON 1.73 SQ M-ARVRAT: > 60 ML/MIN
GLUCOSE SERPL-MCNC: 101 MG/DL (ref 70–105)
HDLC SERPL-MCNC: 30 MG/DL (ref 40–60)
POTASSIUM SERPL-SCNC: 4.2 MMOL/L (ref 3.6–5)
SODIUM SERPL-SCNC: 141 MMOL/L (ref 135–145)
TRIGL SERPL-MCNC: 107 MG/DL (ref ?–150)
VLDLC SERPL CALC-MCNC: 21 MG/DL (ref 5–40)

## 2017-12-21 PROCEDURE — 80061 LIPID PANEL: CPT

## 2017-12-21 PROCEDURE — 80048 BASIC METABOLIC PNL TOTAL CA: CPT

## 2018-06-08 ENCOUNTER — HOSPITAL ENCOUNTER (OUTPATIENT)
Dept: HOSPITAL 75 - ER | Age: 78
Setting detail: OBSERVATION
LOS: 1 days | Discharge: HOME | End: 2018-06-09
Attending: FAMILY MEDICINE | Admitting: FAMILY MEDICINE
Payer: MEDICARE

## 2018-06-08 VITALS — DIASTOLIC BLOOD PRESSURE: 53 MMHG | SYSTOLIC BLOOD PRESSURE: 107 MMHG

## 2018-06-08 VITALS — WEIGHT: 208 LBS | HEIGHT: 65 IN | BODY MASS INDEX: 34.66 KG/M2

## 2018-06-08 VITALS — DIASTOLIC BLOOD PRESSURE: 77 MMHG | SYSTOLIC BLOOD PRESSURE: 148 MMHG

## 2018-06-08 DIAGNOSIS — I25.10: ICD-10-CM

## 2018-06-08 DIAGNOSIS — J44.9: ICD-10-CM

## 2018-06-08 DIAGNOSIS — Z95.1: ICD-10-CM

## 2018-06-08 DIAGNOSIS — Z87.891: ICD-10-CM

## 2018-06-08 DIAGNOSIS — Z95.810: ICD-10-CM

## 2018-06-08 DIAGNOSIS — R09.02: ICD-10-CM

## 2018-06-08 DIAGNOSIS — I49.01: Primary | ICD-10-CM

## 2018-06-08 DIAGNOSIS — R53.81: ICD-10-CM

## 2018-06-08 DIAGNOSIS — I42.9: ICD-10-CM

## 2018-06-08 LAB
ALBUMIN SERPL-MCNC: 3.8 GM/DL (ref 3.2–4.5)
ALP SERPL-CCNC: 86 U/L (ref 40–136)
ALT SERPL-CCNC: 19 U/L (ref 0–55)
APTT BLD: 28 SEC (ref 24–35)
BASOPHILS # BLD AUTO: 0 10^3/UL (ref 0–0.1)
BASOPHILS NFR BLD AUTO: 0 % (ref 0–10)
BILIRUB SERPL-MCNC: 0.5 MG/DL (ref 0.1–1)
BUN/CREAT SERPL: 17
CALCIUM SERPL-MCNC: 9.9 MG/DL (ref 8.5–10.1)
CHLORIDE SERPL-SCNC: 101 MMOL/L (ref 98–107)
CO2 SERPL-SCNC: 27 MMOL/L (ref 21–32)
CREAT SERPL-MCNC: 0.84 MG/DL (ref 0.6–1.3)
EOSINOPHIL # BLD AUTO: 0.1 10^3/UL (ref 0–0.3)
EOSINOPHIL NFR BLD AUTO: 1 % (ref 0–10)
ERYTHROCYTE [DISTWIDTH] IN BLOOD BY AUTOMATED COUNT: 16.8 % (ref 10–14.5)
GFR SERPLBLD BASED ON 1.73 SQ M-ARVRAT: > 60 ML/MIN
GLUCOSE SERPL-MCNC: 150 MG/DL (ref 70–105)
HCT VFR BLD CALC: 40 % (ref 40–54)
HGB BLD-MCNC: 13.6 G/DL (ref 13.3–17.7)
INR PPP: 1.1 (ref 0.8–1.4)
LYMPHOCYTES # BLD AUTO: 2.1 X 10^3 (ref 1–4)
LYMPHOCYTES NFR BLD AUTO: 17 % (ref 12–44)
MAGNESIUM SERPL-MCNC: 1.7 MG/DL (ref 1.8–2.4)
MANUAL DIFFERENTIAL PERFORMED BLD QL: NO
MCH RBC QN AUTO: 32 PG (ref 25–34)
MCHC RBC AUTO-ENTMCNC: 34 G/DL (ref 32–36)
MCV RBC AUTO: 94 FL (ref 80–99)
MONOCYTES # BLD AUTO: 1.5 X 10^3 (ref 0–1)
MONOCYTES NFR BLD AUTO: 13 % (ref 0–12)
MYOGLOBIN SERPL-MCNC: 68 NG/ML (ref 10–92)
NEUTROPHILS # BLD AUTO: 8.2 X 10^3 (ref 1.8–7.8)
NEUTROPHILS NFR BLD AUTO: 69 % (ref 42–75)
PLATELET # BLD: 206 10^3/UL (ref 130–400)
PMV BLD AUTO: 9.9 FL (ref 7.4–10.4)
POTASSIUM SERPL-SCNC: 3.6 MMOL/L (ref 3.6–5)
PROT SERPL-MCNC: 7.9 GM/DL (ref 6.4–8.2)
PROTHROMBIN TIME: 13.7 SEC (ref 12.2–14.7)
RBC # BLD AUTO: 4.27 10^6/UL (ref 4.35–5.85)
SODIUM SERPL-SCNC: 140 MMOL/L (ref 135–145)
WBC # BLD AUTO: 11.9 10^3/UL (ref 4.3–11)

## 2018-06-08 PROCEDURE — 83874 ASSAY OF MYOGLOBIN: CPT

## 2018-06-08 PROCEDURE — 85025 COMPLETE CBC W/AUTO DIFF WBC: CPT

## 2018-06-08 PROCEDURE — 80053 COMPREHEN METABOLIC PANEL: CPT

## 2018-06-08 PROCEDURE — 84484 ASSAY OF TROPONIN QUANT: CPT

## 2018-06-08 PROCEDURE — 83735 ASSAY OF MAGNESIUM: CPT

## 2018-06-08 PROCEDURE — 36415 COLL VENOUS BLD VENIPUNCTURE: CPT

## 2018-06-08 PROCEDURE — 71045 X-RAY EXAM CHEST 1 VIEW: CPT

## 2018-06-08 PROCEDURE — 73030 X-RAY EXAM OF SHOULDER: CPT

## 2018-06-08 PROCEDURE — 85730 THROMBOPLASTIN TIME PARTIAL: CPT

## 2018-06-08 PROCEDURE — 85610 PROTHROMBIN TIME: CPT

## 2018-06-08 PROCEDURE — 93041 RHYTHM ECG TRACING: CPT

## 2018-06-08 PROCEDURE — 80061 LIPID PANEL: CPT

## 2018-06-08 PROCEDURE — 93005 ELECTROCARDIOGRAM TRACING: CPT

## 2018-06-08 PROCEDURE — 84443 ASSAY THYROID STIM HORMONE: CPT

## 2018-06-08 RX ADMIN — AMIODARONE HYDROCHLORIDE SCH MG: 200 TABLET ORAL at 17:35

## 2018-06-08 RX ADMIN — Medication SCH ML: at 22:37
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
## 2018-06-08 NOTE — XMS REPORT
[facility]

 Created on: 2018



Kendall Snowden

External Reference #: [id number]

: 1940

Sex: Male



Demographics







 Address  [address] [place]  [address]

 

 Preferred Language  Unknown

 

 Marital Status  Unknown

 

 Islam Affiliation  Unknown

 

 Race  Unknown

 

 Ethnic Group  Unknown





Author







 Author  MAHOGANY GREENWOOD

 

 Organization  [facility]

 

 Address  [address] [place]  [address]



 

 Phone  [phone]







Care Team Providers







 Care Team Member Name  Role  Phone

 

 MAHOGANY GREENWOOD  Unavailable  [phone]







PROBLEMS







 Type  Condition  ICD9-CM Code  DFM39-FF Code  Onset Dates  Condition Status  
SNOMED Code

 

 Problem  Cardiac defibrillator in place     Z95.810     Active  602503343

 

 Problem  Chronic congestive heart failure, unspecified congestive heart 
failure type     I50.9     Active  22866133

 

 Problem  Other atherosclerosis of native arteries of extremities, right leg   
  I70.291     Active  51661532

 

 Problem  Ventricular arrhythmia     I49.9     Active  71299010

 

 Problem  Essential hypertension     I10     Active  02434532

 

 Problem  Coronary artery disease involving native coronary artery of native 
heart without angina pectoris     I25.10     Active  [id number]

 

 Problem  Stasis dermatitis of both legs     I87.2     Active  78103582

 

 Problem  Chronic obstructive pulmonary disease, unspecified COPD type     
J44.9     Active  42073636







ALLERGIES

No Information



ENCOUNTERS







 Encounter  Location  Date  Diagnosis

 

 [facility] WALK IN [facility]  [address] [place] [address] 90691
-[phone]  25 May, 2018  Acute cystitis without hematuria N30.00

 

 [facility] WALK IN [facility]  [id number] [place] [address] 93888
-[phone]  19 May, 2018  Acute cystitis without hematuria N30.00

 

 [facility]  [id number] [place] [address] 33106-
8986  18 May, 2018   

 

 [facility]  [id number] [place] [address] 70552-
2162  10 Apr, 2018  Encounter for immunization Z23 ; Medicare annual wellness 
visit, initial Z00.00 ; Chronic obstructive pulmonary disease, unspecified COPD 
type J44.9 ; Coronary artery disease involving native coronary artery of native 
heart without angina pectoris I25.10 ; Chronic congestive heart failure, 
unspecified congestive heart failure type I50.9 ; Essential hypertension I10 ; 
Ventricular arrhythmia I49.9 and Other atherosclerosis of native arteries of 
extremities, right leg I70.291

 

 [place] [id number] [place] [address] 17820-
2235  08 Mar, 2018  Chronic congestive heart failure, unspecified congestive 
heart failure type I50.9

 

 [facility]  [id number] [place] [address] 50755-
8472     

 

 [place] [address] [address]-
2353    Chronic congestive heart failure, unspecified congestive 
heart failure type I50.9 ; Chronic obstructive pulmonary disease, unspecified 
COPD type J44.9 and Bilateral impacted cerumen H61.23

 

 [place] [id number] [place] [address] 16193-
9086     

 

 [place] [address] [address]-
3469     

 

 [facility]  [id number] [place] [address] 19921-
1806  27 Dec, 2017   

 

 [place] [id number] [place] [address] 75026-
8127  20 Dec, 2017   

 

 [facility]  [id number] [place] [address] 10380-
9732  12 Dec, 2017  Coronary artery disease involving native coronary artery of 
native heart without angina pectoris I25.10

 

 [place] [id number] [place] [address] 92586-
2980  11 Dec, 2017   

 

 [facility]  [id number] [place] [address] 85413-
5141    Chronic obstructive pulmonary disease, unspecified COPD 
type J44.9 and History of pneumonia Z87.01

 

 [facility]  [id number] [place] [address] 
[id number]     

 

 [place] [address] [address]-
3323     

 

 [facility]  [address]00565100[place], [place] [address]-
8961     

 

 [facility]  [id number] [place] [address] 44929-
8251     

 

 [facility]  [id number] [place] [address] 47272-
3190  19 Oct, 2017   

 

 [facility]  [id number] [place] [address] 80579-
8937  17 Oct, 2017  Coronary artery disease involving native coronary artery of 
native heart without angina pectoris I25.10 and Ventricular arrhythmia I49.9

 

 [facility]  [id number] [place] [address] 55271-
8191  09 Oct, 2017   

 

 [facility]  [id number] [place] [address] 87141-
6146  09 Oct, 2017   

 

 [facility] WALK IN CARE  [id number] [place] [address] 10250
-1408  04 Oct, 2017  Dysuria R30.0 and Acute cystitis without hematuria N30.00

 

 [facility]  [id number] [place] [address] 57355-
0886  25 Aug, 2017  Epistaxis R04.0 and Chronic obstructive pulmonary disease, 
unspecified COPD type J44.9

 

 [facility]  [address] [place] [address] 85705-
9575    Fall from other pedestrian conveyance, initial encounter 
V00.891A

 

 [facility]  [address]65100[place], [place] [address]-
5951    Chronic congestive heart failure, unspecified congestive 
heart failure type I50.9 ; Chronic obstructive pulmonary disease, unspecified 
COPD type J44.9 and Stasis dermatitis of both legs I87.2

 

 [facility]  [address]00565100[place], [place] [address]-
7102  08 May, 2017  Chronic congestive heart failure, unspecified congestive 
heart failure type I50.9

 

 [facility]  [address] [id number] [address]-
2141  05 May, 2017  Coronary artery disease involving native coronary artery of 
native heart without angina pectoris I25.10 ; Chronic congestive heart failure, 
unspecified congestive heart failure type I50.9 and Chronic obstructive 
pulmonary disease, unspecified COPD type J44.9

 

 [facility]  [address] [facility] [id number] [address]-
5726     

 

 [facility]  [address] [facility] [id number], [place] [address]-
7670     

 

 [facility]  [id number], [place] 
[id number]     

 

 [facility]  [id number] [place] [address] 
[id number]  28 Mar, 2017   

 

 [facility]  [address]   [place], [place] 
[id number]  20 Mar, 2017  Essential hypertension I10 and Chronic congestive 
heart failure, unspecified congestive heart failure type I50.9

 

 [facility]  [id number], [place] 
[id number]  13 Mar, 2017   

 

 [facility]  [address]65100[place], [place] [address]-
1977  09 Mar, 2017   

 

 [facility]  [address]00565100[place], [place] [address]-
3096  19 Dec, 2016   

 

 [facility]  [id number], [place] [address]-
6753  19 Dec, 2016   

 

 [facility]  [id number], [place] [address]-
1052     

 

 [facility]  [id number], [place] [address]-
5673     

 

 [facility]  [id number], [place] [address]-
6917     

 

 [facility]  [id number], [place] [address]-
6351     

 

 [facility]  [address] [facility] [id number], [place] [address]-
2160    Chronic congestive heart failure, unspecified congestive 
heart failure type I50.9

 

 [facility] WALK IN CARE  [address]65100[place], [place] [address]
-2123    Pain of right lower extremity M79.604 ; History of atrial 
fibrillation without current medication Z86.79 and Acute deep vein thrombosis (
DVT) of femoral vein of right lower extremity I82.411

 

 [facility]  [address]00565100[place], [place] [address]-
1608     

 

 [facility]  [address] [place] [address] 93041-
4606  22 Aug, 2016   

 

 [facility]  [address]00565100[place], [place] [address]-
6793  22 Aug, 2016  Coronary artery disease involving native coronary artery of 
native heart without angina pectoris I25.10 ; Chronic congestive heart failure, 
unspecified congestive heart failure type I50.9 ; Cardiac defibrillator in 
place Z95.810 and Candidiasis B37.9







IMMUNIZATIONS

No Known Immunizations



SOCIAL HISTORY

Never Assessed



REASON FOR VISIT

Letter to [facility]





VITAL SIGNS





MEDICATIONS

Unknown Medications



RESULTS

No Results



PROCEDURES

No Known procedures



INSTRUCTIONS





MEDICATIONS ADMINISTERED

No Known Medications



MEDICAL (GENERAL) HISTORY







 Type  Description  Date

 

 Medical History  hypertension   

 

 Medical History  skin cancer-arms, face   

 

 Medical History  MI   

 

 Medical History  Pneumonia   

 

 Surgical History  heart cath-2 stents, multiple balloons   

 

 Surgical History  open heart surgery  

 

 Surgical History  defibrillator placed  

 

 Hospitalization History  surgery   

 

 Hospitalization History  pneumonia  

 

 Hospitalization History  broken left hip at   March

 

 Hospitalization History  Bronchitis/clinical Pneumonia,hypoxia,sepsis - Via 
[facility] [place], operated by [facility]  17

## 2018-06-08 NOTE — XMS REPORT
[facility]

 Created on: 2018



Kendall Snowden

External Reference #: [id number]

: 1940

Sex: Male



Demographics







 Address  [address] [place]  [address]

 

 Preferred Language  Unknown

 

 Marital Status  Unknown

 

 Gnosticism Affiliation  Unknown

 

 Race  Unknown

 

 Ethnic Group  Unknown





Author







 Author  MAHOGANY GREENWOOD

 

 Organization  [facility]

 

 Address  [address] [place]  [address]



 

 Phone  [phone]







Care Team Providers







 Care Team Member Name  Role  Phone

 

 MAHOGANY GREENWOOD  Unavailable  [phone]







PROBLEMS







 Type  Condition  ICD9-CM Code  BOV53-WE Code  Onset Dates  Condition Status  
SNOMED Code

 

 Problem  Chronic congestive heart failure, unspecified congestive heart 
failure type     I50.9     Active  71252151

 

 Problem  Ventricular arrhythmia     I49.9     Active  82275463

 

 Problem  Stasis dermatitis of both legs     I87.2     Active  44759694

 

 Problem  Coronary artery disease involving native coronary artery of native 
heart without angina pectoris     I25.10     Active  2020381000327

 

 Problem  Cardiac defibrillator in place     Z95.810     Active  524344989

 

 Problem  Chronic obstructive pulmonary disease, unspecified COPD type     
J44.9     Active  23655685

 

 Problem  Essential hypertension     I10     Active  58200115







ALLERGIES

No Information



ENCOUNTERS







 Encounter  Location  Date  Diagnosis

 

 [place] [address] [place] [address]366-
[phone]  10 Apr, 2018  Medicare annual wellness visit, initial Z00.00

 

 [place] [address] [place] [address] 35662-
5938  08 Mar, 2018  Chronic congestive heart failure, unspecified congestive 
heart failure type I50.9

 

 [place] [address] [place] [address] 69244-
9986     

 

 [place] [address] [place] [id number] [place] [address] 21748-
2256    Chronic congestive heart failure, unspecified congestive 
heart failure type I50.9 ; Chronic obstructive pulmonary disease, unspecified 
COPD type J44.9 and Bilateral impacted cerumen H61.23

 

 [place] [address], [place] [address]-
2695     

 

 [place] [address] [address]-
1284     

 

 [facility]  [address] [place] [phone]-
6858  27 Dec, 2017   

 

 [facility]  [address]65[address], [place] [address]-
6680  20 Dec, 2017   

 

 [facility]  [id number] [place] [address] [phone]-
[phone]  12 Dec, 2017  Coronary artery disease involving native coronary artery of 
native heart without angina pectoris I25.10

 

 [facility]  [id number] [place] [address] 52667-
9670  11 Dec, 2017   

 

 [facility]  [id number] [place] [address] 50639-
3604    Chronic obstructive pulmonary disease, unspecified COPD 
type J44.9 and History of pneumonia Z87.01

 

 [facility]  [id number] [place] [address] 
[phone]     

 

 [facility]  [address] [place] [address] 23036-
0874     

 

 [facility]  [id number] [place] [address] 77202-
9453     

 

 [facility]  [address], [place] [address]-
8404     

 

 [facility]  [address] [address]-
6296  19 Oct, 2017   

 

 [facility]  [address], [place] [address]-
0391  17 Oct, 2017  Coronary artery disease involving native coronary artery of 
native heart without angina pectoris I25.10 and Ventricular arrhythmia I49.9

 

 [facility]  [address]65[address], [place] [address]-
9974  09 Oct, 2017   

 

 [facility]  [address] [place] [address] 08119-
4938  09 Oct, 2017   

 

 [facility]  [address]00565[address], [place] [phone]  04 Oct, 2017  Dysuria R30.0 and Acute cystitis without hematuria N30.00

 

 [facility]  [address] [place] [address] 25308-
6922  25 Aug, 2017  Epistaxis R04.0 and Chronic obstructive pulmonary disease, 
unspecified COPD type J44.9

 

 [facility]  [id number] [place] [address] 06234-
6781    Fall from other pedestrian conveyance, initial encounter 
V00.891A

 

 [place] [address] [address]-
[phone]    Chronic congestive heart failure, unspecified congestive 
heart failure type I50.9 ; Chronic obstructive pulmonary disease, unspecified 
COPD type J44.9 and Stasis dermatitis of both legs I87.2

 

 [place] [address] [place] [id number] [place] [address] 85190-
8031  08 May, 2017  Chronic congestive heart failure, unspecified congestive 
heart failure type I50.9

 

 [place] [address] [place] [id number] [place] [address] 35681-
9334  05 May, 2017  Coronary artery disease involving native coronary artery of 
native heart without angina pectoris I25.10 ; Chronic congestive heart failure, 
unspecified congestive heart failure type I50.9 and Chronic obstructive 
pulmonary disease, unspecified COPD type J44.9

 

 [place] [address] [place] [address] 48515-
8832     

 

 [place] [address] [place] [id number] [place] [address] 33008-
7400     

 

 [facility]  [id number] [place] [address] 
[phone]     

 

 [place] [address] [place] [id number] [place] [address] 
[phone]  28 Mar, 2017   

 

 [facility]  [address] [place] 
[id number]  20 Mar, 2017  Essential hypertension I10 and Chronic congestive 
heart failure, unspecified congestive heart failure type I50.9

 

 [facility]  [address] [id number] 
[phone]  13 Mar, 2017   

 

 [facility]  [address] [facility] [id number] [address]-
2608  09 Mar, 2017   

 

 [facility]  [address] [facility] [id number] [address]-
7611  19 Dec, 2016   

 

 [facility]  [address] [facility] [id number] [address]-
9318  19 Dec, 2016   

 

 [facility]  [address] [facility] [id number] [address]-
8040     

 

 [facility]  [address] [place] [id number] [address]-
1392     

 

 [facility]  [address]00565100[place], [place] [address]-
5626     

 

 [facility]  [address]00565[address], [place] [address]-
9592     

 

 [facility]  [address]00565100[place], [place] [address]-
7346    Chronic congestive heart failure, unspecified congestive 
heart failure type I50.9

 

 [facility] WALK IN [facility]  [id number], [place] [address]    Pain of right lower extremity M79.604 ; History of atrial 
fibrillation without current medication Z86.79 and Acute deep vein thrombosis (
DVT) of femoral vein of right lower extremity I82.411

 

 [facility]  [address] [place] [id number], [place] [address]-
1878     

 

 [facility]  [address]00565100[place], [place] [address]-
5297  22 Aug, 2016   

 

 [facility]  [address] [place] [id number], [place] [address]-
4730  22 Aug, 2016  Coronary artery disease involving native coronary artery of 
native heart without angina pectoris I25.10 ; Chronic congestive heart failure, 
unspecified congestive heart failure type I50.9 ; Cardiac defibrillator in 
place Z95.810 and Candidiasis B37.9







IMMUNIZATIONS

No Known Immunizations



SOCIAL HISTORY

Never Assessed



REASON FOR VISIT

Requests return call



PLAN OF CARE





VITAL SIGNS





MEDICATIONS

Unknown Medications



RESULTS

No Results



PROCEDURES

No Known procedures



INSTRUCTIONS





MEDICATIONS ADMINISTERED

No Known Medications



MEDICAL (GENERAL) HISTORY







 Type  Description  Date

 

 Medical History  hypertension   

 

 Medical History  skin cancer-arms, face   

 

 Medical History  MI   

 

 Medical History  Pneumonia   

 

 Surgical History  heart cath-2 stents, multiple balloons   

 

 Surgical History  open heart surgery  

 

 Surgical History  defibrillator placed  

 

 Hospitalization History  surgery   

 

 Hospitalization History  pneumonia  

 

 Hospitalization History  broken left hip at   March

 

 Hospitalization History  Bronchitis/clinical Pneumonia,hypoxia,sepsis - [facility]  17

## 2018-06-08 NOTE — XMS REPORT
[facility]

 Created on: 2018



Kendall Snowden

External Reference #: [id number]

: 1940

Sex: Male



Demographics







 Address  [address] [place]  [address]

 

 Preferred Language  Unknown

 

 Marital Status  Unknown

 

 Mosque Affiliation  Unknown

 

 Race  Unknown

 

 Ethnic Group  Unknown





Author







 Author  MAHOGANY GREENWOOD

 

 [facility]

 

 Address  [address] [place]  [address]



 

 Phone  [phone]







Care Team Providers







 Care Team Member Name  Role  Phone

 

 MAHOGANY GREENWOOD  Unavailable  [phone]







PROBLEMS







 Type  Condition  ICD9-CM Code  ZQD52-EU Code  Onset Dates  Condition Status  
SNOMED Code

 

 Problem  Cardiac defibrillator in place     Z95.810     Active  495893479

 

 Problem  Chronic congestive heart failure, unspecified congestive heart 
failure type     I50.9     Active  78492325

 

 Problem  Other atherosclerosis of native arteries of extremities, right leg   
  I70.291     Active  58032178

 

 Problem  Ventricular arrhythmia     I49.9     Active  81114456

 

 Problem  Essential hypertension     I10     Active  03155209

 

 Problem  Coronary artery disease involving native coronary artery of native 
heart without angina pectoris     I25.10     Active  1054827018454

 

 Problem  Stasis dermatitis of both legs     I87.2     Active  50432799

 

 Problem  Chronic obstructive pulmonary disease, unspecified COPD type     
J44.9     Active  55278634







ALLERGIES







 Substance  Reaction  Event Type  Date  Status

 

 Penicillin V Potassium  rash  Drug Allergy    Active

 

 Codeine Phosphate  Unknown  Drug Allergy    Active







ENCOUNTERS







 Encounter  Location  Date  Diagnosis

 

 [facility] WALK IN [facility]  [id number] [phone]  25 May, 2018  Acute cystitis without hematuria N30.00

 

 [facility] IN [facility]  [address] [place] [phone]  19 May, 2018  Acute cystitis without hematuria N30.00

 

 [facility]  [address] [place] [address] 39452-
4146  18 May, 2018   

 

 [facility]  [id number] [place] [address] 14827-
5746  10 Apr, 2018  Encounter for immunization Z23 ; Medicare annual wellness 
visit, initial Z00.00 ; Chronic obstructive pulmonary disease, unspecified COPD 
type J44.9 ; Coronary artery disease involving native coronary artery of native 
heart without angina pectoris I25.10 ; Chronic congestive heart failure, 
unspecified congestive heart failure type I50.9 ; Essential hypertension I10 ; 
Ventricular arrhythmia I49.9 and Other atherosclerosis of native arteries of 
extremities, right leg I70.291

 

 [place] [address] [place] [address] [id number] [place] [address] 39681-
2656  08 Mar, 2018  Chronic congestive heart failure, unspecified congestive 
heart failure type I50.9

 

 [place] [address] [place] [id number] [place] [address] 83690-
0346  20 2018   

 

 [place] [address] [place] [id number] [place] [address] 09791-
3119  13 2018  Chronic congestive heart failure, unspecified congestive 
heart failure type I50.9 ; Chronic obstructive pulmonary disease, unspecified 
COPD type J44.9 and Bilateral impacted cerumen H61.23

 

 [place] [address] [place] [id number] [place] [address] 70899-
5305     

 

 [place] [address] [place] [id number] [place] [address] 79058-
3375     

 

 [facility]  [id number] [place] [address] 41384-
4303  27 Dec, 2017   

 

 [place] [address] [place] [id number] [place] [address] 19998-
2706  20 Dec, 2017   

 

 [place] [address] [place] [id number] [place] [address] 61950-
3601  12 Dec, 2017  Coronary artery disease involving native coronary artery of 
native heart without angina pectoris I25.10

 

 [place] [address] [place] [id number] [place] [address] 85202-
2168  11 Dec, 2017   

 

 [place] [address] [place] [id number] [place] [address] 29125-
0930  30 2017  Chronic obstructive pulmonary disease, unspecified COPD 
type J44.9 and History of pneumonia Z87.01

 

 [facility]  [id number] [place] [address] 
[id number]     

 

 [place] [address]65[address] [address]-
4215     

 

 [facility]  [id number] [place] [address] 46858-
6672     

 

 [facility]  [address] [place] [address] 30536-
3363     

 

 [facility]  [id number] [place] [address] 18253-
4932  19 Oct, 2017   

 

 [facility]  [id number] [place] [address] 99054-
7224  17 Oct, 2017  Coronary artery disease involving native coronary artery of 
native heart without angina pectoris I25.10 and Ventricular arrhythmia I49.9

 

 [place] [address] [place] [id number] [place] [address] 19279-
7361  09 Oct, 2017   

 

 [facility]  [id number] [place] [address] 76030-
1282  09 Oct, 2017   

 

 [facility] IN CARE  [id number] [place] [address] 05482
[phone]  04 Oct, 2017  Dysuria R30.0 and Acute cystitis without hematuria N30.00

 

 [place] [address] [place] [address] 80553-
4079  25 Aug, 2017  Epistaxis R04.0 and Chronic obstructive pulmonary disease, 
unspecified COPD type J44.9

 

 [place] [address] [place] [id number] [place] [address] 28902-
3151    Fall from other pedestrian conveyance, initial encounter 
V00.891A

 

 [place] [address] [place] [id number] [place] [address] 42261-
7168    Chronic congestive heart failure, unspecified congestive 
heart failure type I50.9 ; Chronic obstructive pulmonary disease, unspecified 
COPD type J44.9 and Stasis dermatitis of both legs I87.2

 

 [facility]  [address] [place] [address] 83740-
9766  08 May, 2017  Chronic congestive heart failure, unspecified congestive 
heart failure type I50.9

 

 [facility]  [address] [facility] [id number] [address]-
9349  05 May, 2017  Coronary artery disease involving native coronary artery of 
native heart without angina pectoris I25.10 ; Chronic congestive heart failure, 
unspecified congestive heart failure type I50.9 and Chronic obstructive 
pulmonary disease, unspecified COPD type J44.9

 

 [facility]  [address] [facility] [id number] [address]-
1679     

 

 [facility]  [address] [facility] [id number][place] [address]-
1503     

 

 [facility]  [address] [id number], [place] 
[id number]     

 

 [facility]  [id number], [place] 
[id number]  28 Mar, 2017   

 

 [facility]  [address] [place] 
[id number]  20 Mar, 2017  Essential hypertension I10 and Chronic congestive 
heart failure, unspecified congestive heart failure type I50.9

 

 [facility]  [id number], [place] 
[id number]  13 Mar, 2017   

 

 [facility]  [id number] [address]-
6652  09 Mar, 2017   

 

 [facility]  [id number] [address]-
5192  19 Dec, 2016   

 

 [facility]  [address] [facility] [id number] [address]-
5045  19 Dec, 2016   

 

 [facility]  [address] [facility] [id number] [address]-
7428     

 

 [facility]  [address] [facility] [id number][place] [address]-
2340     

 

 [facility]  [address] [facility] [id number] [address]-
5640     

 

 [facility]  [address] [facility] [id number][place] [address]-
2232     

 

 [facility]  [id number] [address]-
5006    Chronic congestive heart failure, unspecified congestive 
heart failure type I50.9

 

 [facility] WALK IN CARE  [id number] [address]
-[address]    Pain of right lower extremity M79.604 ; History of atrial 
fibrillation without current medication Z86.79 and Acute deep vein thrombosis (
DVT) of femoral vein of right lower extremity I82.411

 

 [facility]  [address] [place] [address] 56623-
9663     

 

 [facility]  [address] [place] [address] 53817-
3849  22 Aug, 2016   

 

 [facility]  [address]65100[place], [place] [address]-
1979  22 Aug, 2016  Coronary artery disease involving native coronary artery of 
native heart without angina pectoris I25.10 ; Chronic congestive heart failure, 
unspecified congestive heart failure type I50.9 ; Cardiac defibrillator in 
place Z95.810 and Candidiasis B37.9







IMMUNIZATIONS

No Known Immunizations



SOCIAL HISTORY

Never Assessed



REASON FOR VISIT

[facility] follow [facility]- pneumonia, bronchitis- on ABTS not sure what. Cordell, 
needs to have  order oxygen to [facility] they need portable 
because pt goes to pay bill and is out an about. Or if he can go with out.



PLAN OF CARE







 Activity  Details









  









 Follow Up  3 Months Reason:







VITAL SIGNS







 Height  65 in  2017

 

 Weight  195.5 lbs  2017

 

 Temperature  98.0 degrees Fahrenheit  2017

 

 Heart Rate  80 bpm  2017

 

 Respiratory Rate  22   2017

 

 Oximetry  w/ oxygen:99 %  2017

 

 BMI  32.53 kg/m2  2017

 

 Blood pressure systolic  140 mmHg  2017

 

 Blood pressure diastolic  72 mmHg  2017







MEDICATIONS







 Medication  Instructions  Dosage  Frequency  Start Date  End Date  Duration  
Status

 

 Furosemide 40 mg  Orally twice a day  1 tablet  12h           Active

 

 Wheelchair -     as directed     17 Oct, 2017        Active

 

 ProAir  (90 Base) MCG/ACT  Inhalation 4 times a day  2 puffs as needed  
6h  05 May, 2017     30 days  Active

 

 Amiodarone HCl 200 mg  Orally twice weekly on saturday and   1 tablet   
           Active

 

 Metoprolol Tartrate 50 mg  Orally Twice a day  1/2 tablet with food  12h      
     Active

 

 Nexium 40 MG  Orally Once a day  1 capsule  24h           Active

 

 All-In-One Nebulizer System -  by inhalation route 2 times a day  use for 
Breathing treatments  12h  30 2017        Active

 

 Benzonatate 200 mg  Orally 2 times a day  1 capsule as needed  12h           
Not-Taking

 

 Cefpodoxime Proxetil 200 MG  Orally every 12 hrs  1 tablet  12h  25 Nov, 2017  
2 Dec, 2017     Active

 

 MiraLax 17 gm/dose  Orally Once a day  17 grams mixed in 8 oz of water or 
juice  24h           Not-Taking

 

 Nebulizer -     as directed             Active

 

 Klor-Con 8 MEQ  Orally Once a day  1 tablet  24h           Active

 

 Mexiletine HCl 150 MG  Orally every 8 hrs  1 capsule  8h           Active

 

 Amlodipine Besylate 5 MG  Orally Once a day  1 tablet  24h           Active

 

 Digoxin 125 MCG  Orally Once a day  1 tablet  24h           Active

 

 Albuterol Sulfate 1.25 MG/3ML  Inhalation 2 times a day  as directed  12h          Active







RESULTS

No Results



PROCEDURES







 Procedure  Date Ordered  Result  Body Site

 

 MEASURE BLOOD OXYGEN LEVEL  2017      

 

 [facility] VISIT ESTABLISHED PATIENT  2017      







INSTRUCTIONS





MEDICATIONS ADMINISTERED

No Known Medications



MEDICAL (GENERAL) HISTORY







 Type  Description  Date

 

 Medical History  hypertension   

 

 Medical History  skin cancer-arms, face   

 

 Medical History  MI   

 

 Medical History  Pneumonia   

 

 Surgical History  heart cath-2 stents, multiple balloons   

 

 Surgical History  open heart surgery  

 

 Surgical History  defibrillator placed  

 

 Hospitalization History  surgery   

 

 Hospitalization History  pneumonia  

 

 Hospitalization History  broken left hip at   March

 

 Hospitalization History  Bronchitis/clinical Pneumonia,hypoxia,sepsis - Via 
[facility]  17

## 2018-06-08 NOTE — DIAGNOSTIC IMAGING REPORT
Indication: Left shoulder pain.



AP, oblique, and transcatheter views of the left shoulder

obtained.



No fracture or acute bony abnormality seen.  There is no

dislocation. AC joint appears unremarkable.



Impression:



No acute abnormality of the left shoulder.  There is some

underlying degenerative change of the AC joint.



Dictated by: 



  Dictated on workstation # [id number]

## 2018-06-08 NOTE — XMS REPORT
[facility]

 Created on: 2018



Kendall Snowden

External Reference #: [id number]

: 1940

Sex: Male



Demographics







 Address  [address] [place]  [address]

 

 Preferred Language  Unknown

 

 Marital Status  Unknown

 

 Islam Affiliation  Unknown

 

 Race  Unknown

 

 Ethnic Group  Unknown





Author







 Author  MAHOGANY GREENWOOD

 

 Organization  [facility] [place]

 

 Address  [address] [place]  [address]



 

 Phone  [phone]







Care Team Providers







 Care Team Member Name  Role  Phone

 

 MAHOGANY GREENWOOD  Unavailable  [phone]







PROBLEMS







 Type  Condition  ICD9-CM Code  FLI59-ND Code  Onset Dates  Condition Status  
SNOMED Code

 

 Problem  Cardiac defibrillator in place     Z95.810     Active  050348303

 

 Problem  Chronic congestive heart failure, unspecified congestive heart 
failure type     I50.9     Active  83278705

 

 Problem  Other atherosclerosis of native arteries of extremities, right leg   
  I70.291     Active  42856032

 

 Problem  Ventricular arrhythmia     I49.9     Active  17549026

 

 Problem  Essential hypertension     I10     Active  74244594

 

 Problem  Coronary artery disease involving native coronary artery of native 
heart without angina pectoris     I25.10     Active  0087395089485

 

 Problem  Stasis dermatitis of both legs     I87.2     Active  28076170

 

 Problem  Chronic obstructive pulmonary disease, unspecified COPD type     
J44.9     Active  43362942







ALLERGIES







 Substance  Reaction  Event Type  Date  Status

 

 Penicillin V Potassium  Unknown  Drug Allergy  25 Aug, 2017  Active

 

 Codeine Phosphate  Unknown  Drug Allergy  25 Aug, 2017  Active







ENCOUNTERS







 Encounter  Location  Date  Diagnosis

 

 [facility], [place]  [address] [place] [address]-
7935  10 Apr, 2018  Medicare annual wellness visit, initial Z00.00 ; Encounter 
for immunization Z23 ; Chronic obstructive pulmonary disease, unspecified COPD 
type J44.9 ; Coronary artery disease involving native coronary artery of native 
heart without angina pectoris I25.10 ; Chronic congestive heart failure, 
unspecified congestive heart failure type I50.9 ; Essential hypertension I10 ; 
Ventricular arrhythmia I49.9 and Other atherosclerosis of native arteries of 
extremities, right leg I70.291

 

 [facility] [place]  [id number] [address]
6178  08 Mar, 2018  Chronic congestive heart failure, unspecified congestive 
heart failure type I50.9

 

 [facility] [place]  [id number] [place] [address] 08689-
0669     

 

 [facility] [place]  [address] [place] [address]-
1729    Chronic congestive heart failure, unspecified congestive 
heart failure type I50.9 ; Chronic obstructive pulmonary disease, unspecified 
COPD type J44.9 and Bilateral impacted cerumen H61.23

 

 [facility] [place]  [id number] [place] [address]71-
3409     

 

 [facility] [place]  [id number] [place] [address] 73318-
7926     

 

 [facility] [place]  [id number] [place] [address] 23714-
8822  27 Dec, 2017   

 

 [facility] [place]  [id number] [place] [address] 99421-
3743  20 Dec, 2017   

 

 [facility] [place]  [id number] [place] [address] 96895-
2360  12 Dec, 2017  Coronary artery disease involving native coronary artery of 
native heart without angina pectoris I25.10

 

 [facility] [place]  [address] [place] [address]97-
3660  11 Dec, 2017   

 

 [facility] [place]  [id number] [place] [address] 34475-
6025    Chronic obstructive pulmonary disease, unspecified COPD 
type J44.9 and History of pneumonia Z87.01

 

 [facility]  [id number] [place] [address] 
[id number]     

 

 [facility] [place]  [address] [place] [address] 04027-
7689     

 

 [facility] [place]  [id number] [place] [address] 94117-
4326     

 

 [facility] [place]  [address] [place] [address] 73022-
3479     

 

 [facility] [place]  [address] [place] [address] 29726-
3071  19 Oct, 2017   

 

 [place] [address] [address] [place] [address] 18953-
5841  17 Oct, 2017  Coronary artery disease involving native coronary artery of 
native heart without angina pectoris I25.10 and Ventricular arrhythmia I49.9

 

 [place] [address] [id number] [place] [address] 95297-
9283  09 Oct, 2017   

 

 [id number] [place] [address] 07915-
1306  09 Oct, 2017   

 

 [facility]  [id number] [place] [address] 93630
[phone]  04 Oct, 2017  Dysuria R30.0 and Acute cystitis without hematuria N30.00

 

 [place] [id number] [place] [address] 14602-
9325  25 Aug, 2017  Epistaxis R04.0 and Chronic obstructive pulmonary disease, 
unspecified COPD type J44.9

 

 [place] [id number] [place] [address] 23765-
9090    Fall from other pedestrian conveyance, initial encounter 
V00.891A

 

 [place] [id number] [place] [address] 40221-
6142    Chronic congestive heart failure, unspecified congestive 
heart failure type I50.9 ; Chronic obstructive pulmonary disease, unspecified 
COPD type J44.9 and Stasis dermatitis of both legs I87.2

 

 [place] [address] [place] [address] 65828-
5479  08 May, 2017  Chronic congestive heart failure, unspecified congestive 
heart failure type I50.9

 

 [place] [id number] [place] [address] 05240-
8693  05 May, 2017  Coronary artery disease involving native coronary artery of 
native heart without angina pectoris I25.10 ; Chronic congestive heart failure, 
unspecified congestive heart failure type I50.9 and Chronic obstructive 
pulmonary disease, unspecified COPD type J44.9

 

 [place] [id number] [place] [address] 13934-
7989     

 

 [facility] [place]  [address] [facility] [id number] [address]-
3157     

 

 [facility]  [address] [id number] 
[phone]     

 

 [facility]  [address] [id number] 
[id number]  28 Mar, 2017   

 

 [facility]  [address] [place] 
[phone]  20 Mar, 2017  Essential hypertension I10 and Chronic congestive 
heart failure, unspecified congestive heart failure type I50.9

 

 [facility]  [address] [id number], [place] 
[phone]  13 Mar, 2017   

 

 [facility] [place]  [address] [facility] [id number] [address]-
0784  09 Mar, 2017   

 

 [facility] [place]  [address] [facility] [id number] [address]-
0861  19 Dec, 2016   

 

 [facility] [place]  [address] [facility] [id number], [place] [address]-
5807  19 Dec, 2016   

 

 [facility] [place]  [id number] [address]-
0350     

 

 [facility] [place]  [address] [facility] [id number], [place] [address]-
3986     

 

 [facility] [place]  [address] [facility] [id number], [place] [address]-
6908     

 

 [facility] [place]  [address] [facility] [id number] [address]-
8255     

 

 [facility] [place]  [address] [facility] [id number][place] [address]    Chronic congestive heart failure, unspecified congestive 
heart failure type I50.9

 

 [facility]  [address] [id number], [place] [phone]    Pain of right lower extremity M79.604 ; History of atrial 
fibrillation without current medication Z86.79 and Acute deep vein thrombosis (
DVT) of femoral vein of right lower extremity I82.411

 

 [facility] [place]  [address] [facility] [id number] [place] [address]-
7919     

 

 [facility] [place]  [address] [facility] [id number][place] [address]-
4769  22 Aug, 2016   

 

 [facility] [place]  [address] [facility] [id number][place] [address]-
3944  22 Aug, 2016  Coronary artery disease involving native coronary artery of 
native heart without angina pectoris I25.10 ; Chronic congestive heart failure, 
unspecified congestive heart failure type I50.9 ; Cardiac defibrillator in 
place Z95.810 and Candidiasis B37.9







IMMUNIZATIONS

No Known Immunizations



SOCIAL HISTORY

Never Assessed



REASON FOR VISIT

Hospital f/u- VC  17, UTI and Nosebleed. CBrumbackRN



PLAN OF CARE







 Activity  Details









  









 Follow Up  2 Months Reason:







VITAL SIGNS







 Height  65 in  2017

 

 Weight  203.2 lbs  2017

 

 Temperature  97.6 degrees Fahrenheit  2017

 

 Heart Rate  80 bpm  2017

 

 Respiratory Rate  20   2017

 

 BMI  33.81 kg/m2  2017

 

 Blood pressure systolic  140 mmHg  2017

 

 Blood pressure diastolic  72 mmHg  2017







MEDICATIONS







 Medication  Instructions  Dosage  Frequency  Start Date  End Date  Duration  
Status

 

 Nitrofurantoin Monohyd Macro 100 mg  Orally every 12 hrs  1 capsule with food  
12h  19 Aug, 2017        Active

 

 Digoxin 125 MCG  Orally Once a day  1 tablet  24h           Active

 

 Nexium 40 MG  Orally Once a day  1 capsule  24h           Active

 

 Aspir-81                    Active

 

 Klor-Con 8 MEQ  Orally Once a day  1 tablet  24h           Active

 

 Metoprolol Tartrate 50 mg  Orally Twice a day  1/2 tablet with food  12h      
     Active

 

 Amlodipine Besylate 5 MG  Orally Once a day  1 tablet  24h           Active

 

 Amiodarone HCl 200 MG  Orally Once a day  1 tablet  24h           Active

 

 ProAir  (90 Base) MCG/ACT  Inhalation 4 times a day  2 puffs as needed  
6h  05 May, 2017     30 days  Active

 

 Benzonatate 200 mg  Orally 2 times a day  1 capsule as needed  12h           
Active

 

 Mexiletine HCl 150 MG  Orally every 8 hrs  1 capsule  8h           Active

 

 Furosemide 40 MG  Orally Once a day  1 tablet  24h           Active







RESULTS

No Results



PROCEDURES







 Procedure  Date Ordered  Result  Body Site

 

 [facility] VISIT ESTABLISHED PATIENT  Aug 25, 2017      







INSTRUCTIONS





MEDICATIONS ADMINISTERED

No Known Medications



MEDICAL (GENERAL) HISTORY







 Type  Description  Date

 

 Medical History  hypertension   

 

 Medical History  skin cancer-arms, face   

 

 Medical History  MI   

 

 Medical History  Pneumonia   

 

 Surgical History  heart cath-2 stents, multiple balloons   

 

 Surgical History  open heart surgery  

 

 Surgical History  defibrillator placed  

 

 Hospitalization History  surgery   

 

 Hospitalization History  pneumonia  

 

 Hospitalization History  broken left hip at   March

 

 Hospitalization History  Bronchitis/clinical Pneumonia,hypoxia,sepsis - Via 
[facility]  17
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
## 2018-06-08 NOTE — CONSULTATION-CARDIOLOGY
HPI-Cardiology


Cardiology Consultation:


Date of Consultation


18


Time Seen by Provider:  18:20


Date of Admission





Attending Physician


Alba Daniels MD


Admitting Physician


Raúl Gonzalez MD


Consulting Physician


RUBÉN BOATENG MD, MA, FACP, FACC, FSCAI, CCDS





HPI:


Chief Complaint:


AICD Discharge


Mr. Snowden is a 77 year old male who was brought in by his spouse d/t concerns 

that his defibrillator shocked him.  He was seen in the ED.  He is a poor 

historian.  His spouse is at the bedside.  She reports last night he was 

transferring himself from his w/c to a chair when he fell.  They contacted EMS 

to assist him up.  He scraped his left shoulder.  This morning he was moving 

himself back in his chair by pushing back using his arms.  His spouse states he 

yelled out in pain and his eyes rolled back.  She reports he was unresponsive 

for a few seconds.  He denies this and states he never lost consciousness.  

Nevertheless, she summoned EMS to bring him to the ED to be evaluated.  He does 

have a bedside home montior for he AICD.  His spouse reports the lights on the 

home monitor will change from green to red if he has been shocked.  She reports 

the light never changed from green.  His primary cardiologist is Dr. Puri at 

[facility] in [place], [place].  He reports he saw him in April.  He 

denies any CP, palpitations.  He has chronic mild to mod dyspnea with chronic 

cough.





Review of Systems-Cardiology


Review of Systems


Constitutional:  No chills, No fever


Eyes:  No vision change


Ears/Nose/Throat:  No epistaxis, No recent hearing loss


Respiratory:  As described under HPI


Cardiovascular:  As described under HPI


Gastrointestinal:  No diarrhea, No nausea, No vomiting


Genitourinary:  No dysuria, No hematuria


Skin:  No other (dry, flaky, yellow skin to chest)


Psychiatric/Neurological:  As described under HPI


Hematologic:  No bleeding abnormalities





PMH-Social-Family Hx


Patient Social History


Alcohol Use:  Denies Use


Recreational Drug Use:  No


Smoking Status:  Former Smoker


Former smoker/When Quit:  1988


Type Used:  Cigarettes


2nd Hand Smoke Exposure:  No


Recent Foreign Travel:  No


Recent Infectious Disease Expo:  No


Physical Abuse Screen:  No


Sexual Abuse:  No





Immunizations Up To Date


Tetanus Booster (TDap):  More than 5yrs


Date of Pneumonia Vaccine:  Oct 1, 2014


Date of Influenza Vaccine:  Nov 10, 2017





Past Medical History


PMH


As described under Assessment.





Family Medical History


Family History:  


Cardiovascular disease


  19 MOTHER


  G8 BROTHER


  G8 SISTER


Cervical cancer


  19 MOTHER





Allergies and Home Medications


Allergies


Coded Allergies:  


     Penicillins (Verified  Allergy, Severe, HIVES, SOB, 18)


     codeine (Verified  Allergy, Severe, SOB, HIVES, 18)





Home Medications


Albuterol Sulfate 18 Gm Hfa.aer.ad, 2 PUFF IH QID PRN for SHORTNESS OF BREATH, (

Reported)


Amiodarone HCl 200 Mg Tablet, 200 MG PO SuSa@00,, (Reported)


Amlodipine Besylate 5 Mg Tablet, 5 MG PO HS, (Reported)


   LAST FILLED 17 #90 


Azithromycin 250 Mg Tablet, 250 MG PO HS


   Prescribed by: MOIZ KENT on 17 1028


Cefpodoxime Proxetil 200 Mg Tablet, 200 MG PO BID


   Prescribed by: MOIZ KENT on 17 1028


Digoxin 125 Mcg Tablet, 125 MCG PO DAILY, (Reported)


Esomeprazole Magnesium 40 Mg Capsule.dr, 40 MG PO DAILY, (Reported)


Furosemide 40 Mg Tablet, 40 MG PO 0900,1500, (Reported)


Metoprolol Tartrate 50 Mg Tablet, 25 MG PO BID, (Reported)


   TAKES 1/2 OF A (50 MG) TABLET 


Mexiletine HCl 150 Mg Cap, 150 MG PO TID, (Reported)


Potassium Chloride 8 Meq Tablet.er, 8 MEQ PO DAILY, (Reported)


   LAST FILLED 17 #90 





Patient Home Medication List


Home Medication List Reviewed:  Yes





Physical Exam-Cardiology


Physical Exam


Vital Signs/I&O











 18





 11:46 11:46 17:00


 


Temp 98.0  


 


Pulse 83  80


 


Resp 14  16


 


B/P (MAP) 179/89 (119)  148/77 (100)


 


Pulse Ox 97 93 95


 


O2 Delivery Nasal Cannula Nasal Cannula Room Air


 


O2 Flow Rate 3.00 3.00 





Capillary Refill : Less Than 3 Seconds


Constitutional:  AAO x 3, well-developed, well-nourished


HEENT:  PERRL, hard of hearing; No oral hygience is good


Neck:  No carotid bruit; carotid pulses are 2 + bilaterally


Respiratory:  No accessory muscle use, No respiratory distress; chest expansion 

is symmetric, chest is bilaterally symmetric, wheezing (exp wheezes; prolonged 

expiratory phase; lose non-productive cough)


Cardiovascular:  regular rate-rhythm; No JVD; S1 and S2, systolic murmur


Gastrointestinal:  soft, audible bowel sounds


Rectal:  deferred


Extremities:  no lower extremity edema bilateral


Neurologic/Psychiatric:  grossly intact


Skin:  No rash, No ulcerations





Data Review


Labs


Laboratory Tests


18 12:05: 


White Blood Count 11.9H, Red Blood Count 4.27L, Hemoglobin 13.6, Hematocrit 40, 

Mean Corpuscular Volume 94, Mean Corpuscular Hemoglobin 32, Mean Corpuscular 

Hemoglobin Concent 34, Red Cell Distribution Width 16.8H, Platelet Count 206, 

Mean Platelet Volume 9.9, Neutrophils (%) (Auto) 69, Lymphocytes (%) (Auto) 17, 

Monocytes (%) (Auto) 13H, Eosinophils (%) (Auto) 1, Basophils (%) (Auto) 0, 

Neutrophils # (Auto) 8.2H, Lymphocytes # (Auto) 2.1, Monocytes # (Auto) 1.5H, 

Eosinophils # (Auto) 0.1, Basophils # (Auto) 0.0, Prothrombin Time 13.7, INR 

Comment 1.1, Activated Partial Thromboplast Time 28, Sodium Level 140, 

Potassium Level 3.6, Chloride Level 101, Carbon Dioxide Level 27, Anion Gap 12, 

Blood Urea Nitrogen 14, Creatinine 0.84, Estimat Glomerular Filtration Rate > 60

, BUN/Creatinine Ratio 17, Glucose Level 150H, Calcium Level 9.9, Magnesium 

Level 1.7L, Total Bilirubin 0.5, Aspartate Amino Transf (AST/SGOT) 22, Alanine 

Aminotransferase (ALT/SGPT) 19, Alkaline Phosphatase 86, Myoglobin 68.0, 

Troponin I < 0.30, Total Protein 7.9, Albumin 3.8


18 12:09: Thyroid Stimulating Hormone (TSH) 2.75


18 18:02: Troponin I < 0.30








A/P-Cardiology


Assessment/Admission Diagnosis





ICD discharge, appropriate (to treat VF)





CAD - CABG in  in [place], [place] - exact details unkown.  According to stent 

cards which he has with him: 2002 Multi-link 2.5 x 18mm stent to the prox 

RCA.   Cypher 3.5 x 30mm stent to the prox to mid circ





H/O ICM - [employer] AICD implanted by Dr. Puri in [place], [place] 





MPI  showed LVEF 54%





H/O PAF - has been maintained on Amiodarone





Per spouse considered intolerant to OAC d/t bleeding





ENIO with cardioversion in  by Dr. Chin





ENIO of  by Dr. Chin showed LVEF 50%.  Aortic valve calcification without 

stenosis.  Mod to severe MR.





Nocturnal hypoxemia requiring supplemental oxygen





COPD





General debility, w/c dependent





Discussion and Recomendations





* We discussed his CV issues with him


* We have increase amiodarone from 200 mg to 400 mg daily


* He has been admitted to eval for cor ischemia/MI and to treat any further 

arrhythmia


* Consider cath if troponin positive or if has cp





Clinical Quality Measures


DVT/VTE Risk/Contraindication:


Risk Factor Score Per Nursin


RFS Level Per Nursing on Admit:  1=Low/No VTE PPX











RUBÉN BOATENG MD FACP FACRobert Breck Brigham Hospital for Incurables 2018 18:41
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
## 2018-06-08 NOTE — XMS REPORT
[facility]

 Created on: 2018



Kendall Snowden

External Reference #: [id number]

: 1940

Sex: Male



Demographics







 Address  [address] [place]  [address]

 

 Preferred Language  Unknown

 

 Marital Status  Unknown

 

 Latter day Affiliation  Unknown

 

 Race  Unknown

 

 Ethnic Group  Unknown





Author







 Author  MAHOGANY GREENWOOD

 

 Organization  [facility]

 

 Address  [address] [place]  [address]



 

 Phone  [phone]







Care Team Providers







 Care Team Member Name  Role  Phone

 

 MAHOGANY GREENWOOD  Unavailable  [phone]







PROBLEMS







 Type  Condition  ICD9-CM Code  RJC53-LP Code  Onset Dates  Condition Status  
SNOMED Code

 

 Problem  Cardiac defibrillator in place     Z95.810     Active  615669156

 

 Problem  Chronic congestive heart failure, unspecified congestive heart 
failure type     I50.9     Active  40188277

 

 Problem  Other atherosclerosis of native arteries of extremities, right leg   
  I70.291     Active  99033746

 

 Problem  Ventricular arrhythmia     I49.9     Active  25182950

 

 Problem  Essential hypertension     I10     Active  01852434

 

 Problem  Coronary artery disease involving native coronary artery of native 
heart without angina pectoris     I25.10     Active  2643201321624

 

 Problem  Stasis dermatitis of both legs     I87.2     Active  46606311

 

 Problem  Chronic obstructive pulmonary disease, unspecified COPD type     
J44.9     Active  72085498







ALLERGIES

No Information



ENCOUNTERS







 Encounter  Location  Date  Diagnosis

 

 [facility]ALK [facility]  [address] [place] [address] 37[address]
[phone]  25 May, 2018  Acute cystitis without hematuria N30.00

 

 [facility]  [id number] [place] [address] 67732
-[phone]  19 May, 2018  Acute cystitis without hematuria N30.00

 

 [facility]  [id number] [place] [address] 58045-
3148  18 May, 2018   

 

 [facility]  [address] [address]-
8472  10 Apr, 2018  Encounter for immunization Z23 ; Medicare annual wellness 
visit, initial Z00.00 ; Chronic obstructive pulmonary disease, unspecified COPD 
type J44.9 ; Coronary artery disease involving native coronary artery of native 
heart without angina pectoris I25.10 ; Chronic congestive heart failure, 
unspecified congestive heart failure type I50.9 ; Essential hypertension I10 ; 
Ventricular arrhythmia I49.9 and Other atherosclerosis of native arteries of 
extremities, right leg I70.291

 

 [place] [id number] [place] [address] 14834-
0890  08 Mar, 2018  Chronic congestive heart failure, unspecified congestive 
heart failure type I50.9

 

 [facility]  [id number] [place] [address] 68263-
3922     

 

 [place] [address] [address]84-
0040    Chronic congestive heart failure, unspecified congestive 
heart failure type I50.9 ; Chronic obstructive pulmonary disease, unspecified 
COPD type J44.9 and Bilateral impacted cerumen H61.23

 

 [place] [id number] [place] [address] 18996-
0645     

 

 [place] [address] [address]-
7654     

 

 [facility]  [id number] [place] [address] 86066-
4665  27 Dec, 2017   

 

 [place] [id number] [place] [address] 05059-
6137  20 Dec, 2017   

 

 [facility]  [id number] [place] [address] 84252-
2871  12 Dec, 2017  Coronary artery disease involving native coronary artery of 
native heart without angina pectoris I25.10

 

 [place] [id number] [place] [address] 94993-
6733  11 Dec, 2017   

 

 [facility]  [id number] [place] [address] 17360-
8297    Chronic obstructive pulmonary disease, unspecified COPD 
type J44.9 and History of pneumonia Z87.01

 

 [facility]  [id number] [place] [address] 
[id number]     

 

 [place] [address] 5[address]27-
2637     

 

 [facility]  [address] [address]-
7997     

 

 [facility]  [id number] [place] [address] 49192-
5782     

 

 [facility]  [id number] [place] [address] 13319-
8616  19 Oct, 2017   

 

 [facility]  [id number] [place] [address] 34571-
1421  17 Oct, 2017  Coronary artery disease involving native coronary artery of 
native heart without angina pectoris I25.10 and Ventricular arrhythmia I49.9

 

 [facility]  [id number] [place] [address] 90507-
8960  09 Oct, 2017   

 

 [facility]  [id number] [place] [address] 16555-
5311  09 Oct, 2017   

 

 [facility]  [id number] [place] [address] 94759
-[phone]  04 Oct, 2017  Dysuria R30.0 and Acute cystitis without hematuria N30.00

 

 [facility]  [id number] [place] [address] 14224-
2753  25 Aug, 2017  Epistaxis R04.0 and Chronic obstructive pulmonary disease, 
unspecified COPD type J44.9

 

 [facility]  [address] [place] [address] 24202-
0584    Fall from other pedestrian conveyance, initial encounter 
V00.891A

 

 [facility]  [address]100[place] [address]-
4707    Chronic congestive heart failure, unspecified congestive 
heart failure type I50.9 ; Chronic obstructive pulmonary disease, unspecified 
COPD type J44.9 and Stasis dermatitis of both legs I87.2

 

 [facility]  [address]100[place] [address]-
5704  08 May, 2017  Chronic congestive heart failure, unspecified congestive 
heart failure type I50.9

 

 [facility]  [id number], [place] [address]-
7519  05 May, 2017  Coronary artery disease involving native coronary artery of 
native heart without angina pectoris I25.10 ; Chronic congestive heart failure, 
unspecified congestive heart failure type I50.9 and Chronic obstructive 
pulmonary disease, unspecified COPD type J44.9

 

 [facility]  [address] [facility] [id number] [address]-
7627     

 

 [facility]  [address] [facility] [id number][place] [address]-
3074     

 

 [facility]  [id number] 
[id number]     

 

 [facility]  [id number] [place] [address] 
[id number]  28 Mar, 2017   

 

 [facility]  [address]   [place], [place] 
[id number]  20 Mar, 2017  Essential hypertension I10 and Chronic congestive 
heart failure, unspecified congestive heart failure type I50.9

 

 [facility]  [id number], [place] 
[id number]  13 Mar, 2017   

 

 [facility]  [address]100[place], [place] [address]-
3836  09 Mar, 2017   

 

 [facility]  [address]100[place], [place] [address]-
8952  19 Dec, 2016   

 

 [facility]  [id number], [place] [address]-
6427  19 Dec, 2016   

 

 [facility]  [id number], [place] [address]-
4290     

 

 [facility]  [id number], [place] [address]-
5957     

 

 [facility]  [id number], [place] [address]-
6702     

 

 [facility]  [id number], [place] [address]-
7499     

 

 [facility]  [address] [facility] [id number][place] [address]-
3749    Chronic congestive heart failure, unspecified congestive 
heart failure type I50.9

 

 [facility]  [address]100[place], [place] [address]
-[address]    Pain of right lower extremity M79.604 ; History of atrial 
fibrillation without current medication Z86.79 and Acute deep vein thrombosis (
DVT) of femoral vein of right lower extremity I82.411

 

 [facility]  [address]100[place], [place] [address]-
0844     

 

 [facility]  [address] [place] [address] 69127-
5946  22 Aug, 2016   

 

 [facility]  [address]100[place], [place] [address]-
0517  22 Aug, 2016  Coronary artery disease involving native coronary artery of 
native heart without angina pectoris I25.10 ; Chronic congestive heart failure, 
unspecified congestive heart failure type I50.9 ; Cardiac defibrillator in 
place Z95.810 and Candidiasis B37.9







IMMUNIZATIONS

No Known Immunizations



SOCIAL HISTORY

Never Assessed



REASON FOR VISIT

Labs per compliance



PLAN OF CARE





VITAL SIGNS





MEDICATIONS

Unknown Medications



RESULTS

No Results



PROCEDURES

No Known procedures



INSTRUCTIONS





MEDICATIONS ADMINISTERED

No Known Medications



MEDICAL (GENERAL) HISTORY







 Type  Description  Date

 

 Medical History  hypertension   

 

 Medical History  skin cancer-arms, face   

 

 Medical History  MI   

 

 Medical History  Pneumonia   

 

 Surgical History  heart cath-2 stents, multiple balloons   

 

 Surgical History  open heart surgery  

 

 Surgical History  defibrillator placed  

 

 Hospitalization History  surgery   

 

 Hospitalization History  pneumonia  

 

 Hospitalization History  broken left hip at   March

 

 Hospitalization History  Bronchitis/clinical Pneumonia,hypoxia,sepsis - Via 
[facility]  17

## 2018-06-08 NOTE — XMS REPORT
[facility]

 Created on: 2018



Kendall Snowden

External Reference #: [id number]

: 1940

Sex: Male



Demographics







 Address  [address] [place]  [address]

 

 Preferred Language  Unknown

 

 Marital Status  Unknown

 

 Shinto Affiliation  Unknown

 

 Race  Unknown

 

 Ethnic Group  Unknown





Author







 Author  MAHOGANY GREENWOOD

 

 Organization  [facility]

 

 Address  [address] [place]  [address]



 

 Phone  [phone]







Care Team Providers







 Care Team Member Name  Role  Phone

 

 MAHOGANY GREENWOOD  Unavailable  [phone]







PROBLEMS







 Type  Condition  ICD9-CM Code  YID48-AU Code  Onset Dates  Condition Status  
SNOMED Code

 

 Problem  Chronic congestive heart failure, unspecified congestive heart 
failure type     I50.9     Active  21154500

 

 Problem  Ventricular arrhythmia     I49.9     Active  79124832

 

 Problem  Stasis dermatitis of both legs     I87.2     Active  41479351

 

 Problem  Coronary artery disease involving native coronary artery of native 
heart without angina pectoris     I25.10     Active  [id number]

 

 Problem  Cardiac defibrillator in place     Z95.810     Active  966249651

 

 Problem  Chronic obstructive pulmonary disease, unspecified COPD type     
J44.9     Active  34533415

 

 Problem  Essential hypertension     I10     Active  72450751







ALLERGIES







 Substance  Reaction  Event Type  Date  Status

 

 Penicillin V Potassium  Unknown  Drug Allergy    Active

 

 Codeine Phosphate  Unknown  Drug Allergy    Active







ENCOUNTERS







 Encounter  Location  Date  Diagnosis

 

 [place] [address] [place] [phone]-
[phone]  10 Apr, 2018  Medicare annual wellness visit, initial Z00.00

 

 [place] [address] [place] [phone]-
[phone]  08 Mar, 2018  Chronic congestive heart failure, unspecified congestive 
heart failure type I50.9

 

 [place] [address] [place] [id number] [place] [phone]-
[phone]     

 

 [place] [address] [phone]-
[phone]  13 2018  Chronic congestive heart failure, unspecified congestive 
heart failure type I50.9 ; Chronic obstructive pulmonary disease, unspecified 
COPD type J44.9 and Bilateral impacted cerumen H61.23

 

 [place] [address] [place] [address]-
[address]87     

 

 [facility]  [address] [place] [address]-
[address]83     

 

 [facility]  [address] [place] [address]-
[phone]  27 Dec, 2017   

 

 [facility]  [id number] [place] [phone]-
[phone]  20 Dec, 2017   

 

 [facility]  [id number] [place] [address]-
[phone]  12 Dec, 2017  Coronary artery disease involving native coronary artery of 
native heart without angina pectoris I25.10

 

 [facility]  [id number] [place] [address]-
0323  11 Dec, 2017   

 

 [facility]  [id number] [place] [address]-
7227    Chronic obstructive pulmonary disease, unspecified COPD 
type J44.9 and History of pneumonia Z87.01

 

 [facility]  [id number] [place] [address] 
[phone]     

 

 [facility]  [id number] [place] [phone]-
2226     

 

 [facility]  [address] [place] [address]-
[phone]     

 

 [facility]  [address] [place] [address]-
0832     

 

 [facility]  [address] [place] [phone]-
[phone]  19 Oct, 2017   

 

 [facility]  [id number] [place] [phone]-
[phone]  17 Oct, 2017  Coronary artery disease involving native coronary artery of 
native heart without angina pectoris I25.10 and Ventricular arrhythmia I49.9

 

 [facility]  [address] [place] [address]-
[phone]  09 Oct, 2017   

 

 [facility]  [id number] [address]-
5731  09 Oct, 2017   

 

 [facility]  [address] [phone]  04 Oct, 2017  Dysuria R30.0 and Acute cystitis without hematuria N30.00

 

 [facility]  [address] [address]-
8880  25 Aug, 2017  Epistaxis R04.0 and Chronic obstructive pulmonary disease, 
unspecified COPD type J44.9

 

 [facility]  [address] [address]-
9899    Fall from other pedestrian conveyance, initial encounter 
V00.891A

 

 [facility]  [address] [address]-
3289    Chronic congestive heart failure, unspecified congestive 
heart failure type I50.9 ; Chronic obstructive pulmonary disease, unspecified 
COPD type J44.9 and Stasis dermatitis of both legs I87.2

 

 [facility]  [address] [address]-
[address]  08 May, 2017  Chronic congestive heart failure, unspecified congestive 
heart failure type I50.9

 

 [facility]  [address] [address]-
3021  05 May, 2017  Coronary artery disease involving native coronary artery of 
native heart without angina pectoris I25.10 ; Chronic congestive heart failure, 
unspecified congestive heart failure type I50.9 and Chronic obstructive 
pulmonary disease, unspecified COPD type J44.9

 

 [facility]  [id number] [address]-
5674     

 

 [facility]  [id number] [place] [address]-
[phone]     

 

 [facility]  [id number] [place] [address] 
[phone]     

 

 [facility]  [id number] [place] [address] 
[phone]  28 Mar, 2017   

 

 [facility]  [address] [place] 
[phone]  20 Mar, 2017  Essential hypertension I10 and Chronic congestive 
heart failure, unspecified congestive heart failure type I50.9

 

 [facility]  [id number] 
[phone]  13 Mar, 2017   

 

 [facility]  [address] [address]-
4613  09 Mar, 2017   

 

 [facility]  [address] [address]-
5019  19 Dec, 2016   

 

 [facility]  [address] [address]-
7342  19 Dec, 2016   

 

 [facility]  [address] [address]-
7796     

 

 [facility]  [address] [address]-
[address]     

 

 [facility]  [address] [address]-
7800     

 

 [facility]  [address] [address]-
4[address]     

 

 [facility]  [address] [address]-
4033    Chronic congestive heart failure, unspecified congestive 
heart failure type I50.9

 

 [facility]  [address] [phone]    Pain of right lower extremity M79.604 ; History of atrial 
fibrillation without current medication Z86.79 and Acute deep vein thrombosis (
DVT) of femoral vein of right lower extremity I82.411

 

 [facility]  [address] [address]-
80[address]     

 

 [facility]  [address] [address]-
1037  22 Aug, 2016   

 

 [facility]  [address] [address]-
9092  22 Aug, 2016  Coronary artery disease involving native coronary artery of 
native heart without angina pectoris I25.10 ; Chronic congestive heart failure, 
unspecified congestive heart failure type I50.9 ; Cardiac defibrillator in 
place Z95.810 and Candidiasis B37.9







IMMUNIZATIONS

No Known Immunizations



SOCIAL HISTORY

Never Assessed



REASON FOR VISIT

F/U from nursing home [facility]



PLAN OF CARE







 Activity  Details









  









 Follow Up  3 Months Reason:







VITAL SIGNS







 Height  65 in  2017

 

 Weight  212.6 lbs  2017

 

 Temperature  97.5 degrees Fahrenheit  2017

 

 Heart Rate  84 bpm  2017

 

 Respiratory Rate  18   2017

 

 BMI  35.37 kg/m2  2017

 

 Blood pressure systolic  130 mmHg  2017

 

 Blood pressure diastolic  80 mmHg  2017







MEDICATIONS







 Medication  Instructions  Dosage  Frequency  Start Date  End Date  Duration  
Status

 

 Nexium 40 MG  Orally Once a day  1 capsule  24h           Active

 

 Mexiletine HCl 150 MG  Orally every 8 hrs  1 capsule  8h           Active

 

 Klor-Con 8 MEQ  Orally Once a day  1 tablet  24h           Active

 

 Digoxin 125 MCG  Orally Once a day  1 tablet  24h           Active

 

 Amiodarone HCl 200 MG  Orally Once a day  1 tablet  24h           Active

 

 ProAir  (90 Base) MCG/ACT  Inhalation 4 times a day  2 puffs as needed  
6h  05 May, 2017     30 days  Active

 

 Metoprolol Tartrate 50 mg  Orally Twice a day  1/2 tablet with food  12h      
     Active

 

 Furosemide 40 MG  Orally Once a day  1 tablet  24h           Active

 

 Aspir-81                    Active

 

 Amlodipine Besylate 5 MG  Orally Once a day  1 tablet  24h           Active







RESULTS

No Results



PROCEDURES







 Procedure  Date Ordered  Result  Body Site

 

 [facility] VISIT ESTABLISHED PATIENT  2017      







INSTRUCTIONS





MEDICATIONS ADMINISTERED

No Known Medications



MEDICAL (GENERAL) HISTORY







 Type  Description  Date

 

 Medical History  hypertension   

 

 Medical History  skin cancer-arms, face   

 

 Medical History  MI   

 

 Medical History  Pneumonia   

 

 Surgical History  heart cath-2 stents, multiple balloons   

 

 Surgical History  open heart surgery  

 

 Surgical History  defibrillator placed  

 

 Hospitalization History  surgery   

 

 Hospitalization History  pneumonia  

 

 Hospitalization History  broken left hip at   March

 

 Hospitalization History  Bronchitis/clinical Pneumonia,hypoxia,sepsis - [facility] 
[facility]  17

## 2018-06-08 NOTE — CONSULTATION-CARDIOLOGY
HPI-Cardiology


Cardiology Consultation:


Date of Consultation


18


Time Seen by Provider:  14:30


Date of Admission





Attending Physician





Admitting Physician


Raúl Gonzalez MD


Consulting Physician


Grace Colunga MD





HPI:


Chief Complaint:


AICD Discharge


Mr. Snowden is a 77 year old male who was brought in by his spouse d/t concerns 

that his defibrillator shocked him.  He was seen in the ED.  He is a poor 

historian.  His spouse is at the bedside.  She reports last night he was 

transferring himself from his w/c to a chair when he fell.  They contacted EMS 

to assist him up.  He scraped his left shoulder.  This morning he was moving 

himself back in his chair by pushing back using his arms.  His spouse states he 

yelled out in pain and his eyes rolled back.  She reports he was unresponsive 

for a few seconds.  He denies this and states he never lost consciousness.  

Nevertheless, she summoned EMS to bring him to the ED to be evaluated.  He does 

have a bedside home montior for he AICD.  His spouse reports the lights on the 

home monitor will change from green to red if he has been shocked.  She reports 

the light never changed from green.  His primary cardiologist is Dr. Puri at 

[facility] in [place], [place].  He reports he saw him in April.  He 

denies any CP, palpitations.  He has chronic mild to mod dyspnea with chronic 

cough.





Review of Systems-Cardiology


Review of Systems


Constitutional:  No chills, No fever


Eyes:  No vision change


Ears/Nose/Throat:  No epistaxis, No recent hearing loss


Respiratory:  As described under HPI


Cardiovascular:  As described under HPI


Gastrointestinal:  No diarrhea, No nausea, No vomiting


Genitourinary:  No dysuria, No hematuria


Skin:  No other (dry, flaky, yellow skin to chest)


Psychiatric/Neurological:  As described under HPI


Hematologic:  No bleeding abnormalities





PMH-Social-Family Hx


Patient Social History


Alcohol Use:  Denies Use


Recreational Drug Use:  No


Former smoker/When Quit:  1988


Type Used:  Cigarettes


2nd Hand Smoke Exposure:  No


Recent Foreign Travel:  No


Recent Infectious Disease Expo:  No





Immunizations Up To Date


Tetanus Booster (TDap):  More than 5yrs


Date of Pneumonia Vaccine:  Oct 1, 2014


Date of Influenza Vaccine:  Nov 10, 2017





Past Medical History


PMH


As described under Assessment.





Family Medical History


Family History:  


Cardiovascular disease


  19 MOTHER


  G8 BROTHER


  G8 SISTER


Cervical cancer


  19 MOTHER





Allergies and Home Medications


Allergies


Coded Allergies:  


     Penicillins (Verified  Allergy, Severe, HIVES, SOB, 18)


     codeine (Verified  Allergy, Severe, SOB, HIVES, 18)





Home Medications


Albuterol Sulfate 18 Gm Hfa.aer.ad, 2 PUFF IH QID PRN for SHORTNESS OF BREATH, (

Reported)


Amiodarone HCl 200 Mg Tablet, 400 MG PO DAILY


   Prescribed by: LAUREN BAUTISTA on 18 1318


Amlodipine Besylate 5 Mg Tablet, 5 MG PO HS, (Reported)


   LAST FILLED 17 #90 


Digoxin 125 Mcg Tablet, 125 MCG PO DAILY, (Reported)


Esomeprazole Magnesium 40 Mg Capsule.dr, 40 MG PO DAILY, (Reported)


Furosemide 40 Mg Tablet, 40 MG PO 0900,1500, (Reported)


Metoprolol Tartrate 50 Mg Tablet, 25 MG PO BID, (Reported)


   TAKES 1/2 OF A (50 MG) TABLET 


Mexiletine HCl 150 Mg Cap, 150 MG PO TID, (Reported)


Potassium Chloride 8 Meq Tablet.er, 8 MEQ PO BID


   Prescribed by: LAUREN BAUTISTA on 18 1318





Patient Home Medication List


Home Medication List Reviewed:  Yes





Physical Exam-Cardiology


Physical Exam


Vital Signs/I&O


Capillary Refill : Less Than 3 Seconds


Constitutional:  AAO x 3, well-developed, well-nourished


HEENT:  PERRL, hard of hearing; No oral hygience is good


Neck:  No carotid bruit; carotid pulses are 2 + bilaterally


Respiratory:  No accessory muscle use, No respiratory distress; chest expansion 

is symmetric, chest is bilaterally symmetric, wheezing (exp wheezes; prolonged 

expiratory phase; lose non-productive cough)


Cardiovascular:  regular rate-rhythm; No JVD; S1 and S2, systolic murmur


Gastrointestinal:  soft, audible bowel sounds


Rectal:  deferred


Extremities:  no lower extremity edema bilateral


Neurologic/Psychiatric:  grossly intact


Skin:  No rash, No ulcerations





Data Review


Labs








Radiology


NAME:   ALEJANDRO SNOWDEN


[email or web address] REC#:   [id number]


ACCOUNT#:   [id number]


PT STATUS:   REG ER


:   1940


PHYSICIAN:   NORBERTO MCCRACKEN MD


ADMIT DATE:   18/ER


***Draft***


Date of Exam:18





CHEST 1 VIEW, AP/PA ONLY








INDICATION: Shortness of air.





COMPARISON: 2017





FINDINGS: Single frontal radiographic view of the chest was


obtained and demonstrates mild cardiomegaly. There is also mild


prominence of pulmonary vasculature. Lungs show persistent


elevation right hemidiaphragm, but otherwise clear. There is no


focal consolidation, large effusion, and no pneumothorax.


Left-sided AICD and sternotomy wires are noted. Bony structures


show no gross acute abnormalities.





IMPRESSION:


1. Moderate cardiomegaly and perhaps mild pulmonary vascular


congestion.





  Dictated on workstation # [id number]








Dict:   18 1317


Trans:   18 1324


 [id number]





Interpreted by:     CARLOS CERRATO MD


Electronically signed by:





A/P-Cardiology


Assessment/Admission Diagnosis


Possible AICD discharge - interrogation pending





CAD - CABG in  in [place], [place] - exact details unkown.  According to stent 

cards which he has with him: 2002 Multi-link 2.5 x 18mm stent to the prox 

RCA.   Cypher 3.5 x 30mm stent to the prox to mid circ





H/O ICM - [facility] AICD implanted by Dr. Puri in [place], [place] 





MPI  showed LVEF 54%





H/O PAF - has been maintained on Amiodarone





Per spouse considered intolerant to OAC d/t bleeding





ENIO with cardioversion in  by Dr. Chin





ENIO of  by Dr. Chin showed LVEF 50%.  Aortic valve calcification without 

stenosis.  Mod to severe MR.





Nocturnal hypoxemia requiring supplemental oxygen





COPD





General debility, w/c dependant











FATOU PIZARRO 2018 14:21

## 2018-06-08 NOTE — DIAGNOSTIC IMAGING REPORT
INDICATION: Shortness of air.



COMPARISON: 11/24/2017



FINDINGS: Single frontal radiographic view of the chest was

obtained and demonstrates mild cardiomegaly. There is also mild

prominence of pulmonary vasculature. Lungs show persistent

elevation right hemidiaphragm, but otherwise clear. There is no

focal consolidation, large effusion, and no pneumothorax.

Left-sided AICD and sternotomy wires are noted. Bony structures

show no gross acute abnormalities.



IMPRESSION:

1. Moderate cardiomegaly and perhaps mild pulmonary vascular

congestion.



Dictated by: 



  Dictated on workstation # [id number]
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
## 2018-06-09 VITALS — DIASTOLIC BLOOD PRESSURE: 81 MMHG | SYSTOLIC BLOOD PRESSURE: 152 MMHG

## 2018-06-09 VITALS — DIASTOLIC BLOOD PRESSURE: 59 MMHG | SYSTOLIC BLOOD PRESSURE: 148 MMHG

## 2018-06-09 LAB
CHOLEST SERPL-MCNC: 155 MG/DL (ref ?–200)
HDLC SERPL-MCNC: 30 MG/DL (ref 40–60)
TRIGL SERPL-MCNC: 112 MG/DL (ref ?–150)
VLDLC SERPL CALC-MCNC: 22 MG/DL (ref 5–40)

## 2018-06-09 RX ADMIN — Medication SCH ML: at 04:57

## 2018-06-09 RX ADMIN — MAGNESIUM SULFATE IN DEXTROSE SCH MLS/HR: 10 INJECTION, SOLUTION INTRAVENOUS at 13:29

## 2018-06-09 RX ADMIN — AMIODARONE HYDROCHLORIDE SCH MG: 200 TABLET ORAL at 08:25

## 2018-06-09 RX ADMIN — MAGNESIUM SULFATE IN DEXTROSE SCH MLS/HR: 10 INJECTION, SOLUTION INTRAVENOUS at 13:30

## 2018-06-09 RX ADMIN — Medication SCH ML: at 14:03

## 2018-06-09 NOTE — PROGRESS NOTE-CARDIOLOGY
Cardiology SOAP Progress Note


Subjective:


No cp or palp


No recurrence or syncope


No shortness of breath


Chronically limited ambulation due to chronic joint and back pain


States he does not drive (has not for years)





Objective:


I&O/Vital Signs











 6/9/18 6/9/18 6/9/18 6/9/18





 04:00 07:00 08:00 08:00


 


Pulse  78  


 


Pulse Ox   96 


 


O2 Delivery Nasal Cannula  Nasal Cannula Nasal Cannula


 


O2 Flow Rate 3.00  3.00 3.00





 6/9/18 6/9/18  





 08:00 08:33  


 


Temp 97.4   


 


Pulse 79   


 


Resp 16   


 


B/P (MAP) 152/81 (104)   


 


Pulse Ox 96   


 


O2 Delivery Room Air Nasal Cannula  


 


O2 Flow Rate  3.00  














 6/9/18





 00:00


 


Intake Total 250 ml


 


Output Total 300 ml


 


Balance -50 ml








Weight (Pounds):  208


Weight (Ounces):  0.0


Weight (Calculated Kilograms):  94.264635


Constitutional:  AAO x 3, well-developed, well-nourished


Respiratory:  No accessory muscle use, No respiratory distress; chest expansion 

is symmetric, chest is bilaterally symmetric, wheezing (exp wheezes; prolonged 

expiratory phase; lose non-productive cough)


Cardiovascular:  regular rate-rhythm; No JVD; S1 and S2, systolic murmur


Gastrointestional:  soft, audible bowel sounds


Extremities:  no lower extremity edema bilateral


Neurologic/Psychiatric:  grossly intact


Skin:  No rash, No ulcerations





Results/Procedures:


Labs


Laboratory Tests


6/8/18 18:02: Troponin I < 0.30


6/9/18 00:20: Troponin I < 0.30


6/9/18 03:30: 


Triglycerides Level 112, Cholesterol Level 155, LDL Cholesterol Direct 118, 

VLDL Cholesterol 22, HDL Cholesterol 30L








A/P:


Assessment:





ICD discharge, appropriate (to treat VF)





CAD - CABG in 1988 in [place], [place] - exact details unkown.  According to stent 

cards which he has with him: June 2002 Multi-link 2.5 x 18mm stent to the prox 

RCA.  2007 Cypher 3.5 x 30mm stent to the prox to mid circ





H/O ICM - [facility] AICD implanted by Dr. Puri in [place], [place] 





MPI 2014 showed LVEF 54%





H/O PAF - has been maintained on Amiodarone





Per spouse: considered intolerant to OAC d/t bleeding





ENIO with cardioversion in 2015 by Dr. Chin





ENIO of 2015 by Dr. Chin showed LVEF 50%.  Aortic valve calcification without 

stenosis.  Mod to severe MR.





Nocturnal hypoxemia requiring supplemental oxygen





COPD, managed by the [facility]





Leucocytosis, managed by the [facility]





General debility, w/c dependent


Plan:





* We discussed his CV issues with him and his wife


* There is no evidence of ac MI, but V Fib could have been due to transient 

ischemia. We have recommended card cath. He has not agreed. He wishes to go 

home. States will f/u with Dr Cervantes, his cardiologist and Dr Puri, his 

electrophysiologist


* We have advised to continue to refrain from driving and operating machinery


* We have increased amiodarone to 400 mg daily


* He understands all of the above and states will comply


* Given borderline low K and Mg at presentation, he is getting K and Mg today


* We have increase K from 8 mEq daily to 8 mEq bid (he is on furosemide 40 bid 

on a chronic basis)











RUBÉN BOATENG MD FACP FAC CCDS Jun 9, 2018 13:12
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
## 2018-07-07 ENCOUNTER — HOSPITAL ENCOUNTER (INPATIENT)
Dept: HOSPITAL 75 - ER | Age: 78
LOS: 5 days | Discharge: NURSING, REHAB OR LONG-TERM CARE | DRG: 871 | End: 2018-07-12
Attending: FAMILY MEDICINE | Admitting: FAMILY MEDICINE
Payer: MEDICARE

## 2018-07-07 VITALS — HEIGHT: 65 IN | WEIGHT: 210 LBS | BODY MASS INDEX: 34.99 KG/M2

## 2018-07-07 VITALS — SYSTOLIC BLOOD PRESSURE: 119 MMHG | DIASTOLIC BLOOD PRESSURE: 60 MMHG

## 2018-07-07 VITALS — SYSTOLIC BLOOD PRESSURE: 133 MMHG | DIASTOLIC BLOOD PRESSURE: 69 MMHG

## 2018-07-07 VITALS — DIASTOLIC BLOOD PRESSURE: 63 MMHG | SYSTOLIC BLOOD PRESSURE: 114 MMHG

## 2018-07-07 VITALS — SYSTOLIC BLOOD PRESSURE: 129 MMHG | DIASTOLIC BLOOD PRESSURE: 64 MMHG

## 2018-07-07 VITALS — DIASTOLIC BLOOD PRESSURE: 59 MMHG | SYSTOLIC BLOOD PRESSURE: 121 MMHG

## 2018-07-07 VITALS — DIASTOLIC BLOOD PRESSURE: 76 MMHG | SYSTOLIC BLOOD PRESSURE: 148 MMHG

## 2018-07-07 VITALS — SYSTOLIC BLOOD PRESSURE: 132 MMHG | DIASTOLIC BLOOD PRESSURE: 62 MMHG

## 2018-07-07 VITALS — DIASTOLIC BLOOD PRESSURE: 64 MMHG | SYSTOLIC BLOOD PRESSURE: 129 MMHG

## 2018-07-07 VITALS — DIASTOLIC BLOOD PRESSURE: 63 MMHG | SYSTOLIC BLOOD PRESSURE: 131 MMHG

## 2018-07-07 DIAGNOSIS — R73.9: ICD-10-CM

## 2018-07-07 DIAGNOSIS — I25.2: ICD-10-CM

## 2018-07-07 DIAGNOSIS — Z95.1: ICD-10-CM

## 2018-07-07 DIAGNOSIS — R26.2: ICD-10-CM

## 2018-07-07 DIAGNOSIS — F03.90: ICD-10-CM

## 2018-07-07 DIAGNOSIS — N39.0: ICD-10-CM

## 2018-07-07 DIAGNOSIS — G62.9: ICD-10-CM

## 2018-07-07 DIAGNOSIS — H40.9: ICD-10-CM

## 2018-07-07 DIAGNOSIS — Z95.810: ICD-10-CM

## 2018-07-07 DIAGNOSIS — J44.0: ICD-10-CM

## 2018-07-07 DIAGNOSIS — K21.9: ICD-10-CM

## 2018-07-07 DIAGNOSIS — N17.9: ICD-10-CM

## 2018-07-07 DIAGNOSIS — I70.201: ICD-10-CM

## 2018-07-07 DIAGNOSIS — Z99.81: ICD-10-CM

## 2018-07-07 DIAGNOSIS — E78.00: ICD-10-CM

## 2018-07-07 DIAGNOSIS — A41.89: Primary | ICD-10-CM

## 2018-07-07 DIAGNOSIS — G47.30: ICD-10-CM

## 2018-07-07 DIAGNOSIS — F32.9: ICD-10-CM

## 2018-07-07 DIAGNOSIS — Z95.5: ICD-10-CM

## 2018-07-07 DIAGNOSIS — Z85.828: ICD-10-CM

## 2018-07-07 DIAGNOSIS — I87.2: ICD-10-CM

## 2018-07-07 DIAGNOSIS — I50.9: ICD-10-CM

## 2018-07-07 DIAGNOSIS — J96.20: ICD-10-CM

## 2018-07-07 DIAGNOSIS — I25.10: ICD-10-CM

## 2018-07-07 DIAGNOSIS — A41.50: ICD-10-CM

## 2018-07-07 DIAGNOSIS — M19.91: ICD-10-CM

## 2018-07-07 DIAGNOSIS — H91.90: ICD-10-CM

## 2018-07-07 DIAGNOSIS — Z87.891: ICD-10-CM

## 2018-07-07 DIAGNOSIS — J18.9: ICD-10-CM

## 2018-07-07 DIAGNOSIS — I11.0: ICD-10-CM

## 2018-07-07 LAB
ALBUMIN SERPL-MCNC: 4.2 GM/DL (ref 3.2–4.5)
ALP SERPL-CCNC: 109 U/L (ref 40–136)
ALT SERPL-CCNC: 21 U/L (ref 0–55)
ANISOCYTOSIS BLD QL SMEAR: SLIGHT
APTT BLD: 29 SEC (ref 24–35)
APTT PPP: YELLOW S
ARTERIAL PATENCY WRIST A: POSITIVE
ARTERIAL PATENCY WRIST A: POSITIVE
BACTERIA #/AREA URNS HPF: (no result) /HPF
BASE EXCESS STD BLDA CALC-SCNC: 4 MMOL/L (ref -2.5–2.5)
BASE EXCESS STD BLDA CALC-SCNC: 4.7 MMOL/L (ref -2.5–2.5)
BASOPHILS # BLD AUTO: 0 10^3/UL (ref 0–0.1)
BASOPHILS NFR BLD AUTO: 0 % (ref 0–10)
BASOPHILS NFR BLD MANUAL: 1 %
BDY SITE: (no result)
BDY SITE: (no result)
BILIRUB SERPL-MCNC: 0.5 MG/DL (ref 0.1–1)
BILIRUB UR QL STRIP: NEGATIVE
BODY TEMPERATURE: 100.8
BODY TEMPERATURE: 99.5
BUN/CREAT SERPL: 9
CALCIUM SERPL-MCNC: 10.3 MG/DL (ref 8.5–10.1)
CHLORIDE SERPL-SCNC: 99 MMOL/L (ref 98–107)
CO2 BLDA CALC-SCNC: 29.1 MMOL/L (ref 21–31)
CO2 BLDA CALC-SCNC: 29.7 MMOL/L (ref 21–31)
CO2 SERPL-SCNC: 29 MMOL/L (ref 21–32)
CREAT SERPL-MCNC: 1.32 MG/DL (ref 0.6–1.3)
DIGOXIN SERPL-MCNC: 0.66 NG/ML (ref 0.8–2)
EOSINOPHIL # BLD AUTO: 0.1 10^3/UL (ref 0–0.3)
EOSINOPHIL NFR BLD AUTO: 0 % (ref 0–10)
EOSINOPHIL NFR BLD MANUAL: 1 %
ERYTHROCYTE [DISTWIDTH] IN BLOOD BY AUTOMATED COUNT: 16.2 % (ref 10–14.5)
FIBRINOGEN PPP-MCNC: (no result) MG/DL
GFR SERPLBLD BASED ON 1.73 SQ M-ARVRAT: 53 ML/MIN
GLUCOSE SERPL-MCNC: 180 MG/DL (ref 70–105)
GLUCOSE UR STRIP-MCNC: NEGATIVE MG/DL
HCT VFR BLD CALC: 47 % (ref 40–54)
HGB BLD-MCNC: 15.3 G/DL (ref 13.3–17.7)
INHALED O2 FLOW RATE: (no result) L/MIN
INHALED O2 FLOW RATE: (no result) L/MIN
INR PPP: 1.1 (ref 0.8–1.4)
KETONES UR QL STRIP: NEGATIVE
LEUKOCYTE ESTERASE UR QL STRIP: (no result)
LYMPHOCYTES # BLD AUTO: 0.9 X 10^3 (ref 1–4)
LYMPHOCYTES NFR BLD AUTO: 6 % (ref 12–44)
MAGNESIUM SERPL-MCNC: 2 MG/DL (ref 1.8–2.4)
MANUAL DIFFERENTIAL PERFORMED BLD QL: YES
MCH RBC QN AUTO: 31 PG (ref 25–34)
MCHC RBC AUTO-ENTMCNC: 33 G/DL (ref 32–36)
MCV RBC AUTO: 95 FL (ref 80–99)
MONOCYTES # BLD AUTO: 0.8 X 10^3 (ref 0–1)
MONOCYTES NFR BLD AUTO: 5 % (ref 0–12)
MONOCYTES NFR BLD: 5 %
NEUTROPHILS # BLD AUTO: 14.4 X 10^3 (ref 1.8–7.8)
NEUTROPHILS NFR BLD AUTO: 89 % (ref 42–75)
NEUTS BAND NFR BLD MANUAL: 87 %
NEUTS BAND NFR BLD: 0 %
NITRITE UR QL STRIP: POSITIVE
PCO2 BLDA: 42 MMHG (ref 35–45)
PCO2 BLDA: 44 MMHG (ref 35–45)
PH BLDA: 7.44 [PH] (ref 7.37–7.43)
PH BLDA: 7.44 [PH] (ref 7.37–7.43)
PH UR STRIP: 7 [PH] (ref 5–9)
PLATELET # BLD: 208 10^3/UL (ref 130–400)
PMV BLD AUTO: 10.5 FL (ref 7.4–10.4)
PO2 BLDA: 133 MMHG (ref 79–93)
PO2 BLDA: 77 MMHG (ref 79–93)
POTASSIUM SERPL-SCNC: 3.8 MMOL/L (ref 3.6–5)
PROT SERPL-MCNC: 8.9 GM/DL (ref 6.4–8.2)
PROT UR QL STRIP: (no result)
PROTHROMBIN TIME: 13.7 SEC (ref 12.2–14.7)
RBC # BLD AUTO: 4.94 10^6/UL (ref 4.35–5.85)
RBC #/AREA URNS HPF: (no result) /HPF
SAO2 % BLDA FROM PO2: 95 % (ref 94–100)
SAO2 % BLDA FROM PO2: 99 % (ref 94–100)
SODIUM SERPL-SCNC: 141 MMOL/L (ref 135–145)
SP GR UR STRIP: 1.01 (ref 1.02–1.02)
UROBILINOGEN UR-MCNC: NORMAL MG/DL
VARIANT LYMPHS NFR BLD MANUAL: 6 %
VENTILATION MODE VENT: NO
VENTILATION MODE VENT: NO
WBC # BLD AUTO: 16.2 10^3/UL (ref 4.3–11)
WBC #/AREA URNS HPF: (no result) /HPF

## 2018-07-07 PROCEDURE — 85007 BL SMEAR W/DIFF WBC COUNT: CPT

## 2018-07-07 PROCEDURE — 80162 ASSAY OF DIGOXIN TOTAL: CPT

## 2018-07-07 PROCEDURE — 83735 ASSAY OF MAGNESIUM: CPT

## 2018-07-07 PROCEDURE — 85025 COMPLETE CBC W/AUTO DIFF WBC: CPT

## 2018-07-07 PROCEDURE — 94660 CPAP INITIATION&MGMT: CPT

## 2018-07-07 PROCEDURE — 96374 THER/PROPH/DIAG INJ IV PUSH: CPT

## 2018-07-07 PROCEDURE — 83605 ASSAY OF LACTIC ACID: CPT

## 2018-07-07 PROCEDURE — 81000 URINALYSIS NONAUTO W/SCOPE: CPT

## 2018-07-07 PROCEDURE — 87077 CULTURE AEROBIC IDENTIFY: CPT

## 2018-07-07 PROCEDURE — 82805 BLOOD GASES W/O2 SATURATION: CPT

## 2018-07-07 PROCEDURE — 36600 WITHDRAWAL OF ARTERIAL BLOOD: CPT

## 2018-07-07 PROCEDURE — 96361 HYDRATE IV INFUSION ADD-ON: CPT

## 2018-07-07 PROCEDURE — 87040 BLOOD CULTURE FOR BACTERIA: CPT

## 2018-07-07 PROCEDURE — 85027 COMPLETE CBC AUTOMATED: CPT

## 2018-07-07 PROCEDURE — 87088 URINE BACTERIA CULTURE: CPT

## 2018-07-07 PROCEDURE — 80053 COMPREHEN METABOLIC PANEL: CPT

## 2018-07-07 PROCEDURE — 94640 AIRWAY INHALATION TREATMENT: CPT

## 2018-07-07 PROCEDURE — 71045 X-RAY EXAM CHEST 1 VIEW: CPT

## 2018-07-07 PROCEDURE — 87186 SC STD MICRODIL/AGAR DIL: CPT

## 2018-07-07 PROCEDURE — 36415 COLL VENOUS BLD VENIPUNCTURE: CPT

## 2018-07-07 PROCEDURE — 84484 ASSAY OF TROPONIN QUANT: CPT

## 2018-07-07 PROCEDURE — 85610 PROTHROMBIN TIME: CPT

## 2018-07-07 PROCEDURE — 96375 TX/PRO/DX INJ NEW DRUG ADDON: CPT

## 2018-07-07 PROCEDURE — 94664 DEMO&/EVAL PT USE INHALER: CPT

## 2018-07-07 PROCEDURE — 83880 ASSAY OF NATRIURETIC PEPTIDE: CPT

## 2018-07-07 PROCEDURE — 83036 HEMOGLOBIN GLYCOSYLATED A1C: CPT

## 2018-07-07 PROCEDURE — 94760 N-INVAS EAR/PLS OXIMETRY 1: CPT

## 2018-07-07 PROCEDURE — 80048 BASIC METABOLIC PNL TOTAL CA: CPT

## 2018-07-07 PROCEDURE — 82962 GLUCOSE BLOOD TEST: CPT

## 2018-07-07 PROCEDURE — 85730 THROMBOPLASTIN TIME PARTIAL: CPT

## 2018-07-07 PROCEDURE — 84100 ASSAY OF PHOSPHORUS: CPT

## 2018-07-07 PROCEDURE — 93005 ELECTROCARDIOGRAM TRACING: CPT

## 2018-07-07 PROCEDURE — 93041 RHYTHM ECG TRACING: CPT

## 2018-07-07 RX ADMIN — DEXTROSE MONOHYDRATE, SODIUM CHLORIDE, AND POTASSIUM CHLORIDE SCH MLS/HR: 50; 4.5; 1.49 INJECTION, SOLUTION INTRAVENOUS at 22:02

## 2018-07-07 NOTE — DIAGNOSTIC IMAGING REPORT
INDICATION: Chills x1-2 hours



Frontal chest obtained at 7:40 p.m. and is compared with 6/8/18.



There is post sternotomy change with cardiomegaly with unchanged

pacemaker device. There is some infiltrate in the right

infrahilar region which is new compared to the prior study. Left

lung is grossly clear. There is no pneumothorax or gross pleural

fluid.



IMPRESSION:



Cardiomegaly with postoperative changes. There is mild central

vascular congestion. There is new infiltrate in the right lung

base, pneumonia is not excluded. Followup is recommended.



Dictated by: 



  Dictated on workstation # [id number]
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
## 2018-07-08 VITALS — DIASTOLIC BLOOD PRESSURE: 53 MMHG | SYSTOLIC BLOOD PRESSURE: 117 MMHG

## 2018-07-08 VITALS — SYSTOLIC BLOOD PRESSURE: 102 MMHG | DIASTOLIC BLOOD PRESSURE: 57 MMHG

## 2018-07-08 VITALS — DIASTOLIC BLOOD PRESSURE: 47 MMHG | SYSTOLIC BLOOD PRESSURE: 106 MMHG

## 2018-07-08 VITALS — DIASTOLIC BLOOD PRESSURE: 51 MMHG | SYSTOLIC BLOOD PRESSURE: 98 MMHG

## 2018-07-08 VITALS — DIASTOLIC BLOOD PRESSURE: 53 MMHG | SYSTOLIC BLOOD PRESSURE: 120 MMHG

## 2018-07-08 VITALS — SYSTOLIC BLOOD PRESSURE: 101 MMHG | DIASTOLIC BLOOD PRESSURE: 49 MMHG

## 2018-07-08 VITALS — DIASTOLIC BLOOD PRESSURE: 44 MMHG | SYSTOLIC BLOOD PRESSURE: 101 MMHG

## 2018-07-08 VITALS — DIASTOLIC BLOOD PRESSURE: 49 MMHG | SYSTOLIC BLOOD PRESSURE: 107 MMHG

## 2018-07-08 VITALS — SYSTOLIC BLOOD PRESSURE: 98 MMHG | DIASTOLIC BLOOD PRESSURE: 67 MMHG

## 2018-07-08 VITALS — SYSTOLIC BLOOD PRESSURE: 115 MMHG | DIASTOLIC BLOOD PRESSURE: 88 MMHG

## 2018-07-08 VITALS — DIASTOLIC BLOOD PRESSURE: 53 MMHG | SYSTOLIC BLOOD PRESSURE: 114 MMHG

## 2018-07-08 VITALS — DIASTOLIC BLOOD PRESSURE: 76 MMHG | SYSTOLIC BLOOD PRESSURE: 148 MMHG

## 2018-07-08 VITALS — SYSTOLIC BLOOD PRESSURE: 120 MMHG | DIASTOLIC BLOOD PRESSURE: 69 MMHG

## 2018-07-08 VITALS — SYSTOLIC BLOOD PRESSURE: 98 MMHG | DIASTOLIC BLOOD PRESSURE: 48 MMHG

## 2018-07-08 VITALS — SYSTOLIC BLOOD PRESSURE: 107 MMHG | DIASTOLIC BLOOD PRESSURE: 49 MMHG

## 2018-07-08 VITALS — SYSTOLIC BLOOD PRESSURE: 105 MMHG | DIASTOLIC BLOOD PRESSURE: 49 MMHG

## 2018-07-08 VITALS — SYSTOLIC BLOOD PRESSURE: 108 MMHG | DIASTOLIC BLOOD PRESSURE: 47 MMHG

## 2018-07-08 VITALS — DIASTOLIC BLOOD PRESSURE: 50 MMHG | SYSTOLIC BLOOD PRESSURE: 103 MMHG

## 2018-07-08 VITALS — DIASTOLIC BLOOD PRESSURE: 49 MMHG | SYSTOLIC BLOOD PRESSURE: 103 MMHG

## 2018-07-08 VITALS — SYSTOLIC BLOOD PRESSURE: 113 MMHG | DIASTOLIC BLOOD PRESSURE: 48 MMHG

## 2018-07-08 VITALS — SYSTOLIC BLOOD PRESSURE: 110 MMHG | DIASTOLIC BLOOD PRESSURE: 50 MMHG

## 2018-07-08 VITALS — DIASTOLIC BLOOD PRESSURE: 44 MMHG | SYSTOLIC BLOOD PRESSURE: 96 MMHG

## 2018-07-08 VITALS — DIASTOLIC BLOOD PRESSURE: 55 MMHG | SYSTOLIC BLOOD PRESSURE: 108 MMHG

## 2018-07-08 VITALS — SYSTOLIC BLOOD PRESSURE: 113 MMHG | DIASTOLIC BLOOD PRESSURE: 90 MMHG

## 2018-07-08 VITALS — SYSTOLIC BLOOD PRESSURE: 111 MMHG | DIASTOLIC BLOOD PRESSURE: 56 MMHG

## 2018-07-08 VITALS — DIASTOLIC BLOOD PRESSURE: 47 MMHG | SYSTOLIC BLOOD PRESSURE: 101 MMHG

## 2018-07-08 VITALS — DIASTOLIC BLOOD PRESSURE: 45 MMHG | SYSTOLIC BLOOD PRESSURE: 109 MMHG

## 2018-07-08 VITALS — SYSTOLIC BLOOD PRESSURE: 92 MMHG | DIASTOLIC BLOOD PRESSURE: 41 MMHG

## 2018-07-08 VITALS — DIASTOLIC BLOOD PRESSURE: 55 MMHG | SYSTOLIC BLOOD PRESSURE: 109 MMHG

## 2018-07-08 VITALS — DIASTOLIC BLOOD PRESSURE: 49 MMHG | SYSTOLIC BLOOD PRESSURE: 104 MMHG

## 2018-07-08 VITALS — SYSTOLIC BLOOD PRESSURE: 114 MMHG | DIASTOLIC BLOOD PRESSURE: 53 MMHG

## 2018-07-08 VITALS — SYSTOLIC BLOOD PRESSURE: 116 MMHG | DIASTOLIC BLOOD PRESSURE: 60 MMHG

## 2018-07-08 VITALS — SYSTOLIC BLOOD PRESSURE: 107 MMHG | DIASTOLIC BLOOD PRESSURE: 48 MMHG

## 2018-07-08 VITALS — SYSTOLIC BLOOD PRESSURE: 114 MMHG | DIASTOLIC BLOOD PRESSURE: 52 MMHG

## 2018-07-08 VITALS — SYSTOLIC BLOOD PRESSURE: 116 MMHG | DIASTOLIC BLOOD PRESSURE: 51 MMHG

## 2018-07-08 VITALS — DIASTOLIC BLOOD PRESSURE: 46 MMHG | SYSTOLIC BLOOD PRESSURE: 112 MMHG

## 2018-07-08 VITALS — SYSTOLIC BLOOD PRESSURE: 116 MMHG | DIASTOLIC BLOOD PRESSURE: 57 MMHG

## 2018-07-08 VITALS — DIASTOLIC BLOOD PRESSURE: 63 MMHG | SYSTOLIC BLOOD PRESSURE: 98 MMHG

## 2018-07-08 VITALS — SYSTOLIC BLOOD PRESSURE: 116 MMHG | DIASTOLIC BLOOD PRESSURE: 62 MMHG

## 2018-07-08 VITALS — SYSTOLIC BLOOD PRESSURE: 111 MMHG | DIASTOLIC BLOOD PRESSURE: 59 MMHG

## 2018-07-08 VITALS — SYSTOLIC BLOOD PRESSURE: 115 MMHG | DIASTOLIC BLOOD PRESSURE: 60 MMHG

## 2018-07-08 VITALS — SYSTOLIC BLOOD PRESSURE: 106 MMHG | DIASTOLIC BLOOD PRESSURE: 54 MMHG

## 2018-07-08 VITALS — DIASTOLIC BLOOD PRESSURE: 45 MMHG | SYSTOLIC BLOOD PRESSURE: 111 MMHG

## 2018-07-08 VITALS — SYSTOLIC BLOOD PRESSURE: 120 MMHG | DIASTOLIC BLOOD PRESSURE: 58 MMHG

## 2018-07-08 VITALS — SYSTOLIC BLOOD PRESSURE: 122 MMHG | DIASTOLIC BLOOD PRESSURE: 61 MMHG

## 2018-07-08 VITALS — SYSTOLIC BLOOD PRESSURE: 106 MMHG | DIASTOLIC BLOOD PRESSURE: 46 MMHG

## 2018-07-08 VITALS — DIASTOLIC BLOOD PRESSURE: 58 MMHG | SYSTOLIC BLOOD PRESSURE: 120 MMHG

## 2018-07-08 LAB
ALBUMIN SERPL-MCNC: 3.5 GM/DL (ref 3.2–4.5)
ALP SERPL-CCNC: 77 U/L (ref 40–136)
ALT SERPL-CCNC: 17 U/L (ref 0–55)
BASOPHILS # BLD AUTO: 0 10^3/UL (ref 0–0.1)
BASOPHILS NFR BLD AUTO: 0 % (ref 0–10)
BILIRUB SERPL-MCNC: 0.6 MG/DL (ref 0.1–1)
BUN/CREAT SERPL: 10
CALCIUM SERPL-MCNC: 9.1 MG/DL (ref 8.5–10.1)
CHLORIDE SERPL-SCNC: 100 MMOL/L (ref 98–107)
CO2 SERPL-SCNC: 22 MMOL/L (ref 21–32)
CREAT SERPL-MCNC: 1.63 MG/DL (ref 0.6–1.3)
EOSINOPHIL # BLD AUTO: 0 10^3/UL (ref 0–0.3)
EOSINOPHIL NFR BLD AUTO: 0 % (ref 0–10)
ERYTHROCYTE [DISTWIDTH] IN BLOOD BY AUTOMATED COUNT: 15.9 % (ref 10–14.5)
GFR SERPLBLD BASED ON 1.73 SQ M-ARVRAT: 41 ML/MIN
GLUCOSE SERPL-MCNC: 409 MG/DL (ref 70–105)
HCT VFR BLD CALC: 40 % (ref 40–54)
HGB BLD-MCNC: 13.2 G/DL (ref 13.3–17.7)
LYMPHOCYTES # BLD AUTO: 0.5 X 10^3 (ref 1–4)
LYMPHOCYTES NFR BLD AUTO: 2 % (ref 12–44)
MAGNESIUM SERPL-MCNC: 1.7 MG/DL (ref 1.8–2.4)
MANUAL DIFFERENTIAL PERFORMED BLD QL: YES
MCH RBC QN AUTO: 32 PG (ref 25–34)
MCHC RBC AUTO-ENTMCNC: 33 G/DL (ref 32–36)
MCV RBC AUTO: 95 FL (ref 80–99)
MONOCYTES # BLD AUTO: 0.3 X 10^3 (ref 0–1)
MONOCYTES NFR BLD AUTO: 2 % (ref 0–12)
MONOCYTES NFR BLD: 2 %
NEUTROPHILS # BLD AUTO: 20.3 X 10^3 (ref 1.8–7.8)
NEUTROPHILS NFR BLD AUTO: 96 % (ref 42–75)
NEUTS BAND NFR BLD MANUAL: 97 %
PHOSPHATE SERPL-MCNC: 1.2 MG/DL (ref 2.3–4.7)
PLATELET # BLD: 180 10^3/UL (ref 130–400)
PMV BLD AUTO: 10.9 FL (ref 7.4–10.4)
POTASSIUM SERPL-SCNC: 3.7 MMOL/L (ref 3.6–5)
PROT SERPL-MCNC: 7.4 GM/DL (ref 6.4–8.2)
RBC # BLD AUTO: 4.18 10^6/UL (ref 4.35–5.85)
RBC MORPH BLD: NORMAL
SODIUM SERPL-SCNC: 136 MMOL/L (ref 135–145)
VARIANT LYMPHS NFR BLD MANUAL: 1 %
WBC # BLD AUTO: 21.1 10^3/UL (ref 4.3–11)

## 2018-07-08 RX ADMIN — MEXILETINE HYDROCHLORIDE SCH MG: 150 CAPSULE ORAL at 20:00

## 2018-07-08 RX ADMIN — SODIUM CHLORIDE SCH MLS/HR: 900 INJECTION INTRAVENOUS at 19:59

## 2018-07-08 RX ADMIN — INSULIN ASPART SCH UNIT: 100 INJECTION, SOLUTION INTRAVENOUS; SUBCUTANEOUS at 23:46

## 2018-07-08 RX ADMIN — MAGNESIUM SULFATE IN DEXTROSE SCH MLS/HR: 10 INJECTION, SOLUTION INTRAVENOUS at 05:12

## 2018-07-08 RX ADMIN — INSULIN ASPART SCH UNIT: 100 INJECTION, SOLUTION INTRAVENOUS; SUBCUTANEOUS at 07:47

## 2018-07-08 RX ADMIN — INSULIN ASPART SCH UNIT: 100 INJECTION, SOLUTION INTRAVENOUS; SUBCUTANEOUS at 20:04

## 2018-07-08 RX ADMIN — POTASSIUM CHLORIDE SCH MLS/HR: 200 INJECTION, SOLUTION INTRAVENOUS at 04:37

## 2018-07-08 RX ADMIN — IPRATROPIUM BROMIDE AND ALBUTEROL SULFATE SCH ML: .5; 3 SOLUTION RESPIRATORY (INHALATION) at 22:13

## 2018-07-08 RX ADMIN — POTASSIUM CHLORIDE SCH MEQ: 600 CAPSULE, EXTENDED RELEASE ORAL at 16:18

## 2018-07-08 RX ADMIN — IPRATROPIUM BROMIDE AND ALBUTEROL SULFATE SCH ML: .5; 3 SOLUTION RESPIRATORY (INHALATION) at 06:28

## 2018-07-08 RX ADMIN — IPRATROPIUM BROMIDE AND ALBUTEROL SULFATE SCH ML: .5; 3 SOLUTION RESPIRATORY (INHALATION) at 18:37

## 2018-07-08 RX ADMIN — SODIUM CHLORIDE SCH MLS/HR: 900 INJECTION, SOLUTION INTRAVENOUS at 11:23

## 2018-07-08 RX ADMIN — IPRATROPIUM BROMIDE AND ALBUTEROL SULFATE SCH ML: .5; 3 SOLUTION RESPIRATORY (INHALATION) at 14:43

## 2018-07-08 RX ADMIN — POTASSIUM CHLORIDE SCH MEQ: 1500 TABLET, EXTENDED RELEASE ORAL at 04:37

## 2018-07-08 RX ADMIN — INSULIN ASPART SCH UNIT: 100 INJECTION, SOLUTION INTRAVENOUS; SUBCUTANEOUS at 16:18

## 2018-07-08 RX ADMIN — METHYLPREDNISOLONE SODIUM SUCCINATE SCH MG: 125 INJECTION, POWDER, FOR SOLUTION INTRAMUSCULAR; INTRAVENOUS at 02:23

## 2018-07-08 RX ADMIN — MAGNESIUM SULFATE IN DEXTROSE SCH MLS/HR: 10 INJECTION, SOLUTION INTRAVENOUS at 06:15

## 2018-07-08 RX ADMIN — INSULIN DETEMIR SCH UNIT: 100 INJECTION, SOLUTION SUBCUTANEOUS at 14:46

## 2018-07-08 RX ADMIN — IPRATROPIUM BROMIDE AND ALBUTEROL SULFATE SCH ML: .5; 3 SOLUTION RESPIRATORY (INHALATION) at 02:00

## 2018-07-08 RX ADMIN — DEXTROSE MONOHYDRATE, SODIUM CHLORIDE, AND POTASSIUM CHLORIDE SCH MLS/HR: 50; 4.5; 1.49 INJECTION, SOLUTION INTRAVENOUS at 07:37

## 2018-07-08 RX ADMIN — IPRATROPIUM BROMIDE AND ALBUTEROL SULFATE SCH ML: .5; 3 SOLUTION RESPIRATORY (INHALATION) at 10:32

## 2018-07-08 RX ADMIN — MAGNESIUM SULFATE IN DEXTROSE SCH MLS/HR: 10 INJECTION, SOLUTION INTRAVENOUS at 04:37

## 2018-07-08 RX ADMIN — INSULIN ASPART SCH UNIT: 100 INJECTION, SOLUTION INTRAVENOUS; SUBCUTANEOUS at 12:01

## 2018-07-08 RX ADMIN — ENOXAPARIN SODIUM SCH MG: 100 INJECTION SUBCUTANEOUS at 09:09

## 2018-07-08 RX ADMIN — METHYLPREDNISOLONE SODIUM SUCCINATE SCH MG: 125 INJECTION, POWDER, FOR SOLUTION INTRAMUSCULAR; INTRAVENOUS at 06:16

## 2018-07-08 RX ADMIN — FUROSEMIDE SCH MG: 40 TABLET ORAL at 14:44

## 2018-07-08 NOTE — DIAGNOSTIC IMAGING REPORT
EXAM: Portable erect AP chest at 3:22 



INDICATION: Respiratory distress



When compared to the prior exam of 07/07/2018, there does not

appear to have been any significant change. The heart is stable

in size. There is still mild pulmonary congestion. There is a

small amount of atelectasis/infiltrate and fluid partially

obscuring the right lung base. The mediastinum is not widened.

The osseous structures are intact. The left-sided pacemaker seen

previously is again evident and no different.



IMPRESSION:

Stable chest. There has been no adverse change since the prior

exam.



Dictated by: 



  Dictated on workstation # [id number]

## 2018-07-08 NOTE — HISTORY & PHYSICIAL ([facility])
HPI


History of Present Illness:


This is a 76yo male, patient of Dr. Gonzalez's who presented to  ER w/ c/o 

feeling weak and fatigued for the past week.  Pt denies significant dyspnea 

though he was noted to be in respiratory distress in the ER.  Pt reports he had 

some burning with urination this past week.  Unsure if he has had a fever.  

Denies cough or increased sputum production, denies CP or abdominal pain.


Source:  patient


Date seen by provider:  2018


Time Seen by Provider:  08:00


Attending Physician


Kadi Chowdhury DO


PCP


Raúl Gonzalez MD


Consult





Date of Admission


2018 at 20:15





Home Medications


Home Medications


Reviewed patient Home Medication Reconciliation performed by pharmacy 

medication reconciliations technician and/or nursing.


Patients Allergies have been reviewed.





Allergies


Coded Allergies:  


     Penicillins (Verified  Allergy, Severe, HIVES, SOB, 18)


     codeine (Verified  Allergy, Severe, SOB, HIVES, 18)





PMH-Social-Family Hx


Patient Social History


Alcohol Use:  Denies Use


Recreational Drug Use:  No


Smoking Status:  Former Smoker


Former smoker/When Quit:  1988


Type Used:  Cigarettes


2nd Hand Smoke Exposure:  No


Recent Foreign Travel:  No


Contact w/other who traveled:  No


Recent Hopitalizations:  No


Recent Infectious Disease Expo:  No


Physical Abuse Screen:  No


Sexual Abuse:  No





Immunizations Up To Date


Tetanus Booster (TDap):  More than 5yrs


Date of Pneumonia Vaccine:  Oct 1, 2014


Date of Influenza Vaccine:  Nov 10, 2017





Past Medical History





CAD s/p CABG and stent x2


CHF


Hypertension


COPD


hx of ventricular arrhythmia s/p defibrillator placement


Atherosclerosis of the R leg


Stasis dematitis





Family Medical History


Significant Family History:  No Pertinent Family Hx


Family History:  


Cardiovascular disease


  19 MOTHER


  G8 BROTHER


  G8 SISTER


Cervical cancer


  19 MOTHER





Review of Systems ([facility])


Constitutional:  fever, malaise, weakness


EENTM:  see HPI


Respiratory:  short of breath (chronic)


Cardiovascular:  No chest pain, No palpitations


Gastrointestinal:  no symptoms reported


Genitourinary:  dysuria


Musculoskeletal:  no symptoms reported


Skin:  no symptoms reported


Psychiatric/Neurological:  No Symptoms Reported





Reviewed Test Results


Reviewed Test Results


Lab


Laboratory Tests


18 19:26: 


Urine Color YELLOW, Urine Clarity SLIGHTLY CLOUDY, Urine pH 7, Urine Specific 

Gravity 1.010L, Urine Protein 1+H, Urine Glucose (UA) NEGATIVE, Urine Ketones 

NEGATIVE, Urine Nitrite POSITIVEH, Urine Bilirubin NEGATIVE, Urine Urobilinogen 

NORMAL, Urine Leukocyte Esterase 3+H, Urine RBC (Auto) 4+H, Urine RBC 5-10H, 

Urine WBC 50-100H, Urine Crystals NONE, Urine Bacteria LARGEH, Urine Casts NONE

, Urine Mucus NEGATIVE, Urine Culture Indicated YES


18 19:28: 


White Blood Count 16.2H, Red Blood Count 4.94, Hemoglobin 15.3, Hematocrit 47, 

Mean Corpuscular Volume 95, Mean Corpuscular Hemoglobin 31, Mean Corpuscular 

Hemoglobin Concent 33, Red Cell Distribution Width 16.2H, Platelet Count 208, 

Mean Platelet Volume 10.5H, Neutrophils (%) (Auto) 89H, Lymphocytes (%) (Auto) 

6L, Monocytes (%) (Auto) 5, Eosinophils (%) (Auto) 0, Basophils (%) (Auto) 0, 

Neutrophils # (Auto) 14.4H, Lymphocytes # (Auto) 0.9L, Monocytes # (Auto) 0.8, 

Eosinophils # (Auto) 0.1, Basophils # (Auto) 0.0, Neutrophils % (Manual) 87, 

Lymphocytes % (Manual) 6, Monocytes % (Manual) 5, Eosinophils % (Manual) 1, 

Basophils % (Manual) 1, Band Neutrophils 0, Anisocytosis SLIGHT, Prothrombin 

Time 13.7, INR Comment 1.1, Activated Partial Thromboplast Time 29, Sodium 

Level 141, Potassium Level 3.8, Chloride Level 99, Carbon Dioxide Level 29, 

Anion Gap 13, Blood Urea Nitrogen 12, Creatinine 1.32H, Estimat Glomerular 

Filtration Rate 53, BUN/Creatinine Ratio 9, Glucose Level 180H, Lactic Acid 

Level 2.66*H, Calcium Level 10.3H, Magnesium Level 2.0, Total Bilirubin 0.5, 

Aspartate Amino Transf (AST/SGOT) 25, Alanine Aminotransferase (ALT/SGPT) 21, 

Alkaline Phosphatase 109, Troponin I < 0.30, B-Type Natriuretic Peptide 115.7H, 

Total Protein 8.9H, Albumin 4.2, Digoxin Level 0.66L


18 19:54: 


Blood Gas Puncture Site LEFT RADIAL, Blood Gas Patient Temperature 100.8, 

Arterial Blood pH 7.44H, Arterial Blood Partial Pressure CO2 44, Arterial Blood 

Partial Pressure O2 77L, Arterial Blood HCO3 29H, Arterial Blood Total CO2 29.7

, Arterial Blood Oxygen Saturation 95, Arterial Blood Base Excess 4.7H, Guido 

Test POSITIVE, Blood Gas Ventilator Setting NO, Blood Gas Inspired Oxygen 4L


18 21:36: Lactic Acid Level 2.17*H


18 21:47: 


Blood Gas Puncture Site LEFT RADIAL, Blood Gas Patient Temperature 99.5, 

Arterial Blood pH 7.44H, Arterial Blood Partial Pressure CO2 42, Arterial Blood 

Partial Pressure O2 133H, Arterial Blood HCO3 28H, Arterial Blood Total CO2 29.1

, Arterial Blood Oxygen Saturation 99, Arterial Blood Base Excess 4.0H, Guido 

Test POSITIVE, Blood Gas Ventilator Setting NO, Blood Gas Inspired Oxygen 50% 

CPAP


18 03:15: 


White Blood Count 21.1H, Red Blood Count 4.18L, Hemoglobin 13.2L, Hematocrit 40

, Mean Corpuscular Volume 95, Mean Corpuscular Hemoglobin 32, Mean Corpuscular 

Hemoglobin Concent 33, Red Cell Distribution Width 15.9H, Platelet Count 180, 

Mean Platelet Volume 10.9H, Neutrophils (%) (Auto) 96H, Lymphocytes (%) (Auto) 

2L, Monocytes (%) (Auto) 2, Eosinophils (%) (Auto) 0, Basophils (%) (Auto) 0, 

Neutrophils # (Auto) 20.3H, Lymphocytes # (Auto) 0.5L, Monocytes # (Auto) 0.3, 

Eosinophils # (Auto) 0.0, Basophils # (Auto) 0.0, Neutrophils % (Manual) 97, 

Lymphocytes % (Manual) 1, Monocytes % (Manual) 2, Blood Morphology Comment 

NORMAL, Sodium Level 136, Potassium Level 3.7, Chloride Level 100, Carbon 

Dioxide Level 22, Anion Gap 14, Blood Urea Nitrogen 17, Creatinine 1.63H, 

Estimat Glomerular Filtration Rate 41, BUN/Creatinine Ratio 10, Glucose Level 

409*H, Calcium Level 9.1, Phosphorus Level 1.2L, Magnesium Level 1.7L, Total 

Bilirubin 0.6, Aspartate Amino Transf (AST/SGOT) 25, Alanine Aminotransferase (

ALT/SGPT) 17, Alkaline Phosphatase 77, Troponin I < 0.30, Total Protein 7.4, 

Albumin 3.5


18 07:42: Glucometer 378H


18 11:57: Glucometer 366H





Microbiology


18 Blood Culture - Preliminary, Resulted


         Gram Negative Tomas


         See Comments


Radiology


Date of Exam:   18





CHEST 1 VIEW, AP/PA ONLY


 





INDICATION: Chills x1-2 hours





Frontal chest obtained at 7:40 p.m. and is compared with 18.





There is post sternotomy change with cardiomegaly with unchanged


pacemaker device. There is some infiltrate in the right


infrahilar region which is new compared to the prior study. Left


lung is grossly clear. There is no pneumothorax or gross pleural


fluid.





IMPRESSION:





Cardiomegaly with postoperative changes. There is mild central


vascular congestion. There is new infiltrate in the right lung


base, pneumonia is not excluded. Followup is recommended.





Physical Exam-([facility])


Physical Exam


Vital Signs





 VS - Last 72 Hours, by Label








 18





 19:25 19:25 19:40 20:17


 


Temp  100.8  


 


Pulse  108  124


 


Resp  24  41


 


B/P (MAP)  213/112 (145)  


 


Pulse Ox  97 96 97


 


O2 Delivery Nasal Cannula Nasal Cannula Nasal Cannula 


 


O2 Flow Rate  3.00 3.00 60.00


 


    





 18





 21:14 21:30 21:30 21:36


 


Temp 102.8 99.5  


 


Pulse 116 108 111 110


 


Resp 18  36 


 


B/P (MAP) 159/91 148/76 (100)  


 


Pulse Ox 95  96 


 


O2 Delivery NIV CPAP NIV CPAP  


 


O2 Flow Rate  50.00 50.00 


 


    





 18





 21:53 22:00 22:15 22:30


 


Temp 98.8   


 


Pulse  103 102 101


 


Resp  33 29 35


 


B/P (MAP)  129/64 (85) 133/69 (90) 132/62 (85)


 


Pulse Ox  95 96 96


 


O2 Delivery  NIV CPAP NIV CPAP NIV CPAP


 


O2 Flow Rate  50.00 50.00 50.00


 


    





 18





 22:41 22:45 23:00 23:15


 


Pulse  95 98 96


 


Resp  25 30 25


 


B/P (MAP)  129/64 (85) 131/63 (85) 114/63 (80)


 


Pulse Ox  95 96 95


 


O2 Delivery NIV CPAP NIV CPAP NIV CPAP NIV CPAP


 


O2 Flow Rate  50.00 50.00 50.00


 


FiO2 50   





 7/7/18 7//18 7/7/18 7/8/18





 23:30 23:41 23:45 00:00


 


Temp  97.7  


 


Pulse 93  94 


 


Resp 20  31 


 


B/P (MAP) 119/60 (79)  121/59 (79) 


 


Pulse Ox 95  95 95


 


O2 Delivery NIV CPAP  NIV CPAP NIV CPAP


 


O2 Flow Rate 50.00  50.00 50.00


 


    





 7/8/18 7/8/18 7/8/18 7/8/18





 00:00 00:01 00:15 00:30


 


Pulse 94 78 87 87


 


Resp 38 19 23 22


 


B/P (MAP) 120/58 (78)  116/57 (76) 114/53 (73)


 


Pulse Ox 95 96 95 94


 


O2 Delivery NIV CPAP  NIV CPAP NIV CPAP


 


O2 Flow Rate 50.00 35.00 50.00 50.00





 7/8/18 7/8/18 7/8/18 7/8/18





 00:31 00:34 00:45 01:00


 


Temp  97.6  


 


Pulse 111  86 90


 


Resp   24 21


 


B/P (MAP)   102/57 (72) 103/49 (67)


 


Pulse Ox 96  96 96


 


O2 Delivery   NIV CPAP NIV CPAP


 


O2 Flow Rate   50.00 50.00


 


    





 7/8/18 7/8/18 7/8/18 7/8/18





 01:00 01:15 01:30 01:45


 


Pulse 84 90 86 86


 


Resp  21 23 21


 


B/P (MAP)  105/49 (67) 98/48 (65) 98/51 (67)


 


Pulse Ox  96 95 95


 


O2 Delivery  NIV CPAP NIV CPAP NIV CPAP


 


O2 Flow Rate  50.00 50.00 50.00





 7/8/18 7/8/18 7/8/18 7/8/18





 02:00 02:00 02:15 02:30


 


Pulse 87 80 80 79


 


Resp 21 33 20 34


 


B/P (MAP) 91/46 (61)  106/54 (71) 122/61 (81)


 


Pulse Ox 95 96 95 96


 


O2 Delivery NIV CPAP  NIV CPAP NIV CPAP


 


O2 Flow Rate 50.00 35.00 35.00 35.00





 7/8/18 7/8/18 7/8/18 7/8/18





 02:45 03:00 03:15 03:30


 


Pulse 81 82 91 79


 


Resp 24 19 35 27


 


B/P (MAP) 111/56 (74) 116/51 (72) 120/69 (86) 114/53 (73)


 


Pulse Ox 98 97 98 96


 


O2 Delivery NIV CPAP NIV CPAP NIV CPAP NIV CPAP


 


O2 Flow Rate 35.00 35.00 35.00 35.00





 7/8/18 7/8/18 7/8/18 7/8/18





 03:45 03:57 04:00 04:00


 


Temp    97.9


 


Pulse 78  78 


 


Resp 24  19 


 


B/P (MAP) 108/55 (72)  103/50 (67) 


 


Pulse Ox 96 96 96 


 


O2 Delivery NIV CPAP NIV CPAP NIV CPAP 


 


O2 Flow Rate 35.00 35.00 35.00 


 


    





 7/8/18 7/8/18 7/8/18 7//18





 04:15 04:30 04:45 05:00


 


Pulse 84 78 78 88


 


Resp 30 18 18 19


 


B/P (MAP) 116/62 (80) 107/49 (68) 104/49 (67) 101/49 (66)


 


Pulse Ox 97 96 96 96


 


O2 Delivery NIV CPAP NIV CPAP NIV CPAP NIV CPAP


 


O2 Flow Rate 35.00 35.00 35.00 35.00





 7/8/18 7/8/18 7/8/18 7/8/18





 05:15 05:30 05:45 06:00


 


Pulse 82 70 75 70


 


Resp 28 16 10 11


 


B/P (MAP) 115/60 (78) 117/53 (74) 110/50 (70) 111/45 (67)


 


Pulse Ox 97 96 97 95


 


O2 Delivery NIV CPAP NIV CPAP NIV CPAP NIV CPAP


 


O2 Flow Rate 35.00 35.00 35.00 35.00





 7/8/18 7/8/18 7/8/18 7/8/18





 06:15 06:28 06:30 06:45


 


Pulse 78 79 77 78


 


Resp 21 19 18 14


 


B/P (MAP) 109/45 (66)  101/44 (63) 96/44 (61)


 


Pulse Ox  95  94


 


O2 Delivery NIV CPAP  NIV CPAP NIV CPAP


 


O2 Flow Rate 35.00 35.00 35.00 35.00





 7/8/18 7/8/18 7/8/18 7/8/18





 07:00 07:00 08:00 08:00


 


Temp   97.3 


 


Pulse 78 79 81 


 


Resp 19  18 


 


B/P (MAP) 92/41 (58)  113/48 (69) 


 


Pulse Ox 96  97 97


 


O2 Delivery NIV CPAP  NIV CPAP NIV CPAP


 


O2 Flow Rate 35.00  35.00 


 


FiO2    35


 


    





 18





 09:00 10:00 10:35 11:00


 


Pulse 85 90  86


 


Resp 19 24  20


 


B/P (MAP) 120/53 (75) 107/48 (67)  106/47 (66)


 


Pulse Ox 93 96 96 96


 


O2 Delivery NIV CPAP NIV CPAP Nasal Cannula NIV CPAP


 


O2 Flow Rate 35.00 35.00 3.00 35.00





 18





 12:00 12:00 12:00 13:00


 


Temp  97.2  


 


Pulse  90 89 87


 


Resp  20 24 


 


B/P (MAP)  108/47 (67) 108/47 (67) 


 


Pulse Ox 94 94 93 


 


O2 Delivery Nasal Cannula Nasal Cannula NIV CPAP 


 


O2 Flow Rate 3.00 4.00 35.00 


 


    





 18   





 13:00   


 


Pulse 87   


 


Resp 17   


 


B/P (MAP) 101/47 (65)   


 


Pulse Ox 95   


 


O2 Delivery Nasal Cannula   


 


O2 Flow Rate 4.00   





Capillary Refill : Less Than 3 Seconds


General Appearance:  WD/WN, no apparent distress


HEENT:  PERRL/EOMI


Respiratory:  lungs clear, no respiratory distress


Cardiovascular:  regular rate, rhythm


Gastrointestinal:  non tender, soft


Back:  normal inspection


Extremities:  no pedal edema


Neurologic/Psychiatric:  alert, normal mood/affect, oriented x 3


Skin:  normal color, warm/dry





Assessment/Plan


Assessment/Plan


Admission Dx


1.  Sepsis


2.  UTI


3.  R Lower Lobe Pneumonia


4.  Acute on Chronic Respiratory Failure


5.  Hyperglycemia w/o prior hx of DM


Admission Status:  Inpatient Order (span 2 midnights)


Reason for Inpatient Admission:  


Sepsis and respiratory failure requiring ICU admission


Assessment & Plan


1.  Sepsis w/ bacteremia likely secondary to urinary source


- Adm to ICU 18


- wbc 16.2 --> 21.1; Lactic acid 2.66 --> 2.17


- blood cultures prelim show GNR; urine culture pending - sent to outside lab


- on Cefepime and Levaquin


- hypotension initially, responsive to IVF; holding BP meds





2.  UTI


- Blood cultures prelim show GNR; reviewed clinic records urine culture from  showed P. Mirabilis w/ resistance to Levaquin but sensitive to Cefepime


- started Cefepime and Levaquin 18





3.  R Lower Lobe Pneumonia


- RLL infiltrated noted on CXR


- Currently on Cefepime and Levaquin





4.  Acute on Chronic Respiratory Failure


- uses 3L oxygen at home; started on c-pap in ER due to work of breathing


- breathing improved since admission - maintained sats w/o respiratory distress 

off c-pap - will DC and continue oxygen by NC.





5.  Hyperglycemia w/o prior hx of DM


- no hx of DM diagnosis; reviewed clinic records, last lab was 2017 - 

glucose was 101


- A1c pending


- being treated w/ Novolog SSI; BS running 180-409


- pt received Solu-Medrol and Decadron in the ER which is likely exacerbating 

hyperglycemia - steroids DC as patient has no wheezing.


- will start Levemir 10U in addition to SSI until A1c is back and longer-term 

treatment is determined.





6.  CAD w/ hx of ventricular arrhythmia s/p defibrillator


- resume home meds: amiodarone, Digoxin, Mexiletine





7.  CHF


- continue home furosemide and KCl





8.  Hypertension


- will hold Metoprolol, Norvasc due to hypotension





9. HOLDEN


- baseline Cr from 2017 0.97, GFR >60


- Cr 1.32 -->1.63; continue to monitor; IVF currently running at 60mL/h





10.  Electrolyte abnormalities


- Phos low at 1.2 - will replace


- Mag low at 1.7 - replaced





Clinical Quality Measures


DVT/VTE Risk/Contraindication:


Risk Factor Score Per Nursin


RFS Level Per Nursing on Admit:  4+=Very High


Risk Score Comment:  


KADI France DO 2018 13:40

## 2018-07-08 NOTE — ED GENERAL
General


Chief Complaint:  Fever-Adult/Adol


Stated Complaint:  ACUTE ON CHRONIC RESPIRATORY FAILURE; SEPSIS; UTI


Nursing Triage Note:  


patient reports that he is unable to get warm today. patient reports increase 

in 


SOA


Nursing Sepsis Screen:  Severe Sepsis Risk


Source of Information:  Patient (SOMEWHAT LIMITED HISTORIAN), EMS, Old Records (

SEE CHART FROM 11/23/17)





History of Present Illness


Date Seen by Provider:  Jul 7, 2018


Time Seen by Provider:  19:20


Initial Comments


PT ARRIVES VIA EMS FROM HOME


PT CALLED EMS BECAUSE HE "COULDN'T GET WARM" ALL DAY TODAY


HAS NOT HAD SWEATS, AND HAS NOT CHECKED HIS TEMPERATURE


HAS  AIRCONDITIONING IN HOME, BUT EMS REPORTS IT WAS NOT OVERLY COOL IN THE HOME

--TEMP OUTSIDE IN UPPER 90'S WITH HEAT INDEX > 100. 


PT DID NOT TRY TO GO OUTSIDE TO TRY TO GET WARM. 


PT HAS COPD AND CHF, AND WEARS HOME O2 AT 3L/NC --ON DIRECT QUESTIONING HE DOES 

STATE THAT HE HAS BEEN MORE SHORT OF BREATH THAN NORMAL


NO CHEST PAIN 


NO SWELLING IN LEGS/FEET


STATES THE LAST TIME THAT HE FELT LIKE THIS, HE HAD PNEUMONIA


NO SIGNIFICANT COUGH





PT WITH MULTIPLE VISITS--LAST VISIT WAS 06/09/18 BECAUSE  HIS DEFIBRILLATOR 

DISCHARGED





PCP: DR. GREENWOOD AT [facility]





Allergies and Home Medications


Allergies


Coded Allergies:  


     Penicillins (Verified  Allergy, Severe, HIVES, SOB, 6/8/18)


     codeine (Verified  Allergy, Severe, SOB, HIVES, 6/8/18)





Home Medications


Albuterol Sulfate 18 Gm Hfa.aer.ad, 2 PUFF IH QID PRN for SHORTNESS OF BREATH, (

Reported)


Albuterol Sulfate 1.25 Mg/3 Ml Vial.neb, 1.25 MG NEB BID, (Reported)


Amiodarone HCl 200 Mg Tablet, 200 MG PO DAILY, (Reported)


Amiodarone HCl 200 Mg Tablet, 200 MG PO SuWeSa@2000, (Reported)


   TAKES 200MG DOSE AT NIGHT THREE DAY A WEEK IN ADDITION TO 200MG EVERY 

MORNING. 


Amlodipine Besylate 5 Mg Tablet, 5 MG PO HS, (Reported)


   LAST FILLED 07/06/17 #90 


Digoxin 125 Mcg Tablet, 125 MCG PO DAILY, (Reported)


Esomeprazole Magnesium 40 Mg Capsule.dr, 40 MG PO DAILY, (Reported)


Furosemide 40 Mg Tablet, 40 MG PO 0900,1500, (Reported)


Metoprolol Tartrate 50 Mg Tablet, 25 MG PO BID, (Reported)


   TAKES 1/2 OF A (50 MG) TABLET 


Mexiletine HCl 150 Mg Cap, 150 MG PO TID, (Reported)


Potassium Chloride 8 Meq Tablet.er, 16 MEQ PO DAILY, (Reported)


   TAKES 2 (8MEQ) TABLETS 





Patient Home Medication List


Home Medication List Reviewed:  Yes





Review of Systems


Constitutional:  see HPI, chills


Respiratory:  see HPI, short of breath


Cardiovascular:  no symptoms reported, edema (CHRONIC/STABLE)


Gastrointestinal:  no symptoms reported


Genitourinary:  no symptoms reported


Skin:  no symptoms reported


Psychiatric/Neurological:  No Symptoms Reported


Hematologic/Lymphatic:  No Symptoms Reported





Past Medical-Social-Family Hx


Patient Social History


Alcohol Use:  Denies Use


Recreational Drug Use:  No


Smoking Status:  Former Smoker (3 1/2-4 PPD, QUIT 1993)


Type Used:  Cigarettes


Former Smoker, Quit:  Jan 1, 1993


2nd Hand Smoke Exposure:  No


Recent Foreign Travel:  No


Contact w/Someone Who Travel:  No


Recent Infectious Disease Expo:  No


Recent Hopitalizations:  No


Physical Abuse:  No


Sexual Abuse:  No





Immunizations Up To Date


Tetanus Booster (TDap):  More than 5yrs


Date of Pneumonia Vaccine:  Oct 1, 2014


Date of Influenza Vaccine:  Nov 10, 2017





Seasonal Allergies


Seasonal Allergies:  No





Past Medical History


Surgeries:  Yes (SEE CHART FROM 11/23/17)


Cardiac, CABG, Coronary Stent, Defibrillator, Eye Surgery, Joint Replacement, 

Orthopedic, Renal


Respiratory:  Yes (O2 3L/NC AT HS)


Asthma, Pneumonia, Chronic Bronchitis, Sleep Apnea, COPD


Currently Using CPAP:  No


Currently Using BIPAP:  No


Cardiac:  Yes (CABG, DEFIBRILLATOR, CARDIAC CATHS-STENTS X2; CHF)


Coronary Artery Disease, Heart Attack, High Cholesterol, Hypertension


Neurological:  Yes


Dementia, Neuropathy


Reproductive Disorders:  Yes (unable to have children- mumps as a child)


Sexually Transmitted Disease:  No


HIV/AIDS:  No


Genitourinary:  Yes


Bladder Infection, Kidney Stones


Gastrointestinal:  Yes


Gastroesophageal Reflux, Ulcer


Musculoskeletal:  Yes (POOR MOBILITY)


Arthritis, Fractures


Endocrine:  No


HEENT:  Yes


Cataract, Glaucoma


Loss of Vision:  Bilateral


Hearing Impairment:  Hard of Hearing


Cancer:  Yes


Skin


What Type of Treatment Did You:  Surgical Intervention


Psychosocial:  Yes


Depression


Nursing Suicide Risk Score:  0


Integumentary:  Yes (shingles , SKIN CANCER)


Blood Disorders:  No


Adverse Reaction/Blood Tranf:  No





Family Medical History





Cardiovascular disease


  19 MOTHER


  G8 BROTHER


  G8 SISTER


Cervical cancer


  19 MOTHER


No Pertinent Family Hx





Physical Exam


Vital Signs





Vital Signs - First Documented








 7/8/18 7/8/18 7/8/18





 00:00 00:34 08:00


 


Temp  97.6 


 


Pulse 94  


 


Resp 38  


 


B/P (MAP) 120/58 (78)  


 


Pulse Ox 95  


 


O2 Delivery NIV CPAP  


 


O2 Flow Rate 50.00  


 


FiO2   35





Capillary Refill : Less Than 3 Seconds


Height, Weight, BMI


Height: 5', 5.00"


Weight: 208lbs 4.0oz, [id number] Method:Stated ,34.6BMI


General Appearance:  Other (TREMULOUS, SLIGHTLY DYSPNEIC. )


Respiratory:  No Accessory Muscle Use, Decreased Breath Sounds (IN BASES), 

Other (SLIGHTLY DYSPNEIC)


Cardiovascular:  No Edema, No Murmur, Tachycardia


Gastrointestinal:  Non Tender, Soft


Extremity:  Normal Inspection, Pedal Edema (2-3+ EDEMA WITH CHRONIC VENOUS 

STASIS CHANGES BILATERALLY)


Neurologic/Psychiatric:  Alert, Oriented x3 (BUT POOR MEMORY), No Motor/Sensory 

Deficits, Normal Mood/Affect, CNs II-XII Norm as Tested


Skin:  Normal Color, Warm/Dry, Other (CHRONIC SKIN CHANGES TO FACE, ARMS AND 

LEGS. )





Focused Exam


Lactate Level








Progress/Results/Core Measures


Suspected Sepsis


Recent Fever Within 48 Hours:  No


Infection Criteria Present:  Suspected New Infection


New/Unexplained  Altered Menta:  No


Sepsis Screen:  Severe Sepsis Risk


SIRS


Temperature:97.9 


Pulse: 78 


Respiratory Rate: 14


 


Laboratory Tests


7/12/18 06:35: White Blood Count 14.0H


Blood Pressure 96 /44 


Mean: 61


 


Laboratory Tests


7/12/18 05:48: Creatinine 1.44H


7/12/18 06:35: Platelet Count 123L








Results/Orders


Lab Results





My Orders





Medications Given in ED





Vital Signs/I&O


Capillary Refill : Less Than 3 Seconds








Blood Pressure Mean:  61











Point of Care Testing


Finger Stick Blood Glucose:  409


Blood Glucose Action Taken:  E-ICU NOTIFIED


Progress Note :  


Progress Note


1950--PT WITH SIGNIFICANTLY INCREASED DYSPNEA, DECREASED AERATION IN ALL LUNG 

FIELDS--IMMEDIATELY PLACED ON BIPAP. 


2010--CONVERTED TO CPAP ON RECEIVING ABG RESULTS


BREATHING IS  LESS LABORED, INCREASED AERATION


2105--DISCUSSED WITH WIFE ON PHONE, POLICE CONTACTED AND THEY WILL BRING HER TO 

ER, AS SHE DOES NOT DRIVE. 


PT STATES HE WISHES TO BE A FULL CODE, AND WIFE VERIFIES THIS . 





TEMP UP .8 PRIOR TO ADMIT. TYLENOL AND MOTRIN HAD BEEN ORDERED, BUT PT 

UNABLE TO TAKE ON ARRIVAL, DUE TO BREATHING STATUS


/89,  AT TIME OF ADMIT





ECG


Initial ECG Impression Date:  Jul 7, 2018


Initial ECG Impression Time:  19:28


Initial ECG Rate:  101


Comment


TOO MUCH ARTIFACT TO READ ACCURATELY, IVCD--PT SHAKING UNCONTROLLABLY





Diagnostic Imaging





Comments


CXR--CARDIOMEGALY, CENTRAL VASCULAR CONGESTION, RIGHT RIGHT BASE INFILTRATE--

PER RADIOLOGIST REPORT @ 2100


   Reviewed:  Reviewed by Me





Departure


Communication (Admissions)


2016--SPOKE WITH DR. MAGANA, ACCEPTS PT FOR ADMIT.





Impression





 Primary Impression:  


 Acute on chronic respiratory failure


 Additional Impressions:  


 RLL pneumonia


 CHF (congestive heart failure)


 COPD (chronic obstructive pulmonary disease)


 Sepsis


 UTI (urinary tract infection)


Disposition:  09 ADMITTED AS INPATIENT


Condition:  Improved





Admissions


Decision to Admit Reason:  Admit from ER (General)


Decision to Admit/Date:  Jul 7, 2018


Time/Decision to Admit Time:  20:15





Departure-Patient Inst.


Referrals:  


MAHOGANY GREENWOOD MD (PCP)


Primary Care Physician











MARCOS CAMARENA DO Jul 8, 2018 07:21

## 2018-07-09 VITALS — SYSTOLIC BLOOD PRESSURE: 140 MMHG | DIASTOLIC BLOOD PRESSURE: 63 MMHG

## 2018-07-09 VITALS — SYSTOLIC BLOOD PRESSURE: 111 MMHG | DIASTOLIC BLOOD PRESSURE: 58 MMHG

## 2018-07-09 VITALS — DIASTOLIC BLOOD PRESSURE: 60 MMHG | SYSTOLIC BLOOD PRESSURE: 126 MMHG

## 2018-07-09 VITALS — SYSTOLIC BLOOD PRESSURE: 115 MMHG | DIASTOLIC BLOOD PRESSURE: 55 MMHG

## 2018-07-09 VITALS — DIASTOLIC BLOOD PRESSURE: 80 MMHG | SYSTOLIC BLOOD PRESSURE: 132 MMHG

## 2018-07-09 VITALS — SYSTOLIC BLOOD PRESSURE: 125 MMHG | DIASTOLIC BLOOD PRESSURE: 71 MMHG

## 2018-07-09 VITALS — DIASTOLIC BLOOD PRESSURE: 50 MMHG | SYSTOLIC BLOOD PRESSURE: 101 MMHG

## 2018-07-09 VITALS — DIASTOLIC BLOOD PRESSURE: 58 MMHG | SYSTOLIC BLOOD PRESSURE: 123 MMHG

## 2018-07-09 VITALS — DIASTOLIC BLOOD PRESSURE: 51 MMHG | SYSTOLIC BLOOD PRESSURE: 109 MMHG

## 2018-07-09 VITALS — DIASTOLIC BLOOD PRESSURE: 89 MMHG | SYSTOLIC BLOOD PRESSURE: 136 MMHG

## 2018-07-09 VITALS — DIASTOLIC BLOOD PRESSURE: 61 MMHG | SYSTOLIC BLOOD PRESSURE: 117 MMHG

## 2018-07-09 VITALS — SYSTOLIC BLOOD PRESSURE: 109 MMHG | DIASTOLIC BLOOD PRESSURE: 55 MMHG

## 2018-07-09 VITALS — DIASTOLIC BLOOD PRESSURE: 88 MMHG | SYSTOLIC BLOOD PRESSURE: 115 MMHG

## 2018-07-09 VITALS — DIASTOLIC BLOOD PRESSURE: 59 MMHG | SYSTOLIC BLOOD PRESSURE: 111 MMHG

## 2018-07-09 LAB
ANISOCYTOSIS BLD QL SMEAR: SLIGHT
BASOPHILS # BLD AUTO: 0 10^3/UL (ref 0–0.1)
BASOPHILS NFR BLD AUTO: 0 % (ref 0–10)
BUN/CREAT SERPL: 20
CALCIUM SERPL-MCNC: 9.1 MG/DL (ref 8.5–10.1)
CHLORIDE SERPL-SCNC: 103 MMOL/L (ref 98–107)
CO2 SERPL-SCNC: 22 MMOL/L (ref 21–32)
CREAT SERPL-MCNC: 1.47 MG/DL (ref 0.6–1.3)
EOSINOPHIL # BLD AUTO: 0 10^3/UL (ref 0–0.3)
EOSINOPHIL NFR BLD AUTO: 0 % (ref 0–10)
ERYTHROCYTE [DISTWIDTH] IN BLOOD BY AUTOMATED COUNT: 16.1 % (ref 10–14.5)
GFR SERPLBLD BASED ON 1.73 SQ M-ARVRAT: 47 ML/MIN
GLUCOSE SERPL-MCNC: 221 MG/DL (ref 70–105)
HCT VFR BLD CALC: 39 % (ref 40–54)
HGB BLD-MCNC: 12.9 G/DL (ref 13.3–17.7)
LYMPHOCYTES # BLD AUTO: 0.5 X 10^3 (ref 1–4)
LYMPHOCYTES NFR BLD AUTO: 2 % (ref 12–44)
MAGNESIUM SERPL-MCNC: 2.1 MG/DL (ref 1.8–2.4)
MANUAL DIFFERENTIAL PERFORMED BLD QL: YES
MCH RBC QN AUTO: 31 PG (ref 25–34)
MCHC RBC AUTO-ENTMCNC: 33 G/DL (ref 32–36)
MCV RBC AUTO: 94 FL (ref 80–99)
MONOCYTES # BLD AUTO: 1.5 X 10^3 (ref 0–1)
MONOCYTES NFR BLD AUTO: 5 % (ref 0–12)
MONOCYTES NFR BLD: 3 %
NEUTROPHILS # BLD AUTO: 26.4 X 10^3 (ref 1.8–7.8)
NEUTROPHILS NFR BLD AUTO: 93 % (ref 42–75)
NEUTS BAND NFR BLD MANUAL: 90 %
NEUTS BAND NFR BLD: 5 %
PHOSPHATE SERPL-MCNC: 4.3 MG/DL (ref 2.3–4.7)
PLATELET # BLD: 175 10^3/UL (ref 130–400)
PMV BLD AUTO: 10.7 FL (ref 7.4–10.4)
POTASSIUM SERPL-SCNC: 3.9 MMOL/L (ref 3.6–5)
RBC # BLD AUTO: 4.13 10^6/UL (ref 4.35–5.85)
SODIUM SERPL-SCNC: 137 MMOL/L (ref 135–145)
VARIANT LYMPHS NFR BLD MANUAL: 2 %
WBC # BLD AUTO: 28.4 10^3/UL (ref 4.3–11)

## 2018-07-09 RX ADMIN — IPRATROPIUM BROMIDE AND ALBUTEROL SULFATE SCH ML: .5; 3 SOLUTION RESPIRATORY (INHALATION) at 11:04

## 2018-07-09 RX ADMIN — SODIUM CHLORIDE SCH MLS/HR: 900 INJECTION INTRAVENOUS at 20:19

## 2018-07-09 RX ADMIN — MEXILETINE HYDROCHLORIDE SCH MG: 150 CAPSULE ORAL at 08:20

## 2018-07-09 RX ADMIN — IPRATROPIUM BROMIDE AND ALBUTEROL SULFATE SCH ML: .5; 3 SOLUTION RESPIRATORY (INHALATION) at 02:41

## 2018-07-09 RX ADMIN — IPRATROPIUM BROMIDE AND ALBUTEROL SULFATE SCH ML: .5; 3 SOLUTION RESPIRATORY (INHALATION) at 06:37

## 2018-07-09 RX ADMIN — INSULIN ASPART SCH UNIT: 100 INJECTION, SOLUTION INTRAVENOUS; SUBCUTANEOUS at 08:19

## 2018-07-09 RX ADMIN — MAGNESIUM SULFATE IN DEXTROSE SCH MLS/HR: 10 INJECTION, SOLUTION INTRAVENOUS at 04:20

## 2018-07-09 RX ADMIN — SODIUM CHLORIDE SCH MLS/HR: 900 INJECTION, SOLUTION INTRAVENOUS at 04:09

## 2018-07-09 RX ADMIN — PANTOPRAZOLE SODIUM SCH MG: 40 TABLET, DELAYED RELEASE ORAL at 06:05

## 2018-07-09 RX ADMIN — INSULIN ASPART SCH UNIT: 100 INJECTION, SOLUTION INTRAVENOUS; SUBCUTANEOUS at 20:20

## 2018-07-09 RX ADMIN — INSULIN ASPART SCH UNIT: 100 INJECTION, SOLUTION INTRAVENOUS; SUBCUTANEOUS at 16:44

## 2018-07-09 RX ADMIN — POTASSIUM CHLORIDE SCH MEQ: 600 CAPSULE, EXTENDED RELEASE ORAL at 17:27

## 2018-07-09 RX ADMIN — INSULIN DETEMIR SCH UNIT: 100 INJECTION, SOLUTION SUBCUTANEOUS at 08:15

## 2018-07-09 RX ADMIN — MEXILETINE HYDROCHLORIDE SCH MG: 150 CAPSULE ORAL at 14:12

## 2018-07-09 RX ADMIN — IPRATROPIUM BROMIDE AND ALBUTEROL SULFATE SCH ML: .5; 3 SOLUTION RESPIRATORY (INHALATION) at 22:23

## 2018-07-09 RX ADMIN — POTASSIUM CHLORIDE SCH MEQ: 600 CAPSULE, EXTENDED RELEASE ORAL at 06:06

## 2018-07-09 RX ADMIN — POTASSIUM CHLORIDE SCH MLS/HR: 200 INJECTION, SOLUTION INTRAVENOUS at 04:19

## 2018-07-09 RX ADMIN — ACETAMINOPHEN PRN MG: 500 TABLET ORAL at 15:07

## 2018-07-09 RX ADMIN — INSULIN ASPART SCH UNIT: 100 INJECTION, SOLUTION INTRAVENOUS; SUBCUTANEOUS at 04:13

## 2018-07-09 RX ADMIN — DIGOXIN SCH MG: 125 TABLET ORAL at 08:14

## 2018-07-09 RX ADMIN — IPRATROPIUM BROMIDE AND ALBUTEROL SULFATE SCH ML: .5; 3 SOLUTION RESPIRATORY (INHALATION) at 14:25

## 2018-07-09 RX ADMIN — FUROSEMIDE SCH MG: 40 TABLET ORAL at 15:06

## 2018-07-09 RX ADMIN — MEXILETINE HYDROCHLORIDE SCH MG: 150 CAPSULE ORAL at 21:06

## 2018-07-09 RX ADMIN — AMIODARONE HYDROCHLORIDE SCH MG: 200 TABLET ORAL at 08:14

## 2018-07-09 RX ADMIN — IPRATROPIUM BROMIDE AND ALBUTEROL SULFATE SCH ML: .5; 3 SOLUTION RESPIRATORY (INHALATION) at 18:37

## 2018-07-09 RX ADMIN — SODIUM CHLORIDE SCH MLS/HR: 900 INJECTION, SOLUTION INTRAVENOUS at 20:22

## 2018-07-09 RX ADMIN — ENOXAPARIN SODIUM SCH MG: 100 INJECTION SUBCUTANEOUS at 08:14

## 2018-07-09 RX ADMIN — INSULIN ASPART SCH UNIT: 100 INJECTION, SOLUTION INTRAVENOUS; SUBCUTANEOUS at 12:12

## 2018-07-09 RX ADMIN — POTASSIUM CHLORIDE SCH MEQ: 1500 TABLET, EXTENDED RELEASE ORAL at 04:20

## 2018-07-09 RX ADMIN — FUROSEMIDE SCH MG: 40 TABLET ORAL at 08:15

## 2018-07-09 RX ADMIN — MEXILETINE HYDROCHLORIDE SCH MG: 150 CAPSULE ORAL at 20:20

## 2018-07-09 NOTE — PHYSICIAN QUERY CLARIFICATION
PQ-Link Manifestation-Etiology


Admission/Discharge


Admission Date: Jul 7, 2018 at 20:15 


Discharge Date:


The medical record reflects the following clinical scenario:


History/Risk Factors:


Sepsis


Acue on chronic respiratory failure


Acute kidney injury





Clinical Findings:


Lactic acid 2.66 rising to 3.51. PH 7.44, pCO2 44, pO2 77, HCO3 29 on 4L pulse 

ox 97%. Creatinine 1.32 rising to 1.63.


Treatment:


IV fluids, inhalation therapy, IV Cefepime HCI, Nasal cannula, NIV CPAP.





Question:  Can you specify if the Acute on chronic respiratory failure and/ or 

Acute Kidney Injury is due to/associated with Sepsis? Please document a 

response below








In responding to this query, please exercise your independent professional 

judgment.  The purpose of this communication is to more accurately reflect the 

complexity of your patients condition. The fact that a question is asked does 

not imply that any particular answer is desired or expected.  





Thank you for your timely response to this clarification.      


   


Requestors name: Daniat Gonzalez [facility]   





Phone # ext [phone] or [phone]








THIS PHYSICIAN QUERY FORM IS A PERMANENT PART OF THE MEDICAL RECORD











DANITA GONZALEZ Jul 9, 2018 11:14

## 2018-07-09 NOTE — PROGRESS NOTE (SOAP)
SONJA SANCHEZ A MEDICAL STUDENT 18 1033:


Subjective


Subjective/Events-last exam


77 year old male with Hx of CAD, HTN, HLD, CHF, COPD who was admitted for Sepsis

, UTI, Right lower lobe PNA 2 days ago. Pt states he still feels about the same 

as he did when he was admitted. Pt is on 3L O2 by NC, Pt states he still feels 

like he has fever and chills. Denies abd pain, CP, cough


Review of Systems


Time Seen by Provider:  07:40


General:  Chills, Fatigue


Pulmonary:  Dyspnea


Cardiovascular:  No: Chest Pain


Gastrointestinal:  No: Nausea, Vomiting, Abdominal Pain





Focused Exam


Lactate Level


18 19:28: Lactic Acid Level 2.66*H


18 21:36: Lactic Acid Level 2.17*H


18 09:39: Lactic Acid Level 3.51*H


Lactic Acid Level





Laboratory Tests








Test


 18


09:39


 


Lactic Acid Level


 3.51 MMOL/L


(0.50-2.00)  *H











Objective


Exam


Last Set of Vital Signs





Vital Signs








  Date Time  Temp Pulse Resp B/P (MAP) Pulse Ox O2 Delivery O2 Flow Rate FiO2


 


18 10:00  95 32 111/58 (75) 96 Nasal Cannula 3.00 


 


18 03:39 96.7       


 


18 08:00        35





Capillary Refill : Less Than 3 Seconds


I&O











Intake and Output 


 


 18





 00:00


 


Intake Total 2745 ml


 


Output Total 1375 ml


 


Balance 1370 ml


 


 


 


Intake Oral 1280 ml


 


IV Total 1465 ml


 


Output Urine Total 1375 ml








General:  Alert, Oriented X3, No Acute Distress


HEENT:  Atraumatic, EOMI


Neck:  Supple


Lungs:  Other (decreased breath sounds biltaterally, rales in bases, tachypneic)


Heart:  Regular Rate


Abdomen:  Normal Bowel Sounds, Soft, No Tenderness


Extremities:  No Edema


Neuro:  Normal Speech


Psych/Mental Status:  Mental Status NL





Results/Procedures


Lab


Laboratory Tests


18 11:57: Glucometer 366H


18 14:51: Glucometer 239H


18 16:11: Glucometer 240H


18 19:58: Glucometer 253H


18 23:44: Glucometer 188H


18 03:30: 


White Blood Count 28.4H, Red Blood Count 4.13L, Hemoglobin 12.9L, Hematocrit 39L

, Mean Corpuscular Volume 94, Mean Corpuscular Hemoglobin 31, Mean Corpuscular 

Hemoglobin Concent 33, Red Cell Distribution Width 16.1H, Platelet Count 175, 

Mean Platelet Volume 10.7H, Neutrophils (%) (Auto) 93H, Lymphocytes (%) (Auto) 

2L, Monocytes (%) (Auto) 5, Eosinophils (%) (Auto) 0, Basophils (%) (Auto) 0, 

Neutrophils # (Auto) 26.4H, Lymphocytes # (Auto) 0.5L, Monocytes # (Auto) 1.5H, 

Eosinophils # (Auto) 0.0, Basophils # (Auto) 0.0, Neutrophils % (Manual) 90, 

Lymphocytes % (Manual) 2, Monocytes % (Manual) 3, Band Neutrophils 5, 

Anisocytosis SLIGHT, Sodium Level 137, Potassium Level 3.9, Chloride Level 103, 

Carbon Dioxide Level 22, Anion Gap 12, Blood Urea Nitrogen 29H, Creatinine 1.47H

, Estimat Glomerular Filtration Rate 47, BUN/Creatinine Ratio 20, Glucose Level 

221H, Calcium Level 9.1, Phosphorus Level 4.3, Magnesium Level 2.1


18 08:13: Glucometer 230H


18 09:39: Lactic Acid Level 3.51*H





Microbiology


18 Blood Culture - Preliminary, Resulted


         Proteus mirabilis


         See Comments


18 Urine Culture - Preliminary, Resulted


         Proteus species


         See Comments


         Sent To [facility]


Radiology


Date of Exam:   18





CHEST 1 VIEW, AP/PA ONLY


 





INDICATION: Chills x1-2 hours





Frontal chest obtained at 7:40 p.m. and is compared with 18.





There is post sternotomy change with cardiomegaly with unchanged


pacemaker device. There is some infiltrate in the right


infrahilar region which is new compared to the prior study. Left


lung is grossly clear. There is no pneumothorax or gross pleural


fluid.





IMPRESSION:





Cardiomegaly with postoperative changes. There is mild central


vascular congestion. There is new infiltrate in the right lung


base, pneumonia is not excluded. Followup is recommended.





Assessment/Plan


Assessment/Plan


Assessment & Plan


Sepsis likely secondary to UTI - pt WBC has increased from 16.2 --> 21.1 and 

today has increased to 28.9. Lactic acid was 2.66 on admission and dropped to 

2.17 which has now increased to 3.5





UTI - pt has cultures that show GNR, Proteus, that is sensitive to Cefepime - 

continuing pt on Cefepime Abx IV due to sensitivity, D/C Levaquin due to 

resistance





RLL PNA - RLL infiltrate noted on CXR, continuing pt on Cefepime





Hyperglycemia - A1c results pending





CAD - continuing on home meds: amiodarone, digoxin, mexiletine





CHF - continuing on home meds - Furosemide





Clinical Quality Measures


DVT/VTE Risk/Contraindication:


Risk Factor Score Per Nursin


RFS Level Per Nursing on Admit:  4+=Very High


Risk Score Comment:  


SINGH Ortega MD 18 1132:


Assessment/Plan


Assessment/Plan


Assessment & Plan


1.  Sepsis w/ bacteremia likely secondary to urinary source


- Adm to ICU 18


- hypotension initially, responsive to IVF; holding BP meds


 clinically improved with improved blood pressure and afebrile last 24 hours

, renal function improved, WBC elevated but did receive steroids in ER, lactic 

acid elevated this am, will give 500 cc bolus and recheck





2.  UTI


- Blood cultures prelim show GNR; reviewed clinic records urine culture from  showed P. Mirabilis w/ resistance to Levaquin but sensitive to Cefepime


- started Cefepime and Levaquin 18- culture with proteus resistant to fluoroquinolone, will continue cefepime 

and discontinue levofloxacin





3.  R Lower Lobe Pneumonia


- RLL infiltrated noted on CXR


- Currently on Cefepime and Levaquin


 d/c levofloxacin, respiratory status improved as noted below





4.  Acute on Chronic Respiratory Failure


- uses 3L oxygen at home; started on c-pap in ER due to work of breathing


- breathing improved since admission - maintained sats w/o respiratory distress 

off c-pap - will DC and continue oxygen by NC.


 stable on nasal cannula, uses intermittently at home





5.  Hyperglycemia w/o prior hx of DM


- no hx of DM diagnosis; reviewed clinic records, last lab was 2017 - 

glucose was 101


- A1c pending


- being treated w/ Novolog SSI; BS running 180-409


- pt received Solu-Medrol and Decadron in the ER which is likely exacerbating 

hyperglycemia - steroids DC as patient has no wheezing.


- will start Levemir 10U in addition to SSI until A1c is back and longer-term 

treatment is determined.





6.  CAD w/ hx of ventricular arrhythmia s/p defibrillator


- resume home meds: amiodarone, Digoxin, Mexiletine


 adjust amiodarone dose to match home dosing





7.  CHF


- continue home furosemide and KCl





8.  Hypertension


- will hold Metoprolol, Norvasc due to hypotension





9. HOLDEN


- baseline Cr from 2017 0.97, GFR >60


- Cr 1.32 -->1.63; continue to monitor; IVF currently running at 60mL/h


 improved this am, continue to monitor





10.  Electrolyte abnormalities on admission


- Phos low at 1.2 - will replace


- Mag low at 1.7 - replaced





11. DVT ppx


-SCDs, enoxaparin





Supervisory-Addendum Brief


Supervisory Addendum


Patient seen and examined by me today, I repeated all the findings documented 

by [email or web address] and agree with documentation unless otherwise noted. See my 

assessment and plan.











SONJA SANCHEZ MEDICAL STUDENT 2018 10:33


SINGH ELLISON MD 2018 11:32

## 2018-07-10 VITALS — DIASTOLIC BLOOD PRESSURE: 73 MMHG | SYSTOLIC BLOOD PRESSURE: 151 MMHG

## 2018-07-10 VITALS — SYSTOLIC BLOOD PRESSURE: 130 MMHG | DIASTOLIC BLOOD PRESSURE: 67 MMHG

## 2018-07-10 VITALS — SYSTOLIC BLOOD PRESSURE: 137 MMHG | DIASTOLIC BLOOD PRESSURE: 63 MMHG

## 2018-07-10 VITALS — DIASTOLIC BLOOD PRESSURE: 63 MMHG | SYSTOLIC BLOOD PRESSURE: 131 MMHG

## 2018-07-10 LAB
BASOPHILS # BLD AUTO: 0 10^3/UL (ref 0–0.1)
BASOPHILS NFR BLD AUTO: 0 % (ref 0–10)
BUN/CREAT SERPL: 22
CALCIUM SERPL-MCNC: 9.2 MG/DL (ref 8.5–10.1)
CHLORIDE SERPL-SCNC: 105 MMOL/L (ref 98–107)
CO2 SERPL-SCNC: 26 MMOL/L (ref 21–32)
CREAT SERPL-MCNC: 1.58 MG/DL (ref 0.6–1.3)
EOSINOPHIL # BLD AUTO: 0 10^3/UL (ref 0–0.3)
EOSINOPHIL NFR BLD AUTO: 0 % (ref 0–10)
ERYTHROCYTE [DISTWIDTH] IN BLOOD BY AUTOMATED COUNT: 16.5 % (ref 10–14.5)
GFR SERPLBLD BASED ON 1.73 SQ M-ARVRAT: 43 ML/MIN
GLUCOSE SERPL-MCNC: 146 MG/DL (ref 70–105)
HCT VFR BLD CALC: 39 % (ref 40–54)
HGB BLD-MCNC: 13.4 G/DL (ref 13.3–17.7)
LYMPHOCYTES # BLD AUTO: 0.6 X 10^3 (ref 1–4)
LYMPHOCYTES NFR BLD AUTO: 3 % (ref 12–44)
MAGNESIUM SERPL-MCNC: 2.1 MG/DL (ref 1.8–2.4)
MANUAL DIFFERENTIAL PERFORMED BLD QL: NO
MCH RBC QN AUTO: 32 PG (ref 25–34)
MCHC RBC AUTO-ENTMCNC: 34 G/DL (ref 32–36)
MCV RBC AUTO: 93 FL (ref 80–99)
MONOCYTES # BLD AUTO: 1.2 X 10^3 (ref 0–1)
MONOCYTES NFR BLD AUTO: 6 % (ref 0–12)
NEUTROPHILS # BLD AUTO: 17.2 X 10^3 (ref 1.8–7.8)
NEUTROPHILS NFR BLD AUTO: 91 % (ref 42–75)
PHOSPHATE SERPL-MCNC: 3 MG/DL (ref 2.3–4.7)
PLATELET # BLD: 148 10^3/UL (ref 130–400)
PMV BLD AUTO: 10.5 FL (ref 7.4–10.4)
POTASSIUM SERPL-SCNC: 4 MMOL/L (ref 3.6–5)
RBC # BLD AUTO: 4.18 10^6/UL (ref 4.35–5.85)
SODIUM SERPL-SCNC: 140 MMOL/L (ref 135–145)
WBC # BLD AUTO: 18.9 10^3/UL (ref 4.3–11)

## 2018-07-10 RX ADMIN — INSULIN ASPART SCH UNIT: 100 INJECTION, SOLUTION INTRAVENOUS; SUBCUTANEOUS at 20:25

## 2018-07-10 RX ADMIN — DIGOXIN SCH MG: 125 TABLET ORAL at 08:45

## 2018-07-10 RX ADMIN — MEXILETINE HYDROCHLORIDE SCH MG: 150 CAPSULE ORAL at 13:16

## 2018-07-10 RX ADMIN — ACETAMINOPHEN PRN MG: 500 TABLET ORAL at 04:04

## 2018-07-10 RX ADMIN — MEXILETINE HYDROCHLORIDE SCH MG: 150 CAPSULE ORAL at 20:22

## 2018-07-10 RX ADMIN — POTASSIUM CHLORIDE SCH MEQ: 600 CAPSULE, EXTENDED RELEASE ORAL at 06:16

## 2018-07-10 RX ADMIN — ENOXAPARIN SODIUM SCH MG: 100 INJECTION SUBCUTANEOUS at 08:45

## 2018-07-10 RX ADMIN — FUROSEMIDE SCH MG: 40 TABLET ORAL at 08:45

## 2018-07-10 RX ADMIN — SODIUM CHLORIDE SCH MLS/HR: 900 INJECTION, SOLUTION INTRAVENOUS at 17:36

## 2018-07-10 RX ADMIN — INSULIN DETEMIR SCH UNIT: 100 INJECTION, SOLUTION SUBCUTANEOUS at 08:45

## 2018-07-10 RX ADMIN — IPRATROPIUM BROMIDE AND ALBUTEROL SULFATE SCH ML: .5; 3 SOLUTION RESPIRATORY (INHALATION) at 13:44

## 2018-07-10 RX ADMIN — IPRATROPIUM BROMIDE AND ALBUTEROL SULFATE SCH ML: .5; 3 SOLUTION RESPIRATORY (INHALATION) at 07:03

## 2018-07-10 RX ADMIN — ACETAMINOPHEN PRN MG: 500 TABLET ORAL at 13:16

## 2018-07-10 RX ADMIN — IPRATROPIUM BROMIDE AND ALBUTEROL SULFATE SCH ML: .5; 3 SOLUTION RESPIRATORY (INHALATION) at 02:17

## 2018-07-10 RX ADMIN — MEXILETINE HYDROCHLORIDE SCH MG: 150 CAPSULE ORAL at 08:45

## 2018-07-10 RX ADMIN — SODIUM CHLORIDE SCH MLS/HR: 900 INJECTION INTRAVENOUS at 20:23

## 2018-07-10 RX ADMIN — IPRATROPIUM BROMIDE AND ALBUTEROL SULFATE SCH ML: .5; 3 SOLUTION RESPIRATORY (INHALATION) at 10:34

## 2018-07-10 RX ADMIN — AMIODARONE HYDROCHLORIDE SCH MG: 200 TABLET ORAL at 08:45

## 2018-07-10 RX ADMIN — INSULIN ASPART SCH UNIT: 100 INJECTION, SOLUTION INTRAVENOUS; SUBCUTANEOUS at 13:17

## 2018-07-10 RX ADMIN — INSULIN ASPART SCH UNIT: 100 INJECTION, SOLUTION INTRAVENOUS; SUBCUTANEOUS at 04:03

## 2018-07-10 RX ADMIN — SODIUM CHLORIDE SCH MLS/HR: 900 INJECTION, SOLUTION INTRAVENOUS at 01:04

## 2018-07-10 RX ADMIN — IPRATROPIUM BROMIDE AND ALBUTEROL SULFATE SCH ML: .5; 3 SOLUTION RESPIRATORY (INHALATION) at 19:11

## 2018-07-10 RX ADMIN — INSULIN ASPART SCH UNIT: 100 INJECTION, SOLUTION INTRAVENOUS; SUBCUTANEOUS at 16:55

## 2018-07-10 RX ADMIN — FUROSEMIDE SCH MG: 40 TABLET ORAL at 17:36

## 2018-07-10 RX ADMIN — INSULIN ASPART SCH UNIT: 100 INJECTION, SOLUTION INTRAVENOUS; SUBCUTANEOUS at 00:00

## 2018-07-10 RX ADMIN — POTASSIUM CHLORIDE SCH MEQ: 600 CAPSULE, EXTENDED RELEASE ORAL at 17:36

## 2018-07-10 RX ADMIN — IPRATROPIUM BROMIDE AND ALBUTEROL SULFATE SCH ML: .5; 3 SOLUTION RESPIRATORY (INHALATION) at 22:22

## 2018-07-10 RX ADMIN — ACETAMINOPHEN PRN MG: 500 TABLET ORAL at 23:33

## 2018-07-10 RX ADMIN — INSULIN ASPART SCH UNIT: 100 INJECTION, SOLUTION INTRAVENOUS; SUBCUTANEOUS at 08:26

## 2018-07-10 RX ADMIN — PANTOPRAZOLE SODIUM SCH MG: 40 TABLET, DELAYED RELEASE ORAL at 06:16

## 2018-07-10 NOTE — PHYSICAL THERAPY EVALUATION
PT Evaluation-General


Medical Diagnosis


Admission Date


Jul 7, 2018 at 20:15


Medical Diagnosis:  respiratory failure/sepsis


Onset Date:  Jul 7, 2018





Therapy Diagnosis


Therapy Diagnosis:  generalized weakness





Height/Weight


Height (Feet):  5


Height (Inches):  5.00


Weight (Pounds):  208


Weight (Ounces):  6.0





Precautions


Precautions/Isolations:  Fall Prevention, Standard Precautions





Weight Bear Status


Right Lower Extremity:  Right


Full Weight Bearing


Left Lower Extremity:  Left


Full Weight Bearing





Referral


Physician:  Gabriel


Reason for Referral:  Evaluation/Treatment





Medical History


Pertinent Medical History:  Atrial Fib, Arthritis, CABG, CAD, COPD, Dementia, 

GERD, MI, Neuropathy


Current History


EMS due to chills


Reviewed History:  Yes





Social History


Home:  Apartment


Current Living Status:  Spouse


Entry Into Home:  Level Entry





Prior/Core FIM


Prior Level of Function


              Functional [place] Measure


0=Not Assessed/NA   4=Minimal Assistance


1=Total Assistance   5=Supervision or Setup


2=Maximal Assistance   6=Modified [place]


3=Moderate Assistance   7=Complete [place]


Bed Mobility:  6


Transfers (B,C,W/C) (FIM):  6


Gait:  6


utilizes w/c for distances





PT Evaluation-Current


Subjective


Patient is very agreeable to participate with PT.  Spouse present and answers 

all questions presented to patient.





Pain





   Numeric Pain Scale:  0-No Pain


   Location:  No Pain Reported





Objective


Patient Orientation:  Confused


Problem Solving:  Poor


Attachments:  Oxygen, Novoa Catheter, IV





ROM/Strength


ROM Lower Extremities


bilateral LE WNL


Strength Lower Extremities


3/5 grossly bilaterally





Integumentary/Posture


Integumentary


refer to nursing notes


Bladder Incontinence:  Novoa Cath


Posture


kyphotic





Neuromuscular


(Tone, Coordination, Reflexes)


diminished coordination due to inactivity PLOF





Sensory


Vision:  Functional


Hearing:  Impaired


Sensation Right Lower Extremit:  Impaired


Sensation Left Lower Extremity:  Impaired





Transfers


              Functional [place]mer[place]da Measure


0=Not Assessed/NA   4=Minimal Assistance


1=Total Assistance   5=Supervision or Setup


2=Maximal Assistance   6=Modified [place]mer[place]da


3=Moderate Assistance   7=Complete Esmer[place]da


Transfers (B, C, W/C) (FIM):  3


Scooting:  3


Rolling:  3


Supine to/from Sit:  3





Gait


Mode of Locomotion:  Both


Anticipated Mode of Locomotion:  Both


Gait (FIM):  2


Distance (FIM):  8=638-27 ft


Distance:  90'


Gait Level of Assist:  3


Gait Persons Needed:  2


Gait Assistive Device:  FWW


Comments/Gait Description


SBA of 1 for O2 tank and IV pole/mod assist with patient.





Balance


Sitting Static:  Normal


Sitting Dynamic:  Normal


Standing Static:  Fair


 Standing Dynamic:  Fair





Assessment/Needs


77 y.o. male, will benefit from skilled PT to address functional strength and 

mobility to improve current LOF and to safely return to home with spouse and 

home health intervention.


Rehab Potential:  Fair





PT Long Term Goals


Long Term Goals


PT Long Term Goals Time Frame:  Jul 20, 2018


Transfers (B,C,W/C) (FIM):  5


Gait (FIM):  2


Gait distance (FIM):  7=168-90 ft


Distance:  125'


Gait Level of Assist:  5


Gait Assistive Device:  FWW





PT Plan


Problem List


Problem List:  Activity Tolerance, Functional Strength, Safety, Balance, Gait, 

Transfer, Bed Mobility





Treatment/Plan


Treatment Plan:  Continue Plan of Care


Treatment Plan:  Bed Mobility, Education, Functional Activity Farnaz, Functional 

Strength, Gait, Safety, Therapeutic Exercise, Transfers


Treatment Duration:  Jul 20, 2018


Frequency:  6 times per week


Estimated Hrs Per Day:  .25 hour per day


Patient and/or Family Agrees t:  Yes





Discharge Recommendations


Therapy D/C Recommendations:  Home w/ Family Support, Physical Therapy Home Care





Time/GCodes


Time In:  1416


Time Out:  1426


Total Billed Treatment Time:  10


Total Billed Treatment


1 visit


EVModC 10 min


G Codes Necessary:  MIKAYLA Ignacio PT Jul 10, 2018 15:09

## 2018-07-10 NOTE — PROGRESS NOTE (SOAP)
SONJA ADORNO A MEDICAL STUDENT 7/10/18 1116:


Subjective


Subjective/Events-last exam


Pt states he still feels about the same as yesterday and has no new complaints.


Review of Systems


Time Seen by Provider:  08:10





Focused Exam


Lactate Level


18 21:36: Lactic Acid Level 2.17*H


18 09:39: Lactic Acid Level 3.51*H


18 11:50: Lactic Acid Level 1.84





Objective


Exam


Last Set of Vital Signs





Vital Signs








  Date Time  Temp Pulse Resp B/P (MAP) Pulse Ox O2 Delivery O2 Flow Rate FiO2


 


7/10/18 10:34     96 Nasal Cannula 3.00 


 


7/10/18 08:19 98.9 98 18 130/67 (88)    


 


18 08:00        35





Capillary Refill : Less Than 3 Seconds


I&O











Intake and Output 


 


 7/10/18





 00:00


 


Intake Total 3340 ml


 


Output Total 1675 ml


 


Balance 1665 ml


 


 


 


Intake Oral 1140 ml


 


IV Total 2200 ml


 


Output Urine Total 1675 ml








General:  Alert, Oriented X3


HEENT:  Atraumatic


Neck:  Supple


Lungs:  Other (decreased airmovement)


Heart:  Regular Rate


Extremities:  No Edema


Psych/Mental Status:  Mental Status NL





Results/Procedures


Lab





Laboratory Tests








Test


 18


11:57 18


14:51 18


16:11 18


19:58 Range/Units


 


 


Glucometer 366 H 239 H 240 H 253 H   MG/DL


 


Test


 18


23:44 18


03:30 18


08:13 18


09:39 Range/Units


 


 


Glucometer 188 H  230 H    MG/DL


 


White Blood Count


 


 28.4 H


 


 


 4.3-11.0


10^3/uL


 


Red Blood Count


 


 4.13 L


 


 


 4.35-5.85


10^6/uL


 


Hemoglobin  12.9 L   13.3-17.7  G/DL


 


Hematocrit  39 L   40-54  %


 


Mean Corpuscular Volume  94    80-99  FL


 


Mean Corpuscular Hemoglobin  31    25-34  PG


 


Mean Corpuscular Hemoglobin


Concent 


 33 


 


 


 32-36  G/DL





 


Red Cell Distribution Width  16.1 H   10.0-14.5  %


 


Platelet Count


 


 175 


 


 


 130-400


10^3/uL


 


Mean Platelet Volume  10.7 H   7.4-10.4  FL


 


Neutrophils (%) (Auto)  93 H   42-75  %


 


Lymphocytes (%) (Auto)  2 L   12-44  %


 


Monocytes (%) (Auto)  5    0-12  %


 


Eosinophils (%) (Auto)  0    0-10  %


 


Basophils (%) (Auto)  0    0-10  %


 


Neutrophils # (Auto)  26.4 H   1.8-7.8  X 10^3


 


Lymphocytes # (Auto)  0.5 L   1.0-4.0  X 10^3


 


Monocytes # (Auto)  1.5 H   0.0-1.0  X 10^3


 


Eosinophils # (Auto)


 


 0.0 


 


 


 0.0-0.3


10^3/uL


 


Basophils # (Auto)


 


 0.0 


 


 


 0.0-0.1


10^3/uL


 


Neutrophils % (Manual)  90     %


 


Lymphocytes % (Manual)  2     %


 


Monocytes % (Manual)  3     %


 


Band Neutrophils  5     %


 


Anisocytosis  SLIGHT     


 


Sodium Level  137    135-145  MMOL/L


 


Potassium Level  3.9    3.6-5.0  MMOL/L


 


Chloride Level  103      MMOL/L


 


Carbon Dioxide Level  22    21-32  MMOL/L


 


Anion Gap  12    5-14  MMOL/L


 


Blood Urea Nitrogen  29 H   7-18  MG/DL


 


Creatinine


 


 1.47 H


 


 


 0.60-1.30


MG/DL


 


Estimat Glomerular Filtration


Rate 


 47 


 


 


  





 


BUN/Creatinine Ratio  20     


 


Glucose Level  221 H     MG/DL


 


Calcium Level  9.1    8.5-10.1  MG/DL


 


Phosphorus Level  4.3    2.3-4.7  MG/DL


 


Magnesium Level  2.1    1.8-2.4  MG/DL


 


Lactic Acid Level


 


 


 


 3.51 *H


 0.50-2.00


MMOL/L


 


Test


 18


11:50 18


12:05 18


15:47 18


19:53 Range/Units


 


 


Lactic Acid Level


 1.84 


 


 


 


 0.50-2.00


MMOL/L


 


Glucometer  232 H 143 H 207 H   MG/DL


 


Test


 18


23:53 7/10/18


03:15 7/10/18


04:02 7/10/18


08:10 Range/Units


 


 


Glucometer 140 H  147 H 151 H   MG/DL


 


White Blood Count


 


 18.9 H


 


 


 4.3-11.0


10^3/uL


 


Red Blood Count


 


 4.18 L


 


 


 4.35-5.85


10^6/uL


 


Hemoglobin  13.4    13.3-17.7  G/DL


 


Hematocrit  39 L   40-54  %


 


Mean Corpuscular Volume  93    80-99  FL


 


Mean Corpuscular Hemoglobin  32    25-34  PG


 


Mean Corpuscular Hemoglobin


Concent 


 34 


 


 


 32-36  G/DL





 


Red Cell Distribution Width  16.5 H   10.0-14.5  %


 


Platelet Count


 


 148 


 


 


 130-400


10^3/uL


 


Mean Platelet Volume  10.5 H   7.4-10.4  FL


 


Neutrophils (%) (Auto)  91 H   42-75  %


 


Lymphocytes (%) (Auto)  3 L   12-44  %


 


Monocytes (%) (Auto)  6    0-12  %


 


Eosinophils (%) (Auto)  0    0-10  %


 


Basophils (%) (Auto)  0    0-10  %


 


Neutrophils # (Auto)  17.2 H   1.8-7.8  X 10^3


 


Lymphocytes # (Auto)  0.6 L   1.0-4.0  X 10^3


 


Monocytes # (Auto)  1.2 H   0.0-1.0  X 10^3


 


Eosinophils # (Auto)


 


 0.0 


 


 


 0.0-0.3


10^3/uL


 


Basophils # (Auto)


 


 0.0 


 


 


 0.0-0.1


10^3/uL


 


Sodium Level  140    135-145  MMOL/L


 


Potassium Level  4.0    3.6-5.0  MMOL/L


 


Chloride Level  105      MMOL/L


 


Carbon Dioxide Level  26    21-32  MMOL/L


 


Anion Gap  9    5-14  MMOL/L


 


Blood Urea Nitrogen  34 H   7-18  MG/DL


 


Creatinine


 


 1.58 H


 


 


 0.60-1.30


MG/DL


 


Estimat Glomerular Filtration


Rate 


 43 


 


 


  





 


BUN/Creatinine Ratio  22     


 


Glucose Level  146 H     MG/DL


 


Calcium Level  9.2    8.5-10.1  MG/DL


 


Phosphorus Level  3.0    2.3-4.7  MG/DL


 


Magnesium Level  2.1    1.8-2.4  MG/DL








Radiology


Date of Exam:   18





CHEST 1 VIEW, AP/PA ONLY


 





INDICATION: Chills x1-2 hours





Frontal chest obtained at 7:40 p.m. and is compared with 18.





There is post sternotomy change with cardiomegaly with unchanged


pacemaker device. There is some infiltrate in the right


infrahilar region which is new compared to the prior study. Left


lung is grossly clear. There is no pneumothorax or gross pleural


fluid.





IMPRESSION:





Cardiomegaly with postoperative changes. There is mild central


vascular congestion. There is new infiltrate in the right lung


base, pneumonia is not excluded. Followup is recommended.





Assessment/Plan


Assessment/Plan


Assessment & Plan


1.  Sepsis w/ bacteremia likely secondary to urinary source


- Adm to ICU 18


- hypotension initially, responsive to IVF; holding BP meds


 clinically improved with improved blood pressure and afebrile last 24 hours

, renal function improved, WBC elevated but did receive steroids in ER, lactic 

acid elevated this am, will give 500 cc bolus and recheck


7/10 Pt remained afebrile and WBC decreased to 18.9, lactic acid is normal





2.  UTI


- Blood cultures prelim show GNR; reviewed clinic records urine culture from  showed P. Mirabilis w/ resistance to Levaquin but sensitive to Cefepime


- started Cefepime and Levaquin 18- culture with proteus resistant to fluoroquinolone, will continue cefepime 

and discontinue levofloxacin


7/10 - will continue pt on cefepime 





3.  R Lower Lobe Pneumonia


- RLL infiltrated noted on CXR


- Currently on Cefepime and Levaquin


 d/c levofloxacin, respiratory status improved as noted below





4.  Acute on Chronic Respiratory Failure


- uses 3L oxygen at home; started on c-pap in ER due to work of breathing


- breathing improved since admission - maintained sats w/o respiratory distress 

off c-pap - will DC and continue oxygen by NC.


 stable on nasal cannula, uses intermittently at home


7/10 pt stable on 2.5L NC





5.  Hyperglycemia w/o prior hx of DM


- no hx of DM diagnosis; reviewed clinic records, last lab was 2017 - 

glucose was 101


- A1c pending


- being treated w/ Novolog SSI; BS running 180-409


- pt received Solu-Medrol and Decadron in the ER which is likely exacerbating 

hyperglycemia - steroids DC as patient has no wheezing.


- will start Levemir 10U in addition to SSI until A1c is back and longer-term 

treatment is determined.


7/10 Pts A1c is 6.2





6.  CAD w/ hx of ventricular arrhythmia s/p defibrillator


- resume home meds: amiodarone, Digoxin, Mexiletine


 adjust amiodarone dose to match home dosing





7.  CHF


- continue home furosemide and KCl





8.  Hypertension


- will hold Metoprolol, Norvasc due to hypotension





9. HOLDEN


- baseline Cr from 2017 0.97, GFR >60


- Cr 1.32 -->1.63; continue to monitor; IVF currently running at 60mL/h


 improved this am, continue to monitor


7/10 pt's Creat increased from 1.47 --> 1.58





10.  Electrolyte abnormalities on admission


- Phos low at 1.2 - will replace


- Mag low at 1.7 - replaced


7/10 - Phos now at 3, Mag at 2.1





11. DVT ppx


-SCDs, enoxaparin





Clinical Quality Measures


DVT/VTE Risk/Contraindication:


Risk Factor Score Per Nursin


RFS Level Per Nursing on Admit:  4+=Very High


Risk Score Comment:  


SINGH Ortega MD 7/10/18 1132:


Supervisory-Addendum Brief


Supervisory Addendum


I personally saw and examined this patient today and agree with all 

documentation by SALIMA Adorno, unless otherwise noted.











SONJA ADORNO MEDICAL STUDENT Jul 10, 2018 11:16


SINGH ELLISON MD Jul 10, 2018 11:32

## 2018-07-11 VITALS — DIASTOLIC BLOOD PRESSURE: 60 MMHG | SYSTOLIC BLOOD PRESSURE: 120 MMHG

## 2018-07-11 VITALS — DIASTOLIC BLOOD PRESSURE: 74 MMHG | SYSTOLIC BLOOD PRESSURE: 133 MMHG

## 2018-07-11 VITALS — SYSTOLIC BLOOD PRESSURE: 171 MMHG | DIASTOLIC BLOOD PRESSURE: 79 MMHG

## 2018-07-11 VITALS — SYSTOLIC BLOOD PRESSURE: 138 MMHG | DIASTOLIC BLOOD PRESSURE: 64 MMHG

## 2018-07-11 LAB
BUN/CREAT SERPL: 19
CALCIUM SERPL-MCNC: 8.9 MG/DL (ref 8.5–10.1)
CHLORIDE SERPL-SCNC: 106 MMOL/L (ref 98–107)
CO2 SERPL-SCNC: 24 MMOL/L (ref 21–32)
CREAT SERPL-MCNC: 1.44 MG/DL (ref 0.6–1.3)
ERYTHROCYTE [DISTWIDTH] IN BLOOD BY AUTOMATED COUNT: 16.6 % (ref 10–14.5)
GFR SERPLBLD BASED ON 1.73 SQ M-ARVRAT: 48 ML/MIN
GLUCOSE SERPL-MCNC: 125 MG/DL (ref 70–105)
HCT VFR BLD CALC: 36 % (ref 40–54)
HGB BLD-MCNC: 12.1 G/DL (ref 13.3–17.7)
MCH RBC QN AUTO: 31 PG (ref 25–34)
MCHC RBC AUTO-ENTMCNC: 33 G/DL (ref 32–36)
MCV RBC AUTO: 94 FL (ref 80–99)
PLATELET # BLD: 120 10^3/UL (ref 130–400)
PMV BLD AUTO: 11.4 FL (ref 7.4–10.4)
POTASSIUM SERPL-SCNC: 3.9 MMOL/L (ref 3.6–5)
RBC # BLD AUTO: 3.88 10^6/UL (ref 4.35–5.85)
SODIUM SERPL-SCNC: 139 MMOL/L (ref 135–145)
WBC # BLD AUTO: 14 10^3/UL (ref 4.3–11)

## 2018-07-11 RX ADMIN — IPRATROPIUM BROMIDE AND ALBUTEROL SULFATE SCH ML: .5; 3 SOLUTION RESPIRATORY (INHALATION) at 02:32

## 2018-07-11 RX ADMIN — POTASSIUM CHLORIDE SCH MEQ: 600 CAPSULE, EXTENDED RELEASE ORAL at 05:48

## 2018-07-11 RX ADMIN — MEXILETINE HYDROCHLORIDE SCH MG: 150 CAPSULE ORAL at 20:41

## 2018-07-11 RX ADMIN — DIGOXIN SCH MG: 125 TABLET ORAL at 08:06

## 2018-07-11 RX ADMIN — AMIODARONE HYDROCHLORIDE SCH MG: 200 TABLET ORAL at 08:06

## 2018-07-11 RX ADMIN — FUROSEMIDE SCH MG: 40 TABLET ORAL at 08:06

## 2018-07-11 RX ADMIN — ACETAMINOPHEN PRN MG: 500 TABLET ORAL at 21:56

## 2018-07-11 RX ADMIN — INSULIN ASPART SCH UNIT: 100 INJECTION, SOLUTION INTRAVENOUS; SUBCUTANEOUS at 11:05

## 2018-07-11 RX ADMIN — IPRATROPIUM BROMIDE AND ALBUTEROL SULFATE SCH ML: .5; 3 SOLUTION RESPIRATORY (INHALATION) at 06:35

## 2018-07-11 RX ADMIN — SODIUM CHLORIDE SCH MLS/HR: 900 INJECTION, SOLUTION INTRAVENOUS at 11:06

## 2018-07-11 RX ADMIN — MEXILETINE HYDROCHLORIDE SCH MG: 150 CAPSULE ORAL at 14:35

## 2018-07-11 RX ADMIN — PANTOPRAZOLE SODIUM SCH MG: 40 TABLET, DELAYED RELEASE ORAL at 05:48

## 2018-07-11 RX ADMIN — IPRATROPIUM BROMIDE AND ALBUTEROL SULFATE SCH ML: .5; 3 SOLUTION RESPIRATORY (INHALATION) at 18:48

## 2018-07-11 RX ADMIN — ACETAMINOPHEN PRN MG: 500 TABLET ORAL at 05:48

## 2018-07-11 RX ADMIN — INSULIN DETEMIR SCH UNIT: 100 INJECTION, SOLUTION SUBCUTANEOUS at 08:06

## 2018-07-11 RX ADMIN — INSULIN ASPART SCH UNIT: 100 INJECTION, SOLUTION INTRAVENOUS; SUBCUTANEOUS at 05:38

## 2018-07-11 RX ADMIN — FUROSEMIDE SCH MG: 40 TABLET ORAL at 14:35

## 2018-07-11 RX ADMIN — SODIUM CHLORIDE SCH MLS/HR: 900 INJECTION INTRAVENOUS at 20:44

## 2018-07-11 RX ADMIN — INSULIN ASPART SCH UNIT: 100 INJECTION, SOLUTION INTRAVENOUS; SUBCUTANEOUS at 16:46

## 2018-07-11 RX ADMIN — INSULIN ASPART SCH UNIT: 100 INJECTION, SOLUTION INTRAVENOUS; SUBCUTANEOUS at 20:40

## 2018-07-11 RX ADMIN — ENOXAPARIN SODIUM SCH MG: 100 INJECTION SUBCUTANEOUS at 11:05

## 2018-07-11 RX ADMIN — MEXILETINE HYDROCHLORIDE SCH MG: 150 CAPSULE ORAL at 08:06

## 2018-07-11 RX ADMIN — POTASSIUM CHLORIDE SCH MEQ: 600 CAPSULE, EXTENDED RELEASE ORAL at 16:46

## 2018-07-11 RX ADMIN — ENOXAPARIN SODIUM SCH MG: 100 INJECTION SUBCUTANEOUS at 08:07

## 2018-07-11 NOTE — PHYSICAL THERAPY DAILY NOTE
PT Daily Note-Current


Subjective


Patient in recliner pre tx, reluctantly agrees to PT, has no complaints of pain.





Appearance


Patient in recliner post tx with nurse call, phone, tray, all needs met.  Wife 

in room.





Mental Status


Patient Orientation:  Person, Place


Attachments:  Oxygen, IV





Transfers


              Functional [place] Measure


0=Not Assessed/NA   4=Minimal Assistance


1=Total Assistance   5=Supervision or Setup


2=Maximal Assistance   6=Modified [place]


3=Moderate Assistance   7=Complete IndependenceIRFPAI Quality Coding Scale











6 Independent with activity with or without an assistive device


 


5  Patient requires set up or clean up by helper.  Patient completes activity  

by  themselves


 


4 Supervision or touching assist (CGA). [place] provide cues , steadying assist


 


3 The helper provides less than half the effort to complete the activity


 


2 The helper provides more than half the effort to complete the activity


 


1 Dependent.  The helper does all the effort to complete an activity 


 


7 Patient refused to complete or attempt activity


 


9 The patient did not perform the activity before the current illness or injury


 


88 Not attempted due to Medical conditions or safety concerns








Transfers (B, C, W/C) (FIM):  4


Sit to/from Stand:  4


Bed to/from Chair:  4


Patient performs sit to stand and stand pivot with CGA/Margaret.  Cues for safety 

and hand placement.





Weight Bearing


Right Lower Extremity:  Right


Full Weight Bearing


Left Lower Extremity:  Left


Full Weight Bearing





Gait Training


Gait (FIM):  2


Distance:  100'


Gait Level of Assist:  4


Gait Persons Needed:  1


Gait Assistive Device:  FWW


Patient ambulates very slowly and takes very small steps, poor foot clearance 

and heel strike.





Exercises


Seated Therapy Exercises:  Ankle pumps, Long arc quads


Seated Reps:  15





Treatments


transfers, ambulation, AROM





Assessment


Current Status:  Fair Progress


Patient required less assistance for mobility than yesterday but patient still 

has poor endurance and was very fatigued after walking.





PT Long Term Goals


Long Term Goals


PT Long Term Goals Time Frame:  Jul 20, 2018


Transfers (B,C,W/C) (FIM):  5


Gait (FIM):  2


Gait distance (FIM):  0=275-62 ft


Distance:  125'


Gait Level of Assist:  5


Gait Assistive Device:  FWW





PT Plan


Problem List


Problem List:  Activity Tolerance, Functional Strength, Safety, Balance, Gait, 

Transfer, Bed Mobility, ROM





Treatment/Plan


Treatment Plan:  Continue Plan of Care


Treatment Plan:  Bed Mobility, Education, Functional Activity Farnaz, Functional 

Strength, Gait, Safety, Therapeutic Exercise, Transfers


Treatment Duration:  Jul 20, 2018


Frequency:  6 times per week


Estimated Hrs Per Day:  .25 hour per day


Patient and/or Family Agrees t:  Yes





Safety Risks/Education


Patient Education:  Gait Training, Transfer Techniques, Correct Positioning, 

Safety Issues


Teaching Recipient:  Patient


Teaching Methods:  Demonstration, Discussion


Response to Teaching:  Reinforcement Needed





Time/GCodes


Time In:  0827


Time Out:  0849


Total Billed Treatment Time:  22


Total Billed Treatment


1 visit


GT 22'











JANET PEARSON PT Jul 11, 2018 08:56

## 2018-07-11 NOTE — PROGRESS NOTE (SOAP)
SONJA ADORNO A MEDICAL STUDENT 18 1:38pm:


Subjective


Subjective/Events-last exam


Pt states feeling slightly improved since yesterday.





Focused Exam


Lactate Level


18 09:39: Lactic Acid Level 3.51*H


18 11:50: Lactic Acid Level 1.84





Objective


Exam


Last Set of Vital Signs





Vital Signs








  Date Time  Temp Pulse Resp B/P (MAP) Pulse Ox O2 Delivery O2 Flow Rate FiO2


 


18 12:00 98.6 88 18 138/64 (88) 96 Nasal Cannula 2.00 


 


18 06:35        28





Capillary Refill : Less Than 3 Seconds


I&O











Intake and Output 


 


 18





 00:00


 


Intake Total 4310 ml


 


Output Total 2150 ml


 


Balance 2160 ml


 


 


 


Intake Oral 2260 ml


 


IV Total 2050 ml


 


Output Urine Total 2150 ml








General:  Alert, Oriented X3


HEENT:  Atraumatic


Lungs:  Other (decreased air movement)


Heart:  Regular Rate


Extremities:  No Edema


Neuro:  Normal Speech


Psych/Mental Status:  Mental Status NL





Results/Procedures


Lab





Laboratory Tests








Test


 18


15:47 18


19:53 18


23:53 7/10/18


03:15 Range/Units


 


 


Glucometer 143 H 207 H 140 H    MG/DL


 


White Blood Count


 


 


 


 18.9 H


 4.3-11.0


10^3/uL


 


Red Blood Count


 


 


 


 4.18 L


 4.35-5.85


10^6/uL


 


Hemoglobin    13.4  13.3-17.7  G/DL


 


Hematocrit    39 L 40-54  %


 


Mean Corpuscular Volume    93  80-99  FL


 


Mean Corpuscular Hemoglobin    32  25-34  PG


 


Mean Corpuscular Hemoglobin


Concent 


 


 


 34 


 32-36  G/DL





 


Red Cell Distribution Width    16.5 H 10.0-14.5  %


 


Platelet Count


 


 


 


 148 


 130-400


10^3/uL


 


Mean Platelet Volume    10.5 H 7.4-10.4  FL


 


Neutrophils (%) (Auto)    91 H 42-75  %


 


Lymphocytes (%) (Auto)    3 L 12-44  %


 


Monocytes (%) (Auto)    6  0-12  %


 


Eosinophils (%) (Auto)    0  0-10  %


 


Basophils (%) (Auto)    0  0-10  %


 


Neutrophils # (Auto)    17.2 H 1.8-7.8  X 10^3


 


Lymphocytes # (Auto)    0.6 L 1.0-4.0  X 10^3


 


Monocytes # (Auto)    1.2 H 0.0-1.0  X 10^3


 


Eosinophils # (Auto)


 


 


 


 0.0 


 0.0-0.3


10^3/uL


 


Basophils # (Auto)


 


 


 


 0.0 


 0.0-0.1


10^3/uL


 


Sodium Level    140  135-145  MMOL/L


 


Potassium Level    4.0  3.6-5.0  MMOL/L


 


Chloride Level    105    MMOL/L


 


Carbon Dioxide Level    26  21-32  MMOL/L


 


Anion Gap    9  5-14  MMOL/L


 


Blood Urea Nitrogen    34 H 7-18  MG/DL


 


Creatinine


 


 


 


 1.58 H


 0.60-1.30


MG/DL


 


Estimat Glomerular Filtration


Rate 


 


 


 43 


  





 


BUN/Creatinine Ratio    22   


 


Glucose Level    146 H   MG/DL


 


Calcium Level    9.2  8.5-10.1  MG/DL


 


Phosphorus Level    3.0  2.3-4.7  MG/DL


 


Magnesium Level    2.1  1.8-2.4  MG/DL


 


Test


 7/10/18


04:02 7/10/18


08:10 7/10/18


11:47 7/10/18


15:58 Range/Units


 


 


Glucometer 147 H 151 H 222 H 120 H   MG/DL


 


Test


 7/10/18


20:17 18


05:16 18


05:22 18


10:44 Range/Units


 


 


Glucometer 201 H 138 H  152 H   MG/DL


 


White Blood Count


 


 


 14.0 H


 


 4.3-11.0


10^3/uL


 


Red Blood Count


 


 


 3.88 L


 


 4.35-5.85


10^6/uL


 


Hemoglobin   12.1 L  13.3-17.7  G/DL


 


Hematocrit   36 L  40-54  %


 


Mean Corpuscular Volume   94   80-99  FL


 


Mean Corpuscular Hemoglobin   31   25-34  PG


 


Mean Corpuscular Hemoglobin


Concent 


 


 33 


 


 32-36  G/DL





 


Red Cell Distribution Width   16.6 H  10.0-14.5  %


 


Platelet Count


 


 


 120 L


 


 130-400


10^3/uL


 


Mean Platelet Volume   11.4 H  7.4-10.4  FL


 


Sodium Level   139   135-145  MMOL/L


 


Potassium Level   3.9   3.6-5.0  MMOL/L


 


Chloride Level   106     MMOL/L


 


Carbon Dioxide Level   24   21-32  MMOL/L


 


Anion Gap   9   5-14  MMOL/L


 


Blood Urea Nitrogen   28 H  7-18  MG/DL


 


Creatinine


 


 


 1.44 H


 


 0.60-1.30


MG/DL


 


Estimat Glomerular Filtration


Rate 


 


 48 


 


  





 


BUN/Creatinine Ratio   19    


 


Glucose Level   125 H    MG/DL


 


Calcium Level   8.9   8.5-10.1  MG/DL





Laboratory Tests


7/10/18 15:58: Glucometer 120H


7/10/18 20:17: Glucometer 201H


18 05:16: Glucometer 138H


18 05:22: 


White Blood Count 14.0H, Red Blood Count 3.88L, Hemoglobin 12.1L, Hematocrit 36L

, Mean Corpuscular Volume 94, Mean Corpuscular Hemoglobin 31, Mean Corpuscular 

Hemoglobin Concent 33, Red Cell Distribution Width 16.6H, Platelet Count 120L, 

Mean Platelet Volume 11.4H, Sodium Level 139, Potassium Level 3.9, Chloride 

Level 106, Carbon Dioxide Level 24, Anion Gap 9, Blood Urea Nitrogen 28H, 

Creatinine 1.44H, Estimat Glomerular Filtration Rate 48, BUN/Creatinine Ratio 19

, Glucose Level 125H, Calcium Level 8.9


18 10:44: Glucometer 152H





Microbiology


18 Blood Culture - Preliminary, Resulted


         Proteus mirabilis


         See Comments


18 Urine Culture - Final, Complete


         Proteus mirabilis


         See Comments


         Sent To [facility]


Radiology


Date of Exam:   18





CHEST 1 VIEW, AP/PA ONLY


 





INDICATION: Chills x1-2 hours





Frontal chest obtained at 7:40 p.m. and is compared with 18.





There is post sternotomy change with cardiomegaly with unchanged


pacemaker device. There is some infiltrate in the right


infrahilar region which is new compared to the prior study. Left


lung is grossly clear. There is no pneumothorax or gross pleural


fluid.





IMPRESSION:





Cardiomegaly with postoperative changes. There is mild central


vascular congestion. There is new infiltrate in the right lung


base, pneumonia is not excluded. Followup is recommended.








CHEST 1 VIEW, AP/PA ONLY


 





EXAM: Portable erect AP chest at 3:22 





INDICATION: Respiratory distress





When compared to the prior exam of 2018, there does not


appear to have been any significant change. The heart is stable


in size. There is still mild pulmonary congestion. There is a


small amount of atelectasis/infiltrate and fluid partially


obscuring the right lung base. The mediastinum is not widened.


The osseous structures are intact. The left-sided pacemaker seen


previously is again evident and no different.





IMPRESSION:


Stable chest. There has been no adverse change since the prior


exam.





Assessment/Plan


Assessment/Plan


Admission Status:  Inpatient Order (span 2 midnights)


Assessment & Plan


1.  Sepsis w/ bacteremia likely secondary to urinary source


- Adm to ICU 18


- hypotension initially, responsive to IVF; holding BP meds


 clinically improved with improved blood pressure and afebrile last 24 hours

, renal function improved, WBC elevated but did receive steroids in ER, lactic 

acid elevated this am, will give 500 cc bolus and recheck


7/10 Pt remained afebrile and WBC decreased to 18.9, lactic acid is normal


 pt had temperature of 100.0F during the last 24 hours, will continue IV 

abx and consider transitioning pt to swing





2.  UTI


- Blood cultures prelim show GNR; reviewed clinic records urine culture from  showed P. Mirabilis w/ resistance to Levaquin but sensitive to Cefepime


- started Cefepime and Levaquin 18- culture with proteus resistant to fluoroquinolone, will continue cefepime 

and discontinue levofloxacin


7/10 - will continue pt on cefepime 





3.  R Lower Lobe Pneumonia


- RLL infiltrated noted on CXR


- Currently on Cefepime and Levaquin


 d/c levofloxacin, respiratory status improved as noted below





4.  Acute on Chronic Respiratory Failure


- uses 3L oxygen at home; started on c-pap in ER due to work of breathing


- breathing improved since admission - maintained sats w/o respiratory distress 

off c-pap - will DC and continue oxygen by NC.


 stable on nasal cannula, uses intermittently at home


7/10 pt stable on 2.5L NC





5.  Hyperglycemia w/o prior hx of DM


- no hx of DM diagnosis; reviewed clinic records, last lab was 2017 - 

glucose was 101


- A1c pending


- being treated w/ Novolog SSI; BS running 180-409


- pt received Solu-Medrol and Decadron in the ER which is likely exacerbating 

hyperglycemia - steroids DC as patient has no wheezing.


- will start Levemir 10U in addition to SSI until A1c is back and longer-term 

treatment is determined.


7/10 Pts A1c is 6.2





6.  CAD w/ hx of ventricular arrhythmia s/p defibrillator


- resume home meds: amiodarone, Digoxin, Mexiletine


 adjust amiodarone dose to match home dosing





7.  CHF


- continue home furosemide and KCl





8.  Hypertension


- will hold Metoprolol, Norvasc due to hypotension





9. HOLDEN


- baseline Cr from 2017 0.97, GFR >60


- Cr 1.32 -->1.63; continue to monitor; IVF currently running at 60mL/h


 improved this am, continue to monitor


7/10 pt's Creat increased from 1.47 --> 1.58


 pt's creat decreased from 1.58--> 1.44





10.  Electrolyte abnormalities on admission


- Phos low at 1.2 - will replace


- Mag low at 1.7 - replaced


7/10 - Phos now at 3, Mag at 2.1





11. DVT ppx


-SCDs, enoxaparin





Clinical Quality Measures


DVT/VTE Risk/Contraindication:


Risk Factor Score Per Nursin


RFS Level Per Nursing on Admit:  4+=Very High


Risk Score Comment:  


SINGH Ortega MD 18 2:36pm:


Subjective


Review of Systems


Date Seen by Provider:  2018


Time Seen by Provider:  10:50





Supervisory-Addendum Brief


Supervisory Addendum


Patient seen and examined by me and assessment and plan directed by me, agree 

with documentation by SALIMA Adorno unless otherwise noted.











SONJA ADORNO MEDICAL STUDENT 2018 1:38 pm


SINGH ELLISON MD 2018 2:36 pm

## 2018-07-12 ENCOUNTER — HOSPITAL ENCOUNTER (INPATIENT)
Dept: HOSPITAL 75 - 4TH | Age: 78
LOS: 4 days | Discharge: HOME HEALTH | DRG: 194 | End: 2018-07-16
Attending: FAMILY MEDICINE | Admitting: FAMILY MEDICINE
Payer: MEDICARE

## 2018-07-12 VITALS — DIASTOLIC BLOOD PRESSURE: 77 MMHG | SYSTOLIC BLOOD PRESSURE: 156 MMHG

## 2018-07-12 VITALS — DIASTOLIC BLOOD PRESSURE: 79 MMHG | SYSTOLIC BLOOD PRESSURE: 167 MMHG

## 2018-07-12 VITALS — BODY MASS INDEX: 35.16 KG/M2 | WEIGHT: 211 LBS | HEIGHT: 65 IN

## 2018-07-12 DIAGNOSIS — M19.91: ICD-10-CM

## 2018-07-12 DIAGNOSIS — Z99.81: ICD-10-CM

## 2018-07-12 DIAGNOSIS — H40.9: ICD-10-CM

## 2018-07-12 DIAGNOSIS — E78.00: ICD-10-CM

## 2018-07-12 DIAGNOSIS — J18.9: Primary | ICD-10-CM

## 2018-07-12 DIAGNOSIS — Z95.1: ICD-10-CM

## 2018-07-12 DIAGNOSIS — F03.90: ICD-10-CM

## 2018-07-12 DIAGNOSIS — I25.2: ICD-10-CM

## 2018-07-12 DIAGNOSIS — G62.9: ICD-10-CM

## 2018-07-12 DIAGNOSIS — I11.0: ICD-10-CM

## 2018-07-12 DIAGNOSIS — R73.9: ICD-10-CM

## 2018-07-12 DIAGNOSIS — N39.0: ICD-10-CM

## 2018-07-12 DIAGNOSIS — G47.30: ICD-10-CM

## 2018-07-12 DIAGNOSIS — I25.10: ICD-10-CM

## 2018-07-12 DIAGNOSIS — R26.81: ICD-10-CM

## 2018-07-12 DIAGNOSIS — I70.201: ICD-10-CM

## 2018-07-12 DIAGNOSIS — Z95.810: ICD-10-CM

## 2018-07-12 DIAGNOSIS — J44.0: ICD-10-CM

## 2018-07-12 DIAGNOSIS — Z95.5: ICD-10-CM

## 2018-07-12 DIAGNOSIS — Z87.891: ICD-10-CM

## 2018-07-12 DIAGNOSIS — F32.9: ICD-10-CM

## 2018-07-12 DIAGNOSIS — I50.9: ICD-10-CM

## 2018-07-12 DIAGNOSIS — Z85.828: ICD-10-CM

## 2018-07-12 DIAGNOSIS — K21.9: ICD-10-CM

## 2018-07-12 DIAGNOSIS — I87.2: ICD-10-CM

## 2018-07-12 LAB
BUN/CREAT SERPL: 19
CALCIUM SERPL-MCNC: 8.9 MG/DL (ref 8.5–10.1)
CHLORIDE SERPL-SCNC: 105 MMOL/L (ref 98–107)
CO2 SERPL-SCNC: 24 MMOL/L (ref 21–32)
CREAT SERPL-MCNC: 1.44 MG/DL (ref 0.6–1.3)
ERYTHROCYTE [DISTWIDTH] IN BLOOD BY AUTOMATED COUNT: 16.2 % (ref 10–14.5)
GFR SERPLBLD BASED ON 1.73 SQ M-ARVRAT: 48 ML/MIN
GLUCOSE SERPL-MCNC: 102 MG/DL (ref 70–105)
HCT VFR BLD CALC: 36 % (ref 40–54)
HGB BLD-MCNC: 12.2 G/DL (ref 13.3–17.7)
MCH RBC QN AUTO: 32 PG (ref 25–34)
MCHC RBC AUTO-ENTMCNC: 34 G/DL (ref 32–36)
MCV RBC AUTO: 93 FL (ref 80–99)
PLATELET # BLD: 123 10^3/UL (ref 130–400)
PMV BLD AUTO: 10.8 FL (ref 7.4–10.4)
POTASSIUM SERPL-SCNC: 3.7 MMOL/L (ref 3.6–5)
RBC # BLD AUTO: 3.86 10^6/UL (ref 4.35–5.85)
SODIUM SERPL-SCNC: 138 MMOL/L (ref 135–145)
WBC # BLD AUTO: 14 10^3/UL (ref 4.3–11)

## 2018-07-12 PROCEDURE — 36415 COLL VENOUS BLD VENIPUNCTURE: CPT

## 2018-07-12 PROCEDURE — 94761 N-INVAS EAR/PLS OXIMETRY MLT: CPT

## 2018-07-12 PROCEDURE — 85027 COMPLETE CBC AUTOMATED: CPT

## 2018-07-12 PROCEDURE — 94760 N-INVAS EAR/PLS OXIMETRY 1: CPT

## 2018-07-12 PROCEDURE — 82962 GLUCOSE BLOOD TEST: CPT

## 2018-07-12 PROCEDURE — 94640 AIRWAY INHALATION TREATMENT: CPT

## 2018-07-12 PROCEDURE — 80048 BASIC METABOLIC PNL TOTAL CA: CPT

## 2018-07-12 PROCEDURE — 94664 DEMO&/EVAL PT USE INHALER: CPT

## 2018-07-12 RX ADMIN — INSULIN DETEMIR SCH UNIT: 100 INJECTION, SOLUTION SUBCUTANEOUS at 09:44

## 2018-07-12 RX ADMIN — IPRATROPIUM BROMIDE AND ALBUTEROL SULFATE SCH ML: .5; 3 SOLUTION RESPIRATORY (INHALATION) at 18:58

## 2018-07-12 RX ADMIN — SODIUM CHLORIDE SCH MLS/HR: 900 INJECTION INTRAVENOUS at 23:36

## 2018-07-12 RX ADMIN — FUROSEMIDE SCH MG: 40 TABLET ORAL at 15:04

## 2018-07-12 RX ADMIN — DIGOXIN SCH MG: 125 TABLET ORAL at 08:46

## 2018-07-12 RX ADMIN — MEXILETINE HYDROCHLORIDE SCH MG: 150 CAPSULE ORAL at 13:37

## 2018-07-12 RX ADMIN — MEXILETINE HYDROCHLORIDE SCH MG: 150 CAPSULE ORAL at 08:46

## 2018-07-12 RX ADMIN — INSULIN ASPART SCH UNIT: 100 INJECTION, SOLUTION INTRAVENOUS; SUBCUTANEOUS at 05:37

## 2018-07-12 RX ADMIN — AMIODARONE HYDROCHLORIDE SCH MG: 200 TABLET ORAL at 08:46

## 2018-07-12 RX ADMIN — METOPROLOL TARTRATE SCH MG: 25 TABLET, FILM COATED ORAL at 23:36

## 2018-07-12 RX ADMIN — IPRATROPIUM BROMIDE AND ALBUTEROL SULFATE SCH ML: .5; 3 SOLUTION RESPIRATORY (INHALATION) at 06:42

## 2018-07-12 RX ADMIN — INSULIN ASPART SCH UNIT: 100 INJECTION, SOLUTION INTRAVENOUS; SUBCUTANEOUS at 11:06

## 2018-07-12 RX ADMIN — POTASSIUM CHLORIDE SCH MEQ: 600 CAPSULE, EXTENDED RELEASE ORAL at 17:32

## 2018-07-12 RX ADMIN — MEXILETINE HYDROCHLORIDE SCH MG: 150 CAPSULE ORAL at 23:36

## 2018-07-12 RX ADMIN — POTASSIUM CHLORIDE SCH MEQ: 600 CAPSULE, EXTENDED RELEASE ORAL at 06:08

## 2018-07-12 RX ADMIN — FUROSEMIDE SCH MG: 40 TABLET ORAL at 08:46

## 2018-07-12 RX ADMIN — ENOXAPARIN SODIUM SCH MG: 100 INJECTION SUBCUTANEOUS at 10:54

## 2018-07-12 RX ADMIN — INSULIN ASPART SCH UNIT: 100 INJECTION, SOLUTION INTRAVENOUS; SUBCUTANEOUS at 16:24

## 2018-07-12 RX ADMIN — INSULIN ASPART SCH UNIT: 100 INJECTION, SOLUTION INTRAVENOUS; SUBCUTANEOUS at 21:37

## 2018-07-12 RX ADMIN — PANTOPRAZOLE SODIUM SCH MG: 40 TABLET, DELAYED RELEASE ORAL at 06:08

## 2018-07-12 NOTE — OCCUPATIONAL THERAPY EVAL
OT Evaluation-General/PLF


Medical Diagnosis


Admission Date


2018 at 09:43


Medical Diagnosis:  pneumonia; sepsis


Onset Date:  2018





Therapy Diagnosis


Therapy Diagnosis:  Weakness





Height/Weight


Height (Feet):  5


Height (Inches):  5.00


Weight (Pounds):  211


Weight (Ounces):  0.0





Precautions


Precautions/Isolations:  Fall Prevention, Standard Precautions





Weight Bear Status


Weight Bearing Restriction:  Weight Bearing/Tolerated





Referral


Physician:  Gabriel


Referral Reason:  Activity Tolerance, Self Care, Evaluation/Treatment, 

Strengthening/ROM





Medical History


Pertinent Medical History:  Atrial Fib, Arthritis, CABG, CAD, COPD, Dementia, 

GERD, MI, Neuropathy


Current History


Lives with spouse in [facility].  Became ill and brought to hospital.


Reviewed History:  Yes





Social History


Home:  Apartment


Current Living Status:  Spouse


Entry Into Home:  Level Entry





ADL-Prior Level of Function


ADL PLOF Comments


Spouse does a lot of the communication for pt.  States that pt. is in a 

wheelchair "half of the time" and with a walker "half of the time."  Spouse 

states that both her and pt. have 3 hours of caregiver assist a day, adding up 

to 6 hours per day.  Pt. does state that he can dress himself.  Spouse states 

that she is with him when he showers so that he doesn't "fall out."


DME/Equipment:  Bath Chair, Tub/Shower


DME/Equipment Comments


Spouse states that pt. has a walker, wheelchair.


Drive Self:  Yes





OT Current Status


Subjective


No pain reported.





Appearance


Pt. up in chair.  Agrees to work with OT.





Mental Status/Objective


Patient Orientation:  Unable to Assess





Current


Glasses/Contacts:  Yes


Hand Dominance:  Right


Upper Extremity ROM


WFL





ADL-Treatment


              Functional [place] Measure


0=Not Assessed/NA   4=Minimal Assistance


1=Total Assistance   5=Supervision or Setup


2=Maximal Assistance   6=Modified [place]


3=Moderate Assistance   7=Complete IndependenceIRFPAI Quality Coding Scale











6 Independent with activity with or without an assistive device


 


5  Patient requires set up or clean up by helper.  Patient completes activity  

by  themselves


 


4 Supervision or touching assist (CGA). [place] provide cues , steadying assist


 


3 The helper provides less than half the effort to complete the activity


 


2 The helper provides more than half the effort to complete the activity


 


1 Dependent.  The helper does all the effort to complete an activity 


 


7 Patient refused to complete or attempt activity


 


9 The patient did not perform the activity before the current illness or injury


 


88 Not attempted due to Medical conditions or safety concerns








Eating (FIM):  5 (Meal tray came and pt. was asked if he could set his own tray 

up.  Pt. attempted to, but would "fumble" with the food.  OT did this for him.)


Eating (QC):  5


Lower Body Dressing (FIM):  3 (With effort, pt. is able to doff his socks.  OT 

has to put them on him.)


Transfers (B, C, W/C) (FIM):  4 (Sit-stand)


Pt. states that he has already had a bath.  OT asks him if he needs to use the 

bathroom while she is present.  States "no."  Meal tray came and OT set this up 

for him.  Pt. is able to eat by himself.  Spouse present and is also eating a 

tray.





Education


OT Patient Education:  Correct positioning, Modified ADL techniques, Progress 

toward Goal/Update tx plan, Purpose of tx/functional activities, Reviewed 

precautions, Rehab process, Transfer techniques


Teaching Recipient:  Patient, Significant Other


Teaching Methods:  Demonstration, Discussion


Response to Teaching:  Verbalize Understanding, Return Demonstration





OT Short Term Goals


Short Term Goals


Time Frame:  2018


Eating(FIM):  5


Grooming(FIM):  5


Bathing(FIM):  3


Upper Body Dressing(FIM):  5


Lower Body Dressing(FIM):  4


Toileting(FIM):  4


Transfers (B,C,W/C) (FIM):  5


Toilet/Commode Transfer(FIM):  5


Shower Transfer(FIM):  4


Additional Short Term Goals:  1-Demonstrate ADL Tasks, 2-Verbalize Understanding

, 3-ImproveStrength/Farnaz


1=Demonstrate adherence to instructed precautions during ADL tasks.


2=Patient will verbalize/demonstrate understanding of assistive devices/

modifications for ADL.


3=Patient will improve strength/tolerance for activity to enable patient to 

perform ADL's.





OT Long Term Goals


Long Term Goals


Time Frame:  2018


Eating (FIM):  6


Eating (QC):  6


Groomin


Oral Hygiene (QC):  5


Bathing(FIM):  4


Upper Body Dressing(FIM):  5


Lower Body Dressing(FIM):  5


Toileting(FIM):  6


Toileting Hygiene (QC):  6


Transfers (B,C,W/C) (FIM):  6


Toilet/Commode Transfer(FIM):  6


Toilet/Commode Transfer (QC):  6


Shower Transfer(FIM):  5


Additional Goals:  1-Demonstrate ADL Tasks, 2-Verbalize Understanding, 3-

ImproveStrength/Farnaz


1=Demonstrate adherence to instructed precautions during ADL tasks.


2=Patient will verbalize/demonstrate understanding of assistive devices/

modifications for ADL.


3=Patient will improve strength/tolerance for activity to enable patient to 

perform ADL's.





OT Education/Plan


Problem List/Assessment


Assessment:  Decreased Activ Tolerance, Dependent Transfers, Impaired I ADL's, 

Impaired Self-Care Skills





Discharge Recommendations


Plan/Recommendations:  Continue POC


Therapy D/C Recommendations:  Home w/ Family Support, Occupational Therapy Home 

Care, Scheduled Assistance





Treatment Plan/Plan of Care


Treatment,Training & Education:  Yes


Patient would benefit from OT for education, treatment and training to promote 

independence in ADL's, mobility, safety and/or upper extremity function for ADL'

s.


Plan of Care:  ADL Retraining, Functional Mobility, UE Funct Exercise/Act


Treatment Duration:  2018


Frequency:  5 times per week


Estimated Hrs Per Day:  .25 hour per day


Agreement:  Yes


Rehab Potential:  Good





Time/GCodes


Start Time:  13:00


Stop Time:  13:20


Total Time Billed (hr/min):  20


Billed Treatment Time


1, ALCIDES CLARKE OT 2018 14:48

## 2018-07-12 NOTE — PHYSICAL THERAPY EVALUATION
PT Evaluation-General


Medical Diagnosis


Admission Date


Jul 12, 2018 at 09:43


Medical Diagnosis:  pneumonia; sepsis


Onset Date:  Jul 12, 2018





Therapy Diagnosis


Therapy Diagnosis:  weakness; abn gait





Height/Weight


Height (Feet):  5


Height (Inches):  5.00


Weight (Pounds):  211


Weight (Ounces):  6.0





Referral


Physician:  Gabriel


Reason for Referral:  Evaluation/Treatment





Medical History


Pertinent Medical History:  Atrial Fib, Arthritis, CABG, CAD, COPD, Dementia, 

GERD, MI, Neuropathy


Current History


Admitted to [facility] with complaints of weakness and burning with urination.  

Found to have pneumonia with sepsis.  Transferred to [facility] for continued medical 

management and PT services to increase functional strength.


Reviewed History:  Yes





Social History


Home:  Apartment (handicap accessible.)


Current Living Status:  Spouse


Entry Into Home:  Level Entry





Prior/Core FIM


Prior Level of Function


              Functional [place] Measure


0=Not Assessed/NA   4=Minimal Assistance


1=Total Assistance   5=Supervision or Setup


2=Maximal Assistance   6=Modified [place]


3=Moderate Assistance   7=Complete [place]


Bed Mobility:  6


Transfers (B,C,W/C) (FIM):  6


Gait:  6 (fWW)





PT Evaluation-Current


Subjective


Pt agreeable to PT.  Hoping to go home soon.





Pain





   Numeric Pain Scale:  0-No Pain


   Location:  No Pain Reported





Objective


Patient Orientation:  Person, Place, Time, Situation


Problem Solving:  Fair


Attachments:  Oxygen





ROM/Strength


ROM Lower Extremities


wFL


Strenght Lower Extremities


grossly 4/5 throughout





Integumentary/Posture


Integumentary


refer to nursing notes.


Bowel Incontinence:  No


Bladder Incontinence:  No


Posture


rounded shoulders and slightly forward flexed at hips;forward displaces his 

balance





Neuromuscular


(Tone, Coordination, Reflexes)


functional





Sensory


Vision:  Wears Glasses


Hearing:  Functional


Hand Dominance:  Right


Sensation Right Lower Extremit:  Intact


Sensation Left Lower Extremity:  Intact





Transfers


              Functional [place] Measure


0=Not Assessed/NA   4=Minimal Assistance


1=Total Assistance   5=Supervision or Setup


2=Maximal Assistance   6=Modified [place]


3=Moderate Assistance   7=Complete [place]


Sit to/from Stand:  4 (cGA with skilled cues for sequencing. )


Sit to Stand (QC):  4


Chair/Bed-to-Chair Xfer(QC):  4


Sit to from stand transfers from chair; toilet transfer with min assist as well.





Gait


Does the Patient Walk?:  Yes


Mode of Locomotion:  Walk


Anticipated Mode of Locomotion:  Walk


Gait (FIM):  2


Distance (FIM):  9=295-56 ft


Distance:  100 ft


Walk 50 ft with 2 Turns(QC):  4


Walk 150 ft (QC):  88


Gait Assistive Device:  FWW


Comments/Gait Description


slow gait with forward flexed at hips; narrow MARIBELL and decresed step length; 

slightly unsteady.





Balance


Sitting Static:  Fair


Sitting Dynamic:  Fair


Standing Static:  Fair


 Standing Dynamic:  Fair





Treatment


Worked on functional gait within the room as well as in and out of the 

bathroom.  Seated LE ther ex x 10 fro AP, LAQ, hip flexion; SAQ, AP and heels 

slides in sitting.





Assessment/Needs


Pt presents with a decline in functional strength and mobility. Will benefit 

from skilled PT to work on strength to allow hime to return home with his wife 

as before.


Rehab Potential:  Good





PT Long Term Goals


Long Term Goals


PT Long Term Goals Time Frame:  Jul 19, 2018


Transfers (B,C,W/C) (FIM):  6


Sit to Lying (QC):  6


Lying-Sitting on Side/Bed(QC):  6


Sit to Stand (QC):  6


Chair/Bed-to-Chair Xfer(QC):  6


Does the Patient Walk:  Yes


Gait (FIM):  6


Gait distance (FIM):  3=150 ft


Walk 50ft with 2 Turns (QC):  6


Walk 150 ft (QC):  6


Gait Assistive Device:  FWW





PT Plan


Problem List


Problem List:  Activity Tolerance, Functional Strength, Safety, Balance, Gait, 

Transfer, Bed Mobility





Treatment/Plan


Treatment Plan:  Continue Plan of Care


Treatment Plan:  Bed Mobility, Education, Functional Activity Farnaz, Functional 

Strength, Gait, Safety, Therapeutic Exercise, Transfers


Treatment Duration:  Jul 19, 2018


Frequency:  6 times per week


Estimated Hrs Per Day:  .5 hour per day


Patient and/or Family Agrees t:  Yes





Safety Risks/Education


Patient Education:  Transfer Techniques, Safety Issues


Teaching Recipient:  Patient


Teaching Methods:  Demonstration, Discussion


Response to Teaching:  Reinforcement Needed





Discharge Recommendations


Therapy D/C Recommendations:  Physical Therapy Home Care





Time/GCodes


Time In:  1000


Time Out:  1038


Total Billed Treatment Time:  38


Total Billed Treatment


visit


EVM 15


Gt 15


EX 8











ELMER CATALAN PT Jul 12, 2018 10:52

## 2018-07-12 NOTE — DISCHARGE SUMMARY
Diagnosis/Chief Complaint


Date of Admission


2018 at 20:15


Date of Discharge


2018 at 09:43


Admission Diagnosis


Admission Diagnosis


1.  Sepsis


2.  UTI


3.  R Lower Lobe Pneumonia


4.  Acute on Chronic Respiratory Failure


5.  Hyperglycemia w/o prior hx of DM





Discharge Diagnosis


1.  Sepsis w/ bacteremia likely secondary to urinary source


- Adm to ICU 18


- hypotension initially, responsive to IVF; holding BP meds


 clinically improved with improved blood pressure and afebrile last 24 hours

, renal function improved, WBC elevated but did receive steroids in ER, lactic 

acid elevated this am, will give 500 cc bolus and recheck


7/10 Pt remained afebrile and WBC decreased to 18.9, lactic acid is normal


 pt had temperature of 100.0F during the last 24 hours, will continue IV 

abx and consider transitioning pt to swing


 discharged to swing to complete 7 day course of IV antibiotics and 

continue PT





2.  UTI


- Blood cultures prelim show GNR; reviewed clinic records urine culture from  showed P. Mirabilis w/ resistance to Levaquin but sensitive to Cefepime


- started Cefepime and Levaquin 18- culture with proteus resistant to fluoroquinolone, will continue cefepime 

and discontinue levofloxacin


7/10 - will continue pt on cefepime 





3.  R Lower Lobe Pneumonia


- RLL infiltrated noted on CXR


- Currently on Cefepime and Levaquin


 d/c levofloxacin, respiratory status improved as noted below





4.  Acute on Chronic Respiratory Failure


- uses 3L oxygen at home; started on c-pap in ER due to work of breathing


- breathing improved since admission - maintained sats w/o respiratory distress 

off c-pap - will DC and continue oxygen by NC.


 stable on nasal cannula, uses intermittently at home


7/10 pt stable on 2.5L NC





5.  Hyperglycemia w/o prior hx of DM


- no hx of DM diagnosis; reviewed clinic records, last lab was 2017 - 

glucose was 101


- A1c pending


- being treated w/ Novolog SSI; BS running 180-409


- pt received Solu-Medrol and Decadron in the ER which is likely exacerbating 

hyperglycemia - steroids DC as patient has no wheezing.


- will start Levemir 10U in addition to SSI until A1c is back and longer-term 

treatment is determined.


7/10 Pts A1c is 6.2





6.  CAD w/ hx of ventricular arrhythmia s/p defibrillator


- resume home meds: amiodarone, Digoxin, Mexiletine


 adjusted amiodarone dose to match home dosing





7.  CHF


- continue home furosemide and KCl





8.  Hypertension


- will hold Metoprolol, Norvasc due to hypotension


 hypertensive, resumed metoprolol, anticipate will need to resume 

amlodipine soon as well





9. HOLDEN


- baseline Cr from 2017 0.97, GFR >60


- Cr 1.32 -->1.63; continue to monitor; IVF currently running at 60mL/h


 improved this am, continue to monitor


7/10 pt's Creat increased from 1.47 --> 1.58


 pt's creat decreased from 1.58--> 1.44


 creatinine stable at 1.44, IVF discontinued with good intake and 

increasing blood pressure





10.  Electrolyte abnormalities on admission


- Phos low at 1.2 - will replace


- Mag low at 1.7 - replaced


7/10 - Phos now at 3, Mag at 2.1





Chief Complaint/HPI


Chief Complaint/HPI


This is a 76yo male, patient of Dr. Greenwood's who presented to  ER w/ c/o 

feeling weak and fatigued for the past week.  Pt denies significant dyspnea 

though he was noted to be in respiratory distress in the ER.  Pt reports he had 

some burning with urination this past week.  Unsure if he has had a fever.  

Denies cough or increased sputum production, denies CP or abdominal pain.





Discharge Summary-Simple/Stand


Consultations





Discharge Physical Examination


Allergies:  


Coded Allergies:  


     Penicillins (Verified  Allergy, Severe, HIVES, SOB, 18)


     codeine (Verified  Allergy, Severe, SOB, HIVES, 18)


Vitals & I&Os





Vital Sign - Last 12Hours








  Date Time  Temp Pulse Resp B/P (MAP) Pulse Ox O2 Delivery O2 Flow Rate FiO2


 


18 07:47      Nasal Cannula 2.00 


 


18 07:00  82      


 


18 06:43     95   


 


18 00:00 98.6  20 167/79 (108)    


 


18 06:35        28














Intake and Output 


 


 7/12/18





 00:00


 


Intake Total 1850 ml


 


Output Total 600 ml


 


Balance 1250 ml








General Appearance:  Alert, No Acute Distress


Respiratory:  Clear to Auscultation, Normal Air Movement


Cardiovascular:  Regular Rate, No Murmurs


Extremities:  Other (trace edema, venous stasis dermatitis)


Neuro:  Normal Speech


Psych/Mental Status:  Mental Status NL





Hospital Course


See final discharge diagnosis.


Labs





Laboratory Tests








Test


 7/10/18


11:47 7/10/18


15:58 7/10/18


20:17 18


05:16 Range/Units


 


 


Glucometer 222 H 120 H 201 H 138 H   MG/DL


 


Test


 18


05:22 18


10:44 18


15:56 18


20:21 Range/Units


 


 


White Blood Count


 14.0 H


 


 


 


 4.3-11.0


10^3/uL


 


Red Blood Count


 3.88 L


 


 


 


 4.35-5.85


10^6/uL


 


Hemoglobin 12.1 L    13.3-17.7  G/DL


 


Hematocrit 36 L    40-54  %


 


Mean Corpuscular Volume 94     80-99  FL


 


Mean Corpuscular Hemoglobin 31     25-34  PG


 


Mean Corpuscular Hemoglobin


Concent 33 


 


 


 


 32-36  G/DL





 


Red Cell Distribution Width 16.6 H    10.0-14.5  %


 


Platelet Count


 120 L


 


 


 


 130-400


10^3/uL


 


Mean Platelet Volume 11.4 H    7.4-10.4  FL


 


Sodium Level 139     135-145  MMOL/L


 


Potassium Level 3.9     3.6-5.0  MMOL/L


 


Chloride Level 106       MMOL/L


 


Carbon Dioxide Level 24     21-32  MMOL/L


 


Anion Gap 9     5-14  MMOL/L


 


Blood Urea Nitrogen 28 H    -18  MG/DL


 


Creatinine


 1.44 H


 


 


 


 0.60-1.30


MG/DL


 


Estimat Glomerular Filtration


Rate 48 


 


 


 


  





 


BUN/Creatinine Ratio 19      


 


Glucose Level 125 H      MG/DL


 


Calcium Level 8.9     8.5-10.1  MG/DL


 


Glucometer  152 H 168 H 132 H   MG/DL


 


Test


 18


05:23 18


05:48 18


06:35 18


10:49 Range/Units


 


 


Glucometer 137 H   130 H   MG/DL


 


Sodium Level  138    135-145  MMOL/L


 


Potassium Level  3.7    3.6-5.0  MMOL/L


 


Chloride Level  105      MMOL/L


 


Carbon Dioxide Level  24    21-32  MMOL/L


 


Anion Gap  9    5-14  MMOL/L


 


Blood Urea Nitrogen  28 H   7-18  MG/DL


 


Creatinine


 


 1.44 H


 


 


 0.60-1.30


MG/DL


 


Estimat Glomerular Filtration


Rate 


 48 


 


 


  





 


BUN/Creatinine Ratio  19     


 


Glucose Level  102      MG/DL


 


Calcium Level  8.9    8.5-10.1  MG/DL


 


White Blood Count


 


 


 14.0 H


 


 4.3-11.0


10^3/uL


 


Red Blood Count


 


 


 3.86 L


 


 4.35-5.85


10^6/uL


 


Hemoglobin   12.2 L  13.3-17.7  G/DL


 


Hematocrit   36 L  40-54  %


 


Mean Corpuscular Volume   93   80-99  FL


 


Mean Corpuscular Hemoglobin   32   25-34  PG


 


Mean Corpuscular Hemoglobin


Concent 


 


 34 


 


 32-36  G/DL





 


Red Cell Distribution Width   16.2 H  10.0-14.5  %


 


Platelet Count


 


 


 123 L


 


 130-400


10^3/uL


 


Mean Platelet Volume   10.8 H  7.4-10.4  FL








Radiology Reviewed


Date of Exam:   18





CHEST 1 VIEW, AP/PA ONLY


 





INDICATION: Chills x1-2 hours





Frontal chest obtained at 7:40 p.m. and is compared with 18.





There is post sternotomy change with cardiomegaly with unchanged


pacemaker device. There is some infiltrate in the right


infrahilar region which is new compared to the prior study. Left


lung is grossly clear. There is no pneumothorax or gross pleural


fluid.





IMPRESSION:





Cardiomegaly with postoperative changes. There is mild central


vascular congestion. There is new infiltrate in the right lung


base, pneumonia is not excluded. Followup is recommended.








CHEST 1 VIEW, AP/PA ONLY


 





EXAM: Portable erect AP chest at 3:22 





INDICATION: Respiratory distress





When compared to the prior exam of 2018, there does not


appear to have been any significant change. The heart is stable


in size. There is still mild pulmonary congestion. There is a


small amount of atelectasis/infiltrate and fluid partially


obscuring the right lung base. The mediastinum is not widened.


The osseous structures are intact. The left-sided pacemaker seen


previously is again evident and no different.





IMPRESSION:


Stable chest. There has been no adverse change since the prior


exam.





Discharge


Instructions to patient/family


Please see electronic discharge instructions given to patient.


Discharge Medications


Reviewed and agree with Discharge Medication list on patient's Discharge 

Instruction sheet





Clinical Quality Measures


DVT/VTE Risk/Contraindication:


Risk Factor Score Per Nursin


RFS Level Per Nursing on Admit:  4+=Very High


Risk Score Comment:  


Lovenox





Copy


Copies To 1:   MAHOGANY GREENWOOD MD, BETHANY N MD 2018 11:31

## 2018-07-13 VITALS — DIASTOLIC BLOOD PRESSURE: 80 MMHG | SYSTOLIC BLOOD PRESSURE: 168 MMHG

## 2018-07-13 VITALS — SYSTOLIC BLOOD PRESSURE: 170 MMHG | DIASTOLIC BLOOD PRESSURE: 78 MMHG

## 2018-07-13 LAB
BUN/CREAT SERPL: 20
CALCIUM SERPL-MCNC: 9.3 MG/DL (ref 8.5–10.1)
CHLORIDE SERPL-SCNC: 102 MMOL/L (ref 98–107)
CO2 SERPL-SCNC: 26 MMOL/L (ref 21–32)
CREAT SERPL-MCNC: 1.38 MG/DL (ref 0.6–1.3)
ERYTHROCYTE [DISTWIDTH] IN BLOOD BY AUTOMATED COUNT: 16 % (ref 10–14.5)
GFR SERPLBLD BASED ON 1.73 SQ M-ARVRAT: 50 ML/MIN
GLUCOSE SERPL-MCNC: 95 MG/DL (ref 70–105)
HCT VFR BLD CALC: 35 % (ref 40–54)
HGB BLD-MCNC: 11.6 G/DL (ref 13.3–17.7)
MCH RBC QN AUTO: 31 PG (ref 25–34)
MCHC RBC AUTO-ENTMCNC: 33 G/DL (ref 32–36)
MCV RBC AUTO: 94 FL (ref 80–99)
PLATELET # BLD: 138 10^3/UL (ref 130–400)
PMV BLD AUTO: 11 FL (ref 7.4–10.4)
POTASSIUM SERPL-SCNC: 3.7 MMOL/L (ref 3.6–5)
RBC # BLD AUTO: 3.76 10^6/UL (ref 4.35–5.85)
SODIUM SERPL-SCNC: 138 MMOL/L (ref 135–145)
WBC # BLD AUTO: 14.2 10^3/UL (ref 4.3–11)

## 2018-07-13 RX ADMIN — DIGOXIN SCH MG: 125 TABLET ORAL at 08:50

## 2018-07-13 RX ADMIN — POTASSIUM CHLORIDE SCH MEQ: 600 CAPSULE, EXTENDED RELEASE ORAL at 17:04

## 2018-07-13 RX ADMIN — FUROSEMIDE SCH MG: 40 TABLET ORAL at 08:50

## 2018-07-13 RX ADMIN — POTASSIUM CHLORIDE SCH MEQ: 600 CAPSULE, EXTENDED RELEASE ORAL at 06:21

## 2018-07-13 RX ADMIN — INSULIN ASPART SCH UNIT: 100 INJECTION, SOLUTION INTRAVENOUS; SUBCUTANEOUS at 06:16

## 2018-07-13 RX ADMIN — METOPROLOL TARTRATE SCH MG: 25 TABLET, FILM COATED ORAL at 08:50

## 2018-07-13 RX ADMIN — MEXILETINE HYDROCHLORIDE SCH MG: 150 CAPSULE ORAL at 08:50

## 2018-07-13 RX ADMIN — IPRATROPIUM BROMIDE AND ALBUTEROL SULFATE SCH ML: .5; 3 SOLUTION RESPIRATORY (INHALATION) at 07:28

## 2018-07-13 RX ADMIN — MEXILETINE HYDROCHLORIDE SCH MG: 150 CAPSULE ORAL at 20:50

## 2018-07-13 RX ADMIN — ENOXAPARIN SODIUM SCH MG: 100 INJECTION SUBCUTANEOUS at 08:50

## 2018-07-13 RX ADMIN — ACETAMINOPHEN PRN MG: 500 TABLET ORAL at 18:48

## 2018-07-13 RX ADMIN — INSULIN DETEMIR SCH UNIT: 100 INJECTION, SOLUTION SUBCUTANEOUS at 08:50

## 2018-07-13 RX ADMIN — AMIODARONE HYDROCHLORIDE SCH MG: 200 TABLET ORAL at 08:50

## 2018-07-13 RX ADMIN — IPRATROPIUM BROMIDE AND ALBUTEROL SULFATE SCH ML: .5; 3 SOLUTION RESPIRATORY (INHALATION) at 19:25

## 2018-07-13 RX ADMIN — AMLODIPINE BESYLATE SCH MG: 5 TABLET ORAL at 20:50

## 2018-07-13 RX ADMIN — MEXILETINE HYDROCHLORIDE SCH MG: 150 CAPSULE ORAL at 12:42

## 2018-07-13 RX ADMIN — SODIUM CHLORIDE SCH MLS/HR: 900 INJECTION INTRAVENOUS at 20:51

## 2018-07-13 RX ADMIN — INSULIN ASPART SCH UNIT: 100 INJECTION, SOLUTION INTRAVENOUS; SUBCUTANEOUS at 21:36

## 2018-07-13 RX ADMIN — INSULIN ASPART SCH UNIT: 100 INJECTION, SOLUTION INTRAVENOUS; SUBCUTANEOUS at 10:58

## 2018-07-13 RX ADMIN — INSULIN ASPART SCH UNIT: 100 INJECTION, SOLUTION INTRAVENOUS; SUBCUTANEOUS at 16:54

## 2018-07-13 RX ADMIN — PANTOPRAZOLE SODIUM SCH MG: 40 TABLET, DELAYED RELEASE ORAL at 06:21

## 2018-07-13 RX ADMIN — METOPROLOL TARTRATE SCH MG: 25 TABLET, FILM COATED ORAL at 20:50

## 2018-07-13 RX ADMIN — FUROSEMIDE SCH MG: 40 TABLET ORAL at 14:06

## 2018-07-13 NOTE — PHYSICAL THERAPY DAILY NOTE
PT Daily Note-Current


Subjective


Patient agrees to PT.  Spouse present and encourages patient to participate.





Pain





   Numeric Pain Scale:  0-No Pain


   Location:  No Pain Reported





Mental Status


Patient Orientation:  Normal For Age


Attachments:  Oxygen





Transfers


              Functional [place] Measure


0=Not Assessed/NA   4=Minimal Assistance


1=Total Assistance   5=Supervision or Setup


2=Maximal Assistance   6=Modified [place]


3=Moderate Assistance   7=Complete IndependenceIRFPAI Quality Coding Scale











6 Independent with activity with or without an assistive device


 


5  Patient requires set up or clean up by helper.  Patient completes activity  

by  themselves


 


4 Supervision or touching assist (CGA). [place] provide cues , steadying assist


 


3 The helper provides less than half the effort to complete the activity


 


2 The helper provides more than half the effort to complete the activity


 


1 Dependent.  The helper does all the effort to complete an activity 


 


7 Patient refused to complete or attempt activity


 


9 The patient did not perform the activity before the current illness or injury


 


88 Not attempted due to Medical conditions or safety concerns








Transfers (B, C, W/C) (FIM):  5


Scootin


Sit to/from Stand:  5


Sit to Stand (QC):  5





Gait Training


Does the Patient Walk?:  Yes


Gait (FIM):  5


Distance (FIM):  3=150 ft


Distance:  175'


Walk 50 ft with 2 Turns(QC):  5


Walk 150 ft (QC):  5


Gait Level of Assist:  5


Gait Assistive Device:  FWW


kyphotic/functional gait sequence





Exercises


Seated Therapy Exercises:  Ankle pumps, Long arc quads, Hip flexion


Seated Reps:  25 (2 sets)





Assessment


Patient requires time to complete all functional tasks.  PT to increase 

activity as tolerated by patient.





PT Short Term Goals


Short Term Goals


Transfers (B,C,W/C) (FIM):  5





PT Long Term Goals


Long Term Goals


PT Long Term Goals Time Frame:  2018


Transfers (B,C,W/C) (FIM):  6


Sit to Lying (QC):  6


Lying-Sitting on Side/Bed(QC):  6


Sit to Stand (QC):  6


Chair/Bed-to-Chair Xfer(QC):  6


Does the Patient Walk:  Yes


Gait (FIM):  6


Gait distance (FIM):  3=150 ft


Walk 50ft with 2 Turns (QC):  6


Walk 150 ft (QC):  6


Gait Assistive Device:  FWW





PT Plan


Treatment/Plan


Treatment Plan:  Continue Plan of Care


Treatment Plan:  Bed Mobility, Education, Functional Activity Farnaz, Functional 

Strength, Gait, Safety, Therapeutic Exercise, Transfers


Treatment Duration:  2018


Frequency:  6 times per week


Estimated Hrs Per Day:  .5 hour per day


Patient and/or Family Agrees t:  Yes





Time/GCodes


Time In:  901


Time Out:  924


Total Billed Treatment Time:  23


Total Billed Treatment


1 visit


EX 10 min


GT 13 min











MIKAYLA JOHNSON PT 2018 09:32

## 2018-07-13 NOTE — OCCUPATIONAL THER DAILY NOTE
OT Current Status-Daily Note


Subjective


Pt seen in room, up in recliner, agreeable to OT. No pain mentioned.





Appearance


Alert, cooperative





Mental Status/Objective


              Functional [place] Measure


0=Not Assessed/NA   4=Minimal Assistance


1=Total Assistance   5=Supervision or Setup


2=Maximal Assistance   6=Modified [place]


3=Moderate Assistance   7=Complete [place]





ADL-Treatment


              Functional [place] Measure


0=Not Assessed/NA   4=Minimal Assistance


1=Total Assistance   5=Supervision or Setup


2=Maximal Assistance   6=Modified [place]


3=Moderate Assistance   7=Complete IndependenceIRFPAI Quality Coding Scale











6 Independent with activity with or without an assistive device


 


5  Patient requires set up or clean up by helper.  Patient completes activity  

by  themselves


 


4 Supervision or touching assist (CGA). [place] provide cues , steadying assist


 


3 The helper provides less than half the effort to complete the activity


 


2 The helper provides more than half the effort to complete the activity


 


1 Dependent.  The helper does all the effort to complete an activity 


 


7 Patient refused to complete or attempt activity


 


9 The patient did not perform the activity before the current illness or injury


 


88 Not attempted due to Medical conditions or safety concerns











Other Treatment


Pt provided with yellow theraband for bilat UE exercise to help with transfers 

and ADLs. He recalled exercises that he has done at home and did 20-25 reps, 

with a little help tracking reps or occasional cue to do the exercise 

correctly. Pt encouraged to do additional exercises on his own in his room - 

his wife will help remind him. Pt left up in recliner, wife present, all needs 

met.





Education


OT Patient Education:  Exercise program, Home exercise program


Teaching Recipient:  Patient


Teaching Methods:  Demonstration, Discussion


Response to Teaching:  Verbalize Understanding, Return Demonstration, 

Reinforcement Needed





OT Short Term Goals


Short Term Goals


Time Frame:  2018


Eating(FIM):  5


Grooming(FIM):  5


Bathing(FIM):  3


Upper Body Dressing(FIM):  5


Lower Body Dressing(FIM):  4


Toileting(FIM):  4


Transfers (B,C,W/C) (FIM):  5


Toilet/Commode Transfer(FIM):  5


Shower Transfer(FIM):  4


Additional Short Term Goals:  1-Demonstrate ADL Tasks, 2-Verbalize Understanding

, 3-ImproveStrength/Farnaz


1=Demonstrate adherence to instructed precautions during ADL tasks.


2=Patient will verbalize/demonstrate understanding of assistive devices/

modifications for ADL.


3=Patient will improve strength/tolerance for activity to enable patient to 

perform ADL's.





OT Long Term Goals


Long Term Goals


Time Frame:  2018


Eating (FIM):  6


Eating (QC):  6


Groomin


Oral Hygiene (QC):  5


Bathing(FIM):  4


Upper Body Dressing(FIM):  5


Lower Body Dressing(FIM):  5


Toileting(FIM):  6


Toileting Hygiene (QC):  6


Transfers (B,C,W/C) (FIM):  6


Toilet/Commode Transfer(FIM):  6


Toilet/Commode Transfer (QC):  6


Shower Transfer(FIM):  5


Additional Goals:  1-Demonstrate ADL Tasks, 2-Verbalize Understanding, 3-

ImproveStrength/Farnaz


1=Demonstrate adherence to instructed precautions during ADL tasks.


2=Patient will verbalize/demonstrate understanding of assistive devices/

modifications for ADL.


3=Patient will improve strength/tolerance for activity to enable patient to 

perform ADL's.





OT Education/Plan


Discharge Recommendations


Plan/Recommendations:  Continue POC





Treatment Plan/Plan of Care


Patient would benefit from OT for education, treatment and training to promote 

independence in ADL's, mobility, safety and/or upper extremity function for ADL'

s.


Plan of Care:  ADL Retraining, Functional Mobility, UE Funct Exercise/Act


Treatment Duration:  2018


Frequency:  5 times per week


Estimated Hrs Per Day:  .25 hour per day


Agreement:  Yes


Rehab Potential:  Good





Time/GCodes


Start Time:  13:52


Stop Time:  14:07


Total Time Billed (hr/min):  15


Billed Treatment Time


visit, 15 minutes exercise











JOCELINE MORALES OT 2018 14:49

## 2018-07-14 VITALS — SYSTOLIC BLOOD PRESSURE: 140 MMHG | DIASTOLIC BLOOD PRESSURE: 69 MMHG

## 2018-07-14 VITALS — DIASTOLIC BLOOD PRESSURE: 73 MMHG | SYSTOLIC BLOOD PRESSURE: 159 MMHG

## 2018-07-14 LAB
BUN/CREAT SERPL: 21
CALCIUM SERPL-MCNC: 9.4 MG/DL (ref 8.5–10.1)
CHLORIDE SERPL-SCNC: 101 MMOL/L (ref 98–107)
CO2 SERPL-SCNC: 26 MMOL/L (ref 21–32)
CREAT SERPL-MCNC: 1.47 MG/DL (ref 0.6–1.3)
ERYTHROCYTE [DISTWIDTH] IN BLOOD BY AUTOMATED COUNT: 15.9 % (ref 10–14.5)
GFR SERPLBLD BASED ON 1.73 SQ M-ARVRAT: 47 ML/MIN
GLUCOSE SERPL-MCNC: 110 MG/DL (ref 70–105)
HCT VFR BLD CALC: 36 % (ref 40–54)
HGB BLD-MCNC: 12.2 G/DL (ref 13.3–17.7)
MCH RBC QN AUTO: 32 PG (ref 25–34)
MCHC RBC AUTO-ENTMCNC: 34 G/DL (ref 32–36)
MCV RBC AUTO: 94 FL (ref 80–99)
PLATELET # BLD: 150 10^3/UL (ref 130–400)
PMV BLD AUTO: 10.9 FL (ref 7.4–10.4)
POTASSIUM SERPL-SCNC: 4 MMOL/L (ref 3.6–5)
RBC # BLD AUTO: 3.87 10^6/UL (ref 4.35–5.85)
SODIUM SERPL-SCNC: 138 MMOL/L (ref 135–145)
WBC # BLD AUTO: 13.4 10^3/UL (ref 4.3–11)

## 2018-07-14 RX ADMIN — MEXILETINE HYDROCHLORIDE SCH MG: 150 CAPSULE ORAL at 20:01

## 2018-07-14 RX ADMIN — CEFDINIR SCH MG: 300 CAPSULE ORAL at 08:39

## 2018-07-14 RX ADMIN — AMIODARONE HYDROCHLORIDE SCH MG: 200 TABLET ORAL at 08:40

## 2018-07-14 RX ADMIN — FUROSEMIDE SCH MG: 40 TABLET ORAL at 08:40

## 2018-07-14 RX ADMIN — MEXILETINE HYDROCHLORIDE SCH MG: 150 CAPSULE ORAL at 13:24

## 2018-07-14 RX ADMIN — AMIODARONE HYDROCHLORIDE SCH MG: 200 TABLET ORAL at 20:01

## 2018-07-14 RX ADMIN — INSULIN ASPART SCH UNIT: 100 INJECTION, SOLUTION INTRAVENOUS; SUBCUTANEOUS at 21:08

## 2018-07-14 RX ADMIN — METOPROLOL TARTRATE SCH MG: 25 TABLET, FILM COATED ORAL at 20:01

## 2018-07-14 RX ADMIN — INSULIN ASPART SCH UNIT: 100 INJECTION, SOLUTION INTRAVENOUS; SUBCUTANEOUS at 11:03

## 2018-07-14 RX ADMIN — POTASSIUM CHLORIDE SCH MEQ: 600 CAPSULE, EXTENDED RELEASE ORAL at 16:45

## 2018-07-14 RX ADMIN — DIGOXIN SCH MG: 125 TABLET ORAL at 08:39

## 2018-07-14 RX ADMIN — FUROSEMIDE SCH MG: 40 TABLET ORAL at 15:29

## 2018-07-14 RX ADMIN — PANTOPRAZOLE SODIUM SCH MG: 40 TABLET, DELAYED RELEASE ORAL at 06:10

## 2018-07-14 RX ADMIN — ACETAMINOPHEN PRN MG: 500 TABLET ORAL at 08:40

## 2018-07-14 RX ADMIN — IPRATROPIUM BROMIDE AND ALBUTEROL SULFATE SCH ML: .5; 3 SOLUTION RESPIRATORY (INHALATION) at 19:42

## 2018-07-14 RX ADMIN — IPRATROPIUM BROMIDE AND ALBUTEROL SULFATE SCH ML: .5; 3 SOLUTION RESPIRATORY (INHALATION) at 07:19

## 2018-07-14 RX ADMIN — INSULIN DETEMIR SCH UNIT: 100 INJECTION, SOLUTION SUBCUTANEOUS at 08:40

## 2018-07-14 RX ADMIN — ENOXAPARIN SODIUM SCH MG: 100 INJECTION SUBCUTANEOUS at 08:39

## 2018-07-14 RX ADMIN — INSULIN ASPART SCH UNIT: 100 INJECTION, SOLUTION INTRAVENOUS; SUBCUTANEOUS at 16:07

## 2018-07-14 RX ADMIN — CEFDINIR SCH MG: 300 CAPSULE ORAL at 20:00

## 2018-07-14 RX ADMIN — METOPROLOL TARTRATE SCH MG: 25 TABLET, FILM COATED ORAL at 08:40

## 2018-07-14 RX ADMIN — MEXILETINE HYDROCHLORIDE SCH MG: 150 CAPSULE ORAL at 08:40

## 2018-07-14 RX ADMIN — AMLODIPINE BESYLATE SCH MG: 5 TABLET ORAL at 20:01

## 2018-07-14 RX ADMIN — POTASSIUM CHLORIDE SCH MEQ: 600 CAPSULE, EXTENDED RELEASE ORAL at 06:10

## 2018-07-14 RX ADMIN — INSULIN ASPART SCH UNIT: 100 INJECTION, SOLUTION INTRAVENOUS; SUBCUTANEOUS at 05:58

## 2018-07-14 NOTE — PHYSICAL THERAPY DAILY NOTE
PT Daily Note-Current


Subjective


Patient reluctantly agrees to PT.  C/o left knee pain 8/10.  RN notified and is 

issuing pain medication.





Pain





   Numeric Pain Scale:  8


   Location:  Left


   Location Body Site:  Knee


   Pain Description:  Chronic





Mental Status


Patient Orientation:  Person, Time, Situation


Attachments:  Oxygen





Transfers


              Functional [place] Measure


0=Not Assessed/NA   4=Minimal Assistance


1=Total Assistance   5=Supervision or Setup


2=Maximal Assistance   6=Modified [place]


3=Moderate Assistance   7=Complete IndependenceIRFPAI Quality Coding Scale











6 Independent with activity with or without an assistive device


 


5  Patient requires set up or clean up by helper.  Patient completes activity  

by  themselves


 


4 Supervision or touching assist (CGA). [place] provide cues , steadying assist


 


3 The helper provides less than half the effort to complete the activity


 


2 The helper provides more than half the effort to complete the activity


 


1 Dependent.  The helper does all the effort to complete an activity 


 


7 Patient refused to complete or attempt activity


 


9 The patient did not perform the activity before the current illness or injury


 


88 Not attempted due to Medical conditions or safety concerns








Transfers (B, C, W/C) (FIM):  4


Scootin


Sit to/from Stand:  4


Sit to Stand (QC):  4





Gait Training


Does the Patient Walk?:  Yes


Gait (FIM):  2


Distance (FIM):  2=470-25 ft


Distance:  125'


Walk 50 ft with 2 Turns(QC):  4


Gait Level of Assist:  4


Gait Persons Needed:  1


Gait Assistive Device:  FWW


flexed knees, extended UE's with gait.  Patient requires minimal assist to 

maintain stand due to left knee pain.





Exercises


Seated Therapy Exercises:  Ankle pumps, Long arc quads, Hip flexion


Seated Reps:  15 (2 sets)





Assessment


Patient required more assistance on this date due to left knee pain.  Noted 

increase in shuffle gait sequence.  Patient is up in recliner with needs met 

and spouse present.





PT Short Term Goals


Short Term Goals


Transfers (B,C,W/C) (FIM):  5





PT Long Term Goals


Long Term Goals


PT Long Term Goals Time Frame:  2018


Transfers (B,C,W/C) (FIM):  6


Sit to Lying (QC):  6


Lying-Sitting on Side/Bed(QC):  6


Sit to Stand (QC):  6


Chair/Bed-to-Chair Xfer(QC):  6


Does the Patient Walk:  Yes


Gait (FIM):  6


Gait distance (FIM):  3=150 ft


Walk 50ft with 2 Turns (QC):  6


Walk 150 ft (QC):  6


Gait Assistive Device:  FWW





PT Plan


Treatment/Plan


Treatment Plan:  Continue Plan of Care


Treatment Plan:  Bed Mobility, Education, Functional Activity [place], Functional 

Strength, Gait, Safety, Therapeutic Exercise, Transfers


Treatment Duration:  2018


Frequency:  6 times per week


Estimated Hrs Per Day:  .5 hour per day


Patient and/or Family Agrees t:  Yes





Time/GCodes


Time In:  825


Time Out:  840


Total Billed Treatment Time:  15


Total Billed Treatment


1 visit


FA 15 min











MIKAYLA JOHNSON PT 2018 08:49

## 2018-07-15 VITALS — SYSTOLIC BLOOD PRESSURE: 149 MMHG | DIASTOLIC BLOOD PRESSURE: 69 MMHG

## 2018-07-15 VITALS — SYSTOLIC BLOOD PRESSURE: 153 MMHG | DIASTOLIC BLOOD PRESSURE: 72 MMHG

## 2018-07-15 VITALS — DIASTOLIC BLOOD PRESSURE: 66 MMHG | SYSTOLIC BLOOD PRESSURE: 148 MMHG

## 2018-07-15 RX ADMIN — PANTOPRAZOLE SODIUM SCH MG: 40 TABLET, DELAYED RELEASE ORAL at 06:34

## 2018-07-15 RX ADMIN — MEXILETINE HYDROCHLORIDE SCH MG: 150 CAPSULE ORAL at 12:48

## 2018-07-15 RX ADMIN — POTASSIUM CHLORIDE SCH MEQ: 600 CAPSULE, EXTENDED RELEASE ORAL at 16:36

## 2018-07-15 RX ADMIN — ACETAMINOPHEN PRN MG: 500 TABLET ORAL at 00:54

## 2018-07-15 RX ADMIN — MEXILETINE HYDROCHLORIDE SCH MG: 150 CAPSULE ORAL at 09:01

## 2018-07-15 RX ADMIN — CEFDINIR SCH MG: 300 CAPSULE ORAL at 09:01

## 2018-07-15 RX ADMIN — IPRATROPIUM BROMIDE AND ALBUTEROL SULFATE SCH ML: .5; 3 SOLUTION RESPIRATORY (INHALATION) at 07:04

## 2018-07-15 RX ADMIN — INSULIN DETEMIR SCH UNIT: 100 INJECTION, SOLUTION SUBCUTANEOUS at 09:01

## 2018-07-15 RX ADMIN — CEFDINIR SCH MG: 300 CAPSULE ORAL at 20:31

## 2018-07-15 RX ADMIN — INSULIN ASPART SCH UNIT: 100 INJECTION, SOLUTION INTRAVENOUS; SUBCUTANEOUS at 16:36

## 2018-07-15 RX ADMIN — FUROSEMIDE SCH MG: 40 TABLET ORAL at 09:01

## 2018-07-15 RX ADMIN — INSULIN ASPART SCH UNIT: 100 INJECTION, SOLUTION INTRAVENOUS; SUBCUTANEOUS at 21:37

## 2018-07-15 RX ADMIN — DIGOXIN SCH MG: 125 TABLET ORAL at 09:01

## 2018-07-15 RX ADMIN — METOPROLOL TARTRATE SCH MG: 25 TABLET, FILM COATED ORAL at 09:01

## 2018-07-15 RX ADMIN — METOPROLOL TARTRATE SCH MG: 25 TABLET, FILM COATED ORAL at 20:31

## 2018-07-15 RX ADMIN — AMLODIPINE BESYLATE SCH MG: 5 TABLET ORAL at 20:30

## 2018-07-15 RX ADMIN — ENOXAPARIN SODIUM SCH MG: 100 INJECTION SUBCUTANEOUS at 09:01

## 2018-07-15 RX ADMIN — POTASSIUM CHLORIDE SCH MEQ: 600 CAPSULE, EXTENDED RELEASE ORAL at 06:34

## 2018-07-15 RX ADMIN — AMIODARONE HYDROCHLORIDE SCH MG: 200 TABLET ORAL at 20:30

## 2018-07-15 RX ADMIN — MEXILETINE HYDROCHLORIDE SCH MG: 150 CAPSULE ORAL at 20:31

## 2018-07-15 RX ADMIN — IPRATROPIUM BROMIDE AND ALBUTEROL SULFATE SCH ML: .5; 3 SOLUTION RESPIRATORY (INHALATION) at 19:40

## 2018-07-15 RX ADMIN — INSULIN ASPART SCH UNIT: 100 INJECTION, SOLUTION INTRAVENOUS; SUBCUTANEOUS at 11:08

## 2018-07-15 RX ADMIN — FUROSEMIDE SCH MG: 40 TABLET ORAL at 15:01

## 2018-07-15 RX ADMIN — INSULIN ASPART SCH UNIT: 100 INJECTION, SOLUTION INTRAVENOUS; SUBCUTANEOUS at 05:54

## 2018-07-15 RX ADMIN — AMIODARONE HYDROCHLORIDE SCH MG: 200 TABLET ORAL at 09:01

## 2018-07-16 VITALS — DIASTOLIC BLOOD PRESSURE: 65 MMHG | SYSTOLIC BLOOD PRESSURE: 139 MMHG

## 2018-07-16 RX ADMIN — ENOXAPARIN SODIUM SCH MG: 100 INJECTION SUBCUTANEOUS at 09:22

## 2018-07-16 RX ADMIN — INSULIN ASPART SCH UNIT: 100 INJECTION, SOLUTION INTRAVENOUS; SUBCUTANEOUS at 17:01

## 2018-07-16 RX ADMIN — DIGOXIN SCH MG: 125 TABLET ORAL at 09:21

## 2018-07-16 RX ADMIN — FUROSEMIDE SCH MG: 40 TABLET ORAL at 09:21

## 2018-07-16 RX ADMIN — CEFDINIR SCH MG: 300 CAPSULE ORAL at 09:21

## 2018-07-16 RX ADMIN — POTASSIUM CHLORIDE SCH MEQ: 600 CAPSULE, EXTENDED RELEASE ORAL at 06:22

## 2018-07-16 RX ADMIN — IPRATROPIUM BROMIDE AND ALBUTEROL SULFATE SCH ML: .5; 3 SOLUTION RESPIRATORY (INHALATION) at 07:37

## 2018-07-16 RX ADMIN — INSULIN ASPART SCH UNIT: 100 INJECTION, SOLUTION INTRAVENOUS; SUBCUTANEOUS at 06:05

## 2018-07-16 RX ADMIN — MEXILETINE HYDROCHLORIDE SCH MG: 150 CAPSULE ORAL at 09:21

## 2018-07-16 RX ADMIN — INSULIN DETEMIR SCH UNIT: 100 INJECTION, SOLUTION SUBCUTANEOUS at 09:22

## 2018-07-16 RX ADMIN — METOPROLOL TARTRATE SCH MG: 25 TABLET, FILM COATED ORAL at 09:21

## 2018-07-16 RX ADMIN — PANTOPRAZOLE SODIUM SCH MG: 40 TABLET, DELAYED RELEASE ORAL at 06:22

## 2018-07-16 RX ADMIN — POTASSIUM CHLORIDE SCH MEQ: 600 CAPSULE, EXTENDED RELEASE ORAL at 17:03

## 2018-07-16 RX ADMIN — INSULIN ASPART SCH UNIT: 100 INJECTION, SOLUTION INTRAVENOUS; SUBCUTANEOUS at 11:00

## 2018-07-16 RX ADMIN — AMIODARONE HYDROCHLORIDE SCH MG: 200 TABLET ORAL at 09:21

## 2018-07-16 RX ADMIN — FUROSEMIDE SCH MG: 40 TABLET ORAL at 17:03

## 2018-07-16 RX ADMIN — MEXILETINE HYDROCHLORIDE SCH MG: 150 CAPSULE ORAL at 17:03

## 2018-07-16 NOTE — D/C HH FACE TO FACE ORDER
D/C  Face to Face Orders


Instructions for Patient


Patient Instructions/FollowUp:  


You will have a follow up with your PCP Carlos this week


Physician to follow Patient:  Carlos


Discharge Diet for Home:  Cardiac Diet


Patient Problems:  


Sepsis with Bacteremia: Resolved


UTI


RLL Pneumonia


Pre DM


CAD


CHF


HTN


Goals for Patient:  


- Improved strength and stable gait


- Safe ambulation





Patient Data-Allergies,Ht & Wt


Patient Allergies:  


Coded Allergies:  


     Penicillins (Verified  Allergy, Severe, HIVES, SOB, 6/8/18)


     codeine (Verified  Allergy, Severe, SOB, HIVES, 6/8/18)


Height (Feet):  5


Height (Inches):  5.00


Weight (Pounds):  211


Weight (Ounces):  0.0





Home Health Need/Face to Face


Date of Face to Face:  Jul 16, 2018


Clinical Findings:  Muscle weakness, Shortness of breath, Unsteady gait


I have seen Pt face-to-face:  Yes


Discharged To:  Home


Diagnosis/Conditions:  


See Above


Patient is Homebound due to:  [place] fall risk due to instabilty, Muscle weakness

, Shortness of breath/distress


Homebound Status


   Due to the above stated illness, injury or surgical procedure (medical 

condition or diagnosis) and associated clinical findings, the patient is 

homebound because of his/her inability to leave home except with aid of a 

supportive device and/or person AND leaving the home requires a considerable 

and taxing effort or is medically contraindicated.


Pt req the following assistanc:  Aid of another person, Walker





Home Health Nursing Orders


Home Health Services Order:  Nursing Services, Occupational Ther-Evaluate & 

Treat, Physical Therapy-Evaluate & Treat





Therapy Orders


Therapy Orders:  OT (must have SN or PT order), PT to assess for OT


Therapy Specific Orders:  Eval assistive deivces, Teach enviro modifications/

safety, Gait training, Increase strength/endurance


Certify Stmt


I certify that this patient is under my care and that I, a nurse practitioner 

or a physician; a assistant working with me, had a face to face encounter that -

meets the physician face to face encounter requirements with this patient as 

dated.











ARSEN LAWS MD Jul 16, 2018 14:55

## 2018-07-16 NOTE — OCCUPATIONAL THER DAILY NOTE
OT Current Status-Daily Note


Subjective


Pt alert, sitting in recliner with wife present.  Pt agrees to therapy.  No c/o 

pain.  Pt's wife was wanting to know when the doctor made rounds, she thought 

that they were supposed to leave today.





Mental Status/Objective


Patient Orientation:  Person, Place


              Functional [place] Measure


0=Not Assessed/NA   4=Minimal Assistance


1=Total Assistance   5=Supervision or Setup


2=Maximal Assistance   6=Modified [place]


3=Moderate Assistance   7=Complete [place]





ADL-Treatment


CGA for sit to stand.  Shuffling gate ambulating to bathroom, verbal cues to 

step and stay within FWW.  Pt transferred to toilet using FWW and grabbars with 

CGA for safety.  Pt able to manipulate clothing and cleanse self with CGA.  Pt 

ambulated back to recliner and was able to push self up with arm chair pushup 

to scoot self back in chair.  After therapy, pt sitting in recliner with call 

light/phone in reach.  All needs met in room.


              Functional [place] Measure


0=Not Assessed/NA   4=Minimal Assistance


1=Total Assistance   5=Supervision or Setup


2=Maximal Assistance   6=Modified [place]


3=Moderate Assistance   7=Complete IndependenceIRFPAI Quality Coding Scale











6 Independent with activity with or without an assistive device


 


5  Patient requires set up or clean up by helper.  Patient completes activity  

by  themselves


 


4 Supervision or touching assist (CGA). [place] provide cues , steadying assist


 


3 The helper provides less than half the effort to complete the activity


 


2 The helper provides more than half the effort to complete the activity


 


1 Dependent.  The helper does all the effort to complete an activity 


 


7 Patient refused to complete or attempt activity


 


9 The patient did not perform the activity before the current illness or injury


 


88 Not attempted due to Medical conditions or safety concerns








Toileting (FIM):  4


Toileting Hygiene (QC):  4


Toilet/Commode Transfer (FIM):  4


Toilet Transfer (QC):  4





OT Short Term Goals


Short Term Goals


Time Frame:  2018


Eating(FIM):  5


Grooming(FIM):  5


Bathing(FIM):  3


Upper Body Dressing(FIM):  5


Lower Body Dressing(FIM):  4


Toileting(FIM):  4


Transfers (B,C,W/C) (FIM):  5


Toilet/Commode Transfer(FIM):  5


Shower Transfer(FIM):  4


Additional Short Term Goals:  1-Demonstrate ADL Tasks, 2-Verbalize Understanding

, 3-ImproveStrength/Farnaz


1=Demonstrate adherence to instructed precautions during ADL tasks.


2=Patient will verbalize/demonstrate understanding of assistive devices/

modifications for ADL.


3=Patient will improve strength/tolerance for activity to enable patient to 

perform ADL's.





OT Long Term Goals


Long Term Goals


Time Frame:  2018


Eating (FIM):  6


Eating (QC):  6


Groomin


Oral Hygiene (QC):  5


Bathing(FIM):  4


Upper Body Dressing(FIM):  5


Lower Body Dressing(FIM):  5


Toileting(FIM):  6


Toileting Hygiene (QC):  6


Transfers (B,C,W/C) (FIM):  6


Toilet/Commode Transfer(FIM):  6


Toilet/Commode Transfer (QC):  6


Shower Transfer(FIM):  5


Additional Goals:  1-Demonstrate ADL Tasks, 2-Verbalize Understanding, 3-

ImproveStrength/Farnaz


1=Demonstrate adherence to instructed precautions during ADL tasks.


2=Patient will verbalize/demonstrate understanding of assistive devices/

modifications for ADL.


3=Patient will improve strength/tolerance for activity to enable patient to 

perform ADL's.





OT Education/Plan


Discharge Recommendations


Plan/Recommendations:  Continue POC





Treatment Plan/Plan of Care


Patient would benefit from OT for education, treatment and training to promote 

independence in ADL's, mobility, safety and/or upper extremity function for ADL'

s.


Plan of Care:  ADL Retraining, Functional Mobility, UE Funct Exercise/Act


Treatment Duration:  2018


Frequency:  5 times per week


Estimated Hrs Per Day:  .25 hour per day


Agreement:  Yes


Rehab Potential:  Good





Time/GCodes


Start Time:  13:15


Stop Time:  13:30


Total Time Billed (hr/min):  15


Billed Treatment Time


1 visit-ADL 1 (15 min)











ELMER WHITING 2018 13:31

## 2018-07-16 NOTE — PHYSICAL THERAPY DAILY NOTE
PT Daily Note-Current


Subjective


Patient initially declined PT, however, spouse highly encouraged patient to 

participate with PT so they can go home.  Patient reluctantly agrees.





Pain





   Numeric Pain Scale:  5-Moderate Pain


   Location:  Right, Left


   Location Body Site:  Knee


   Pain Description:  Chronic





Mental Status


Patient Orientation:  Person, Time, Situation


Attachments:  Oxygen





Transfers


              Functional [place] Measure


0=Not Assessed/NA   4=Minimal Assistance


1=Total Assistance   5=Supervision or Setup


2=Maximal Assistance   6=Modified [place]


3=Moderate Assistance   7=Complete IndependenceIRFPAI Quality Coding Scale











6 Independent with activity with or without an assistive device


 


5  Patient requires set up or clean up by helper.  Patient completes activity  

by  themselves


 


4 Supervision or touching assist (CGA). [place] provide cues , steadying assist


 


3 The helper provides less than half the effort to complete the activity


 


2 The helper provides more than half the effort to complete the activity


 


1 Dependent.  The helper does all the effort to complete an activity 


 


7 Patient refused to complete or attempt activity


 


9 The patient did not perform the activity before the current illness or injury


 


88 Not attempted due to Medical conditions or safety concerns








Transfers (B, C, W/C) (FIM):  4


Scootin


Sit to/from Stand:  4


Sit to Stand (QC):  4


Patient requires CGA for safety





Gait Training


Does the Patient Walk?:  Yes


Gait (FIM):  2


Distance (FIM):  2=026-95 ft


Distance:  100'


Walk 50 ft with 2 Turns(QC):  4


Gait Level of Assist:  4


Gait Assistive Device:  FWW


extended UE's, flexed knee flexion, shuffle gait sequence





Exercises


Seated Therapy Exercises:  Ankle pumps, Long arc quads, Hip flexion


Seated Reps:  15 (2 sets)





Assessment


Patient ceased treatment and remains up in recliner with needs met.  Patient 

and spouse report he is mainly in his w/c at home and ambulates minimally.  If 

patient does dismiss to home, this PT recommends home health PT due to safety 

concerns.  Patient continues to require more assistance for mobility.





PT Short Term Goals


Short Term Goals


Transfers (B,C,W/C) (FIM):  5





PT Long Term Goals


Long Term Goals


PT Long Term Goals Time Frame:  2018


Transfers (B,C,W/C) (FIM):  6


Sit to Lying (QC):  6


Lying-Sitting on Side/Bed(QC):  6


Sit to Stand (QC):  6


Chair/Bed-to-Chair Xfer(QC):  6


Does the Patient Walk:  Yes


Gait (FIM):  6


Gait distance (FIM):  3=150 ft


Walk 50ft with 2 Turns (QC):  6


Walk 150 ft (QC):  6


Gait Assistive Device:  FWW





PT Plan


Treatment/Plan


Treatment Plan:  Continue Plan of Care


Treatment Plan:  Bed Mobility, Education, Functional Activity Farnaz, Functional 

Strength, Gait, Safety, Therapeutic Exercise, Transfers


Treatment Duration:  2018


Frequency:  6 times per week


Estimated Hrs Per Day:  .5 hour per day


Patient and/or Family Agrees t:  Yes





Time/GCodes


Time In:  955


Time Out:  1010


Total Billed Treatment Time:  15


Total Billed Treatment


1 visit


GT 15 min











MIKAYLA JOHNSON PT 2018 10:49

## 2018-07-17 NOTE — THERAPY TEAM DISCHARGE SUMMARY
Therapy Discharge Summary


Discharge Recommendations


Date of Discharge


2018 at 18:30


Therapy D/C Recommendations:  Home w/ Family Support, Occupational Therapy Home 

Care, Scheduled Assistance





Physical Therapy


Patient and spouse report he is mainly in his w/c at home and ambulates 

minimally.  If patient does dismiss to home, this PT recommends home health PT 

due to safety concerns due to high fall risk.  Patient continues to require 

more assistance for mobility.  Patient did not meet set goals, however, per 

spouse, is at PLOF with gross motor skills.





Occupational Therapy


Decreased Activ Tolerance, Dependent Transfers, Impaired I ADL's, Impaired Self-

Care Skills





PT Long Term Goals


Long Term Goals


PT Long Term Goals Time Frame:  2018


Transfers (B,C,W/C) (FIM):  6


Sit to Lying (QC):  6


Lying-Sitting on Side/Bed(QC):  6


Sit to Stand (QC):  6


Chair/Bed-to-Chair Xfer(QC):  6


Does the Patient Walk:  Yes


Gait (FIM):  6


Gait distance (FIM):  3=150 ft


Walk 50ft with 2 Turns (QC):  6


Walk 150 ft (QC):  6


Gait Assistive Device:  FWW





OT Long Term Goals


Long Term Goals


Time Frame:  2018


Eating (FIM):  6


Eating (QC):  6


Groomin


Oral Hygiene (QC):  5


Bathing(FIM):  4


Upper Body Dressing(FIM):  5


Lower Body Dressing(FIM):  5


Toileting(FIM):  6


Toileting Hygiene (QC):  6


Transfers (B,C,W/C) (FIM):  6


Toilet/Commode Transfer(FIM):  6


Toilet/Commode Transfer (QC):  6


Shower Transfer(FIM):  5


Additional Goals:  1-Demonstrate ADL Tasks, 2-Verbalize Understanding, 3-

ImproveStrength/Farnaz


1=Demonstrate adherence to instructed precautions during ADL tasks.


2=Patient will verbalize/demonstrate understanding of assistive devices/

modifications for ADL.


3=Patient will improve strength/tolerance for activity to enable patient to 

perform ADL's.











MIKAYLA JOHNSON PT 2018 07:58

## 2018-07-19 NOTE — THERAPY TEAM DISCHARGE SUMMARY
Therapy Discharge Summary


Discharge Recommendations


Date of Discharge


Jul 16, 2018 at 18:30


Therapy D/C Recommendations:  Home w/ Family Support, Scheduled Assistance





Occupational Therapy


Pt. was evaluated and treated several times to work on pt's independence and 

strength.  Pt. is able to transfer minimally with CGA/SBA.  Pt. did not have a 

change to fully work on bathing and dressing, but at discharge, did require mod 

assist for donning socks.  Pt. to discharge home with spouse and with caregiver 

support.








[id number]


Decreased Activ Tolerance, Dependent Transfers, Impaired I ADL's, Impaired Self-

Care Skills





PT Long Term Goals


Long Term Goals


PT Long Term Goals Time Frame:  Jul 19, 2018


Transfers (B,C,W/C) (FIM):  6


Sit to Lying (QC):  6


Lying-Sitting on Side/Bed(QC):  6


Sit to Stand (QC):  6


Chair/Bed-to-Chair Xfer(QC):  6


Does the Patient Walk:  Yes


Gait (FIM):  6


Gait distance (FIM):  3=150 ft


Walk 50ft with 2 Turns (QC):  6


Walk 150 ft (QC):  6


Gait Assistive Device:  FWW





OT Long Term Goals


Long Term Goals


Time Frame:  Jul 26, 2018


Eating (FIM):  6 (not met)


Eating (QC):  6 (not met)


Oral Hygiene (QC):  5 (not met)


Grooming(FIM):  5 (not met)


Bathing(FIM):  4 (not met)


Upper Body Dressing(FIM):  5 (not met)


Lower Body Dressing(FIM):  5 (not met)


Toileting(FIM):  6 (not met)


Toileting Hygiene (QC):  6 (not met)


Transfers (B,C,W/C) (FIM):  6 (not met)


Toilet/Commode Transfer(FIM):  6 (not met)


Toilet/Commode Transfer (QC):  6 (not met)


Shower Transfer(FIM):  5 (not met)


Additional Goals:  1-Demonstrate ADL Tasks, 2-Verbalize Understanding, 3-

ImproveStrength/Farnaz


1=Demonstrate adherence to instructed precautions during ADL tasks.


2=Patient will verbalize/demonstrate understanding of assistive devices/

modifications for ADL.


3=Patient will improve strength/tolerance for activity to enable patient to 

perform ADL's.











ALCIDES OSUNA OT Jul 19, 2018 09:36

## 2018-07-20 ENCOUNTER — HOSPITAL ENCOUNTER (OUTPATIENT)
Dept: HOSPITAL 75 - HH | Age: 78
End: 2018-07-20
Attending: INTERNAL MEDICINE
Payer: MEDICARE

## 2018-07-20 DIAGNOSIS — N39.0: Primary | ICD-10-CM

## 2018-07-20 LAB
APTT PPP: YELLOW S
BACTERIA #/AREA URNS HPF: (no result) /HPF
BILIRUB UR QL STRIP: NEGATIVE
FIBRINOGEN PPP-MCNC: CLEAR MG/DL
GLUCOSE UR STRIP-MCNC: NEGATIVE MG/DL
KETONES UR QL STRIP: NEGATIVE
LEUKOCYTE ESTERASE UR QL STRIP: (no result)
NITRITE UR QL STRIP: NEGATIVE
PH UR STRIP: 6 [PH] (ref 5–9)
PROT UR QL STRIP: (no result)
RBC #/AREA URNS HPF: (no result) /HPF
SP GR UR STRIP: 1.01 (ref 1.02–1.02)
SQUAMOUS #/AREA URNS HPF: (no result) /HPF
UROBILINOGEN UR-MCNC: NORMAL MG/DL
WBC #/AREA URNS HPF: (no result) /HPF

## 2018-07-20 PROCEDURE — 87088 URINE BACTERIA CULTURE: CPT

## 2018-07-20 PROCEDURE — 81000 URINALYSIS NONAUTO W/SCOPE: CPT

## 2018-11-10 ENCOUNTER — HOSPITAL ENCOUNTER (INPATIENT)
Dept: HOSPITAL 75 - ER | Age: 78
LOS: 3 days | Discharge: ANOTHER HOSPITAL | DRG: 694 | End: 2018-11-13
Attending: FAMILY MEDICINE | Admitting: FAMILY MEDICINE
Payer: MEDICARE

## 2018-11-10 VITALS — SYSTOLIC BLOOD PRESSURE: 142 MMHG | DIASTOLIC BLOOD PRESSURE: 82 MMHG

## 2018-11-10 VITALS — WEIGHT: 188.25 LBS | BODY MASS INDEX: 31.36 KG/M2 | HEIGHT: 65 IN

## 2018-11-10 VITALS — DIASTOLIC BLOOD PRESSURE: 69 MMHG | SYSTOLIC BLOOD PRESSURE: 146 MMHG

## 2018-11-10 DIAGNOSIS — E11.40: ICD-10-CM

## 2018-11-10 DIAGNOSIS — E83.42: ICD-10-CM

## 2018-11-10 DIAGNOSIS — I11.0: ICD-10-CM

## 2018-11-10 DIAGNOSIS — N10: ICD-10-CM

## 2018-11-10 DIAGNOSIS — R26.2: ICD-10-CM

## 2018-11-10 DIAGNOSIS — J44.9: ICD-10-CM

## 2018-11-10 DIAGNOSIS — N21.0: ICD-10-CM

## 2018-11-10 DIAGNOSIS — M19.91: ICD-10-CM

## 2018-11-10 DIAGNOSIS — N20.2: Primary | ICD-10-CM

## 2018-11-10 DIAGNOSIS — K21.9: ICD-10-CM

## 2018-11-10 DIAGNOSIS — Z95.1: ICD-10-CM

## 2018-11-10 DIAGNOSIS — I25.2: ICD-10-CM

## 2018-11-10 DIAGNOSIS — Z95.810: ICD-10-CM

## 2018-11-10 DIAGNOSIS — I48.0: ICD-10-CM

## 2018-11-10 DIAGNOSIS — E11.65: ICD-10-CM

## 2018-11-10 DIAGNOSIS — E87.1: ICD-10-CM

## 2018-11-10 DIAGNOSIS — Z85.828: ICD-10-CM

## 2018-11-10 DIAGNOSIS — N28.9: ICD-10-CM

## 2018-11-10 DIAGNOSIS — Z95.5: ICD-10-CM

## 2018-11-10 DIAGNOSIS — I70.201: ICD-10-CM

## 2018-11-10 DIAGNOSIS — G47.30: ICD-10-CM

## 2018-11-10 DIAGNOSIS — I25.10: ICD-10-CM

## 2018-11-10 DIAGNOSIS — Z99.81: ICD-10-CM

## 2018-11-10 DIAGNOSIS — E78.00: ICD-10-CM

## 2018-11-10 DIAGNOSIS — Z79.4: ICD-10-CM

## 2018-11-10 DIAGNOSIS — I50.9: ICD-10-CM

## 2018-11-10 DIAGNOSIS — Z87.891: ICD-10-CM

## 2018-11-10 DIAGNOSIS — H40.9: ICD-10-CM

## 2018-11-10 DIAGNOSIS — F32.9: ICD-10-CM

## 2018-11-10 DIAGNOSIS — F03.90: ICD-10-CM

## 2018-11-10 LAB
ALBUMIN SERPL-MCNC: 3.5 GM/DL (ref 3.2–4.5)
ALP SERPL-CCNC: 79 U/L (ref 40–136)
ALT SERPL-CCNC: 23 U/L (ref 0–55)
APTT PPP: YELLOW S
BACTERIA #/AREA URNS HPF: (no result) /HPF
BASOPHILS # BLD AUTO: 0 10^3/UL (ref 0–0.1)
BASOPHILS NFR BLD AUTO: 0 % (ref 0–10)
BASOPHILS NFR BLD MANUAL: 0 %
BILIRUB SERPL-MCNC: 0.4 MG/DL (ref 0.1–1)
BILIRUB UR QL STRIP: NEGATIVE
BUN/CREAT SERPL: 12
CALCIUM SERPL-MCNC: 10 MG/DL (ref 8.5–10.1)
CHLORIDE SERPL-SCNC: 96 MMOL/L (ref 98–107)
CO2 SERPL-SCNC: 21 MMOL/L (ref 21–32)
CREAT SERPL-MCNC: 1.4 MG/DL (ref 0.6–1.3)
EOSINOPHIL # BLD AUTO: 0 10^3/UL (ref 0–0.3)
EOSINOPHIL NFR BLD AUTO: 0 % (ref 0–10)
EOSINOPHIL NFR BLD MANUAL: 0 %
ERYTHROCYTE [DISTWIDTH] IN BLOOD BY AUTOMATED COUNT: 16 % (ref 10–14.5)
FIBRINOGEN PPP-MCNC: (no result) MG/DL
GFR SERPLBLD BASED ON 1.73 SQ M-ARVRAT: 49 ML/MIN
GLUCOSE SERPL-MCNC: 102 MG/DL (ref 70–105)
GLUCOSE UR STRIP-MCNC: NEGATIVE MG/DL
HCT VFR BLD CALC: 38 % (ref 40–54)
HGB BLD-MCNC: 12.1 G/DL (ref 13.3–17.7)
KETONES UR QL STRIP: NEGATIVE
LEUKOCYTE ESTERASE UR QL STRIP: (no result)
LIPASE SERPL-CCNC: 29 U/L (ref 8–78)
LYMPHOCYTES # BLD AUTO: 1.8 X 10^3 (ref 1–4)
LYMPHOCYTES NFR BLD AUTO: 10 % (ref 12–44)
MANUAL DIFFERENTIAL PERFORMED BLD QL: YES
MCH RBC QN AUTO: 30 PG (ref 25–34)
MCHC RBC AUTO-ENTMCNC: 32 G/DL (ref 32–36)
MCV RBC AUTO: 92 FL (ref 80–99)
MONOCYTES # BLD AUTO: 1.8 X 10^3 (ref 0–1)
MONOCYTES NFR BLD AUTO: 10 % (ref 0–12)
MONOCYTES NFR BLD: 7 %
NEUTROPHILS # BLD AUTO: 14.9 X 10^3 (ref 1.8–7.8)
NEUTROPHILS NFR BLD AUTO: 80 % (ref 42–75)
NEUTS BAND NFR BLD MANUAL: 83 %
NEUTS BAND NFR BLD: 0 %
NITRITE UR QL STRIP: NEGATIVE
PH UR STRIP: 6.5 [PH] (ref 5–9)
PLATELET # BLD: 352 10^3/UL (ref 130–400)
PMV BLD AUTO: 9.9 FL (ref 7.4–10.4)
POTASSIUM SERPL-SCNC: 5 MMOL/L (ref 3.6–5)
PROT SERPL-MCNC: 9 GM/DL (ref 6.4–8.2)
PROT UR QL STRIP: (no result)
RBC # BLD AUTO: 4.07 10^6/UL (ref 4.35–5.85)
RBC #/AREA URNS HPF: (no result) /HPF
RBC MORPH BLD: NORMAL
SODIUM SERPL-SCNC: 132 MMOL/L (ref 135–145)
SP GR UR STRIP: 1.01 (ref 1.02–1.02)
SQUAMOUS #/AREA URNS HPF: (no result) /HPF
UROBILINOGEN UR-MCNC: NORMAL MG/DL
VARIANT LYMPHS NFR BLD MANUAL: 10 %
WBC # BLD AUTO: 18.5 10^3/UL (ref 4.3–11)

## 2018-11-10 PROCEDURE — 83930 ASSAY OF BLOOD OSMOLALITY: CPT

## 2018-11-10 PROCEDURE — 82962 GLUCOSE BLOOD TEST: CPT

## 2018-11-10 PROCEDURE — 83935 ASSAY OF URINE OSMOLALITY: CPT

## 2018-11-10 PROCEDURE — 93005 ELECTROCARDIOGRAM TRACING: CPT

## 2018-11-10 PROCEDURE — 84443 ASSAY THYROID STIM HORMONE: CPT

## 2018-11-10 PROCEDURE — 96365 THER/PROPH/DIAG IV INF INIT: CPT

## 2018-11-10 PROCEDURE — 80053 COMPREHEN METABOLIC PANEL: CPT

## 2018-11-10 PROCEDURE — 83605 ASSAY OF LACTIC ACID: CPT

## 2018-11-10 PROCEDURE — 81000 URINALYSIS NONAUTO W/SCOPE: CPT

## 2018-11-10 PROCEDURE — 87040 BLOOD CULTURE FOR BACTERIA: CPT

## 2018-11-10 PROCEDURE — 85027 COMPLETE CBC AUTOMATED: CPT

## 2018-11-10 PROCEDURE — 74176 CT ABD & PELVIS W/O CONTRAST: CPT

## 2018-11-10 PROCEDURE — 87077 CULTURE AEROBIC IDENTIFY: CPT

## 2018-11-10 PROCEDURE — 74018 RADEX ABDOMEN 1 VIEW: CPT

## 2018-11-10 PROCEDURE — 84300 ASSAY OF URINE SODIUM: CPT

## 2018-11-10 PROCEDURE — 74019 RADEX ABDOMEN 2 VIEWS: CPT

## 2018-11-10 PROCEDURE — 83690 ASSAY OF LIPASE: CPT

## 2018-11-10 PROCEDURE — 87081 CULTURE SCREEN ONLY: CPT

## 2018-11-10 PROCEDURE — 87186 SC STD MICRODIL/AGAR DIL: CPT

## 2018-11-10 PROCEDURE — 80048 BASIC METABOLIC PNL TOTAL CA: CPT

## 2018-11-10 PROCEDURE — 83735 ASSAY OF MAGNESIUM: CPT

## 2018-11-10 PROCEDURE — 36415 COLL VENOUS BLD VENIPUNCTURE: CPT

## 2018-11-10 PROCEDURE — 85007 BL SMEAR W/DIFF WBC COUNT: CPT

## 2018-11-10 PROCEDURE — 85025 COMPLETE CBC W/AUTO DIFF WBC: CPT

## 2018-11-10 PROCEDURE — 87088 URINE BACTERIA CULTURE: CPT
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
## 2018-11-10 NOTE — XMS REPORT
[facility]

 Created on: 2018



Kendall Snowden

External Reference #: [id number]

: 1940

Sex: Male



Demographics







 Address  [address] [place]  [address]

 

 Preferred Language  Unknown

 

 Marital Status  Unknown

 

 Gnosticism Affiliation  Unknown

 

 Race  Unknown

 

 Ethnic Group  Unknown





Author







 Author  CHAPIS MURILLO

 

 Organization  [facility]

 

 Address  [address] [place]  [address]



 

 Phone  [phone]







Care Team Providers







 Care Team Member Name  Role  Phone

 

 CHAPIS MURILLO  Unavailable  [phone]







PROBLEMS







 Type  Condition  ICD9-CM Code  SSQ66-LB Code  Onset Dates  Condition Status  
SNOMED Code

 

 Problem  Cardiac defibrillator in place     Z95.810     Active  880516866

 

 Problem  Essential hypertension     I10     Active  68333871

 

 Problem  Coronary artery disease involving native coronary artery of native 
heart without angina pectoris     I25.10     Active  [id number]

 

 Problem  Chronic congestive heart failure, unspecified congestive heart 
failure type     I50.9     Active  23556373

 

 Problem  Type 2 diabetes mellitus without complications     E11.9     Active  
926627812

 

 Problem  Long term current use of insulin     Z79.4     Active  921453307

 

 Problem  Stasis dermatitis of both legs     I87.2     Active  20749679

 

 Problem  Chronic obstructive pulmonary disease, unspecified COPD type     
J44.9     Active  94112807

 

 Problem  Other atherosclerosis of native arteries of extremities, right leg   
  I70.291     Active  66574794

 

 Problem  Ventricular arrhythmia     I49.9     Active  04602034







ALLERGIES

No Information



ENCOUNTERS







 Encounter  Location  Date  Diagnosis

 

 [facility]  [address] [place] [phone]
[phone]  11 Sep, 2018   

 

 [facility]  [address] [place] [phone]-
[phone]  20 Aug, 2018   

 

 [facility]  [id number] [place] [phone]-
[phone]  16 Aug, 2018   

 

 [facility]  [address] [phone]
9754  15 Aug, 2018   

 

 [facility]  [id number] [place] [address]-
3322     

 

 [facility]  [id number] [place] [address]-
9937     

 

 [facility]  [address] [address]-
0468    Acute cystitis with hematuria N30.01 and Dysuria R30.0

 

 [facility]  [address] [address]-
3107     

 

 [facility]  [address] [address]-
[address]     

 

 [facility]  [address] [address]-
3052     

 

 [facility]  [address] [place] [address]-
1578    Type 2 diabetes mellitus without complications E11.9 ; Long 
term current use of insulin Z79.4 ; History of respiratory failure Z87.09 and 
History of sepsis Z86.19

 

 [facility]  [address] [address]-
3265     

 

 [facility]  [address] [address]-
3731     

 

 [facility]  [address] [address]-
8664     

 

 [facility]  [address] [address]-
3166     

 

 [facility]  [address] [address]-
4133     

 

 [facility]  [address] [address]-
5071     

 

 [facility]  [address] [phone]  25 May, 2018  Acute cystitis without hematuria N30.00

 

 [facility]  [address] [phone]  19 May, 2018  Acute cystitis without hematuria N30.00

 

 [facility]  [address] [address]-
9412  18 May, 2018   

 

 [place] [id number] [place] [address]-
5749  10 Apr, 2018  Medicare annual wellness visit, initial Z00.00 ; Encounter 
for immunization Z23 ; Chronic obstructive pulmonary disease, unspecified COPD 
type J44.9 ; Coronary artery disease involving native coronary artery of native 
heart without angina pectoris I25.10 ; Chronic congestive heart failure, 
unspecified congestive heart failure type I50.9 ; Essential hypertension I10 ; 
Ventricular arrhythmia I49.9 and Other atherosclerosis of native arteries of 
extremities, right leg I70.291

 

 [place] [id number] [place] [address]-
4669  08 Mar, 2018  Chronic congestive heart failure, unspecified congestive 
heart failure type I50.9

 

 [place] [id number] [place] [address]9621-
2533  20 2018   

 

 [facility] [place] [id number] [place] [address] 08[address]-
9966  13 2018  Chronic congestive heart failure, unspecified congestive 
heart failure type I50.9 ; Chronic obstructive pulmonary disease, unspecified 
COPD type J44.9 and Bilateral impacted cerumen H61.23

 

 [place] [address] [place] [address]-
1023     

 

 [facility] [place] [address] [address]-
9154     

 

 [place] [id number] [place] [address]-
5550  27 Dec, 2017   

 

 [place] [id number] [place] [address] 824[address]-
5522  20 Dec, 2017   

 

 [facility] [place] [id number] [place] [address]-
1727  12 Dec, 2017  Coronary artery disease involving native coronary artery of 
native heart without angina pectoris I25.10

 

 [facility] [id number] [place] [address] 689[address]-
3733  11 Dec, 2017   

 

 [facility]  [address] [place] [address]-
4637    Chronic obstructive pulmonary disease, unspecified COPD 
type J44.9 and History of pneumonia Z87.01

 

 [facility]  [id number] 
[phone]     

 

 [facility]  [id number] [place] [address]-
7437     

 

 [facility]  [id number] [place] [address]-
6300     

 

 [facility]  [id number] [place] [address]-
8177     

 

 [facility]  [id number] [place] [address]-
8182  19 Oct, 2017   

 

 [facility]  [id number] [place] [address] 07174-
2028  17 Oct, 2017  Coronary artery disease involving native coronary artery of 
native heart without angina pectoris I25.10 and Ventricular arrhythmia I49.9

 

 [facility]  [address] [place] [address] 9[address]72-
3132  09 Oct, 2017   

 

 [facility]  [address] [place] [address] 8[address]-
4163  09 Oct, 2017   

 

 [facility]  [address] [place] [address]
[phone]  04 Oct, 2017  Dysuria R30.0 and Acute cystitis without hematuria N30.00

 

 [facility]  [address] [place] [address] 88926-
9807  25 Aug, 2017  Epistaxis R04.0 and Chronic obstructive pulmonary disease, 
unspecified COPD type J44.9

 

 [facility]  [address] [place] [address] 36474-
8395    Fall from other pedestrian conveyance, initial encounter 
V00.891A

 

 [facility]  [address] [address]-
7318    Chronic congestive heart failure, unspecified congestive 
heart failure type I50.9 ; Chronic obstructive pulmonary disease, unspecified 
COPD type J44.9 and Stasis dermatitis of both legs I87.2

 

 [facility]  [address] [address]-
9039  08 May, 2017  Chronic congestive heart failure, unspecified congestive 
heart failure type I50.9

 

 [facility]  [address]100[place] [address]-
0182  05 May, 2017  Coronary artery disease involving native coronary artery of 
native heart without angina pectoris I25.10 ; Chronic congestive heart failure, 
unspecified congestive heart failure type I50.9 and Chronic obstructive 
pulmonary disease, unspecified COPD type J44.9

 

 [facility]  [address]100[place] [address]-
[address]646     

 

 [facility]  [address]100[place] [address]-
[address]     

 

 [facility]  [id number] 
[id number]     

 

 [facility]  [id number] [place] [address] 
[phone]  28 Mar, 2017   

 

 [facility] [facility]  [address]   [place], [place] 
[phone]  20 Mar, 2017  Essential hypertension I10 and Chronic congestive 
heart failure, unspecified congestive heart failure type I50.9

 

 [facility]  [id number] 
[phone]  13 Mar, 2017   

 

 [facility]  [address] [address]-
4178  09 Mar, 2017   

 

 [facility]  [address] [address]-
8634  19 Dec, 2016   

 

 [facility]  [address] [address]-
8793  19 Dec, 2016   

 

 [facility]  [address] [address]-
3160     

 

 [facility]  [address]65100[place] [address]-
1755     

 

 [facility]  [address] [place] [address]-
2021     

 

 [facility]  [id number] [place] [address]-
5640     

 

 [facility]  [id number] [place] [address]140-
5464    Chronic congestive heart failure, unspecified congestive 
heart failure type I50.9

 

 [facility]  [address] [place] [phone]    Pain of right lower extremity M79.604 ; History of atrial 
fibrillation without current medication Z86.79 and Acute deep vein thrombosis (
DVT) of femoral vein of right lower extremity I82.411

 

 [facility]  [address] [place] [address] 7[address]-
6381     

 

 [facility]  [address] [place] [address] 82459-
7517  22 Aug, 2016   

 

 [facility]  [address] [place] [address] 10896-
3089  22 Aug, 2016  Coronary artery disease involving native coronary artery of 
native heart without angina pectoris I25.10 ; Chronic congestive heart failure, 
unspecified congestive heart failure type I50.9 ; Cardiac defibrillator in 
place Z95.810 and Candidiasis B37.9







IMMUNIZATIONS

No Known Immunizations



SOCIAL HISTORY

Never Assessed



REASON FOR VISIT

culture results



PLAN OF CARE





VITAL SIGNS





MEDICATIONS







 Medication  Instructions  Dosage  Frequency  Start Date  End Date  Duration  
Status

 

 Augmentin 875-125 MG  Orally every 12 hrs  1 tablet  12h    2 Aug, 
2018  03 days  Active







RESULTS

No Results



PROCEDURES

No Known procedures



INSTRUCTIONS





MEDICATIONS ADMINISTERED

No Known Medications



MEDICAL (GENERAL) HISTORY







 Type  Description  Date

 

 Medical History  hypertension   

 

 Medical History  skin cancer-arms, face   

 

 Medical History  MI   

 

 Medical History  Pneumonia   

 

 Surgical History  heart cath-2 stents, multiple balloons   

 

 Surgical History  open heart surgery  

 

 Surgical History  defibrillator placed  

 

 Hospitalization History  surgery   

 

 Hospitalization History  pneumonia  

 

 Hospitalization History  broken left hip at   March

 

 Hospitalization History  Bronchitis/clinical Pneumonia,hypoxia,sepsis - [facility]  17

 

 Hospitalization History  [facility], [place], operated by [facility]- Cardiomyopathy, Defibrillator 
discharge  2018

 

 Hospitalization History  [facility]  2018

## 2018-11-10 NOTE — XMS REPORT
[facility]

 Created on: 10/27/2018



Sp Kendall

External Reference #: [id number]

: 1940

Sex: Male



Demographics







 Address  [address] [place]  [address]

 

 Preferred Language  Unknown

 

 Marital Status  Unknown

 

 [employer] Affiliation  Unknown

 

 Race  Unknown

 

 Ethnic Group  Unknown





Author







 Author  CHAPIS MURILLO

 

 Organization  [facility]

 

 Address  [address] [place]  [address]



 

 Phone  [phone]







Care Team Providers







 Care Team Member Name  Role  Phone

 

 CHAPIS MURILLO  Unavailable  [phone]







PROBLEMS







 Type  Condition  ICD9-CM Code  HZL33-DC Code  Onset Dates  Condition Status  
SNOMED Code

 

 Problem  Cardiac defibrillator in place     Z95.810     Active  586765727

 

 Problem  Essential hypertension     I10     Active  42745562

 

 Problem  Coronary artery disease involving native coronary artery of native 
heart without angina pectoris     I25.10     Active  [id number]

 

 Problem  Chronic congestive heart failure, unspecified congestive heart 
failure type     I50.9     Active  15822703

 

 Problem  Type 2 diabetes mellitus without complications     E11.9     Active  
522856801

 

 Problem  Long term current use of insulin     Z79.4     Active  506504044

 

 Problem  Stasis dermatitis of both legs     I87.2     Active  67828924

 

 Problem  Chronic obstructive pulmonary disease, unspecified COPD type     
J44.9     Active  93642119

 

 Problem  Other atherosclerosis of native arteries of extremities, right leg   
  I70.291     Active  12395970

 

 Problem  Ventricular arrhythmia     I49.9     Active  69811399







ALLERGIES







 Substance  Reaction  Event Type  Date  Status

 

 Penicillin V Potassium  rash  Drug Allergy  24 Oct, 2018  Active

 

 Codeine Phosphate  Unknown  Drug Allergy  24 Oct, 2018  Active







ENCOUNTERS







 Encounter  Location  Date  Diagnosis

 

 [facility]  [address], [place] [address]-
7966  24 Oct, 2018   

 

 [facility]  [address] [place] [address]-
7649  24 Oct, 2018  Type 2 diabetes mellitus without complications E11.9 ; 
Flank pain R10.9 ; Essential hypertension I10 and Coronary artery disease 
involving native coronary artery of native heart without angina pectoris I25.10

 

 [facility]  [address], [place] [address]
4040  22 Oct, 2018  Dysuria R30.0

 

 [facility]  [address] [place] [address]-
3651  16 Oct, 2018  Acute cystitis without hematuria N30.00 ; Encounter for 
immunization Z23 and BMI 40.0-44.9, adult Z68.41

 

 [facility]  [id number] [place] [address]-
2866  16 Oct, 2018   

 

 [facility]  [address], [place] [address]
3373  26 Sep, 2018   

 

 [facility]  [id number] [place] [address] [phone]-
5730  11 Sep, 2018   

 

 [facility]  [id number] [place] [address]-
0918  20 Aug, 2018   

 

 [facility]  [id number] [place] [address]-
8590  16 Aug, 2018   

 

 [facility]  [id number] [place] [address]-
8063  15 Aug, 2018   

 

 [facility]  [id number] [place] [address]-
9688     

 

 [facility]  [id number] [place] [address]-
6804     

 

 [facility]  [address], [place] [address]    Acute cystitis with hematuria N30.01 and Dysuria R30.0

 

 [facility]  [address], [place] [address]-
6575     

 

 [facility]  [address], [place] [address]-
9672     

 

 [facility]  [address] [address]-
8655     

 

 [facility]  [address], [place] [address]
9639    Type 2 diabetes mellitus without complications E11.9 ; Long 
term current use of insulin Z79.4 ; History of respiratory failure Z87.09 and 
History of sepsis Z86.19

 

 [facility]  [id number], [place] [address]-
5660  17 2018   

 

 [facility]  [address], [place] [address]-
3603     

 

 [facility]  [address], [place] [address]-
6387     

 

 [facility]  [address], [place] [address]-
6550     

 

 [facility]  [address], [place] [address]-
5852     

 

 [facility]  [address], [place] [address]-
2029     

 

 [facility]  [address], [place] [phone]  25 May, 2018  Acute cystitis without hematuria N30.00

 

 [facility]  [address], [place] [phone]  19 May, 2018  Acute cystitis without hematuria N30.00

 

 [facility]  [address], [place] [address]-
2851  18 May, 2018   

 

 [facility]  [id number], [place] [address]-
7881  10 Apr, 2018  Medicare annual wellness visit, initial Z00.00 ; Encounter 
for immunization Z23 ; Chronic obstructive pulmonary disease, unspecified COPD 
type J44.9 ; Coronary artery disease involving native coronary artery of native 
heart without angina pectoris I25.10 ; Chronic congestive heart failure, 
unspecified congestive heart failure type I50.9 ; Essential hypertension I10 ; 
Ventricular arrhythmia I49.9 and Other atherosclerosis of native arteries of 
extremities, right leg I70.291

 

 [facility]  [id number], [place] [address]-
7414  08 Mar, 2018  Chronic congestive heart failure, unspecified congestive 
heart failure type I50.9

 

 [facility]  [address], [place] [address]-
4693     

 

 [facility]  [id number] [place] [address]-
3692    Chronic congestive heart failure, unspecified congestive 
heart failure type I50.9 ; Chronic obstructive pulmonary disease, unspecified 
COPD type J44.9 and Bilateral impacted cerumen H61.23

 

 [facility]  [id number] [place] [address]-
5922     

 

 [facility]  [id number] [place] [address]-
8519     

 

 [facility]  [id number] [place] [address]-
5281  27 Dec, 2017   

 

 [facility]  [id number] [place] [address] 8[address]-
4769  20 Dec, 2017   

 

 [facility]  [id number] [place] [address]-
4721  12 Dec, 2017  Coronary artery disease involving native coronary artery of 
native heart without angina pectoris I25.10

 

 [facility]  [id number] [place] [address] 87[address]-
4675  11 Dec, 2017   

 

 [facility]  [id number] [place] [address]-
2322    Chronic obstructive pulmonary disease, unspecified COPD 
type J44.9 and History of pneumonia Z87.01

 

 [facility]  [id number] [place] [address] 
[phone]     

 

 [facility]  [address] [place] [address]-
7304     

 

 [facility]  [id number] [place] [address]-
4516     

 

 [facility]  [address] [place] [address]-
2453     

 

 [facility]  [id number] [place] [address]-
5594  19 Oct, 2017   

 

 [address] [place] [address] [place] [address]-
0879  17 Oct, 2017  Coronary artery disease involving native coronary artery of 
native heart without angina pectoris I25.10 and Ventricular arrhythmia I49.9

 

 [id number] [place] [address]-
8489  09 Oct, 2017   

 

 [address] [place] [address]-
2377  09 Oct, 2017   

 

 [facility]  [id number] [place] [phone]  04 Oct, 2017  Dysuria R30.0 and Acute cystitis without hematuria N30.00

 

 [address] [place] [id number] [place] [address]-
6579  25 Aug, 2017  Epistaxis R04.0 and Chronic obstructive pulmonary disease, 
unspecified COPD type J44.9

 

 [place] [id number] [place] [address]-
5131    Fall from other pedestrian conveyance, initial encounter 
V00.891A

 

 [address] [place] [address] 7[address]58-
9168    Chronic congestive heart failure, unspecified congestive 
heart failure type I50.9 ; Chronic obstructive pulmonary disease, unspecified 
COPD type J44.9 and Stasis dermatitis of both legs I87.2

 

 [address] [place] [place] [address]-
5197  08 May, 2017  Chronic congestive heart failure, unspecified congestive 
heart failure type I50.9

 

 [address] [place] [address] [place] [address]-
1675  05 May, 2017  Coronary artery disease involving native coronary artery of 
native heart without angina pectoris I25.10 ; Chronic congestive heart failure, 
unspecified congestive heart failure type I50.9 and Chronic obstructive 
pulmonary disease, unspecified COPD type J44.9

 

 [id number] [place] [address] 91702485-
6803     

 

 [facility]  [address] [id number] [address]-
8366     

 

 [facility]  [id number] 
[id number]     

 

 [facility]  [address] [id number] 
[id number]  28 Mar, 2017   

 

 [facility]  [address] [place] 
[id number]  20 Mar, 2017  Essential hypertension I10 and Chronic congestive 
heart failure, unspecified congestive heart failure type I50.9

 

 [facility]  [id number] 
[id number]  13 Mar, 2017   

 

 [facility]  [address] [facility] [id number] [address]-
9046  09 Mar, 2017   

 

 [facility]  [address], [place] [address]-
2113  19 Dec, 2016   

 

 [facility]  [id number], [place] [address]-
1659  19 Dec, 2016   

 

 [facility]  [address], [place] [address]-
5891     

 

 [facility]  [id number], [place] [address]-
0973     

 

 [facility]  [address] [id number] [address]-
1590     

 

 [facility]  [address] [id number], [place] [address]
254     

 

 [facility]  [address] [facility] [id number], [place] [address]-
7201    Chronic congestive heart failure, unspecified congestive 
heart failure type I50.9

 

 [facility]  [address] [id number], [place] [address]    Pain of right lower extremity M79.604 ; History of atrial 
fibrillation without current medication Z86.79 and Acute deep vein thrombosis (
DVT) of femoral vein of right lower extremity I82.411

 

 [facility]  [address] [facility] [id number], [place] [address]-
1011     

 

 [facility]  [address] [facility] [id number], [place] [address]-
4952  22 Aug, 2016   

 

 [facility]  [address] [facility] [id number], [place] [address]-
9732  22 Aug, 2016  Coronary artery disease involving native coronary artery of 
native heart without angina pectoris I25.10 ; Chronic congestive heart failure, 
unspecified congestive heart failure type I50.9 ; Cardiac defibrillator in 
place Z95.810 and Candidiasis B37.9







IMMUNIZATIONS

No Known Immunizations



SOCIAL HISTORY

Never Assessed



REASON FOR VISIT

pain, kidney- Wife reports he was seen last friday for slight kidney infection 
and was treated with antibitoics. PT finished the antibiotics but has still 
been having dysuira. Notes burning and pain. -Josias MCDONALD



PLAN OF CARE





VITAL SIGNS







 Height  65 in  2018-10-24

 

 Weight  187.9 lbs  2018-10-24

 

 Heart Rate  89 bpm  2018-10-24

 

 Respiratory Rate  20   2018-10-24

 

 Oximetry  w/ oxygen @ 2L:94 %  2018-10-24

 

 BMI  31.26 kg/m2  2018-10-24

 

 Blood pressure systolic  146 mmHg  2018-10-24

 

 Blood pressure diastolic  82 mmHg  2018-10-24







MEDICATIONS







 Medication  Instructions  Dosage  Frequency  Start Date  End Date  Duration  
Status

 

 Amiodarone HCl 200 mg  Orally Once a day  1 tablet  24h           Active

 

 Oxygen                    Active

 

 Amiodarone HCl 200 mg  Orally three times a week on Sun, Wed, Sat.  in 
addition to daily dose  2 tablets at               Active

 

 Nebulizer -     as directed             Active

 

 Nexium 40 mg  Orally Once a day  1 capsule  24h        30 days  Active

 

 Albuterol Sulfate 1.25     INHALE ONE VIAL VIA NEBULIZER BY MOUTH TWICE A DAY 
AS INSTRUCTED           25  Active

 

 Klor-Con 8 MEQ  Orally Once a day  2 capsules  24h       30 days  
Active

 

 Amlodipine Besylate 5 MG  Orally Once a day  1 tablet  24h           Active

 

 Levaquin 500 mg  Orally Once a day  1 tablet  24h       7 days  
Active

 

 Wheelchair -     to use for Mobility  24h          Active

 

 Metoprolol Tartrate 50 mg  Orally Twice a day  1/2 tablets in the am, and pm  
12h           Active

 

 Albuterol Sulfate 1.25 MG/3ML  Inhalation 2 times a day  as directed  12h  30 
2017        Active

 

 Furosemide 40 mg  Orally twice a day  1 tablet  12h           Active

 

 Potassium Chloride 8mg  Orally Once a day  as directed  24h           Active

 

 Digoxin 125 mcg  Orally Once a day  1 tablet  24h        30 days  Active







RESULTS

No Results



PROCEDURES







 Procedure  Date Ordered  Result  Body Site

 

 GLYCATED HEMOGLOBIN TEST  Oct 24, 2018      

 

 MICROALBUMIN, SEMIQUANT  Oct 24, 2018      

 

 VENIPUNCT, ROUTINE*  Oct 24, 2018      

 

 [facility] VISIT ESTABLISHED PATIENT  Oct 24, 2018      

 

 URINALYSIS, AUTO, W/O SCOPE  Oct 24, 2018      

 

 [facility]  Oct [facility], 2018      







INSTRUCTIONS





MEDICATIONS ADMINISTERED

No Known Medications



MEDICAL (GENERAL) HISTORY







 Type  Description  Date

 

 Medical History  hypertension   

 

 Medical History  skin cancer-arms, face   

 

 Medical History  MI   

 

 Medical History  Pneumonia   

 

 Surgical History  heart cath-2 stents, multiple balloons   

 

 Surgical History  open heart surgery  

 

 Surgical History  defibrillator placed  

 

 Hospitalization History  surgery   

 

 Hospitalization History  pneumonia  

 

 Hospitalization History  broken left hip at   March

 

 Hospitalization History  Bronchitis/clinical Pneumonia,hypoxia,sepsis - [facility] [place]  17

 

 Hospitalization History  [facility]- Cardiomyopathy, Defibrillator 
discharge  2018

 

 Hospitalization History  [facility]  2018

## 2018-11-10 NOTE — XMS REPORT
[facility]

 Created on: 2018



Kendall Snowden

External Reference #: [id number]

: 1940

Sex: Male



Demographics







 Address  [address] [place]  [address]

 

 Preferred Language  Unknown

 

 Marital Status  Unknown

 

 [employer] Affiliation  Unknown

 

 Race  Unknown

 

 Ethnic Group  Unknown





Author







 Author  MAHOGANY GREENWOOD

 

 Organization  [facility]

 

 Address  [address] [place]  [address]



 

 Phone  [phone]







Care Team Providers







 Care Team Member Name  Role  Phone

 

 MAHOGANY GREENWOOD  Unavailable  [phone]







PROBLEMS







 Type  Condition  ICD9-CM Code  TTF01-MI Code  Onset Dates  Condition Status  
SNOMED Code

 

 Problem  Cardiac defibrillator in place     Z95.810     Active  876178562

 

 Problem  Chronic congestive heart failure, unspecified congestive heart 
failure type     I50.9     Active  42019985

 

 Problem  Other atherosclerosis of native arteries of extremities, right leg   
  I70.291     Active  64841692

 

 Problem  Ventricular arrhythmia     I49.9     Active  84166602

 

 Problem  Essential hypertension     I10     Active  23640922

 

 Problem  Coronary artery disease involving native coronary artery of native 
heart without angina pectoris     I25.10     Active  6227488387115

 

 Problem  Stasis dermatitis of both legs     I87.2     Active  95709936

 

 Problem  Chronic obstructive pulmonary disease, unspecified COPD type     
J44.9     Active  20713814







ALLERGIES

No Information



ENCOUNTERS







 Encounter  Location  Date  Diagnosis

 

 [facility]  [address] [place] [address]
-[phone]  25 May, 2018  Acute cystitis without hematuria N30.00

 

 [facility]  [id number] [place] [address]
-[phone]  19 May, 2018  Acute cystitis without hematuria N30.00

 

 [facility]  [id number] [place] [address]-
4428  18 May, 2018   

 

 [facility]  [address] [address]-
4090  10 Apr, 2018  Medicare annual wellness visit, initial Z00.00 ; Encounter 
for immunization Z23 ; Chronic obstructive pulmonary disease, unspecified COPD 
type J44.9 ; Coronary artery disease involving native coronary artery of native 
heart without angina pectoris I25.10 ; Chronic congestive heart failure, 
unspecified congestive heart failure type I50.9 ; Essential hypertension I10 ; 
Ventricular arrhythmia I49.9 and Other atherosclerosis of native arteries of 
extremities, right leg I70.291

 

 [place] [id number] [place] [address] 18949-
7886  08 Mar, 2018  Chronic congestive heart failure, unspecified congestive 
heart failure type I50.9

 

 [facility]  [id number] [place] [address] 56946-
9993     

 

 [place] [address] [address]-
8272    Chronic congestive heart failure, unspecified congestive 
heart failure type I50.9 ; Chronic obstructive pulmonary disease, unspecified 
COPD type J44.9 and Bilateral impacted cerumen H61.23

 

 [place] [id number] [place] [address] 22945-
6474     

 

 [place] [address] [address]-
3924     

 

 [facility]  [id number] [place] [address] 13889-
7035  27 Dec, 2017   

 

 [place] [id number] [place] [address] 23326-
6195  20 Dec, 2017   

 

 [facility]  [id number] [place] [address] 67377-
7051  12 Dec, 2017  Coronary artery disease involving native coronary artery of 
native heart without angina pectoris I25.10

 

 [place] [id number] [place] [address] 23265-
2353  11 Dec, 2017   

 

 [facility]  [id number] [place] [address] 83938-
1233    Chronic obstructive pulmonary disease, unspecified COPD 
type J44.9 and History of pneumonia Z87.01

 

 [facility]  [id number] [place] [address] 
[id number]     

 

 [place] [address] [address]-
5788     

 

 [facility]  [address]65100[place] [address]-
0497     

 

 [facility]  [id number] [place] [address] 87679-
6023     

 

 [facility]  [id number] [place] [address] 98165-
3063  19 Oct, 2017   

 

 [facility]  [id number] [place] [address] 95864-
6605  17 Oct, 2017  Coronary artery disease involving native coronary artery of 
native heart without angina pectoris I25.10 and Ventricular arrhythmia I49.9

 

 [facility]  [id number] [place] [address] 38697-
8533  09 Oct, 2017   

 

 [facility]  [id number] [place] [address] 35105-
4291  09 Oct, 2017   

 

 [facility]  [id number] [place] [address] 65575
-[phone]  04 Oct, 2017  Dysuria R30.0 and Acute cystitis without hematuria N30.00

 

 [facility]  [id number] [place] [address] 40886-
1453  25 Aug, 2017  Epistaxis R04.0 and Chronic obstructive pulmonary disease, 
unspecified COPD type J44.9

 

 [facility]  [address] [place] [address] 72860-
7491    Fall from other pedestrian conveyance, initial encounter 
V00.891A

 

 [facility]  [address]65100[place] [address]-
7249    Chronic congestive heart failure, unspecified congestive 
heart failure type I50.9 ; Chronic obstructive pulmonary disease, unspecified 
COPD type J44.9 and Stasis dermatitis of both legs I87.2

 

 [facility]  [address] [address]-
2056  08 May, 2017  Chronic congestive heart failure, unspecified congestive 
heart failure type I50.9

 

 [facility]  [id number] [address]-
2251  05 May, 2017  Coronary artery disease involving native coronary artery of 
native heart without angina pectoris I25.10 ; Chronic congestive heart failure, 
unspecified congestive heart failure type I50.9 and Chronic obstructive 
pulmonary disease, unspecified COPD type J44.9

 

 [facility]  [address] [facility] [id number] [address]-
1181     

 

 [facility]  [address] [facility] [id number] [address]-
5284     

 

 [facility]  [id number] 
[id number]     

 

 [facility]  [id number] [place] [address] 
[id number]  28 Mar, 2017   

 

 [facility]  [address]   [place], [place] 
[phone]  20 Mar, 2017  Essential hypertension I10 and Chronic congestive 
heart failure, unspecified congestive heart failure type I50.9

 

 [facility]  [id number] 
[id number]  13 Mar, 2017   

 

 [facility]  [address]100[place] [address]-
9675  09 Mar, 2017   

 

 [facility]  [address]65100[place] [address]-
1499  19 Dec, 2016   

 

 [facility]  [id number] [address]-
8310  19 Dec, 2016   

 

 [facility]  [id number] [address]-
4320     

 

 [facility]  [id number] [address]-
9777     

 

 [facility]  [id number] [address]-
0087     

 

 [facility]  [id number] [address]-
2906     

 

 [facility]  [address] [facility] [id number] [address]-
0713    Chronic congestive heart failure, unspecified congestive 
heart failure type I50.9

 

 [facility]  [address]65100[place] [address]
-[address]160    Pain of right lower extremity M79.604 ; History of atrial 
fibrillation without current medication Z86.79 and Acute deep vein thrombosis (
DVT) of femoral vein of right lower extremity I82.411

 

 [facility]  [address]00565100[place] [address]-
7435     

 

 [facility]  [address] [place] [address] 78064-
7636  22 Aug, 2016   

 

 [facility]  [address]00565100[place] [address]-
9899  22 Aug, 2016  Coronary artery disease involving native coronary artery of 
native heart without angina pectoris I25.10 ; Chronic congestive heart failure, 
unspecified congestive heart failure type I50.9 ; Cardiac defibrillator in 
place Z95.810 and Candidiasis B37.9







IMMUNIZATIONS

No Known Immunizations



SOCIAL HISTORY

Never Assessed



REASON FOR VISIT





PLAN OF CARE





VITAL SIGNS





MEDICATIONS

Unknown Medications



RESULTS

No Results



PROCEDURES

No Known procedures



INSTRUCTIONS





MEDICATIONS ADMINISTERED

No Known Medications



MEDICAL (GENERAL) HISTORY







 Type  Description  Date

 

 Medical History  hypertension   

 

 Medical History  skin cancer-arms, face   

 

 Medical History  MI   

 

 Medical History  Pneumonia   

 

 Surgical History  heart cath-2 stents, multiple balloons   

 

 Surgical History  open heart surgery  

 

 Surgical History  defibrillator placed  

 

 Hospitalization History  surgery   

 

 Hospitalization History  pneumonia  

 

 Hospitalization History  broken left hip at   March

 

 Hospitalization History  Bronchitis/clinical Pneumonia,hypoxia,sepsis - Via 
[facility]  17

## 2018-11-10 NOTE — XMS REPORT
[facility]

 Created on: 2018



Kendall Snowden

External Reference #: [id number]

: 1940

Sex: Male



Demographics







 Address  [address] [place]  [address]

 

 Preferred Language  Unknown

 

 Marital Status  Unknown

 

 Adventism Affiliation  Unknown

 

 Race  Unknown

 

 Ethnic Group  Unknown





Author







 Author  MAHOGANY GREENWOOD

 

 [facility]

 

 Address  [address] [place]  [address]



 

 Phone  [phone]







Care Team Providers







 Care Team Member Name  Role  Phone

 

 MAHOGANY GREENWOOD  Unavailable  [phone]







PROBLEMS







 Type  Condition  ICD9-CM Code  SAW25-JI Code  Onset Dates  Condition Status  
SNOMED Code

 

 Problem  Cardiac defibrillator in place     Z95.810     Active  112056291

 

 Problem  Essential hypertension     I10     Active  41146004

 

 Problem  Coronary artery disease involving native coronary artery of native 
heart without angina pectoris     I25.10     Active  3196951376197

 

 Problem  Chronic congestive heart failure, unspecified congestive heart 
failure type     I50.9     Active  86258847

 

 Problem  Type 2 diabetes mellitus without complications     E11.9     Active  
144251014

 

 Problem  Long term current use of insulin     Z79.4     Active  263081205

 

 Problem  Stasis dermatitis of both legs     I87.2     Active  92906274

 

 Problem  Chronic obstructive pulmonary disease, unspecified COPD type     
J44.9     Active  76409772

 

 Problem  Other atherosclerosis of native arteries of extremities, right leg   
  I70.291     Active  41875827

 

 Problem  Ventricular arrhythmia     I49.9     Active  09348514







ALLERGIES

No Information



ENCOUNTERS







 Encounter  Location  Date  Diagnosis

 

 [facility]  [address]00565100[place] [address]-
6144  20 Aug, 2018   

 

 [facility]  [address]00565100[place] [address]-
7069  16 Aug, 2018   

 

 [facility]  [address]00565100[place] [address]-
7384  15 Aug, 2018   

 

 [facility]  [id number] [place] [address] 52078-
6123     

 

 [facility]  [address]00565100[place] [address]-
0907     

 

 [facility]  [id number] [place] [address] 59676-
8528    Acute cystitis with hematuria N30.01 and Dysuria R30.0

 

 [facility]  [id number] [place] [address] 26085-
8870     

 

 [facility]  [id number] [place] [address] 44660-
1591     

 

 [facility]  [id number] [place] [address] 26953-
2077     

 

 [facility]  [id number] [place] [address] 32643-
1345    Type 2 diabetes mellitus without complications E11.9 ; Long 
term current use of insulin Z79.4 ; History of respiratory failure Z87.09 and 
History of sepsis Z86.19

 

 [facility]  [id number] [place] [address] 91414-
7672     

 

 [facility]  [id number] [place] [address] 68253-
4137     

 

 [facility]  [id number] [place] [address] 66138-
4303     

 

 [facility]  [id number] [place] [address] 39[address]-
9906     

 

 [facility]  [address]00565100[place] [address]-
0422     

 

 [facility]  [id number] [place] [address] 67691-
7810     

 

 [facility]  [address]00565100[place] [address]
-[address]  25 May, 2018  Acute cystitis without hematuria N30.00

 

 [facility]  [address] [place] [address]
-[phone]  19 May, 2018  Acute cystitis without hematuria N30.00

 

 [facility]  [address]00565100[place] [address]-
4204  18 May, 2018   

 

 [facility]  [address]00565100[place] [address]-
4325  10 Apr, 2018  Medicare annual wellness visit, initial Z00.00 ; Encounter 
for immunization Z23 ; Chronic obstructive pulmonary disease, unspecified COPD 
type J44.9 ; Coronary artery disease involving native coronary artery of native 
heart without angina pectoris I25.10 ; Chronic congestive heart failure, 
unspecified congestive heart failure type I50.9 ; Essential hypertension I10 ; 
Ventricular arrhythmia I49.9 and Other atherosclerosis of native arteries of 
extremities, right leg I70.291

 

 [place] [address] [address]-
7984  08 Mar, 2018  Chronic congestive heart failure, unspecified congestive 
heart failure type I50.9

 

 [place] [id number] [place] [address] 53019-
1447     

 

 [place] [id number] [place] [address] 75622-
4898  13 2018  Chronic congestive heart failure, unspecified congestive 
heart failure type I50.9 ; Chronic obstructive pulmonary disease, unspecified 
COPD type J44.9 and Bilateral impacted cerumen H61.23

 

 [place] [id number] [place] [address] 29279-
9845     

 

 [place] [address] [place] [address] 73838-
3464     

 

 [place] [address]00565100[place] [address]-
3879  27 Dec, 2017   

 

 [place] [address] [place] [address] 55354-
2461  20 Dec, 2017   

 

 [place] [id number] [place] [address] 93948-
2164  12 Dec, 2017  Coronary artery disease involving native coronary artery of 
native heart without angina pectoris I25.10

 

 [place] [address]00565100[place] [address]-
1810  11 Dec, 2017   

 

 [place] [id number] [place] [address] 88220-
4559    Chronic obstructive pulmonary disease, unspecified COPD 
type J44.9 and History of pneumonia Z87.01

 

 [facility]  [id number] [place] [address] 
[id number]     

 

 [facility]  [address] [place] [address] 05373-
9852     

 

 [facility]  [id number] [place] [address] 36806-
9675     

 

 [facility]  [id number] [place] [address] 29487-
5864     

 

 [facility]  [id number] [place] [address] 70362-
3121  19 Oct, 2017   

 

 [facility]  [id number] [place] [address] 67507-
6320  17 Oct, 2017  Coronary artery disease involving native coronary artery of 
native heart without angina pectoris I25.10 and Ventricular arrhythmia I49.9

 

 [facility]  [address] [place] [address] 09627-
5510  09 Oct, 2017   

 

 [facility]  [id number] [place] [address] 20847-
4162  09 Oct, 2017   

 

 [facility]  [address] [place] [address] 99702
-5758  04 Oct, 2017  Dysuria R30.0 and Acute cystitis without hematuria N30.00

 

 [facility]  [address] [place] [address] 29436-
6854  25 Aug, 2017  Epistaxis R04.0 and Chronic obstructive pulmonary disease, 
unspecified COPD type J44.9

 

 [facility]  [id number] [place] [address] 41252-
5931    Fall from other pedestrian conveyance, initial encounter 
V00.891A

 

 [place] [id number] [place] [address] 94980-
0995    Chronic congestive heart failure, unspecified congestive 
heart failure type I50.9 ; Chronic obstructive pulmonary disease, unspecified 
COPD type J44.9 and Stasis dermatitis of both legs I87.2

 

 [facility]  [address] [facility] [id number] [address]-
9187  08 May, 2017  Chronic congestive heart failure, unspecified congestive 
heart failure type I50.9

 

 [facility]  [address] [facility] [id number] [address]-
1323  05 May, 2017  Coronary artery disease involving native coronary artery of 
native heart without angina pectoris I25.10 ; Chronic congestive heart failure, 
unspecified congestive heart failure type I50.9 and Chronic obstructive 
pulmonary disease, unspecified COPD type J44.9

 

 [facility]  [address]65100[place] [address]-
5293     

 

 [facility]  [address]00565100[place] [address]-
3284     

 

 [facility]  [id number] [place] [address] 
[id number]     

 

 [facility]  [id number] 
[id number]  28 Mar, 2017   

 

 [facility]  [address] [place] 
[id number]  20 Mar, 2017  Essential hypertension I10 and Chronic congestive 
heart failure, unspecified congestive heart failure type I50.9

 

 [facility]  [id number], [place] 
[id number]  13 Mar, 2017   

 

 [facility]  [id number] [address]-
8379  09 Mar, 2017   

 

 [facility]  [id number], [place] 185315-
0875  19 Dec, 2016   

 

 [facility]  [address]100[place] [address]-
2605  19 Dec, 2016   

 

 [facility]  [id number] 785994-
2175     

 

 [facility]  [address]00565100[place], [place] [address]-
5713     

 

 [facility]  [address]00565100[place], [place] [address]-
8739     

 

 [facility]  [address]00565100[place], [place] [address]-
8046     

 

 [facility]  [address]00565100[place], [place] [address]-
3153    Chronic congestive heart failure, unspecified congestive 
heart failure type I50.9

 

 [facility]  [address]00565100[place], [place] [address]
-[address]61    Pain of right lower extremity M79.604 ; History of atrial 
fibrillation without current medication Z86.79 and Acute deep vein thrombosis (
DVT) of femoral vein of right lower extremity I82.411

 

 [facility]  [id number] [place] [address] 01885-
9465     

 

 [facility]  [address]65[address], [place] [address]-
2766  22 Aug, 2016   

 

 [facility]  [address] [place] [address] 74717-
0728  22 Aug, 2016  Coronary artery disease involving native coronary artery of 
native heart without angina pectoris I25.10 ; Chronic congestive heart failure, 
unspecified congestive heart failure type I50.9 ; Cardiac defibrillator in 
place Z95.810 and Candidiasis B37.9







IMMUNIZATIONS

No Known Immunizations



SOCIAL HISTORY

Never Assessed



REASON FOR VISIT

FYI



PLAN OF CARE





VITAL SIGNS





MEDICATIONS

Unknown Medications



RESULTS

No Results



PROCEDURES

No Known procedures



INSTRUCTIONS





MEDICATIONS ADMINISTERED

No Known Medications



MEDICAL (GENERAL) HISTORY







 Type  Description  Date

 

 Medical History  hypertension   

 

 Medical History  skin cancer-arms, face   

 

 Medical History  MI   

 

 Medical History  Pneumonia   

 

 Surgical History  heart cath-2 stents, multiple balloons   

 

 Surgical History  open heart surgery  

 

 Surgical History  defibrillator placed  

 

 Hospitalization History  surgery   

 

 Hospitalization History  pneumonia  

 

 Hospitalization History  broken left hip at   March

 

 Hospitalization History  Bronchitis/clinical Pneumonia,hypoxia,sepsis - Via 
[facility]  17

 

 Hospitalization History  [facility]- Cardiomyopathy, Defibrillator 
discharge  2018

 

 Hospitalization History  [facility]  2018

## 2018-11-10 NOTE — XMS REPORT
[facility]

 Created on: 08/15/2018



Kendall Snowden

External Reference #: [id number]

: 1940

Sex: Male



Demographics







 Address  [address] [place]  [address]

 

 Preferred Language  Unknown

 

 Marital Status  Unknown

 

 Sikhism Affiliation  Unknown

 

 Race  Unknown

 

 Ethnic Group  Unknown





Author







 Author  TANESHA ABDULLAHI

 

 [facility] [place] [facility]

 

 Address  [address] [place]  [address]



 

 Phone  [phone]







Care Team Providers







 Care Team Member Name  Role  Phone

 

 TANESHA ABDULLAHI  Unavailable  [phone]







PROBLEMS







 Type  Condition  ICD9-CM Code  GXC20-GZ Code  Onset Dates  Condition Status  
SNOMED Code

 

 Problem  Cardiac defibrillator in place     Z95.810     Active  597030342

 

 Problem  Essential hypertension     I10     Active  28694700

 

 Problem  Coronary artery disease involving native coronary artery of native 
heart without angina pectoris     I25.10     Active  [id number]

 

 Problem  Chronic congestive heart failure, unspecified congestive heart 
failure type     I50.9     Active  83637908

 

 Problem  Type 2 diabetes mellitus without complications     E11.9     Active  
164228933

 

 Problem  Long term current use of insulin     Z79.4     Active  033916354

 

 Problem  Stasis dermatitis of both legs     I87.2     Active  12910931

 

 Problem  Chronic obstructive pulmonary disease, unspecified COPD type     
J44.9     Active  99704133

 

 Problem  Other atherosclerosis of native arteries of extremities, right leg   
  I70.291     Active  50350151

 

 Problem  Ventricular arrhythmia     I49.9     Active  38784928







ALLERGIES







 Substance  Reaction  Event Type  Date  Status

 

 Penicillin V Potassium  rash  Drug Allergy  19 May, 2018  Active

 

 Codeine Phosphate  Unknown  Drug Allergy  19 May, 2018  Active







ENCOUNTERS







 Encounter  Location  Date  Diagnosis

 

 [facility]  [address] [place] [phone]-
[phone]     

 

 [facility]  [id number], [place] [address]
4577     

 

 [facility]  [id number] [place] [phone]-
8941    Acute cystitis with hematuria N30.01 and Dysuria R30.0

 

 [facility]  [address] [place] [address]-
1898     

 

 [facility]  [address] [address]-
4[address]     

 

 [facility]  [address] [address]-
[address]     

 

 [facility]  [address] [address]
7382    Type 2 diabetes mellitus without complications E11.9 ; Long 
term current use of insulin Z79.4 ; History of respiratory failure Z87.09 and 
History of sepsis Z86.19

 

 [facility]  [address] [address]     

 

 [facility]  [address] [address]-
8136     

 

 [facility]  [address] [address]-
2180     

 

 [facility]  [address] [address]-
5054     

 

 [facility]  [address] [address]
6128     

 

 [facility]  [address] [address]-
6818     

 

 [facility]  [id number] [phone]  25 May, 2018  Acute cystitis without hematuria N30.00

 

 [facility]  [id number] [phone]  19 May, 2018  Acute cystitis without hematuria N30.00

 

 [facility]  [id number] [address]-
1740  18 May, 2018   

 

 [facility]  [id number] [address]-
6586  10 Apr, 2018  Medicare annual wellness visit, initial Z00.00 ; Encounter 
for immunization Z23 ; Chronic obstructive pulmonary disease, unspecified COPD 
type J44.9 ; Coronary artery disease involving native coronary artery of native 
heart without angina pectoris I25.10 ; Chronic congestive heart failure, 
unspecified congestive heart failure type I50.9 ; Essential hypertension I10 ; 
Ventricular arrhythmia I49.9 and Other atherosclerosis of native arteries of 
extremities, right leg I70.291

 

 [place] [address] [address]-
[address]  08 Mar, 2018  Chronic congestive heart failure, unspecified congestive 
heart failure type I50.9

 

 [place] [id number] [place] [address]-
6051     

 

 [place] [id number] [place] [address]-
0392  13 2018  Chronic congestive heart failure, unspecified congestive 
heart failure type I50.9 ; Chronic obstructive pulmonary disease, unspecified 
COPD type J44.9 and Bilateral impacted cerumen H61.23

 

 [place] [id number] [place] [address]-
8414     

 

 [place] [id number] [place] [address]-
3953     

 

 [place] [id number] [place] [address]-
1908  27 Dec, 2017   

 

 [place] [address] [place] [address]-
6658  20 Dec, 2017   

 

 [place] [id number] [place] [address]-
5000  12 Dec, 2017  Coronary artery disease involving native coronary artery of 
native heart without angina pectoris I25.10

 

 [place] [id number] [place] [address]-
2751  11 Dec, 2017   

 

 [place] [id number] [place] [address]-
2552  30 2017  Chronic obstructive pulmonary disease, unspecified COPD 
type J44.9 and History of pneumonia Z87.01

 

 [facility]  [id number] [place] [address] 
[id number]     

 

 [facility]  [address] [place] [address]-
6802     

 

 [facility]  [id number] [place] [address]-
9732     

 

 [facility]  [id number] [place] [address]-
8426     

 

 [facility]  [id number] [place] [address]-
5694  19 Oct, 2017   

 

 [facility]  [id number] [place] [address]-
9924  17 Oct, 2017  Coronary artery disease involving native coronary artery of 
native heart without angina pectoris I25.10 and Ventricular arrhythmia I49.9

 

 [place] [id number] [place] [address]-
2499  09 Oct, 2017   

 

 [facility]  [id number] [place] [address]-
0540  09 Oct, 2017   

 

 [facility]  [id number] [place] [phone]  04 Oct, 2017  Dysuria R30.0 and Acute cystitis without hematuria N30.00

 

 [facility]  [id number] [place] [address]-
0860  25 Aug, 2017  Epistaxis R04.0 and Chronic obstructive pulmonary disease, 
unspecified COPD type J44.9

 

 [place] [id number] [place] [address]-
7090    Fall from other pedestrian conveyance, initial encounter 
V00.891A

 

 [place] [id number] [place] [address]-
9214    Chronic congestive heart failure, unspecified congestive 
heart failure type I50.9 ; Chronic obstructive pulmonary disease, unspecified 
COPD type J44.9 and Stasis dermatitis of both legs I87.2

 

 [facility]  [id number] [place] [address]-
0676  08 May, 2017  Chronic congestive heart failure, unspecified congestive 
heart failure type I50.9

 

 [facility]  [address] [facility] [id number] [address]-
5006  05 May, 2017  Coronary artery disease involving native coronary artery of 
native heart without angina pectoris I25.10 ; Chronic congestive heart failure, 
unspecified congestive heart failure type I50.9 and Chronic obstructive 
pulmonary disease, unspecified COPD type J44.9

 

 [facility]  [address] [facility] [id number] [address]-
1596     

 

 [facility]  [address] [facility] [id number][place] [address]-
[address]     

 

 [facility]  [id number] 
[id number]     

 

 [facility]  [id number], [place] 
[id number]  28 Mar, 2017   

 

 [facility]  [address] [place] 
[phone]  20 Mar, 2017  Essential hypertension I10 and Chronic congestive 
heart failure, unspecified congestive heart failure type I50.9

 

 [facility]  [id number], [place] 
[id number]  13 Mar, 2017   

 

 [facility]  [id number] [address]-
7416  09 Mar, 2017   

 

 [facility]  [id number] [address]-
0630  19 Dec, 2016   

 

 [facility]  [address] [facility] [id number][place] [address]-
5126  19 Dec, 2016   

 

 [facility]  [address] [facility] [id number][place] [address]-
5537     

 

 [facility]  [address] [facility] [id number][place] [address]-
4436     

 

 [facility]  [id number] [address]-
6474     

 

 [facility]  [address] [address]-
2168     

 

 [facility]  [address] [place] [address]-
2742    Chronic congestive heart failure, unspecified congestive 
heart failure type I50.9

 

 [facility]  [address] [address]    Pain of right lower extremity M79.604 ; History of atrial 
fibrillation without current medication Z86.79 and Acute deep vein thrombosis (
DVT) of femoral vein of right lower extremity I82.411

 

 [facility]  [id number] [place] [address]-
5953     

 

 [facility]  [id number] [place] [address]-
6936  22 Aug, 2016   

 

 [place] [id number] [place] [address]-
6411  22 Aug, 2016  Coronary artery disease involving native coronary artery of 
native heart without angina pectoris I25.10 ; Chronic congestive heart failure, 
unspecified congestive heart failure type I50.9 ; Cardiac defibrillator in 
place Z95.810 and Candidiasis B37.9







IMMUNIZATIONS

No Known Immunizations



SOCIAL HISTORY

Never Assessed



REASON FOR VISIT

UTI symptoms, burning on urination---CAYDEN faria



PLAN OF CARE







 Activity  Details









  









 Follow Up  prn Reason:







VITAL SIGNS







 Height  65 in  2018

 

 Weight  203.2 lbs  2018

 

 Temperature  98.5 degrees Fahrenheit  2018

 

 Heart Rate  77 bpm  2018

 

 Respiratory Rate  20   2018

 

 BMI  33.81 kg/m2  2018

 

 Blood pressure systolic  164 mmHg  2018

 

 Blood pressure diastolic  76 mmHg  2018







MEDICATIONS







 Medication  Instructions  Dosage  Frequency  Start Date  End Date  Duration  
Status

 

 Flovent HFA                    Active

 

 Amlodipine Besylate 5 MG  Orally Once a day  1 tablet  24h           Active

 

 Albuterol Sulfate 1.25 MG/3ML  Inhalation 2 times a day  as directed  12h  30 
2017        Active

 

 Wheelchair -     to use for Mobility  24h  13 2018        Active

 

 Nexium 40 MG  Orally Once a day  1 capsule  24h           Active

 

 Furosemide 40 mg  Orally twice a day  1 tablet  12h           Active

 

 Amiodarone HCl 200 mg  Orally twice weekly on saturday and   1 tablet   
           Active

 

 Nebulizer -     as directed             Active

 

 Bactrim -160 MG  Orally Twice a day  1 tablet  12h        5 days  Active

 

 Oxygen                    Active

 

 Klor-Con 8 MEQ  Orally Once a day  1 tablet  24h           Active

 

 Metoprolol Tartrate 50 mg  Orally Twice a day  1/2 tablet with food  12h      
     Active

 

 Digoxin 125 MCG  Orally Once a day  1 tablet  24h           Active

 

 Mexiletine HCl 150 MG  Orally every 8 hrs  1 capsule  8h           Active







RESULTS

No Results



PROCEDURES







 Procedure  Date Ordered  Result  Body Site

 

 [facility] VISIT ESTABLISHED PATIENT  May 19, 2018      

 

 URINALYSIS, AUTO, W/O SCOPE  May 19, 2018      







INSTRUCTIONS





MEDICATIONS ADMINISTERED

No Known Medications



MEDICAL (GENERAL) HISTORY







 Type  Description  Date

 

 Medical History  hypertension   

 

 Medical History  skin cancer-arms, face   

 

 Medical History  MI   

 

 Medical History  Pneumonia   

 

 Surgical History  heart cath-2 stents, multiple balloons   

 

 Surgical History  open heart surgery  

 

 Surgical History  defibrillator placed  

 

 Hospitalization History  surgery   

 

 Hospitalization History  pneumonia  

 

 Hospitalization History  broken left hip at   March

 

 Hospitalization History  Bronchitis/clinical Pneumonia,hypoxia,sepsis - [facility] 
[facility]  17

 

 Hospitalization History  [facility]- Cardiomyopathy, Defibrillator 
discharge  2018

 

 Hospitalization History  [facility]  2018

## 2018-11-10 NOTE — DIAGNOSTIC IMAGING REPORT
INDICATION: Kidney stones, follow-up to CT scan from earlier

today.



FINDINGS: KUB demonstrates a staghorn calculus in the left

kidney. No calculi are seen in the right kidney or along the

course of the ureters. Phleboliths are present in the pelvis. The

bowel gas pattern appears normal.



IMPRESSION: There is a somewhat segmented staghorn calculus in

the left kidney.



Dictated by: 



  Dictated on workstation # [id number]

## 2018-11-10 NOTE — XMS REPORT
[facility]

 Created on: 2018



Kendall Snowden

External Reference #: [id number]

: 1940

Sex: Male



Demographics







 Address  [address] [place]  [address]

 

 Preferred Language  Unknown

 

 Marital Status  Unknown

 

 Scientologist Affiliation  Unknown

 

 Race  Unknown

 

 Ethnic Group  Unknown





Author







 Author  MAHOGANY GREENWOOD

 

 [facility]

 

 Address  [address] [place]  [address]



 

 Phone  [phone]







Care Team Providers







 Care Team Member Name  Role  Phone

 

 MAHOGANY GREENWOOD  Unavailable  [phone]







PROBLEMS







 Type  Condition  ICD9-CM Code  EUE18-YX Code  Onset Dates  Condition Status  
SNOMED Code

 

 Problem  Cardiac defibrillator in place     Z95.810     Active  358946109

 

 Problem  Essential hypertension     I10     Active  72789259

 

 Problem  Coronary artery disease involving native coronary artery of native 
heart without angina pectoris     I25.10     Active  [id number]

 

 Problem  Chronic congestive heart failure, unspecified congestive heart 
failure type     I50.9     Active  78786906

 

 Problem  Type 2 diabetes mellitus without complications     E11.9     Active  
[id number]

 

 Problem  Long term current use of insulin     Z79.4     Active  466611104

 

 Problem  Stasis dermatitis of both legs     I87.2     Active  89358964

 

 Problem  Chronic obstructive pulmonary disease, unspecified COPD type     
J44.9     Active  25489059

 

 Problem  Other atherosclerosis of native arteries of extremities, right leg   
  I70.291     Active  63637762

 

 Problem  Ventricular arrhythmia     I49.9     Active  66782568







ALLERGIES







 Substance  Reaction  Event Type  Date  Status

 

 Penicillin V Potassium  rash  Drug Allergy    Active

 

 Codeine Phosphate  Unknown  Drug Allergy    Active







ENCOUNTERS







 Encounter  Location  Date  Diagnosis

 

 [facility] [place]  [id number], [place] [address]  20 Aug, 2018   

 

 [facility] [place]  [address] [address]  16 Aug, 2018   

 

 [facility] [place]  [address] [place] [address]
4563  15 Aug, 2018   

 

 [facility] [place]  [address], [place] [address]-
[address]     

 

 [facility] [place]  [address] [place] [address]-
5342     

 

 [facility] [place]  [address], [place] [address]
2671    Acute cystitis with hematuria N30.01 and Dysuria R30.0

 

 [facility] [place]  [address] [address]
7163     

 

 [facility] [place]  [address], [place] [address]-
1288     

 

 [facility] [place]  [address] [address]-
[address]     

 

 [facility] [place]  [address] [place] [address]-
7289    Type 2 diabetes mellitus without complications E11.9 ; Long 
term current use of insulin Z79.4 ; History of respiratory failure Z87.09 and 
History of sepsis Z86.19

 

 [facility] [place]  [address], [place] [address]-
6018     

 

 [facility] [place]  [address] [address]-
1756     

 

 [facility] [place]  [address] [place] [address]-
3872     

 

 [facility] [place]  [address], [place] [address]-
1396     

 

 [facility] [place]  [address], [place] [address]-
8447     

 

 [facility] [place]  [address], [place] [address]-
1179     

 

 [facility]  [address], [place] [phone]  25 May, 2018  Acute cystitis without hematuria N30.00

 

 [facility]  [address] [address]
-[phone]  19 May, 2018  Acute cystitis without hematuria N30.00

 

 [facility] [place]  [address], [place] [address]
4383  18 May, 2018   

 

 [address] [place] [address]-
9474  10 Apr, 2018  Medicare annual wellness visit, initial Z00.00 ; Encounter 
for immunization Z23 ; Chronic obstructive pulmonary disease, unspecified COPD 
type J44.9 ; Coronary artery disease involving native coronary artery of native 
heart without angina pectoris I25.10 ; Chronic congestive heart failure, 
unspecified congestive heart failure type I50.9 ; Essential hypertension I10 ; 
Ventricular arrhythmia I49.9 and Other atherosclerosis of native arteries of 
extremities, right leg I70.291

 

 [place] [id number] [place] [address]-
8832  08 Mar, 2018  Chronic congestive heart failure, unspecified congestive 
heart failure type I50.9

 

 [id number] [address]-
2652  20 2018   

 

 [id number] [place] [address]-
0997  13 2018  Chronic congestive heart failure, unspecified congestive 
heart failure type I50.9 ; Chronic obstructive pulmonary disease, unspecified 
COPD type J44.9 and Bilateral impacted cerumen H61.23

 

 [address] [address]-
1518     

 

 [address] [address]-
8805     

 

 [id number] [place] [address]-
6352  27 Dec, 2017   

 

 [address] [place] [address]-
8123  20 Dec, 2017   

 

 [address] [place] [address]-
2882  12 Dec, 2017  Coronary artery disease involving native coronary artery of 
native heart without angina pectoris I25.10

 

 [id number] [place] [address]-
3535  11 Dec, 2017   

 

 [facility] [place]  [address], [place] [address]-
[address]    Chronic obstructive pulmonary disease, unspecified COPD 
type J44.9 and History of pneumonia Z87.01

 

 [facility]  [id number] [place] 
[phone]     

 

 [facility] [place]  [address] [place] [address]-
8800     

 

 [facility] [place]  [address] [place] [address]-
0856     

 

 [facility] [place]  [address] [place] [address]-
9028     

 

 [facility] [place]  [address] [place] [address]-
8322  19 Oct, 2017   

 

 [facility] [place]  [address] [place] [address]-
2810  17 Oct, 2017  Coronary artery disease involving native coronary artery of 
native heart without angina pectoris I25.10 and Ventricular arrhythmia I49.9

 

 [facility] [place]  [address] [place] [address]-
7983  09 Oct, 2017   

 

 [facility] [place]  [address] [place] [address]-
8767  09 Oct, 2017   

 

 [facility]  [address] [place] [address]
[phone]  04 Oct, 2017  Dysuria R30.0 and Acute cystitis without hematuria N30.00

 

 [facility] [place]  [address] [place] [address]-
3591  25 Aug, 2017  Epistaxis R04.0 and Chronic obstructive pulmonary disease, 
unspecified COPD type J44.9

 

 [facility] [place]  [address], [place] [address]-
8121    Fall from other pedestrian conveyance, initial encounter 
V00.891A

 

 [facility] [place]  [address] [address], [place] [address]-
[address]04    Chronic congestive heart failure, unspecified congestive 
heart failure type I50.9 ; Chronic obstructive pulmonary disease, unspecified 
COPD type J44.9 and Stasis dermatitis of both legs I87.2

 

 [facility] [place]  [address], [place] [address]-
5505  08 May, 2017  Chronic congestive heart failure, unspecified congestive 
heart failure type I50.9

 

 [facility] [place]  [address], [place] [address]-
[address]07  05 May, 2017  Coronary artery disease involving native coronary artery of 
native heart without angina pectoris I25.10 ; Chronic congestive heart failure, 
unspecified congestive heart failure type I50.9 and Chronic obstructive 
pulmonary disease, unspecified COPD type J44.9

 

 [facility] [place]  [address], [place] [address]-
3909     

 

 [facility] [place]  [address] [address]-
[address]57     

 

 [facility]  [id number] [place] [address] 
[phone]     

 

 [facility]  [id number] [place] [address] 
[phone]  28 Mar, 2017   

 

 [facility]  [address]   [place] 
[phone]  20 Mar, 2017  Essential hypertension I10 and Chronic congestive 
heart failure, unspecified congestive heart failure type I50.9

 

 [facility]  [id number] [place] 
[phone]  13 Mar, 2017   

 

 [facility] [place]  [address], [place] [address]-
9865  09 Mar, 2017   

 

 [facility] [place]  [address], [place] [address]-
0452  19 Dec, 2016   

 

 [facility] [place]  [address] [address]-
5734  19 Dec, 2016   

 

 [facility] [place]  [address], [place] [address]-
3847     

 

 [facility] [place]  [address] [address]-
0357     

 

 [facility] [place]  [address] [place] [address]-
3525     

 

 [facility] [place]  [address] [place] [address]-
6959     

 

 [facility] [place]  [address] [place] [address]-
6175    Chronic congestive heart failure, unspecified congestive 
heart failure type I50.9

 

 [facility]  [address] [place] [address]
-[phone]    Pain of right lower extremity M79.604 ; History of atrial 
fibrillation without current medication Z86.79 and Acute deep vein thrombosis (
DVT) of femoral vein of right lower extremity I82.411

 

 [facility] [place]  [address] [place] [address]-
3390     

 

 [facility] [place]  [address] [place] [address]-
7099  22 Aug, 2016   

 

 [facility] [place]  [address] [place] [address]-
1843  22 Aug, 2016  Coronary artery disease involving native coronary artery of 
native heart without angina pectoris I25.10 ; Chronic congestive heart failure, 
unspecified congestive heart failure type I50.9 ; Cardiac defibrillator in 
place Z95.810 and Candidiasis B37.9







IMMUNIZATIONS

No Known Immunizations



SOCIAL HISTORY

Never Assessed



REASON FOR VISIT

hospital f/u-HARRY kaye



PLAN OF CARE







 Activity  Details









  









 Follow Up  4 Months Reason:

 

 Pending Test  A1C (IN HOUSE) 



 



VITAL SIGNS







 Height  65 in  2018

 

 Weight  215.0 lbs  2018

 

 Temperature  98.1 degrees Fahrenheit  2018

 

 Heart Rate  90 bpm  2018

 

 Respiratory Rate  20   2018

 

 Oximetry  w/ oxygen @ 2L:94 %  2018

 

 BMI  35.77 kg/m2  2018

 

 Blood pressure systolic  130 mmHg  2018

 

 Blood pressure diastolic  64 mmHg  2018







MEDICATIONS







 Medication  Instructions  Dosage  Frequency  Start Date  End Date  Duration  
Status

 

 Nexium 40 MG  Orally Once a day  1 capsule  24h           Active

 

 Wheelchair -     to use for Mobility  24h  13 2018        Active

 

 Albuterol Sulfate 1.25 MG/3ML  Inhalation 2 times a day  as directed  12h          Active

 

 Amiodarone HCl 200 mg  Orally three times a week on Sun, Wed, Sat.  in 
addition to daily dose  2 tablets at               Active

 

 Nebulizer -     as directed             Active

 

 Amlodipine Besylate 5 MG  Orally Once a day  1 tablet  24h           Active

 

 Furosemide 40 mg  Orally twice a day  1 tablet  12h           Active

 

 Amiodarone HCl 200 mg  Orally Once a day  1 tablet  24h           Active

 

 Oxygen                    Active

 

 Digoxin 125 MCG  Orally Once a day  1 tablet  24h           Active

 

 Levemir 100 UNIT/ML  Subcutaneous Once a day  10 units  24h       
   Active







RESULTS

No Results



PROCEDURES







 Procedure  Date Ordered  Result  Body Site

 

 GLYCATED HEMOGLOBIN TEST  2018      

 

 [facility] VISIT ESTABLISHED PATIENT  2018      







INSTRUCTIONS





MEDICATIONS ADMINISTERED

No Known Medications



MEDICAL (GENERAL) HISTORY







 Type  Description  Date

 

 Medical History  hypertension   

 

 Medical History  skin cancer-arms, face   

 

 Medical History  MI   

 

 Medical History  Pneumonia   

 

 Surgical History  heart cath-2 stents, multiple balloons   

 

 Surgical History  open heart surgery  

 

 Surgical History  defibrillator placed  

 

 Hospitalization History  surgery   

 

 Hospitalization History  pneumonia  

 

 Hospitalization History  broken left hip at   [facility]

 

 Hospitalization History  Bronchitis/clinical Pneumonia,hypoxia,sepsis - [facility] [place]  [address]

 

 Hospitalization History  [facility]- Cardiomyopathy, Defibrillator 
discharge  2018

 

 Hospitalization History  [facility]  2018

## 2018-11-10 NOTE — XMS REPORT
[facility]

 Created on: 2018



Kendall Snowden

External Reference #: [id number]

: 1940

Sex: Male



Demographics







 Address  [address] [place]  [address]

 

 Preferred Language  Unknown

 

 Marital Status  Unknown

 

 [employer] Affiliation  Unknown

 

 Race  Unknown

 

 Ethnic Group  Unknown





Author







 Author  MAHOGANY GREENWOOD

 

 [facility]

 

 Address  [address] [place]  [address]



 

 Phone  [phone]







Care Team Providers







 Care Team Member Name  Role  Phone

 

 MAHOGANY GREENWOOD  Unavailable  [phone]







PROBLEMS







 Type  Condition  ICD9-CM Code  KIM47-AL Code  Onset Dates  Condition Status  
SNOMED Code

 

 Problem  Cardiac defibrillator in place     Z95.810     Active  003857379

 

 Problem  Essential hypertension     I10     Active  91831529

 

 Problem  Coronary artery disease involving native coronary artery of native 
heart without angina pectoris     I25.10     Active  [id number]

 

 Problem  Chronic congestive heart failure, unspecified congestive heart 
failure type     I50.9     Active  25131136

 

 Problem  Type 2 diabetes mellitus without complications     E11.9     Active  
935190651

 

 Problem  Long term current use of insulin     Z79.4     Active  675299028

 

 Problem  Stasis dermatitis of both legs     I87.2     Active  99340137

 

 Problem  Chronic obstructive pulmonary disease, unspecified COPD type     
J44.9     Active  66326027

 

 Problem  Other atherosclerosis of native arteries of extremities, right leg   
  I70.291     Active  08535611

 

 Problem  Ventricular arrhythmia     I49.9     Active  29115580







ALLERGIES

No Information



ENCOUNTERS







 Encounter  Location  Date  Diagnosis

 

 [facility]  [address], [place] [address]-
5974  20 Aug, 2018   

 

 [facility]  [address], [place] [address]
0773  16 Aug, 2018   

 

 [facility]  [address], [place] [address]
2165  15 Aug, 2018   

 

 [facility]  [id number] [place] [address]-
3910     

 

 [facility]  [address], [place] [address]-
2530     

 

 [facility]  [id number] [place] [address]-
9483    Acute cystitis with hematuria N30.01 and Dysuria R30.0

 

 [facility]  [id number] [place] [address]-
1331     

 

 [facility]  [id number] [place] [address]65-
5011     

 

 [facility]  [id number] [place] [address]-
5262     

 

 [facility]  [id number] [place] [address] 60832-
6086    Type 2 diabetes mellitus without complications E11.9 ; Long 
term current use of insulin Z79.4 ; History of respiratory failure Z87.09 and 
History of sepsis Z86.19

 

 [facility]  [id number] [place] [address] 13756-
8845     

 

 [facility]  [id number] [place] [address] 08203-
7307     

 

 [facility]  [id number] [place] [address] 36133-
4385     

 

 [facility]  [id number] [place] [address] 36517-
3212     

 

 [facility]  [address], [place] [address]-
5790     

 

 [facility]  [id number] [place] [address] 595[address]-
9998     

 

 [facility]  [address], [place] [address]
-[phone]  25 May, 2018  Acute cystitis without hematuria N30.00

 

 [facility]  [address] [place] [address]
-[phone]  19 May, 2018  Acute cystitis without hematuria N30.00

 

 [facility]  [address], [place] [address]-
7096  18 May, 2018   

 

 [facility]  [address], [place] [address]-
2591  10 Apr, 2018  Medicare annual wellness visit, initial Z00.00 ; Encounter 
for immunization Z23 ; Chronic obstructive pulmonary disease, unspecified COPD 
type J44.9 ; Coronary artery disease involving native coronary artery of native 
heart without angina pectoris I25.10 ; Chronic congestive heart failure, 
unspecified congestive heart failure type I50.9 ; Essential hypertension I10 ; 
Ventricular arrhythmia I49.9 and Other atherosclerosis of native arteries of 
extremities, right leg I70.291

 

 [place] [id number] [address]-
7009  08 Mar, 2018  Chronic congestive heart failure, unspecified congestive 
heart failure type I50.9

 

 [place] [facility] [id number] [place] [address] 97[address]-
3384     

 

 [facility] [id number] [place] [address]-
7893  13 2018  Chronic congestive heart failure, unspecified congestive 
heart failure type I50.9 ; Chronic obstructive pulmonary disease, unspecified 
COPD type J44.9 and Bilateral impacted cerumen H61.23

 

 [facility] [id number] [place] [address] 82287-
2229     

 

 [facility] [place] [address] [place] [address] 46042-
2486     

 

 [place] [address], [place] [address]-
2958  27 Dec, 2017   

 

 [place] [address] [place] [address] 46229-
5886  20 Dec, 2017   

 

 [place] [id number] [place] [address] 65823-
1153  12 Dec, 2017  Coronary artery disease involving native coronary artery of 
native heart without angina pectoris I25.10

 

 [place] [address], [place] [address]-
0336  11 Dec, 2017   

 

 [place] [id number] [place] [address] 77864-
1102    Chronic obstructive pulmonary disease, unspecified COPD 
type J44.9 and History of pneumonia Z87.01

 

 [facility]  [id number] [place] [address] 
[phone]     

 

 [facility]  [address] [place] [address] 81428-
9562     

 

 [facility]  [id number] [place] [address] 76[address]-
7450     

 

 [facility]  [id number] [place] [address] 09207-
1775     

 

 [facility]  [id number] [place] [address] 55[address]-
7451  19 Oct, 2017   

 

 [facility]  [id number] [place] [address]-
1601  17 Oct, 2017  Coronary artery disease involving native coronary artery of 
native heart without angina pectoris I25.10 and Ventricular arrhythmia I49.9

 

 [facility]  [address] [place] [address] 01981-
5387  09 Oct, 2017   

 

 [facility]  [id number] [place] [address] 18277-
3873  09 Oct, 2017   

 

 [facility]  [address] [place] [address]
-[phone]  04 Oct, 2017  Dysuria R30.0 and Acute cystitis without hematuria N30.00

 

 [facility]  [address] [place] [address] 58680-
6698  25 Aug, 2017  Epistaxis R04.0 and Chronic obstructive pulmonary disease, 
unspecified COPD type J44.9

 

 [facility]  [id number] [place] [address]36-
4551    Fall from other pedestrian conveyance, initial encounter 
V00.891A

 

 [place] [id number] [place] [address] 03488-
8310    Chronic congestive heart failure, unspecified congestive 
heart failure type I50.9 ; Chronic obstructive pulmonary disease, unspecified 
COPD type J44.9 and Stasis dermatitis of both legs I87.2

 

 [facility]  [address] [facility] [id number] [address]
1029  08 May, 2017  Chronic congestive heart failure, unspecified congestive 
heart failure type I50.9

 

 [facility]  [address] [facility] [id number] [address]
3360  05 May, 2017  Coronary artery disease involving native coronary artery of 
native heart without angina pectoris I25.10 ; Chronic congestive heart failure, 
unspecified congestive heart failure type I50.9 and Chronic obstructive 
pulmonary disease, unspecified COPD type J44.9

 

 [facility]  [address] [address]-
8605     

 

 [facility]  [address] [address]-
8676     

 

 [facility]  [id number] [place] [address] 
[phone]     

 

 [facility]  [id number] 
[phone]  28 Mar, 2017   

 

 [facility]  [address] [place] 
[phone]  20 Mar, 2017  Essential hypertension I10 and Chronic congestive 
heart failure, unspecified congestive heart failure type I50.9

 

 [facility]  [id number] 
[phone]  13 Mar, 2017   

 

 [facility]  [id number] [address]-
4621  09 Mar, 2017   

 

 [facility]  [id number] [address]-
2538  19 Dec, 2016   

 

 [facility]  [address] [address]-
2290  19 Dec, 2016   

 

 [facility]  [id number] [address]-
9673     

 

 [facility]  [address], [place] [address]-
1205     

 

 [facility]  [address], [place] [address]-
8502     

 

 [facility]  [address], [place] [address]-
7106     

 

 [facility]  [address], [place] [address]-
3738    Chronic congestive heart failure, unspecified congestive 
heart failure type I50.9

 

 [facility]  [address], [place] [address]
-[address]    Pain of right lower extremity M79.604 ; History of atrial 
fibrillation without current medication Z86.79 and Acute deep vein thrombosis (
DVT) of femoral vein of right lower extremity I82.411

 

 [facility]  [id number] [place] [address] 12505-
7023     

 

 [facility]  [address], [place] [address]-
7012  22 Aug, 2016   

 

 [facility]  [address] [place] [address]-
8035  22 Aug, 2016  Coronary artery disease involving native coronary artery of 
native heart without angina pectoris I25.10 ; Chronic congestive heart failure, 
unspecified congestive heart failure type I50.9 ; Cardiac defibrillator in 
place Z95.810 and Candidiasis B37.9







IMMUNIZATIONS

No Known Immunizations



SOCIAL HISTORY

Never Assessed



REASON FOR VISIT

Requests return call



PLAN OF CARE





VITAL SIGNS





MEDICATIONS

Unknown Medications



RESULTS

No Results



PROCEDURES

No Known procedures



INSTRUCTIONS





MEDICATIONS ADMINISTERED

No Known Medications



MEDICAL (GENERAL) HISTORY







 Type  Description  Date

 

 Medical History  hypertension   

 

 Medical History  skin cancer-arms, face   

 

 Medical History  MI   

 

 Medical History  Pneumonia   

 

 Surgical History  heart cath-2 stents, multiple balloons   

 

 Surgical History  open heart surgery  

 

 Surgical History  defibrillator placed  

 

 Hospitalization History  surgery   

 

 Hospitalization History  pneumonia  

 

 Hospitalization History  broken left hip at   March

 

 Hospitalization History  Bronchitis/clinical Pneumonia,hypoxia,sepsis - [facility] 
[facility]  17

 

 Hospitalization History  [facility]- Cardiomyopathy, Defibrillator 
discharge  2018

 

 Hospitalization History  [facility]  2018

## 2018-11-10 NOTE — XMS REPORT
[facility]

 Created on: 2018



Kendall Snowden

External Reference #: [id number]

: 1940

Sex: Male



Demographics







 Address  [address] [place]  [address]

 

 Preferred Language  Unknown

 

 Marital Status  Unknown

 

 Yarsani Affiliation  Unknown

 

 Race  Unknown

 

 Ethnic Group  Unknown





Author







 Author  MAHOGANY GREENWOOD

 

 [facility]

 

 Address  [address] [place]  [address]



 

 Phone  [phone]







Care Team Providers







 Care Team Member Name  Role  Phone

 

 MAHOGANY GREENWOOD  Unavailable  [phone]







PROBLEMS







 Type  Condition  ICD9-CM Code  OPH56-ID Code  Onset Dates  Condition Status  
SNOMED Code

 

 Problem  Cardiac defibrillator in place     Z95.810     Active  504410449

 

 Problem  Essential hypertension     I10     Active  38764593

 

 Problem  Coronary artery disease involving native coronary artery of native 
heart without angina pectoris     I25.10     Active  [id number]

 

 Problem  Chronic congestive heart failure, unspecified congestive heart 
failure type     I50.9     Active  31245310

 

 Problem  Type 2 diabetes mellitus without complications     E11.9     Active  
685934588

 

 Problem  Long term current use of insulin     Z79.4     Active  947867307

 

 Problem  Stasis dermatitis of both legs     I87.2     Active  29432353

 

 Problem  Chronic obstructive pulmonary disease, unspecified COPD type     
J44.9     Active  05377332

 

 Problem  Other atherosclerosis of native arteries of extremities, right leg   
  I70.291     Active  39885454

 

 Problem  Ventricular arrhythmia     I49.9     Active  71440065







ALLERGIES

No Information



ENCOUNTERS







 Encounter  Location  Date  Diagnosis

 

 [facility]  [id number], [place] [address]
0669  20 Aug, 2018   

 

 [facility]  [id number] [address]  16 Aug, 2018   

 

 [facility]  [address] [address]
3058  15 Aug, 2018   

 

 [facility]  [id number] [place] [phone]-
[phone]     

 

 [facility]  [address], [place] [address]8     

 

 [facility]  [id number] [place] [address]-
5487    Acute cystitis with hematuria N30.01 and Dysuria R30.0

 

 [facility]  [id number] [place] [address]497-
8002     

 

 [facility]  [id number] [place] [address] 10704-
5902     

 

 [facility]  [id number] [place] [address]32-
3626     

 

 [facility]  [id number] [place] [address]171-
4400    Type 2 diabetes mellitus without complications E11.9 ; Long 
term current use of insulin Z79.4 ; History of respiratory failure Z87.09 and 
History of sepsis Z86.19

 

 [facility]  [id number] [place] [address]46-
5621     

 

 [facility]  [id number] [place] [address] 35208-
3125     

 

 [facility]  [id number] [place] [address] 90384-
9372     

 

 [facility]  [id number] [place] [address]18-
9479     

 

 [facility]  [address] [address]-
7760     

 

 [facility]  [id number] [place] [address] 40061-
9579     

 

 [facility]  [address], [place] [phone]  25 May, 2018  Acute cystitis without hematuria N30.00

 

 [facility]  [address] [place] [phone]
[phone]  19 May, 2018  Acute cystitis without hematuria N30.00

 

 [facility]  [address], [place] [address]-
2530  18 May, 2018   

 

 [facility]  [address] [address]-
2654  10 Apr, 2018  Medicare annual wellness visit, initial Z00.00 ; Encounter 
for immunization Z23 ; Chronic obstructive pulmonary disease, unspecified COPD 
type J44.9 ; Coronary artery disease involving native coronary artery of native 
heart without angina pectoris I25.10 ; Chronic congestive heart failure, 
unspecified congestive heart failure type I50.9 ; Essential hypertension I10 ; 
Ventricular arrhythmia I49.9 and Other atherosclerosis of native arteries of 
extremities, right leg I70.291

 

 [place] [id number] [address]-
3462  08 Mar, 2018  Chronic congestive heart failure, unspecified congestive 
heart failure type I50.9

 

 [place] [id number] [place] [address] 75942-
8897     

 

 [place] [id number] [place] [address] 86685-
1076  13 2018  Chronic congestive heart failure, unspecified congestive 
heart failure type I50.9 ; Chronic obstructive pulmonary disease, unspecified 
COPD type J44.9 and Bilateral impacted cerumen H61.23

 

 [place] [id number] [place] [address] 38448-
5645     

 

 [place] [address] [place] [address] 05823-
9700     

 

 [place] [address] [address]-
4931  27 Dec, 2017   

 

 [place] [address] [place] [address] 82045-
5552  20 Dec, 2017   

 

 [place] [id number] [place] [address] 40991-
0694  12 Dec, 2017  Coronary artery disease involving native coronary artery of 
native heart without angina pectoris I25.10

 

 [address] [address]-
2657  11 Dec, 2017   

 

 [place] [id number] [place] [address] 97418-
9923    Chronic obstructive pulmonary disease, unspecified COPD 
type J44.9 and History of pneumonia Z87.01

 

 [facility]  [id number] [place] [address] 
[phone]     

 

 [facility]  [address] [place] [address] 25916-
4696     

 

 [facility]  [id number] [place] [address] 24825-
0460     

 

 [facility]  [id number] [place] [address] 03645-
1193     

 

 [facility]  [id number] [place] [address] 17986-
9667  19 Oct, 2017   

 

 [facility]  [id number] [place] [address] 25103-
8894  17 Oct, 2017  Coronary artery disease involving native coronary artery of 
native heart without angina pectoris I25.10 and Ventricular arrhythmia I49.9

 

 [facility]  [address] [place] [address] 38602-
2674  09 Oct, 2017   

 

 [facility]  [id number] [place] [address] 13401-
0229  09 Oct, 2017   

 

 [facility] [place] [facility]  [address] [place] [address]
[phone]  04 Oct, 2017  Dysuria R30.0 and Acute cystitis without hematuria N30.00

 

 [facility]  [address] [place] [address] 05930-
2924  25 Aug, 2017  Epistaxis R04.0 and Chronic obstructive pulmonary disease, 
unspecified COPD type J44.9

 

 [facility]  [id number] [place] [address] 67349-
9960    Fall from other pedestrian conveyance, initial encounter 
V00.891A

 

 [place] [id number] [place] [address] 96890-
4019    Chronic congestive heart failure, unspecified congestive 
heart failure type I50.9 ; Chronic obstructive pulmonary disease, unspecified 
COPD type J44.9 and Stasis dermatitis of both legs I87.2

 

 [facility]  [address] [facility] [id number] [address]
1422  08 May, 2017  Chronic congestive heart failure, unspecified congestive 
heart failure type I50.9

 

 [facility]  [address] [facility] [id number] [address]
8262  05 May, 2017  Coronary artery disease involving native coronary artery of 
native heart without angina pectoris I25.10 ; Chronic congestive heart failure, 
unspecified congestive heart failure type I50.9 and Chronic obstructive 
pulmonary disease, unspecified COPD type J44.9

 

 [facility]  [address] [address]-
6027     

 

 [facility]  [address] [address]-
6086     

 

 [facility]  [id number] [place] [address] 
[phone]     

 

 [facility]  [id number] [place] 
[phone]  28 Mar, 2017   

 

 [facility]  [address] [place] 
[phone]  20 Mar, 2017  Essential hypertension I10 and Chronic congestive 
heart failure, unspecified congestive heart failure type I50.9

 

 [facility]  [id number] 
[phone]  13 Mar, 2017   

 

 [facility]  [id number] [phone]-
5479  09 Mar, 2017   

 

 [facility]  [id number] [address]-
7195  19 Dec, 2016   

 

 [facility]  [address] [address]-
9130  19 Dec, 2016   

 

 [facility]  [id number] [address]13[address]-
8709     

 

 [facility]  [address], [place] [address]-
2055     

 

 [facility]  [address], [place] [address]-
2746     

 

 [facility]  [address], [place] [address]-
5428     

 

 [facility]  [address] [place] [address] 42971-
5727    Chronic congestive heart failure, unspecified congestive 
heart failure type I50.9

 

 [facility]  [address], [place] [phone]    Pain of right lower extremity M79.604 ; History of atrial 
fibrillation without current medication Z86.79 and Acute deep vein thrombosis (
DVT) of femoral vein of right lower extremity I82.411

 

 [facility]  [id number] [place] [address] 88397-
3755     

 

 [facility]  [id number] [address]-
2032  22 Aug, 2016   

 

 [facility]  [id number] [place] [address] 80713-
[phone]  22 Aug, 2016  Coronary artery disease involving native coronary artery of 
native heart without angina pectoris I25.10 ; Chronic congestive heart failure, 
unspecified congestive heart failure type I50.9 ; Cardiac defibrillator in 
place Z95.810 and Candidiasis B37.9







IMMUNIZATIONS

No Known Immunizations



SOCIAL HISTORY

Never Assessed



REASON FOR VISIT

Hospital Discharge Follow up Phone Call 



PLAN OF CARE





VITAL SIGNS





MEDICATIONS







 Medication  Instructions  Dosage  Frequency  Start Date  End Date  Duration  
Status

 

 Amiodarone HCl 200 mg  Orally Once a day  2 capsules  24h           Active

 

 Furosemide 40 mg  Orally twice a day  1 tablet  12h           Unknown

 

 Amlodipine Besylate 5 MG  Orally Once a day  1 tablet  24h           Unknown

 

 Oxygen                    Unknown

 

 Nexium 40 MG  Orally Once a day  1 capsule  24h           Unknown

 

 Nebulizer -     as directed             Unknown

 

 Albuterol Sulfate 1.25 MG/3ML  Inhalation 2 times a day  as directed  12h          Unknown

 

 Klor-Con 8 MEQ  Orally Once a day  2 capsules  24h           Active

 

 Digoxin 125 MCG  Orally Once a day  1 tablet  24h           Unknown

 

 Mexiletine HCl 150 MG  Orally every 8 hrs  1 capsule  8h           Unknown

 

 Flovent HFA                    Unknown

 

 Bactrim -160 MG  Orally Twice a day  1 tablet  12h        5 days  Unknown

 

 Metoprolol Tartrate 50 mg  Orally Twice a day  1/2 tablet with food  12h      
     Unknown

 

 Wheelchair -     to use for Mobility  24h  13 2018        Unknown







RESULTS

No Results



PROCEDURES

No Known procedures



INSTRUCTIONS





MEDICATIONS ADMINISTERED

No Known Medications



MEDICAL (GENERAL) HISTORY







 Type  Description  Date

 

 Medical History  hypertension   

 

 Medical History  skin cancer-arms, face   

 

 Medical History  MI   

 

 Medical History  Pneumonia   

 

 Surgical History  heart cath-2 stents, multiple balloons   

 

 Surgical History  open heart surgery  

 

 Surgical History  defibrillator placed  

 

 Hospitalization History  surgery   

 

 Hospitalization History  pneumonia  

 

 Hospitalization History  broken left hip at   March

 

 Hospitalization History  Bronchitis/clinical Pneumonia,hypoxia,sepsis - [facility] [place]  [address]

 

 Hospital[facility] History  [facility]- Cardiomyopathy, Defibrillator 
discharge  2018

 

 Hospitalization History  [facility]  2018
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
## 2018-11-10 NOTE — XMS REPORT
[facility]

 Created on: 10/09/2018



Kendall Snowden

External Reference #: [id number]

: 1940

Sex: Male



Demographics







 Address  [address] [place]  [address]

 

 Preferred Language  Unknown

 

 Marital Status  Unknown

 

 Jainism Affiliation  Unknown

 

 Race  Unknown

 

 Ethnic Group  Unknown





Author







 Author  MAHOGANY GREENWOOD

 

 [facility]

 

 Address  [address] [place]  [address]



 

 Phone  [phone]







Care Team Providers







 Care Team Member Name  Role  Phone

 

 MAHOGANY GREENWOOD  Unavailable  [phone]







PROBLEMS







 Type  Condition  ICD9-CM Code  UPF96-IL Code  Onset Dates  Condition Status  
SNOMED Code

 

 Problem  Cardiac defibrillator in place     Z95.810     Active  835069017

 

 Problem  Essential hypertension     I10     Active  53285687

 

 Problem  Coronary artery disease involving native coronary artery of native 
heart without angina pectoris     I25.10     Active  [id number]

 

 Problem  Chronic congestive heart failure, unspecified congestive heart 
failure type     I50.9     Active  52920726

 

 Problem  Type 2 diabetes mellitus without complications     E11.9     Active  
695964393

 

 Problem  Long term current use of insulin     Z79.4     Active  172080289

 

 Problem  Stasis dermatitis of both legs     I87.2     Active  95533252

 

 Problem  Chronic obstructive pulmonary disease, unspecified COPD type     
J44.9     Active  23182714

 

 Problem  Other atherosclerosis of native arteries of extremities, right leg   
  I70.291     Active  55165723

 

 Problem  Ventricular arrhythmia     I49.9     Active  86461828







ALLERGIES

No Information



ENCOUNTERS







 Encounter  Location  Date  Diagnosis

 

 [facility]  [address], [place] [address]-
2859  26 Sep, 2018   

 

 [facility]  [id number] [address]-
4368  11 Sep, 2018   

 

 [facility]  [address] [place] [address]-
4755  20 Aug, 2018   

 

 [facility]  [id number] [place] [address]-
3011  16 Aug, 2018   

 

 [facility]  [address] [address]-
0633  15 Aug, 2018   

 

 [facility]  [id number] [place] [address]-
9396     

 

 [facility]  [address] [address]
9043     

 

 [facility]  [address] [address]
8823    Acute cystitis with hematuria N30.01 and Dysuria R30.0

 

 [facility]  [id number] [place] [address]-
4038     

 

 [facility]  [address] [address]-
6009     

 

 [facility]  [id number] [place] [address]-
1512     

 

 [facility]  [address] [address]-
7828    Type 2 diabetes mellitus without complications E11.9 ; Long 
term current use of insulin Z79.4 ; History of respiratory failure Z87.09 and 
History of sepsis Z86.19

 

 [facility]  [address] [address]-
2101     

 

 [facility]  [address] [address]-
2437     

 

 [facility]  [address] [address]-
0672     

 

 [facility]  [address] [address]-
9003     

 

 [facility]  [address] [address]-
0363     

 

 [facility]  [address] [address]-
1249     

 

 [facility]  [address] [phone]  25 May, 2018  Acute cystitis without hematuria N30.00

 

 [facility]  [id number] [phone]  19 May, 2018  Acute cystitis without hematuria N30.00

 

 [facility]  [id number] [place] [address]-
9178  18 May, 2018   

 

 [facility]  [id number] [place] [address]-
9506  10 Apr, 2018  Medicare annual wellness visit, initial Z00.00 ; Encounter 
for immunization Z23 ; Chronic obstructive pulmonary disease, unspecified COPD 
type J44.9 ; Coronary artery disease involving native coronary artery of native 
heart without angina pectoris I25.10 ; Chronic congestive heart failure, 
unspecified congestive heart failure type I50.9 ; Essential hypertension I10 ; 
Ventricular arrhythmia I49.9 and Other atherosclerosis of native arteries of 
extremities, right leg I70.291

 

 [facility]  [id number] [place] [address]-
2486  08 Mar, 2018  Chronic congestive heart failure, unspecified congestive 
heart failure type I50.9

 

 [facility]  [id number] [place] [address]-
0131     

 

 [facility]  [id number] [place] [address]-
4272  13 2018  Chronic congestive heart failure, unspecified congestive 
heart failure type I50.9 ; Chronic obstructive pulmonary disease, unspecified 
COPD type J44.9 and Bilateral impacted cerumen H61.23

 

 [facility]  [id number] [place] [address]-
9345     

 

 [facility]  [id number] [place] [address]-
6134     

 

 [facility]  [id number] [place] [address]-
5557  27 Dec, 2017   

 

 [facility]  [id number] [place] [address]-
9710  20 Dec, 2017   

 

 [facility]  [id number] [place] [address]-
9146  12 Dec, 2017  Coronary artery disease involving native coronary artery of 
native heart without angina pectoris I25.10

 

 [facility]  [address] [address]-
5595  11 Dec, 2017   

 

 [facility]  [address] [place] [address]-
7345    Chronic obstructive pulmonary disease, unspecified COPD 
type J44.9 and History of pneumonia Z87.01

 

 [facility]  [id number] [place] [address] 
[phone]     

 

 [facility]  [id number] [place] [address]-
5643     

 

 [facility]  [id number] [place] [address]-
0020     

 

 [facility]  [id number] [place] [address]-
5949     

 

 [facility]  [id number] [place] [address]-
4679  19 Oct, 2017   

 

 [facility]  [id number] [place] [address]-
4843  17 Oct, 2017  Coronary artery disease involving native coronary artery of 
native heart without angina pectoris I25.10 and Ventricular arrhythmia I49.9

 

 [facility]  [address] [place] [address]-
3388  09 Oct, 2017   

 

 [facility]  [id number] [place] [address]-
1188  09 Oct, 2017   

 

 [facility]  [address] [place] [phone]  04 Oct, 2017  Dysuria R30.0 and Acute cystitis without hematuria N30.00

 

 [facility]  [address] [place] [address]-
5502  25 Aug, 2017  Epistaxis R04.0 and Chronic obstructive pulmonary disease, 
unspecified COPD type J44.9

 

 [facility]  [id number] [place] [address]-
1101    Fall from other pedestrian conveyance, initial encounter 
V00.891A

 

 [facility]  [address] [address]-
6633    Chronic congestive heart failure, unspecified congestive 
heart failure type I50.9 ; Chronic obstructive pulmonary disease, unspecified 
COPD type J44.9 and Stasis dermatitis of both legs I87.2

 

 [facility]  [address] [address]-
3890  08 May, 2017  Chronic congestive heart failure, unspecified congestive 
heart failure type I50.9

 

 [facility]  [address], [place] [address]-
4305  05 May, 2017  Coronary artery disease involving native coronary artery of 
native heart without angina pectoris I25.10 ; Chronic congestive heart failure, 
unspecified congestive heart failure type I50.9 and Chronic obstructive 
pulmonary disease, unspecified COPD type J44.9

 

 [facility]  [address] [address]-
1039     

 

 [facility]  [address] [address]-
0620     

 

 [facility]  [id number] [place] [address] 
[phone]     

 

 [facility]  [id number] 
[id number]  28 Mar, 2017   

 

 [facility]  [address] [place] 
[id number]  20 Mar, 2017  Essential hypertension I10 and Chronic congestive 
heart failure, unspecified congestive heart failure type I50.9

 

 [facility]  [id number] 
[phone]  13 Mar, 2017   

 

 [facility]  [address] [address]-
3555  09 Mar, 2017   

 

 [facility]  [id number] [address]-
6908  19 Dec, 2016   

 

 [facility]  [id number] [address]-
2867  19 Dec, 2016   

 

 [facility]  [address] [address]-
5848     

 

 [facility]  [address] [address]-
9476     

 

 [facility]  [address] [place] [address]-
8625     

 

 [facility]  [address] [place] [address]-
8854     

 

 [facility]  [id number] [place] [address]-
7121    Chronic congestive heart failure, unspecified congestive 
heart failure type I50.9

 

 [facility]  [address], [place] [phone]    Pain of right lower extremity M79.604 ; History of atrial 
fibrillation without current medication Z86.79 and Acute deep vein thrombosis (
DVT) of femoral vein of right lower extremity I82.411

 

 [facility]  [id number] [place] [address]-
4274     

 

 [facility]  [address] [address]-
0673  22 Aug, 2016   

 

 [facility]  [address] [address]-
1164  22 Aug, 2016  Coronary artery disease involving native coronary artery of 
native heart without angina pectoris I25.10 ; Chronic congestive heart failure, 
unspecified congestive heart failure type I50.9 ; Cardiac defibrillator in 
place Z95.810 and Candidiasis B37.9







IMMUNIZATIONS

No Known Immunizations



SOCIAL HISTORY

Never Assessed



REASON FOR VISIT

medication refill request



PLAN OF CARE





VITAL SIGNS





MEDICATIONS







 Medication  Instructions  Dosage  Frequency  Start Date  End Date  Duration  
Status

 

 Digoxin 125 mcg  Orally Once a day  1 tablet  24h        30 days  Active







RESULTS

No Results



PROCEDURES

No Known procedures



INSTRUCTIONS





MEDICATIONS ADMINISTERED

No Known Medications



MEDICAL (GENERAL) HISTORY







 Type  Description  Date

 

 Medical History  hypertension   

 

 Medical History  skin cancer-arms, face   

 

 Medical History  MI   

 

 Medical History  Pneumonia   

 

 Surgical History  heart cath-2 stents, multiple balloons   

 

 Surgical History  open heart surgery  

 

 Surgical History  defibrillator placed  

 

 Hospitalization History  surgery   

 

 Hospitalization History  pneumonia  

 

 Hospitalization History  broken left hip at   March

 

 Hospitalization History  Bronchitis/clinical Pneumonia,hypoxia,sepsis - [facility] 
[facility]  17

 

 Hospitalization History  [facility]- Cardiomyopathy, Defibrillator 
discharge  2018

 

 Hospitalization History  [facility]  2018

## 2018-11-10 NOTE — XMS REPORT
[facility]

 Created on: 2018



Kendall Snowden

External Reference #: [id number]

: 1940

Sex: Male



Demographics







 Address  [address] [place]  [address]

 

 Preferred Language  Unknown

 

 Marital Status  Unknown

 

 Confucianism Affiliation  Unknown

 

 Race  Unknown

 

 Ethnic Group  Unknown





Author







 Author  TANESHA ABDULLAHI

 

 [facility]

 

 Address  [address] [place]  [address]



 

 Phone  [phone]







Care Team Providers







 Care Team Member Name  Role  Phone

 

 TANESHA ABDULLAHI  Unavailable  [phone]







PROBLEMS







 Type  Condition  ICD9-CM Code  OHN30-RE Code  Onset Dates  Condition Status  
SNOMED Code

 

 Problem  Cardiac defibrillator in place     Z95.810     Active  577455826

 

 Problem  Essential hypertension     I10     Active  18021627

 

 Problem  Coronary artery disease involving native coronary artery of native 
heart without angina pectoris     I25.10     Active  [id number]

 

 Problem  Chronic congestive heart failure, unspecified congestive heart 
failure type     I50.9     Active  16167141

 

 Problem  Type 2 diabetes mellitus without complications     E11.9     Active  
645209108

 

 Problem  Long term current use of insulin     Z79.4     Active  997760869

 

 Problem  Stasis dermatitis of both legs     I87.2     Active  95952307

 

 Problem  Chronic obstructive pulmonary disease, unspecified COPD type     
J44.9     Active  68444036

 

 Problem  Other atherosclerosis of native arteries of extremities, right leg   
  I70.291     Active  08940105

 

 Problem  Ventricular arrhythmia     I49.9     Active  84951273







ALLERGIES

No Information



ENCOUNTERS







 Encounter  Location  Date  Diagnosis

 

 [facility]  [address], [place] [address]-
8649  16 Aug, 2018   

 

 [facility]  [address] [place] [phone]-
[phone]  15 Aug, 2018   

 

 [facility]  [id number] [place] [address]-
5862     

 

 [facility]  [id number] [place] [address] 84382-
8359     

 

 [facility]  [address] [place] [address]-
3084    Acute cystitis with hematuria N30.01 and Dysuria R30.0

 

 [facility]  [address], [place] [address]-
5046     

 

 [facility]  [id number] [place] [address] 90708-
3132     

 

 [facility]  [address] [address]-
3989     

 

 [facility]  [id number] [place] [address]-
7075    Type 2 diabetes mellitus without complications E11.9 ; Long 
term current use of insulin Z79.4 ; History of respiratory failure Z87.09 and 
History of sepsis Z86.19

 

 [facility]  [id number] [place] [address]17-
0179     

 

 [facility]  [address], [place] [address]-
6849     

 

 [facility]  [id number] [place] [address]-
0028     

 

 [facility]  [address], [place] [address]-
0598     

 

 [facility]  [address] [place] [address]-
1237     

 

 [facility]  [address], [place] [address]-
0341     

 

 [facility]  [address], [place] [phone]  25 May, 2018  Acute cystitis without hematuria N30.00

 

 [facility]  [address] [phone]  19 May, 2018  Acute cystitis without hematuria N30.00

 

 [facility]  [id number] [address]-
1334  18 May, 2018   

 

 [facility]  [address], [place] [address]-
6361  10 Apr, 2018  Medicare annual wellness visit, initial Z00.00 ; Encounter 
for immunization Z23 ; Chronic obstructive pulmonary disease, unspecified COPD 
type J44.9 ; Coronary artery disease involving native coronary artery of native 
heart without angina pectoris I25.10 ; Chronic congestive heart failure, 
unspecified congestive heart failure type I50.9 ; Essential hypertension I10 ; 
Ventricular arrhythmia I49.9 and Other atherosclerosis of native arteries of 
extremities, right leg I70.291

 

 [place] [facility] [id number] [place] [address]-
3046  08 Mar, 2018  Chronic congestive heart failure, unspecified congestive 
heart failure type I50.9

 

 [facility] [id number] [place] [address] 14862-
6726     

 

 [facility] [id number] [place] [address] 86831-
5746  13 2018  Chronic congestive heart failure, unspecified congestive 
heart failure type I50.9 ; Chronic obstructive pulmonary disease, unspecified 
COPD type J44.9 and Bilateral impacted cerumen H61.23

 

 [facility] [id number] [place] [address] 563[address]-
2259     

 

 [facility] [id number] [place] [address] 99414-
7814     

 

 [facility] [id number] [place] [address] 99507-
9970  27 Dec, 2017   

 

 [facility] [id number] [place] [address] 17368-
0800  20 Dec, 2017   

 

 [facility] [id number] [place] [address] 90578-
5367  12 Dec, 2017  Coronary artery disease involving native coronary artery of 
native heart without angina pectoris I25.10

 

 [place] [address] [place] [address] 73878-
4200  11 Dec, 2017   

 

 [place] [facility] [id number] [place] [address]-
8316    Chronic obstructive pulmonary disease, unspecified COPD 
type J44.9 and History of pneumonia Z87.01

 

 [facility]  [id number] 
[phone]     

 

 [facility]  [id number] [place] [address] 91177-
6943     

 

 [facility]  [address] [place] [address] 93402-
8368     

 

 [facility]  [id number] [place] [address]80-
6903     

 

 [facility]  [address] [place] [address] 36306-
1555  19 Oct, 2017   

 

 [facility]  [id number] [place] [address] 63787-
9625  17 Oct, 2017  Coronary artery disease involving native coronary artery of 
native heart without angina pectoris I25.10 and Ventricular arrhythmia I49.9

 

 [facility]  [id number] [place] [address] 09893-
7951  09 Oct, 2017   

 

 [facility]  [address] [place] [address]37-
3051  09 Oct, 2017   

 

 [facility] [place] [facility]  [address] [place] [address]
-[phone]  04 Oct, 2017  Dysuria R30.0 and Acute cystitis without hematuria N30.00

 

 [facility]  [address] [place] [address]84-
6447  25 Aug, 2017  Epistaxis R04.0 and Chronic obstructive pulmonary disease, 
unspecified COPD type J44.9

 

 [facility]  [id number], [place] [address]-
9309    Fall from other pedestrian conveyance, initial encounter 
V00.891A

 

 [facility]  [address] [place] [address] 71837-
6130    Chronic congestive heart failure, unspecified congestive 
heart failure type I50.9 ; Chronic obstructive pulmonary disease, unspecified 
COPD type J44.9 and Stasis dermatitis of both legs I87.2

 

 [facility]  [id number], [place] [address]-
3467  08 May, 2017  Chronic congestive heart failure, unspecified congestive 
heart failure type I50.9

 

 [facility]  [id number], [place] [address]-
7848  05 May, 2017  Coronary artery disease involving native coronary artery of 
native heart without angina pectoris I25.10 ; Chronic congestive heart failure, 
unspecified congestive heart failure type I50.9 and Chronic obstructive 
pulmonary disease, unspecified COPD type J44.9

 

 [facility]  [address], [place] [address]-
7871     

 

 [facility]  [address], [place] [address]-
7791     

 

 [facility]  [id number] 
[id number]     

 

 [facility]  [id number] [place] [address] 
[phone]  28 Mar, 2017   

 

 [facility]  [address] [place] 
[id number]  20 Mar, 2017  Essential hypertension I10 and Chronic congestive 
heart failure, unspecified congestive heart failure type I50.9

 

 [facility]  [id number] 
[id number]  13 Mar, 2017   

 

 [facility]  [id number], [place] [address]-
4208  09 Mar, 2017   

 

 [facility]  [id number] [address]-
1780  19 Dec, 2016   

 

 [facility]  [id number], [place] [address]-
1756  19 Dec, 2016   

 

 [facility]  [address] [address]-
4464     

 

 [facility]  [id number], [place] [address]-
9634     

 

 [facility]  [id number], [place] [address]-
4296     

 

 [facility]  [address], [place] [address]-
4432     

 

 [facility]  [address], [place] [address]-
5082    Chronic congestive heart failure, unspecified congestive 
heart failure type I50.9

 

 [facility]  [address], [place] [address]    Pain of right lower extremity M79.604 ; History of atrial 
fibrillation without current medication Z86.79 and Acute deep vein thrombosis (
DVT) of femoral vein of right lower extremity I82.411

 

 [facility]  [address], [place] [address]-
3768     

 

 [facility]  [address], [place] [address]-
1805  22 Aug, 2016   

 

 [facility]  [address], [place] [address]-
6638  22 Aug, 2016  Coronary artery disease involving native coronary artery of 
native heart without angina pectoris I25.10 ; Chronic congestive heart failure, 
unspecified congestive heart failure type I50.9 ; Cardiac defibrillator in 
place Z95.810 and Candidiasis B37.9







IMMUNIZATIONS

No Known Immunizations



SOCIAL HISTORY

Never Assessed



REASON FOR VISIT





PLAN OF CARE





VITAL SIGNS





MEDICATIONS







 Medication  Instructions  Dosage  Frequency  Start Date  End Date  Duration  
Status

 

 Keflex 500 MG  Orally every 6 hrs  1 capsule  6h  25 May, 2018  4 Clint, 2018  
10 day(s)  Active







RESULTS

No Results



PROCEDURES

No Known procedures



INSTRUCTIONS





MEDICATIONS ADMINISTERED

No Known Medications



MEDICAL (GENERAL) HISTORY







 Type  Description  Date

 

 Medical History  hypertension   

 

 Medical History  skin cancer-arms, face   

 

 Medical History  MI   

 

 Medical History  Pneumonia   

 

 Surgical History  heart cath-2 stents, multiple balloons   

 

 Surgical History  open heart surgery  

 

 Surgical History  defibrillator placed  

 

 Hospitalization History  surgery   

 

 Hospitalization History  pneumonia  

 

 Hospitalization History  broken left hip at   March

 

 Hospitalization History  Bronchitis/clinical Pneumonia,hypoxia,sepsis - Via 
[facility]  17

 

 Hospitalization History  [facility]- Cardiomyopathy, Defibrillator 
discharge  2018

 

 Hospitalization History  [facility]  2018

## 2018-11-10 NOTE — XMS REPORT
[facility]

 Created on: 2018



Kendall Snowden

External Reference #: [id number]

: 1940

Sex: Male



Demographics







 Address  [address] [place]  [address]

 

 Preferred Language  Unknown

 

 Marital Status  Unknown

 

 Sabianist Affiliation  Unknown

 

 Race  Unknown

 

 Ethnic Group  Unknown





Author







 Author  MAHOGANY GREENWOOD

 

 [facility]

 

 Address  [address] [place]  [address]



 

 Phone  [phone]







Care Team Providers







 Care Team Member Name  Role  Phone

 

 MAHOGANY GREENWOOD  Unavailable  [phone]







PROBLEMS







 Type  Condition  ICD9-CM Code  YMT62-CM Code  Onset Dates  Condition Status  
SNOMED Code

 

 Problem  Cardiac defibrillator in place     Z95.810     Active  176008347

 

 Problem  Essential hypertension     I10     Active  86356380

 

 Problem  Coronary artery disease involving native coronary artery of native 
heart without angina pectoris     I25.10     Active  4068553520392

 

 Problem  Chronic congestive heart failure, unspecified congestive heart 
failure type     I50.9     Active  16605397

 

 Problem  Type 2 diabetes mellitus without complications     E11.9     Active  
328045842

 

 Problem  Long term current use of insulin     Z79.4     Active  675906346

 

 Problem  Stasis dermatitis of both legs     I87.2     Active  95431918

 

 Problem  Chronic obstructive pulmonary disease, unspecified COPD type     
J44.9     Active  95384823

 

 Problem  Other atherosclerosis of native arteries of extremities, right leg   
  I70.291     Active  10759051

 

 Problem  Ventricular arrhythmia     I49.9     Active  79570164







ALLERGIES

No Information



ENCOUNTERS







 Encounter  Location  Date  Diagnosis

 

 [facility]  [address] [address]
8557  11 Sep, 2018   

 

 [facility]  [address] [address]
2674  20 Aug, 2018   

 

 [facility]  [address] [place] [address]-
7805  16 Aug, 2018   

 

 [facility]  [address] [place] [address]-
2270  15 Aug, 2018   

 

 [facility]  [address]100[place] [address]
3286     

 

 [facility]  [address] [place] [address]-
2437     

 

 [facility]  [address] [address]
1569    Acute cystitis with hematuria N30.01 and Dysuria R30.0

 

 [facility]  [address]100[place] [address]-
8003     

 

 [facility]  [address] [place] [address]-
4882     

 

 [facility]  [id number] [address]-
7270     

 

 [facility]  [address] [place] [address]-
2174    Type 2 diabetes mellitus without complications E11.9 ; Long 
term current use of insulin Z79.4 ; History of respiratory failure Z87.09 and 
History of sepsis Z86.19

 

 [facility]  [address] [place] [address]-
7580     

 

 [facility]  [address] [place] [address] 599[address]-
8555     

 

 [facility]  [address] [place] [address] 34226-
4618     

 

 [facility]  [address] [address]-
8957     

 

 [facility]  [address] [address]-
5474     

 

 [facility]  [address]100[place] [address]-
3617     

 

 [facility]  [id number] [place] [address] 22738
-[phone]  25 May, 2018  Acute cystitis without hematuria N30.00

 

 [facility]  [address]100[place] [address]
-[address]  19 May, 2018  Acute cystitis without hematuria N30.00

 

 [place] [address] [address]-
8484  18 May, 2018   

 

 [place] [address] [place] [address]-
4006  10 Apr, 2018  Medicare annual wellness visit, initial Z00.00 ; Encounter 
for immunization Z23 ; Chronic obstructive pulmonary disease, unspecified COPD 
type J44.9 ; Coronary artery disease involving native coronary artery of native 
heart without angina pectoris I25.10 ; Chronic congestive heart failure, 
unspecified congestive heart failure type I50.9 ; Essential hypertension I10 ; 
Ventricular arrhythmia I49.9 and Other atherosclerosis of native arteries of 
extremities, right leg I70.291

 

 [place] [address] [place] [address]-
9974  08 Mar, 2018  Chronic congestive heart failure, unspecified congestive 
heart failure type I50.9

 

 [place] [id number] [address]-
4822     

 

 [place] [address] [place] [address] 32436-
2237  13 2018  Chronic congestive heart failure, unspecified congestive 
heart failure type I50.9 ; Chronic obstructive pulmonary disease, unspecified 
COPD type J44.9 and Bilateral impacted cerumen H61.23

 

 [place] [address] [address]-
6959     

 

 [place] [address] [address]-
5431     

 

 [place] [address] [place] [address] 13463-
1836  27 Dec, 2017   

 

 [place] [address] [place] [address]-
0242  20 Dec, 2017   

 

 [place] [address] [place] [address] 379[address]-
0635  12 Dec, 2017  Coronary artery disease involving native coronary artery of 
native heart without angina pectoris I25.10

 

 [place] [address] [place] [address] 83023-
1230  11 Dec, 2017   

 

 [facility]  [address] [place] [address] 7[address]-
3315    Chronic obstructive pulmonary disease, unspecified COPD 
type J44.9 and History of pneumonia Z87.01

 

 [facility]  [address] [place] [address] 
[phone]     

 

 [facility]  [address] [place] [address] 586[address]-
7147     

 

 [facility]  [address] [place] [address] 61802-
1629     

 

 [facility]  [address] [place] [address] 11628-
3452     

 

 [facility]  [address] [place] [address] 308[address]-
4895  19 Oct, 2017   

 

 [facility]  [address] [place] [address] 308[address]-
4771  17 Oct, 2017  Coronary artery disease involving native coronary artery of 
native heart without angina pectoris I25.10 and Ventricular arrhythmia I49.9

 

 [facility]  [address] [place] [address] 42438-
1341  09 Oct, 2017   

 

 [facility]  [address] [place] [address] 31225-
6485  09 Oct, 2017   

 

 [facility]  [address] [place] [address] 0[address]
-[phone]  04 Oct, 2017  Dysuria R30.0 and Acute cystitis without hematuria N30.00

 

 [facility]  [address] [place] [address] 476[address]-
2926  25 Aug, 2017  Epistaxis R04.0 and Chronic obstructive pulmonary disease, 
unspecified COPD type J44.9

 

 [facility]  [address] [place] [address] 46936-
2568    Fall from other pedestrian conveyance, initial encounter 
V00.891A

 

 [facility]  [address] [facility] [id number] [address]-
5787    Chronic congestive heart failure, unspecified congestive 
heart failure type I50.9 ; Chronic obstructive pulmonary disease, unspecified 
COPD type J44.9 and Stasis dermatitis of both legs I87.2

 

 [facility]  [id number] [address]-
2219  08 May, 2017  Chronic congestive heart failure, unspecified congestive 
heart failure type I50.9

 

 [facility]  [id number] [address]-
8116  05 May, 2017  Coronary artery disease involving native coronary artery of 
native heart without angina pectoris I25.10 ; Chronic congestive heart failure, 
unspecified congestive heart failure type I50.9 and Chronic obstructive 
pulmonary disease, unspecified COPD type J44.9

 

 [facility]  [address]100[place] [address]-
7324     

 

 [facility]  [address]100[place] [address]-
5361     

 

 [facility]  [id number] 
[id number]     

 

 [facility]  [id number] 
[id number]  28 Mar, 2017   

 

 [facility]  [address] [place] 
[phone]  20 Mar, 2017  Essential hypertension I10 and Chronic congestive 
heart failure, unspecified congestive heart failure type I50.9

 

 [facility]  [id number] 
[id number]  13 Mar, 2017   

 

 [facility]  [id number] [address]-
5374  09 Mar, 2017   

 

 [facility]  [id number] [address]-
4599  19 Dec, 2016   

 

 [facility]  [id number] [address]-
3536  19 Dec, 2016   

 

 [facility]  [address]100[place] [address]-
8407     

 

 [facility]  [address]100[place] [address]-
1332     

 

 [facility]  [address]100[place] [address]-
9203     

 

 [facility]  [address] [place] [address] 16745-
3013     

 

 [facility]  [address] [place] [address] 497[address]-
9074    Chronic congestive heart failure, unspecified congestive 
heart failure type I50.9

 

 [facility]  [address]100[place] [address]
-2121    Pain of right lower extremity M79.604 ; History of atrial 
fibrillation without current medication Z86.79 and Acute deep vein thrombosis (
DVT) of femoral vein of right lower extremity I82.411

 

 [facility]  [address] [address]-
5094     

 

 [facility]  [address] [place] [address] 08202-
0174  22 Aug, 2016   

 

 [facility]  [address]100[place] [address]-
1342  22 Aug, 2016  Coronary artery disease involving native coronary artery of 
native heart without angina pectoris I25.10 ; Chronic congestive heart failure, 
unspecified congestive heart failure type I50.9 ; Cardiac defibrillator in 
place Z95.810 and Candidiasis B37.9







IMMUNIZATIONS

No Known Immunizations



SOCIAL HISTORY

Never Assessed



REASON FOR VISIT

Refill request



PLAN OF CARE





VITAL SIGNS





MEDICATIONS







 Medication  Instructions  Dosage  Frequency  Start Date  End Date  Duration  
Status

 

 Klor-Con 8 MEQ  Orally Once a day  2 capsules  24h       30 days  
Active







RESULTS

No Results



PROCEDURES

No Known procedures



INSTRUCTIONS





MEDICATIONS ADMINISTERED

No Known Medications



MEDICAL (GENERAL) HISTORY







 Type  Description  Date

 

 Medical History  hypertension   

 

 Medical History  skin cancer-arms, face   

 

 Medical History  MI   

 

 Medical History  Pneumonia   

 

 Surgical History  heart cath-2 stents, multiple balloons   

 

 Surgical History  open heart surgery  

 

 Surgical History  defibrillator placed  

 

 Hospitalization History  surgery   

 

 Hospitalization History  pneumonia  

 

 Hospitalization History  broken left hip at   March

 

 Hospitalization History  Bronchitis/clinical Pneumonia,hypoxia,sepsis - Via 
[facility]  17

 

 Hospitalization History  [facility]- Cardiomyopathy, Defibrillator 
discharge  2018

 

 Hospitalization History  [facility]  2018

## 2018-11-10 NOTE — XMS REPORT
[facility]

 Created on: 2018



Kendall Snowden

External Reference #: [id number]

: 1940

Sex: Male



Demographics







 Address  [address] [place]  [address]

 

 Preferred Language  Unknown

 

 Marital Status  Unknown

 

 Judaism Affiliation  Unknown

 

 Race  Unknown

 

 Ethnic Group  Unknown





Author







 Author  MAHOGANY GREENWOOD

 

 Organization  [facility]

 

 Address  [address] [place]  [address]



 

 Phone  [phone]







Care Team Providers







 Care Team Member Name  Role  Phone

 

 MAHOGANY GREENWOOD  Unavailable  [phone]







PROBLEMS







 Type  Condition  ICD9-CM Code  RGN86-TU Code  Onset Dates  Condition Status  
SNOMED Code

 

 Problem  Cardiac defibrillator in place     Z95.810     Active  271318826

 

 Problem  Chronic congestive heart failure, unspecified congestive heart 
failure type     I50.9     Active  48962659

 

 Problem  Other atherosclerosis of native arteries of extremities, right leg   
  I70.291     Active  21748768

 

 Problem  Ventricular arrhythmia     I49.9     Active  80837058

 

 Problem  Essential hypertension     I10     Active  93266987

 

 Problem  Coronary artery disease involving native coronary artery of native 
heart without angina pectoris     I25.10     Active  4693798282557

 

 Problem  Stasis dermatitis of both legs     I87.2     Active  45077354

 

 Problem  Chronic obstructive pulmonary disease, unspecified COPD type     
J44.9     Active  17205452







ALLERGIES

No Information



ENCOUNTERS







 Encounter  Location  Date  Diagnosis

 

 [facility]  [id number] [place] [address]-
5655     

 

 [facility]  [id number] [place] [address] 6[address]-
3007     

 

 [facility]  [id number] [place] [address]
-[phone]  25 May, 2018  Acute cystitis without hematuria N30.00

 

 [facility]  [id number] [place] [address]
-4080  19 May, 2018  Acute cystitis without hematuria N30.00

 

 [facility]  [address] [address]-
7696  18 May, 2018   

 

 [facility]  [id number] [place] [address] 62775-
5749  10 Apr, 2018  Encounter for immunization Z23 ; Medicare annual wellness 
visit, initial Z00.00 ; Chronic obstructive pulmonary disease, unspecified COPD 
type J44.9 ; Coronary artery disease involving native coronary artery of native 
heart without angina pectoris I25.10 ; Chronic congestive heart failure, 
unspecified congestive heart failure type I50.9 ; Essential hypertension I10 ; 
Ventricular arrhythmia I49.9 and Other atherosclerosis of native arteries of 
extremities, right leg I70.291

 

 [place] [id number] [place] [address]-
6458  08 Mar, 2018  Chronic congestive heart failure, unspecified congestive 
heart failure type I50.9

 

 [place] [id number] [place] [address] 75304-
6319     

 

 [place] [id number] [place] [address] 65007-
2013  13 2018  Chronic congestive heart failure, unspecified congestive 
heart failure type I50.9 ; Chronic obstructive pulmonary disease, unspecified 
COPD type J44.9 and Bilateral impacted cerumen H61.23

 

 [place] [id number] [place] [address] 82714-
1832     

 

 [place] [id number] [place] [address] 94783-
4662     

 

 [place] [id number] [place] [address] 08636-
3441  27 Dec, 2017   

 

 [place] [id number] [place] [address] 80902-
1107  20 Dec, 2017   

 

 [place] [id number] [place] [address] 70778-
3639  12 Dec, 2017  Coronary artery disease involving native coronary artery of 
native heart without angina pectoris I25.10

 

 [place] [id number] [place] [address] 65222-
2201  11 Dec, 2017   

 

 [place] [id number] [place] [address] 36121-
4729  30 2017  Chronic obstructive pulmonary disease, unspecified COPD 
type J44.9 and History of pneumonia Z87.01

 

 [facility]  [id number], [place] 
[id number]     

 

 [facility]  [id number] [place] [address] 46504-
5856     

 

 [facility]  [address] [place] [address] 88576-
0000     

 

 [facility]  [id number] [place] [address] 33961-
4584     

 

 [facility]  [address] [place] [address] 25561-
0307  19 Oct, 2017   

 

 [facility]  [id number] [place] [address] 25831-
0953  17 Oct, 2017  Coronary artery disease involving native coronary artery of 
native heart without angina pectoris I25.10 and Ventricular arrhythmia I49.9

 

 [facility]  [id number] [place] [address] 60434-
3951  09 Oct, 2017   

 

 [facility]  [address] [place] [address] 49592-
9072  09 Oct, 2017   

 

 [facility]  [address] [place] [address] 22721
-[phone]  04 Oct, 2017  Dysuria R30.0 and Acute cystitis without hematuria N30.00

 

 [facility]  [address] [place] [address] 31782-
4745  25 Aug, 2017  Epistaxis R04.0 and Chronic obstructive pulmonary disease, 
unspecified COPD type J44.9

 

 [facility]  [address] [place] [address] 62451-
4528    Fall from other pedestrian conveyance, initial encounter 
V00.891A

 

 [place] [address] [place] [address] 74827-
9338    Chronic congestive heart failure, unspecified congestive 
heart failure type I50.9 ; Chronic obstructive pulmonary disease, unspecified 
COPD type J44.9 and Stasis dermatitis of both legs I87.2

 

 [facility]  [address] [facility] [id number] [address]-
0890  08 May, 2017  Chronic congestive heart failure, unspecified congestive 
heart failure type I50.9

 

 [facility]  [id number] [address]-
7790  05 May, 2017  Coronary artery disease involving native coronary artery of 
native heart without angina pectoris I25.10 ; Chronic congestive heart failure, 
unspecified congestive heart failure type I50.9 and Chronic obstructive 
pulmonary disease, unspecified COPD type J44.9

 

 [facility]  [address] [facility] [id number] [address]-
1948     

 

 [facility]  [address] [address]-
1749     

 

 [facility]  [id number] 
[id number]     

 

 [facility]  [id number] [place] [address] 
[phone]  28 Mar, 2017   

 

 [facility]  [address]   [place], [place] 
[phone]  20 Mar, 2017  Essential hypertension I10 and Chronic congestive 
heart failure, unspecified congestive heart failure type I50.9

 

 [facility]  [id number], [place] 
[id number]  13 Mar, 2017   

 

 [facility]  [id number] [address]-
2542  09 Mar, 2017   

 

 [facility]  [id number] [address]-
1989  19 Dec, 2016   

 

 [facility]  [id number] [address]-
0874  19 Dec, 2016   

 

 [facility]  [address]005[address]100[place] [address]-
9896     

 

 [facility]  [address]100[place] [address]-
5517     

 

 [facility]  [address]100[place] [address]-
1983     

 

 [facility]  [address]100[place] [address]-
0160     

 

 [facility]  [address]100[place], [place] [address]-
3745    Chronic congestive heart failure, unspecified congestive 
heart failure type I50.9

 

 [facility]  [address], [place] [address]
-2967    Pain of right lower extremity M79.604 ; History of atrial 
fibrillation without current medication Z86.79 and Acute deep vein thrombosis (
DVT) of femoral vein of right lower extremity I82.411

 

 [facility]  [address]100[place] [address]-
7717     

 

 [facility]  [address]005[address]100[place] [address]-
9041  22 Aug, 2016   

 

 [facility]  [address]100[place], [place] [address]-
6143  22 Aug, 2016  Coronary artery disease involving native coronary artery of 
native heart without angina pectoris I25.10 ; Chronic congestive heart failure, 
unspecified congestive heart failure type I50.9 ; Cardiac defibrillator in 
place Z95.810 and Candidiasis B37.9







IMMUNIZATIONS

No Known Immunizations



SOCIAL HISTORY

Never Assessed



REASON FOR VISIT

Re-fax wheelchair scripts



PLAN OF CARE





VITAL SIGNS





MEDICATIONS







 Medication  Instructions  Dosage  Frequency  Start Date  End Date  Duration  
Status

 

 Wheelchair -     to use for Mobility  24h          Active







RESULTS

No Results



PROCEDURES

No Known procedures



INSTRUCTIONS





MEDICATIONS ADMINISTERED

No Known Medications



MEDICAL (GENERAL) HISTORY







 Type  Description  Date

 

 Medical History  hypertension   

 

 Medical History  skin cancer-arms, face   

 

 Medical History  MI   

 

 Medical History  Pneumonia   

 

 Surgical History  heart cath-2 stents, multiple balloons   

 

 Surgical History  open heart surgery  

 

 Surgical History  defibrillator placed  

 

 Hospitalization History  surgery   

 

 Hospitalization History  pneumonia  

 

 Hospitalization History  broken left hip at   March

 

 Hospitalization History  Bronchitis/clinical Pneumonia,hypoxia,sepsis - Via 
[facility]  17

## 2018-11-10 NOTE — XMS REPORT
[facility]

 Created on: 2018



Kendall Snowden

External Reference #: [id number]

: 1940

Sex: Male



Demographics







 Address  [address] [place]  [address]

 

 Preferred Language  Unknown

 

 Marital Status  Unknown

 

 Congregational Affiliation  Unknown

 

 Race  Unknown

 

 Ethnic Group  Unknown





Author







 Author  MAHOGANY GREENWOOD

 

 Organization  [facility]

 

 Address  [address] [place]  [address]



 

 Phone  [phone]







Care Team Providers







 Care Team Member Name  Role  Phone

 

 MAHOGANY GREENWOOD  Unavailable  [phone]







PROBLEMS







 Type  Condition  ICD9-CM Code  AOF48-NJ Code  Onset Dates  Condition Status  
SNOMED Code

 

 Problem  Cardiac defibrillator in place     Z95.810     Active  850883117

 

 Problem  Chronic congestive heart failure, unspecified congestive heart 
failure type     I50.9     Active  08915750

 

 Problem  Other atherosclerosis of native arteries of extremities, right leg   
  I70.291     Active  64223370

 

 Problem  Ventricular arrhythmia     I49.9     Active  53171394

 

 Problem  Essential hypertension     I10     Active  75339901

 

 Problem  Coronary artery disease involving native coronary artery of native 
heart without angina pectoris     I25.10     Active  [id number]

 

 Problem  Stasis dermatitis of both legs     I87.2     Active  41682533

 

 Problem  Chronic obstructive pulmonary disease, unspecified COPD type     
J44.9     Active  48696904







ALLERGIES

No Information



ENCOUNTERS







 Encounter  Location  Date  Diagnosis

 

 [facility]  [id number] [place] [phone]-
[phone]     

 

 [facility]  [id number] [place] [address] 98508-
1845     

 

 [facility]  [id number] [place] [address] [phone]
[phone]  25 May, 2018  Acute cystitis without hematuria N30.00

 

 [facility]  [id number] [place] [address] 73496
[phone]  19 May, 2018  Acute cystitis without hematuria N30.00

 

 [facility]  [id number] [place] [address] 74235-
[phone]  18 May, 2018   

 

 [facility]  [id number] [place] [address] 40153-
[phone]  10 Apr, 2018  Medicare annual wellness visit, initial Z00.00 ; Encounter 
for immunization Z23 ; Chronic obstructive pulmonary disease, unspecified COPD 
type J44.9 ; Coronary artery disease involving native coronary artery of native 
heart without angina pectoris I25.10 ; Chronic congestive heart failure, 
unspecified congestive heart failure type I50.9 ; Essential hypertension I10 ; 
Ventricular arrhythmia I49.9 and Other atherosclerosis of native arteries of 
extremities, right leg I70.291

 

 [place] [id number] [place] [address] [phone]-
8105  08 Mar, 2018  Chronic congestive heart failure, unspecified congestive 
heart failure type I50.9

 

 [place] [id number] [place] [address] 82567-
[phone]     

 

 [place] [id number] [place] [address] 55589-
4004  13 2018  Chronic congestive heart failure, unspecified congestive 
heart failure type I50.9 ; Chronic obstructive pulmonary disease, unspecified 
COPD type J44.9 and Bilateral impacted cerumen H61.23

 

 [place] [id number] [place] [address] 66833-
5068     

 

 [place] [id number] [place] [address] 72146-
8647     

 

 [place] [id number] [place] [address] 88440-
[phone]  27 Dec, 2017   

 

 [place] [id number] [place] [address] [phone]-
[phone]  20 Dec, 2017   

 

 [place] [id number] [place] [address] [phone]-
[phone]  12 Dec, 2017  Coronary artery disease involving native coronary artery of 
native heart without angina pectoris I25.10

 

 [place] [id number] [place] [address] [phone]-
[phone]  11 Dec, 2017   

 

 [place] [id number] [place] [address] 05406-
7590  30 2017  Chronic obstructive pulmonary disease, unspecified COPD 
type J44.9 and History of pneumonia Z87.01

 

 [facility]  [id number] 
[id number]     

 

 [facility]  [id number] [place] [address] 79267-
[phone]     

 

 [facility]  [address] [place] [address] 21452-
0709     

 

 [facility]  [id number] [place] [address] 43155-
3517     

 

 [facility]  [address] [place] [address] 25491-
6547  19 Oct, 2017   

 

 [facility]  [id number] [place] [address] 38349-
1735  17 Oct, 2017  Coronary artery disease involving native coronary artery of 
native heart without angina pectoris I25.10 and Ventricular arrhythmia I49.9

 

 [facility]  [id number] [place] [address] 31508-
9071  09 Oct, 2017   

 

 [facility]  [address] [place] [address] 82849-
5850  09 Oct, 2017   

 

 [facility]  [address] [place] [address] 61390
[phone]  04 Oct, 2017  Dysuria R30.0 and Acute cystitis without hematuria N30.00

 

 [facility]  [address] [place] [address] 40551-
2485  25 Aug, 2017  Epistaxis R04.0 and Chronic obstructive pulmonary disease, 
unspecified COPD type J44.9

 

 [facility]  [address] [place] [address] 35989-
0368    Fall from other pedestrian conveyance, initial encounter 
V00.891A

 

 [place] [address] [place] [address] 48851-
6854    Chronic congestive heart failure, unspecified congestive 
heart failure type I50.9 ; Chronic obstructive pulmonary disease, unspecified 
COPD type J44.9 and Stasis dermatitis of both legs I87.2

 

 [facility]  [address] [facility] [id number] [address]-
0089  08 May, 2017  Chronic congestive heart failure, unspecified congestive 
heart failure type I50.9

 

 [facility]  [id number] [address]-
1773  05 May, 2017  Coronary artery disease involving native coronary artery of 
native heart without angina pectoris I25.10 ; Chronic congestive heart failure, 
unspecified congestive heart failure type I50.9 and Chronic obstructive 
pulmonary disease, unspecified COPD type J44.9

 

 [facility]  [address] [facility] [id number] [address]-
2150     

 

 [facility]  [address] [address]-
2908     

 

 [facility]  [id number] 
[id number]     

 

 [facility]  [id number] [place] [address] 
[phone]  28 Mar, 2017   

 

 [facility]  [address]   [place], [place] 
[id number]  20 Mar, 2017  Essential hypertension I10 and Chronic congestive 
heart failure, unspecified congestive heart failure type I50.9

 

 [facility]  [id number] 
[id number]  13 Mar, 2017   

 

 [facility]  [id number] [address]-
3485  09 Mar, 2017   

 

 [facility]  [id number] [address]-
5441  19 Dec, 2016   

 

 [facility]  [id number] [address]-
2931  19 Dec, 2016   

 

 [facility]  [id number] [address]-
8756     

 

 [facility]  [id number] [address]-
1756     

 

 [facility]  [address] [address]-
0859     

 

 [facility]  [address] [address]-
1899     

 

 [facility]  [address], [place] [address]-
7485    Chronic congestive heart failure, unspecified congestive 
heart failure type I50.9

 

 [facility]  [address] [address]
-[phone]    Pain of right lower extremity M79.604 ; History of atrial 
fibrillation without current medication Z86.79 and Acute deep vein thrombosis (
DVT) of femoral vein of right lower extremity I82.411

 

 [facility]  [address] [address]-
3181     

 

 [facility]  [address] [place] [address] 26925-
5822  22 Aug, 2016   

 

 [facility]  [address], [place] [address]-
0567  22 Aug, 2016  Coronary artery disease involving native coronary artery of 
native heart without angina pectoris I25.10 ; Chronic congestive heart failure, 
unspecified congestive heart failure type I50.9 ; Cardiac defibrillator in 
place Z95.810 and Candidiasis B37.9







IMMUNIZATIONS

No Known Immunizations



SOCIAL HISTORY

Never Assessed



REASON FOR VISIT





PLAN OF CARE





VITAL SIGNS





MEDICATIONS

Unknown Medications



RESULTS

No Results



PROCEDURES

No Known procedures



INSTRUCTIONS





MEDICATIONS ADMINISTERED

No Known Medications



MEDICAL (GENERAL) HISTORY







 Type  Description  Date

 

 Medical History  hypertension   

 

 Medical History  skin cancer-arms, face   

 

 Medical History  MI   

 

 Medical History  Pneumonia   

 

 Surgical History  heart cath-2 stents, multiple balloons   

 

 Surgical History  open heart surgery  

 

 Surgical History  defibrillator placed  

 

 Hospitalization History  surgery   

 

 Hospitalization History  pneumonia  

 

 Hospitalization History  broken left hip at   March

 

 Hospitalization History  Bronchitis/clinical Pneumonia,hypoxia,sepsis - [facility] 
[facility]  17

## 2018-11-10 NOTE — DIAGNOSTIC IMAGING REPORT
PROCEDURE: CT abdomen and pelvis without contrast.



TECHNIQUE: Multiple contiguous axial images were obtained through

the abdomen and pelvis without the use of intravenous contrast. 



INDICATION: Abdominal pain.



FINDINGS: The previous CT aorta exam of 12/12/2016 failed to show

any sign of an acute abnormality. There was atherosclerotic

disease of both common iliac arteries, however.



In the interval since the previous study, numerous calcifications

have developed within the left kidney. 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

staghorn formation. Furthermore, there is now obstruction of the

left collecting system due to 2 or 3 prominent calculi within the

proximal left ureter. The calculi measure approximately 10 and 20

mm in maximum longitudinal dimension. The cysts associated with

the left kidney, seen previously, are again evident and slightly

greater in size. There is also coarse perinephric stranding about

the left kidney consistent with obstruction.



There is no evidence for obstruction of the right collecting

system. The cyst on the lateral aspect of the right kidney, seen

previously, is essentially no different.



The liver, spleen, gallbladder, adrenals, pancreas, aorta and

inferior vena cava show no sign of an acute abnormality. The 1.3

x 2.2 cm fatty lesion associated with the right adrenal gland,

seen previously, is again evident and no different. Most likely

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 



The stomach is not well-distended and consequently difficult to

assess.



In the interval since the previous exam a 1.4 x 2.2 cm calculus

has developed in the base of the bladder. The bladder is

otherwise unremarkable. The prostate gland is not enlarged. There

is no pelvic mass or free fluid collection noted. The appendix

was visualized and is not abnormally thickened.



The bone windows show no sign of an acute fracture. There does

appear to be a long-standing compression deformity of T12 and

there is mild anterior wedging of T11. There are also bilateral

pars defects at L5 with a grade 1 spondylolisthesis of L5 with

respect to S1. 



The lung bases are clear. There is mild compressive atelectasis

adjacent to the elevated right hemidiaphragm. The heart is

enlarged and there is a pacemaker in place.



IMPRESSION: 

1. There is a high-grade obstruction of the left collecting

system due to two large calculi within the proximal left ureter.

There is also a newly developed 1.4 x 2.2 cm bladder calculus and

there may be early staghorn calculus formation in the left

kidney..

2. There is no acute abnormality of the abdomen or pelvis noted

otherwise.  



Dictated by: 



  Dictated on workstation # [id number]

## 2018-11-10 NOTE — XMS REPORT
[facility]

 Created on: 2018



Kendall Snowden

External Reference #: [id number]

: 1940

Sex: Male



Demographics







 Address  [address] [place]  [address]

 

 Preferred Language  Unknown

 

 Marital Status  Unknown

 

 Mandaeism Affiliation  Unknown

 

 Race  Unknown

 

 Ethnic Group  Unknown





Author







 Author  MAHOGANY GREENWOOD

 

 [facility]

 

 Address  [address] [place]  [address]



 

 Phone  [phone]







Care Team Providers







 Care Team Member Name  Role  Phone

 

 MAHOGANY GREENWOOD  Unavailable  [phone]







PROBLEMS







 Type  Condition  ICD9-CM Code  ZCI18-CP Code  Onset Dates  Condition Status  
SNOMED Code

 

 Problem  Cardiac defibrillator in place     Z95.810     Active  779709754

 

 Problem  Essential hypertension     I10     Active  77040410

 

 Problem  Coronary artery disease involving native coronary artery of native 
heart without angina pectoris     I25.10     Active  [id number]

 

 Problem  Chronic congestive heart failure, unspecified congestive heart 
failure type     I50.9     Active  73584331

 

 Problem  Type 2 diabetes mellitus without complications     E11.9     Active  
201957415

 

 Problem  Long term current use of insulin     Z79.4     Active  374955325

 

 Problem  Stasis dermatitis of both legs     I87.2     Active  60660861

 

 Problem  Chronic obstructive pulmonary disease, unspecified COPD type     
J44.9     Active  50961765

 

 Problem  Other atherosclerosis of native arteries of extremities, right leg   
  I70.291     Active  61962273

 

 Problem  Ventricular arrhythmia     I49.9     Active  96336784







ALLERGIES

No Information



ENCOUNTERS







 Encounter  Location  Date  Diagnosis

 

 [facility]  [address], [place] [address]77  20 Aug, 2018   

 

 [facility]  [address], [place] [address]
1265  16 Aug, 2018   

 

 [facility]  [address], [place] [address]
6182  15 Aug, 2018   

 

 [facility]  [id number] [place] [address]-
8726     

 

 [facility]  [address], [place] [address]
5268     

 

 [facility]  [id number] [place] [address]-
2225    Acute cystitis with hematuria N30.01 and Dysuria R30.0

 

 [facility]  [id number] [place] [address]-
1341     

 

 [facility]  [id number] [place] [address]-
4162     

 

 [facility]  [id number] [place] [address]-
3618     

 

 [facility]  [id number] [place] [address]-
2429    Type 2 diabetes mellitus without complications E11.9 ; Long 
term current use of insulin Z79.4 ; History of respiratory failure Z87.09 and 
History of sepsis Z86.19

 

 [facility]  [id number] [place] [address]-
6967     

 

 [facility]  [id number] [place] [address]-
4602     

 

 [facility]  [id number] [place] [address] 11632-
0969     

 

 [facility]  [id number] [place] [address] 20327-
2033     

 

 [facility]  [address], [place] [address]-
3390     

 

 [facility]  [id number] [place] [address]-
5250     

 

 [facility]  [address], [place] [phone]  25 May, 2018  Acute cystitis without hematuria N30.00

 

 [facility]  [address] [place] [address]
-[phone]  19 May, 2018  Acute cystitis without hematuria N30.00

 

 [facility]  [address], [place] [address]-
7616  18 May, 2018   

 

 [facility]  [address], [place] [address]-
9766  10 Apr, 2018  Medicare annual wellness visit, initial Z00.00 ; Encounter 
for immunization Z23 ; Chronic obstructive pulmonary disease, unspecified COPD 
type J44.9 ; Coronary artery disease involving native coronary artery of native 
heart without angina pectoris I25.10 ; Chronic congestive heart failure, 
unspecified congestive heart failure type I50.9 ; Essential hypertension I10 ; 
Ventricular arrhythmia I49.9 and Other atherosclerosis of native arteries of 
extremities, right leg I70.291

 

 [place] [id number] [address]-
9718  08 Mar, 2018  Chronic congestive heart failure, unspecified congestive 
heart failure type I50.9

 

 [place] [id number] [place] [address]-
8716     

 

 [place] [id number] [place] [address]-
4896  13 2018  Chronic congestive heart failure, unspecified congestive 
heart failure type I50.9 ; Chronic obstructive pulmonary disease, unspecified 
COPD type J44.9 and Bilateral impacted cerumen H61.23

 

 [facility] [id number] [place] [address]-
8709     

 

 [place] [address] [place] [address] 28950-
3784     

 

 [place] [address], [place] [address]-
7321  27 Dec, 2017   

 

 [place] [address] [place] [address]-
0170  20 Dec, 2017   

 

 [facility] [place] [id number] [place] [address] 26523-
4283  12 Dec, 2017  Coronary artery disease involving native coronary artery of 
native heart without angina pectoris I25.10

 

 [place] [address], [place] [address]-
0307  11 Dec, 2017   

 

 [place] [id number] [place] [address]-
7909    Chronic obstructive pulmonary disease, unspecified COPD 
type J44.9 and History of pneumonia Z87.01

 

 [facility]  [id number] [place] [address] 
[phone]     

 

 [facility]  [address] [place] [address]-
6608     

 

 [facility]  [id number] [place] [address] 57777-
2147     

 

 [facility]  [id number] [place] [address] 55798-
4416     

 

 [facility]  [id number] [place] [address]-
5815  19 Oct, 2017   

 

 [facility]  [id number] [place] [address]-
1752  17 Oct, 2017  Coronary artery disease involving native coronary artery of 
native heart without angina pectoris I25.10 and Ventricular arrhythmia I49.9

 

 [facility]  [address] [place] [address] 27836-
9438  09 Oct, 2017   

 

 [facility]  [id number] [place] [address] 87[address]-
9185  09 Oct, 2017   

 

 [facility]  [address] [place] [address]
-[phone]  04 Oct, 2017  Dysuria R30.0 and Acute cystitis without hematuria N30.00

 

 [facility]  [address] [place] [address]-
8482  25 Aug, 2017  Epistaxis R04.0 and Chronic obstructive pulmonary disease, 
unspecified COPD type J44.9

 

 [facility]  [id number] [place] [address]-
5047    Fall from other pedestrian conveyance, initial encounter 
V00.891A

 

 [place] [id number] [place] [address] 9[address]-
7022    Chronic congestive heart failure, unspecified congestive 
heart failure type I50.9 ; Chronic obstructive pulmonary disease, unspecified 
COPD type J44.9 and Stasis dermatitis of both legs I87.2

 

 [facility]  [address] [facility] [id number] [address]
4261  08 May, 2017  Chronic congestive heart failure, unspecified congestive 
heart failure type I50.9

 

 [facility]  [address] [facility] [id number] [phone]
5656  05 May, 2017  Coronary artery disease involving native coronary artery of 
native heart without angina pectoris I25.10 ; Chronic congestive heart failure, 
unspecified congestive heart failure type I50.9 and Chronic obstructive 
pulmonary disease, unspecified COPD type J44.9

 

 [facility]  [address] [address]-
0435     

 

 [facility]  [address] [phone]-
2931     

 

 [facility]  [id number] [place] [address] 
[id number]     

 

 [facility]  [id number] [place] 
[id number]  28 Mar, 2017   

 

 [facility]  [address] [place] 
[phone]  20 Mar, 2017  Essential hypertension I10 and Chronic congestive 
heart failure, unspecified congestive heart failure type I50.9

 

 [facility]  [id number] 
[id number]  13 Mar, 2017   

 

 [facility]  [id number] [address]-
9077  09 Mar, 2017   

 

 [facility]  [id number] [address]-
8148  19 Dec, 2016   

 

 [facility]  [address] [address]-
2776  19 Dec, 2016   

 

 [facility]  [id number] [address]-
5852     

 

 [facility]  [address], [place] [address]-
4422     

 

 [facility]  [address], [place] [address]-
1550     

 

 [facility]  [address], [place] [address]-
4793     

 

 [facility]  [address] [place] [address] 45046-
7909    Chronic congestive heart failure, unspecified congestive 
heart failure type I50.9

 

 [facility]  [address], [place] [address]    Pain of right lower extremity M79.604 ; History of atrial 
fibrillation without current medication Z86.79 and Acute deep vein thrombosis (
DVT) of femoral vein of right lower extremity I82.411

 

 [facility]  [id number] [place] [address]-
9445     

 

 [facility]  [address], [place] [address]-
4090  22 Aug, 2016   

 

 [facility]  [address] [place] [address]-
8974  22 Aug, 2016  Coronary artery disease involving native coronary artery of 
native heart without angina pectoris I25.10 ; Chronic congestive heart failure, 
unspecified congestive heart failure type I50.9 ; Cardiac defibrillator in 
place Z95.810 and Candidiasis B37.9







IMMUNIZATIONS

No Known Immunizations



SOCIAL HISTORY

Never Assessed



REASON FOR VISIT

medication refills



PLAN OF CARE





VITAL SIGNS





MEDICATIONS







 Medication  Instructions  Dosage  Frequency  Start Date  End Date  Duration  
Status

 

 Furosemide 40 mg  Orally twice a day  1 tablet  12h           Active







RESULTS

No Results



PROCEDURES

No Known procedures



INSTRUCTIONS





MEDICATIONS ADMINISTERED

No Known Medications



MEDICAL (GENERAL) HISTORY







 Type  Description  Date

 

 Medical History  hypertension   

 

 Medical History  skin cancer-arms, face   

 

 Medical History  MI   

 

 Medical History  Pneumonia   

 

 Surgical History  heart cath-2 stents, multiple balloons   

 

 Surgical History  open heart surgery  

 

 Surgical History  defibrillator placed  

 

 Hospitalization History  surgery   

 

 Hospitalization History  pneumonia  

 

 Hospitalization History  broken left hip at   March

 

 Hospitalization History  Bronchitis/clinical Pneumonia,hypoxia,sepsis - Via 
[facility]  17

 

 Hospitalization History  [facility]- Cardiomyopathy, Defibrillator 
discharge  2018

 

 Hospitalization History  [facility]  2018
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
## 2018-11-10 NOTE — XMS REPORT
[facility]

 Created on: 2018



Kendall Snowden

External Reference #: [id number]

: 1940

Sex: Male



Demographics







 Address  [address] [place]  [address]

 

 Preferred Language  Unknown

 

 Marital Status  Unknown

 

 Latter day Affiliation  Unknown

 

 Race  Unknown

 

 Ethnic Group  Unknown





Author







 Author  MAHOGANY GREENWOOD

 

 [facility]

 

 Address  [address] [place]  [address]



 

 Phone  [phone]







Care Team Providers







 Care Team Member Name  Role  Phone

 

 MAHOGANY GREENWOOD  Unavailable  [phone]







PROBLEMS







 Type  Condition  ICD9-CM Code  DAF44-DL Code  Onset Dates  Condition Status  
SNOMED Code

 

 Problem  Cardiac defibrillator in place     Z95.810     Active  173730402

 

 Problem  Essential hypertension     I10     Active  56847225

 

 Problem  Coronary artery disease involving native coronary artery of native 
heart without angina pectoris     I25.10     Active  3495696807082

 

 Problem  Chronic congestive heart failure, unspecified congestive heart 
failure type     I50.9     Active  18164406

 

 Problem  Type 2 diabetes mellitus without complications     E11.9     Active  
335324011

 

 Problem  Long term current use of insulin     Z79.4     Active  254797586

 

 Problem  Stasis dermatitis of both legs     I87.2     Active  87442380

 

 Problem  Chronic obstructive pulmonary disease, unspecified COPD type     
J44.9     Active  13814941

 

 Problem  Other atherosclerosis of native arteries of extremities, right leg   
  I70.291     Active  89975154

 

 Problem  Ventricular arrhythmia     I49.9     Active  74402487







ALLERGIES

No Information



ENCOUNTERS







 Encounter  Location  Date  Diagnosis

 

 [facility]  [address], [place] [address]-
6376  20 Aug, 2018   

 

 [facility]  [address], [place] [address]9  16 Aug, 2018   

 

 [facility]  [address] [address]
9910  15 Aug, 2018   

 

 [facility]  [id number] [place] [address]-
0033     

 

 [facility]  [address], [place] [address]
4833     

 

 [facility]  [id number] [place] [address]-
8183    Acute cystitis with hematuria N30.01 and Dysuria R30.0

 

 [facility]  [id number] [place] [address]-
2727     

 

 [facility]  [id number] [place] [address] 8[address]-
0854     

 

 [facility]  [id number] [place] [address] 18404-
6008     

 

 [facility]  [id number] [place] [address]-
7710    Type 2 diabetes mellitus without complications E11.9 ; Long 
term current use of insulin Z79.4 ; History of respiratory failure Z87.09 and 
History of sepsis Z86.19

 

 [facility]  [id number] [place] [address]-
3714     

 

 [facility]  [id number] [place] [address] 7[address]-
8416     

 

 [facility]  [id number] [place] [address] 81925-
8224     

 

 [facility]  [id number] [place] [address] 88685-
9633     

 

 [facility]  [address] [address]-
6938     

 

 [facility]  [id number] [place] [address] 6[address]-
4710     

 

 [facility]  [address], [place] [address]
-[phone]  25 May, 2018  Acute cystitis without hematuria N30.00

 

 [facility]  [address] [place] [address]
-[phone]  19 May, 2018  Acute cystitis without hematuria N30.00

 

 [facility]  [address], [place] [address]-
7042  18 May, 2018   

 

 [facility]  [address], [place] [address]-
4909  10 Apr, 2018  Medicare annual wellness visit, initial Z00.00 ; Encounter 
for immunization Z23 ; Chronic obstructive pulmonary disease, unspecified COPD 
type J44.9 ; Coronary artery disease involving native coronary artery of native 
heart without angina pectoris I25.10 ; Chronic congestive heart failure, 
unspecified congestive heart failure type I50.9 ; Essential hypertension I10 ; 
Ventricular arrhythmia I49.9 and Other atherosclerosis of native arteries of 
extremities, right leg I70.291

 

 [facility] [id number] [address]-
4081  08 Mar, 2018  Chronic congestive heart failure, unspecified congestive 
heart failure type I50.9

 

 [facility] [id number] [place] [address]-
6563     

 

 [facility] [id number] [place] [address]-
3455  13 2018  Chronic congestive heart failure, unspecified congestive 
heart failure type I50.9 ; Chronic obstructive pulmonary disease, unspecified 
COPD type J44.9 and Bilateral impacted cerumen H61.23

 

 [facility] [id number] [place] [address]-
8360     

 

 [facility] [address] [place] [address] 8[address]68-
4709     

 

 [facility] [address] [address]-
1638  27 Dec, 2017   

 

 [facility] [address] [place] [address]-
7401  20 Dec, 2017   

 

 [facility] [id number] [place] [address] 6[address]-
7355  12 Dec, 2017  Coronary artery disease involving native coronary artery of 
native heart without angina pectoris I25.10

 

 [facility] [address] [address]-
9993  11 Dec, 2017   

 

 [facility] [id number] [place] [address]-
2122    Chronic obstructive pulmonary disease, unspecified COPD 
type J44.9 and History of pneumonia Z87.01

 

 [facility]  [id number] [place] [address] 
[id number]     

 

 [facility]  [address] [place] [address] 0[address]-
4298     

 

 [facility]  [id number] [place] [address]-
0529     

 

 [facility]  [id number] [place] [address] 96892-
5894     

 

 [facility]  [id number] [place] [address] 78033-
1809  19 Oct, 2017   

 

 [facility]  [id number] [place] [address] 35621-
0689  17 Oct, 2017  Coronary artery disease involving native coronary artery of 
native heart without angina pectoris I25.10 and Ventricular arrhythmia I49.9

 

 [facility]  [address] [place] [address]-
5171  09 Oct, 2017   

 

 [facility]  [id number] [place] [address] 16752-
9110  09 Oct, 2017   

 

 [facility] [place] [facility]  [address] [place] [address]
-[phone]  04 Oct, 2017  Dysuria R30.0 and Acute cystitis without hematuria N30.00

 

 [facility]  [address] [place] [address] 17821-
7907  25 Aug, 2017  Epistaxis R04.0 and Chronic obstructive pulmonary disease, 
unspecified COPD type J44.9

 

 [facility]  [id number] [place] [address] 330[address]-
1596    Fall from other pedestrian conveyance, initial encounter 
V00.891A

 

 [facility] [id number] [place] [address] 13311-
3666    Chronic congestive heart failure, unspecified congestive 
heart failure type I50.9 ; Chronic obstructive pulmonary disease, unspecified 
COPD type J44.9 and Stasis dermatitis of both legs I87.2

 

 [facility]  [address] [facility] [id number] [address]-
1464  08 May, 2017  Chronic congestive heart failure, unspecified congestive 
heart failure type I50.9

 

 [facility]  [address] [facility] [id number] [address]-
8916  05 May, 2017  Coronary artery disease involving native coronary artery of 
native heart without angina pectoris I25.10 ; Chronic congestive heart failure, 
unspecified congestive heart failure type I50.9 and Chronic obstructive 
pulmonary disease, unspecified COPD type J44.9

 

 [facility]  [address] [address]-
1263     

 

 [facility]  [address] [address]-
1827     

 

 [facility]  [id number] [place] [address] 
[id number]     

 

 [facility]  [id number] 
[id number]  28 Mar, 2017   

 

 [facility]  [address] [place] 
[phone]  20 Mar, 2017  Essential hypertension I10 and Chronic congestive 
heart failure, unspecified congestive heart failure type I50.9

 

 [facility]  [id number] 
[id number]  13 Mar, 2017   

 

 [facility]  [id number] [address]-
8122  09 Mar, 2017   

 

 [facility]  [id number] [address]-
4253  19 Dec, 2016   

 

 [facility]  [address] [address]-
5129  19 Dec, 2016   

 

 [facility]  [id number] [address]-
0801     

 

 [facility]  [address], [place] [address]-
2854     

 

 [facility]  [address], [place] [address]-
8177     

 

 [facility]  [address], [place] [address]-
4847     

 

 [facility]  [address] [place] [address] 97410-
4797    Chronic congestive heart failure, unspecified congestive 
heart failure type I50.9

 

 [facility]  [address], [place] [address]
-[address]    Pain of right lower extremity M79.604 ; History of atrial 
fibrillation without current medication Z86.79 and Acute deep vein thrombosis (
DVT) of femoral vein of right lower extremity I82.411

 

 [facility]  [id number] [place] [address] 27990-
7986     

 

 [facility]  [address], [place] [address]-
8864  22 Aug, 2016   

 

 [facility]  [address] [place] [address] 6[address]20-
4097  22 Aug, 2016  Coronary artery disease involving native coronary artery of 
native heart without angina pectoris I25.10 ; Chronic congestive heart failure, 
unspecified congestive heart failure type I50.9 ; Cardiac defibrillator in 
place Z95.810 and Candidiasis B37.9







IMMUNIZATIONS

No Known Immunizations



SOCIAL HISTORY

Never Assessed



REASON FOR VISIT

Requests return call



PLAN OF CARE





VITAL SIGNS





MEDICATIONS







 Medication  Instructions  Dosage  Frequency  Start Date  End Date  Duration  
Status

 

 Levemir 100 UNIT/ML  Subcutaneous Once a day  10 units  24h       
   Active







RESULTS

No Results



PROCEDURES

No Known procedures



INSTRUCTIONS





MEDICATIONS ADMINISTERED

No Known Medications



MEDICAL (GENERAL) HISTORY







 Type  Description  Date

 

 Medical History  hypertension   

 

 Medical History  skin cancer-arms, face   

 

 Medical History  MI   

 

 Medical History  Pneumonia   

 

 Surgical History  heart cath-2 stents, multiple balloons   

 

 Surgical History  open heart surgery  

 

 Surgical History  defibrillator placed  

 

 Hospitalization History  surgery   

 

 Hospitalization History  pneumonia  

 

 Hospitalization History  broken left hip at   March

 

 Hospitalization History  Bronchitis/clinical Pneumonia,hypoxia,sepsis - Via 
[facility]  17

 

 Hospitalization History  [facility]- Cardiomyopathy, Defibrillator 
discharge  2018

 

 Hospitalization History  [facility]  2018

## 2018-11-10 NOTE — ED ABDOMINAL PAIN
General


Chief Complaint:  Abdominal/GI Problems


Stated Complaint:  STOMACH PAIN


Source of Information:  Patient


Exam Limitations:  No Limitations





History of Present Illness


Date Seen by Provider:  Nov 10, 2018


Time Seen by Provider:  14:38


Initial Comments


To ER complaint by his wife from home with reports of generalized abdominal 

pain for the past 2-3 days. No diarrhea or constipation, no nausea or vomiting. 

No fevers or chills. He does report urinary frequency. He was recently treated 

for urinary tract infection with a round of antibiotics (Levaquin) and finished 

those antibiotics on 18.


Timing/Duration:  2-3 Days


Severity/Quality:  Moderate


Location:  Generalized Abdomen


Radiation:  No Radiation


Activities at Onset:  None





Allergies and Home Medications


Allergies


Coded Allergies:  


     Penicillins (Verified  Allergy, Severe, HIVES, SOB, 18)


     codeine (Verified  Allergy, Severe, SOB, HIVES, 18)





Home Medications


Albuterol Sulfate 18 Gm Hfa.aer.ad, 2 PUFF IH QID PRN for SHORTNESS OF BREATH, (

Reported)


Albuterol Sulfate 1.25 Mg/3 Ml Vial.neb, 1.25 MG NEB BID, (Reported)


Amiodarone HCl 200 Mg Tablet, 200 MG PO DAILY, (Reported)


Amiodarone HCl 200 Mg Tablet, 200 MG PO SuWeSa@2000, (Reported)


   TAKES 200MG DOSE AT NIGHT THREE DAY A WEEK IN ADDITION TO 200MG EVERY 

MORNING. 


Amlodipine Besylate 5 Mg Tablet, 5 MG PO HS, (Reported)


   LAST FILLED 17 #90 


Digoxin 125 Mcg Tablet, 125 MCG PO DAILY, (Reported)


Esomeprazole Magnesium 40 Mg Capsule.dr, 40 MG PO DAILY, (Reported)


Furosemide 40 Mg Tablet, 40 MG PO 0900,1500, (Reported)


Insulin Determir 1,000 Units/10 Ml Soln, 10 UNIT SQ DAILY


   Prescribed by: ARSEN LAWS on 18 1449


Metoprolol Tartrate 50 Mg Tablet, 25 MG PO BID, (Reported)


   TAKES 1/2 OF A (50 MG) TABLET 


Mexiletine HCl 150 Mg Cap, 150 MG PO TID, (Reported)


Potassium Chloride 8 Meq Tablet.er, 16 MEQ PO DAILY, (Reported)


   TAKES 2 (8MEQ) TABLETS 





Patient Home Medication List


Home Medication List Reviewed:  Yes





Review of Systems


Review of Systems


Constitutional:  see HPI; No chills, No fever


EENTM:  No Symptoms Reported


Respiratory:  No Symptoms Reported


Cardiovascular:  No Symptoms Reported


Gastrointestinal:  See HPI, Abdominal Pain; Denies Constipated, Denies Diarrhea

, Denies Nausea, Denies Vomiting


Genitourinary:  See HPI, Frequency


Musculoskeletal:  no symptoms reported


Skin:  no symptoms reported


Psychiatric/Neurological:  No Symptoms Reported


Endocrine:  No Symptoms Reported





Past Medical-Social-Family Hx


Patient Social History


Type Used:  Cigarettes


Former Smoker, Quit:  1993


2nd Hand Smoke Exposure:  No


Recent Foreign Travel:  No


Contact w/Someone Who Travel:  No


Recent Hopitalizations:  No





Immunizations Up To Date


Tetanus Booster (TDap):  More than 5yrs


Date of Pneumonia Vaccine:  Oct 1, 2014


Date of Influenza Vaccine:  Nov 10, 2017





Seasonal Allergies


Seasonal Allergies:  No





Past Medical History


Surgeries:  Yes


Cardiac, CABG, Coronary Stent, Defibrillator, Eye Surgery, Joint Replacement, 

Orthopedic, Renal


Respiratory:  Yes (O2 3L/NC AT HS)


Asthma, Pneumonia, Chronic Bronchitis, Sleep Apnea, COPD


Currently Using CPAP:  No


Currently Using BIPAP:  No


Cardiac:  Yes (CABG, DEFIBRILLATOR, CARDIAC CATHS-STENTS X2; CHF)


Coronary Artery Disease, Heart Attack, High Cholesterol, Hypertension


Neurological:  Yes


Dementia, Neuropathy


Reproductive Disorders:  Yes (unable to have children- mumps as a child)


Sexually Transmitted Disease:  No


HIV/AIDS:  No


Genitourinary:  Yes


Bladder Infection, Kidney Stones


Gastrointestinal:  Yes


Gastroesophageal Reflux, Ulcer


Musculoskeletal:  Yes (POOR MOBILITY)


Arthritis, Fractures


Endocrine:  No


HEENT:  Yes


Cataract, Glaucoma


Loss of Vision:  Bilateral


Hearing Impairment:  Hard of Hearing


Cancer:  Yes


Skin


What Type of Treatment Did You:  Surgical Intervention


Psychosocial:  Yes


Depression


Integumentary:  Yes (shingles , SKIN CANCER)


Blood Disorders:  No


Adverse Reaction/Blood Tranf:  No





Family Medical History





Cardiovascular disease


  19 MOTHER


  G8 BROTHER


  G8 SISTER


Cervical cancer


  19 MOTHER


No Pertinent Family Hx





Physical Exam


Vital Signs


Capillary Refill :


Height/Weight/BMI


Height: 5'5.00"


Weight: 211lbs. 0.0oz. 95.686258so; 35.1 BMI


Method:Stated


General Appearance:  WD/WN, no apparent distress


HEENT:  PERRL/EOMI, normal ENT inspection


Respiratory:  no respiratory distress, no accessory muscle use


Cardiovascular:  regular rate, rhythm, no murmur


Gastrointestinal:  normal bowel sounds, soft, tenderness (tender diffusely but 

no rebound. Abdomen is soft. Bowel sounds are normal.)


Neurologic/Psychiatric:  alert, normal mood/affect, oriented x 3


Skin:  normal color, warm/dry





Focused Exam


Lactate Level


11/10/18 14:30: Lactic Acid Level 1.10





Lactic Acid Level





Laboratory Tests








Test


 11/10/18


14:30


 


Lactic Acid Level


 1.10 MMOL/L


(0.50-2.00)











Progress/Results/Core Measures


Results/Orders


Lab Results





Laboratory Tests








Test


 11/10/18


14:13 11/10/18


14:30 Range/Units


 


 


Urine Color YELLOW    


 


Urine Clarity


 SLIGHTLY


CLOUDY 


  





 


Urine pH 6.5   5-9  


 


Urine Specific Gravity 1.015 L  1.016-1.022  


 


Urine Protein 2+ H  NEGATIVE  


 


Urine Glucose (UA) NEGATIVE   NEGATIVE  


 


Urine Ketones NEGATIVE   NEGATIVE  


 


Urine Nitrite NEGATIVE   NEGATIVE  


 


Urine Bilirubin NEGATIVE   NEGATIVE  


 


Urine Urobilinogen NORMAL   NORMAL  MG/DL


 


Urine Leukocyte Esterase 3+ H  NEGATIVE  


 


Urine RBC (Auto) 4+ H  NEGATIVE  


 


Urine RBC 5-10 H   /HPF


 


Urine WBC 10-25 H   /HPF


 


Urine Squamous Epithelial


Cells NONE 


 


  /HPF





 


Urine Crystals NONE    /LPF


 


Urine Bacteria FEW H   /HPF


 


Urine Casts NONE    /LPF


 


Urine Mucus NEGATIVE    /LPF


 


Urine Culture Indicated YES    


 


White Blood Count


 


 18.5 H


 4.3-11.0


10^3/uL


 


Red Blood Count


 


 4.07 L


 4.35-5.85


10^6/uL


 


Hemoglobin  12.1 L 13.3-17.7  G/DL


 


Hematocrit  38 L 40-54  %


 


Mean Corpuscular Volume  92  80-99  FL


 


Mean Corpuscular Hemoglobin  30  25-34  PG


 


Mean Corpuscular Hemoglobin


Concent 


 32 


 32-36  G/DL





 


Red Cell Distribution Width  16.0 H 10.0-14.5  %


 


Platelet Count


 


 352 


 130-400


10^3/uL


 


Mean Platelet Volume  9.9  7.4-10.4  FL


 


Neutrophils (%) (Auto)  80 H 42-75  %


 


Lymphocytes (%) (Auto)  10 L 12-44  %


 


Monocytes (%) (Auto)  10  0-12  %


 


Eosinophils (%) (Auto)  0  0-10  %


 


Basophils (%) (Auto)  0  0-10  %


 


Neutrophils # (Auto)  14.9 H 1.8-7.8  X 10^3


 


Lymphocytes # (Auto)  1.8  1.0-4.0  X 10^3


 


Monocytes # (Auto)  1.8 H 0.0-1.0  X 10^3


 


Eosinophils # (Auto)


 


 0.0 


 0.0-0.3


10^3/uL


 


Basophils # (Auto)


 


 0.0 


 0.0-0.1


10^3/uL


 


Neutrophils % (Manual)  83   %


 


Lymphocytes % (Manual)  10   %


 


Monocytes % (Manual)  7   %


 


Eosinophils % (Manual)  0   %


 


Basophils % (Manual)  0   %


 


Band Neutrophils  0   %


 


Blood Morphology Comment  NORMAL   


 


Sodium Level  132 L 135-145  MMOL/L


 


Potassium Level  5.0  3.6-5.0  MMOL/L


 


Chloride Level  96 L   MMOL/L


 


Carbon Dioxide Level  21  21-32  MMOL/L


 


Anion Gap  15 H 5-14  MMOL/L


 


Blood Urea Nitrogen  17  7-18  MG/DL


 


Creatinine


 


 1.40 H


 0.60-1.30


MG/DL


 


Estimat Glomerular Filtration


Rate 


 49 


  





 


BUN/Creatinine Ratio  12   


 


Glucose Level  102    MG/DL


 


Lactic Acid Level


 


 1.10 


 0.50-2.00


MMOL/L


 


Calcium Level  10.0  8.5-10.1  MG/DL


 


Corrected Calcium  10.4 H 8.5-10.1  MG/DL


 


Total Bilirubin  0.4  0.1-1.0  MG/DL


 


Aspartate Amino Transf


(AST/SGOT) 


 33 


 5-34  U/L





 


Alanine Aminotransferase


(ALT/SGPT) 


 23 


 0-55  U/L





 


Alkaline Phosphatase  79    U/L


 


Total Protein  9.0 H 6.4-8.2  GM/DL


 


Albumin  3.5  3.2-4.5  GM/DL


 


Lipase  29  8-78  U/L








My Orders





Orders - VIOLA MONREAL APRN


Lipase (11/10/18 14:32)


Ua Culture If Indicated (11/10/18 14:32)


Cbc With Automated Diff (11/10/18 14:32)


Comprehensive Metabolic Panel (11/10/18 14:32)


Iv Heplock-Insert (Order) (11/10/18 14:32)


Blood Culture (11/10/18 14:32)


Lactic Acid Analyzer (11/10/18 14:32)


Ct Abdomen/Pelvis Wo (11/10/18 14:32)


Urine Culture (11/10/18 14:13)


Manual Differential (11/10/18 14:30)


Ceftriaxone  For Iv Use (Rocephin  For I (11/10/18 15:45)





Medications Given in ED





Current Medications








 Medications  Dose


 Ordered  Sig/Silvana


 Route  Start Time


 Stop Time Status Last Admin


Dose Admin


 


 Ceftriaxone


 Sodium 1000 mg/


 Sodium Chloride  50 ml @ 


 100 mls/hr  ONCE  ONCE


 IV  11/10/18 15:45


 11/10/18 16:14  11/10/18 15:49


100 MLS/HR











Diagnostic Imaging





   Diagonstic Imaging:  CT


Comments


NAME:   ALEJANDRO SPIVEY


[facility] REC#:   [id number]


ACCOUNT#:   [id number]


PT STATUS:   REG ER


:   1940


PHYSICIAN:   VIOLA MONREAL APRN


ADMIT DATE:   11/10/18/ER


 ***Draft***


Date of Exam:11/10/18





CT ABDOMEN/PELVIS WO








PROCEDURE: CT abdomen and pelvis without contrast.





TECHNIQUE: Multiple contiguous axial images were obtained through


the abdomen and pelvis without the use of intravenous contrast. 





INDICATION: Abdominal pain.





FINDINGS: The previous CT aorta exam of 2016 failed to show


any sign of an acute abnormality. There was atherosclerotic


disease of both common iliac arteries, however.





In the interval since the previous study, numerous calcifications


have developed within the left kidney. 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


staghorn formation. Furthermore, there is now obstruction of the


left collecting system due to 2 or 3 prominent calculi within the


proximal left ureter. The calculi measure approximately 10 and 20


mm in maximum longitudinal dimension. The cysts associated with


the left kidney, seen previously, are again evident and slightly


greater in size.





There is no evidence for obstruction of the right collecting


system. The cyst on the lateral aspect of the right kidney, seen


previously, is essentially no different.





The liver, spleen, gallbladder, adrenals, pancreas, aorta and


inferior vena cava show no sign of an acute abnormality. The 1.3


x 2.2 cm fatty lesion associated with the right adrenal gland,


seen previously, is again evident and no different. Most likely


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 





The stomach is not well-distended and consequently difficult to


assess.





In the interval since the previous exam a 1.4 x 2.2 cm calculus


has developed in the base of the bladder. The bladder is


otherwise unremarkable. The prostate gland is not enlarged. There


is no pelvic mass or free fluid collection noted. The appendix


was visualized and is not abnormally thickened.





The bone windows show no sign of an acute fracture. There does


appear to be a long-standing compression deformity of T12 and


there is mild anterior wedging of T11. There are also bilateral


pars defects at L5 with a grade 1 spondylolisthesis of L5 with


respect to S1. 





The lung bases are clear. There is mild compressive atelectasis


adjacent to the elevated right hemidiaphragm. The heart is


enlarged and there is a pacemaker in place.





IMPRESSION: 


1. There is a high-grade obstruction of the left collecting


system due to two large calculi within the proximal left ureter.


There is also a newly developed 1.4 x 2.2 cm bladder calculus.


2. There is no acute abnormality of the abdomen or pelvis noted


otherwise.  





  Dictated on workstation # [id number]








Dict:   11/10/18 1525


Trans:   11/10/18 1539


[facility] [phone]





Interpreted by:     LORIN FALCON MD


Electronically signed by:





Departure


Communication (Admissions)


Time/Spoke to Admitting Phy:  16:13


Spoke with Dr. Tawil who recommends either admission here for IV antibiotics 

and possible transfer later if the patient condition worsens or he could place 

a ureteral stent and/or lithotripsy Monday night or Tuesday but he is currently 

out of town. I then spoke with primary care doctor Gabriel. She would like the 

patient to go ahead and be transferred today for urology is available given his 

pre-existing chronic kidney disease.


1612- I spoke with [facility] one call, they will call back. I then went and relayed 

the plan to the patient and his wife. They state they have no way to get to 

[place] even if I transported  by ambulance the wife would be unable to make it 

to [place]. They state they want to stay here. They have had Dr. Tawil intervene 

on a stone before and were very pleased with him. I discussed with him the 

risks of staying here without having urology at this time and they are 

understanding of this risk and wishes to proceed with admission here and not 

transfer. I then called joe Boykin back to notify them that patient refuses 

transfer.





Impression





 Primary Impression:  


 Left ureteral calculus


 Additional Impression:  


 Pyelonephritis


Disposition:   ADMITTED AS INPATIENT


Condition:  Stable





Admissions


Decision to Admit Reason:  Admit from ER (General)


Decision to Admit/Date:  Nov 10, 2018


Time/Decision to Admit Time:  16:14





Departure-Patient Inst.


Referrals:  


MAHOGANY GREENWOOD MD (PCP/Family)


Primary Care Physician











VIOLA MONREAL Nov 10, 2018 14:39

## 2018-11-10 NOTE — XMS REPORT
[facility]

 Created on: 2018



Kendall Snowden

External Reference #: [id number]

: 1940

Sex: Male



Demographics







 Address  [address] [place]  [address]

 

 Preferred Language  Unknown

 

 Marital Status  Unknown

 

 Jainism Affiliation  Unknown

 

 Race  Unknown

 

 Ethnic Group  Unknown





Author







 Author  MAHOGANY GREENWOOD

 

 Organization  [facility]

 

 Address  [address] [place]  [address]



 

 Phone  [phone]







Care Team Providers







 Care Team Member Name  Role  Phone

 

 MAHOGANY GREENWOOD  Unavailable  [phone]







PROBLEMS







 Type  Condition  ICD9-CM Code  DUN30-GN Code  Onset Dates  Condition Status  
SNOMED Code

 

 Problem  Cardiac defibrillator in place     Z95.810     Active  837698608

 

 Problem  Chronic congestive heart failure, unspecified congestive heart 
failure type     I50.9     Active  57363616

 

 Problem  Other atherosclerosis of native arteries of extremities, right leg   
  I70.291     Active  25688992

 

 Problem  Ventricular arrhythmia     I49.9     Active  14892886

 

 Problem  Essential hypertension     I10     Active  48212833

 

 Problem  Coronary artery disease involving native coronary artery of native 
heart without angina pectoris     I25.10     Active  [id number]

 

 Problem  Stasis dermatitis of both legs     I87.2     Active  10889528

 

 Problem  Chronic obstructive pulmonary disease, unspecified COPD type     
J44.9     Active  72296154







ALLERGIES

No Information



ENCOUNTERS







 Encounter  Location  Date  Diagnosis

 

 [facility]  [id number] [place] [phone]
[phone]  10 Apr, 2018  Medicare annual wellness visit, initial Z00.00 ; Encounter 
for immunization Z23 ; Chronic obstructive pulmonary disease, unspecified COPD 
type J44.9 ; Coronary artery disease involving native coronary artery of native 
heart without angina pectoris I25.10 ; Chronic congestive heart failure, 
unspecified congestive heart failure type I50.9 ; Essential hypertension I10 ; 
Ventricular arrhythmia I49.9 and Other atherosclerosis of native arteries of 
extremities, right leg I70.291

 

 [facility]  [address] [id number] [address]  08 Mar, 2018  Chronic congestive heart failure, unspecified congestive 
heart failure type I50.9

 

 [facility]  [address] [id number] [address]     

 

 [facility]  [address] [place], [place] [id number] [address]    Chronic congestive heart failure, unspecified congestive 
heart failure type I50.9 ; Chronic obstructive pulmonary disease, unspecified 
COPD type J44.9 and Bilateral impacted cerumen H61.23

 

 [facility]  [id number] [address]     

 

 [facility]  [id number] [place] [phone]
[phone]     

 

 [facility]  [id number] [place] [phone]
[phone]  27 Dec, 2017   

 

 [facility]  [id number] [place] [phone]
[phone]  20 Dec, 2017   

 

 [facility]  [id number] [place] [phone]
[phone]  12 Dec, 2017  Coronary artery disease involving native coronary artery of 
native heart without angina pectoris I25.10

 

 [facility]  [id number] [place] [phone]
[phone]  11 Dec, 2017   

 

 [facility]  [id number] [place] [phone]
[phone]    Chronic obstructive pulmonary disease, unspecified COPD 
type J44.9 and History of pneumonia Z87.01

 

 [facility]  [id number] [place] [address] 
[phone]     

 

 [facility]  [address] [place] [phone]
[phone]     

 

 [facility]  [address] [place] [phone]
[phone]     

 

 [facility]  [id number] [place] [phone]
[phone]     

 

 [facility]  [address] [address]19  19 Oct, 2017   

 

 [facility]  [id number] [place] [phone]
[phone]  17 Oct, 2017  Coronary artery disease involving native coronary artery of 
native heart without angina pectoris I25.10 and Ventricular arrhythmia I49.9

 

 [facility]  [address] [place] [phone]
[phone]  09 Oct, 2017   

 

 [facility]  [id number] [place] [phone]
[phone]  09 Oct, 2017   

 

 [facility]  [id number] [place] [phone]  04 Oct, 2017  Dysuria R30.0 and Acute cystitis without hematuria N30.00

 

 [id number] [place] [phone]
[phone]  25 Aug, 2017  Epistaxis R04.0 and Chronic obstructive pulmonary disease, 
unspecified COPD type J44.9

 

 [id number] [place] [phone]-
[phone]    Fall from other pedestrian conveyance, initial encounter 
V00.891A

 

 [id number] [place] [phone]-
[phone]    Chronic congestive heart failure, unspecified congestive 
heart failure type I50.9 ; Chronic obstructive pulmonary disease, unspecified 
COPD type J44.9 and Stasis dermatitis of both legs I87.2

 

 [address] [place] [phone]-
[phone]  08 May, 2017  Chronic congestive heart failure, unspecified congestive 
heart failure type I50.9

 

 [address] [place] [phone]-
9720  05 May, 2017  Coronary artery disease involving native coronary artery of 
native heart without angina pectoris I25.10 ; Chronic congestive heart failure, 
unspecified congestive heart failure type I50.9 and Chronic obstructive 
pulmonary disease, unspecified COPD type J44.9

 

 [facility]  [address] [place] [phone]
[phone]     

 

 [id number] [place] [phone]
[phone]     

 

 [facility]  [address] [id number] 
[id number]     

 

 [facility]  [id number] 
[phone]  28 Mar, 2017   

 

 [facility]  [address] [place] 
[phone]  20 Mar, 2017  Essential hypertension I10 and Chronic congestive 
heart failure, unspecified congestive heart failure type I50.9

 

 [facility]  [address] [id number] 
[phone]  13 Mar, 2017   

 

 [facility]  [address] [facility] [id number] [address]-
4152  09 Mar, 2017   

 

 [facility]  [address] [facility] [id number] [phone]-
[phone]  19 Dec, 2016   

 

 [facility]  [address] [address]-
[phone]  19 Dec, 2016   

 

 [facility]  [address] [address]-
[address]     

 

 [facility]  [address] [id number] [address]-
[address]     

 

 [facility]  [address]100[place] [address]-
[address]     

 

 [facility]  [address]100[place] [address]-
[address]     

 

 [facility]  [id number] [address]-
9069    Chronic congestive heart failure, unspecified congestive 
heart failure type I50.9

 

 [facility]  [address] [facility] [id number] [place] [phone]    Pain of right lower extremity M79.604 ; History of atrial 
fibrillation without current medication Z86.79 and Acute deep vein thrombosis (
DVT) of femoral vein of right lower extremity I82.411

 

 [facility]  [address] [facility] [id number] [place] [address]-
80[address]     

 

 [facility]  [address] [facility] [id number] [place] [address]-
2534  22 Aug, 2016   

 

 [facility]  [address] [facility] [id number] [place] [address]-
[address]  22 Aug, 2016  Coronary artery disease involving native coronary artery of 
native heart without angina pectoris I25.10 ; Chronic congestive heart failure, 
unspecified congestive heart failure type I50.9 ; Cardiac defibrillator in 
place Z95.810 and Candidiasis B37.9







IMMUNIZATIONS

No Known Immunizations



SOCIAL HISTORY

Never Assessed



REASON FOR VISIT

LVM



PLAN OF CARE





VITAL SIGNS





MEDICATIONS

No Known Medications



RESULTS

No Results



PROCEDURES

No Known procedures



INSTRUCTIONS





MEDICATIONS ADMINISTERED

No Known Medications



MEDICAL (GENERAL) HISTORY







 Type  Description  Date

 

 Medical History  hypertension   

 

 Medical History  skin cancer-arms, face   

 

 Medical History  MI   

 

 Medical History  Pneumonia   

 

 Surgical History  heart cath-2 stents, multiple balloons   

 

 Surgical History  open heart surgery  

 

 Surgical History  defibrillator placed  

 

 Hospitalization History  surgery   

 

 Hospitalization History  pneumonia  

 

 Hospitalization History  broken left hip at   March

 

 Hospitalization History  Bronchitis/clinical Pneumonia,hypoxia,sepsis - [facility] 
[facility]  17

## 2018-11-10 NOTE — XMS REPORT
[facility]

 Created on: 2018



Kendall Snowden

External Reference #: [id number]

: 1940

Sex: Male



Demographics







 Address  [address] [place]  [address]

 

 Preferred Language  Unknown

 

 Marital Status  Unknown

 

 Latter day Affiliation  Unknown

 

 Race  Unknown

 

 Ethnic Group  Unknown





Author







 Author  MAHOGANY GREENWOOD

 

 [facility]

 

 Address  [address] [place]  [address]



 

 Phone  [phone]







Care Team Providers







 Care Team Member Name  Role  Phone

 

 MAHOGANY GREENWOOD  Unavailable  [phone]







PROBLEMS







 Type  Condition  ICD9-CM Code  [id number] Code  Onset Dates  Condition Status  
SNOMED Code

 

 Problem  Cardiac defibrillator in place     Z95.810     Active  231160883

 

 Problem  Essential hypertension     I10     Active  60316008

 

 Problem  Coronary artery disease involving native coronary artery of native 
heart without angina pectoris     I25.10     Active  8020708202946

 

 Problem  Chronic congestive heart failure, unspecified congestive heart 
failure type     I50.9     Active  40839242

 

 Problem  Type 2 diabetes mellitus without complications     E11.9     Active  
357796735

 

 Problem  Long term current use of insulin     Z79.4     Active  188485825

 

 Problem  Stasis dermatitis of both legs     I87.2     Active  98532413

 

 Problem  Chronic obstructive pulmonary disease, unspecified COPD type     
J44.9     Active  23766614

 

 Problem  Other atherosclerosis of native arteries of extremities, right leg   
  I70.291     Active  56002388

 

 Problem  Ventricular arrhythmia     I49.9     Active  71848766







ALLERGIES

No Information



ENCOUNTERS







 Encounter  Location  Date  Diagnosis

 

 [facility]  [address], [place] [address]
1608  20 Aug, 2018   

 

 [facility]  [address], [place] [address]
6941  16 Aug, 2018   

 

 [facility]  [address], [place] [address]-
5392  15 Aug, 2018   

 

 [facility]  [id number] [place] [address]-
6538     

 

 [facility]  [address], [place] [address]-
8207     

 

 [facility]  [id number] [place] [address]-
2831    Acute cystitis with hematuria N30.01 and Dysuria R30.0

 

 [facility]  [id number] [place] [address]26-
9501     

 

 [facility]  [id number] [place] [address]777-
4225     

 

 [facility]  [id number] [place] [address]-
5354     

 

 [facility]  [id number] [place] [address] 72410-
6616    Type 2 diabetes mellitus without complications E11.9 ; Long 
term current use of insulin Z79.4 ; History of respiratory failure Z87.09 and 
History of sepsis Z86.19

 

 [facility]  [id number] [place] [address]-
3582     

 

 [facility]  [id number] [place] [address] 10704-
4860     

 

 [facility]  [id number] [place] [address]77-
3384     

 

 [facility]  [id number] [place] [address] 33278-
3439     

 

 [facility]  [address], [place] [address]-
7822     

 

 [facility]  [id number] [place] [address] 16417-
1810     

 

 [facility]  [address], [place] [address]
-[phone]  25 May, 2018  Acute cystitis without hematuria N30.00

 

 [facility]  [address] [place] [address]
-[phone]  19 May, 2018  Acute cystitis without hematuria N30.00

 

 [facility]  [address], [place] [address]-
1567  18 May, 2018   

 

 [facility]  [address], [place] [address]-
0303  10 Apr, 2018  Medicare annual wellness visit, initial Z00.00 ; Encounter 
for immunization Z23 ; Chronic obstructive pulmonary disease, unspecified COPD 
type J44.9 ; Coronary artery disease involving native coronary artery of native 
heart without angina pectoris I25.10 ; Chronic congestive heart failure, 
unspecified congestive heart failure type I50.9 ; Essential hypertension I10 ; 
Ventricular arrhythmia I49.9 and Other atherosclerosis of native arteries of 
extremities, right leg I70.291

 

 [facility] [id number] [address]-
2203  08 Mar, 2018  Chronic congestive heart failure, unspecified congestive 
heart failure type I50.9

 

 [facility] [id number] [place] [address]-
5889     

 

 [facility] [id number] [place] [address]-
4312  13 2018  Chronic congestive heart failure, unspecified congestive 
heart failure type I50.9 ; Chronic obstructive pulmonary disease, unspecified 
COPD type J44.9 and Bilateral impacted cerumen H61.23

 

 [facility] [id number] [place] [address]14-
9476     

 

 [facility] [address] [place] [address]-
6205     

 

 [facility] [address] [address]-
9363  27 Dec, 2017   

 

 [facility] [address] [place] [address]-
0184  20 Dec, 2017   

 

 [facility] [id number] [place] [address]85-
3625  12 Dec, 2017  Coronary artery disease involving native coronary artery of 
native heart without angina pectoris I25.10

 

 [facility] [address] [address]-
0156  11 Dec, 2017   

 

 [facility] [id number] [place] [address]127-
4820    Chronic obstructive pulmonary disease, unspecified COPD 
type J44.9 and History of pneumonia Z87.01

 

 [facility]  [id number] [place] [address] 
[id number]     

 

 [facility]  [address] [place] [address] 25655-
9396     

 

 [facility]  [id number] [place] [address] 40824-
5583     

 

 [facility]  [id number] [place] [address] 49240-
9995     

 

 [facility]  [id number] [place] [address] 89987-
3898  19 Oct, 2017   

 

 [facility]  [id number] [place] [address] 73022-
6471  17 Oct, 2017  Coronary artery disease involving native coronary artery of 
native heart without angina pectoris I25.10 and Ventricular arrhythmia I49.9

 

 [facility]  [address] [place] [address] 30108-
8665  09 Oct, 2017   

 

 [facility]  [id number] [place] [address] 88278-
4132  09 Oct, 2017   

 

 [facility] [place] [facility]  [address] [place] [address]
-3024  04 Oct, 2017  Dysuria R30.0 and Acute cystitis without hematuria N30.00

 

 [facility]  [address] [place] [address] 95915-
2275  25 Aug, 2017  Epistaxis R04.0 and Chronic obstructive pulmonary disease, 
unspecified COPD type J44.9

 

 [facility]  [id number] [place] [address] 62088-
1144    Fall from other pedestrian conveyance, initial encounter 
V00.891A

 

 [facility] [id number] [place] [address] 21936-
8494    Chronic congestive heart failure, unspecified congestive 
heart failure type I50.9 ; Chronic obstructive pulmonary disease, unspecified 
COPD type J44.9 and Stasis dermatitis of both legs I87.2

 

 [facility]  [address] [facility] [id number] [address]
0219  08 May, 2017  Chronic congestive heart failure, unspecified congestive 
heart failure type I50.9

 

 [facility]  [address] [facility] [id number] [address]
7898  05 May, 2017  Coronary artery disease involving native coronary artery of 
native heart without angina pectoris I25.10 ; Chronic congestive heart failure, 
unspecified congestive heart failure type I50.9 and Chronic obstructive 
pulmonary disease, unspecified COPD type J44.9

 

 [facility]  [address] [address]-
9277     

 

 [facility]  [address] [address]-
1919     

 

 [facility]  [id number] [place] [address] 
[id number]     

 

 [facility]  [id number] 
[id number]  28 Mar, 2017   

 

 [facility]  [address] [place] 
[id number]  20 Mar, 2017  Essential hypertension I10 and Chronic congestive 
heart failure, unspecified congestive heart failure type I50.9

 

 [facility]  [id number] 
[id number]  13 Mar, 2017   

 

 [facility]  [id number] [address]-
5826  09 Mar, 2017   

 

 [facility]  [id number] [address]-
1407  19 Dec, 2016   

 

 [facility]  [address] [address]-
0542  19 Dec, 2016   

 

 [facility]  [id number] [address]-
8762     

 

 [facility]  [address] [address]-
1622     

 

 [facility]  [address], [place] [address]-
5839     

 

 [facility]  [address], [place] [address]-
0475     

 

 [facility]  [address], [place] [address]-
6138    Chronic congestive heart failure, unspecified congestive 
heart failure type I50.9

 

 [facility]  [address], [place] [address]    Pain of right lower extremity M79.604 ; History of atrial 
fibrillation without current medication Z86.79 and Acute deep vein thrombosis (
DVT) of femoral vein of right lower extremity I82.411

 

 [facility]  [id number] [place] [address] 34576-
7218     

 

 [facility]  [address], [place] [address]-
9381  22 Aug, 2016   

 

 [facility]  [address] [place] [address] 53870-
7767  22 Aug, 2016  Coronary artery disease involving native coronary artery of 
native heart without angina pectoris I25.10 ; Chronic congestive heart failure, 
unspecified congestive heart failure type I50.9 ; Cardiac defibrillator in 
place Z95.810 and Candidiasis B37.9







IMMUNIZATIONS

No Known Immunizations



SOCIAL HISTORY

Never Assessed



REASON FOR VISIT

Lab results



PLAN OF CARE





VITAL SIGNS





MEDICATIONS

Unknown Medications



RESULTS

No Results



PROCEDURES

No Known procedures



INSTRUCTIONS





MEDICATIONS ADMINISTERED

No Known Medications



MEDICAL (GENERAL) HISTORY







 Type  Description  Date

 

 Medical History  hypertension   

 

 Medical History  skin cancer-arms, face   

 

 Medical History  MI   

 

 Medical History  Pneumonia   

 

 Surgical History  heart cath-2 stents, multiple balloons   

 

 Surgical History  open heart surgery  

 

 Surgical History  defibrillator placed  

 

 Hospitalization History  surgery   

 

 Hospitalization History  pneumonia  

 

 Hospitalization History  broken left hip at   March

 

 Hospitalization History  Bronchitis/clinical Pneumonia,hypoxia,sepsis - [facility] 
[facility]  17

 

 Hospitalization History  [facility]- Cardiomyopathy, Defibrillator 
discharge  2018

 

 Hospitalization History  [facility]  2018

## 2018-11-10 NOTE — XMS REPORT
[facility]

 Created on: 09/10/2018



Kendall Snowden

External Reference #: [id number]

: 1940

Sex: Male



Demographics







 Address  [address] [place]  [address]

 

 Preferred Language  Unknown

 

 Marital Status  Unknown

 

 Hindu Affiliation  Unknown

 

 Race  Unknown

 

 Ethnic Group  Unknown





Author







 Author  MAHOGANY GREENWOOD

 

 [facility]

 

 Address  [address] [place]  [address]



 

 Phone  [phone]







Care Team Providers







 Care Team Member Name  Role  Phone

 

 MAHOGANY GREENWOOD  Unavailable  [phone]







PROBLEMS







 Type  Condition  ICD9-CM Code  [id number] Code  Onset Dates  Condition Status  
SNOMED Code

 

 Problem  Cardiac defibrillator in place     Z95.810     Active  525146929

 

 Problem  Essential hypertension     I10     Active  17072164

 

 Problem  Coronary artery disease involving native coronary artery of native 
heart without angina pectoris     I25.10     Active  [id number]

 

 Problem  Chronic congestive heart failure, unspecified congestive heart 
failure type     I50.9     Active  53891887

 

 Problem  Type 2 diabetes mellitus without complications     E11.9     Active  
308851313

 

 Problem  Long term current use of insulin     Z79.4     Active  394739907

 

 Problem  Stasis dermatitis of both legs     I87.2     Active  51942701

 

 Problem  Chronic obstructive pulmonary disease, unspecified COPD type     
J44.9     Active  09076300

 

 Problem  Other atherosclerosis of native arteries of extremities, right leg   
  I70.291     Active  48663328

 

 Problem  Ventricular arrhythmia     I49.9     Active  86020991







ALLERGIES

No Information



ENCOUNTERS







 Encounter  Location  Date  Diagnosis

 

 [facility]  [id number], [place] [address]
9602  20 Aug, 2018   

 

 [facility]  [id number], [place] [address]
0214  16 Aug, 2018   

 

 [facility]  [id number], [place] [address]
8133  15 Aug, 2018   

 

 [facility]  [id number] [place] [address]-
0069     

 

 [facility]  [id number], [place] [address]
9193     

 

 [facility]  [id number] [place] [address]53-
3750    Acute cystitis with hematuria N30.01 and Dysuria R30.0

 

 [facility]  [id number] [place] [address]67-
2003     

 

 [facility]  [id number] [place] [address] 66045-
5416     

 

 [facility]  [id number] [place] [address] 88194-
8459     

 

 [facility]  [id number] [place] [address] 40956-
2609    Type 2 diabetes mellitus without complications E11.9 ; Long 
term current use of insulin Z79.4 ; History of respiratory failure Z87.09 and 
History of sepsis Z86.19

 

 [facility]  [id number] [place] [address]71-
3605     

 

 [facility]  [id number] [place] [address] 01051-
9164     

 

 [facility]  [id number] [place] [address] 51043-
5546     

 

 [facility]  [id number] [place] [address] 43431-
2221     

 

 [facility]  [address], [place] [address]-
0661     

 

 [facility]  [id number] [place] [address] 77340-
0770     

 

 [facility]  [id number], [place] [phone]  25 May, 2018  Acute cystitis without hematuria N30.00

 

 [facility]  [address] [place] [phone]
[phone]  19 May, 2018  Acute cystitis without hematuria N30.00

 

 [facility]  [id number], [place] [address]
2001  18 May, 2018   

 

 [facility]  [address], [place] [address]-
5152  10 Apr, 2018  Medicare annual wellness visit, initial Z00.00 ; Encounter 
for immunization Z23 ; Chronic obstructive pulmonary disease, unspecified COPD 
type J44.9 ; Coronary artery disease involving native coronary artery of native 
heart without angina pectoris I25.10 ; Chronic congestive heart failure, 
unspecified congestive heart failure type I50.9 ; Essential hypertension I10 ; 
Ventricular arrhythmia I49.9 and Other atherosclerosis of native arteries of 
extremities, right leg I70.291

 

 [facility] [id number] [address]-
4669  08 Mar, 2018  Chronic congestive heart failure, unspecified congestive 
heart failure type I50.9

 

 [facility] [id number] [place] [address] 49573-
6973     

 

 [facility] [id number] [place] [address] 25814-
2812  13 2018  Chronic congestive heart failure, unspecified congestive 
heart failure type I50.9 ; Chronic obstructive pulmonary disease, unspecified 
COPD type J44.9 and Bilateral impacted cerumen H61.23

 

 [facility] [id number] [place] [address] 23289-
0729     

 

 [facility] [place] [address] [place] [address] 05974-
8489     

 

 [facility] [address] [address]-
2370  27 Dec, 2017   

 

 [place] [address] [place] [address] 62845-
4529  20 Dec, 2017   

 

 [facility] [id number] [place] [address] 64849-
0252  12 Dec, 2017  Coronary artery disease involving native coronary artery of 
native heart without angina pectoris I25.10

 

 [facility] [place] [address] [address]-
8016  11 Dec, 2017   

 

 [facility] [id number] [place] [address] 94269-
8250    Chronic obstructive pulmonary disease, unspecified COPD 
type J44.9 and History of pneumonia Z87.01

 

 [facility]  [id number] [place] [address] 
[phone]     

 

 [facility]  [address] [place] [address] 95413-
6597     

 

 [facility]  [id number] [place] [address] 03260-
6073     

 

 [facility]  [id number] [place] [address] 78344-
3288     

 

 [facility]  [id number] [place] [address] 60517-
4932  19 Oct, 2017   

 

 [facility]  [id number] [place] [address] 35890-
6059  17 Oct, 2017  Coronary artery disease involving native coronary artery of 
native heart without angina pectoris I25.10 and Ventricular arrhythmia I49.9

 

 [facility]  [address] [place] [address] 93415-
7565  09 Oct, 2017   

 

 [facility]  [id number] [place] [address] 22903-
1028  09 Oct, 2017   

 

 [facility] [place] [facility]  [address] [place] [phone]
[phone]  04 Oct, 2017  Dysuria R30.0 and Acute cystitis without hematuria N30.00

 

 [facility]  [address] [place] [address] 18314-
9091  25 Aug, 2017  Epistaxis R04.0 and Chronic obstructive pulmonary disease, 
unspecified COPD type J44.9

 

 [facility]  [id number] [place] [address] 08349-
9836    Fall from other pedestrian conveyance, initial encounter 
V00.891A

 

 [place] [id number] [place] [address] 86068-
5982    Chronic congestive heart failure, unspecified congestive 
heart failure type I50.9 ; Chronic obstructive pulmonary disease, unspecified 
COPD type J44.9 and Stasis dermatitis of both legs I87.2

 

 [facility]  [address] [facility] [id number] [phone]-
1073  08 May, 2017  Chronic congestive heart failure, unspecified congestive 
heart failure type I50.9

 

 [facility]  [address] [facility] [id number] [phone]-
3782  05 May, 2017  Coronary artery disease involving native coronary artery of 
native heart without angina pectoris I25.10 ; Chronic congestive heart failure, 
unspecified congestive heart failure type I50.9 and Chronic obstructive 
pulmonary disease, unspecified COPD type J44.9

 

 [facility]  [address] [address]-
4742     

 

 [facility]  [address] [address]-
0858     

 

 [facility]  [id number] [place] [address] 
[phone]     

 

 [facility]  [id number] 
[phone]  28 Mar, 2017   

 

 [facility]  [address] [place] 
[phone]  20 Mar, 2017  Essential hypertension I10 and Chronic congestive 
heart failure, unspecified congestive heart failure type I50.9

 

 [facility]  [id number] 
[phone]  13 Mar, 2017   

 

 [facility]  [id number] [address]-
0477  09 Mar, 2017   

 

 [facility]  [id number] [address]-
1237  19 Dec, 2016   

 

 [facility]  [id number] [address]-
1899  19 Dec, 2016   

 

 [facility]  [id number] [address]-
0845     

 

 [facility]  [address], [place] [address]-
6579     

 

 [facility]  [address], [place] [address]-
5417     

 

 [facility]  [address], [place] [address]-
9010     

 

 [facility]  [address], [place] [address]-
4569    Chronic congestive heart failure, unspecified congestive 
heart failure type I50.9

 

 [facility]  [address], [place] [address]    Pain of right lower extremity M79.604 ; History of atrial 
fibrillation without current medication Z86.79 and Acute deep vein thrombosis (
DVT) of femoral vein of right lower extremity I82.411

 

 [facility]  [id number] [place] [address] 34910-
2062     

 

 [facility]  [address], [place] [address]-
3792  22 Aug, 2016   

 

 [facility]  [address] [place] [address] 91628-
2629  22 Aug, 2016  Coronary artery disease involving native coronary artery of 
native heart without angina pectoris I25.10 ; Chronic congestive heart failure, 
unspecified congestive heart failure type I50.9 ; Cardiac defibrillator in 
place Z95.810 and Candidiasis B37.9







IMMUNIZATIONS

No Known Immunizations



SOCIAL HISTORY

Never Assessed



REASON FOR VISIT

Lab results



PLAN OF CARE





VITAL SIGNS





MEDICATIONS

Unknown Medications



RESULTS

No Results



PROCEDURES

No Known procedures



INSTRUCTIONS





MEDICATIONS ADMINISTERED

No Known Medications



MEDICAL (GENERAL) HISTORY







 Type  Description  Date

 

 Medical History  hypertension   

 

 Medical History  skin cancer-arms, face   

 

 Medical History  MI   

 

 Medical History  Pneumonia   

 

 Surgical History  heart cath-2 stents, multiple balloons   

 

 Surgical History  open heart surgery  

 

 Surgical History  defibrillator placed  

 

 Hospitalization History  surgery   

 

 Hospitalization History  pneumonia  

 

 Hospitalization History  broken left hip at   March

 

 Hospitalization History  Bronchitis/clinical Pneumonia,hypoxia,sepsis - [facility] 
[facility]  17

 

 Hospitalization History  [facility]- Cardiomyopathy, Defibrillator 
discharge  2018

 

 Hospitalization History  [facility]  2018

## 2018-11-10 NOTE — XMS REPORT
[facility]

 Created on: 2018



Kendall Snowden

External Reference #: [id number]

: 1940

Sex: Male



Demographics







 Address  [address] [place]  [address]

 

 Preferred Language  Unknown

 

 Marital Status  Unknown

 

 Restorationist Affiliation  Unknown

 

 Race  Unknown

 

 Ethnic Group  Unknown





Author







 Author  MAHOGANY GREENWOOD

 

 [facility]

 

 Address  [address] [place]  [address]



 

 Phone  [phone]







Care Team Providers







 Care Team Member Name  Role  Phone

 

 MAHOGANY GREENWOOD  Unavailable  [phone]







PROBLEMS







 Type  Condition  ICD9-CM Code  DBM54-IV Code  Onset Dates  Condition Status  
SNOMED Code

 

 Problem  Cardiac defibrillator in place     Z95.810     Active  785334462

 

 Problem  Essential hypertension     I10     Active  16530554

 

 Problem  Coronary artery disease involving native coronary artery of native 
heart without angina pectoris     I25.10     Active  [id number]

 

 Problem  Chronic congestive heart failure, unspecified congestive heart 
failure type     I50.9     Active  96244793

 

 Problem  Type 2 diabetes mellitus without complications     E11.9     Active  
036168009

 

 Problem  Long term current use of insulin     Z79.4     Active  082269671

 

 Problem  Stasis dermatitis of both legs     I87.2     Active  60875107

 

 Problem  Chronic obstructive pulmonary disease, unspecified COPD type     
J44.9     Active  54632296

 

 Problem  Other atherosclerosis of native arteries of extremities, right leg   
  I70.291     Active  38912994

 

 Problem  Ventricular arrhythmia     I49.9     Active  28960304







ALLERGIES







 Substance  Reaction  Event Type  Date  Status

 

 Penicillin V Potassium  rash  Drug Allergy  17 Oct, 2017  Active

 

 Codeine Phosphate  Unknown  Drug Allergy  17 Oct, 2017  Active







ENCOUNTERS







 Encounter  Location  Date  Diagnosis

 

 [facility]  [id number], [place] [address]
5102    Acute cystitis with hematuria N30.01 and Dysuria R30.0

 

 [facility]  [id number], [place] [address]
4380     

 

 [facility]  [id number] [place] [address]-
9668     

 

 [facility]  [address], [place] [address]     

 

 [facility]  [address], [place] [address]
2012    Type 2 diabetes mellitus without complications E11.9 ; Long 
term current use of insulin Z79.4 ; History of respiratory failure Z87.09 and 
History of sepsis Z86.19

 

 [facility]  [address] [address]-
8071  17 2018   

 

 [facility]  [id number] [place] [address]-
9131     

 

 [facility]  [id number] [place] [address]-
3565     

 

 [facility]  [id number] [place] [address]-
7162     

 

 [facility]  [id number] [place] [address]-
5100     

 

 [facility]  [id number] [place] [address]-
8507     

 

 [facility]  [address] [place] [phone]  25 May, 2018  Acute cystitis without hematuria N30.00

 

 [facility]  [address] [place] [address]
-[phone]  19 May, 2018  Acute cystitis without hematuria N30.00

 

 [facility]  [address], [place] [address]-
4677  18 May, 2018   

 

 [facility]  [id number] [place] [address]-
3481  10 Apr, 2018  Medicare annual wellness visit, initial Z00.00 ; Encounter 
for immunization Z23 ; Chronic obstructive pulmonary disease, unspecified COPD 
type J44.9 ; Coronary artery disease involving native coronary artery of native 
heart without angina pectoris I25.10 ; Chronic congestive heart failure, 
unspecified congestive heart failure type I50.9 ; Essential hypertension I10 ; 
Ventricular arrhythmia I49.9 and Other atherosclerosis of native arteries of 
extremities, right leg I70.291

 

 [facility]  [id number], [place] [address]-
1740  08 Mar, 2018  Chronic congestive heart failure, unspecified congestive 
heart failure type I50.9

 

 [facility]  [id number] [place] [address]-
7439     

 

 [facility]  [address] [place] [address]-
0401    Chronic congestive heart failure, unspecified congestive 
heart failure type I50.9 ; Chronic obstructive pulmonary disease, unspecified 
COPD type J44.9 and Bilateral impacted cerumen H61.23

 

 [facility]  [address] [place] [address]-
5292     

 

 [facility]  [id number] [place] [address] 18466-
1870     

 

 [facility]  [id number] [place] [address]-
6594  27 Dec, 2017   

 

 [facility]  [id number] [place] [address]-
4800  20 Dec, 2017   

 

 [facility]  [address] [place] [address]-
9388  12 Dec, 2017  Coronary artery disease involving native coronary artery of 
native heart without angina pectoris I25.10

 

 [facility]  [address], [place] [address]-
1512  11 Dec, 2017   

 

 [facility]  [address] [place] [address]-
8838    Chronic obstructive pulmonary disease, unspecified COPD 
type J44.9 and History of pneumonia Z87.01

 

 [facility]  [id number] [place] [address] 
[id number]     

 

 [facility]  [id number] [place] [address]-
1264     

 

 [facility]  [address] [place] [address] 17953-
0424     

 

 [facility]  [address], [place] [address]-
1916     

 

 [facility]  [id number] [place] [address]-
7870  19 Oct, 2017   

 

 [facility]  [address] [place] [address]-
8344  17 Oct, 2017  Coronary artery disease involving native coronary artery of 
native heart without angina pectoris I25.10 and Ventricular arrhythmia I49.9

 

 [id number] [place] [address]-
4675  09 Oct, 2017   

 

 [id number] [place] [address]-
1257  09 Oct, 2017   

 

 [facility]  [id number] [place] [phone]  04 Oct, 2017  Dysuria R30.0 and Acute cystitis without hematuria N30.00

 

 [id number] [place] [address]-
7857  25 Aug, 2017  Epistaxis R04.0 and Chronic obstructive pulmonary disease, 
unspecified COPD type J44.9

 

 [id number] [place] [address] 8[address]-
5499    Fall from other pedestrian conveyance, initial encounter 
V00.891A

 

 [id number] [place] [address]-
7649    Chronic congestive heart failure, unspecified congestive 
heart failure type I50.9 ; Chronic obstructive pulmonary disease, unspecified 
COPD type J44.9 and Stasis dermatitis of both legs I87.2

 

 [address] [place] [address] 7[address]-
8830  08 May, 2017  Chronic congestive heart failure, unspecified congestive 
heart failure type I50.9

 

 [address] [place] [address] 77[address]-
6654  05 May, 2017  Coronary artery disease involving native coronary artery of 
native heart without angina pectoris I25.10 ; Chronic congestive heart failure, 
unspecified congestive heart failure type I50.9 and Chronic obstructive 
pulmonary disease, unspecified COPD type J44.9

 

 [facility]  [address] [facility] [id number] [address]-
[address]     

 

 [facility]  [address] [facility] [id number], [place] [address]-
9736     

 

 [facility]  [address] [id number] 
[id number]     

 

 [facility]  [address] [id number] 
[id number]  28 Mar, 2017   

 

 [facility]  [address] [place] 
[id number]  20 Mar, 2017  Essential hypertension I10 and Chronic congestive 
heart failure, unspecified congestive heart failure type I50.9

 

 [facility]  [id number] 
[id number]  13 Mar, 2017   

 

 [facility]  [address] [facility] [id number], [place] [address]-
9060  09 Mar, 2017   

 

 [facility]  [id number], [place] [address]-
2568  19 Dec, 2016   

 

 [facility]  [address] [facility] [id number], [place] [address]-
7574  19 Dec, 2016   

 

 [facility]  [address] [facility] [id number], [place] [address]-
8899     

 

 [facility]  [address] [facility] [id number], [place] [address]-
6739     

 

 [facility]  [address] [facility] [id number], [place] [address]-
9590     

 

 [facility]  [address] [facility] [id number], [place] [address]-
0095     

 

 [facility]  [address] [facility] [id number], [place] [address]-
6099    Chronic congestive heart failure, unspecified congestive 
heart failure type I50.9

 

 [facility]  [address] [facility] [id number], [place] [phone]    Pain of right lower extremity M79.604 ; History of atrial 
fibrillation without current medication Z86.79 and Acute deep vein thrombosis (
DVT) of femoral vein of right lower extremity I82.411

 

 [facility]  [address] [facility] [id number], [place] [address]-
4778     

 

 [facility]  [address], [place] [address]-
3961  22 Aug, 2016   

 

 [facility]  [address] [facility] [id number], [place] [address]-
8914  22 Aug, 2016  Coronary artery disease involving native coronary artery of 
native heart without angina pectoris I25.10 ; Chronic congestive heart failure, 
unspecified congestive heart failure type I50.9 ; Cardiac defibrillator in 
place Z95.810 and Candidiasis B37.9







IMMUNIZATIONS

No Known Immunizations



SOCIAL HISTORY

Never Assessed



REASON FOR VISIT

wheelchair eval- needs a new wheelchair, the one he is using now is his wifes, 
he uses a walker but is not stable with walker and candarren Garcia RN



PLAN OF CARE







 Activity  Details









  









 Follow Up  Regular appt Reason:







VITAL SIGNS







 Height  65 in  2017-10-17

 

 Weight  200 lbs  2017-10-17

 

 Temperature  97.9 degrees Fahrenheit  2017-10-17

 

 Heart Rate  78 bpm  2017-10-17

 

 Respiratory Rate  18   2017-10-17

 

 BMI  33.28 kg/m2  2017-10-17

 

 Blood pressure systolic  138 mmHg  2017-10-17

 

 Blood pressure diastolic  78 mmHg  2017-10-17







MEDICATIONS







 Medication  Instructions  Dosage  Frequency  Start Date  End Date  Duration  
Status

 

 Wheelchair -     as directed     17 Oct, 2017        Active

 

 Furosemide 40 MG  Orally Once a day  1 tablet  24h           Active

 

 Nexium 40 MG  Orally Once a day  1 capsule  24h           Active

 

 Digoxin 125 MCG  Orally Once a day  1 tablet  24h           Active

 

 MiraLax 17 gm/dose  Orally Once a day  17 grams mixed in 8 oz of water or 
juice  24h           Active

 

 Benzonatate 200 mg  Orally 2 times a day  1 capsule as needed  12h           
Not-Taking

 

 Amiodarone HCl 200 MG  Orally Once a day  1 tablet  24h           Active

 

 Mexiletine HCl 150 MG  Orally every 8 hrs  1 capsule  8h           Active

 

 Klor-Con 8 MEQ  Orally Once a day  1 tablet  24h           Active

 

 Keflex 500 mg  Orally every 12 hrs  1 capsule  12h  09 Oct, 2017  19 Oct, 2017
  10 day(s)  Active

 

 Amlodipine Besylate 5 MG  Orally Once a day  1 tablet  24h           Active

 

 ProAir  (90 Base) MCG/ACT  Inhalation 4 times a day  2 puffs as needed  
6h  05 May, 2017     30 days  Active

 

 Metoprolol Tartrate 50 mg  Orally Twice a day  1/2 tablet with food  12h      
     Active







RESULTS

No Results



PROCEDURES







 Procedure  Date Ordered  Result  Body Site

 

 [facility] VISIT ESTABLISHED PATIENT  Oct 17, 2017      







INSTRUCTIONS





MEDICATIONS ADMINISTERED

No Known Medications



MEDICAL (GENERAL) HISTORY







 Type  Description  Date

 

 Medical History  hypertension   

 

 Medical History  skin cancer-arms, face   

 

 Medical History  MI   

 

 Medical History  Pneumonia   

 

 Surgical History  heart cath-2 stents, multiple balloons   

 

 Surgical History  open heart surgery  

 

 Surgical History  defibrillator placed  

 

 Hospitalization History  surgery   

 

 Hospitalization History  pneumonia  

 

 Hospitalization History  broken left hip at   March

 

 Hospitalization History  Bronchitis/clinical Pneumonia,hypoxia,sepsis - [facility] [place]  17

 

 Hospitalization History  [facility]- Cardiomyopathy, Defibrillator 
discharge  2018

 

 Hospitalization History  [facility]  2018

## 2018-11-10 NOTE — XMS REPORT
[facility]

 Created on: 2018



Kendall Snowden

External Reference #: [id number]

: 1940

Sex: Male



Demographics







 Address  [address] [place]  [address]

 

 Preferred Language  Unknown

 

 Marital Status  Unknown

 

 Anabaptist Affiliation  Unknown

 

 Race  Unknown

 

 Ethnic Group  Unknown





Author







 Author  MAHOGANY GREENWOOD

 

 [facility]

 

 Address  [address] [place]  [address]



 

 Phone  [phone]







Care Team Providers







 Care Team Member Name  Role  Phone

 

 MAHOGANY GREENWOOD  Unavailable  [phone]







PROBLEMS







 Type  Condition  ICD9-CM Code  AOP43-JW Code  Onset Dates  Condition Status  
SNOMED Code

 

 Problem  Cardiac defibrillator in place     Z95.810     Active  577953764

 

 Problem  Essential hypertension     I10     Active  72616548

 

 Problem  Coronary artery disease involving native coronary artery of native 
heart without angina pectoris     I25.10     Active  [id number]

 

 Problem  Chronic congestive heart failure, unspecified congestive heart 
failure type     I50.9     Active  34450820

 

 Problem  Type 2 diabetes mellitus without complications     E11.9     Active  
693019962

 

 Problem  Long term current use of insulin     Z79.4     Active  931667604

 

 Problem  Stasis dermatitis of both legs     I87.2     Active  57151651

 

 Problem  Chronic obstructive pulmonary disease, unspecified COPD type     
J44.9     Active  69987031

 

 Problem  Other atherosclerosis of native arteries of extremities, right leg   
  I70.291     Active  51010999

 

 Problem  Ventricular arrhythmia     I49.9     Active  68175671







ALLERGIES

No Information



ENCOUNTERS







 Encounter  Location  Date  Diagnosis

 

 [facility]  [address] [address]-
[phone]  26 Sep, 2018   

 

 [facility]  [address] [address]
9278  11 Sep, 2018   

 

 [facility]  [address] [place] [address]-
6116  20 Aug, 2018   

 

 [facility]  [id number] [place] [phone]-
[phone]  16 Aug, 2018   

 

 [facility]  [address] [address]
3582  15 Aug, 2018   

 

 [facility]  [id number] [place] [address]-
9478     

 

 [facility]  [address] [address]
9995     

 

 [facility]  [address] [address]-
4812    Acute cystitis with hematuria N30.01 and Dysuria R30.0

 

 [facility]  [id number] [place] [address]-
8478     

 

 [facility]  [address] [address]-
5433     

 

 [facility]  [id number] [place] [address]-
6292     

 

 [facility]  [address] [address]-
2440    Type 2 diabetes mellitus without complications E11.9 ; Long 
term current use of insulin Z79.4 ; History of respiratory failure Z87.09 and 
History of sepsis Z86.19

 

 [facility]  [address] [address]-
6835     

 

 [facility]  [address] [address]
2467     

 

 [facility]  [address] [address]-
2762     

 

 [facility]  [address] [address]-
9619     

 

 [facility]  [address] [address]-
7604     

 

 [facility]  [address] [address]-
3098     

 

 [facility]  [address] [phone]  25 May, 2018  Acute cystitis without hematuria N30.00

 

 [facility]  [id number] [phone]  19 May, 2018  Acute cystitis without hematuria N30.00

 

 [facility]  [id number] [place] [address]-
2127  18 May, 2018   

 

 [facility]  [id number] [place] [address]-
7871  10 Apr, 2018  Medicare annual wellness visit, initial Z00.00 ; Encounter 
for immunization Z23 ; Chronic obstructive pulmonary disease, unspecified COPD 
type J44.9 ; Coronary artery disease involving native coronary artery of native 
heart without angina pectoris I25.10 ; Chronic congestive heart failure, 
unspecified congestive heart failure type I50.9 ; Essential hypertension I10 ; 
Ventricular arrhythmia I49.9 and Other atherosclerosis of native arteries of 
extremities, right leg I70.291

 

 [facility]  [id number] [place] [address]-
6859  08 Mar, 2018  Chronic congestive heart failure, unspecified congestive 
heart failure type I50.9

 

 [facility]  [id number] [place] [address]-
2228     

 

 [facility]  [id number] [place] [address]-
3063  13 2018  Chronic congestive heart failure, unspecified congestive 
heart failure type I50.9 ; Chronic obstructive pulmonary disease, unspecified 
COPD type J44.9 and Bilateral impacted cerumen H61.23

 

 [facility]  [id number] [place] [address]-
7982     

 

 [facility]  [id number] [place] [address]-
0227     

 

 [facility]  [id number] [place] [address]-
7361  27 Dec, 2017   

 

 [facility]  [id number] [place] [address]-
[phone]  20 Dec, 2017   

 

 [facility]  [id number] [place] [address]-
4596  12 Dec, 2017  Coronary artery disease involving native coronary artery of 
native heart without angina pectoris I25.10

 

 [facility]  [address] [address]-
5971  11 Dec, 2017   

 

 [facility]  [address] [place] [address]-
6062    Chronic obstructive pulmonary disease, unspecified COPD 
type J44.9 and History of pneumonia Z87.01

 

 [facility] [place]  [id number] [place] [address] 
[phone]     

 

 [facility]  [id number] [place] [address]-
1694     

 

 [facility]  [id number] [place] [address]-
8664     

 

 [facility]  [id number] [place] [address]-
6089     

 

 [facility]  [id number] [place] [address]-
5191  19 Oct, 2017   

 

 [facility]  [id number] [place] [address]-
2576  17 Oct, 2017  Coronary artery disease involving native coronary artery of 
native heart without angina pectoris I25.10 and Ventricular arrhythmia I49.9

 

 [facility]  [address] [place] [address]-
2779  09 Oct, 2017   

 

 [facility]  [id number] [place] [address] 18169-
9386  09 Oct, 2017   

 

 [facility]  [address] [place] [phone]  04 Oct, 2017  Dysuria R30.0 and Acute cystitis without hematuria N30.00

 

 [facility]  [address] [place] [address] 86[address]-
4407  25 Aug, 2017  Epistaxis R04.0 and Chronic obstructive pulmonary disease, 
unspecified COPD type J44.9

 

 [facility]  [id number] [place] [address]-
7755    Fall from other pedestrian conveyance, initial encounter 
V00.891A

 

 [facility]  [address] [address]-
1152    Chronic congestive heart failure, unspecified congestive 
heart failure type I50.9 ; Chronic obstructive pulmonary disease, unspecified 
COPD type J44.9 and Stasis dermatitis of both legs I87.2

 

 [facility]  [address] [address]-
8630  08 May, 2017  Chronic congestive heart failure, unspecified congestive 
heart failure type I50.9

 

 [facility]  [address] [address]-
2702  05 May, 2017  Coronary artery disease involving native coronary artery of 
native heart without angina pectoris I25.10 ; Chronic congestive heart failure, 
unspecified congestive heart failure type I50.9 and Chronic obstructive 
pulmonary disease, unspecified COPD type J44.9

 

 [facility]  [address] [address]-
7075     

 

 [facility]  [address] [address]-
1859     

 

 [facility] [place]  [id number] [place] [address] 
[phone]     

 

 [facility] [place]  [id number] 
[phone]  28 Mar, 2017   

 

 [facility]  [address] [place] 
[phone]  20 Mar, 2017  Essential hypertension I10 and Chronic congestive 
heart failure, unspecified congestive heart failure type I50.9

 

 [facility] [place]  [id number] 
[phone]  13 Mar, 2017   

 

 [facility]  [address] [address]-
2628  09 Mar, 2017   

 

 [facility]  [id number] [address]-
0407  19 Dec, 2016   

 

 [facility]  [address] [address]-
3181  19 Dec, 2016   

 

 [facility]  [address] [address]-
1298     

 

 [facility]  [address] [address]-
4719     

 

 [facility]  [address] [place] [address] 106[address]-
1814     

 

 [facility]  [address] [place] [address]-
0054     

 

 [facility]  [address] [place] [address]-
1223    Chronic congestive heart failure, unspecified congestive 
heart failure type I50.9

 

 [facility]  [address] [phone]    Pain of right lower extremity M79.604 ; History of atrial 
fibrillation without current medication Z86.79 and Acute deep vein thrombosis (
DVT) of femoral vein of right lower extremity I82.411

 

 [facility]  [address] [place] [address]-
0131     

 

 [facility]  [address] [address]-
9748  22 Aug, 2016   

 

 [facility]  [address] [address]-
1640  22 Aug, 2016  Coronary artery disease involving native coronary artery of 
native heart without angina pectoris I25.10 ; Chronic congestive heart failure, 
unspecified congestive heart failure type I50.9 ; Cardiac defibrillator in 
place Z95.810 and Candidiasis B37.9







IMMUNIZATIONS

No Known Immunizations



SOCIAL HISTORY

Never Assessed



REASON FOR VISIT





PLAN OF CARE





VITAL SIGNS





MEDICATIONS

Unknown Medications



RESULTS

No Results



PROCEDURES

No Known procedures



INSTRUCTIONS





MEDICATIONS ADMINISTERED

No Known Medications



MEDICAL (GENERAL) HISTORY







 Type  Description  Date

 

 Medical History  hypertension   

 

 Medical History  skin cancer-arms, face   

 

 Medical History  MI   

 

 Medical History  Pneumonia   

 

 Surgical History  heart cath-2 stents, multiple balloons   

 

 Surgical History  open heart surgery  

 

 Surgical History  defibrillator placed  

 

 Hospitalization History  surgery   

 

 Hospitalization History  pneumonia  

 

 Hospitalization History  broken left hip at   March

 

 Hospitalization History  Bronchitis/clinical Pneumonia,hypoxia,sepsis - [facility] 
[facility]  17

 

 Hospitalization History  [facility]- Cardiomyopathy, Defibrillator 
discharge  2018

 

 Hospitalization History  [facility]  2018

## 2018-11-10 NOTE — XMS REPORT
[facility]

 Created on: 2018



Kendall Snowden

External Reference #: [id number]

: 1940

Sex: Male



Demographics







 Address  [address] [place]  [address]

 

 Preferred Language  Unknown

 

 Marital Status  Unknown

 

 Buddhism Affiliation  Unknown

 

 Race  Unknown

 

 Ethnic Group  Unknown





Author







 Author  MAHOGANY GREENWOOD

 

 [facility]

 

 Address  [address] [place]  [address]



 

 Phone  [phone]







Care Team Providers







 Care Team Member Name  Role  Phone

 

 MAHOGANY GREENWOOD  Unavailable  [phone]







PROBLEMS







 Type  Condition  ICD9-CM Code  RLB47-BY Code  Onset Dates  Condition Status  
SNOMED Code

 

 Problem  Cardiac defibrillator in place     Z95.810     Active  710221078

 

 Problem  Essential hypertension     I10     Active  88668389

 

 Problem  Coronary artery disease involving native coronary artery of native 
heart without angina pectoris     I25.10     Active  [id number]

 

 Problem  Chronic congestive heart failure, unspecified congestive heart 
failure type     I50.9     Active  14601369

 

 Problem  Type 2 diabetes mellitus without complications     E11.9     Active  
986505600

 

 Problem  Long term current use of insulin     Z79.4     Active  552733248

 

 Problem  Stasis dermatitis of both legs     I87.2     Active  59970248

 

 Problem  Chronic obstructive pulmonary disease, unspecified COPD type     
J44.9     Active  68313463

 

 Problem  Other atherosclerosis of native arteries of extremities, right leg   
  I70.291     Active  54882085

 

 Problem  Ventricular arrhythmia     I49.9     Active  90802609







ALLERGIES

No Information



ENCOUNTERS







 Encounter  Location  Date  Diagnosis

 

 [facility]  [id number], [place] [address]  20 Aug, 2018   

 

 [facility]  [id number] [address]
8627  16 Aug, 2018   

 

 [facility]  [address] [address]
3872  15 Aug, 2018   

 

 [facility]  [id number] [place] [address]-
4433     

 

 [facility]  [address] [address]-
[address]     

 

 [facility]  [id number] [place] [address]-
8806    Acute cystitis with hematuria N30.01 and Dysuria R30.0

 

 [facility]  [id number] [place] [address]74-
6311     

 

 [facility]  [id number] [place] [address]76-
6771     

 

 [facility]  [id number] [place] [address] 80118-
3357     

 

 [facility]  [id number] [place] [address]686-
5750    Type 2 diabetes mellitus without complications E11.9 ; Long 
term current use of insulin Z79.4 ; History of respiratory failure Z87.09 and 
History of sepsis Z86.19

 

 [facility]  [id number] [place] [address]-
3719     

 

 [facility]  [id number] [place] [address] 23607-
2232     

 

 [facility]  [id number] [place] [address]56-
3852     

 

 [facility]  [id number] [place] [address] 33973-
2119     

 

 [facility]  [address] [address]-
9091     

 

 [facility]  [id number] [place] [address]-
4234     

 

 [facility]  [address], [place] [phone]  25 May, 2018  Acute cystitis without hematuria N30.00

 

 [facility]  [address] [place] [address]
[phone]  19 May, 2018  Acute cystitis without hematuria N30.00

 

 [facility]  [address], [place] [address]-
1123  18 May, 2018   

 

 [facility]  [address], [place] [address]-
5174  10 Apr, 2018  Medicare annual wellness visit, initial Z00.00 ; Encounter 
for immunization Z23 ; Chronic obstructive pulmonary disease, unspecified COPD 
type J44.9 ; Coronary artery disease involving native coronary artery of native 
heart without angina pectoris I25.10 ; Chronic congestive heart failure, 
unspecified congestive heart failure type I50.9 ; Essential hypertension I10 ; 
Ventricular arrhythmia I49.9 and Other atherosclerosis of native arteries of 
extremities, right leg I70.291

 

 [place] [facility] [id number] [address]-
7203  08 Mar, 2018  Chronic congestive heart failure, unspecified congestive 
heart failure type I50.9

 

 [place] [facility] [id number] [place] [address] 79919-
5990     

 

 [facility] [id number] [place] [address] 63928-
1405  13 2018  Chronic congestive heart failure, unspecified congestive 
heart failure type I50.9 ; Chronic obstructive pulmonary disease, unspecified 
COPD type J44.9 and Bilateral impacted cerumen H61.23

 

 [facility] [id number] [place] [address]09-
5900     

 

 [place] [address] [place] [address] 74367-
8397     

 

 [place] [address] [address]-
1626  27 Dec, 2017   

 

 [place] [address] [place] [address] 38992-
0823  20 Dec, 2017   

 

 [facility] [id number] [place] [address] 49169-
3175  12 Dec, 2017  Coronary artery disease involving native coronary artery of 
native heart without angina pectoris I25.10

 

 [place] [address] [address]-
4858  11 Dec, 2017   

 

 [facility] [id number] [place] [address] 20792-
0051    Chronic obstructive pulmonary disease, unspecified COPD 
type J44.9 and History of pneumonia Z87.01

 

 [facility]  [id number] [place] [address] 
[id number]     

 

 [facility]  [address] [place] [address]204-
1833     

 

 [facility]  [id number] [place] [address] 70476-
0496     

 

 [facility]  [id number] [place] [address] 05247-
2350     

 

 [facility]  [id number] [place] [address] 81501-
8045  19 Oct, 2017   

 

 [facility]  [id number] [place] [address] 06321-
7845  17 Oct, 2017  Coronary artery disease involving native coronary artery of 
native heart without angina pectoris I25.10 and Ventricular arrhythmia I49.9

 

 [facility]  [address] [place] [address] 61106-
7128  09 Oct, 2017   

 

 [facility]  [id number] [place] [address] 13178-
0821  09 Oct, 2017   

 

 [facility] [place] [facility]  [address] [place] [address]
[phone]  04 Oct, 2017  Dysuria R30.0 and Acute cystitis without hematuria N30.00

 

 [facility]  [address] [place] [address] 24118-
2808  25 Aug, 2017  Epistaxis R04.0 and Chronic obstructive pulmonary disease, 
unspecified COPD type J44.9

 

 [facility]  [id number] [place] [address] 42638-
5079    Fall from other pedestrian conveyance, initial encounter 
V00.891A

 

 [place] [id number] [place] [address] 62670-
0953    Chronic congestive heart failure, unspecified congestive 
heart failure type I50.9 ; Chronic obstructive pulmonary disease, unspecified 
COPD type J44.9 and Stasis dermatitis of both legs I87.2

 

 [facility]  [address] [facility] [id number] [address]-
3061  08 May, 2017  Chronic congestive heart failure, unspecified congestive 
heart failure type I50.9

 

 [facility]  [address] [facility] [id number] [address]-
9036  05 May, 2017  Coronary artery disease involving native coronary artery of 
native heart without angina pectoris I25.10 ; Chronic congestive heart failure, 
unspecified congestive heart failure type I50.9 and Chronic obstructive 
pulmonary disease, unspecified COPD type J44.9

 

 [facility]  [address] [address]-
4331     

 

 [facility]  [address] [address]-
6499     

 

 [facility]  [id number] [place] [address] 
[phone]     

 

 [facility]  [id number] 
[phone]  28 Mar, 2017   

 

 [facility]  [address] [place] 
[phone]  20 Mar, 2017  Essential hypertension I10 and Chronic congestive 
heart failure, unspecified congestive heart failure type I50.9

 

 [facility]  [id number] 
[phone]  13 Mar, 2017   

 

 [facility]  [id number] [address]-
0805  09 Mar, 2017   

 

 [facility]  [id number] [address]-
0875  19 Dec, 2016   

 

 [facility]  [address] [address]-
4734  19 Dec, 2016   

 

 [facility]  [id number] [address]-
7478     

 

 [facility]  [address], [place] [address]-
5482     

 

 [facility]  [address], [place] [address]-
5477     

 

 [facility]  [address], [place] [address]-
5322     

 

 [facility]  [address], [place] [address]-
1784    Chronic congestive heart failure, unspecified congestive 
heart failure type I50.9

 

 [facility]  [address], [place] [address]    Pain of right lower extremity M79.604 ; History of atrial 
fibrillation without current medication Z86.79 and Acute deep vein thrombosis (
DVT) of femoral vein of right lower extremity I82.411

 

 [facility]  [id number] [place] [address] 90848-
8428     

 

 [facility]  [address] [address]-
6186  22 Aug, 2016   

 

 [facility]  [address] [place] [address] 61764-
7261  22 Aug, 2016  Coronary artery disease involving native coronary artery of 
native heart without angina pectoris I25.10 ; Chronic congestive heart failure, 
unspecified congestive heart failure type I50.9 ; Cardiac defibrillator in 
place Z95.810 and Candidiasis B37.9







IMMUNIZATIONS

No Known Immunizations



SOCIAL HISTORY

Never Assessed



REASON FOR VISIT

POC



PLAN OF CARE





VITAL SIGNS





MEDICATIONS

Unknown Medications



RESULTS

No Results



PROCEDURES

No Known procedures



INSTRUCTIONS





MEDICATIONS ADMINISTERED

No Known Medications



MEDICAL (GENERAL) HISTORY







 Type  Description  Date

 

 Medical History  hypertension   

 

 Medical History  skin cancer-arms, face   

 

 Medical History  MI   

 

 Medical History  Pneumonia   

 

 Surgical History  heart cath-2 stents, multiple balloons   

 

 Surgical History  open heart surgery  

 

 Surgical History  defibrillator placed  

 

 Hospitalization History  surgery   

 

 Hospitalization History  pneumonia  

 

 Hospitalization History  broken left hip at   March

 

 Hospitalization History  Bronchitis/clinical Pneumonia,hypoxia,sepsis - [facility] 
[facility]  17

 

 Hospitalization History  [facility]- Cardiomyopathy, Defibrillator 
discharge  2018

 

 Hospitalization History  [facility]  2018
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
## 2018-11-11 VITALS — SYSTOLIC BLOOD PRESSURE: 157 MMHG | DIASTOLIC BLOOD PRESSURE: 78 MMHG

## 2018-11-11 VITALS — SYSTOLIC BLOOD PRESSURE: 134 MMHG | DIASTOLIC BLOOD PRESSURE: 62 MMHG

## 2018-11-11 VITALS — SYSTOLIC BLOOD PRESSURE: 159 MMHG | DIASTOLIC BLOOD PRESSURE: 80 MMHG

## 2018-11-11 VITALS — DIASTOLIC BLOOD PRESSURE: 74 MMHG | SYSTOLIC BLOOD PRESSURE: 168 MMHG

## 2018-11-11 VITALS — DIASTOLIC BLOOD PRESSURE: 79 MMHG | SYSTOLIC BLOOD PRESSURE: 149 MMHG

## 2018-11-11 VITALS — DIASTOLIC BLOOD PRESSURE: 72 MMHG | SYSTOLIC BLOOD PRESSURE: 137 MMHG

## 2018-11-11 LAB
BASOPHILS # BLD AUTO: 0 10^3/UL (ref 0–0.1)
BASOPHILS NFR BLD AUTO: 0 % (ref 0–10)
BUN/CREAT SERPL: 12
CALCIUM SERPL-MCNC: 9.4 MG/DL (ref 8.5–10.1)
CHLORIDE SERPL-SCNC: 99 MMOL/L (ref 98–107)
CO2 SERPL-SCNC: 20 MMOL/L (ref 21–32)
CREAT SERPL-MCNC: 1.19 MG/DL (ref 0.6–1.3)
EOSINOPHIL # BLD AUTO: 0 10^3/UL (ref 0–0.3)
EOSINOPHIL NFR BLD AUTO: 0 % (ref 0–10)
ERYTHROCYTE [DISTWIDTH] IN BLOOD BY AUTOMATED COUNT: 15.6 % (ref 10–14.5)
GFR SERPLBLD BASED ON 1.73 SQ M-ARVRAT: 59 ML/MIN
GLUCOSE SERPL-MCNC: 101 MG/DL (ref 70–105)
HCT VFR BLD CALC: 35 % (ref 40–54)
HGB BLD-MCNC: 11.4 G/DL (ref 13.3–17.7)
LYMPHOCYTES # BLD AUTO: 1.2 X 10^3 (ref 1–4)
LYMPHOCYTES NFR BLD AUTO: 7 % (ref 12–44)
MANUAL DIFFERENTIAL PERFORMED BLD QL: NO
MCH RBC QN AUTO: 30 PG (ref 25–34)
MCHC RBC AUTO-ENTMCNC: 32 G/DL (ref 32–36)
MCV RBC AUTO: 93 FL (ref 80–99)
MONOCYTES # BLD AUTO: 2.1 X 10^3 (ref 0–1)
MONOCYTES NFR BLD AUTO: 12 % (ref 0–12)
NEUTROPHILS # BLD AUTO: 14.3 X 10^3 (ref 1.8–7.8)
NEUTROPHILS NFR BLD AUTO: 81 % (ref 42–75)
PLATELET # BLD: 283 10^3/UL (ref 130–400)
PMV BLD AUTO: 10 FL (ref 7.4–10.4)
POTASSIUM SERPL-SCNC: 3.9 MMOL/L (ref 3.6–5)
RBC # BLD AUTO: 3.81 10^6/UL (ref 4.35–5.85)
SODIUM SERPL-SCNC: 132 MMOL/L (ref 135–145)
WBC # BLD AUTO: 17.7 10^3/UL (ref 4.3–11)

## 2018-11-11 RX ADMIN — INSULIN ASPART SCH UNIT: 100 INJECTION, SOLUTION INTRAVENOUS; SUBCUTANEOUS at 20:52

## 2018-11-11 RX ADMIN — METOPROLOL TARTRATE SCH MG: 25 TABLET, FILM COATED ORAL at 20:56

## 2018-11-11 RX ADMIN — SODIUM CHLORIDE SCH MLS/HR: 900 INJECTION, SOLUTION INTRAVENOUS at 02:00

## 2018-11-11 RX ADMIN — MEXILETINE HYDROCHLORIDE SCH MG: 150 CAPSULE ORAL at 13:35

## 2018-11-11 RX ADMIN — AMLODIPINE BESYLATE SCH MG: 5 TABLET ORAL at 20:56

## 2018-11-11 RX ADMIN — FUROSEMIDE SCH MG: 40 TABLET ORAL at 15:27

## 2018-11-11 RX ADMIN — SODIUM CHLORIDE SCH MLS/HR: 900 INJECTION INTRAVENOUS at 15:28

## 2018-11-11 RX ADMIN — MEXILETINE HYDROCHLORIDE SCH MG: 150 CAPSULE ORAL at 20:56

## 2018-11-11 RX ADMIN — SODIUM CHLORIDE SCH MLS/HR: 900 INJECTION, SOLUTION INTRAVENOUS at 20:52

## 2018-11-11 RX ADMIN — SODIUM CHLORIDE SCH MLS/HR: 900 INJECTION, SOLUTION INTRAVENOUS at 06:49

## 2018-11-11 RX ADMIN — DOCUSATE SODIUM SCH MG: 100 CAPSULE ORAL at 11:56

## 2018-11-11 RX ADMIN — ACETAMINOPHEN PRN MG: 500 TABLET ORAL at 13:35

## 2018-11-11 RX ADMIN — ACETAMINOPHEN PRN MG: 500 TABLET ORAL at 23:58

## 2018-11-11 RX ADMIN — SODIUM CHLORIDE SCH MLS/HR: 900 INJECTION, SOLUTION INTRAVENOUS at 11:55

## 2018-11-11 NOTE — HISTORY & PHYSICIAL ([facility])
HPI


History of Present Illness:


Wife provides most of history. States he has had abdominal pain on the right 

side for about a month. Was treated for UTI x 2 without benefit and also 

recommended laxative, but she did not believe that was the problem because he 

was having BM every day. She also noted some swelling in the lower right 

abdomen. Yesterday due to worsening pain, brought to ER. They deny fever at home

, deny nausea, vomiting, diarrhea, constipation.


Source:  family


Date seen by provider:  Nov 11, 2018


Time Seen by Provider:  10:30


Attending Physician


Erika Rios MD


PCP


Raúl Greenwood MD


Consult





Date of Admission


Nov 10, 2018 at 4:15 pm





Home Medications


Home Medications


Reviewed patient Home Medication Reconciliation performed by pharmacy 

medication reconciliations technician and/or nursing.


Patients Allergies have been reviewed.





Allergies


Coded Allergies:  


     Penicillins (Verified  Allergy, Severe, HIVES, SOB, 6/8/18)


     codeine (Verified  Allergy, Severe, SOB, HIVES, 6/8/18)





PMH-Social-Family Hx


Patient Social History


Alcohol Use:  Denies Use


Recreational Drug Use:  No


Smoking Status:  Former Smoker


Former smoker/When Quit:  Jan 4, 1988


Type Used:  Cigarettes


2nd Hand Smoke Exposure:  No


Recent Foreign Travel:  No


Contact w/other who traveled:  No


Recent Hopitalizations:  No


Recent Infectious Disease Expo:  No


Physical Abuse Screen:  No


Sexual Abuse:  No





Immunizations Up To Date


Tetanus Booster (TDap):  More than 5yrs


Date of Pneumonia Vaccine:  Oct 1, 2014


Date of Influenza Vaccine:  Oct 10, 2018





Past Medical History





CAD s/p CABG and stent x2


CHF


Hypertension


COPD


hx of ventricular arrhythmia s/p defibrillator placement


Atherosclerosis of the R leg


Stasis dematitis





Family Medical History


Significant Family History:  Heart Disease


Family History:  


Cardiovascular disease


  19 MOTHER


  G8 BROTHER


  G8 SISTER


Cervical cancer


  19 MOTHER





Review of Systems ([facility])


Constitutional:  No fever


EENTM:  no symptoms reported


Respiratory:  No cough, No short of breath


Cardiovascular:  No chest pain


Gastrointestinal:  see HPI


Genitourinary:  frequency


Musculoskeletal:  no symptoms reported


Skin:  no symptoms reported


Psychiatric/Neurological:  No Symptoms Reported





Reviewed Test Results


Reviewed Test Results


Lab





Laboratory Tests








Test


 11/10/18


14:13 11/10/18


14:30 11/10/18


18:41 11/10/18


20:48 Range/Units


 


 


Urine Color YELLOW      


 


Urine Clarity


 SLIGHTLY


CLOUDY 


 


 


  





 


Urine pH 6.5     5-9  


 


Urine Specific Gravity 1.015 L    1.016-1.022  


 


Urine Protein 2+ H    NEGATIVE  


 


Urine Glucose (UA) NEGATIVE     NEGATIVE  


 


Urine Ketones NEGATIVE     NEGATIVE  


 


Urine Nitrite NEGATIVE     NEGATIVE  


 


Urine Bilirubin NEGATIVE     NEGATIVE  


 


Urine Urobilinogen NORMAL     NORMAL  MG/DL


 


Urine Leukocyte Esterase 3+ H    NEGATIVE  


 


Urine RBC (Auto) 4+ H    NEGATIVE  


 


Urine RBC 5-10 H     /HPF


 


Urine WBC 10-25 H     /HPF


 


Urine Squamous Epithelial


Cells NONE 


 


 


 


  /HPF





 


Urine Crystals NONE      /LPF


 


Urine Bacteria FEW H     /HPF


 


Urine Casts NONE      /LPF


 


Urine Mucus NEGATIVE      /LPF


 


Urine Culture Indicated YES      


 


White Blood Count


 


 18.5 H


 


 


 4.3-11.0


10^3/uL


 


Red Blood Count


 


 4.07 L


 


 


 4.35-5.85


10^6/uL


 


Hemoglobin  12.1 L   13.3-17.7  G/DL


 


Hematocrit  38 L   40-54  %


 


Mean Corpuscular Volume  92    80-99  FL


 


Mean Corpuscular Hemoglobin  30    25-34  PG


 


Mean Corpuscular Hemoglobin


Concent 


 32 


 


 


 32-36  G/DL





 


Red Cell Distribution Width  16.0 H   10.0-14.5  %


 


Platelet Count


 


 352 


 


 


 130-400


10^3/uL


 


Mean Platelet Volume  9.9    7.4-10.4  FL


 


Neutrophils (%) (Auto)  80 H   42-75  %


 


Lymphocytes (%) (Auto)  10 L   12-44  %


 


Monocytes (%) (Auto)  10    0-12  %


 


Eosinophils (%) (Auto)  0    0-10  %


 


Basophils (%) (Auto)  0    0-10  %


 


Neutrophils # (Auto)  14.9 H   1.8-7.8  X 10^3


 


Lymphocytes # (Auto)  1.8    1.0-4.0  X 10^3


 


Monocytes # (Auto)  1.8 H   0.0-1.0  X 10^3


 


Eosinophils # (Auto)


 


 0.0 


 


 


 0.0-0.3


10^3/uL


 


Basophils # (Auto)


 


 0.0 


 


 


 0.0-0.1


10^3/uL


 


Neutrophils % (Manual)  83     %


 


Lymphocytes % (Manual)  10     %


 


Monocytes % (Manual)  7     %


 


Eosinophils % (Manual)  0     %


 


Basophils % (Manual)  0     %


 


Band Neutrophils  0     %


 


Blood Morphology Comment  NORMAL     


 


Sodium Level  132 L   135-145  MMOL/L


 


Potassium Level  5.0    3.6-5.0  MMOL/L


 


Chloride Level  96 L     MMOL/L


 


Carbon Dioxide Level  21    21-32  MMOL/L


 


Anion Gap  15 H   5-14  MMOL/L


 


Blood Urea Nitrogen  17    7-18  MG/DL


 


Creatinine


 


 1.40 H


 


 


 0.60-1.30


MG/DL


 


Estimat Glomerular Filtration


Rate 


 49 


 


 


  





 


BUN/Creatinine Ratio  12     


 


Glucose Level  102      MG/DL


 


Lactic Acid Level


 


 1.10 


 


 


 0.50-2.00


MMOL/L


 


Calcium Level  10.0    8.5-10.1  MG/DL


 


Corrected Calcium  10.4 H   8.5-10.1  MG/DL


 


Total Bilirubin  0.4    0.1-1.0  MG/DL


 


Aspartate Amino Transf


(AST/SGOT) 


 33 


 


 


 5-34  U/L





 


Alanine Aminotransferase


(ALT/SGPT) 


 23 


 


 


 0-55  U/L





 


Alkaline Phosphatase  79      U/L


 


Total Protein  9.0 H   6.4-8.2  GM/DL


 


Albumin  3.5    3.2-4.5  GM/DL


 


Lipase  29    8-78  U/L


 


Glucometer   97  115 H   MG/DL


 


Test


 11/11/18


03:35 11/11/18


06:01 11/11/18


11:06 


 Range/Units


 


 


White Blood Count


 17.7 H


 


 


 


 4.3-11.0


10^3/uL


 


Red Blood Count


 3.81 L


 


 


 


 4.35-5.85


10^6/uL


 


Hemoglobin 11.4 L    13.3-17.7  G/DL


 


Hematocrit 35 L    40-54  %


 


Mean Corpuscular Volume 93     80-99  FL


 


Mean Corpuscular Hemoglobin 30     25-34  PG


 


Mean Corpuscular Hemoglobin


Concent 32 


 


 


 


 32-36  G/DL





 


Red Cell Distribution Width 15.6 H    10.0-14.5  %


 


Platelet Count


 283 


 


 


 


 130-400


10^3/uL


 


Mean Platelet Volume 10.0     7.4-10.4  FL


 


Neutrophils (%) (Auto) 81 H    42-75  %


 


Lymphocytes (%) (Auto) 7 L    12-44  %


 


Monocytes (%) (Auto) 12     0-12  %


 


Eosinophils (%) (Auto) 0     0-10  %


 


Basophils (%) (Auto) 0     0-10  %


 


Neutrophils # (Auto) 14.3 H    1.8-7.8  X 10^3


 


Lymphocytes # (Auto) 1.2     1.0-4.0  X 10^3


 


Monocytes # (Auto) 2.1 H    0.0-1.0  X 10^3


 


Eosinophils # (Auto)


 0.0 


 


 


 


 0.0-0.3


10^3/uL


 


Basophils # (Auto)


 0.0 


 


 


 


 0.0-0.1


10^3/uL


 


Sodium Level 132 L    135-145  MMOL/L


 


Potassium Level 3.9     3.6-5.0  MMOL/L


 


Chloride Level 99       MMOL/L


 


Carbon Dioxide Level 20 L    21-32  MMOL/L


 


Anion Gap 13     5-14  MMOL/L


 


Blood Urea Nitrogen 14     7-18  MG/DL


 


Creatinine


 1.19 


 


 


 


 0.60-1.30


MG/DL


 


Estimat Glomerular Filtration


Rate 59 


 


 


 


  





 


BUN/Creatinine Ratio 12      


 


Glucose Level 101       MG/DL


 


Calcium Level 9.4     8.5-10.1  MG/DL


 


Glucometer  97  98     MG/DL








Radiology


CT abd/pelvis 11/10- IMPRESSION:   


 1. There is a high-grade obstruction of the left collecting  system due to two 

large calculi within the proximal left ureter.  There is also a newly developed 

1.4 x 2.2 cm bladder calculus and there may be early staghorn calculus 

formation in the left kidney.


 2. There is no acute abnormality of the abdomen or pelvis noted otherwise.





Physical Exam-([facility])


Physical Exam


Vital Signs





 VS - Last 72 Hours, by Label








 11/10/18 11/10/18 11/10/18 11/10/18





 14:22 17:28 17:39 17:48


 


Temp 99.4 99.9  


 


Pulse 90 91 81 


 


Resp 20 20 20 


 


B/P (MAP) 173/92 (119) 142/82 (102) 135/82 (99) 


 


Pulse Ox 95 97 95 


 


O2 Delivery Room Air Nasal Cannula Nasal Cannula Nasal Cannula


 


O2 Flow Rate  3.00 2.00 2.00


 


    





 11/10/18 11/10/18 11/10/18 11/10/18





 19:30 20:00 20:50 21:03


 


Temp 100.2   100.1


 


Pulse 90   


 


Resp 20   


 


B/P (MAP) 146/69 (94)   


 


Pulse Ox 97 97  


 


O2 Delivery Nasal Cannula Nasal Cannula Nasal Cannula 


 


O2 Flow Rate 3.00 3.00 3.00 


 


    





 11/11/18 11/11/18 11/11/18 11/11/18





 00:00 04:00 08:00 08:13


 


Temp 98.7 98.3 98.0 


 


Pulse 90 90 90 


 


Resp 16 16 32 


 


B/P (MAP) 159/80 (106) 168/74 (105) 149/79 (102) 


 


Pulse Ox 97 98 99 97


 


O2 Delivery Nasal Cannula Nasal Cannula Nasal Cannula Nasal Cannula


 


O2 Flow Rate 3.00 3.00 3.00 3.00


 


    





 11/11/18 11/11/18  





 12:00 16:01  


 


Temp 97.3 99.1  


 


Pulse 90 91  


 


Resp 20 20  


 


B/P (MAP) 157/78 (104) 134/62 (86)  


 


Pulse Ox 99 98  


 


O2 Delivery Nasal Cannula Nasal Cannula  


 


O2 Flow Rate 3.00 3.00  





Capillary Refill : Less Than 3 Seconds


General Appearance:  no apparent distress


Respiratory:  lungs clear, normal breath sounds


Cardiovascular:  regular rate, rhythm


Gastrointestinal:  normal bowel sounds, soft


Extremities:  no pedal edema


Neurologic/Psychiatric:  alert, normal mood/affect


Skin:  warm/dry, other (cutaneous horn on left forearm)





Assessment/Plan


Assessment/Plan


Admission Status:  Inpatient Order (span 2 midnights)


Reason for Inpatient Admission:  


Patient with multiple large kidney stones that will require procedure as


well as underlying infection requiring IV antibiotics.





(1) Left ureteral calculus


Status:  Acute


Assessment & Plan:  Obstructive and significant in size. Recommended transfer 

from ER for definitive treatment as local Urology not currently available, 

patient and spouse declined. Anticipate Urology to see tomorrow. Fentanyl prn 

pain.





(2) Pyelonephritis


Status:  Acute


Assessment & Plan:  Possible given stranding and UA consistent with infection, 

but CT findings may also be explained by the obstruction. On ceftriaxone- has 

had multiple prior UTI with proteus sensitive to ceftriaxone, and more remotely 

staph aureus and E coli with no resistance.





(3) Diabetes mellitus, type 2


Status:  Chronic


Assessment & Plan:  Unclear diagnosis- on admission in July had high blood 

sugar without previous diagnosis of DMII, A1c at that time was 6.2. Required 

long-acting insulin at that time, and apparently has continued outpatient based 

on refills. Diabetic diet, sliding scale insulin.


Qualifiers:  


   Qualified Codes:  E11.65 - Type 2 diabetes mellitus with hyperglycemia; 

Z79.4 - Long term (current) use of insulin


(4) Leukocytosis


Status:  Acute


Assessment & Plan:  WBC was high throughout last admission, had not normalized 

by d/c, but is increased from that on this admission. On ceftriaxone for 

suspected UTI/pyelonephritis. Consider peripheral smear if leukocytosis 

persists.


Qualifiers:  


   Qualified Codes:  D72.828 - Other elevated white blood cell count


(5) Hyponatremia


Status:  Acute


Assessment & Plan:  Suspect intravascular hypovolemia. Given IVF overnight and 

will resume furosemide. Check urine Na and osmolality for further eval.





(6) Acute renal insufficiency


Status:  Acute


Assessment & Plan:  Last admission in July creatinine remained mildly elevated 

throughout, however was normal prior and is decreased today after IVF, so 

appears more consistent with acute than chronic CKD. Monitor.





(7) Congestive heart failure


Status:  Chronic


Assessment & Plan:  [facility] has ICD in place and history of 

VT. No evidence of acute decompensation at this time, resume home beta blocker, 

amiodarone, mexilitine and furosemide.


Qualifiers:  


   


(8) Paroxysmal atrial fibrillation


Status:  Chronic


Assessment & Plan:  Intolerant of anticoagulation per wife's report due to 

bleeding. Continue digoxin and beta blocker.





(9) COPD (chronic obstructive pulmonary disease)


Status:  Chronic


Assessment & Plan:  No evidence of exacerbation, continue albuterol prn.





(10) DVT prophylaxis


Status:  Acute


Assessment & Plan:  Enoxaparin








Clinical Quality Measures


DVT/VTE Risk/Contraindication:


Risk Factor Score Per Nursing:  10


RFS Level Per Nursing on Admit:  4+=Very High





Copy


Copies To 1:   RAÚL GREENWOOD MD, BETHANY N MD Nov 11, 2018 12:01

## 2018-11-11 NOTE — DIAGNOSTIC IMAGING REPORT
Indication: Left ureteral stone.



Time of exam: 1254 PM



Correlation is made with prior exam one day earlier.



Calcific densities overlying the upper, mid and lower poles of

the left kidney again noted consistent with staghorn calculi.

This appears similar to prior. No definite ureteral calculi are

seen with exception of a possible calcific density lying

immediately inferior to the left transverse process of L5,

corresponding with the ureteral calculi noted on CT one day

earlier. There is also calcific density overlying the left sacrum

which may represent a ureteral calculus. Rounded calcific

densities more inferiorly in the pelvis are consistent with

phleboliths. The bowel gas pattern is unremarkable.



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 There also appears to

be mid and distal left ureteric calculi present.



Dictated by: 



  Dictated on workstation # [id number] Aggression. Agitation.

## 2018-11-12 VITALS — DIASTOLIC BLOOD PRESSURE: 68 MMHG | SYSTOLIC BLOOD PRESSURE: 140 MMHG

## 2018-11-12 VITALS — DIASTOLIC BLOOD PRESSURE: 74 MMHG | SYSTOLIC BLOOD PRESSURE: 143 MMHG

## 2018-11-12 VITALS — DIASTOLIC BLOOD PRESSURE: 65 MMHG | SYSTOLIC BLOOD PRESSURE: 143 MMHG

## 2018-11-12 VITALS — SYSTOLIC BLOOD PRESSURE: 157 MMHG | DIASTOLIC BLOOD PRESSURE: 74 MMHG

## 2018-11-12 VITALS — DIASTOLIC BLOOD PRESSURE: 67 MMHG | SYSTOLIC BLOOD PRESSURE: 130 MMHG

## 2018-11-12 VITALS — SYSTOLIC BLOOD PRESSURE: 144 MMHG | DIASTOLIC BLOOD PRESSURE: 68 MMHG

## 2018-11-12 LAB
BASOPHILS # BLD AUTO: 0 10^3/UL (ref 0–0.1)
BASOPHILS NFR BLD AUTO: 0 % (ref 0–10)
BUN/CREAT SERPL: 13
CALCIUM SERPL-MCNC: 9.1 MG/DL (ref 8.5–10.1)
CHLORIDE SERPL-SCNC: 104 MMOL/L (ref 98–107)
CO2 SERPL-SCNC: 22 MMOL/L (ref 21–32)
CREAT SERPL-MCNC: 1.12 MG/DL (ref 0.6–1.3)
EOSINOPHIL # BLD AUTO: 0 10^3/UL (ref 0–0.3)
EOSINOPHIL NFR BLD AUTO: 0 % (ref 0–10)
ERYTHROCYTE [DISTWIDTH] IN BLOOD BY AUTOMATED COUNT: 16.1 % (ref 10–14.5)
GFR SERPLBLD BASED ON 1.73 SQ M-ARVRAT: > 60 ML/MIN
GLUCOSE SERPL-MCNC: 103 MG/DL (ref 70–105)
HCT VFR BLD CALC: 33 % (ref 40–54)
HGB BLD-MCNC: 10.6 G/DL (ref 13.3–17.7)
LYMPHOCYTES # BLD AUTO: 1.4 X 10^3 (ref 1–4)
LYMPHOCYTES NFR BLD AUTO: 8 % (ref 12–44)
MAGNESIUM SERPL-MCNC: 1.6 MG/DL (ref 1.8–2.4)
MANUAL DIFFERENTIAL PERFORMED BLD QL: NO
MCH RBC QN AUTO: 30 PG (ref 25–34)
MCHC RBC AUTO-ENTMCNC: 32 G/DL (ref 32–36)
MCV RBC AUTO: 95 FL (ref 80–99)
MONOCYTES # BLD AUTO: 1.9 X 10^3 (ref 0–1)
MONOCYTES NFR BLD AUTO: 11 % (ref 0–12)
NEUTROPHILS # BLD AUTO: 13.8 X 10^3 (ref 1.8–7.8)
NEUTROPHILS NFR BLD AUTO: 81 % (ref 42–75)
PLATELET # BLD: 268 10^3/UL (ref 130–400)
PMV BLD AUTO: 9.6 FL (ref 7.4–10.4)
POTASSIUM SERPL-SCNC: 4.1 MMOL/L (ref 3.6–5)
RBC # BLD AUTO: 3.48 10^6/UL (ref 4.35–5.85)
SODIUM SERPL-SCNC: 137 MMOL/L (ref 135–145)
WBC # BLD AUTO: 17.2 10^3/UL (ref 4.3–11)

## 2018-11-12 RX ADMIN — DIGOXIN SCH MG: 125 TABLET ORAL at 10:11

## 2018-11-12 RX ADMIN — FUROSEMIDE SCH MG: 40 TABLET ORAL at 10:11

## 2018-11-12 RX ADMIN — MEXILETINE HYDROCHLORIDE SCH MG: 150 CAPSULE ORAL at 14:17

## 2018-11-12 RX ADMIN — MEXILETINE HYDROCHLORIDE SCH MG: 150 CAPSULE ORAL at 21:21

## 2018-11-12 RX ADMIN — INSULIN ASPART SCH UNIT: 100 INJECTION, SOLUTION INTRAVENOUS; SUBCUTANEOUS at 05:31

## 2018-11-12 RX ADMIN — METOPROLOL TARTRATE SCH MG: 25 TABLET, FILM COATED ORAL at 10:11

## 2018-11-12 RX ADMIN — FUROSEMIDE SCH MG: 40 TABLET ORAL at 16:18

## 2018-11-12 RX ADMIN — SODIUM CHLORIDE SCH MLS/HR: 900 INJECTION INTRAVENOUS at 16:18

## 2018-11-12 RX ADMIN — INSULIN ASPART SCH UNIT: 100 INJECTION, SOLUTION INTRAVENOUS; SUBCUTANEOUS at 21:04

## 2018-11-12 RX ADMIN — SODIUM CHLORIDE SCH MLS/HR: 900 INJECTION, SOLUTION INTRAVENOUS at 02:30

## 2018-11-12 RX ADMIN — INSULIN ASPART SCH UNIT: 100 INJECTION, SOLUTION INTRAVENOUS; SUBCUTANEOUS at 11:05

## 2018-11-12 RX ADMIN — PANTOPRAZOLE SODIUM SCH MG: 40 TABLET, DELAYED RELEASE ORAL at 05:53

## 2018-11-12 RX ADMIN — PHENAZOPYRIDINE HYDROCHLORIDE SCH MG: 100 TABLET ORAL at 18:33

## 2018-11-12 RX ADMIN — POTASSIUM CHLORIDE SCH MEQ: 600 CAPSULE, EXTENDED RELEASE ORAL at 05:53

## 2018-11-12 RX ADMIN — ACETAMINOPHEN PRN MG: 500 TABLET ORAL at 22:09

## 2018-11-12 RX ADMIN — METOPROLOL TARTRATE SCH MG: 25 TABLET, FILM COATED ORAL at 21:21

## 2018-11-12 RX ADMIN — AMLODIPINE BESYLATE SCH MG: 5 TABLET ORAL at 21:22

## 2018-11-12 RX ADMIN — ENOXAPARIN SODIUM SCH MG: 100 INJECTION SUBCUTANEOUS at 10:12

## 2018-11-12 RX ADMIN — MEXILETINE HYDROCHLORIDE SCH MG: 150 CAPSULE ORAL at 10:11

## 2018-11-12 RX ADMIN — INSULIN ASPART SCH UNIT: 100 INJECTION, SOLUTION INTRAVENOUS; SUBCUTANEOUS at 16:19

## 2018-11-12 RX ADMIN — DOCUSATE SODIUM SCH MG: 100 CAPSULE ORAL at 10:11

## 2018-11-12 RX ADMIN — AMIODARONE HYDROCHLORIDE SCH MG: 200 TABLET ORAL at 10:11

## 2018-11-12 RX ADMIN — SODIUM CHLORIDE SCH MLS/HR: 900 INJECTION, SOLUTION INTRAVENOUS at 11:49

## 2018-11-12 NOTE — DIAGNOSTIC IMAGING REPORT
INDICATION: Urolithiasis.



FINDINGS:



Multiple stones project over the left kidney casting the

collecting system presumed staghorn calculus disease. The largest

stone in an upper pole calyx measures long axis of 3.5 cm. The

right kidney showed no obvious stone, however, overlying stool

does limit sensitivity. Pelvic calcification showed lucent

centers and are unchanged from prior, believed phleboliths. There

are atherosclerotic vascular calcifications.



IMPRESSION:



Left renal stone burden and likely staghorn calculus disease not

obviously changed.



Dictated by: 



  Dictated on workstation # [id number]

## 2018-11-12 NOTE — CONSULTATION-CARDIOLOGY
HPI-Cardiology


Cardiology Consultation


Date of Consultation


11/12/18


Date of Admission





Time Seen by Provider:  16:46


Indication:  Coronary artery disease





HPI


78 years old gentleman with extensive cardiac history which will be discussed 

below.  Admitted with lower abdominal pain.  Had urinary tract infection 

receiving antibiotic and noted to have multiple stones.  He was scheduled for 

cystoscopy and/or called for preoperative cardiac evaluation.  Patient follows 

with Dr. David in [place].  Denied any chest pain, no palpitation, had 

chronic pedal edema.  Denied any claudication.  Reported that his cardiologist 

saw him last week and reported that he has been doing well.  Does not need any 

further testing at this point.  He is intolerant to oral anticoagulation due to 

recurrent nosebleed





Home Medications & Allergies


Allergies:  


Coded Allergies:  


     Penicillins (Verified  Allergy, Severe, HIVES, SOB, 6/8/18)


     codeine (Verified  Allergy, Severe, SOB, HIVES, 6/8/18)


Home Medication List Reviewed:  Yes





PMH-Social-Family Hx


Patient Social History


Marital Status:  


Employed/Student:  retired


Alcohol Use:  Denies Use


Recreational Drug Use:  No


Smoking Status:  Former Smoker


Former smoker/When Quit:  Jan 4, 1988


Type Used:  Cigarettes


2nd Hand Smoke Exposure:  No


Recent Foreign Travel:  No


Recent Infectious Disease Expo:  No


Recent Hopitalizations:  No


Physical Abuse Screen:  No


Sexual Abuse:  No





Immunizations Up To Date


Tetanus Booster (TDap):  More than 5yrs


Date of Pneumonia Vaccine:  Oct 1, 2014


Date of Influenza Vaccine:  Oct 10, 2018





Past Medical History


As described below





Family Medical History


Significant Family History:  Heart Disease


Family History:  


Cardiovascular disease


  19 MOTHER


  G8 BROTHER


  G8 SISTER


Cervical cancer


  19 MOTHER





Review of Systems


Constitutional:  see HPI, malaise


EENTM:  see HPI, no symptoms reported


Respiratory:  see HPI; No cough, No dyspnea on exertion, No hemoptysis, No 

orthopnea, No phlegm, No short of breath, No stridor, No wheezing, No other


Cardiovascular:  see HPI; No chest pain; edema; No Hx of Intervention, No 

palpitations, No syncope, No vascular heart diseas, No other


Gastrointestinal:  see HPI, abdominal pain


Genitourinary:  see HPI, dysuria


Musculoskeletal:  see HPI, joint pain


Skin:  no symptoms reported, see HPI


Psychiatric/Neurological:  No Symptoms Reported, See HPI





Reviewed Test Results


Reviewed Test Results


Lab





Laboratory Tests








Test


 11/11/18


18:30 11/11/18


20:45 11/12/18


05:23 11/12/18


05:35 Range/Units


 


 


Glucometer  118 H 112 H    MG/DL


 


White Blood Count


 


 


 


 17.2 H


 4.3-11.0


10^3/uL


 


Red Blood Count


 


 


 


 3.48 L


 4.35-5.85


10^6/uL


 


Hemoglobin    10.6 L 13.3-17.7  G/DL


 


Hematocrit    33 L 40-54  %


 


Mean Corpuscular Volume    95  80-99  FL


 


Mean Corpuscular Hemoglobin    30  25-34  PG


 


Mean Corpuscular Hemoglobin


Concent 


 


 


 32 


 32-36  G/DL





 


Red Cell Distribution Width    16.1 H 10.0-14.5  %


 


Platelet Count


 


 


 


 268 


 130-400


10^3/uL


 


Mean Platelet Volume    9.6  7.4-10.4  FL


 


Neutrophils (%) (Auto)    81 H 42-75  %


 


Lymphocytes (%) (Auto)    8 L 12-44  %


 


Monocytes (%) (Auto)    11  0-12  %


 


Eosinophils (%) (Auto)    0  0-10  %


 


Basophils (%) (Auto)    0  0-10  %


 


Neutrophils # (Auto)    13.8 H 1.8-7.8  X 10^3


 


Lymphocytes # (Auto)    1.4  1.0-4.0  X 10^3


 


Monocytes # (Auto)    1.9 H 0.0-1.0  X 10^3


 


Eosinophils # (Auto)


 


 


 


 0.0 


 0.0-0.3


10^3/uL


 


Basophils # (Auto)


 


 


 


 0.0 


 0.0-0.1


10^3/uL


 


Sodium Level    137  135-145  MMOL/L


 


Potassium Level    4.1  3.6-5.0  MMOL/L


 


Chloride Level    104    MMOL/L


 


Carbon Dioxide Level    22  21-32  MMOL/L


 


Anion Gap    11  5-14  MMOL/L


 


Blood Urea Nitrogen    14  7-18  MG/DL


 


Creatinine


 


 


 


 1.12 


 0.60-1.30


MG/DL


 


Estimat Glomerular Filtration


Rate 


 


 


 > 60 


  





 


BUN/Creatinine Ratio    13   


 


Glucose Level    103    MG/DL


 


Calcium Level    9.1  8.5-10.1  MG/DL


 


Magnesium Level    1.6 L 1.8-2.4  MG/DL


 


Thyroid Stimulating Hormone


(TSH) 


 


 


 1.20 


 0.35-4.94


UIU/ML


 


Test


 11/12/18


11:01 11/12/18


16:18 


 


 Range/Units


 


 


Glucometer 160 H 102      MG/DL








Radiology


CT abd/pelvis 11/10- IMPRESSION:   


 1. There is a high-grade obstruction of the left collecting  system due to two 

large calculi within the proximal left ureter.  There is also a newly developed 

1.4 x 2.2 cm bladder calculus and there may be early staghorn calculus 

formation in the left kidney.


 2. There is no acute abnormality of the abdomen or pelvis noted otherwise.





Physical Exam


Vital Signs





Vital Signs - First Documented








 11/10/18 11/10/18





 14:22 17:28


 


Temp 99.4 


 


Pulse 90 


 


Resp 20 


 


B/P (MAP) 173/92 (119) 


 


Pulse Ox 95 


 


O2 Delivery Room Air 


 


O2 Flow Rate  3.00





Capillary Refill : Less Than 3 SecondsLess Than 3 Seconds


Height, Weight, BMI


Height: 5'5.00"


Weight: 188lbs. 4.0oz. [id number]; 31.3 BMI


Method:Stated


General Appearance:  No Apparent Distress, WD/WN


Eyes:  Bilateral Eye Normal Inspection, Bilateral Eye PERRL, Bilateral Eye EOMI


HEENT:  PERRL/EOMI, TMs Normal, Normal ENT Inspection, Pharynx Normal


Neck:  Full Range of Motion, Normal Inspection, Non Tender, Supple, Carotid 

Bruit


Respiratory:  Chest Non Tender, Lungs Clear, Normal Breath Sounds, No Accessory 

Muscle Use, No Respiratory Distress


Cardiovascular:  No Edema, No Gallop, No JVD, No Murmur, Normal Peripheral 

Pulses, Systolic Murmur


Gastrointestinal:  Normal Bowel Sounds, No Organomegaly, No Pulsatile Mass, Non 

Tender, Soft


Back:  Normal Inspection, No CVA Tenderness, No Vertebral Tenderness


Extremity:  Normal Capillary Refill, Normal Inspection, Normal Range of Motion, 

Non Tender, No Calf Tenderness, Pedal Edema


Neurologic/Psychiatric:  Alert, Oriented x3, No Motor/Sensory Deficits, Normal 

Mood/Affect


Skin:  Normal Color, Warm/Dry


Lymphatic:  No Adenopathy





A/P-Cardiology


Admission Diagnosis


Urinary tract infection


Kidney stones


Coronary artery disease


Ventricular fibrillation





Assessment/Plan


Urinary tract infection receiving antibiotics.  Managed by primary care team





Kidney stones, scheduled for cystoscopy.





Hypomagnesemia, replace with IV magnesium and monitor





Coronary artery disease, history of CABG done in the remote past.  Patient had 

a stent done in June 2002 using MultiLink 2.518 mm to the proximal right 

coronary artery, 2017 had a Cypher stent 3.530 mm to the proximal and mid 

circumflex artery.  Has been followed and managed by primary cardiologist Dr. David in [place].  I'll try to obtain copy of his record.  No recent cardiac 

workup





History of congestive heart failure, reported ejection fraction 54 percent.  

MPI in 2014.





Paroxysmal atrial fibrillation maintained on amiodarone, intolerant to oral 

anticoagulation secondary to recurrent nosebleed





Patient has been maintained on mexiletine.  Probably due to ventricular 

fibrillation and history of shock from his defibrillator, known to have [facility] 

[facility] ICD implanted by Dr. stevens in [place].





COPD managed by primary care physician





Chronic pedal edema.





Diabetes mellitus, followed and managed by primary care physician





Preoperative cardiac evaluation, patient is considered at intermediate to high 

risk for perioperative cardiovascular complication due to his extensive 

cardiovascular history.  Did not have any recent cardiac workup done, reported 

that he has seen his primary cardiologist in [place] last week.  I will try to 

obtain copy of the record.  Decision regarding the surgery, risks versus 

benefits is deferred to the surgeon





Clinical Quality Measures


DVT/VTE Risk/Contraindication:


Risk Factor Score Per Nursing:  10


RFS Level Per Nursing on Admit:  4+=Very High











MARINA KUMAR MD Nov 12, 2018 16:52

## 2018-11-12 NOTE — PROGRESS NOTE-UROLOGY
Progress Note-Urology


Progress Notes/Assess & Plan


Progress/Assessment & Plan


CONTINUES WELL. OR TOMORROW, FULLY RE EXPLAINED TO PATIENT AND WIFE


Final Diagnosis


LT URETERAL, LT RENAL, AND BLADDER STONES











TAWIL,ELIAS A MD Nov 12, 2018 6:31 am

## 2018-11-12 NOTE — PROGRESS NOTE (SOAP)
Subjective


Subjective/Events-last exam


Febrile to 100.8 last 24 hours. Leukocytosis stable. Tachycardic.


Review of Systems


Date Seen by Provider:  Nov 12, 2018


Time Seen by Provider:  10:10





Focused Exam


Lactate Level


11/10/18 14:30: Lactic Acid Level 1.10





Objective


Exam


Last Set of Vital Signs





Vital Signs








  Date Time  Temp Pulse Resp B/P (MAP) Pulse Ox O2 Delivery O2 Flow Rate FiO2


 


11/12/18 09:57      Nasal Cannula 3.00 


 


11/12/18 08:00 97.2 92 16 130/67 (88) 97   





Capillary Refill : Less Than 3 SecondsLess Than 3 Seconds


I&O











Intake and Output 


 


 11/12/18





 00:00


 


Intake Total 2361 ml


 


Output Total 1260 ml


 


Balance 1101 ml


 


 


 


Intake Oral 1360 ml


 


IV Total 1001 ml


 


Output Urine Total 1260 ml


 


# Voids 1


 


# Bowel Movements 1








General:  Alert, No Acute Distress


Lungs:  Clear to Auscultation, Normal Air Movement


Heart:  Regular Rate, No Murmurs


Psych/Mental Status:  Mood NL





Results/Procedures


Lab


Laboratory Tests


11/11/18 16:01: Glucometer 143H


11/11/18 18:30: 


11/11/18 20:45: Glucometer 118H


11/12/18 05:23: Glucometer 112H


11/12/18 05:35: 


White Blood Count 17.2H, Red Blood Count 3.48L, Hemoglobin 10.6L, Hematocrit 33L

, Mean Corpuscular Volume 95, Mean Corpuscular Hemoglobin 30, Mean Corpuscular 

Hemoglobin Concent 32, Red Cell Distribution Width 16.1H, Platelet Count 268, 

Mean Platelet Volume 9.6, Neutrophils (%) (Auto) 81H, Lymphocytes (%) (Auto) 8L

, Monocytes (%) (Auto) 11, Eosinophils (%) (Auto) 0, Basophils (%) (Auto) 0, 

Neutrophils # (Auto) 13.8H, Lymphocytes # (Auto) 1.4, Monocytes # (Auto) 1.9H, 

Eosinophils # (Auto) 0.0, Basophils # (Auto) 0.0, Sodium Level 137, Potassium 

Level 4.1, Chloride Level 104, Carbon Dioxide Level 22, Anion Gap 11, Blood 

Urea Nitrogen 14, Creatinine 1.12, Estimat Glomerular Filtration Rate > 60, BUN/

Creatinine Ratio 13, Glucose Level 103, Calcium Level 9.1, Magnesium Level 1.6L

, Thyroid Stimulating Hormone (TSH) 1.20


11/12/18 11:01: Glucometer 160H





Microbiology


11/10/18 Blood Culture - Preliminary, Resulted


           No growth


11/10/18 Urine Culture - Final, Complete


           Proteus mirabilis


Radiology


CT abd/pelvis 11/10- IMPRESSION:   


 1. There is a high-grade obstruction of the left collecting  system due to two 

large calculi within the proximal left ureter.  There is also a newly developed 

1.4 x 2.2 cm bladder calculus and there may be early staghorn calculus 

formation in the left kidney.


 2. There is no acute abnormality of the abdomen or pelvis noted otherwise.





Assessment/Plan


Assessment/Plan





(1) Left ureteral calculus


Status:  Acute


Assessment & Plan:  Obstructive and significant in size. Recommended transfer 

from ER for definitive treatment as local Urology not currently available, 

patient and spouse declined. Anticipate Urology to see tomorrow. Fentanyl prn 

pain.


Plan for OR tomorrow per Urology, continue supportive care for now.





(2) Pyelonephritis


Status:  Acute


Assessment & Plan:  Possible given stranding and UA consistent with infection, 

but CT findings may also be explained by the obstruction. On ceftriaxone- has 

had multiple prior UTI with proteus sensitive to ceftriaxone, and more remotely 

staph aureus and E coli with no resistance.


11/12- continue ceftriaxone





(3) Diabetes mellitus, type 2


Status:  Chronic


Assessment & Plan:  Unclear diagnosis- on admission in July had high blood 

sugar without previous diagnosis of DMII, A1c at that time was 6.2. Required 

long-acting insulin at that time, and apparently has continued outpatient based 

on refills. Diabetic diet, sliding scale insulin.


Qualifiers:  


   Qualified Codes:  E11.65 - Type 2 diabetes mellitus with hyperglycemia; 

Z79.4 - Long term (current) use of insulin


(4) Leukocytosis


Status:  Acute


Assessment & Plan:  WBC was high throughout last admission, had not normalized 

by d/c, but is increased from that on this admission. On ceftriaxone for 

suspected UTI/pyelonephritis. Consider peripheral smear if leukocytosis 

persists.


11/12- stable, will reeval after procedure


Qualifiers:  


   Qualified Codes:  D72.828 - Other elevated white blood cell count


(5) Hyponatremia


Status:  Resolved


Assessment & Plan:  Suspect intravascular hypovolemia. Given IVF overnight and 

will resume furosemide. Check urine Na and osmolality for further eval.


11/12- resolved, labs pending





(6) Acute renal insufficiency


Status:  Resolved


Assessment & Plan:  Last admission in July creatinine remained mildly elevated 

throughout, however was normal prior and is decreased today after IVF, so 

appears more consistent with acute than chronic CKD. Monitor.


11/12- resolved





(7) Congestive heart failure


Status:  Chronic


Assessment & Plan:  Followed by [facility], has ICD in place and history of 

VT. No evidence of acute decompensation at this time, resume home beta blocker, 

amiodarone, mexilitine and furosemide.


Qualifiers:  


   


(8) Paroxysmal atrial fibrillation


Status:  Chronic


Assessment & Plan:  Intolerant of anticoagulation per wife's report due to 

bleeding. Continue digoxin and beta blocker.





(9) COPD (chronic obstructive pulmonary disease)


Status:  Chronic


Assessment & Plan:  No evidence of exacerbation, continue albuterol prn.





(10) DVT prophylaxis


Status:  Acute


Assessment & Plan:  Enoxaparin








Clinical Quality Measures


DVT/VTE Risk/Contraindication:


Risk Factor Score Per Nursing:  10


RFS Level Per Nursing on Admit:  4+=Very High











SINGH ELLISON MD Nov 12, 2018 12:29 pm

## 2018-11-12 NOTE — CONSULTATION REPORT
DATE OF SERVICE:  11/11/2018



ATTENDING PHYSICIAN:

Erika Rios M.D. -- Dr. Erickson Dominguez.



SUMMARY:

The patient is known to me with previous history of urolithiasis and stone

surgeries, presented to [facility] yesterday complaining of

generalized abdominal pain.  No other significant symptoms.  No fever.  He was

treated recently for urinary tract infection, on Levaquin and finished those on

11/01/2018.  CT scan of the abdomen and pelvis without contrast revealed a large

staghorn type stone in the left kidney and two stones in the proximal left

ureter, 1 cm and 2 cm in size, causing high-grade obstruction and 2.2 x 1.4 cm

bladder stone.  I was contacted by the emergency room and was out of town, so I

recommended according to the situation of the patient and presence or absence of

sepsis to transfer the patient to [facility] with local urologist there, may

need stenting or definite procedure.  Patient refused transfer and wanted me to

take care of him since I have took care of him before.  So he was admitted to

the hospital and started on IV fluid and Rocephin, kept n.p.o.  I called this

morning and he was much better.  No fever.  Not septic.  Blood pressure and

vital signs stable.  So we give him some diet and continue treating the

infection to prepare him for possible procedure.  We also held his aspirin or

any possible surgical intervention.



A review of symptoms otherwise negative.



ALLERGIES:

THE PATIENT IS ALLERGIC TO PENICILLIN AND CODEINE.



HOME MEDICINES:  Albuterol, amiodarone, amlodipine, digoxin, magnesium, Lasix,

insulin, metoprolol tartrate, mexiletine hydrochloride and potassium chloride. 

Doses and frequency per chart, which I reviewed.



SOCIAL HISTORY:

Quit smoking in 1993.  No alcohol, no drugs.  There is no seasonal allergies.



FAMILY HISTORY:

Coronary artery disease and cervical cancer.



PAST MEDICAL HISTORY:

Surgery wise had a CABG, coronary stent, defibrillator, eye surgery, joint

replacement and stone surgeries.



MEDICAL ILLNESSES:

Admits to asthma and history of pneumonia, chronic bronchitis as well as COPD

and sleep apnea.  He is not on CPAP, some dementia, neuropathy and has no

children.  Admits to cataracts and glaucoma and hard of hearing, some also

history of depression.



PHYSICAL EXAMINATION:

VITAL SIGNS:  Per chart.

GENERAL:  Well-nourished, well developed, in no acute distress at the time of

examination.

HEENT:  Normocephalic.  ENT unremarkable.

NECK:  Supple.  No bruits.

CHEST:  Clear, nontender.

HEART:  Regular rate and rhythm, no murmur.

ABDOMEN:  Soft, no CVA tenderness.

EXTREMITIES:  Lower extremity, no edema or cyanosis.

NEUROLOGIC:  Grossly intact.  Oriented x3.

SKIN:  Warm and dry.

EXTERNAL GENITALIA:  Adequate male configuration.



LABORATORY DATA:

Labs were reviewed.



IMPRESSION:

1.  Left proximal ureteral stone with obstruction.

2.  Left renal stone.

3.  Bladder stone.

4.  History of urolithiasis.

5.  Multiple medical problems as per history.



PLAN:

I discussed in detail and intensively the possibility of treatment.  He is a

very complex case medically and neurologically.  He has no _____ stones in the

ureter.  His option will be to attempt to put a stent in him to relieve the

obstruction and/or push the stones back into the kidney and then consider ESWL

or a percutaneous nephrolithotomy somewhere else or the bladder stone, we

consider doing later on some cystolithotripsy on him.  I told him we do not have

any flexible ureteroscopy here and no laser, which will be stronger then the

LithoClast.  They want me to try and see if we can do something.  They

understand the risks and the possibility of failure and need to transfer to

either [facility] or referred to  later on.  We will proceed with

continuing treatment of antibiotics until Tuesday.  We will take him to surgery

and attempt manipulation of the stone and for insertion of stent.





Job ID: [id number]

DocumentID: [id number]

Dictated Date:  11/11/2018 22:12:11

Transcription Date: 11/12/2018 00:14:02

Dictated By: ELIAS TAWIL, MD

## 2018-11-13 VITALS — DIASTOLIC BLOOD PRESSURE: 75 MMHG | SYSTOLIC BLOOD PRESSURE: 136 MMHG

## 2018-11-13 VITALS — DIASTOLIC BLOOD PRESSURE: 70 MMHG | SYSTOLIC BLOOD PRESSURE: 133 MMHG

## 2018-11-13 VITALS — SYSTOLIC BLOOD PRESSURE: 130 MMHG | DIASTOLIC BLOOD PRESSURE: 69 MMHG

## 2018-11-13 VITALS — SYSTOLIC BLOOD PRESSURE: 132 MMHG | DIASTOLIC BLOOD PRESSURE: 63 MMHG

## 2018-11-13 LAB
ALBUMIN SERPL-MCNC: 2.9 GM/DL (ref 3.2–4.5)
ALP SERPL-CCNC: 70 U/L (ref 40–136)
ALT SERPL-CCNC: 17 U/L (ref 0–55)
BASOPHILS # BLD AUTO: 0 10^3/UL (ref 0–0.1)
BASOPHILS NFR BLD AUTO: 0 % (ref 0–10)
BILIRUB SERPL-MCNC: 0.4 MG/DL (ref 0.1–1)
BUN/CREAT SERPL: 10
CALCIUM SERPL-MCNC: 9.2 MG/DL (ref 8.5–10.1)
CHLORIDE SERPL-SCNC: 103 MMOL/L (ref 98–107)
CO2 SERPL-SCNC: 26 MMOL/L (ref 21–32)
CREAT SERPL-MCNC: 1.05 MG/DL (ref 0.6–1.3)
EOSINOPHIL # BLD AUTO: 0 10^3/UL (ref 0–0.3)
EOSINOPHIL NFR BLD AUTO: 0 % (ref 0–10)
ERYTHROCYTE [DISTWIDTH] IN BLOOD BY AUTOMATED COUNT: 15.9 % (ref 10–14.5)
GFR SERPLBLD BASED ON 1.73 SQ M-ARVRAT: > 60 ML/MIN
GLUCOSE SERPL-MCNC: 99 MG/DL (ref 70–105)
HCT VFR BLD CALC: 33 % (ref 40–54)
HGB BLD-MCNC: 10.3 G/DL (ref 13.3–17.7)
LYMPHOCYTES # BLD AUTO: 1.3 X 10^3 (ref 1–4)
LYMPHOCYTES NFR BLD AUTO: 7 % (ref 12–44)
MAGNESIUM SERPL-MCNC: 1.8 MG/DL (ref 1.8–2.4)
MANUAL DIFFERENTIAL PERFORMED BLD QL: NO
MCH RBC QN AUTO: 30 PG (ref 25–34)
MCHC RBC AUTO-ENTMCNC: 31 G/DL (ref 32–36)
MCV RBC AUTO: 95 FL (ref 80–99)
MONOCYTES # BLD AUTO: 1.7 X 10^3 (ref 0–1)
MONOCYTES NFR BLD AUTO: 10 % (ref 0–12)
NEUTROPHILS # BLD AUTO: 14.4 X 10^3 (ref 1.8–7.8)
NEUTROPHILS NFR BLD AUTO: 83 % (ref 42–75)
PLATELET # BLD: 240 10^3/UL (ref 130–400)
PMV BLD AUTO: 9.7 FL (ref 7.4–10.4)
POTASSIUM SERPL-SCNC: 4 MMOL/L (ref 3.6–5)
PROT SERPL-MCNC: 7.7 GM/DL (ref 6.4–8.2)
RBC # BLD AUTO: 3.48 10^6/UL (ref 4.35–5.85)
SODIUM SERPL-SCNC: 138 MMOL/L (ref 135–145)
WBC # BLD AUTO: 17.5 10^3/UL (ref 4.3–11)

## 2018-11-13 PROCEDURE — 0TJ98ZZ INSPECTION OF URETER, VIA NATURAL OR ARTIFICIAL OPENING ENDOSCOPIC: ICD-10-PCS | Performed by: UROLOGY

## 2018-11-13 RX ADMIN — PHENAZOPYRIDINE HYDROCHLORIDE SCH MG: 100 TABLET ORAL at 12:54

## 2018-11-13 RX ADMIN — ENOXAPARIN SODIUM SCH MG: 100 INJECTION SUBCUTANEOUS at 11:11

## 2018-11-13 RX ADMIN — POTASSIUM CHLORIDE SCH MEQ: 600 CAPSULE, EXTENDED RELEASE ORAL at 06:25

## 2018-11-13 RX ADMIN — DOCUSATE SODIUM SCH MG: 100 CAPSULE ORAL at 11:00

## 2018-11-13 RX ADMIN — PHENAZOPYRIDINE HYDROCHLORIDE SCH MG: 100 TABLET ORAL at 19:10

## 2018-11-13 RX ADMIN — SODIUM CHLORIDE SCH MLS/HR: 900 INJECTION INTRAVENOUS at 16:31

## 2018-11-13 RX ADMIN — FUROSEMIDE SCH MG: 40 TABLET ORAL at 16:30

## 2018-11-13 RX ADMIN — FUROSEMIDE SCH MG: 40 TABLET ORAL at 11:00

## 2018-11-13 RX ADMIN — SODIUM CHLORIDE SCH MLS/HR: 900 INJECTION, SOLUTION INTRAVENOUS at 01:51

## 2018-11-13 RX ADMIN — AMIODARONE HYDROCHLORIDE SCH MG: 200 TABLET ORAL at 11:00

## 2018-11-13 RX ADMIN — DIGOXIN SCH MG: 125 TABLET ORAL at 11:00

## 2018-11-13 RX ADMIN — PHENAZOPYRIDINE HYDROCHLORIDE SCH MG: 100 TABLET ORAL at 11:00

## 2018-11-13 RX ADMIN — INSULIN ASPART SCH UNIT: 100 INJECTION, SOLUTION INTRAVENOUS; SUBCUTANEOUS at 12:16

## 2018-11-13 RX ADMIN — MEXILETINE HYDROCHLORIDE SCH MG: 150 CAPSULE ORAL at 12:54

## 2018-11-13 RX ADMIN — INSULIN ASPART SCH UNIT: 100 INJECTION, SOLUTION INTRAVENOUS; SUBCUTANEOUS at 06:24

## 2018-11-13 RX ADMIN — INSULIN ASPART SCH UNIT: 100 INJECTION, SOLUTION INTRAVENOUS; SUBCUTANEOUS at 17:43

## 2018-11-13 RX ADMIN — METOPROLOL TARTRATE SCH MG: 25 TABLET, FILM COATED ORAL at 11:00

## 2018-11-13 RX ADMIN — SODIUM CHLORIDE SCH MLS/HR: 900 INJECTION, SOLUTION INTRAVENOUS at 19:10

## 2018-11-13 RX ADMIN — PANTOPRAZOLE SODIUM SCH MG: 40 TABLET, DELAYED RELEASE ORAL at 06:25

## 2018-11-13 RX ADMIN — MEXILETINE HYDROCHLORIDE SCH MG: 150 CAPSULE ORAL at 11:02

## 2018-11-13 NOTE — DISCHARGE SUMMARY
Diagnosis/Chief Complaint


Date of Admission


Nov 10, 2018 at 16:15


Date of Discharge


Nov 13, 2018


Admission Diagnosis


Admission Diagnosis


See discharge diagnoses





Discharge Diagnosis


See problem list


Problems/Diagnosis:  


(1) Left ureteral calculus


Assessment & Plan:  Obstructive and significant in size. Recommended transfer 

from ER for definitive treatment as local Urology not currently available, 

patient and spouse declined. Anticipate Urology to see tomorrow. Fentanyl prn 

pain.


Plan for OR tomorrow per Urology, continue supportive care for now.


11/13- attempted procedure this am, unable to safely manage with semi-rigid 

scope per Dr. Tawil, recommended transfer to facility with ability to place 

nephrostomy tube. Discussed with [facility] and accepted in transfer.


Status:  Acute


(2) Pyelonephritis


Assessment & Plan:  Possible given stranding and UA consistent with infection, 

but CT findings may also be explained by the obstruction. On ceftriaxone- has 

had multiple prior UTI with proteus sensitive to ceftriaxone, and more remotely 

staph aureus and E coli with no resistance.


11/12- continue ceftriaxone


Status:  Acute


(3) Diabetes mellitus, type 2


Assessment & Plan:  Unclear diagnosis- on admission in July had high blood 

sugar without previous diagnosis of DMII, A1c at that time was 6.2. Required 

long-acting insulin at that time, and apparently has continued outpatient based 

on refills. Diabetic diet, sliding scale insulin.


Qualifiers:  


   Qualified Codes:  E11.65 - Type 2 diabetes mellitus with hyperglycemia; 

Z79.4 - Long term (current) use of insulin


Status:  Chronic


(4) Leukocytosis


Assessment & Plan:  WBC was high throughout last admission, had not normalized 

by d/c, but is increased from that on this admission. On ceftriaxone for 

suspected UTI/pyelonephritis. Consider peripheral smear if leukocytosis 

persists.


11/12- stable, will reeval after procedure


11/13- remained stable elevation at transfer but without definitive treatment 

yet done.


Qualifiers:  


   Qualified Codes:  D72.828 - Other elevated white blood cell count


Status:  Acute


(5) Hyponatremia


Assessment & Plan:  Suspect intravascular hypovolemia. Given IVF overnight and 

will resume furosemide. Check urine Na and osmolality for further eval.


11/12- resolved, labs pending


Status:  Resolved


Resolution Date/Time:  11/12/18 @ 12:28


(6) Acute renal insufficiency


Assessment & Plan:  Last admission in July creatinine remained mildly elevated 

throughout, however was normal prior and is decreased today after IVF, so 

appears more consistent with acute than chronic CKD. Monitor.


11/12- resolved


Status:  Resolved


Resolution Date/Time:  11/12/18 @ 12:28


(7) Congestive heart failure


Assessment & Plan:  Followed by [facility], has ICD in place and history of 

VT. No evidence of acute decompensation at this time, resume home beta blocker, 

amiodarone, mexilitine and furosemide.


Cardiology consulted prior to procedure, unknown risk due to limited record 

availability


Qualifiers:  


   


Status:  Chronic


(8) Paroxysmal atrial fibrillation


Assessment & Plan:  Intolerant of anticoagulation per wife's report due to 

bleeding. Continue digoxin and beta blocker.


Status:  Chronic


(9) COPD (chronic obstructive pulmonary disease)


Assessment & Plan:  No evidence of exacerbation, continue albuterol prn.


Status:  Chronic


(10) DVT prophylaxis


Assessment & Plan:  Enoxaparin- last dose 11/12.


Status:  Acute





Chief Complaint/HPI


Chief Complaint/HPI


Wife provides most of history. States he has had abdominal pain on the right 

side for about a month. Was treated for UTI x 2 without benefit and also 

recommended laxative, but she did not believe that was the problem because he 

was having BM every day. She also noted some swelling in the lower right 

abdomen. Yesterday due to worsening pain, brought to ER. They deny fever at home

, deny nausea, vomiting, diarrhea, constipation.





Discharge Summary-Simple/Stand


Procedures


LT URETEROSCOPY WITH ATTEMPTED LITHOTRIPSY, ATTEMPTED STENT AND RETROGRADE 

UROGRAM


Consultations


Urology


Cardiology


Discharge Physical Examination


Allergies:  


Coded Allergies:  


     Penicillins (Verified  Allergy, Severe, HIVES, SOB, 6/8/18)


     codeine (Verified  Allergy, Severe, SOB, HIVES, 6/8/18)


Vitals & I&Os





Vital Sign - Last 12Hours








  Date Time  Temp Pulse Resp B/P (MAP) Pulse Ox O2 Delivery O2 Flow Rate FiO2


 


11/13/18 12:51 98.7 89 18 136/75 (95) 93 Nasal Cannula 2.00 














Intake and Output 


 


 11/13/18





 00:00


 


Intake Total 980 ml


 


Output Total 900 ml


 


Balance 80 ml








General Appearance:  Alert, No Acute Distress


Respiratory:  Clear to Auscultation, Normal Air Movement


Cardiovascular:  Regular Rate, No Murmurs


Psych/Mental Status:  Mental Status NL





Hospital Course


See final discharge diagnosis.


Labs





Laboratory Tests








Test


 11/11/18


18:30 11/11/18


20:45 11/12/18


05:23 11/12/18


05:35 Range/Units


 


 


Urine Osmolality


 495 


 


 


 


 250-1200


mOsm/kg


 


Urine Random Sodium 48      mmol/L


 


Serum Osmolality


 288 


 


 


 


 280-300


mOsm/kg


 


Glucometer  118 H 112 H    MG/DL


 


White Blood Count


 


 


 


 17.2 H


 4.3-11.0


10^3/uL


 


Red Blood Count


 


 


 


 3.48 L


 4.35-5.85


10^6/uL


 


Hemoglobin    10.6 L 13.3-17.7  G/DL


 


Hematocrit    33 L 40-54  %


 


Mean Corpuscular Volume    95  80-99  FL


 


Mean Corpuscular Hemoglobin    30  25-34  PG


 


Mean Corpuscular Hemoglobin


Concent 


 


 


 32 


 32-36  G/DL





 


Red Cell Distribution Width    16.1 H 10.0-14.5  %


 


Platelet Count


 


 


 


 268 


 130-400


10^3/uL


 


Mean Platelet Volume    9.6  7.4-10.4  FL


 


Neutrophils (%) (Auto)    81 H 42-75  %


 


Lymphocytes (%) (Auto)    8 L 12-44  %


 


Monocytes (%) (Auto)    11  0-12  %


 


Eosinophils (%) (Auto)    0  0-10  %


 


Basophils (%) (Auto)    0  0-10  %


 


Neutrophils # (Auto)    13.8 H 1.8-7.8  X 10^3


 


Lymphocytes # (Auto)    1.4  1.0-4.0  X 10^3


 


Monocytes # (Auto)    1.9 H 0.0-1.0  X 10^3


 


Eosinophils # (Auto)


 


 


 


 0.0 


 0.0-0.3


10^3/uL


 


Basophils # (Auto)


 


 


 


 0.0 


 0.0-0.1


10^3/uL


 


Sodium Level    137  135-145  MMOL/L


 


Potassium Level    4.1  3.6-5.0  MMOL/L


 


Chloride Level    104    MMOL/L


 


Carbon Dioxide Level    22  21-32  MMOL/L


 


Anion Gap    11  5-14  MMOL/L


 


Blood Urea Nitrogen    14  7-18  MG/DL


 


Creatinine


 


 


 


 1.12 


 0.60-1.30


MG/DL


 


Estimat Glomerular Filtration


Rate 


 


 


 > 60 


  





 


BUN/Creatinine Ratio    13   


 


Glucose Level    103    MG/DL


 


Calcium Level    9.1  8.5-10.1  MG/DL


 


Magnesium Level    1.6 L 1.8-2.4  MG/DL


 


Thyroid Stimulating Hormone


(TSH) 


 


 


 1.20 


 0.35-4.94


UIU/ML


 


Test


 11/12/18


11:01 11/12/18


16:18 11/12/18


20:54 11/13/18


05:32 Range/Units


 


 


Glucometer 160 H 102  141 H    MG/DL


 


White Blood Count


 


 


 


 17.5 H


 4.3-11.0


10^3/uL


 


Red Blood Count


 


 


 


 3.48 L


 4.35-5.85


10^6/uL


 


Hemoglobin    10.3 L 13.3-17.7  G/DL


 


Hematocrit    33 L 40-54  %


 


Mean Corpuscular Volume    95  80-99  FL


 


Mean Corpuscular Hemoglobin    30  25-34  PG


 


Mean Corpuscular Hemoglobin


Concent 


 


 


 31 L


 32-36  G/DL





 


Red Cell Distribution Width    15.9 H 10.0-14.5  %


 


Platelet Count


 


 


 


 240 


 130-400


10^3/uL


 


Mean Platelet Volume    9.7  7.4-10.4  FL


 


Neutrophils (%) (Auto)    83 H 42-75  %


 


Lymphocytes (%) (Auto)    7 L 12-44  %


 


Monocytes (%) (Auto)    10  0-12  %


 


Eosinophils (%) (Auto)    0  0-10  %


 


Basophils (%) (Auto)    0  0-10  %


 


Neutrophils # (Auto)    14.4 H 1.8-7.8  X 10^3


 


Lymphocytes # (Auto)    1.3  1.0-4.0  X 10^3


 


Monocytes # (Auto)    1.7 H 0.0-1.0  X 10^3


 


Eosinophils # (Auto)


 


 


 


 0.0 


 0.0-0.3


10^3/uL


 


Basophils # (Auto)


 


 


 


 0.0 


 0.0-0.1


10^3/uL


 


Sodium Level    138  135-145  MMOL/L


 


Potassium Level    4.0  3.6-5.0  MMOL/L


 


Chloride Level    103    MMOL/L


 


Carbon Dioxide Level    26  21-32  MMOL/L


 


Anion Gap    9  5-14  MMOL/L


 


Blood Urea Nitrogen    11  7-18  MG/DL


 


Creatinine


 


 


 


 1.05 


 0.60-1.30


MG/DL


 


Estimat Glomerular Filtration


Rate 


 


 


 > 60 


  





 


BUN/Creatinine Ratio    10   


 


Glucose Level    99    MG/DL


 


Calcium Level    9.2  8.5-10.1  MG/DL


 


Corrected Calcium    10.1  8.5-10.1  MG/DL


 


Magnesium Level    1.8  1.8-2.4  MG/DL


 


Total Bilirubin    0.4  0.1-1.0  MG/DL


 


Aspartate Amino Transf


(AST/SGOT) 


 


 


 26 


 5-34  U/L





 


Alanine Aminotransferase


(ALT/SGPT) 


 


 


 17 


 0-55  U/L





 


Alkaline Phosphatase    70    U/L


 


Total Protein    7.7  6.4-8.2  GM/DL


 


Albumin    2.9 L 3.2-4.5  GM/DL


 


Test


 11/13/18


06:32 11/13/18


11:43 11/13/18


15:31 


 Range/Units


 


 


Glucometer 109  104  126 H    MG/DL








Radiology Reviewed


CT abd/pelvis 11/10- IMPRESSION:   


 1. There is a high-grade obstruction of the left collecting  system due to two 

large calculi within the proximal left ureter.  There is also a newly developed 

1.4 x 2.2 cm bladder calculus and there may be early staghorn calculus 

formation in the left kidney.


 2. There is no acute abnormality of the abdomen or pelvis noted otherwise.





Discharge


Instructions to patient/family


Please see electronic discharge instructions given to patient.


Discharge Medications


Reviewed and agree with Discharge Medication list on patient's Discharge 

Instruction sheet





Clinical Quality Measures


DVT/VTE Risk/Contraindication:


Risk Factor Score Per Nursing:  10


RFS Level Per Nursing on Admit:  4+=Very High





Copy


Copies To 1:   MAHOGANY GREENWOOD MD, BETHANY N MD Nov 13, 2018 16:16

## 2018-11-13 NOTE — PROGRESS NOTE-POST OPERATIVE
Post-Operative Progess Note


Surgeon (s)/Assistant (s)


Surgeon


ELIAS TAWIL MD


Assistant:  NONE





Pre-Operative Diagnosis


LT URETERAL, LT RENAL, AND BLADDER STONES





Post-Operative Diagnosis





same





Procedure & Operative Findings


Date of Procedure


11/13/18


Procedure Performed/Findings


LT URETEROSCOPY WITH ATTEMPTED LITHOTRIPSY, ATTEMPTED STENT AND RETROGRADE 

UROGRAM


Anesthesia Type


GENERAL





Estimated Blood Loss


Estimated blood loss (mL):  NONE





Specimens/Packing


Specimens Removed


NONE


Packing:  


NONE











TAWIL,ELIAS A MD Nov 13, 2018 9:35 am

## 2018-11-13 NOTE — ANESTHESIA-GENERAL POST-OP
General


Patient Condition


Mental Status/LOC:  Same as Preop


Cardiovascular:  Satisfactory


Nausea/Vomiting:  Absent


Respiratory:  Satisfactory


Pain:  Controlled


Complications:  Absent





Post Op Complications


Complications


None





Follow Up Care/Instructions


Patient Instructions


None needed.





Anesthesia/Patient Condition


Patient Condition


Patient is doing well, no complaints, stable vital signs, no apparent adverse 

anesthesia problems.   


No complications reported per nursing.











WILLIAMS GUDINO CRNA Nov 13, 2018 10:39

## 2018-11-13 NOTE — DIAGNOSTIC IMAGING REPORT
INDICATION: 

Followup renal stones.



COMPARISON: 

11/12/2018 and CT abdomen of 11/10/2018.



FINDINGS: 

The multiple large left renal stones remain in stable position at

the level of the renal hilum and lower pole of the left kidney.

No new renal calculi. A large ovoid bladder calculus is stable

measuring 2.5 cm. There is a partially imaged proximal femoral

nail with an antetorsion blade. Nonobstructive bowel gas pattern.



IMPRESSION: 

No appreciable change in position of left renal stones and

bladder stone.



Dictated by: 



  Dictated on workstation # [id number]

## 2018-11-13 NOTE — PROGRESS NOTE-PRE OPERATIVE
Pre-Operative Progress Note


H&P Reviewed


The H&P was reviewed, patient examined and no changes noted.


Date Seen by Provider:  Nov 13, 2018


Time Seen by Provider:  07:08


Date H&P Reviewed:  Nov 13, 2018


Time H&P Reviewed:  07:08


Pre-Operative Diagnosis:  LT URETERAL, LT RENAL, AND BLADDER STONES











TAWIL,ELIAS A MD Nov 13, 2018 07:08

## 2018-11-13 NOTE — ELECTROPHYSIOLOGY CONSULTATION
HPI-Cardiology


Cardiology Consultation:


Date of Consultation


11/13/18


Date of Admission





Attending Physician


Erika Rios MD


Admitting Physician


Raúl Gonzalez MD


Consulting Physician


PEREZ OGLESBY MD





HPI:


Time Seen by a Provider:  12:45


Chief Complaint:


ICD, VT history


This is a 78 year old gentleman with history of CABG, PCI, VT requiring [facility] 

ICD. He presents with UTI, Sepsis, Renal stones, requiring urological procedure 

with Dr Tawil. EP consultation requested by Dr Chin since that patient has 

history of VT/VF and is on amiodarone, Mexilitine, Metoprolol, Digoxin. patient 

denies chest pain, shortness of breath, syncope or near-syncope. He followed EP 

in the past but the Electrophysiologist has since retired.





Review of Systems-Cardiology


Review of Systems


Constitutional:  As described under HPI; No As described under HPI, No no 

symptoms reported, No chills, No fever, No lightheadedness


Eyes:  No As described under HPI, No no symptoms reported, No blindness, No 

blurred vision, No contact lenses, No drainage, No decreased acuity, No foreign 

body sensation, No pain, No vision change


Ears/Nose/Throat:  No As described under HPI, No no symptoms reported, No 

chronic hearing loss, No ear discharge, No ear pain, No nasal drainage, No 

ulcerations


Respiratory:  No no symptoms reported; As described under HPI; No As described 

under HPI, No cough, No orthopnea, No shortness of breath, No SOB with excertion


Cardiovascular:  No no symptoms reported; As described under HPI; No As 

described under HPI, No chest pain, No edema, No irregular heart rate, No 

lightheadedness, No palpitations


Gastrointestinal:  No no symptoms reported, No As described under HPI, No 

abdomen distended; abdominal pain; No blood streaked bowels, No constipation, 

No diarrhea, No nausea, No vomiting, No stool coloration changes


Genitourinary:  No As described under HPI; burning, dysuria; No discharge, No 

frequency, No flank pain, No hematuria, No urgency


Skin:  No rash, No skin related problems, No ulcerations


Psychiatric/Neurological:  No anxiety, No depression, No seizure, No focal 

weakness, No syncope


Hematologic:  No bleeding abnormalities





PMH-Social-Family Hx


Patient Social History


Marrital Status:  


Employed/Student:  retired


Alcohol Use:  Denies Use


Recreational Drug Use:  No


Smoking Status:  Former Smoker


Former smoker/When Quit:  Jan 4, 1988


Type Used:  Cigarettes


2nd Hand Smoke Exposure:  No


Recent Foreign Travel:  No


Recent Infectious Disease Expo:  No


Hospitalization with Isolation:  Denies


Physical Abuse Screen:  No


Sexual Abuse:  No





Immunizations Up To Date


Tetanus Booster (TDap):  More than 5yrs


Date of Pneumonia Vaccine:  Oct 1, 2014


Date of Influenza Vaccine:  Oct 10, 2018





Past Medical History


PMH


As described under Assessment.





Family Medical History


Family History:  


Cardiovascular disease


  19 MOTHER


  G8 BROTHER


  G8 SISTER


Cervical cancer


  19 MOTHER





Allergies and Home Medications


Allergies


Coded Allergies:  


     Penicillins (Verified  Allergy, Severe, HIVES, SOB, 6/8/18)


     codeine (Verified  Allergy, Severe, SOB, HIVES, 6/8/18)





Home Medications


Albuterol Sulfate 18 Gm Hfa.aer.ad, 2 PUFF IH QID PRN for SHORTNESS OF BREATH, (

Reported)


Albuterol Sulfate 1.25 Mg/3 Ml Vial.neb, 1.25 MG NEB BID, (Reported)


Amiodarone HCl 200 Mg Tablet, 200 MG PO DAILY, (Reported)


Amiodarone HCl 200 Mg Tablet, 200 MG PO SuWeSa@2000, (Reported)


   TAKES 200MG DOSE AT NIGHT THREE DAY A WEEK IN ADDITION TO 200MG EVERY 

MORNING. 


Amlodipine Besylate 5 Mg Tablet, 5 MG PO HS, (Reported)


   LAST FILLED 07/06/17 #90 


Digoxin 125 Mcg Tablet, 125 MCG PO DAILY, (Reported)


Esomeprazole Magnesium 40 Mg Capsule.dr, 40 MG PO DAILY, (Reported)


Furosemide 40 Mg Tablet, 40 MG PO 0900,1500, (Reported)


Insulin Determir 1,000 Units/10 Ml Soln, 10 UNIT SQ DAILY


   Prescribed by: ARSEN LAWS on 7/16/18 1449


Metoprolol Tartrate 50 Mg Tablet, 25 MG PO BID, (Reported)


   TAKES 1/2 OF A (50 MG) TABLET 


Mexiletine HCl 150 Mg Cap, 150 MG PO TID, (Reported)


Potassium Chloride 8 Meq Tablet.er, 16 MEQ PO DAILY, (Reported)


   TAKES 2 (8MEQ) TABLETS 





Patient Home Medication List


Home Medication List Reviewed:  Yes





Physical Exam-Cardiology


Physical Exam


Vital Signs/I&O











 11/13/18 11/13/18





 10:30 12:51


 


Temp  98.7


 


Pulse  89


 


Resp  18


 


B/P (MAP)  136/75 (95)


 


Pulse Ox  93


 


O2 Delivery Nasal Cannula Nasal Cannula


 


O2 Flow Rate 3.00 2.00














 11/13/18





 00:00


 


Intake Total 980 ml


 


Output Total 900 ml


 


Balance 80 ml





Capillary Refill : Less Than 3 SecondsLess Than 3 Seconds


Constitutional:  appears stated age, AAO x 3; No apparent distress; well-

developed, well-nourished


HEENT:  PERRL; No normal ENT inspection, No TMs normal, No pharynx normal, No 

scleral icterus (R), No scleral icterus (L), No pale conjunctivae (R), No pale 

conjunctivae (L), No photophobia, No TM abnormal (R), No TM abnormal (L), No 

pharyngeal erythema, No tonsillar exudate, No other, No discharge, No EOMI; 

hearing is well preserved; No hard of hearing; oral hygience is good; No 

ulceration, No xanthelasmas are seen


Neck:  No carotid bruit; carotid pulses are 2 + bilaterally


Respiratory:  No accessory muscle use, No respiratory distress, No chest tender

, No chest expansion is symmetric; chest is bilaterally symmetric; No lungs 

clear to percussion; lungs clear to auscultation; No crackles, No rhonchi, No 

rales, No stridor, No wheezing, No pleural rub, No other


Cardiovascular:  regular rate-rhythm; No irregularly irregular, No extra beats, 

No parasternal heave is noted, No JVD, No edema, No bradycardia, No tachycardia

, No point of maximal impulse, No cardiac thrills are palpable; S1 and S2; No 

gallop/S3, No gallop/S4, No diastolic murmur, No systolic murmur, No friction 

rub, No click, No other


Gastrointestinal:  No tender, No soft, No round, No distended, No pulsatile mass

, No organomegaly, No guarding, No rebound, No tenderness, No hernia, No mass, 

No audible bowel sounds, No abnormal bowel sounds, No abdominal bruits, No 

spleenomegaly, No other


Rectal:  deferred


Extremities:  No normal range of motion, No non-tender, No normal inspection, 

No pedal edema, No calf tenderness, No normal capillary refill, No pelvis stable

, No calf tenderness, No inflammation, No pedal edema, No slow capillary refill

, No swelling, No other, No abrasion, No clubbing, No cyanosis, No ecchymosis, 

No laceration, No no lower extremity edema bilateral, No significant edema, No 

tenderness, No wound


Neurologic/Psychiatric:  no motor/sensory deficits, alert, normal mood/affect, 

oriented x 3, power is 5/5 both on sides


Skin:  No normal color, No warm/dry, No cyanosis, No cool, No diaphoresis, No 

damp, No ecchymosis, No jaundice, No mottled, No pallor, No rash, No tattoos/

piercings, No ulcerations, No rash on exposed areas, No ulcerations on exposed 

areas, No other





Data Review


Labs


Laboratory Tests


11/12/18 20:54: Glucometer 141H


11/13/18 05:32: 


White Blood Count 17.5H, Red Blood Count 3.48L, Hemoglobin 10.3L, Hematocrit 33L

, Mean Corpuscular Volume 95, Mean Corpuscular Hemoglobin 30, Mean Corpuscular 

Hemoglobin Concent 31L, Red Cell Distribution Width 15.9H, Platelet Count 240, 

Mean Platelet Volume 9.7, Neutrophils (%) (Auto) 83H, Lymphocytes (%) (Auto) 7L

, Monocytes (%) (Auto) 10, Eosinophils (%) (Auto) 0, Basophils (%) (Auto) 0, 

Neutrophils # (Auto) 14.4H, Lymphocytes # (Auto) 1.3, Monocytes # (Auto) 1.7H, 

Eosinophils # (Auto) 0.0, Basophils # (Auto) 0.0, Sodium Level 138, Potassium 

Level 4.0, Chloride Level 103, Carbon Dioxide Level 26, Anion Gap 9, Blood Urea 

Nitrogen 11, Creatinine 1.05, Estimat Glomerular Filtration Rate > 60, BUN/

Creatinine Ratio 10, Glucose Level 99, Calcium Level 9.2, Corrected Calcium 10.1

, Magnesium Level 1.8, Total Bilirubin 0.4, Aspartate Amino Transf (AST/SGOT) 26

, Alanine Aminotransferase (ALT/SGPT) 17, Alkaline Phosphatase 70, Total 

Protein 7.7, Albumin 2.9L


11/13/18 06:32: Glucometer 109


11/13/18 11:43: Glucometer 104


11/13/18 15:31: Glucometer 126H





Microbiology


11/10/18 Blood Culture - Preliminary, Resulted


           No growth


11/10/18 Urine Culture - Final, Complete


           Proteus mirabilis





A/P-Cardiology


Assessment/Admission Diagnosis


CAD/ CABG/ PCI,


History of VT,


Amiodarone,


ICD,


Renal stones





Plan


CAD/CABG/PCI- defer to Dr chin.





ICD - [facility] device. We will take over the device interrogation and set up EP 

follow up here in [place]. 





VT- continue mexilitine and amiodarone.





Amiodarone - need to get old records of amiodarone surveillance. 





Renal stones - defer to Dr Tawil.








Thank you for your consultation. Please call me if you have any questions.








JULIENNE Oglesby MD, FACP, FACC, FSCAI, FHRS, CCDS


Interventional Cardiology


Cardiac Electrophysiology


Vascular Medicine and Endovascular Interventions





Clinical Quality Measures


DVT/VTE Risk/Contraindication:


Risk Factor Score Per Nursing:  10


RFS Level Per Nursing on Admit:  4+=Very High











PEREZ OGLESBY MD Nov 13, 2018 13:12

## 2018-11-13 NOTE — PROGRESS NOTE-POST OPERATIVE
Post-Operative Progess Note


Surgeon (s)/Assistant (s)


Surgeon


ELIAS TAWIL MD


Assistant:  NONE





Pre-Operative Diagnosis


LT URETERAL, LT RENAL, AND BLADDER STONES





Post-Operative Diagnosis





SAME





Procedure & Operative Findings


Date of Procedure


11/13/18


Procedure Performed/Findings


LT URETEROSCOPY WITH ATTEMPTED LITHOTRIPSY, STENT, AND RETROGRADE UROGRAM


Anesthesia Type


GENERAL





Estimated Blood Loss


Estimated blood loss (mL):  NONE





Specimens/Packing


Specimens Removed


NONE


Packing:  


NONE











TAWIL,ELIAS A MD Nov 13, 2018 7:09 am

## 2018-11-14 NOTE — OPERATIVE REPORT
DATE OF SERVICE:  11/13/2018



PREOPERATIVE DIAGNOSES:

Left mid ureteral stones, left renal stones, and a bladder stone.



POSTOPERATIVE DIAGNOSES:

Left mid ureteral stones, left renal stones, and a bladder stone.



OPERATION PERFORMED:

Left ureteroscopy with attempted stone lithotripsy and attempted stent with

retrograde urogram.



SURGEON:

Elias Tawil, MD.



ANESTHESIA:

General.



COMPLICATIONS:

None.



PROCEDURE:

Under satisfactory general anesthesia, the patient in the lithotomy position,

genitalia were prepped and draped in the usual sterile fashion.  Cystoscope was

introduced under vision.  The anterior urethra was normal.  The prostate

revealed some enlargement of the bladder neck obstruction and trabeculation. 

Ureteric orifices were normal in shape, size and configuration with clear

efflux, none on the left side.  Using the foroblique lens, I passed first a

6-Croatian ureteral catheter and then it was held at the level presumed of the

stone that was very hard to see being not very radiopaque and because of the

size of the patient.  I injected contrast; the contrast could not even pass

beyond the stones.  I removed the 6-Croatian, dilated the intramural portion and a

left ureteral orifice to accommodate the 6.9-Croatian semi-rigid ureteroscope.  I

was able to get to the stone.  However, the erections and having a semi-rigid

not flexible being unavailable with our institution and the lithoclast not

reaching the stone, I was unable to perform the lithotripsy effectively and

safely, especially that we are dealing were 2 big stones, 1 cm and 2 cm in

sizes.  I did remove the ureteroscope, reinserted the cystoscope, attempted to

pass a stent, again unable to even pass beyond the stones.  I discontinued

further attempts in order not to disturb these for the next procedure, emptied

the bladder.  The patient tolerated the procedure and anesthesia well and was

sent to recovery room in stable condition.



PLAN:

As previously addressed with the patient and his wife, we need to transfer him

to institution where either a percutaneous nephrostomy can be done since our

radiologist does not perform these or a flexible ureteroscopy with laser

lithotripsy be performed.  We will discuss with Dr. Rios and see closest place

available for the patient who can definitely be transferred by ambulance.  I

would recommend that ASAP before he goes into any sepsis again.





Job ID: [id number]

DocumentID: [id number]

Dictated Date:  11/13/2018 09:42:08

Transcription Date: 11/13/2018 11:59:47

Dictated By: ELIAS TAWIL, MD

## 2018-11-17 ENCOUNTER — HOSPITAL ENCOUNTER (INPATIENT)
Dept: HOSPITAL 75 - ER | Age: 78
LOS: 6 days | Discharge: HOME HEALTH | DRG: 691 | End: 2018-11-23
Attending: FAMILY MEDICINE | Admitting: FAMILY MEDICINE
Payer: MEDICARE

## 2018-11-17 VITALS — HEIGHT: 65 IN | WEIGHT: 190.31 LBS | BODY MASS INDEX: 31.71 KG/M2

## 2018-11-17 VITALS — SYSTOLIC BLOOD PRESSURE: 128 MMHG | DIASTOLIC BLOOD PRESSURE: 70 MMHG

## 2018-11-17 VITALS — SYSTOLIC BLOOD PRESSURE: 164 MMHG | DIASTOLIC BLOOD PRESSURE: 90 MMHG

## 2018-11-17 VITALS — SYSTOLIC BLOOD PRESSURE: 132 MMHG | DIASTOLIC BLOOD PRESSURE: 80 MMHG

## 2018-11-17 DIAGNOSIS — I48.0: ICD-10-CM

## 2018-11-17 DIAGNOSIS — E11.40: ICD-10-CM

## 2018-11-17 DIAGNOSIS — Z87.891: ICD-10-CM

## 2018-11-17 DIAGNOSIS — I87.2: ICD-10-CM

## 2018-11-17 DIAGNOSIS — I25.5: ICD-10-CM

## 2018-11-17 DIAGNOSIS — E78.00: ICD-10-CM

## 2018-11-17 DIAGNOSIS — J44.9: ICD-10-CM

## 2018-11-17 DIAGNOSIS — I25.10: ICD-10-CM

## 2018-11-17 DIAGNOSIS — R06.03: ICD-10-CM

## 2018-11-17 DIAGNOSIS — N21.0: ICD-10-CM

## 2018-11-17 DIAGNOSIS — I50.9: ICD-10-CM

## 2018-11-17 DIAGNOSIS — I11.0: ICD-10-CM

## 2018-11-17 DIAGNOSIS — E87.6: ICD-10-CM

## 2018-11-17 DIAGNOSIS — E86.0: ICD-10-CM

## 2018-11-17 DIAGNOSIS — Z95.1: ICD-10-CM

## 2018-11-17 DIAGNOSIS — K21.9: ICD-10-CM

## 2018-11-17 DIAGNOSIS — E11.51: ICD-10-CM

## 2018-11-17 DIAGNOSIS — E83.42: ICD-10-CM

## 2018-11-17 DIAGNOSIS — F32.9: ICD-10-CM

## 2018-11-17 DIAGNOSIS — H40.9: ICD-10-CM

## 2018-11-17 DIAGNOSIS — I25.2: ICD-10-CM

## 2018-11-17 DIAGNOSIS — N10: ICD-10-CM

## 2018-11-17 DIAGNOSIS — N28.9: ICD-10-CM

## 2018-11-17 DIAGNOSIS — Z97.8: ICD-10-CM

## 2018-11-17 DIAGNOSIS — R53.81: ICD-10-CM

## 2018-11-17 DIAGNOSIS — Z95.810: ICD-10-CM

## 2018-11-17 DIAGNOSIS — Z93.6: ICD-10-CM

## 2018-11-17 DIAGNOSIS — I70.201: ICD-10-CM

## 2018-11-17 DIAGNOSIS — N20.2: Primary | ICD-10-CM

## 2018-11-17 DIAGNOSIS — Z95.5: ICD-10-CM

## 2018-11-17 LAB
ALBUMIN SERPL-MCNC: 3 GM/DL (ref 3.2–4.5)
ALP SERPL-CCNC: 74 U/L (ref 40–136)
ALT SERPL-CCNC: 36 U/L (ref 0–55)
APTT BLD: 35 SEC (ref 24–35)
APTT PPP: YELLOW S
BACTERIA #/AREA URNS HPF: (no result) /HPF
BASOPHILS # BLD AUTO: 0 10^3/UL (ref 0–0.1)
BASOPHILS NFR BLD AUTO: 0 % (ref 0–10)
BILIRUB SERPL-MCNC: 0.5 MG/DL (ref 0.1–1)
BILIRUB UR QL STRIP: NEGATIVE
BUN/CREAT SERPL: 25
CALCIUM SERPL-MCNC: 9.5 MG/DL (ref 8.5–10.1)
CHLORIDE SERPL-SCNC: 102 MMOL/L (ref 98–107)
CO2 SERPL-SCNC: 30 MMOL/L (ref 21–32)
CREAT SERPL-MCNC: 0.91 MG/DL (ref 0.6–1.3)
DIGOXIN SERPL-MCNC: < 0.3 NG/ML (ref 0.8–2)
EOSINOPHIL # BLD AUTO: 0 10^3/UL (ref 0–0.3)
EOSINOPHIL NFR BLD AUTO: 0 % (ref 0–10)
ERYTHROCYTE [DISTWIDTH] IN BLOOD BY AUTOMATED COUNT: 15.7 % (ref 10–14.5)
FIBRINOGEN PPP-MCNC: CLEAR MG/DL
GFR SERPLBLD BASED ON 1.73 SQ M-ARVRAT: > 60 ML/MIN
GLUCOSE SERPL-MCNC: 118 MG/DL (ref 70–105)
GLUCOSE UR STRIP-MCNC: NEGATIVE MG/DL
HCT VFR BLD CALC: 37 % (ref 40–54)
HGB BLD-MCNC: 11.9 G/DL (ref 13.3–17.7)
INR PPP: 1.2 (ref 0.8–1.4)
KETONES UR QL STRIP: NEGATIVE
LEUKOCYTE ESTERASE UR QL STRIP: (no result)
LYMPHOCYTES # BLD AUTO: 1.8 X 10^3 (ref 1–4)
LYMPHOCYTES NFR BLD AUTO: 13 % (ref 12–44)
MAGNESIUM SERPL-MCNC: 1.6 MG/DL (ref 1.8–2.4)
MANUAL DIFFERENTIAL PERFORMED BLD QL: NO
MCH RBC QN AUTO: 30 PG (ref 25–34)
MCHC RBC AUTO-ENTMCNC: 32 G/DL (ref 32–36)
MCV RBC AUTO: 92 FL (ref 80–99)
MONOCYTES # BLD AUTO: 1.6 X 10^3 (ref 0–1)
MONOCYTES NFR BLD AUTO: 12 % (ref 0–12)
NEUTROPHILS # BLD AUTO: 10.3 X 10^3 (ref 1.8–7.8)
NEUTROPHILS NFR BLD AUTO: 75 % (ref 42–75)
NITRITE UR QL STRIP: NEGATIVE
PH UR STRIP: 8 [PH] (ref 5–9)
PLATELET # BLD: 235 10^3/UL (ref 130–400)
PMV BLD AUTO: 9.7 FL (ref 7.4–10.4)
POTASSIUM SERPL-SCNC: 3.3 MMOL/L (ref 3.6–5)
PROT SERPL-MCNC: 7.9 GM/DL (ref 6.4–8.2)
PROT UR QL STRIP: (no result)
PROTHROMBIN TIME: 15.3 SEC (ref 12.2–14.7)
RBC # BLD AUTO: 4.01 10^6/UL (ref 4.35–5.85)
RBC #/AREA URNS HPF: >100 /HPF
SODIUM SERPL-SCNC: 141 MMOL/L (ref 135–145)
SP GR UR STRIP: 1.01 (ref 1.02–1.02)
SQUAMOUS #/AREA URNS HPF: (no result) /HPF
UROBILINOGEN UR-MCNC: NORMAL MG/DL
WBC # BLD AUTO: 13.8 10^3/UL (ref 4.3–11)

## 2018-11-17 PROCEDURE — 94640 AIRWAY INHALATION TREATMENT: CPT

## 2018-11-17 PROCEDURE — 85730 THROMBOPLASTIN TIME PARTIAL: CPT

## 2018-11-17 PROCEDURE — 94760 N-INVAS EAR/PLS OXIMETRY 1: CPT

## 2018-11-17 PROCEDURE — 96361 HYDRATE IV INFUSION ADD-ON: CPT

## 2018-11-17 PROCEDURE — 80162 ASSAY OF DIGOXIN TOTAL: CPT

## 2018-11-17 PROCEDURE — 71046 X-RAY EXAM CHEST 2 VIEWS: CPT

## 2018-11-17 PROCEDURE — 74018 RADEX ABDOMEN 1 VIEW: CPT

## 2018-11-17 PROCEDURE — 96367 TX/PROPH/DG ADDL SEQ IV INF: CPT

## 2018-11-17 PROCEDURE — 71045 X-RAY EXAM CHEST 1 VIEW: CPT

## 2018-11-17 PROCEDURE — 36415 COLL VENOUS BLD VENIPUNCTURE: CPT

## 2018-11-17 PROCEDURE — 84484 ASSAY OF TROPONIN QUANT: CPT

## 2018-11-17 PROCEDURE — 94761 N-INVAS EAR/PLS OXIMETRY MLT: CPT

## 2018-11-17 PROCEDURE — 87081 CULTURE SCREEN ONLY: CPT

## 2018-11-17 PROCEDURE — 83735 ASSAY OF MAGNESIUM: CPT

## 2018-11-17 PROCEDURE — 85025 COMPLETE CBC W/AUTO DIFF WBC: CPT

## 2018-11-17 PROCEDURE — 85610 PROTHROMBIN TIME: CPT

## 2018-11-17 PROCEDURE — 83880 ASSAY OF NATRIURETIC PEPTIDE: CPT

## 2018-11-17 PROCEDURE — 94664 DEMO&/EVAL PT USE INHALER: CPT

## 2018-11-17 PROCEDURE — 82962 GLUCOSE BLOOD TEST: CPT

## 2018-11-17 PROCEDURE — 87040 BLOOD CULTURE FOR BACTERIA: CPT

## 2018-11-17 PROCEDURE — 80053 COMPREHEN METABOLIC PANEL: CPT

## 2018-11-17 PROCEDURE — 87088 URINE BACTERIA CULTURE: CPT

## 2018-11-17 PROCEDURE — 83605 ASSAY OF LACTIC ACID: CPT

## 2018-11-17 PROCEDURE — 81000 URINALYSIS NONAUTO W/SCOPE: CPT

## 2018-11-17 PROCEDURE — 87804 INFLUENZA ASSAY W/OPTIC: CPT

## 2018-11-17 PROCEDURE — 93005 ELECTROCARDIOGRAM TRACING: CPT

## 2018-11-17 PROCEDURE — 96365 THER/PROPH/DIAG IV INF INIT: CPT

## 2018-11-17 PROCEDURE — 93041 RHYTHM ECG TRACING: CPT

## 2018-11-17 RX ADMIN — SODIUM CHLORIDE SCH MLS/HR: 900 INJECTION INTRAVENOUS at 09:19

## 2018-11-17 RX ADMIN — DEXTROSE MONOHYDRATE, SODIUM CHLORIDE, AND POTASSIUM CHLORIDE SCH MLS/HR: 50; 4.5; 1.49 INJECTION, SOLUTION INTRAVENOUS at 08:15

## 2018-11-17 RX ADMIN — MEXILETINE HYDROCHLORIDE SCH MG: 150 CAPSULE ORAL at 20:56

## 2018-11-17 RX ADMIN — DEXTROSE MONOHYDRATE, SODIUM CHLORIDE, AND POTASSIUM CHLORIDE SCH MLS/HR: 50; 4.5; 1.49 INJECTION, SOLUTION INTRAVENOUS at 16:55

## 2018-11-17 RX ADMIN — METOPROLOL TARTRATE SCH MG: 25 TABLET, FILM COATED ORAL at 20:56

## 2018-11-17 RX ADMIN — SODIUM CHLORIDE SCH MLS/HR: 900 INJECTION INTRAVENOUS at 20:56

## 2018-11-17 RX ADMIN — IPRATROPIUM BROMIDE AND ALBUTEROL SULFATE SCH ML: .5; 3 SOLUTION RESPIRATORY (INHALATION) at 19:14

## 2018-11-17 RX ADMIN — IPRATROPIUM BROMIDE AND ALBUTEROL SULFATE SCH ML: .5; 3 SOLUTION RESPIRATORY (INHALATION) at 22:43

## 2018-11-17 RX ADMIN — IPRATROPIUM BROMIDE AND ALBUTEROL SULFATE SCH ML: .5; 3 SOLUTION RESPIRATORY (INHALATION) at 15:05

## 2018-11-17 NOTE — PHYSICAL THERAPY EVALUATION
PT Evaluation-General


Medical Diagnosis


Admission Date


Nov 17, 2018 at 05:20


Medical Diagnosis:  suspected sepsis


Onset Date:  Nov 17, 2018





Therapy Diagnosis


Therapy Diagnosis:  weakness; abn gait





Height/Weight


Height (Feet):  5


Height (Inches):  5.00


Weight (Pounds):  181


Weight (Ounces):  4.0





Precautions


Precautions/Isolations:  Fall Prevention, Standard Precautions





Weight Bear Status


Right Lower Extremity:  Right


Weight Bearing/Tolerated


Left Lower Extremity:  Left


Weight Bearing/Tolerated





Referral


Physician:  Jeremiah


Reason for Referral:  Evaluation/Treatment





Medical History


Pertinent Medical History:  Atrial Fib, Arthritis, CABG, CAD, COPD, Dementia, 

GERD, MI, Neuropathy


Current History


Admitted with UTI and suspected sepsis.


Reviewed History:  Yes





Social History


Home:  Apartment


Current Living Status:  Spouse


Entry Into Home:  Level Entry





Prior/Core FIM


Prior Level of Function


              Functional [place] Measure


0=Not Assessed/NA   4=Minimal Assistance


1=Total Assistance   5=Supervision or Setup


2=Maximal Assistance   6=Modified [place]


3=Moderate Assistance   7=Complete IndependenceIRFPAI Quality Coding Scale











6 Independent with activity with or without an assistive device


 


5  Patient requires set up or clean up by helper.  Patient completes activity  

by  themselves


 


4 Supervision or touching assist (CGA). [place] provide cues , steadying assist


 


3 The helper provides less than half the effort to complete the activity


 


2 The helper provides more than half the effort to complete the activity


 


1 Dependent.  The helper does all the effort to complete an activity 


 


7 Patient refused to complete or attempt activity


 


9 The patient did not perform the activity before the current illness or injury


 


88 Not attempted due to Medical conditions or safety concerns








Bed Mobility:  6


Transfers (B,C,W/C) (FIM):  6


Gait:  6 (short distances in their apartment)


wife assists with ADL





PT Evaluation-Current


Subjective


agrees to get up to the chair.





Objective


Patient Orientation:  Person, Place, Time, Situation


Problem Solving:  Fair


Attachments:  Oxygen, Drains, IV





ROM/Strength


ROM Lower Extremities


WFL


Strength Lower Extremities


grossly 3/5





Integumentary/Posture


Integumentary


refer to nursing notes


Bowel Incontinence:  No


Bladder Incontinence:  Yes


Posture


forward flexed at hips and rounded shoulders with head down.





Neuromuscular


(Tone, Coordination, Reflexes)


functional





Sensory


Vision:  Wears Glasses


Hearing:  Functional


Hand Dominance:  Right


Sensation Right Lower Extremit:  Intact


Sensation Left Lower Extremity:  Intact





Transfers


              Functional [place] Measure


0=Not Assessed/NA   4=Minimal Assistance


1=Total Assistance   5=Supervision or Setup


2=Maximal Assistance   6=Modified [place]


3=Moderate Assistance   7=Complete [place]


Transfers (B, C, W/C) (FIM):  4


Supine to/from Sit:  4


Sit to/from Stand:  4


CGA for safety with cues to sequence safely; transferred bed to chair with FWW 

with CGA .  Up in chair with oxygen in situ and needs met





Balance


Sitting Static:  Good


Sitting Dynamic:  Good


Standing Static:  Fair


 Standing Dynamic:  Fair





Assessment/Needs


Presents with decreased functional strength and activity tolerance.  Will 

benefit from skilled PT to work on [employer] to allow him to return home as 

before.


Rehab Potential:  Fair





PT Long Term Goals


Long Term Goals


PT Long Term Goals Time Frame:  Nov 21, 2018


Transfers (B,C,W/C) (FIM):  6


Gait (FIM):  5


Gait distance (FIM):  2=900-59 ft


Gait Assistive Device:  FWW





PT Plan


Problem List


Problem List:  Activity Tolerance, Functional Strength, Safety, Balance, Gait, 

Transfer, Bed Mobility





Treatment/Plan


Treatment Plan:  Continue Plan of Care


Treatment Plan:  Bed Mobility, Education, Functional Activity [place], Functional 

Strength, Gait, Safety, Therapeutic Exercise, Transfers


Treatment Duration:  Nov 21, 2018


Frequency:  6 times per week


Estimated Hrs Per Day:  .25 hour per day


Patient and/or Family Agrees t:  Yes





Safety Risks/Education


Patient Education:  Transfer Techniques, Safety Issues


Teaching Recipient:  Patient, Significant Other


Teaching Methods:  Discussion


Response to Teaching:  Reinforcement Needed





Time/GCodes


Time In:  1300


Time Out:  1316


Total Billed Treatment Time:  16


Total Billed Treatment


visit


EVL 16











ELMER CATALAN PT Nov 17, 2018 13:16

## 2018-11-17 NOTE — HISTORY & PHYSICIAL ([facility])
HPI


History of Present Illness:


77 yo M with multiple co morbid conditions that presented to ER after discharge 

from [facility] yesterday with HH. Wife states that she is unable to care 

for him at home. She did not get any teaching on his stent and she does not 

feel comfortable with caring for it. Patient states that he started to have 

chills yesterday. They had not gone to  his antibiotics and he stated 

that he just was not feeling well. Wife states that she made him dinner and he 

did not eat his normal amount. States that he is still having pain in his left 

flank. States that he has been having some more shortness of breath the last 

few days.


Source:  patient, family (wife)


Exam Limitations:  no limitations


Date seen by provider:  2018


Time Seen by Provider:  10:45


Attending Physician


Alba Daniels MD


PCP


Raúl Gonzalez MD


Consult





Date of Admission


2018 at 05:20





Home Medications


Home Medications


Reviewed patient Home Medication Reconciliation performed by pharmacy 

medication reconciliations technician and/or nursing.


Patients Allergies have been reviewed.





Allergies


Coded Allergies:  


     Penicillins (Verified  Allergy, Severe, HIVES, SOB, 18)


     codeine (Verified  Allergy, Severe, SOB, HIVES, 18)





PMH-Social-Family Hx


Patient Social History


Living Status:  Lives at home with wife who is wheel chair bound


Alcohol Use:  Denies Use


Recreational Drug Use:  No


Smoking Status:  Former Smoker


Former smoker/When Quit:  1988


Type Used:  Cigarettes


2nd Hand Smoke Exposure:  No


Recent Foreign Travel:  No


Contact w/other who traveled:  No


Recent Hopitalizations:  No


Recent Infectious Disease Expo:  No


Physical Abuse Screen:  No


Sexual Abuse:  No





Immunizations Up To Date


Tetanus Booster (TDap):  More than 5yrs


Date of Pneumonia Vaccine:  Oct 1, 2014


Date of Influenza Vaccine:  Oct 10, 2018





Past Medical History





CAD s/p CABG and stent x2


CHF


Hypertension


COPD


hx of ventricular arrhythmia s/p defibrillator placement


Atherosclerosis of the R leg


Stasis dematitis





Family Medical History


Significant Family History:  Heart Disease


Family History:  


Cardiovascular disease


  19 MOTHER


  G8 BROTHER


  G8 SISTER


Cervical cancer


  19 MOTHER





Review of Systems ([facility])


Constitutional:  chills; No fever; weakness


EENTM:  no symptoms reported


Respiratory:  dyspnea on exertion, orthopnea, short of breath


Cardiovascular:  no symptoms reported; No chest pain; edema; No palpitations


Gastrointestinal:  no symptoms reported; No abdominal pain, No constipation, No 

diarrhea, No nausea, No vomiting


Genitourinary:  hematuria


Musculoskeletal:  back pain


Skin:  no symptoms reported


Psychiatric/Neurological:  No Symptoms Reported





Reviewed Test Results


Reviewed Test Results


Lab





Laboratory Tests








Test


 18


04:25 18


04:28 18


04:38 18


04:57 Range/Units


 


 


White Blood Count


 13.8 H


 


 


 


 4.3-11.0


10^3/uL


 


Red Blood Count


 4.01 L


 


 


 


 4.35-5.85


10^6/uL


 


Hemoglobin 11.9 L    13.3-17.7  G/DL


 


Hematocrit 37 L    40-54  %


 


Mean Corpuscular Volume 92     80-99  FL


 


Mean Corpuscular Hemoglobin 30     25-34  PG


 


Mean Corpuscular Hemoglobin


Concent 32 


 


 


 


 32-36  G/DL





 


Red Cell Distribution Width 15.7 H    10.0-14.5  %


 


Platelet Count


 235 


 


 


 


 130-400


10^3/uL


 


Mean Platelet Volume 9.7     7.4-10.4  FL


 


Neutrophils (%) (Auto) 75     42-75  %


 


Lymphocytes (%) (Auto) 13     12-44  %


 


Monocytes (%) (Auto) 12     0-12  %


 


Eosinophils (%) (Auto) 0     0-10  %


 


Basophils (%) (Auto) 0     0-10  %


 


Neutrophils # (Auto) 10.3 H    1.8-7.8  X 10^3


 


Lymphocytes # (Auto) 1.8     1.0-4.0  X 10^3


 


Monocytes # (Auto) 1.6 H    0.0-1.0  X 10^3


 


Eosinophils # (Auto)


 0.0 


 


 


 


 0.0-0.3


10^3/uL


 


Basophils # (Auto)


 0.0 


 


 


 


 0.0-0.1


10^3/uL


 


Prothrombin Time 15.3 H    12.2-14.7  SEC


 


INR Comment 1.2     0.8-1.4  


 


Activated Partial


Thromboplast Time 35 


 


 


 


 24-35  SEC





 


Sodium Level 141     135-145  MMOL/L


 


Potassium Level 3.3 L    3.6-5.0  MMOL/L


 


Chloride Level 102       MMOL/L


 


Carbon Dioxide Level 30     21-32  MMOL/L


 


Anion Gap 9     5-14  MMOL/L


 


Blood Urea Nitrogen 23 H    7-18  MG/DL


 


Creatinine


 0.91 


 


 


 


 0.60-1.30


MG/DL


 


Estimat Glomerular Filtration


Rate > 60 


 


 


 


  





 


BUN/Creatinine Ratio 25      


 


Glucose Level 118 H      MG/DL


 


Calcium Level 9.5     8.5-10.1  MG/DL


 


Corrected Calcium 10.3 H    8.5-10.1  MG/DL


 


Magnesium Level 1.6 L    1.8-2.4  MG/DL


 


Total Bilirubin 0.5     0.1-1.0  MG/DL


 


Aspartate Amino Transf


(AST/SGOT) 37 H


 


 


 


 5-34  U/L





 


Alanine Aminotransferase


(ALT/SGPT) 36 


 


 


 


 0-55  U/L





 


Alkaline Phosphatase 74       U/L


 


Troponin I < 0.30     <0.30  NG/ML


 


B-Type Natriuretic Peptide 434.2 H    <100.0  PG/ML


 


Total Protein 7.9     6.4-8.2  GM/DL


 


Albumin 3.0 L    3.2-4.5  GM/DL


 


Digoxin Level


 < 0.30 L


 


 


 


 0.80-2.00


NG/ML


 


Glucometer  106      MG/DL


 


Urine Color   YELLOW    


 


Urine Clarity   CLEAR    


 


Urine pH   8   5-9  


 


Urine Specific Gravity   1.010 L  1.016-1.022  


 


Urine Protein   3+ H  NEGATIVE  


 


Urine Glucose (UA)   NEGATIVE   NEGATIVE  


 


Urine Ketones   NEGATIVE   NEGATIVE  


 


Urine Nitrite   NEGATIVE   NEGATIVE  


 


Urine Bilirubin   NEGATIVE   NEGATIVE  


 


Urine Urobilinogen   NORMAL   NORMAL  MG/DL


 


Urine Leukocyte Esterase   3+ H  NEGATIVE  


 


Urine RBC (Auto)   5+ H  NEGATIVE  


 


Urine RBC   >100 H   /HPF


 


Urine WBC   10-25 H   /HPF


 


Urine Squamous Epithelial


Cells 


 


 0-2 


 


  /HPF





 


Urine Crystals   NONE    /LPF


 


Urine Bacteria   TRACE    /HPF


 


Urine Casts   NONE    /LPF


 


Urine Mucus   NEGATIVE    /LPF


 


Urine Culture Indicated   YES    


 


Lactic Acid Level


 


 


 


 0.69 


 0.50-2.00


MMOL/L


 


Test


 18


11:31 18


16:05 


 


 Range/Units


 


 


Glucometer 127 H 141 H     MG/DL











Physical Exam-([facility])


Physical Exam


Vital Signs





 VS - Last 72 Hours, by Label








 18





 03:40 03:40 04:01 06:56


 


Temp 98.3   98.5


 


Pulse 110   115


 


Resp 15   15


 


B/P (MAP) 163/106 (125)   148/92 (110)


 


Pulse Ox 96 96 95 96


 


O2 Delivery Nasal Cannula Nasal Cannula Nasal Cannula Nasal Cannula


 


O2 Flow Rate 2.00 3.00  3.00


 


    





 18





 08:45 10:21 10:22 12:00


 


Temp    97.8


 


Pulse   117 86


 


Resp    22


 


B/P (MAP)    164/90 (114)


 


Pulse Ox 95 95 95 94


 


O2 Delivery Nasal Cannula Nasal Cannula  Nasal Cannula


 


O2 Flow Rate 3.00 3.00  3.00


 


FiO2   32 


 


    





 18   





 15:07   


 


O2 Delivery Nasal Cannula   


 


O2 Flow Rate 3.00   





Capillary Refill : Greater Than 3 Seconds


General Appearance:  mild distress (tired)


HEENT:  PERRL/EOMI


Respiratory:  no respiratory distress, no accessory muscle use, wheezing


Cardiovascular:  normal peripheral pulses, no murmur, irregularly irregular


Gastrointestinal:  normal bowel sounds, non tender, soft, no organomegaly


Back:  CVA tenderness (L)


Extremities:  no calf tenderness, normal capillary refill, pedal edema


Neurologic/Psychiatric:  CNs II-XII nml as tested, no motor/sensory deficits, 

alert, normal mood/affect, oriented x 3


Skin:  normal color, warm/dry


Lymphatic:  no adenopathy





Assessment/Plan


Assessment/Plan


Admission Status:  Inpatient Order (span 2 midnights)


Reason for Inpatient Admission:  


Patient had recent renal stent placed and requiring IV antibiotics and has


acute renal insufficiency





(1) Renal calculus, left


Status:  Acute


Assessment & Plan:  : Stent in place at this time from admission at [facility]

, urology consulted 





(2) Pyelonephritis


Status:  Acute


Assessment & Plan:  - IV antibiotics





(3) Acute renal insufficiency


Status:  Acute


Assessment & Plan:  - Will continue to monitor with gentle hydration





(4) CAD (coronary artery disease)


Status:  Chronic


Qualifiers:  


   Qualified Codes:  I25.10 - Atherosclerotic heart disease of native coronary 

artery without angina pectoris


(5) Paroxysmal atrial fibrillation


Status:  Chronic


(6) HTN (hypertension)


Status:  Acute


Assessment & Plan:  - Continue home meds


Qualifiers:  


   Qualified Codes:  I10 - Essential (primary) hypertension


(7) Diabetes mellitus, type 2


Status:  Chronic


Assessment & Plan:  - Accucheck AC/HS


Qualifiers:  


   


(8) COPD (chronic obstructive pulmonary disease)


Status:  Chronic


Assessment & Plan:  - Continue to titrate oxygen as tolerated, MAT protocol


Qualifiers:  


   Qualified Codes:  J44.9 - Chronic obstructive pulmonary disease, unspecified


(9) Debility


Status:  Acute


Assessment & Plan:  - SW consulted, patient will need placement to SNF





(10) DVT prophylaxis


Status:  Acute


Assessment & Plan:  - SCDs due to hematuria








Clinical Quality Measures


DVT/VTE Risk/Contraindication:


Risk Factor Score Per Nursin


RFS Level Per Nursing on Admit:  4+=Very High





Copy


Copies To 1:   ALBA NOVA MD 2018 16:45

## 2018-11-17 NOTE — DIAGNOSTIC IMAGING REPORT
EXAMINATION: Chest radiograph, portable AP view.



DATE: November 17, 2018 at 0501 hours.



INDICATION: 78-year-old male, shortness of breath.



COMPARISON:  July 8, 2018.



FINDINGS: There are median sternotomy wires. There is a

left-sided cardiac assist device with leads. Lung volumes are

low. Heart size and mediastinal contours are unchanged. There is

no identified pneumothorax. There are bilateral predominantly

interstitial opacities with additional alveolar consolidation in

the left lower lobe which is increased since comparison exam.

There is some mild blunting of the right and left lateral

costophrenic angles. There is a left-sided abdominal catheter

potentially reflecting a nephrostomy tube.



IMPRESSION: 

1. Persistent low lung volumes with bilateral interstitial

opacities and additional alveolar consolidation in the left lung

base which is increased since comparison exam.



Dictated by: 



  Dictated on workstation # [id number]

## 2018-11-17 NOTE — ED GENERAL
General


Stated Complaint:  URINARY ISSUES


Source of Information:  Patient (PT IS POOR HISTORIAN ( PT WITH HISTORY OF 

DEMENTIA ) AND SPEECH DIFFICULT TO UNDERSTAND ( APPEARS TO BE IN PART DUE TO 

EXTREMELY DRY ORAL MUCOSA) --NORMAL BASELINE FOR PT), EMS, Old Records





History of Present Illness


Date Seen by Provider:  Nov 17, 2018


Time Seen by Provider:  03:40


Initial Comments


PT ARRIVES VIA EMS FROM HOME--WIFE DOES NOT ACCOMPANY PT TO ER. 


PT WAS JUST DISMISSED FROM [facility] THIS AFTERNOON / FRIDAY 11/16/18


PT WAS ADMITTED HERE 11/10-11/13 FOR UTI WITH SEPSIS, WITH 2 LARGE LEFT 

URETERAL STONES--1 CM AND 2 CM


ATTEMPTS TO DO LITHOTRIPSY AND URETERAL STENT WERE UNSUCCESSFUL, AND NO 

INTERVENTIONAL RADIOLOGY SERVICES WERE AVAILABLE, SO PT WAS TRANSFERRED TO 

[facility] AND HAD LEFT NEPHROSTOMY TUBE PLACED. 


SENT HOME WITH RX FOR VANTIN, BUT HAS NOT FILLED IT YET, AND BRINGS WITH HIM TO 

ER. 





PT IS SCHEDULED TO HAVE HOME HEALTH START SEEING HIM TODAY





PT HAS NO COMPLAINTS. STATES HE FEELS FINE


STATES HIS NEPHROSTOMY IS WORKING--HAS EMPTIED THE COLLECTION BAG 3 OR 4 TIMES 

SINCE BEING DISMISSED THIS AFTERNOON


NO ABDOMINAL PAIN 


NO NAUSEA/VOMITING


NO FEVER





PT DOES NOT KNOW WHY HE IS HERE, OTHER THAN WIFE CALLED EMS AND WANTING HIM TO 

COME HERE





EMS REPORT THAT WIFE REPEATEDLY STATED TO THEM THAT SHE DID NOT WANT HIM 

DISMISSED TO HOME, AND WANTED HIM ADMITTED BACK HERE FOR "LONG TERM REHAB" AND 

STATES SHE CANNOT TAKE CARE OF HIM AND CALLED THEM TO BRING HIM HERE FOR US TO 

ADMIT HIM AND TAKE CARE OF HIM





PT HAS BEEN IN NURSING HOME IN THE PAST, AFTER HIP FRACTURE IN 2017, BUT HAS 

BEEN BACK IN HOME FOR OVER A YEAR. 





PT WITH MULTIPLE VISITS HERE FOR VARIOUS COMPLAINTS


PT HAS HAD SEPSIS SEVERAL TIMES.





PCP: DR. GREENWOOD AT [facility]





Allergies and Home Medications


Allergies


Coded Allergies:  


     Penicillins (Verified  Allergy, Severe, HIVES, SOB, 6/8/18)


     codeine (Verified  Allergy, Severe, SOB, HIVES, 6/8/18)





Home Medications


Albuterol Sulfate 18 Gm Hfa.aer.ad, 2 PUFF IH QID PRN for SHORTNESS OF BREATH, (

Reported)


Albuterol Sulfate 1.25 Mg/3 Ml Vial.neb, 1.25 MG NEB BID, (Reported)


Amiodarone HCl 200 Mg Tablet, 200 MG PO DAILY, (Reported)


Amiodarone HCl 200 Mg Tablet, 200 MG PO SuWeSa@2000, (Reported)


   TAKES 200MG DOSE AT NIGHT THREE DAY A WEEK IN ADDITION TO 200MG EVERY 

MORNING. 


Amlodipine Besylate 5 Mg Tablet, 5 MG PO HS, (Reported)


   LAST FILLED 07/06/17 #90 


Digoxin 125 Mcg Tablet, 125 MCG PO DAILY, (Reported)


Esomeprazole Magnesium 40 Mg Capsule.dr, 40 MG PO DAILY, (Reported)


Furosemide 40 Mg Tablet, 40 MG PO 0900,1500, (Reported)


Insulin Determir 1,000 Units/10 Ml Soln, 10 UNIT SQ DAILY


   Prescribed by: ARSEN LAWS on 7/16/18 1449


Metoprolol Tartrate 50 Mg Tablet, 25 MG PO BID, (Reported)


   TAKES 1/2 OF A (50 MG) TABLET 


Mexiletine HCl 150 Mg Cap, 150 MG PO TID, (Reported)


Potassium Chloride 8 Meq Tablet.er, 16 MEQ PO DAILY, (Reported)


   TAKES 2 (8MEQ) TABLETS 





Patient Home Medication List


Home Medication List Reviewed:  Yes





Review of Systems


Review of Systems


Constitutional:  no symptoms reported; No chills, No dizziness, No fever


EENTM:  no symptoms reported


Respiratory:  see HPI, cough, dyspnea on exertion; No phlegm; short of breath


Cardiovascular:  no symptoms reported; No chest pain; edema (CHRONIC/STABLE)


Gastrointestinal:  no symptoms reported; No abdominal pain, No nausea, No 

vomiting


Genitourinary:  see HPI


Musculoskeletal:  no symptoms reported; No back pain


Skin:  no symptoms reported


Psychiatric/Neurological:  No Symptoms Reported


Hematologic/Lymphatic:  No Symptoms Reported


Immunological/Allergic:  no symptoms reported





Past Medical-Social-Family Hx


Patient Social History


Alcohol Use:  Denies Use


Recreational Drug Use:  No


Smoking Status:  Former Smoker (3 1/2 TO 4 PPD, QUIT 1993)


Type Used:  Cigarettes


Former Smoker, Quit:  Jan 1, 1993


2nd Hand Smoke Exposure:  No


Recent Foreign Travel:  No


Contact w/Someone Who Travel:  No


Recent Hopitalizations:  No





Immunizations Up To Date


Tetanus Booster (TDap):  More than 5yrs


Date of Pneumonia Vaccine:  Oct 1, 2014


Date of Influenza Vaccine:  Oct 10, 2018





Seasonal Allergies


Seasonal Allergies:  No





Past Medical History


Surgeries:  Yes (SKIN CANCER REMOVALS; CATARACT SURGERY; CARDIAC CATHS WITH 

STENTS X 2 ; CABG; LEFT HIP FX/ ORIF 02/2017; LITHOTRIPSY; HERNIA REPAIR; 11/ 2018--CYSTOSCOPY WITH UNSUCCESSFUL ATTEMPT AT LITHOTRIPSY AND URETERAL STENT 

PLACEMENT WITH SUBSEQUENT LEFT NEPHROSTOMY TUBE)


Cardiac, CABG, Coronary Stent, Defibrillator, Eye Surgery, Orthopedic, Renal


Respiratory:  Yes (O2 3L/NC AT HS; CHRONIC RESPIRATORY FAILURE)


Asthma, Pneumonia, Chronic Bronchitis, Sleep Apnea, COPD


Currently Using CPAP:  No


Currently Using BIPAP:  No


Cardiac:  Yes (CABG; STENTS X 2; DEFIBRILLATOR FOR  ISCHEMIC CARDIOMYOPATHY 

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 CARDIAC CATHS-STENTS X2; CHF)


Cardiomyopathy, Chronic Edema/Swelling, Coronary Artery Disease, Heart Attack, 

High Cholesterol, Hypertension, Irregular Heartbeat


Neurological:  Yes


Dementia, Neuropathy


Reproductive Disorders:  Yes (unable to have children- mumps as a child)


Sexually Transmitted Disease:  No


HIV/AIDS:  No


Genitourinary:  Yes (LEFT NEPHROSTOMY TUBE PLACED 11/2018 FOR URETERAL 

OBSTRUCTION FROM 2 LARGE STONES--1 CM AND 2 CM IN SIZE. )


Kidney Infection, Bladder Infection, Kidney Stones


Gastrointestinal:  Yes


Gastroesophageal Reflux, Ulcer


Musculoskeletal:  Yes (POOR MOBILITY; LEFT HIP FRACTURE)


Arthritis, Fractures


Endocrine:  Yes


Diabetes, Insulin dep


HEENT:  Yes


Cataract, Glaucoma


Loss of Vision:  Bilateral


Hearing Impairment:  Hard of Hearing


Cancer:  Yes


Skin


Did You Recieve Any Treatments:  Yes


What Type of Treatment Did You:  Surgical Intervention


Psychosocial:  Yes


Depression


Integumentary:  Yes (shingles , SKIN CANCER; LEG CELLULITIS/ STASIS DERMATITIS)


Blood Disorders:  No


Adverse Reaction/Blood Tranf:  No





Family Medical History





Cardiovascular disease


  19 MOTHER


  G8 BROTHER


  G8 SISTER


Cervical cancer


  19 MOTHER


Heart Disease





Physical Exam


Vital Signs





Vital Signs - First Documented








 11/17/18





 03:40


 


Temp 98.3


 


Pulse 110


 


Resp 15


 


B/P (MAP) 163/106 (125)


 


Pulse Ox 96


 


O2 Delivery Nasal Cannula


 


O2 Flow Rate 2.00





Capillary Refill :


Height, Weight, BMI


Height: 5'5.00"


Weight: 188lbs. 4.0oz. [id number]; 31.3 BMI


Method:Stated


General Appearance:  WD/WN, Other (MILDLY DYSPNEIC AT REST, PT WITH CHRONIC 

GENERALIZED WEAKNESS. FREQUENT LOOSE/MOIST COUGH AND LOTS OF UPPER AIRWAY NOISE/

RHONCHI. SPEECH DIFFICULT TO UNDERSTAND AT TIMES)


HEENT:  Other (ORAL MUCOSA EXTREMELY DRY)


Neck:  Normal Inspection


Respiratory:  Other (MILDLY DYSPNEIC, FREQUENT LOOSE/MOIST COUGH, WITH LOTS OF 

AUDIBLE UPPER AIRWAY RHONCHI/NOISE. )


Cardiovascular:  Regular Rate, Rhythm, No JVD, No Murmur


Gastrointestinal:  Non Tender, Soft, Other (LEFT NEPHROSTOMY TUBE IN PLACE WITH 

CLEAR/PALE AND SLIGHTLY PINK URINE. NO SIGNS OF INFECTION. )


Back:  No CVA Tenderness


Extremity:  Normal Capillary Refill, Pedal Edema (2+ ON RIGHT, 1+ ON LEFT, WITH 

CHRONIC VENOUS STASIS CHANGES BILATERALLY WITH RIGHT > LEFT. NO SIGNIFICANT 

ERYTHEMA NOTED AT THIS TIME. )


Neurologic/Psychiatric:  Alert, No Motor/Sensory Deficits (GROSSLY INTACT ), 

Normal Mood/Affect (SMILING. ), CNs II-XII Norm as Tested, Other (ORIENTED TO 

PERSON, PLACE, BUT SOMEWHAT DISORIENTED TO TIME AND HAS VERY POOR MEMORY AND 

DOES NOT KNOW WHY HE IS HERE. )


Skin:  Normal Color, Warm/Dry, Other (MULTIPLE SCABS AND CHRONIC APPEARING SKIN 

LESIONS TO FACE, ARMS AND HANDS. )





Focused Exam


Sepsis Stage:  Sepsis


Possible Source:  Pulmonary


Lactate Level


11/17/18 04:57: Lactic Acid Level 0.69





Time of Focused Exam:  04:30


Respiratory:  Rhonci, Other (MILDLY DYSPNEIC--UNCHANGED)


Cardiovascular:  No JVD, No Murmur, Tachycardia


Skin:  normal color, warm/dry


Lactic Acid Level





Laboratory Tests








Test


 11/17/18


04:57


 


Lactic Acid Level


 0.69 MMOL/L


(0.50-2.00)








Within 3hrs of presentation:  Admin fluids, Admin ABX, Blood cultures prior to 

ABX's, Focus exam, Lactate level





Progress/Results/Core Measures


Suspected Sepsis


SIRS


Temperature: 


Pulse:  


Respiratory Rate: 


 


Laboratory Tests


11/17/18 04:25: White Blood Count 13.8H


Blood Pressure  / 


Mean: 


 





11/17/18 04:57: Lactic Acid Level 0.69


Laboratory Tests


11/17/18 04:25: 


Creatinine 0.91, INR Comment 1.2, Platelet Count 235, Total Bilirubin 0.5








Results/Orders


Lab Results





Laboratory Tests








Test


 11/17/18


04:25 11/17/18


04:28 11/17/18


04:38 11/17/18


04:57 Range/Units


 


 


White Blood Count


 13.8 H


 


 


 


 4.3-11.0


10^3/uL


 


Red Blood Count


 4.01 L


 


 


 


 4.35-5.85


10^6/uL


 


Hemoglobin 11.9 L    13.3-17.7  G/DL


 


Hematocrit 37 L    40-54  %


 


Mean Corpuscular Volume 92     80-99  FL


 


Mean Corpuscular Hemoglobin 30     25-34  PG


 


Mean Corpuscular Hemoglobin


Concent 32 


 


 


 


 32-36  G/DL





 


Red Cell Distribution Width 15.7 H    10.0-14.5  %


 


Platelet Count


 235 


 


 


 


 130-400


10^3/uL


 


Mean Platelet Volume 9.7     7.4-10.4  FL


 


Neutrophils (%) (Auto) 75     42-75  %


 


Lymphocytes (%) (Auto) 13     12-44  %


 


Monocytes (%) (Auto) 12     0-12  %


 


Eosinophils (%) (Auto) 0     0-10  %


 


Basophils (%) (Auto) 0     0-10  %


 


Neutrophils # (Auto) 10.3 H    1.8-7.8  X 10^3


 


Lymphocytes # (Auto) 1.8     1.0-4.0  X 10^3


 


Monocytes # (Auto) 1.6 H    0.0-1.0  X 10^3


 


Eosinophils # (Auto)


 0.0 


 


 


 


 0.0-0.3


10^3/uL


 


Basophils # (Auto)


 0.0 


 


 


 


 0.0-0.1


10^3/uL


 


Prothrombin Time 15.3 H    12.2-14.7  SEC


 


INR Comment 1.2     0.8-1.4  


 


Activated Partial


Thromboplast Time 35 


 


 


 


 24-35  SEC





 


Sodium Level 141     135-145  MMOL/L


 


Potassium Level 3.3 L    3.6-5.0  MMOL/L


 


Chloride Level 102       MMOL/L


 


Carbon Dioxide Level 30     21-32  MMOL/L


 


Anion Gap 9     5-14  MMOL/L


 


Blood Urea Nitrogen 23 H    7-18  MG/DL


 


Creatinine


 0.91 


 


 


 


 0.60-1.30


MG/DL


 


Estimat Glomerular Filtration


Rate > 60 


 


 


 


  





 


BUN/Creatinine Ratio 25      


 


Glucose Level 118 H      MG/DL


 


Calcium Level 9.5     8.5-10.1  MG/DL


 


Corrected Calcium 10.3 H    8.5-10.1  MG/DL


 


Magnesium Level 1.6 L    1.8-2.4  MG/DL


 


Total Bilirubin 0.5     0.1-1.0  MG/DL


 


Aspartate Amino Transf


(AST/SGOT) 37 H


 


 


 


 5-34  U/L





 


Alanine Aminotransferase


(ALT/SGPT) 36 


 


 


 


 0-55  U/L





 


Alkaline Phosphatase 74       U/L


 


Troponin I < 0.30     <0.30  NG/ML


 


B-Type Natriuretic Peptide 434.2 H    <100.0  PG/ML


 


Total Protein 7.9     6.4-8.2  GM/DL


 


Albumin 3.0 L    3.2-4.5  GM/DL


 


Digoxin Level


 < 0.30 L


 


 


 


 0.80-2.00


NG/ML


 


Glucometer  106      MG/DL


 


Urine Color   YELLOW    


 


Urine Clarity   CLEAR    


 


Urine pH   8   5-9  


 


Urine Specific Gravity   1.010 L  1.016-1.022  


 


Urine Protein   3+ H  NEGATIVE  


 


Urine Glucose (UA)   NEGATIVE   NEGATIVE  


 


Urine Ketones   NEGATIVE   NEGATIVE  


 


Urine Nitrite   NEGATIVE   NEGATIVE  


 


Urine Bilirubin   NEGATIVE   NEGATIVE  


 


Urine Urobilinogen   NORMAL   NORMAL  MG/DL


 


Urine Leukocyte Esterase   3+ H  NEGATIVE  


 


Urine RBC (Auto)   5+ H  NEGATIVE  


 


Urine RBC   >100 H   /HPF


 


Urine WBC   10-25 H   /HPF


 


Urine Squamous Epithelial


Cells 


 


 0-2 


 


  /HPF





 


Urine Crystals   NONE    /LPF


 


Urine Bacteria   TRACE    /HPF


 


Urine Casts   NONE    /LPF


 


Urine Mucus   NEGATIVE    /LPF


 


Urine Culture Indicated   YES    


 


Lactic Acid Level


 


 


 


 0.69 


 0.50-2.00


MMOL/L








Micro Results





Microbiology


11/17/18 Influenza Types A,B Antigen (AYALA) - Final, Complete


           





My Orders





Orders - MARCOS CAMARENA DO


Saline Lock/Iv-Start (11/17/18 03:41)


Cbc With Automated Diff (11/17/18 03:41)


Comprehensive Metabolic Panel (11/17/18 03:41)


Ua Culture If Indicated (11/17/18 03:41)


Accucheck Stat ONCE (11/17/18 03:50)


Ekg Tracing (11/17/18 03:50)


O2 (11/17/18 03:50)


Monitor-Rhythm Ecg Trace Only (11/17/18 03:50)


BNP (11/17/18 03:50)


Digoxin (11/17/18 03:50)


Lactic Acid Analyzer (11/17/18 03:50)


Magnesium (11/17/18 03:50)


Protime With Inr (11/17/18 03:50)


Partial Thromboplastin Time (11/17/18 03:50)


Troponin I (11/17/18 03:50)


Blood Culture (11/17/18 03:50)


Influenza A And B Antigens (11/17/18 03:50)


Chest 1 View, Ap/Pa Only (11/17/18 03:50)


Albuterol/Ipra Inhalation Soln (Duoneb I (11/17/18 04:00)


Rt Request For Service (11/17/18 03:50)


Saline Lock/Iv-Start (11/17/18 03:50)


Svn Small Volume Nebulizer (11/17/18 03:50)


Urine Culture (11/17/18 04:38)


Magnesium 1 Gm/100 Ml Ivpb (Magnesium Cannon (11/17/18 05:15)


Magnesium Oxide Tablet (Mag Ox Tablet) (11/17/18 05:15)


Potassium Chloride (Tablet) (Klor Con Ta (11/17/18 05:15)


Saline Lock/Iv-Start (11/17/18 05:10)


Ns Iv 1000 Ml (Sodium Chloride 0.9%) (11/17/18 05:10)


Cefepime Injection (Maxipime Injection) (11/17/18 05:15)





Medications Given in ED





Current Medications








 Medications  Dose


 Ordered  Sig/Silvana


 Route  Start Time


 Stop Time Status Last Admin


Dose Admin


 


 Albuterol/


 Ipratropium  3 ml  ONCE  ONCE


 INH  11/17/18 04:00


 11/17/18 04:01 DC 11/17/18 04:01


3 ML


 


 Cefepime HCl 2000


 mg/Sodium Chloride  50 ml @ 


 100 mls/hr  ONCE  ONCE


 IV  11/17/18 05:15


 11/17/18 05:44 DC 11/17/18 05:23


100 MLS/HR


 


 Magnesium Oxide  800 mg  ONCE  ONCE


 PO  11/17/18 05:15


 11/17/18 05:16 DC 11/17/18 05:23


800 MG


 


 Magnesium Sulfate/


 Dextrose  100 ml @ 


 100 mls/hr  ONCE  ONCE


 IV  11/17/18 05:15


 11/17/18 06:14 DC 11/17/18 05:56


100 MLS/HR


 


 Potassium Chloride  20 meq  ONCE  ONCE


 PO  11/17/18 05:15


 11/17/18 05:16 DC 11/17/18 05:23


20 MEQ


 


 Sodium Chloride  1,000 ml @ 


 0 mls/hr  Q0M ONCE


 IV  11/17/18 05:10


 11/17/18 05:14 DC 11/17/18 05:23


1,000 MLS/HR








Vital Signs/I&O











 11/17/18 11/17/18





 03:40 04:01


 


Temp 98.3 


 


Pulse 110 


 


Resp 15 


 


B/P (MAP) 163/106 (125) 


 


Pulse Ox 96 95


 


O2 Delivery Nasal Cannula Nasal Cannula


 


O2 Flow Rate 2.00 





Capillary Refill :


Progress Note :  


Progress Note


RT TO GIVE NEB TREATMENT AND SUCTION--PT DID NOT TOLERATE SUCTIONING WELL, BUT 

DID HAVE SOME DECREASE IN UPPER AIRWAY NOISE


PT SOMEWHAT LESS DYSPNEIC


O2 SATS REMAINED IN UPPER 90'S


INITIAL HEART RATE AROUND 120 ON ARRIVAL AND PT WAS HYPERTENSIVE WITH BP '

S/100'S


NO DETERIORATION IN PT'S CONDITION DURING ER STAY





PT MEETS SEPSIS CRITERIA BY: HEART RATE > 90, WBC > 12,000 AND SOURCE OF 

INFECTION--UTI/ PULMONARY--ADDITIONALLY  WITH RECENT HOSPITALIZATION WITH 

RECENT BROAD SPECTRUM ANTIBIOTICS, AT RISK FOR MULTI DRUG RESISTANT ORGANISMS





ECG


Initial ECG Impression Date:  Nov 17, 2018


Initial ECG Impression Time:  04:26


Initial ECG Rate:  118


Initial ECG Rhythm:  S.Tach (LBBB)


Initial ECG Comparisson:  Unchanged





Diagnostic Imaging





Comments


CXR--BIBASILAR ATELECTASIS/INFILTRATES, PENDING RADIOLOGIST REVIEW


   Reviewed:  Reviewed by Me





Departure


Communication (Admissions)


0517--SPOKE WITH DR. LAWS, ACCEPTS PT FOR ADMIT





Impression





 Primary Impression:  


 Sepsis


 Additional Impressions:  


 UTI (urinary tract infection)


 BIBASILAR ATELECTASIS / INFILTRATES


 S/P RECENT PLACEMENT OF LEFT NEPHROSTOMY TUBE


 LEFT URETERAL CALCULI WITH OBSTRUCTION


 Dehydration


 Hypokaluria


 Hypomagnesemia


 COPD (chronic obstructive pulmonary disease)


 IDDM TYPE 2


 HTN (hypertension)


Disposition:  09 ADMITTED AS INPATIENT


Condition:  Stable





Admissions


Decision to Admit Reason:  Admit from ER (General)


Decision to Admit/Date:  Nov 17, 2018


Time/Decision to Admit Time:  05:20





Departure-Patient Inst.


Referrals:  


MAHOGANY GREENWOOD MD (PCP/Family)


Primary Care Physician











MARCOS CAMARENA DO Nov 17, 2018 03:59

## 2018-11-18 VITALS — DIASTOLIC BLOOD PRESSURE: 72 MMHG | SYSTOLIC BLOOD PRESSURE: 132 MMHG

## 2018-11-18 VITALS — SYSTOLIC BLOOD PRESSURE: 114 MMHG | DIASTOLIC BLOOD PRESSURE: 66 MMHG

## 2018-11-18 VITALS — DIASTOLIC BLOOD PRESSURE: 76 MMHG | SYSTOLIC BLOOD PRESSURE: 128 MMHG

## 2018-11-18 VITALS — SYSTOLIC BLOOD PRESSURE: 124 MMHG | DIASTOLIC BLOOD PRESSURE: 85 MMHG

## 2018-11-18 VITALS — SYSTOLIC BLOOD PRESSURE: 138 MMHG | DIASTOLIC BLOOD PRESSURE: 62 MMHG

## 2018-11-18 VITALS — SYSTOLIC BLOOD PRESSURE: 112 MMHG | DIASTOLIC BLOOD PRESSURE: 61 MMHG

## 2018-11-18 LAB
ALBUMIN SERPL-MCNC: 2.8 GM/DL (ref 3.2–4.5)
ALP SERPL-CCNC: 69 U/L (ref 40–136)
ALT SERPL-CCNC: 31 U/L (ref 0–55)
BASOPHILS # BLD AUTO: 0 10^3/UL (ref 0–0.1)
BASOPHILS NFR BLD AUTO: 0 % (ref 0–10)
BILIRUB SERPL-MCNC: 0.4 MG/DL (ref 0.1–1)
BUN/CREAT SERPL: 22
CALCIUM SERPL-MCNC: 9.2 MG/DL (ref 8.5–10.1)
CHLORIDE SERPL-SCNC: 104 MMOL/L (ref 98–107)
CO2 SERPL-SCNC: 28 MMOL/L (ref 21–32)
CREAT SERPL-MCNC: 0.81 MG/DL (ref 0.6–1.3)
EOSINOPHIL # BLD AUTO: 0.1 10^3/UL (ref 0–0.3)
EOSINOPHIL NFR BLD AUTO: 1 % (ref 0–10)
ERYTHROCYTE [DISTWIDTH] IN BLOOD BY AUTOMATED COUNT: 16.2 % (ref 10–14.5)
GFR SERPLBLD BASED ON 1.73 SQ M-ARVRAT: > 60 ML/MIN
GLUCOSE SERPL-MCNC: 130 MG/DL (ref 70–105)
HCT VFR BLD CALC: 35 % (ref 40–54)
HGB BLD-MCNC: 11.3 G/DL (ref 13.3–17.7)
LYMPHOCYTES # BLD AUTO: 1.1 X 10^3 (ref 1–4)
LYMPHOCYTES NFR BLD AUTO: 9 % (ref 12–44)
MAGNESIUM SERPL-MCNC: 1.8 MG/DL (ref 1.8–2.4)
MANUAL DIFFERENTIAL PERFORMED BLD QL: NO
MCH RBC QN AUTO: 30 PG (ref 25–34)
MCHC RBC AUTO-ENTMCNC: 32 G/DL (ref 32–36)
MCV RBC AUTO: 94 FL (ref 80–99)
MONOCYTES # BLD AUTO: 1.6 X 10^3 (ref 0–1)
MONOCYTES NFR BLD AUTO: 13 % (ref 0–12)
NEUTROPHILS # BLD AUTO: 9.1 X 10^3 (ref 1.8–7.8)
NEUTROPHILS NFR BLD AUTO: 77 % (ref 42–75)
PLATELET # BLD: 227 10^3/UL (ref 130–400)
PMV BLD AUTO: 10.1 FL (ref 7.4–10.4)
POTASSIUM SERPL-SCNC: 3.7 MMOL/L (ref 3.6–5)
PROT SERPL-MCNC: 7.3 GM/DL (ref 6.4–8.2)
RBC # BLD AUTO: 3.75 10^6/UL (ref 4.35–5.85)
SODIUM SERPL-SCNC: 140 MMOL/L (ref 135–145)
WBC # BLD AUTO: 11.9 10^3/UL (ref 4.3–11)

## 2018-11-18 RX ADMIN — PANTOPRAZOLE SODIUM SCH MG: 40 TABLET, DELAYED RELEASE ORAL at 06:59

## 2018-11-18 RX ADMIN — MEXILETINE HYDROCHLORIDE SCH MG: 150 CAPSULE ORAL at 13:58

## 2018-11-18 RX ADMIN — DEXTROSE MONOHYDRATE, SODIUM CHLORIDE, AND POTASSIUM CHLORIDE SCH MLS/HR: 50; 4.5; 1.49 INJECTION, SOLUTION INTRAVENOUS at 10:32

## 2018-11-18 RX ADMIN — SODIUM CHLORIDE SCH MLS/HR: 900 INJECTION INTRAVENOUS at 06:59

## 2018-11-18 RX ADMIN — METOPROLOL TARTRATE SCH MG: 25 TABLET, FILM COATED ORAL at 10:32

## 2018-11-18 RX ADMIN — IPRATROPIUM BROMIDE AND ALBUTEROL SULFATE SCH ML: .5; 3 SOLUTION RESPIRATORY (INHALATION) at 19:09

## 2018-11-18 RX ADMIN — MEXILETINE HYDROCHLORIDE SCH MG: 150 CAPSULE ORAL at 10:32

## 2018-11-18 RX ADMIN — IPRATROPIUM BROMIDE AND ALBUTEROL SULFATE SCH ML: .5; 3 SOLUTION RESPIRATORY (INHALATION) at 13:41

## 2018-11-18 RX ADMIN — AMIODARONE HYDROCHLORIDE SCH MG: 200 TABLET ORAL at 17:03

## 2018-11-18 RX ADMIN — METOPROLOL TARTRATE SCH MG: 25 TABLET, FILM COATED ORAL at 20:15

## 2018-11-18 RX ADMIN — SODIUM CHLORIDE SCH MLS/HR: 900 INJECTION INTRAVENOUS at 20:15

## 2018-11-18 RX ADMIN — IPRATROPIUM BROMIDE AND ALBUTEROL SULFATE SCH ML: .5; 3 SOLUTION RESPIRATORY (INHALATION) at 10:54

## 2018-11-18 RX ADMIN — FUROSEMIDE SCH MG: 40 TABLET ORAL at 17:03

## 2018-11-18 RX ADMIN — IPRATROPIUM BROMIDE AND ALBUTEROL SULFATE SCH ML: .5; 3 SOLUTION RESPIRATORY (INHALATION) at 07:10

## 2018-11-18 RX ADMIN — DIGOXIN SCH MG: 125 TABLET ORAL at 10:32

## 2018-11-18 RX ADMIN — DEXTROSE MONOHYDRATE, SODIUM CHLORIDE, AND POTASSIUM CHLORIDE SCH MLS/HR: 50; 4.5; 1.49 INJECTION, SOLUTION INTRAVENOUS at 01:38

## 2018-11-18 RX ADMIN — MEXILETINE HYDROCHLORIDE SCH MG: 150 CAPSULE ORAL at 20:15

## 2018-11-18 RX ADMIN — IPRATROPIUM BROMIDE AND ALBUTEROL SULFATE SCH ML: .5; 3 SOLUTION RESPIRATORY (INHALATION) at 01:26

## 2018-11-18 RX ADMIN — FUROSEMIDE SCH MG: 40 TABLET ORAL at 10:32

## 2018-11-18 NOTE — DIAGNOSTIC IMAGING REPORT
EXAMINATION: CHEST (PA AND LATERAL)



CLINICAL INDICATION: 78-year-old male, shortness of breath.



COMPARISON: November 17, 2018.



FINDINGS: There is a left-sided cardiac assist device with leads.

Stable overall appearance of the cardiomediastinal silhouette.

There is no identified pneumothorax. There is improved aeration

of the left lung base with persistent nonspecific right basilar

airspace consolidation. There are persistent low lung volumes.

There is a probable left-sided nephrostomy tube.



 IMPRESSION: 

1. Interval improved aeration of the left lung base with

persistent nonspecific bibasilar airspace consolidation which may

relate to infiltrate, atelectasis, and/or effusion.



Dictated by: 



  Dictated on workstation # [id number]

## 2018-11-18 NOTE — PROGRESS NOTE (SOAP)
Subjective


Subjective/Events-last exam


Patient sitting in bed comfortably. States that he had an ok night. Tolerating 

PO appetite without any problems. Having some shortness of breath with minimal 

activity.


Review of Systems


Date Seen by Provider:  2018


Time Seen by Provider:  12:45


Pulmonary:  Dyspnea, Cough


Cardiovascular:  No: Chest Pain, Palpitations


Gastrointestinal:  No: Nausea, Vomiting, Diarrhea, Constipation


Genitourinary:  Frequency, Incontinence


Musculoskeletal:  back pain


Neurological:  Weakness





Focused Exam


Lactate Level


18 04:57: Lactic Acid Level 0.69


Time of Focused Exam:  04:30





Objective


Exam


Last Set of Vital Signs





Vital Signs








  Date Time  Temp Pulse Resp B/P (MAP) Pulse Ox O2 Delivery O2 Flow Rate FiO2


 


18 16:00 97.5 88 20 112/61 (78) 94 Nasal Cannula 2.00 


 


18 10:22        32





Capillary Refill : Less Than 3 SecondsGreater Than 3 Seconds


I&O











Intake and Output 


 


 18





 00:00


 


Intake Total 710 ml


 


Output Total 1475 ml


 


Balance -765 ml


 


 


 


Intake Oral 660 ml


 


IV Total 50 ml


 


Output Urine Total 800 ml


 


Other 675 ml


 


Daily Weight Change No








General:  Alert, Oriented X3, Cooperative


Lungs:  Other (basilar crackles, diminished breath sounds, mild increased work 

of breathing with minimal activity)


Heart:  No Murmurs, Other (Irregular rhythm)


Abdomen:  Normal Bowel Sounds, Soft, No Hepatosplenomegaly


Extremities:  No Tenderness/Swelling, Other (2+ pitting edema bilaterally)


Neuro:  Cranial Nerves 3-12 NL





Results/Procedures


Lab


Laboratory Tests


18 20:05: Glucometer 135H


18 05:45: 


White Blood Count 11.9H, Red Blood Count 3.75L, Hemoglobin 11.3L, Hematocrit 35L

, Mean Corpuscular Volume 94, Mean Corpuscular Hemoglobin 30, Mean Corpuscular 

Hemoglobin Concent 32, Red Cell Distribution Width 16.2H, Platelet Count 227, 

Mean Platelet Volume 10.1, Neutrophils (%) (Auto) 77H, Lymphocytes (%) (Auto) 9L

, Monocytes (%) (Auto) 13H, Eosinophils (%) (Auto) 1, Basophils (%) (Auto) 0, 

Neutrophils # (Auto) 9.1H, Lymphocytes # (Auto) 1.1, Monocytes # (Auto) 1.6H, 

Eosinophils # (Auto) 0.1, Basophils # (Auto) 0.0, Sodium Level 140, Potassium 

Level 3.7, Chloride Level 104, Carbon Dioxide Level 28, Anion Gap 8, Blood Urea 

Nitrogen 18, Creatinine 0.81, Estimat Glomerular Filtration Rate > 60, BUN/

Creatinine Ratio 22, Glucose Level 130H, Calcium Level 9.2, Corrected Calcium 

10.2H, Magnesium Level 1.8, Total Bilirubin 0.4, Aspartate Amino Transf (AST/

SGOT) 29, Alanine Aminotransferase (ALT/SGPT) 31, Alkaline Phosphatase 69, 

Total Protein 7.3, Albumin 2.8L


18 05:54: Glucometer 125H


18 11:17: Glucometer 172H


18 16:10: Glucometer 165H





Microbiology


18 Blood Culture - Preliminary, Resulted


           No growth


18 Influenza Types A,B Antigen (AYALA) - Final, Complete


           


18 Urine Culture - Final, Complete


           NO GROWTH





Assessment/Plan


Assessment/Plan





(1) Renal calculus, left


Status:  Acute


Assessment & Plan:  : Stent in place at this time from admission at [facility]

, urology consulted 


- Continue IV antibiotics, stent draining clear urine





(2) Pyelonephritis


Status:  Acute


Assessment & Plan:  - IV antibiotics





(3) Acute renal insufficiency


Status:  Resolved


Assessment & Plan:  - Will continue to monitor with gentle hydration





(4) CAD (coronary artery disease)


Status:  Chronic


Qualifiers:  


   Qualified Codes:  I25.10 - Atherosclerotic heart disease of native coronary 

artery without angina pectoris


(5) Paroxysmal atrial fibrillation


Status:  Chronic


Assessment & Plan:  - Continue rate control medications





(6) HTN (hypertension)


Status:  Acute


Assessment & Plan:  - Continue home meds


Qualifiers:  


   Qualified Codes:  I10 - Essential (primary) hypertension


(7) Diabetes mellitus, type 2


Status:  Chronic


Assessment & Plan:  - Accucheck AC/HS


Qualifiers:  


   


(8) COPD (chronic obstructive pulmonary disease)


Status:  Chronic


Assessment & Plan:  - Continue to titrate oxygen as tolerated, MAT protocol


Qualifiers:  


   Qualified Codes:  J44.9 - Chronic obstructive pulmonary disease, unspecified


(9) Debility


Status:  Acute


Assessment & Plan:  - SW consulted, patient will need placement to SNF





(10) DVT prophylaxis


Status:  Acute


Assessment & Plan:  - SCDs due to hematuria








Clinical Quality Measures


DVT/VTE Risk/Contraindication:


Risk Factor Score Per Nursin


RFS Level Per Nursing on Admit:  4+=Very High











ARSEN LAWS MD 2018 17:52

## 2018-11-19 VITALS — SYSTOLIC BLOOD PRESSURE: 109 MMHG | DIASTOLIC BLOOD PRESSURE: 61 MMHG

## 2018-11-19 VITALS — DIASTOLIC BLOOD PRESSURE: 73 MMHG | SYSTOLIC BLOOD PRESSURE: 139 MMHG

## 2018-11-19 VITALS — SYSTOLIC BLOOD PRESSURE: 158 MMHG | DIASTOLIC BLOOD PRESSURE: 70 MMHG

## 2018-11-19 VITALS — SYSTOLIC BLOOD PRESSURE: 128 MMHG | DIASTOLIC BLOOD PRESSURE: 71 MMHG

## 2018-11-19 VITALS — SYSTOLIC BLOOD PRESSURE: 125 MMHG | DIASTOLIC BLOOD PRESSURE: 68 MMHG

## 2018-11-19 VITALS — SYSTOLIC BLOOD PRESSURE: 125 MMHG | DIASTOLIC BLOOD PRESSURE: 74 MMHG

## 2018-11-19 LAB
ALBUMIN SERPL-MCNC: 2.8 GM/DL (ref 3.2–4.5)
ALP SERPL-CCNC: 69 U/L (ref 40–136)
ALT SERPL-CCNC: 28 U/L (ref 0–55)
BASOPHILS # BLD AUTO: 0 10^3/UL (ref 0–0.1)
BASOPHILS NFR BLD AUTO: 0 % (ref 0–10)
BILIRUB SERPL-MCNC: 0.5 MG/DL (ref 0.1–1)
BUN/CREAT SERPL: 25
CALCIUM SERPL-MCNC: 9.1 MG/DL (ref 8.5–10.1)
CHLORIDE SERPL-SCNC: 103 MMOL/L (ref 98–107)
CO2 SERPL-SCNC: 25 MMOL/L (ref 21–32)
CREAT SERPL-MCNC: 0.89 MG/DL (ref 0.6–1.3)
EOSINOPHIL # BLD AUTO: 0.2 10^3/UL (ref 0–0.3)
EOSINOPHIL NFR BLD AUTO: 1 % (ref 0–10)
ERYTHROCYTE [DISTWIDTH] IN BLOOD BY AUTOMATED COUNT: 16.1 % (ref 10–14.5)
GFR SERPLBLD BASED ON 1.73 SQ M-ARVRAT: > 60 ML/MIN
GLUCOSE SERPL-MCNC: 102 MG/DL (ref 70–105)
HCT VFR BLD CALC: 35 % (ref 40–54)
HGB BLD-MCNC: 11 G/DL (ref 13.3–17.7)
LYMPHOCYTES # BLD AUTO: 1.4 X 10^3 (ref 1–4)
LYMPHOCYTES NFR BLD AUTO: 10 % (ref 12–44)
MANUAL DIFFERENTIAL PERFORMED BLD QL: NO
MCH RBC QN AUTO: 29 PG (ref 25–34)
MCHC RBC AUTO-ENTMCNC: 31 G/DL (ref 32–36)
MCV RBC AUTO: 94 FL (ref 80–99)
MONOCYTES # BLD AUTO: 1.7 X 10^3 (ref 0–1)
MONOCYTES NFR BLD AUTO: 12 % (ref 0–12)
NEUTROPHILS # BLD AUTO: 10.5 X 10^3 (ref 1.8–7.8)
NEUTROPHILS NFR BLD AUTO: 76 % (ref 42–75)
PLATELET # BLD: 192 10^3/UL (ref 130–400)
PMV BLD AUTO: 10.2 FL (ref 7.4–10.4)
POTASSIUM SERPL-SCNC: 3.6 MMOL/L (ref 3.6–5)
PROT SERPL-MCNC: 7.2 GM/DL (ref 6.4–8.2)
RBC # BLD AUTO: 3.74 10^6/UL (ref 4.35–5.85)
SODIUM SERPL-SCNC: 137 MMOL/L (ref 135–145)
WBC # BLD AUTO: 13.8 10^3/UL (ref 4.3–11)

## 2018-11-19 RX ADMIN — FUROSEMIDE SCH MG: 40 TABLET ORAL at 15:58

## 2018-11-19 RX ADMIN — AMIODARONE HYDROCHLORIDE SCH MG: 200 TABLET ORAL at 08:52

## 2018-11-19 RX ADMIN — IPRATROPIUM BROMIDE AND ALBUTEROL SULFATE SCH ML: .5; 3 SOLUTION RESPIRATORY (INHALATION) at 10:04

## 2018-11-19 RX ADMIN — SODIUM CHLORIDE SCH MLS/HR: 900 INJECTION INTRAVENOUS at 06:53

## 2018-11-19 RX ADMIN — MEXILETINE HYDROCHLORIDE SCH MG: 150 CAPSULE ORAL at 20:15

## 2018-11-19 RX ADMIN — IPRATROPIUM BROMIDE AND ALBUTEROL SULFATE SCH ML: .5; 3 SOLUTION RESPIRATORY (INHALATION) at 15:31

## 2018-11-19 RX ADMIN — DIGOXIN SCH MG: 125 TABLET ORAL at 08:52

## 2018-11-19 RX ADMIN — IPRATROPIUM BROMIDE AND ALBUTEROL SULFATE SCH ML: .5; 3 SOLUTION RESPIRATORY (INHALATION) at 20:11

## 2018-11-19 RX ADMIN — METOPROLOL TARTRATE SCH MG: 25 TABLET, FILM COATED ORAL at 20:15

## 2018-11-19 RX ADMIN — MEXILETINE HYDROCHLORIDE SCH MG: 150 CAPSULE ORAL at 08:52

## 2018-11-19 RX ADMIN — METOPROLOL TARTRATE SCH MG: 25 TABLET, FILM COATED ORAL at 08:52

## 2018-11-19 RX ADMIN — PANTOPRAZOLE SODIUM SCH MG: 40 TABLET, DELAYED RELEASE ORAL at 06:53

## 2018-11-19 RX ADMIN — FUROSEMIDE SCH MG: 40 TABLET ORAL at 08:52

## 2018-11-19 RX ADMIN — IPRATROPIUM BROMIDE AND ALBUTEROL SULFATE SCH ML: .5; 3 SOLUTION RESPIRATORY (INHALATION) at 02:45

## 2018-11-19 RX ADMIN — MEXILETINE HYDROCHLORIDE SCH MG: 150 CAPSULE ORAL at 13:56

## 2018-11-19 RX ADMIN — SODIUM CHLORIDE SCH MLS/HR: 900 INJECTION INTRAVENOUS at 20:15

## 2018-11-19 NOTE — PROGRESS NOTE (SOAP)
Subjective


Subjective/Events-last exam


Patient stable today. + pain in left flank. Tolerating PO diet.


Review of Systems


Date Seen by Provider:  2018


Time Seen by Provider:  10:35


Pulmonary:  Dyspnea, Cough


Cardiovascular:  No: Chest Pain, Palpitations


Gastrointestinal:  Nausea; No: Vomiting


Musculoskeletal:  other (Flank pain Left, + CVA tenderness on Left)


Neurological:  Weakness





Focused Exam


Lactate Level


18 04:57: Lactic Acid Level 0.69


Time of Focused Exam:  04:30





Objective


Exam


Last Set of Vital Signs





Vital Signs








  Date Time  Temp Pulse Resp B/P (MAP) Pulse Ox O2 Delivery O2 Flow Rate FiO2


 


18 20:11     95 Nasal Cannula 1.00 


 


18 19:00  90      


 


18 15:55 98.5  16 109/61 (77)    


 


18 20:44        32





Capillary Refill : Less Than 3 SecondsGreater Than 3 Seconds


I&O











Intake and Output 


 


 18





 00:00


 


Intake Total 3030 ml


 


Output Total 2475 ml


 


Balance 555 ml


 


 


 


Intake Oral 1680 ml


 


IV Total 1350 ml


 


Output Urine Total 325 ml


 


Drainage Total 1375 ml


 


Other 775 ml


 


# Voids 7


 


# Urine Diapers 1








General:  Alert, Cooperative, No Acute Distress


HEENT:  Mucous Memb Moist/Pink


Lungs:  Other (Diminish breath sounds, no crackles or wheezing)


Heart:  Regular Rate, No Murmurs


Abdomen:  Normal Bowel Sounds, Soft, No Tenderness, No Masses


Extremities:  No Tenderness/Swelling, Other (trace edema bilaterally)


Neuro:  Sensation Intact, Cranial Nerves 3-12 NL


Psych/Mental Status:  Mental Status NL, Mood NL





Results/Procedures


Lab


Laboratory Tests


18 05:19: Glucometer 114H


18 05:42: 


White Blood Count 13.8H, Red Blood Count 3.74L, Hemoglobin 11.0L, Hematocrit 35L

, Mean Corpuscular Volume 94, Mean Corpuscular Hemoglobin 29, Mean Corpuscular 

Hemoglobin Concent 31L, Red Cell Distribution Width 16.1H, Platelet Count 192, 

Mean Platelet Volume 10.2, Neutrophils (%) (Auto) 76H, Lymphocytes (%) (Auto) 

10L, Monocytes (%) (Auto) 12, Eosinophils (%) (Auto) 1, Basophils (%) (Auto) 0, 

Neutrophils # (Auto) 10.5H, Lymphocytes # (Auto) 1.4, Monocytes # (Auto) 1.7H, 

Eosinophils # (Auto) 0.2, Basophils # (Auto) 0.0, Sodium Level 137, Potassium 

Level 3.6, Chloride Level 103, Carbon Dioxide Level 25, Anion Gap 9, Blood Urea 

Nitrogen 22H, Creatinine 0.89, Estimat Glomerular Filtration Rate > 60, BUN/

Creatinine Ratio 25, Glucose Level 102, Calcium Level 9.1, Corrected Calcium 

10.1, Total Bilirubin 0.5, Aspartate Amino Transf (AST/SGOT) 29, Alanine 

Aminotransferase (ALT/SGPT) 28, Alkaline Phosphatase 69, Total Protein 7.2, 

Albumin 2.8L


18 11:40: Glucometer 91


18 15:54: Glucometer 97


18 19:56: Glucometer 129H





Microbiology


18 Blood Culture - Preliminary, Resulted


           No growth


18 Influenza Types A,B Antigen (AYALA) - Final, Complete


           


18 Urine Culture - Final, Complete


           NO GROWTH





Assessment/Plan


Assessment/Plan





(1) Renal calculus, left


Status:  Acute


Assessment & Plan:  : Stent in place at this time from admission at [place]

, urology consulted 


- Continue IV antibiotics, stent draining clear urine


: Plan for Lithotrypsy tomorrow with Dr Tawil





(2) Pyelonephritis


Status:  Acute


Assessment & Plan:  - IV antibiotics





(3) Acute renal insufficiency


Status:  Resolved


Assessment & Plan:  - Will continue to monitor with gentle hydration





(4) CAD (coronary artery disease)


Status:  Chronic


Qualifiers:  


   Qualified Codes:  I25.10 - Atherosclerotic heart disease of native coronary 

artery without angina pectoris


(5) Paroxysmal atrial fibrillation


Status:  Chronic


Assessment & Plan:  - Continue rate control medications





(6) HTN (hypertension)


Status:  Acute


Assessment & Plan:  - Continue home meds


Qualifiers:  


   Qualified Codes:  I10 - Essential (primary) hypertension


(7) Diabetes mellitus, type 2


Status:  Chronic


Assessment & Plan:  - Accucheck AC/HS


Qualifiers:  


   


(8) COPD (chronic obstructive pulmonary disease)


Status:  Chronic


Assessment & Plan:  - Continue to titrate oxygen as tolerated, MAT protocol


: discussed the importance of using Aerobike and IS


Qualifiers:  


   Qualified Codes:  J44.9 - Chronic obstructive pulmonary disease, unspecified


(9) Debility


Status:  Acute


Assessment & Plan:  - SW consulted, patient will need placement to SNF





(10) DVT prophylaxis


Status:  Acute


Assessment & Plan:  - SCDs due to hematuria








Clinical Quality Measures


DVT/VTE Risk/Contraindication:


Risk Factor Score Per Nursin


RFS Level Per Nursing on Admit:  4+=Very High











ARSEN LAWS MD 2018 20:59

## 2018-11-19 NOTE — PROGRESS NOTE-UROLOGY
Progress Note-Urology


Progress Notes/Assess & Plan


Progress/Assessment & Plan


IMPROVING. PLAN LT ESWL TOMORROW WITH NEPHROSTOGRAM IMAGING IF OK WITH DR LAWS 

AND ANESTHESIA. FULLY EXPLAINED TO PATIENT AND WHOLE FAMILY. PREOP ORDERED AND 

CONSENT


Final Diagnosis


LT URETERAL, RENAL AND BLADDER STONES











TAWIL,ELIAS A MD Nov 19, 2018 09:27

## 2018-11-19 NOTE — PHYSICAL THERAPY DAILY NOTE
PT Daily Note-Current


Subjective


Pt awake in chair visiting with wife when PT arrived. Pt agreed to get up and 

walk with PT.





Pain





   Numeric Pain Scale:  0-No Pain


   Location:  No Pain Reported





Mental Status


Patient Orientation:  Normal For Age


Attachments:  Oxygen, Drains





Transfers


              Functional [place] Measure


0=Not Assessed/NA   4=Minimal Assistance


1=Total Assistance   5=Supervision or Setup


2=Maximal Assistance   6=Modified [place]


3=Moderate Assistance   7=Complete IndependenceIRFPAI Quality Coding Scale











6 Independent with activity with or without an assistive device


 


5  Patient requires set up or clean up by helper.  Patient completes activity  

by  themselves


 


4 Supervision or touching assist (CGA). [place] provide cues , steadying assist


 


3 The helper provides less than half the effort to complete the activity


 


2 The helper provides more than half the effort to complete the activity


 


1 Dependent.  The helper does all the effort to complete an activity 


 


7 Patient refused to complete or attempt activity


 


9 The patient did not perform the activity before the current illness or injury


 


88 Not attempted due to Medical conditions or safety concerns








Transfers (B, C, W/C) (FIM):  4


Scootin


Sit to/from Stand:  4





Weight Bearing


Right Lower Extremity:  Right


Weight Bearing/Tolerated


Left Lower Extremity:  Left


Weight Bearing/Tolerated





Gait Training


Gait (FIM):  2


Distance (FIM):  8=484-19 ft


Distance:  50'


Gait Level of Assist:  4


Gait Persons Needed:  1


Gait Assistive Device:  FWW





Assessment


Pt was able to ambulate 50' with a FWW requiring CGA. Patient has shortened 

stride length and fatigues quickly with ambulation. Pt did not require a break 

but noticeable heavy breathing throughout. Pt returned to chair in room and 

left with wife when PT exited room.





PT Long Term Goals


Long Term Goals


PT Long Term Goals Time Frame:  2018


Transfers (B,C,W/C) (FIM):  6


Gait (FIM):  5


Gait distance (FIM):  9=631-57 ft


Gait Assistive Device:  FWW





PT Plan


Problem List


Problem List:  Activity Tolerance, Functional Strength, Safety, Balance, Gait, 

Transfer, Bed Mobility, ROM





Treatment/Plan


Treatment Plan:  Continue Plan of Care


Treatment Plan:  Bed Mobility, Education, Functional Activity Farnaz, Functional 

Strength, Gait, Safety, Therapeutic Exercise, Transfers


Treatment Duration:  2018


Frequency:  6 times per week


Estimated Hrs Per Day:  .25 hour per day


Patient and/or Family Agrees t:  Yes





Time/GCodes


Time In:  946


Time Out:  956


Total Billed Treatment Time:  10


Total Billed Treatment


1 Visit


FA - 10'











MIKAYLA JOHNSON PT 2018 10:44

## 2018-11-20 VITALS — SYSTOLIC BLOOD PRESSURE: 120 MMHG | DIASTOLIC BLOOD PRESSURE: 63 MMHG

## 2018-11-20 VITALS — SYSTOLIC BLOOD PRESSURE: 124 MMHG | DIASTOLIC BLOOD PRESSURE: 74 MMHG

## 2018-11-20 VITALS — DIASTOLIC BLOOD PRESSURE: 64 MMHG | SYSTOLIC BLOOD PRESSURE: 127 MMHG

## 2018-11-20 VITALS — DIASTOLIC BLOOD PRESSURE: 78 MMHG | SYSTOLIC BLOOD PRESSURE: 126 MMHG

## 2018-11-20 VITALS — SYSTOLIC BLOOD PRESSURE: 103 MMHG | DIASTOLIC BLOOD PRESSURE: 68 MMHG

## 2018-11-20 LAB
ALBUMIN SERPL-MCNC: 2.8 GM/DL (ref 3.2–4.5)
ALP SERPL-CCNC: 69 U/L (ref 40–136)
ALT SERPL-CCNC: 26 U/L (ref 0–55)
BASOPHILS # BLD AUTO: 0 10^3/UL (ref 0–0.1)
BASOPHILS NFR BLD AUTO: 0 % (ref 0–10)
BILIRUB SERPL-MCNC: 0.5 MG/DL (ref 0.1–1)
BUN/CREAT SERPL: 26
CALCIUM SERPL-MCNC: 9.3 MG/DL (ref 8.5–10.1)
CHLORIDE SERPL-SCNC: 101 MMOL/L (ref 98–107)
CO2 SERPL-SCNC: 30 MMOL/L (ref 21–32)
CREAT SERPL-MCNC: 0.91 MG/DL (ref 0.6–1.3)
EOSINOPHIL # BLD AUTO: 0.2 10^3/UL (ref 0–0.3)
EOSINOPHIL NFR BLD AUTO: 2 % (ref 0–10)
ERYTHROCYTE [DISTWIDTH] IN BLOOD BY AUTOMATED COUNT: 15.8 % (ref 10–14.5)
GFR SERPLBLD BASED ON 1.73 SQ M-ARVRAT: > 60 ML/MIN
GLUCOSE SERPL-MCNC: 96 MG/DL (ref 70–105)
HCT VFR BLD CALC: 34 % (ref 40–54)
HGB BLD-MCNC: 10.9 G/DL (ref 13.3–17.7)
LYMPHOCYTES # BLD AUTO: 1.4 X 10^3 (ref 1–4)
LYMPHOCYTES NFR BLD AUTO: 14 % (ref 12–44)
MANUAL DIFFERENTIAL PERFORMED BLD QL: NO
MCH RBC QN AUTO: 30 PG (ref 25–34)
MCHC RBC AUTO-ENTMCNC: 32 G/DL (ref 32–36)
MCV RBC AUTO: 93 FL (ref 80–99)
MONOCYTES # BLD AUTO: 1.5 X 10^3 (ref 0–1)
MONOCYTES NFR BLD AUTO: 14 % (ref 0–12)
NEUTROPHILS # BLD AUTO: 7.3 X 10^3 (ref 1.8–7.8)
NEUTROPHILS NFR BLD AUTO: 70 % (ref 42–75)
PLATELET # BLD: 204 10^3/UL (ref 130–400)
PMV BLD AUTO: 10.4 FL (ref 7.4–10.4)
POTASSIUM SERPL-SCNC: 3.6 MMOL/L (ref 3.6–5)
PROT SERPL-MCNC: 7.3 GM/DL (ref 6.4–8.2)
RBC # BLD AUTO: 3.64 10^6/UL (ref 4.35–5.85)
SODIUM SERPL-SCNC: 139 MMOL/L (ref 135–145)
WBC # BLD AUTO: 10.4 10^3/UL (ref 4.3–11)

## 2018-11-20 PROCEDURE — BT121ZZ FLUOROSCOPY OF LEFT KIDNEY USING LOW OSMOLAR CONTRAST: ICD-10-PCS | Performed by: UROLOGY

## 2018-11-20 PROCEDURE — 0TF7XZZ FRAGMENTATION IN LEFT URETER, EXTERNAL APPROACH: ICD-10-PCS | Performed by: UROLOGY

## 2018-11-20 RX ADMIN — MEXILETINE HYDROCHLORIDE SCH MG: 150 CAPSULE ORAL at 09:00

## 2018-11-20 RX ADMIN — FUROSEMIDE SCH MG: 40 TABLET ORAL at 18:38

## 2018-11-20 RX ADMIN — IPRATROPIUM BROMIDE AND ALBUTEROL SULFATE SCH ML: .5; 3 SOLUTION RESPIRATORY (INHALATION) at 19:56

## 2018-11-20 RX ADMIN — SODIUM CHLORIDE SCH MLS/HR: 900 INJECTION INTRAVENOUS at 21:51

## 2018-11-20 RX ADMIN — MEXILETINE HYDROCHLORIDE SCH MG: 150 CAPSULE ORAL at 18:38

## 2018-11-20 RX ADMIN — DIGOXIN SCH MG: 125 TABLET ORAL at 08:34

## 2018-11-20 RX ADMIN — SODIUM CHLORIDE SCH MLS/HR: 900 INJECTION INTRAVENOUS at 08:16

## 2018-11-20 RX ADMIN — METOPROLOL TARTRATE SCH MG: 25 TABLET, FILM COATED ORAL at 21:51

## 2018-11-20 RX ADMIN — AMIODARONE HYDROCHLORIDE SCH MG: 200 TABLET ORAL at 09:00

## 2018-11-20 RX ADMIN — IPRATROPIUM BROMIDE AND ALBUTEROL SULFATE SCH ML: .5; 3 SOLUTION RESPIRATORY (INHALATION) at 02:53

## 2018-11-20 RX ADMIN — IPRATROPIUM BROMIDE AND ALBUTEROL SULFATE SCH ML: .5; 3 SOLUTION RESPIRATORY (INHALATION) at 15:33

## 2018-11-20 RX ADMIN — PANTOPRAZOLE SODIUM SCH MG: 40 TABLET, DELAYED RELEASE ORAL at 06:13

## 2018-11-20 RX ADMIN — FUROSEMIDE SCH MG: 40 TABLET ORAL at 12:24

## 2018-11-20 RX ADMIN — METOPROLOL TARTRATE SCH MG: 25 TABLET, FILM COATED ORAL at 08:14

## 2018-11-20 RX ADMIN — IPRATROPIUM BROMIDE AND ALBUTEROL SULFATE SCH ML: .5; 3 SOLUTION RESPIRATORY (INHALATION) at 09:53

## 2018-11-20 RX ADMIN — MEXILETINE HYDROCHLORIDE SCH MG: 150 CAPSULE ORAL at 21:51

## 2018-11-20 NOTE — PROGRESS NOTE-POST OPERATIVE
Post-Operative Progess Note


Surgeon (s)/Assistant (s)


Surgeon


ELIAS TAWIL MD


Assistant:  NONE





Pre-Operative Diagnosis


LT URETERAL, LT RENAL, AND BLADDER STONES





Post-Operative Diagnosis





SAME





Procedure & Operative Findings


Date of Procedure


11/20/18


Procedure Performed/Findings


LT ESWL AND LT NEPHROSTOGRAM


Anesthesia Type


GENERAL





Estimated Blood Loss


Estimated blood loss (mL):  NONE





Specimens/Packing


Specimens Removed


NONE


Packing:  


NONE











TAWIL,ELIAS A MD Nov 20, 2018 11:25 am

## 2018-11-20 NOTE — PROGRESS NOTE-PRE OPERATIVE
Pre-Operative Progress Note


H&P Reviewed


The H&P was reviewed, patient examined and no changes noted.


Date Seen by Provider:  Nov 20, 2018


Time Seen by Provider:  07:00


Date H&P Reviewed:  Nov 20, 2018


Time H&P Reviewed:  07:00


Pre-Operative Diagnosis:  LT URETERAL, LT RENAL, AND BLADDER STONES











TAWIL,ELIAS A MD Nov 20, 2018 7:01 am

## 2018-11-20 NOTE — DIAGNOSTIC IMAGING REPORT
INDICATION: Nephrolithiasis



KUB 8:23 AM



There is a nephrostomy tube projecting over the left kidney.

There are two 10 mm calcifications projecting over the upper pole

of the left kidney. There is a 10 mm calcification with some

small adjacent calcifications projecting over the lower pole of

the left kidney. There is a 9 mm calcification projecting over

the left ureter near the pelvic inlet. There is a 2.4 cm oval

calcification projecting over the lower sacrum that could be a

bladder calculus.



IMPRESSION: There is a large amount of fecal material which could

obscure stones and mimic stones. There are suspected stones in

the upper and lower poles of left kidney, left mid ureter and

urinary bladder but clinical correlation recommended.



Dictated by: 



  Dictated on workstation # [id number]

## 2018-11-20 NOTE — PROGRESS NOTE (SOAP)
Subjective


Subjective/Events-last exam


Patient sleeping this AM. Easily wakes for questions. Patient states that he is 

short of breath. Wife and daughter in the room this AM. They state that they 

wish for him to return home after hospitalization.


Review of Systems


Date Seen by Provider:  2018


Time Seen by Provider:  10:15


Pulmonary:  Dyspnea, Cough


Cardiovascular:  No: Chest Pain, Palpitations


Gastrointestinal:  No: Nausea, Abdominal Pain


Genitourinary:  No Hematuria


Neurological:  Weakness





Focused Exam


Time of Focused Exam:  04:30





Objective


Exam


Last Set of Vital Signs





Vital Signs








  Date Time  Temp Pulse Resp B/P (MAP) Pulse Ox O2 Delivery O2 Flow Rate FiO2


 


18 19:57     98 Nasal Cannula 4.00 


 


18 12:00        


 


18 08:00 97.7 96 20     


 


18 20:44        32





Capillary Refill : Less Than 3 SecondsGreater Than 3 Seconds


I&O











Intake and Output 


 


 18





 00:00


 


Intake Total 3240 ml


 


Output Total 2275 ml


 


Balance 965 ml


 


 


 


Intake Oral 3240 ml


 


Output Urine Total 350 ml


 


Drainage Total 725 ml


 


Other 1200 ml


 


# Voids 3


 


# Urine Diapers 5


 


# Bowel Movements 1








General:  Alert, Cooperative, No Acute Distress


Lungs:  Other (basilar crackles, mild increased work of breathing, dyspnea with 

minimal activity)


Heart:  Regular Rate


Abdomen:  Normal Bowel Sounds, Soft, No Tenderness, No Masses


Extremities:  No Edema


Neuro:  Cranial Nerves 3-12 NL


Psych/Mental Status:  Mental Status NL, Mood NL





Results/Procedures


Lab


Laboratory Tests


18 05:30: 


White Blood Count 10.4, Red Blood Count 3.64L, Hemoglobin 10.9L, Hematocrit 34L

, Mean Corpuscular Volume 93, Mean Corpuscular Hemoglobin 30, Mean Corpuscular 

Hemoglobin Concent 32, Red Cell Distribution Width 15.8H, Platelet Count 204, 

Mean Platelet Volume 10.4, Neutrophils (%) (Auto) 70, Lymphocytes (%) (Auto) 14

, Monocytes (%) (Auto) 14H, Eosinophils (%) (Auto) 2, Basophils (%) (Auto) 0, 

Neutrophils # (Auto) 7.3, Lymphocytes # (Auto) 1.4, Monocytes # (Auto) 1.5H, 

Eosinophils # (Auto) 0.2, Basophils # (Auto) 0.0, Sodium Level 139, Potassium 

Level 3.6, Chloride Level 101, Carbon Dioxide Level 30, Anion Gap 8, Blood Urea 

Nitrogen 24H, Creatinine 0.91, Estimat Glomerular Filtration Rate > 60, BUN/

Creatinine Ratio 26, Glucose Level 96, Calcium Level 9.3, Corrected Calcium 

10.3H, Total Bilirubin 0.5, Aspartate Amino Transf (AST/SGOT) 26, Alanine 

Aminotransferase (ALT/SGPT) 26, Alkaline Phosphatase 69, Total Protein 7.3, 

Albumin 2.8L


18 05:37: Glucometer 94


18 16:56: Glucometer 101





Microbiology


18 Blood Culture - Preliminary, Resulted


           No growth


18 MRSA Screen - Final, Complete


           


18 Urine Culture - Final, Complete


           NO GROWTH





Assessment/Plan


Assessment/Plan





(1) Renal calculus, left


Status:  Acute


Assessment & Plan:  : Stent in place at this time from admission at [place]

, urology consulted 


- Continue IV antibiotics, stent draining clear urine


: Plan for Lithotrypsy tomorrow with Dr Tawil


: Surgery done today





(2) Pyelonephritis


Status:  Acute


Assessment & Plan:  - IV antibiotics





(3) Acute renal insufficiency


Status:  Resolved


Assessment & Plan:  - Will continue to monitor with gentle hydration





(4) CAD (coronary artery disease)


Status:  Chronic


Qualifiers:  


   Qualified Codes:  I25.10 - Atherosclerotic heart disease of native coronary 

artery without angina pectoris


(5) Paroxysmal atrial fibrillation


Status:  Chronic


Assessment & Plan:  - Continue rate control medications





(6) HTN (hypertension)


Status:  Acute


Assessment & Plan:  - Continue home meds


Qualifiers:  


   Qualified Codes:  I10 - Essential (primary) hypertension


(7) Diabetes mellitus, type 2


Status:  Chronic


Assessment & Plan:  - Accucheck AC/HS


Qualifiers:  


   


(8) COPD (chronic obstructive pulmonary disease)


Status:  Chronic


Assessment & Plan:  - Continue to titrate oxygen as tolerated, MAT protocol


: discussed the importance of using Aerobike and IS


Qualifiers:  


   Qualified Codes:  J44.9 - Chronic obstructive pulmonary disease, unspecified


(9) Debility


Status:  Acute


Assessment & Plan:  - SW consulted, patient will needs placement to SNF but 

wife and daughter desire Home with 





(10) DVT prophylaxis


Status:  Acute


Assessment & Plan:  - SCDs due to hematuria








Clinical Quality Measures


DVT/VTE Risk/Contraindication:


Risk Factor Score Per Nursin


RFS Level Per Nursing on Admit:  4+=Very High











ARSEN LAWS MD 2018 20:27

## 2018-11-20 NOTE — PHYSICAL THERAPY PROGRESS NOTE
Therapy Progress Note


Pt unavailable today for therapy due to surgery. PT will continue therapy with 

patient on 11/21/18.











MIKAYLA JOHNSON PT Nov 20, 2018 14:04

## 2018-11-21 VITALS — SYSTOLIC BLOOD PRESSURE: 112 MMHG | DIASTOLIC BLOOD PRESSURE: 57 MMHG

## 2018-11-21 VITALS — SYSTOLIC BLOOD PRESSURE: 128 MMHG | DIASTOLIC BLOOD PRESSURE: 74 MMHG

## 2018-11-21 VITALS — SYSTOLIC BLOOD PRESSURE: 116 MMHG | DIASTOLIC BLOOD PRESSURE: 56 MMHG

## 2018-11-21 VITALS — DIASTOLIC BLOOD PRESSURE: 73 MMHG | SYSTOLIC BLOOD PRESSURE: 123 MMHG

## 2018-11-21 VITALS — DIASTOLIC BLOOD PRESSURE: 61 MMHG | SYSTOLIC BLOOD PRESSURE: 118 MMHG

## 2018-11-21 VITALS — DIASTOLIC BLOOD PRESSURE: 65 MMHG | SYSTOLIC BLOOD PRESSURE: 119 MMHG

## 2018-11-21 LAB
ALBUMIN SERPL-MCNC: 2.9 GM/DL (ref 3.2–4.5)
ALP SERPL-CCNC: 86 U/L (ref 40–136)
ALT SERPL-CCNC: 24 U/L (ref 0–55)
BASOPHILS # BLD AUTO: 0 10^3/UL (ref 0–0.1)
BASOPHILS NFR BLD AUTO: 0 % (ref 0–10)
BILIRUB SERPL-MCNC: 0.3 MG/DL (ref 0.1–1)
BUN/CREAT SERPL: 24
CALCIUM SERPL-MCNC: 9.2 MG/DL (ref 8.5–10.1)
CHLORIDE SERPL-SCNC: 101 MMOL/L (ref 98–107)
CO2 SERPL-SCNC: 31 MMOL/L (ref 21–32)
CREAT SERPL-MCNC: 1.02 MG/DL (ref 0.6–1.3)
EOSINOPHIL # BLD AUTO: 0.2 10^3/UL (ref 0–0.3)
EOSINOPHIL NFR BLD AUTO: 2 % (ref 0–10)
ERYTHROCYTE [DISTWIDTH] IN BLOOD BY AUTOMATED COUNT: 15.8 % (ref 10–14.5)
GFR SERPLBLD BASED ON 1.73 SQ M-ARVRAT: > 60 ML/MIN
GLUCOSE SERPL-MCNC: 112 MG/DL (ref 70–105)
HCT VFR BLD CALC: 34 % (ref 40–54)
HGB BLD-MCNC: 10.6 G/DL (ref 13.3–17.7)
LYMPHOCYTES # BLD AUTO: 1.2 X 10^3 (ref 1–4)
LYMPHOCYTES NFR BLD AUTO: 10 % (ref 12–44)
MANUAL DIFFERENTIAL PERFORMED BLD QL: NO
MCH RBC QN AUTO: 29 PG (ref 25–34)
MCHC RBC AUTO-ENTMCNC: 31 G/DL (ref 32–36)
MCV RBC AUTO: 94 FL (ref 80–99)
MONOCYTES # BLD AUTO: 1.7 X 10^3 (ref 0–1)
MONOCYTES NFR BLD AUTO: 14 % (ref 0–12)
NEUTROPHILS # BLD AUTO: 9.3 X 10^3 (ref 1.8–7.8)
NEUTROPHILS NFR BLD AUTO: 75 % (ref 42–75)
PLATELET # BLD: 195 10^3/UL (ref 130–400)
PMV BLD AUTO: 10.9 FL (ref 7.4–10.4)
POTASSIUM SERPL-SCNC: 3.9 MMOL/L (ref 3.6–5)
PROT SERPL-MCNC: 7.4 GM/DL (ref 6.4–8.2)
RBC # BLD AUTO: 3.65 10^6/UL (ref 4.35–5.85)
SODIUM SERPL-SCNC: 140 MMOL/L (ref 135–145)
WBC # BLD AUTO: 12.3 10^3/UL (ref 4.3–11)

## 2018-11-21 RX ADMIN — FUROSEMIDE SCH MG: 40 TABLET ORAL at 15:02

## 2018-11-21 RX ADMIN — IPRATROPIUM BROMIDE AND ALBUTEROL SULFATE SCH ML: .5; 3 SOLUTION RESPIRATORY (INHALATION) at 16:22

## 2018-11-21 RX ADMIN — PANTOPRAZOLE SODIUM SCH MG: 40 TABLET, DELAYED RELEASE ORAL at 06:34

## 2018-11-21 RX ADMIN — IPRATROPIUM BROMIDE AND ALBUTEROL SULFATE SCH ML: .5; 3 SOLUTION RESPIRATORY (INHALATION) at 19:14

## 2018-11-21 RX ADMIN — AMIODARONE HYDROCHLORIDE SCH MG: 200 TABLET ORAL at 09:17

## 2018-11-21 RX ADMIN — SODIUM CHLORIDE SCH MLS/HR: 900 INJECTION INTRAVENOUS at 21:03

## 2018-11-21 RX ADMIN — DIGOXIN SCH MG: 125 TABLET ORAL at 09:20

## 2018-11-21 RX ADMIN — MEXILETINE HYDROCHLORIDE SCH MG: 150 CAPSULE ORAL at 09:17

## 2018-11-21 RX ADMIN — METOPROLOL TARTRATE SCH MG: 25 TABLET, FILM COATED ORAL at 09:17

## 2018-11-21 RX ADMIN — MEXILETINE HYDROCHLORIDE SCH MG: 150 CAPSULE ORAL at 21:03

## 2018-11-21 RX ADMIN — IPRATROPIUM BROMIDE AND ALBUTEROL SULFATE SCH ML: .5; 3 SOLUTION RESPIRATORY (INHALATION) at 08:27

## 2018-11-21 RX ADMIN — MEXILETINE HYDROCHLORIDE SCH MG: 150 CAPSULE ORAL at 14:58

## 2018-11-21 RX ADMIN — SODIUM CHLORIDE SCH MLS/HR: 900 INJECTION INTRAVENOUS at 09:17

## 2018-11-21 RX ADMIN — IPRATROPIUM BROMIDE AND ALBUTEROL SULFATE SCH ML: .5; 3 SOLUTION RESPIRATORY (INHALATION) at 03:24

## 2018-11-21 RX ADMIN — METOPROLOL TARTRATE SCH MG: 25 TABLET, FILM COATED ORAL at 21:03

## 2018-11-21 RX ADMIN — FUROSEMIDE SCH MG: 40 TABLET ORAL at 09:17

## 2018-11-21 NOTE — PROGRESS NOTE (SOAP)
Subjective


Subjective/Events-last exam


Patient up sitting in chair this AM. States that he is feeling better. 

Tolerating PO diet. Needing assistx1 with walker.


Review of Systems


Date Seen by Provider:  2018


Time Seen by Provider:  10:45


Pulmonary:  Dyspnea, Cough


Cardiovascular:  No: Chest Pain, Palpitations


Gastrointestinal:  No: Nausea, Vomiting


Genitourinary:  No Dysuria, No Hematuria


Neurological:  Weakness; No: Confusion





Focused Exam


Time of Focused Exam:  04:30





Objective


Exam


Last Set of Vital Signs





Vital Signs








  Date Time  Temp Pulse Resp B/P (MAP) Pulse Ox O2 Delivery O2 Flow Rate FiO2


 


18 12:00 98.4 90 18 118/61 (80) 96 Nasal Cannula 1.00 


 


18 09:30        32





Capillary Refill : Less Than 3 SecondsGreater Than 3 Seconds


I&O











Intake and Output 


 


 18





 00:00


 


Intake Total 1320 ml


 


Output Total 2125 ml


 


Balance -805 ml


 


 


 


Intake Oral 1320 ml


 


Output Urine Total 200 ml


 


Drainage Total 1125 ml


 


Other 800 ml


 


# Voids 2


 


# Urine Diapers 6








General:  Alert, Cooperative


Lungs:  Other (basilar wheezing, normal work of breathing)


Heart:  Regular Rate, No Murmurs


Abdomen:  Normal Bowel Sounds, Soft, No Tenderness, No Masses


Extremities:  No Edema, No Tenderness/Swelling


Skin:  Other (Drain w/o erythema or signs of infection)


Neuro:  Normal Speech, Cranial Nerves 3-12 NL





Results/Procedures


Lab


Laboratory Tests


18 16:56: Glucometer 101


18 21:45: Glucometer 195H


18 05:20: 


White Blood Count 12.3H, Red Blood Count 3.65L, Hemoglobin 10.6L, Hematocrit 34L

, Mean Corpuscular Volume 94, Mean Corpuscular Hemoglobin 29, Mean Corpuscular 

Hemoglobin Concent 31L, Red Cell Distribution Width 15.8H, Platelet Count 195, 

Mean Platelet Volume 10.9H, Neutrophils (%) (Auto) 75, Lymphocytes (%) (Auto) 

10L, Monocytes (%) (Auto) 14H, Eosinophils (%) (Auto) 2, Basophils (%) (Auto) 0

, Neutrophils # (Auto) 9.3H, Lymphocytes # (Auto) 1.2, Monocytes # (Auto) 1.7H, 

Eosinophils # (Auto) 0.2, Basophils # (Auto) 0.0, Sodium Level 140, Potassium 

Level 3.9, Chloride Level 101, Carbon Dioxide Level 31, Anion Gap 8, Blood Urea 

Nitrogen 24H, Creatinine 1.02, Estimat Glomerular Filtration Rate > 60, BUN/

Creatinine Ratio 24, Glucose Level 112H, Calcium Level 9.2, Corrected Calcium 

10.1, Total Bilirubin 0.3, Aspartate Amino Transf (AST/SGOT) 31, Alanine 

Aminotransferase (ALT/SGPT) 24, Alkaline Phosphatase 86, Total Protein 7.4, 

Albumin 2.9L


18 06:51: Glucometer 100


18 12:13: Glucometer 118H





Microbiology


18 Blood Culture - Preliminary, Resulted


           No growth


18 MRSA Screen - Final, Complete


           


18 Urine Culture - Final, Complete


           NO GROWTH





Assessment/Plan


Assessment/Plan





(1) Renal calculus, left


Status:  Acute


Assessment & Plan:  : Stent in place at this time from admission at [place]

, urology consulted 


- Continue IV antibiotics, stent draining clear urine


: Plan for Lithotrypsy tomorrow with Dr Tawil


: Surgery done today


: Surgery successful with moving stones, Dr Tawil will see patient next 

week in clinic





(2) Pyelonephritis


Status:  Acute


Assessment & Plan:  - IV antibiotics





(3) Acute renal insufficiency


Status:  Resolved


Assessment & Plan:  - Will continue to monitor with gentle hydration





(4) CAD (coronary artery disease)


Status:  Chronic


Qualifiers:  


   Qualified Codes:  I25.10 - Atherosclerotic heart disease of native coronary 

artery without angina pectoris


(5) Paroxysmal atrial fibrillation


Status:  Chronic


Assessment & Plan:  - Continue rate control medications





(6) HTN (hypertension)


Status:  Acute


Assessment & Plan:  - Continue home meds


Qualifiers:  


   Qualified Codes:  I10 - Essential (primary) hypertension


(7) Diabetes mellitus, type 2


Status:  Chronic


Assessment & Plan:  - Accucheck AC/HS


Qualifiers:  


   


(8) COPD (chronic obstructive pulmonary disease)


Status:  Chronic


Assessment & Plan:  - Continue to titrate oxygen as tolerated, MAT protocol


: discussed the importance of using Aerobike and IS


Qualifiers:  


   Qualified Codes:  J44.9 - Chronic obstructive pulmonary disease, unspecified


(9) Debility


Status:  Acute


Assessment & Plan:  - SW consulted, patient will needs placement to SNF but 

wife and daughter desire Home with HH


Patient up to chair and requires assist x1





(10) DVT prophylaxis


Status:  Acute


Assessment & Plan:  - SCDs due to hematuria








Clinical Quality Measures


DVT/VTE Risk/Contraindication:


Risk Factor Score Per Nursin


RFS Level Per Nursing on Admit:  4+=Very High











ARSEN LAWS MD 2018 13:57

## 2018-11-21 NOTE — PHYSICAL THERAPY DAILY NOTE
PT Daily Note-Current


Subjective


Pt reports he is tired and hasn't slept very well since surgery yesterday. 

Agreeable to work with therapy at his chair





Pain





   Comment:  Denies pain until activity then req pain med from nsg





Appearance


Pt sitting up in chair sleeping, easily aroused with slightly raised voice, 

family present in room


At end of session, pt in recliner with LE's elevated, table in front of him, 

call light and phone within reach. Patient Called nsg and requested pain med





Mental Status


Attachments:  Oxygen, Drains, IV





Transfers


              Functional [place] Measure


0=Not Assessed/NA   4=Minimal Assistance


1=Total Assistance   5=Supervision or Setup


2=Maximal Assistance   6=Modified [place]


3=Moderate Assistance   7=Complete IndependenceIRFPAI Quality Coding Scale











6 Independent with activity with or without an assistive device


 


5  Patient requires set up or clean up by helper.  Patient completes activity  

by  themselves


 


4 Supervision or touching assist (CGA). [place] provide cues , steadying assist


 


3 The helper provides less than half the effort to complete the activity


 


2 The helper provides more than half the effort to complete the activity


 


1 Dependent.  The helper does all the effort to complete an activity 


 


7 Patient refused to complete or attempt activity


 


9 The patient did not perform the activity before the current illness or injury


 


88 Not attempted due to Medical conditions or safety concerns








Transfers (B, C, W/C) (FIM):  5


Scootin


Sit to/from Stand:  5


Pt requiring mod verb inst several times with sit to/from stand transfers for 

hand placement and safety





Weight Bearing


Right Lower Extremity:  Right


Weight Bearing/Tolerated


Left Lower Extremity:  Left


Weight Bearing/Tolerated





Exercises


Standing:  Hip Abduction, Heel/toe raises, Marching, Mini squats, Sit to Stand, 

Weight shifts


Standing Reps:  15


standing ex's requiring UE support on FWW, attempts to perform quickly 

requiring inst for decreasing speed and with technique. No LOB but did 

requiring sitting rest break after each ex completed





Treatments


exercise, transfer training





Assessment


Current Status:  Fair Progress





PT Long Term Goals


Long Term Goals


PT Long Term Goals Time Frame:  2018


Transfers (B,C,W/C) (FIM):  6


Gait (FIM):  5


Gait distance (FIM):  4=361-78 ft


Gait Assistive Device:  FWW





PT Plan


Problem List


Problem List:  Activity Tolerance, Functional Strength, Safety, Balance, Gait, 

Transfer





Treatment/Plan


Treatment Plan:  Continue Plan of Care


Treatment Plan:  Bed Mobility, Education, Functional Activity Farnaz, Functional 

Strength, Gait, Safety, Therapeutic Exercise, Transfers


Treatment Duration:  2018


Frequency:  6 times per week


Estimated Hrs Per Day:  .25 hour per day


Patient and/or Family Agrees t:  Yes





Safety Risks/Education


Patient Education:  Transfer Techniques, Safety Issues


Teaching Recipient:  Patient


Teaching Methods:  Demonstration, Discussion


Response to Teaching:  Verbalize Understanding, Return Demonstration, 

Reinforcement Needed





Time/GCodes


Time In:  1111


Time Out:  1126


Total Billed Treatment Time:  15


Total Billed Treatment


1 Visit


Ex x1











MELANIE,SUSAN PTA 2018 11:30

## 2018-11-21 NOTE — PROGRESS NOTE-UROLOGY
Progress Note-Urology


Progress Notes/Assess & Plan


Progress/Assessment & Plan


RECOVERED VERY WELL. NO COMPLAINTS. PLAN RUN  FLUIDS THROUGH PCN TUBE AND 

DISCHARGE, FOLLOW UP AT OFFICE NEXT WEEK WITH PLAN TO CHECK CTS AND 

NEPHROSTOGRAM TO DECIDE NEXT STEP. ALL FULLY EXPLAINED TO PATIENT AND WIFE


Final Diagnosis


LT URETERAL, RENAL, AND BLADDER STONES











TAWIL,ELIAS A MD Nov 21, 2018 09:35

## 2018-11-21 NOTE — OPERATIVE REPORT
DATE OF SERVICE:  11/20/2018



PREOPERATIVE DIAGNOSES:

Left ureteral, left renal and bladder stones.



POSTOPERATIVE DIAGNOSES:

Left ureteral, left renal and bladder stones.



OPERATION PERFORMED:

1.  Left ESWL.

2.  Nephrostogram, left side.



SURGEON:

Elias Tawil, MD.



ANESTHESIA:

General.



COMPLICATIONS:

None.



PROCEDURE:

Under satisfactory general anesthesia, the patient in supine position on the

ESWL table, the left nephrostomy tube was connected to a _____ of Cystografin

and the contrast was lead to flow into the kidney down to the ureter.  There was

quite a bit of dilatation of the ureter all the way to the cut off area and

probably the junction of the mid and distal ureter where there was filling

defects there corresponding to the level of the stones.  The dilatation was very

significantly seen in chronic situation, which has been going on for a while.  I

went ahead and directed the shockwaves toward the filling defects in the ureter.

 There was very good response to the breaking of the stones.  Throughout the

procedure, contrast was added to visualize better and we could start tracing the

ureter almost all the way to the bladder.  There was also contrast filling the

bladder, which signifies the response of the fragmentation to the ESWL shock

waves.  We delivered a total of 3500 shocks with what seems to be good results. 

The patient tolerated the procedure and anesthesia well and was sent to recovery

room in stable condition.



PLAN:

We will follow him up with a combination of KUB, noncontrast CTs and

nephrostogram to see if he needs further treatment whether same like today or

ureteroscopies whatever is possible and amenable in his situation being _____

from the very beginning.  This was fully explained to his wife.





Job ID: [id number]

DocumentID: [id number]

Dictated Date:  11/20/2018 17:13:46

Transcription Date: 11/21/2018 00:21:50

Dictated By: ELIAS TAWIL, MD

## 2018-11-22 VITALS — SYSTOLIC BLOOD PRESSURE: 116 MMHG | DIASTOLIC BLOOD PRESSURE: 57 MMHG

## 2018-11-22 VITALS — SYSTOLIC BLOOD PRESSURE: 132 MMHG | DIASTOLIC BLOOD PRESSURE: 60 MMHG

## 2018-11-22 VITALS — DIASTOLIC BLOOD PRESSURE: 68 MMHG | SYSTOLIC BLOOD PRESSURE: 108 MMHG

## 2018-11-22 VITALS — SYSTOLIC BLOOD PRESSURE: 108 MMHG | DIASTOLIC BLOOD PRESSURE: 68 MMHG

## 2018-11-22 VITALS — SYSTOLIC BLOOD PRESSURE: 116 MMHG | DIASTOLIC BLOOD PRESSURE: 59 MMHG

## 2018-11-22 VITALS — SYSTOLIC BLOOD PRESSURE: 119 MMHG | DIASTOLIC BLOOD PRESSURE: 52 MMHG

## 2018-11-22 LAB
ALBUMIN SERPL-MCNC: 2.9 GM/DL (ref 3.2–4.5)
ALP SERPL-CCNC: 94 U/L (ref 40–136)
ALT SERPL-CCNC: 25 U/L (ref 0–55)
BASOPHILS # BLD AUTO: 0 10^3/UL (ref 0–0.1)
BASOPHILS NFR BLD AUTO: 0 % (ref 0–10)
BILIRUB SERPL-MCNC: 0.3 MG/DL (ref 0.1–1)
BUN/CREAT SERPL: 25
CALCIUM SERPL-MCNC: 9.1 MG/DL (ref 8.5–10.1)
CHLORIDE SERPL-SCNC: 102 MMOL/L (ref 98–107)
CO2 SERPL-SCNC: 26 MMOL/L (ref 21–32)
CREAT SERPL-MCNC: 1.07 MG/DL (ref 0.6–1.3)
EOSINOPHIL # BLD AUTO: 0.3 10^3/UL (ref 0–0.3)
EOSINOPHIL NFR BLD AUTO: 3 % (ref 0–10)
ERYTHROCYTE [DISTWIDTH] IN BLOOD BY AUTOMATED COUNT: 15.9 % (ref 10–14.5)
GFR SERPLBLD BASED ON 1.73 SQ M-ARVRAT: > 60 ML/MIN
GLUCOSE SERPL-MCNC: 105 MG/DL (ref 70–105)
HCT VFR BLD CALC: 33 % (ref 40–54)
HGB BLD-MCNC: 10.5 G/DL (ref 13.3–17.7)
LYMPHOCYTES # BLD AUTO: 1.4 X 10^3 (ref 1–4)
LYMPHOCYTES NFR BLD AUTO: 13 % (ref 12–44)
MANUAL DIFFERENTIAL PERFORMED BLD QL: NO
MCH RBC QN AUTO: 30 PG (ref 25–34)
MCHC RBC AUTO-ENTMCNC: 31 G/DL (ref 32–36)
MCV RBC AUTO: 95 FL (ref 80–99)
MONOCYTES # BLD AUTO: 1.6 X 10^3 (ref 0–1)
MONOCYTES NFR BLD AUTO: 14 % (ref 0–12)
NEUTROPHILS # BLD AUTO: 7.9 X 10^3 (ref 1.8–7.8)
NEUTROPHILS NFR BLD AUTO: 71 % (ref 42–75)
PLATELET # BLD: 199 10^3/UL (ref 130–400)
PMV BLD AUTO: 10.7 FL (ref 7.4–10.4)
POTASSIUM SERPL-SCNC: 3.9 MMOL/L (ref 3.6–5)
PROT SERPL-MCNC: 7.3 GM/DL (ref 6.4–8.2)
RBC # BLD AUTO: 3.53 10^6/UL (ref 4.35–5.85)
SODIUM SERPL-SCNC: 139 MMOL/L (ref 135–145)
WBC # BLD AUTO: 11.2 10^3/UL (ref 4.3–11)

## 2018-11-22 RX ADMIN — IPRATROPIUM BROMIDE AND ALBUTEROL SULFATE SCH ML: .5; 3 SOLUTION RESPIRATORY (INHALATION) at 19:13

## 2018-11-22 RX ADMIN — FUROSEMIDE SCH MG: 40 TABLET ORAL at 15:51

## 2018-11-22 RX ADMIN — SODIUM CHLORIDE SCH MLS/HR: 900 INJECTION INTRAVENOUS at 07:58

## 2018-11-22 RX ADMIN — AMIODARONE HYDROCHLORIDE SCH MG: 200 TABLET ORAL at 12:34

## 2018-11-22 RX ADMIN — METOPROLOL TARTRATE SCH MG: 25 TABLET, FILM COATED ORAL at 21:34

## 2018-11-22 RX ADMIN — MEXILETINE HYDROCHLORIDE SCH MG: 150 CAPSULE ORAL at 12:34

## 2018-11-22 RX ADMIN — IPRATROPIUM BROMIDE AND ALBUTEROL SULFATE SCH ML: .5; 3 SOLUTION RESPIRATORY (INHALATION) at 15:00

## 2018-11-22 RX ADMIN — FUROSEMIDE SCH MG: 40 TABLET ORAL at 08:13

## 2018-11-22 RX ADMIN — MEXILETINE HYDROCHLORIDE SCH MG: 150 CAPSULE ORAL at 08:12

## 2018-11-22 RX ADMIN — IBUPROFEN PRN MG: 600 TABLET ORAL at 12:58

## 2018-11-22 RX ADMIN — PANTOPRAZOLE SODIUM SCH MG: 40 TABLET, DELAYED RELEASE ORAL at 06:36

## 2018-11-22 RX ADMIN — DIGOXIN SCH MG: 125 TABLET ORAL at 08:12

## 2018-11-22 RX ADMIN — MEXILETINE HYDROCHLORIDE SCH MG: 150 CAPSULE ORAL at 21:34

## 2018-11-22 RX ADMIN — METOPROLOL TARTRATE SCH MG: 25 TABLET, FILM COATED ORAL at 12:33

## 2018-11-22 RX ADMIN — IBUPROFEN PRN MG: 600 TABLET ORAL at 21:35

## 2018-11-22 RX ADMIN — IPRATROPIUM BROMIDE AND ALBUTEROL SULFATE SCH ML: .5; 3 SOLUTION RESPIRATORY (INHALATION) at 08:55

## 2018-11-22 NOTE — PHYSICAL THERAPY DAILY NOTE
PT Daily Note-Current


Subjective


Patient reluctantly agrees to PT after much encouragement.





Pain





   Numeric Pain Scale:  0-No Pain


   Location:  No Pain Reported





Mental Status


Patient Orientation:  Normal For Age


Attachments:  Oxygen, IV





Transfers


              Functional [place] Measure


0=Not Assessed/NA   4=Minimal Assistance


1=Total Assistance   5=Supervision or Setup


2=Maximal Assistance   6=Modified [place]


3=Moderate Assistance   7=Complete IndependenceIRFPAI Quality Coding Scale











6 Independent with activity with or without an assistive device


 


5  Patient requires set up or clean up by helper.  Patient completes activity  

by  themselves


 


4 Supervision or touching assist (CGA). [place] provide cues , steadying assist


 


3 The helper provides less than half the effort to complete the activity


 


2 The helper provides more than half the effort to complete the activity


 


1 Dependent.  The helper does all the effort to complete an activity 


 


7 Patient refused to complete or attempt activity


 


9 The patient did not perform the activity before the current illness or injury


 


88 Not attempted due to Medical conditions or safety concerns








Transfers (B, C, W/C) (FIM):  5


Scootin


Sit to/from Stand:  5





Weight Bearing


Right Lower Extremity:  Right


Weight Bearing/Tolerated


Left Lower Extremity:  Left


Weight Bearing/Tolerated





Gait Training


Gait (FIM):  1


Distance (FIM):  1=up to 49 ft


Distance:  30'


Gait Level of Assist:  4


Gait Persons Needed:  1


Gait Assistive Device:  FWW


slow, steady





Exercises


Seated Therapy Exercises:  Ankle pumps, Long arc quads


Seated Reps:  15 (2 sets)





Assessment


Patient tolerated treatment well and remains up in recliner with needs met.  

Spouse present.





PT Long Term Goals


Long Term Goals


PT Long Term Goals Time Frame:  2018


Transfers (B,C,W/C) (FIM):  6


Gait (FIM):  5


Gait distance (FIM):  3=285-93 ft


Gait Assistive Device:  FWW





PT Plan


Treatment/Plan


Treatment Plan:  Continue Plan of Care


Treatment Plan:  Bed Mobility, Education, Functional Activity Farnaz, Functional 

Strength, Gait, Safety, Therapeutic Exercise, Transfers


Treatment Duration:  Dec 1, 2018


Frequency:  6 times per week


Estimated Hrs Per Day:  .25 hour per day


Patient and/or Family Agrees t:  Yes





Time/GCodes


Time In:  820


Time Out:  828


Total Billed Treatment Time:  8


Total Billed Treatment


1 visit


FA 8 min











MIKAYLA JOHNSON PT 2018 08:58

## 2018-11-22 NOTE — PROGRESS NOTE (SOAP)
Subjective


Subjective/Events-last exam


Patient laying comfortably in bed. Tolerating PO diet. BM last night. States 

that he is having pain in his back


Review of Systems


Date Seen by Provider:  2018


Time Seen by Provider:  12:48


Pulmonary:  Dyspnea, Cough


Cardiovascular:  No: Chest Pain, Palpitations


Gastrointestinal:  No: Nausea, Vomiting, Abdominal Pain


Musculoskeletal:  back pain


Neurological:  Weakness





Focused Exam


Time of Focused Exam:  04:30





Objective


Exam


Last Set of Vital Signs





Vital Signs








  Date Time  Temp Pulse Resp B/P (MAP) Pulse Ox O2 Delivery O2 Flow Rate FiO2


 


18 08:55     97 Nasal Cannula 1.00 


 


18 08:00 97.6 92 18 116/57 (76)    


 


18 09:30        32





Capillary Refill : Less Than 3 SecondsGreater Than 3 Seconds


I&O











Intake and Output 


 


 18





 00:00


 


Intake Total 1470 ml


 


Output Total 500 ml


 


Balance 970 ml


 


 


 


Intake Oral 1470 ml


 


Drainage Total 500 ml


 


# Voids 13








General:  Alert, Cooperative, No Acute Distress


HEENT:  Mucous Memb Moist/Pink


Lungs:  Other (basilar crackles and diminished breath sounds, mild increase 

work of breathing with minimal activity)


Heart:  Regular Rate, No Murmurs


Abdomen:  Normal Bowel Sounds, Soft, No Tenderness, No Masses


Extremities:  No Edema, No Tenderness/Swelling


Neuro:  Normal Gait, Normal Speech, Sensation Intact, Cranial Nerves 3-12 NL





Results/Procedures


Lab


Laboratory Tests


18 16:12: Glucometer 123H


18 21:07: Glucometer 106


18 04:35: 


White Blood Count 11.2H, Red Blood Count 3.53L, Hemoglobin 10.5L, Hematocrit 33L

, Mean Corpuscular Volume 95, Mean Corpuscular Hemoglobin 30, Mean Corpuscular 

Hemoglobin Concent 31L, Red Cell Distribution Width 15.9H, Platelet Count 199, 

Mean Platelet Volume 10.7H, Neutrophils (%) (Auto) 71, Lymphocytes (%) (Auto) 13

, Monocytes (%) (Auto) 14H, Eosinophils (%) (Auto) 3, Basophils (%) (Auto) 0, 

Neutrophils # (Auto) 7.9H, Lymphocytes # (Auto) 1.4, Monocytes # (Auto) 1.6H, 

Eosinophils # (Auto) 0.3, Basophils # (Auto) 0.0, Sodium Level 139, Potassium 

Level 3.9, Chloride Level 102, Carbon Dioxide Level 26, Anion Gap 11, Blood 

Urea Nitrogen 27H, Creatinine 1.07, Estimat Glomerular Filtration Rate > 60, BUN

/Creatinine Ratio 25, Glucose Level 105, Calcium Level 9.1, Corrected Calcium 

10.0, Total Bilirubin 0.3, Aspartate Amino Transf (AST/SGOT) 27, Alanine 

Aminotransferase (ALT/SGPT) 25, Alkaline Phosphatase 94, Total Protein 7.3, 

Albumin 2.9L


18 05:26: Glucometer 112H


18 11:14: Glucometer 203H





Microbiology


18 Blood Culture - Preliminary, Resulted


           No growth


18 MRSA Screen - Final, Complete


           


18 Urine Culture - Final, Complete


           NO GROWTH





Assessment/Plan


Assessment/Plan





(1) COPD (chronic obstructive pulmonary disease)


Status:  Chronic


Assessment & Plan:  - Continue to titrate oxygen as tolerated, MAT protocol


: discussed the importance of using Aerobike and IS


: Encourage sitting up and ambulation, Make sure  you are using your 

Aerobike every hrs, Titrate oxygen as tolerate


Qualifiers:  


   Qualified Codes:  J44.9 - Chronic obstructive pulmonary disease, unspecified


(2) Renal calculus, left


Status:  Acute


Assessment & Plan:  : Stent in place at this time from admission at [facility]

, urology consulted 


- Continue IV antibiotics, stent draining clear urine


: Plan for Lithotrypsy tomorrow with Dr Tawil


: Surgery done today


: Surgery successful with moving stones, Dr Tawil will see patient next 

week in clinic





(3) Debility


Status:  Acute


Assessment & Plan:  - SW consulted, patient will needs placement to SNF but 

wife and daughter desire Home with HH


Patient up to chair and requires assist x1





(4) Pyelonephritis


Status:  Acute


Assessment & Plan:  - IV antibiotics





(5) Acute renal insufficiency


Status:  Resolved


Assessment & Plan:  - Will continue to monitor with gentle hydration





(6) CAD (coronary artery disease)


Status:  Chronic


Qualifiers:  


   Qualified Codes:  I25.10 - Atherosclerotic heart disease of native coronary 

artery without angina pectoris


(7) Paroxysmal atrial fibrillation


Status:  Chronic


Assessment & Plan:  - Continue rate control medications





(8) HTN (hypertension)


Status:  Acute


Assessment & Plan:  - Continue home meds


Qualifiers:  


   Qualified Codes:  I10 - Essential (primary) hypertension


(9) Diabetes mellitus, type 2


Status:  Chronic


Assessment & Plan:  - Accucheck AC/HS


Qualifiers:  


   


(10) DVT prophylaxis


Status:  Acute


Assessment & Plan:  - SCDs due to hematuria








Clinical Quality Measures


DVT/VTE Risk/Contraindication:


Risk Factor Score Per Nursin


RFS Level Per Nursing on Admit:  4+=Very High











ARSEN LAWS MD 2018 13:35

## 2018-11-23 VITALS — DIASTOLIC BLOOD PRESSURE: 54 MMHG | SYSTOLIC BLOOD PRESSURE: 130 MMHG

## 2018-11-23 VITALS — DIASTOLIC BLOOD PRESSURE: 56 MMHG | SYSTOLIC BLOOD PRESSURE: 102 MMHG

## 2018-11-23 VITALS — SYSTOLIC BLOOD PRESSURE: 131 MMHG | DIASTOLIC BLOOD PRESSURE: 60 MMHG

## 2018-11-23 VITALS — DIASTOLIC BLOOD PRESSURE: 60 MMHG | SYSTOLIC BLOOD PRESSURE: 131 MMHG

## 2018-11-23 RX ADMIN — METOPROLOL TARTRATE SCH MG: 25 TABLET, FILM COATED ORAL at 09:53

## 2018-11-23 RX ADMIN — PANTOPRAZOLE SODIUM SCH MG: 40 TABLET, DELAYED RELEASE ORAL at 05:58

## 2018-11-23 RX ADMIN — MEXILETINE HYDROCHLORIDE SCH MG: 150 CAPSULE ORAL at 09:53

## 2018-11-23 RX ADMIN — AMIODARONE HYDROCHLORIDE SCH MG: 200 TABLET ORAL at 09:53

## 2018-11-23 RX ADMIN — FUROSEMIDE SCH MG: 40 TABLET ORAL at 09:53

## 2018-11-23 RX ADMIN — DIGOXIN SCH MG: 125 TABLET ORAL at 09:53

## 2018-11-23 NOTE — DISCHARGE SUMMARY
Diagnosis/Chief Complaint


Date of Admission


2018 at 5:20 am


Date of Discharge


18


Admission Diagnosis


Admission Diagnosis


Renal Stones


Pyelonephritis


Acute respiratory distress


NIDDM


HTN


CAD


Debility


Atrial Fibrillation





Discharge Diagnosis


See below


Problems/Diagnosis:  


(1) COPD (chronic obstructive pulmonary disease)


Assessment & Plan:  - Continue to titrate oxygen as tolerated, MAT protocol


: discussed the importance of using Aerobike and IS


: Encourage sitting up and ambulation, Make sure  you are using your 

Aerobike every hrs, Titrate oxygen as tolerate


Qualifiers:  


   Qualified Codes:  J44.9 - Chronic obstructive pulmonary disease, unspecified


Status:  Chronic


(2) Renal calculus, left


Assessment & Plan:  : Stent in place at this time from admission at [facility]

, urology consulted 


- Continue IV antibiotics, stent draining clear urine


: Plan for Lithotrypsy tomorrow with Dr Tawil


: Surgery done today


: Surgery successful with moving stones, Dr Tawil will see patient next 

week in clinic


Status:  Acute


(3) Debility


Assessment & Plan:  - SW consulted, patient will needs placement to SNF but 

wife and daughter desire Home with HH


Patient up to chair and requires assist x1


Status:  Acute


(4) Pyelonephritis


Assessment & Plan:  - IV antibiotics


Status:  Acute


(5) Acute renal insufficiency


Assessment & Plan:  - Will continue to monitor with gentle hydration


Status:  Resolved


Resolution Date/Time:  18 @ 17:54


(6) CAD (coronary artery disease)


Qualifiers:  


   Qualified Codes:  I25.10 - Atherosclerotic heart disease of native coronary 

artery without angina pectoris


Status:  Chronic


(7) Paroxysmal atrial fibrillation


Assessment & Plan:  - Continue rate control medications


Status:  Chronic


(8) HTN (hypertension)


Assessment & Plan:  - Continue home meds


Qualifiers:  


   Qualified Codes:  I10 - Essential (primary) hypertension


Status:  Acute


(9) Diabetes mellitus, type 2


Assessment & Plan:  - Accucheck AC/HS


Qualifiers:  


   


Status:  Chronic


(10) DVT prophylaxis


Assessment & Plan:  - SCDs due to hematuria


Status:  Acute





Chief Complaint/HPI


Chief Complaint/HPI


79 yo M with multiple co morbid conditions that presented to ER after discharge 

from [facility] yesterday with HH. Wife states that she is unable to care 

for him at home. She did not get any teaching on his stent and she does not 

feel comfortable with caring for it. Patient states that he started to have 

chills yesterday. They had not gone to  his antibiotics and he stated 

that he just was not feeling well. Wife states that she made him dinner and he 

did not eat his normal amount. States that he is still having pain in his left 

flank. States that he has been having some more shortness of breath the last 

few days.





Discharge Summary-Simple/Stand


Procedures


 Lithotripsy


Consultations


Dr Tawil, Urology


Dr Oglesby, Cardiology


Discharge Physical Examination


Allergies:  


Coded Allergies:  


     Penicillins (Verified  Allergy, Severe, HIVES, SOB, 18)


     codeine (Verified  Allergy, Severe, SOB, HIVES, 18)


Vitals & I&Os





Vital Sign - Last 12Hours








  Date Time  Temp Pulse Resp B/P (MAP) Pulse Ox O2 Delivery O2 Flow Rate FiO2


 


18 08:00 97.3 91 18 131/60 (83) 92 Room Air  


 


18 12:00       1.00 


 


18 09:30        32














Intake and Output 


 


 18





 00:00


 


Intake Total 900 ml


 


Output Total 575 ml


 


Balance 325 ml








General Appearance:  Alert, Oriented X3, Cooperative, No Acute Distress


HEENT:  Mucous Memb Moist/Pink


Respiratory:  Normal Air Movement, Other (diminished breath sounds, normal work 

of breathing)


Cardiovascular:  Regular Rate, No Murmurs


Abdominal:  Normal Bowel Sounds, Soft, No Tenderness, No Masses


Extremities:  No Edema, No Tenderness/Swelling


Skin:  No Rashes, Other (Stent placed in left flank)


Neuro:  Normal Speech, Sensation Intact, Cranial Nerves 3-12 NL


Psych/Mental Status:  Mental Status NL, Mood NL





Hospital Course


See final discharge diagnosis.





Discussion & Recommendations


79 yo M that presented to ER following discharged from [facility]. 

Patient underwent lithotripsy that was successful. Discussed in depth with 

patient and wife regarding returning home and the concerns. Wife and daughter 

discussed that they did not want the patient to go to NH for rehab and that 

they could care for him at home with HH. 





Patient to be discharged home with .





Discharge


Condition at discharge


Guarded


Instructions to patient/family


Please see electronic discharge instructions given to patient.


Discharge Medications


Reviewed and agree with Discharge Medication list on patient's Discharge 

Instruction sheet





Clinical Quality Measures


DVT/VTE Risk/Contraindication:


Risk Factor Score Per Nursin


RFS Level Per Nursing on Admit:  4+=Very High





Copy


Copies To 1:   MAHOGANY GREENWOOD MD, HOLLY R MD 2018 12:04

## 2018-11-23 NOTE — PHYSICAL THERAPY DAILY NOTE
PT Daily Note-Current


Subjective


Pt reports feeling ok today. Denies any pain and agreeable to work with 

Physical Therapy this am





Pain





   Numeric Pain Scale:  0-No Pain





Appearance


Pt sitting up in recliner resting with eyes closed but easily aroused at 

beginning of PT session. Family member present in room with patient


At end of session pt in recliner with LE's elevated and call button within 

reach and all needs met. Family member still present.





Mental Status


Attachments:  Drains


left post thoracic region





Transfers


              Functional [place] Measure


0=Not Assessed/NA   4=Minimal Assistance


1=Total Assistance   5=Supervision or Setup


2=Maximal Assistance   6=Modified [place]


3=Moderate Assistance   7=Complete IndependenceIRFPAI Quality Coding Scale











6 Independent with activity with or without an assistive device


 


5  Patient requires set up or clean up by helper.  Patient completes activity  

by  themselves


 


4 Supervision or touching assist (CGA). [place] provide cues , steadying assist


 


3 The helper provides less than half the effort to complete the activity


 


2 The helper provides more than half the effort to complete the activity


 


1 Dependent.  The helper does all the effort to complete an activity 


 


7 Patient refused to complete or attempt activity


 


9 The patient did not perform the activity before the current illness or injury


 


88 Not attempted due to Medical conditions or safety concerns








Transfers (B, C, W/C) (FIM):  6


Scootin


Sit to/from Stand:  6


Pt performing transitions well. Did require re instruction x1 for safety and 

hand placement when sitting back down into chair from FWW





Weight Bearing


Right Lower Extremity:  Right


Weight Bearing/Tolerated


Left Lower Extremity:  Left


Weight Bearing/Tolerated





Gait Training


Distance (FIM):  8=546-03 ft


Distance:  100


Gait Level of Assist:  5


Gait Persons Needed:  1


Gait Assistive Device:  FWW


kyphotic posture with left lateral head tilt, steady but slow, requiring brief 

standing rest break





Exercises


Standing:  Sit to Stand


Standing Reps:  10


rest breaks required, verb instruction required x1 episode for reaching back to 

safely sit





Treatments


gait and transfer training





Assessment


Current Status:  Good Progress


able to increase distance with gait





PT Long Term Goals


Long Term Goals


PT Long Term Goals Time Frame:  2018


Transfers (B,C,W/C) (FIM):  6


Gait (FIM):  5


Gait distance (FIM):  4=438-16 ft


Gait Assistive Device:  FWW





PT Plan


Problem List


Problem List:  Activity Tolerance, Functional Strength, Safety, Gait, Transfer





Treatment/Plan


Treatment Plan:  Continue Plan of Care


Treatment Plan:  Bed Mobility, Education, Functional Activity Farnaz, Functional 

Strength, Gait, Safety, Therapeutic Exercise, Transfers


Treatment Duration:  Dec 1, 2018


Frequency:  6 times per week


Estimated Hrs Per Day:  .25 hour per day


Patient and/or Family Agrees t:  Yes





Safety Risks/Education


Patient Education:  Gait Training, Transfer Techniques, Safety Issues


Teaching Recipient:  Patient


Teaching Methods:  Demonstration, Discussion


Response to Teaching:  Verbalize Understanding, Return Demonstration, 

Reinforcement Needed





Time/GCodes


Time In:  825


Time Out:  840


Total Billed Treatment Time:  15


Total Billed Treatment


1 Visit


GT x1











KENDALL NAVARRO PTA 2018 09:00

## 2018-11-23 NOTE — D/C HH FACE TO FACE ORDER
D/C  Face to Face Orders


Instructions for Patient


[facility], [phone]


Patient Instructions/FollowUp:  


You have a followup with Dr Tawil





You will be called with a follow up appt for next week


Physician to follow Patient:  Carlos


Discharge Diet for Home:  ADA Diet


Patient Problems:  


Nephrolithiasis


Pyelonephritis


Atelectasis


DM


HTN


Atrial fibrillation


Goals for Patient:  


- Strengthening of LE


- Gain more independence





Patient Data-Allergies,Ht & Wt


Patient Allergies:  


Coded Allergies:  


     Penicillins (Verified  Allergy, Severe, HIVES, SOB, 6/8/18)


     codeine (Verified  Allergy, Severe, SOB, HIVES, 6/8/18)


Height (Feet):  5


Height (Inches):  5.00


Weight (Pounds):  190


Weight (Ounces):  5.0





Home Health Need/Face to Face


Date of Face to Face:  Nov 23, 2018


Clinical Findings:  Generalized weakness and fatigue, Instability, Muscle 

weakness, Shortness of breath, Unsteady gait


I have seen Pt face-to-face:  Yes


Discharged To:  Home


Diagnosis/Conditions:  


See Above


Patient is Homebound due to:  Qasim fall risk due to instabilty, Muscle weakness

, Shortness of breath/distress


Homebound Status


   Due to the above stated illness, injury or surgical procedure (medical 

condition or diagnosis) and associated clinical findings, the patient is 

homebound because of his/her inability to leave home except with aid of a 

supportive device and/or person AND leaving the home requires a considerable 

and taxing effort or is medically contraindicated.


Pt req the following assistanc:  Aid of another person, Walker





Home Health Nursing Orders


Home Health Services Order:  Nursing Services, Occupational Ther-Evaluate & 

Treat, Physical Therapy-Evaluate & Treat, Wound Care-Eval/Treat





Home Health Infusion Therapy


Line Start Date:  Nov 17, 2018


Line Start Time:  0425


Site Location:  W[place]


Certify Stmt


I certify that this patient is under my care and that I, a nurse practitioner 

or a physician; a assistant working with me, had a face to face encounter that -

meets the physician face to face encounter requirements with this patient as 

dated.











ARSEN LAWS MD Nov 23, 2018 12:10 pm

## 2018-11-30 ENCOUNTER — HOSPITAL ENCOUNTER (OUTPATIENT)
Dept: HOSPITAL 75 - RAD | Age: 78
End: 2018-11-30
Attending: UROLOGY
Payer: MEDICARE

## 2018-11-30 DIAGNOSIS — Z93.6: ICD-10-CM

## 2018-11-30 DIAGNOSIS — N13.2: Primary | ICD-10-CM

## 2018-11-30 DIAGNOSIS — N28.1: ICD-10-CM

## 2018-11-30 PROCEDURE — 74425 UROGRAPHY ANTEGRADE RS&I: CPT

## 2018-11-30 PROCEDURE — 50431 NJX PX NFROSGRM &/URTRGRM: CPT

## 2018-11-30 PROCEDURE — 74176 CT ABD & PELVIS W/O CONTRAST: CPT

## 2018-11-30 NOTE — DIAGNOSTIC IMAGING REPORT
INDICATION: 

Left ureteral stone. Patient is status post percutaneous

nephrostomy tube placement and lithotripsy.



TECHNIQUE: 

The patient was brought to the procedure room and placed on the

table in the prone position. Contrast was injected into the

patient's indwelling left percutaneous nephrostomy  tube and

fluoroscopic imaging over the abdomen was performed. 1 minute and

29 seconds of fluoroscopy was utilized.



FINDINGS:

Contrast is seen within the left renal collecting system. There

is prompt passage of contrast into the left ureter. There is a

large filling defect in the proximal left ureter corresponding to

the large calculus noted on the CT of the same day. Contrast does

freely pass around the calculus into the mid and distal left

ureter with flow of contrast into the bladder. There are some

filling defects within lower pole calyces of the left kidney as

well, consistent with the known staghorn calculi.



IMPRESSION: 

Left renal and proximal left ureteric calculi. Contrast does pass

freely around the proximal left ureteric calculus into the distal

ureter and bladder.



Dictated by: 



  Dictated on workstation # [id number]

## 2018-11-30 NOTE — DIAGNOSTIC IMAGING REPORT
PROCEDURE: CT abdomen and pelvis without contrast.



TECHNIQUE: Multiple contiguous axial images were obtained through

the abdomen and pelvis without the use of intravenous contrast. 



INDICATION:  Left ureteral stone.



COMPARISON: Comparison is made with prior CT from 11/10/2018.



FINDINGS: The lung bases are clear. The liver is unremarkable. No

biliary ductal dilatation is seen. The pancreas and spleen are

unremarkable. No adrenal mass is detected. The right kidney is

stable with exophytic cyst arising from the upper pole. Staghorn

calculus in the left kidney is again seen. A percutaneous

nephrostomy tube has been placed on the left since prior CT.

Previously noted calculus in the mid left ureter appears to have

passed or been fragmented and is located in the bladder. There is

a second calculus still present in the proximal left ureter. This

calculus measures 13 mm cephalocaudal x 6 mm transverse x

approximately 7 mm AP. This is located just distal to the UPJ. No

other ureteral calculi are detected. Numerous calcific densities

in the bladder are noted consistent with calculus fragments. The

dominant calculus previously seen is no longer visualized.



IMPRESSION:



Status post left percutaneous nephrostomy tube placement as well

as lithotripsy. One dominant calculus in the mid to distal left

ureter seen on exam from 11/10/2018 has passed. There continues

to be a residual calculus in the proximal left ureter, as

described. Staghorn calculus in the left kidney is also seen.

Hydronephrosis has improved. There are bilateral renal cysts.



Dictated by: 



  Dictated on workstation # [id number]

## 2018-12-04 ENCOUNTER — HOSPITAL ENCOUNTER (OUTPATIENT)
Dept: HOSPITAL 75 - PREOP | Age: 78
Discharge: HOME | End: 2018-12-04
Attending: UROLOGY
Payer: MEDICARE

## 2018-12-04 VITALS — WEIGHT: 190 LBS | BODY MASS INDEX: 31.65 KG/M2 | HEIGHT: 65 IN

## 2018-12-04 DIAGNOSIS — Z01.818: Primary | ICD-10-CM

## 2018-12-05 ENCOUNTER — HOSPITAL ENCOUNTER (OUTPATIENT)
Dept: HOSPITAL 75 - SDC | Age: 78
Discharge: HOME | End: 2018-12-05
Attending: UROLOGY
Payer: MEDICARE

## 2018-12-05 VITALS — BODY MASS INDEX: 31.65 KG/M2 | WEIGHT: 190 LBS | HEIGHT: 65 IN

## 2018-12-05 VITALS — SYSTOLIC BLOOD PRESSURE: 132 MMHG | DIASTOLIC BLOOD PRESSURE: 83 MMHG

## 2018-12-05 VITALS — DIASTOLIC BLOOD PRESSURE: 90 MMHG | SYSTOLIC BLOOD PRESSURE: 134 MMHG

## 2018-12-05 VITALS — SYSTOLIC BLOOD PRESSURE: 118 MMHG | DIASTOLIC BLOOD PRESSURE: 80 MMHG

## 2018-12-05 VITALS — DIASTOLIC BLOOD PRESSURE: 89 MMHG | SYSTOLIC BLOOD PRESSURE: 140 MMHG

## 2018-12-05 VITALS — SYSTOLIC BLOOD PRESSURE: 134 MMHG | DIASTOLIC BLOOD PRESSURE: 85 MMHG

## 2018-12-05 VITALS — SYSTOLIC BLOOD PRESSURE: 134 MMHG | DIASTOLIC BLOOD PRESSURE: 90 MMHG

## 2018-12-05 DIAGNOSIS — N21.0: ICD-10-CM

## 2018-12-05 DIAGNOSIS — I50.9: ICD-10-CM

## 2018-12-05 DIAGNOSIS — G62.9: ICD-10-CM

## 2018-12-05 DIAGNOSIS — I25.10: ICD-10-CM

## 2018-12-05 DIAGNOSIS — Z95.810: ICD-10-CM

## 2018-12-05 DIAGNOSIS — Z95.5: ICD-10-CM

## 2018-12-05 DIAGNOSIS — J44.9: ICD-10-CM

## 2018-12-05 DIAGNOSIS — K21.9: ICD-10-CM

## 2018-12-05 DIAGNOSIS — Z95.1: ICD-10-CM

## 2018-12-05 DIAGNOSIS — Z79.899: ICD-10-CM

## 2018-12-05 DIAGNOSIS — N20.1: Primary | ICD-10-CM

## 2018-12-05 DIAGNOSIS — G47.33: ICD-10-CM

## 2018-12-05 DIAGNOSIS — J45.909: ICD-10-CM

## 2018-12-05 DIAGNOSIS — I48.91: ICD-10-CM

## 2018-12-05 DIAGNOSIS — I11.0: ICD-10-CM

## 2018-12-05 DIAGNOSIS — F03.90: ICD-10-CM

## 2018-12-05 PROCEDURE — 87081 CULTURE SCREEN ONLY: CPT

## 2018-12-05 PROCEDURE — 74018 RADEX ABDOMEN 1 VIEW: CPT

## 2018-12-05 NOTE — OPERATIVE REPORT
DATE OF SERVICE:  12/05/2018



PREOPERATIVE DIAGNOSES:

Left proximal ureteral renal and bladder stones.



POSTOPERATIVE DIAGNOSES:

Left proximal ureteral renal and bladder stones.



OPERATION PERFORMED:

Left nephrostogram and left ESWL.



SURGEON:

Elias Tawil, MD.



ANESTHESIA:

General.



COMPLICATIONS:

None.



DESCRIPTION OF PROCEDURE:

Under satisfactory general anesthesia, the patient in supine position on the

ESWL table, we did a nephrostogram to visualize the stone in the proximal ureter

and usually get on and off during surgery.  The stones were localized.  Shocks

were delivered at kV of 5.  A total of 3000 shocks were delivered with good

results and clearance of the contrast easily.  The patient received 40 mg of

Lasix and 50 mg of Toradol IV at the end of the procedure.  He tolerated the

procedure and anesthesia well and was sent to recovery room in stable condition.





Job ID: [id number]

DocumentID: [id number]

Dictated Date:  12/05/2018 10:27:08

Transcription Date: 12/05/2018 12:09:32

Dictated By: ELIAS TAWIL, MD

## 2018-12-05 NOTE — PROGRESS NOTE-PRE OPERATIVE
Pre-Operative Progress Note


H&P Reviewed


The H&P was reviewed, patient examined and no changes noted.


Date Seen by Provider:  Dec 5, 2018


Time Seen by Provider:  08:34


Date H&P Reviewed:  Dec 5, 2018


Time H&P Reviewed:  08:35


Pre-Operative Diagnosis:  LT PROXIMAL AND LT RENAL STONES











TAWIL,ELIAS A MD Dec 5, 2018 08:35

## 2018-12-05 NOTE — DIAGNOSTIC IMAGING REPORT
INDICATION: Status post ESWL.



COMPARISON: Earlier same day.



FINDINGS: Two supine radiographic views of the abdomen were

obtained. Indwelling left-sided percutaneous nephrostomy tube is

again identified and is in stable position. Note is also again

made of bulky left-sided nephrolithiasis. Overall, appearance has

not significantly changed compared to earlier same day. Small

bowel loops are nondistended. No unexpected radiopaque foreign

bodies are seen. Contrast is now noted within the urinary

bladder.



IMPRESSION:

1. Left-sided nephrolithiasis with indwelling percutaneous

nephrostomy tube. There has been no significant interval change

when compared to earlier same day.



Dictated by: 



  Dictated on workstation # [id number]

## 2018-12-05 NOTE — DIAGNOSTIC IMAGING REPORT
PATIENT HISTORY: Preop, left renal stones. 



TECHNIQUE: Frontal view of the abdomen.



COMPARISON: CT from 11/30/2018.



FINDINGS: A nephrostomy tube is noted on the left. Multiple

calculi are seen in the superior and inferior left kidney. There

is a questionable 1.6 cm calculus just medial to the inferior

pole of the left kidney. Multiple phleboliths are noted in the

pelvis. There is calcific atherosclerosis present. There are

moderate degenerative changes in the bilateral hip joints. There

is internal fixation of the proximal left femur with heterotopic

ossification. No evidence of bowel obstruction or large

collection of free air is seen.



IMPRESSION: Left nephrostomy tube with multiple left renal

calculi. Questionable calculus in the proximal left ureter.



Dictated by: 



  Dictated on workstation # [id number]

## 2018-12-05 NOTE — DISCHARGE INST-UROLOGY
Discharge Inst-Urology


Discharge Medications


New, Converted, or Re-newed RX:  RX on Chart





Patient Instructions/Follow Up


Plan


Please make appointment to been seen in office Monday 21/17, KUB prior to it





KUB on way home





Post ESWL instructions





Increase oral fluids for 48 hours and then as needed.





Diet and Activity as tolerated.





If questions or concerns contact your physician 


Or seek help at emergency department.











TAWIL,ELIAS A MD Dec 5, 2018 10:08

## 2018-12-05 NOTE — PROGRESS NOTE-POST OPERATIVE
Post-Operative Progess Note


Surgeon (s)/Assistant (s)


Surgeon


ELIAS TAWIL MD


Assistant:  none





Pre-Operative Diagnosis


LT PROXIMAL AND LT RENAL STONES





Post-Operative Diagnosis





same





Procedure & Operative Findings


Date of Procedure


12/5/18


Procedure Performed/Findings


LT NEPHROSTOGRAM AND LT ESWL


Anesthesia Type


GENERAL





Estimated Blood Loss


Estimated blood loss (mL):  NONE





Specimens/Packing


Specimens Removed


NONE


Packing:  


NONE











TAWIL,ELIAS A MD Dec 5, 2018 10:10

## 2018-12-05 NOTE — ANESTHESIA-GENERAL POST-OP
General


Patient Condition


Mental Status/LOC:  Same as Preop


Cardiovascular:  Satisfactory


Nausea/Vomiting:  Absent


Respiratory:  Satisfactory


Pain:  Controlled


Complications:  Absent





Post Op Complications


Complications


None





Follow Up Care/Instructions


Patient Instructions


None needed.





Anesthesia/Patient Condition


Patient Condition


Patient is doing well, no complaints, stable vital signs, no apparent adverse 

anesthesia problems.











ISABELLA AMBROCIO DO Dec 5, 2018 13:41

## 2018-12-08 ENCOUNTER — HOSPITAL ENCOUNTER (EMERGENCY)
Dept: HOSPITAL 75 - ER | Age: 78
LOS: 1 days | Discharge: ANOTHER HOSPITAL | End: 2018-12-09
Payer: MEDICARE

## 2018-12-08 VITALS — HEIGHT: 69 IN | BODY MASS INDEX: 27.4 KG/M2 | WEIGHT: 185 LBS

## 2018-12-08 DIAGNOSIS — Z88.5: ICD-10-CM

## 2018-12-08 DIAGNOSIS — N13.6: ICD-10-CM

## 2018-12-08 DIAGNOSIS — I10: ICD-10-CM

## 2018-12-08 DIAGNOSIS — J44.9: ICD-10-CM

## 2018-12-08 DIAGNOSIS — Z87.891: ICD-10-CM

## 2018-12-08 DIAGNOSIS — Z88.0: ICD-10-CM

## 2018-12-08 DIAGNOSIS — I25.10: ICD-10-CM

## 2018-12-08 DIAGNOSIS — T83.092A: Primary | ICD-10-CM

## 2018-12-08 DIAGNOSIS — E78.00: ICD-10-CM

## 2018-12-08 DIAGNOSIS — Z95.5: ICD-10-CM

## 2018-12-08 DIAGNOSIS — F03.90: ICD-10-CM

## 2018-12-08 DIAGNOSIS — I25.2: ICD-10-CM

## 2018-12-08 DIAGNOSIS — Z85.828: ICD-10-CM

## 2018-12-08 DIAGNOSIS — A41.9: ICD-10-CM

## 2018-12-08 DIAGNOSIS — I42.9: ICD-10-CM

## 2018-12-08 DIAGNOSIS — K21.9: ICD-10-CM

## 2018-12-08 DIAGNOSIS — J18.9: ICD-10-CM

## 2018-12-08 DIAGNOSIS — Z87.01: ICD-10-CM

## 2018-12-08 DIAGNOSIS — Z82.49: ICD-10-CM

## 2018-12-08 DIAGNOSIS — Z95.1: ICD-10-CM

## 2018-12-08 DIAGNOSIS — E11.40: ICD-10-CM

## 2018-12-08 DIAGNOSIS — Z87.448: ICD-10-CM

## 2018-12-08 DIAGNOSIS — Z87.19: ICD-10-CM

## 2018-12-08 DIAGNOSIS — Z87.442: ICD-10-CM

## 2018-12-08 DIAGNOSIS — G47.30: ICD-10-CM

## 2018-12-08 DIAGNOSIS — F32.9: ICD-10-CM

## 2018-12-08 DIAGNOSIS — Z95.810: ICD-10-CM

## 2018-12-08 DIAGNOSIS — Z80.49: ICD-10-CM

## 2018-12-08 DIAGNOSIS — Z79.51: ICD-10-CM

## 2018-12-08 LAB
ALBUMIN SERPL-MCNC: 3.3 GM/DL (ref 3.2–4.5)
ALP SERPL-CCNC: 93 U/L (ref 40–136)
ALT SERPL-CCNC: 13 U/L (ref 0–55)
APTT BLD: 39 SEC (ref 24–35)
APTT PPP: YELLOW S
BACTERIA #/AREA URNS HPF: (no result) /HPF
BASOPHILS # BLD AUTO: 0 10^3/UL (ref 0–0.1)
BASOPHILS NFR BLD AUTO: 0 % (ref 0–10)
BASOPHILS NFR BLD MANUAL: 0 %
BILIRUB SERPL-MCNC: 1 MG/DL (ref 0.1–1)
BILIRUB UR QL STRIP: NEGATIVE
BUN/CREAT SERPL: 17
CALCIUM SERPL-MCNC: 9.4 MG/DL (ref 8.5–10.1)
CHLORIDE SERPL-SCNC: 94 MMOL/L (ref 98–107)
CO2 SERPL-SCNC: 27 MMOL/L (ref 21–32)
CREAT SERPL-MCNC: 1.16 MG/DL (ref 0.6–1.3)
EOSINOPHIL # BLD AUTO: 0 10^3/UL (ref 0–0.3)
EOSINOPHIL NFR BLD AUTO: 0 % (ref 0–10)
EOSINOPHIL NFR BLD MANUAL: 0 %
ERYTHROCYTE [DISTWIDTH] IN BLOOD BY AUTOMATED COUNT: 16.4 % (ref 10–14.5)
FIBRINOGEN PPP-MCNC: (no result) MG/DL
GFR SERPLBLD BASED ON 1.73 SQ M-ARVRAT: > 60 ML/MIN
GLUCOSE SERPL-MCNC: 116 MG/DL (ref 70–105)
GLUCOSE UR STRIP-MCNC: NEGATIVE MG/DL
HCT VFR BLD CALC: 33 % (ref 40–54)
HGB BLD-MCNC: 10.9 G/DL (ref 13.3–17.7)
INR PPP: 1.3 (ref 0.8–1.4)
KETONES UR QL STRIP: NEGATIVE
LEUKOCYTE ESTERASE UR QL STRIP: (no result)
LYMPHOCYTES # BLD AUTO: 1.2 X 10^3 (ref 1–4)
LYMPHOCYTES NFR BLD AUTO: 6 % (ref 12–44)
MANUAL DIFFERENTIAL PERFORMED BLD QL: YES
MCH RBC QN AUTO: 30 PG (ref 25–34)
MCHC RBC AUTO-ENTMCNC: 33 G/DL (ref 32–36)
MCV RBC AUTO: 90 FL (ref 80–99)
MONOCYTES # BLD AUTO: 2.7 X 10^3 (ref 0–1)
MONOCYTES NFR BLD AUTO: 13 % (ref 0–12)
MONOCYTES NFR BLD: 10 %
NEUTROPHILS # BLD AUTO: 16.4 X 10^3 (ref 1.8–7.8)
NEUTROPHILS NFR BLD AUTO: 81 % (ref 42–75)
NEUTS BAND NFR BLD MANUAL: 76 %
NEUTS BAND NFR BLD: 6 %
NITRITE UR QL STRIP: NEGATIVE
PH UR STRIP: 6 [PH] (ref 5–9)
PLATELET # BLD: 181 10^3/UL (ref 130–400)
PMV BLD AUTO: 10.4 FL (ref 7.4–10.4)
POTASSIUM SERPL-SCNC: 3.9 MMOL/L (ref 3.6–5)
PROT SERPL-MCNC: 7.9 GM/DL (ref 6.4–8.2)
PROT UR QL STRIP: (no result)
PROTHROMBIN TIME: 16.4 SEC (ref 12.2–14.7)
RBC # BLD AUTO: 3.68 10^6/UL (ref 4.35–5.85)
RBC #/AREA URNS HPF: >100 /HPF
RBC MORPH BLD: NORMAL
SODIUM SERPL-SCNC: 133 MMOL/L (ref 135–145)
SP GR UR STRIP: 1.01 (ref 1.02–1.02)
UROBILINOGEN UR-MCNC: NORMAL MG/DL
VARIANT LYMPHS NFR BLD MANUAL: 8 %
WBC # BLD AUTO: 20.3 10^3/UL (ref 4.3–11)
WBC #/AREA URNS HPF: (no result) /HPF

## 2018-12-08 PROCEDURE — 74176 CT ABD & PELVIS W/O CONTRAST: CPT

## 2018-12-08 PROCEDURE — 85027 COMPLETE CBC AUTOMATED: CPT

## 2018-12-08 PROCEDURE — 85007 BL SMEAR W/DIFF WBC COUNT: CPT

## 2018-12-08 PROCEDURE — 85730 THROMBOPLASTIN TIME PARTIAL: CPT

## 2018-12-08 PROCEDURE — 85610 PROTHROMBIN TIME: CPT

## 2018-12-08 PROCEDURE — 87077 CULTURE AEROBIC IDENTIFY: CPT

## 2018-12-08 PROCEDURE — 81000 URINALYSIS NONAUTO W/SCOPE: CPT

## 2018-12-08 PROCEDURE — 36415 COLL VENOUS BLD VENIPUNCTURE: CPT

## 2018-12-08 PROCEDURE — 83605 ASSAY OF LACTIC ACID: CPT

## 2018-12-08 PROCEDURE — 80053 COMPREHEN METABOLIC PANEL: CPT

## 2018-12-08 PROCEDURE — 71045 X-RAY EXAM CHEST 1 VIEW: CPT

## 2018-12-08 PROCEDURE — 87088 URINE BACTERIA CULTURE: CPT

## 2018-12-08 PROCEDURE — 87186 SC STD MICRODIL/AGAR DIL: CPT

## 2018-12-08 PROCEDURE — 87040 BLOOD CULTURE FOR BACTERIA: CPT

## 2018-12-08 NOTE — ED GU-MALE
General


Stated Complaint:  RENAL TUBE PROBLEM


Source:  patient, EMS


Exam Limitations:  no limitations





History of Present Illness


Date Seen by Provider:  Dec 8, 2018


Time Seen by Provider:  22:00


Initial Comments


Patient presents to ER by EMS with chief complaint that just prior to arrival 

he accidentally pulled the nephrostomy tube out. There is no bleeding or 

drainage from the site. He denies any fevers chills or significant pain. He has 

had a productive cough last couple days. EMS reports an elevated temperature of 

99.8. Patient is not a diabetic. Nephrostomy was placed because a history of 

kidney stones. He is followed by Dr. Tawil, urology. Is still able to urinate 

without pain or blood in his urine. Bowels are moving normally. No abdominal 

pain.





Allergies and Home Medications


Allergies


Coded Allergies:  


     Penicillins (Verified  Allergy, Severe, HIVES, SOB (Pt has received 

Cefepime & Ceftriaxone), 12/5/18)


     codeine (Verified  Allergy, Severe, SOB, HIVES, 6/8/18)





Home Medications


Albuterol Sulfate 18 Gm Hfa.aer.ad, 2 PUFF IH QID PRN for SHORTNESS OF BREATH, (

Reported)


Albuterol Sulfate 1.25 Mg/3 Ml Vial.neb, 1.25 MG NEB BID, (Reported)


Amiodarone HCl 200 Mg Tablet, [facility], (Rep[facility])


Amiodarone HCl 200 Mg Tablet, 400 [facility], ([facility])


   take 2 (200mg) tabs 


Ciprofloxacin HCl 500 Mg Tablet, 500 MG PO BID


   Prescribed by: PILO GAONA on 12/5/18 1154


Digoxin 125 Mcg Tablet, 125 MCG PO DAILY, (Reported)


Esomeprazole Magnesium 40 Mg Capsule.dr, 40 MG PO DAILY, (Reported)


Furosemide 40 Mg Tablet, 40 MG PO 0900,1500, (Reported)


Hydrocodone/Acetaminophen 1 Each Tablet, 1-2 EACH PO Q6H PRN for PAIN-MODERATE


   Prescribed by: PILO GAONA on 12/5/18 1154


Metoprolol Tartrate 50 Mg Tablet, 25 MG PO BID, (Reported)


   TAKES 1/2 OF A (50 MG) TABLET 


Mexiletine HCl 150 Mg Cap, 150 MG PO TID, (Reported)


Potassium Chloride 8 Meq Tablet.er, 16 MEQ PO DAILY, (Reported)


   TAKES 2 (8MEQ) TABLETS 


Tamsulosin HCl 0.4 Mg Cap, 0.4 MG PO DAILY


   Prescribed by: PILO GAONA on 12/5/18 1154





Patient Home Medication List


Home Medication List Reviewed:  Yes





Review of Systems


Review of Systems


Constitutional:  No chills, No diaphoresis


EENTM:  No hearing loss, No ear pain


Respiratory:  cough; No dyspnea on exertion; phlegm; No short of breath, No 

wheezing


Cardiovascular:  No chest pain, No edema


Gastrointestinal:  see HPI; No abdominal pain, No constipation, No diarrhea


Genitourinary:  see HPI; denies burning, denies discharge, denies dysuria, 

denies frequency, denies flank pain


Musculoskeletal:  No back pain, No joint pain


Skin:  No pruritus, No rash


Psychiatric/Neurological:  Denies Headache, Denies Numbness





Past Medical-Social-Family Hx


Patient Social History


Alcohol Use:  Denies Use


Recreational Drug Use:  No


Smoking Status:  Former Smoker


Type Used:  Cigarettes


Former Smoker, Quit:  Jan 1, 1993


2nd Hand Smoke Exposure:  No


Recent Foreign Travel:  No


Contact w/Someone Who Travel:  No


Recent Hopitalizations:  No





Immunizations Up To Date


Tetanus Booster (TDap):  More than 5yrs


Date of Pneumonia Vaccine:  Oct 1, 2014


Date of Influenza Vaccine:  Oct 10, 2018





Seasonal Allergies


Seasonal Allergies:  No





Past Medical History


Surgeries:  Yes


Cardiac, CABG, Coronary Stent, Defibrillator, Eye Surgery, Orthopedic, Renal


Respiratory:  Yes (O2 3L/NC AT HS; CHRONIC RESPIRATORY FAILURE)


Asthma, Pneumonia, Chronic Bronchitis, Sleep Apnea, COPD


Currently Using CPAP:  No


Currently Using BIPAP:  No


Cardiac:  Yes


Cardiomyopathy, Chronic Edema/Swelling, Coronary Artery Disease, Heart Attack, 

High Cholesterol, Hypertension, Irregular Heartbeat


Neurological:  Yes


Dementia, Neuropathy


Reproductive Disorders:  Yes (unable to have children- mumps as a child)


Sexually Transmitted Disease:  No


HIV/AIDS:  No


Genitourinary:  Yes


Kidney Infection, Bladder Infection, Kidney Stones


Gastrointestinal:  Yes


Gastroesophageal Reflux, Ulcer


Musculoskeletal:  Yes (POOR MOBILITY; LEFT HIP FRACTURE)


Arthritis, Fractures


Endocrine:  Yes


Diabetes, Insulin dep


HEENT:  Yes


Cataract, Glaucoma


Loss of Vision:  Bilateral


Hearing Impairment:  Hard of Hearing


Cancer:  Yes


Skin


Did You Recieve Any Treatments:  Yes


What Type of Treatment Did You:  Surgical Intervention


Psychosocial:  Yes


Depression


Integumentary:  Yes (shingles , SKIN CANCER; LEG CELLULITIS/ STASIS DERMATITIS)


Blood Disorders:  No


Adverse Reaction/Blood Tranf:  No





Family Medical History





Cardiovascular disease


  19 MOTHER


  G8 BROTHER


  G8 SISTER


Cervical cancer


  19 MOTHER


Heart Disease





Physical Exam


Vital Signs





Vital Signs - First Documented








 12/8/18





 22:00


 


Temp 98.4


 


Pulse 90


 


Resp 20


 


B/P (MAP) 133/74 (93)


 


Pulse Ox 96


 


O2 Delivery Room Air





Capillary Refill :


Height, Weight, BMI


Height: 5'5.00"


Weight: 190lbs. 0.0oz. 86.233047bz; 31.6 BMI


Method:Stated


General Appearance:  WD/WN, no apparent distress


HEENT:  PERRL/EOMI, normal ENT inspection, pharynx normal


Neck:  non-tender, full range of motion


Cardiovascular:  normal peripheral pulses, regular rate, rhythm


Respiratory:  chest non-tender, no respiratory distress, no accessory muscle use

, rhonchi (bilateral); No wheezing


Gastrointestinal:  normal bowel sounds, non tender, soft


Back:  other (small punctate nephrostomy without any erythema, induration or 

discharge)


Extremities:  normal inspection, normal capillary refill


Neurologic/Psychiatric:  alert, normal mood/affect, oriented x 3





Focused Exam


Lactate Level


12/8/18 22:08: Lactic Acid Level 1.33





Lactic Acid Level





Laboratory Tests








Test


 12/8/18


22:08


 


Lactic Acid Level


 1.33 MMOL/L


(0.50-2.00)











Progress/Results/Core Measures


Suspected Sepsis


SIRS


Temperature: 


Pulse:  


Respiratory Rate: 


 


Laboratory Tests


12/8/18 22:08: White Blood Count 20.3H


Blood Pressure  / 


Mean: 


 





12/8/18 22:08: Lactic Acid Level 1.33


Laboratory Tests


12/8/18 22:08: 


Creatinine 1.16, INR Comment 1.3, Platelet Count 181, Total Bilirubin 1.0








Results/Orders


Lab Results





Laboratory Tests








Test


 12/8/18


22:08 12/8/18


22:45 Range/Units


 


 


White Blood Count


 20.3 H


 


 4.3-11.0


10^3/uL


 


Red Blood Count


 3.68 L


 


 4.35-5.85


10^6/uL


 


Hemoglobin 10.9 L  13.3-17.7  G/DL


 


Hematocrit 33 L  40-54  %


 


Mean Corpuscular Volume 90   80-99  FL


 


Mean Corpuscular Hemoglobin 30   25-34  PG


 


Mean Corpuscular Hemoglobin


Concent 33 


 


 32-36  G/DL





 


Red Cell Distribution Width 16.4 H  10.0-14.5  %


 


Platelet Count


 181 


 


 130-400


10^3/uL


 


Mean Platelet Volume 10.4   7.4-10.4  FL


 


Neutrophils (%) (Auto) 81 H  42-75  %


 


Lymphocytes (%) (Auto) 6 L  12-44  %


 


Monocytes (%) (Auto) 13 H  0-12  %


 


Eosinophils (%) (Auto) 0   0-10  %


 


Basophils (%) (Auto) 0   0-10  %


 


Neutrophils # (Auto) 16.4 H  1.8-7.8  X 10^3


 


Lymphocytes # (Auto) 1.2   1.0-4.0  X 10^3


 


Monocytes # (Auto) 2.7 H  0.0-1.0  X 10^3


 


Eosinophils # (Auto)


 0.0 


 


 0.0-0.3


10^3/uL


 


Basophils # (Auto)


 0.0 


 


 0.0-0.1


10^3/uL


 


Neutrophils % (Manual) 76    %


 


Lymphocytes % (Manual) 8    %


 


Monocytes % (Manual) 10    %


 


Eosinophils % (Manual) 0    %


 


Basophils % (Manual) 0    %


 


Band Neutrophils 6    %


 


Blood Morphology Comment NORMAL    


 


Prothrombin Time 16.4 H  12.2-14.7  SEC


 


INR Comment 1.3   0.8-1.4  


 


Activated Partial


Thromboplast Time 39 H


 


 24-35  SEC





 


Sodium Level 133 L  135-145  MMOL/L


 


Potassium Level 3.9   3.6-5.0  MMOL/L


 


Chloride Level 94 L    MMOL/L


 


Carbon Dioxide Level 27   21-32  MMOL/L


 


Anion Gap 12   5-14  MMOL/L


 


Blood Urea Nitrogen 20 H  7-18  MG/DL


 


Creatinine


 1.16 


 


 0.60-1.30


MG/DL


 


Estimat Glomerular Filtration


Rate > 60 


 


  





 


BUN/Creatinine Ratio 17    


 


Glucose Level 116 H    MG/DL


 


Lactic Acid Level


 1.33 


 


 0.50-2.00


MMOL/L


 


Calcium Level 9.4   8.5-10.1  MG/DL


 


Corrected Calcium 10.0   8.5-10.1  MG/DL


 


Total Bilirubin 1.0   0.1-1.0  MG/DL


 


Aspartate Amino Transf


(AST/SGOT) 14 


 


 5-34  U/L





 


Alanine Aminotransferase


(ALT/SGPT) 13 


 


 0-55  U/L





 


Alkaline Phosphatase 93     U/L


 


Total Protein 7.9   6.4-8.2  GM/DL


 


Albumin 3.3   3.2-4.5  GM/DL


 


Urine Color  YELLOW   


 


Urine Clarity


 


 SLIGHTLY


CLOUDY  





 


Urine pH  6  5-9  


 


Urine Specific Gravity  1.015 L 1.016-1.022  


 


Urine Protein  2+ H NEGATIVE  


 


Urine Glucose (UA)  NEGATIVE  NEGATIVE  


 


Urine Ketones  NEGATIVE  NEGATIVE  


 


Urine Nitrite  NEGATIVE  NEGATIVE  


 


Urine Bilirubin  NEGATIVE  NEGATIVE  


 


Urine Urobilinogen  NORMAL  NORMAL  MG/DL


 


Urine Leukocyte Esterase  3+ H NEGATIVE  


 


Urine RBC (Auto)  5+ H NEGATIVE  


 


Urine RBC  >100 H  /HPF


 


Urine WBC  25-50 H  /HPF


 


Urine Crystals  NONE   /LPF


 


Urine Bacteria  FEW H  /HPF


 


Urine Casts  NONE   /LPF


 


Urine Mucus  NEGATIVE   /LPF


 


Urine Culture Indicated  YES   








My Orders





Orders - KYLAH RUTHERFORD


Cbc With Automated Diff (12/8/18 22:05)


Comprehensive Metabolic Panel (12/8/18 22:05)


Blood Culture (12/8/18 22:05)


Sputum Culture (12/8/18 22:05)


Urinalysis (12/8/18 22:05)


Urine Culture (12/8/18 22:05)


Protime With Inr (12/8/18 22:05)


Partial Thromboplastin Time (12/8/18 22:05)


Chest 1 View, Ap/Pa Only (12/8/18 22:05)


Saline Lock/Iv-Start (12/8/18 22:05)


Saline Lock/Iv-Start (12/8/18 22:05)


Vital Signs Adult Sepsis Patie Q15M (12/8/18 22:05)


O2 (12/8/18 22:05)


Remove Rings In Anticipation O (12/8/18 22:05)


Lactic Acid Analyzer (12/8/18 22:05)


Ns Iv 1000 Ml (Sodium Chloride 0.9%) (12/8/18 22:05)


Ceftriaxone  For Iv Use (Rocephin  For I (12/8/18 22:15)


Saline Lock/Iv-Start (12/8/18 22:05)


Ns Iv 500 Ml (Sodium Chloride 0.9%) (12/8/18 22:05)


Manual Differential (12/8/18 22:08)


Ct Abd/Pelvis Wo(Kidney Stone) (12/8/18 22:29)


Urine Culture (12/8/18 22:45)


Azithromycin Injection (Zithromax Inject (12/8/18 23:15)





Medications Given in ED





Current Medications








 Medications  Dose


 Ordered  Sig/Silvana


 Route  Start Time


 Stop Time Status Last Admin


Dose Admin


 


 Azithromycin 500


 mg/Sodium Chloride  250 ml @ 


 250 mls/hr  ONCE  ONCE


 IV  12/8/18 23:15


 12/9/18 00:14 DC 12/8/18 23:20


250 MLS/HR


 


 Ceftriaxone


 Sodium 1000 mg/


 Sodium Chloride  60 ml @ 


 100 mls/hr  ONCE  ONCE


 IV  12/8/18 22:15


 12/8/18 22:50 DC 12/8/18 22:19


100 MLS/HR


 


 Sodium Chloride  500 ml @ 0


 mls/hr  Q0M ONCE


 IV  12/8/18 22:05


 12/8/18 22:12 DC 12/9/18 00:00


0 MLS/HR








Vital Signs/I&O











 12/8/18 12/9/18





 22:00 00:20


 


Temp 98.4 98.4


 


Pulse 90 90


 


Resp 20 20


 


B/P (MAP) 133/74 (93) 126/68 (87)


 


Pulse Ox 96 95


 


O2 Delivery Room Air Room Air














 12/9/18





 00:00


 


Intake Total 1060 ml


 


Balance 1060 ml





Capillary Refill :


Progress Note #1:  


   Time:  22:19


Progress Note


The patient presents acutely for his nephrostomy displacement but because of 

his productive cough and adverse breath sounds were going to obtain a chest x-

ray cultures and lactate and look for evidence of pneumonia as well. We'll 

obtain urine specimen by clean catch.


Lithotripsy on December 5th, 3 days ago by Dr. Tawil.


Has a history of urethral and renal and bladder stones. Had good results 

without mention of residual stones. Lithotripsy in and follow-up nephrostogram 

demonstrates after 3000 shocks delivered they had good clearance of the 

contrast easily.


Progress Note #2:  


   Time:  23:44


Progress Note


Patient still has a productive cough elevated white count but no evidence of 

pneumonia on chest x-ray. We'll cover him with Rocephin and Zithromax would 

cover for urinary tract infection as well as respiratory. Since he has ureteral 

obstruction he will need nephrostomy replaced and since we don't have urology 

on the weekend we will send him back to [facility] where his nephrostomy was placed 

originally.





Diagnostic Imaging





   Diagonstic Imaging:  Xray


   Plain Films/CT/US/NM/MRI:  chest (1v)


Comments


Left base Has some possible consolidation but is partially obscured by the 

pacemaker.


   Reviewed:  Reviewed by Me








   Diagonstic Imaging:  CT (noncontrast)


   Plain Films/CT/US/NM/MRI:  abdomen, pelvis


Comments


Left ureter, mid ureter 4 mm stone with proximal hydroureter and hydronephrosis 

but decompressed ureter distally.


Multiple left renal staghorn-type calculi, the largest at the left upper pole 

is 27 m. There is a moderate left hydroureter nephrosis with a left proximal 

ureter calculus measuring 2 mm inferior to the above another calculus measuring 

5 mm.


Right ureter vesicular junction calculus 2 mm. Right mild hydronephrosis.


   Reviewed:  Reviewed by Me





Consults


Consults :  


   Consulting Physician:  TAWIL,ELIAS A MD


Consults Notes


Discussed the case and he recommends a CT noncontrast of the abdomen pelvis and 

if there is good flow and no evidence of obstructing stones in the don't need 

to do anything but if there isn't evidence of obstruction and he would 

recommend replacement of the nephrostomy.





Departure


Impression





 Primary Impression:  


 Nephrostomy tube displaced


 Additional Impressions:  


 UTI (urinary tract infection)


 Qualified Codes:  T83.512A - Infection and inflammatory reaction due to 

nephrostomy catheter, initial encounter; N39.0 - Urinary tract infection, site 

not specified


 Ureteral calculus, left


 Hydroureteronephrosis


 Sepsis


 Qualified Codes:  A41.9 - Sepsis, unspecified organism


 Pneumonia


 Qualified Codes:  J18.9 - Pneumonia, unspecified organism


Disposition:  02 XFER SHT-TRM HOSP


Condition:  Stable





Transfer


Time Spoke to Accepting Phy:  23:20


Transfer Progress Notes


Spoke with Sondra 1 call at [place], [place] at 2310.


Spoke with Dr. Jay discussed the case and he agrees to accept the patient 

for transfer at 2320.


Transfer Time:  00:20


Transfer Facility:  


[place], [place]


Method of Transfer:  EMS





Departure-Patient Inst.


Referrals:  


MAHOGANY GREENWOOD MD (PCP/Family)


Primary Care Physician





Copy


Copies To 1:   KADI MAGANA DO; TAWIL,ELIAS A MD WELLER,TITUS J Dec 8, 2018 22:15

## 2018-12-09 VITALS — DIASTOLIC BLOOD PRESSURE: 68 MMHG | SYSTOLIC BLOOD PRESSURE: 126 MMHG

## 2018-12-09 NOTE — DIAGNOSTIC IMAGING REPORT
PROCEDURE: CT urinary tract, rule out kidney stone.



TECHNIQUE: Multiple contiguous axial images were obtained through

the abdomen and pelvis without the use of intravenous contrast.



INDICATION:  Abdominal pain



Comparison is made with prior examination from 11/30/18



TECHNIQUE: Multiple contiguous axial images were obtained through

the abdomen and pelvis without the use of intravenous contrast.



FINDINGS: There is cardiomegaly. There is minimal scarring in the

lung bases. The liver is normal in size without focal lesions.

The gallbladder unremarkable. There is no biliary duct

dilatation. Spleen is normal. The pancreas and adrenal glands are

unremarkable. There is a 2 cm staghorn calculus in the left

kidney. There are also left renal cysts. There is moderate left

hydronephrosis. In the proximal left ureter, there is a 2 mm

stone. In the mid left ureter, there is a 5 mm stone.



There is a tiny nonobstructing stone in the right kidney as well

as a right renal cyst. There is minimal right hydronephrosis

secondary to a 2 mm stone seen at the right UVJ. There is

moderate atherosclerotic calcification of the abdominal aorta.

Bowel gas pattern is nonspecific. There is no free air. There is

no ascites. Bladder is normal. There is no pelvic mass or

adenopathy. There are degenerative changes in the spine. There is

bilateral spondylolysis at L5 without significant

spondylolisthesis.



IMPRESSION: Bilateral obstructive uropathy, left greater than

right, as described.



Chronic spondylolysis at L5 without spondylolisthesis.



Cardiomegaly and some scarring in the lung bases









Dictated by: 



  Dictated on workstation # [id number]

## 2018-12-09 NOTE — DIAGNOSTIC IMAGING REPORT
INDICATION: Cough and congestion.



FINDINGS: There is cardiomegaly. There is patchy bibasilar

atelectasis and/or pneumonitis. There has been previous median

sternotomy. Pacemaker overlies the left hemithorax. There is no

pleural effusion or pneumothorax. The mediastinum is

unremarkable.



IMPRESSION: Cardiomegaly and some patchy bibasilar atelectasis

and/or pneumonitis.



Dictated by: 



  Dictated on workstation # [id number]

## 2018-12-23 ENCOUNTER — HOSPITAL ENCOUNTER (INPATIENT)
Dept: HOSPITAL 75 - IRF | Age: 78
LOS: 15 days | Discharge: HOME HEALTH | DRG: 91 | End: 2019-01-07
Attending: PHYSICAL MEDICINE & REHABILITATION | Admitting: PHYSICAL MEDICINE & REHABILITATION
Payer: MEDICARE

## 2018-12-23 VITALS — DIASTOLIC BLOOD PRESSURE: 80 MMHG | SYSTOLIC BLOOD PRESSURE: 137 MMHG

## 2018-12-23 VITALS — BODY MASS INDEX: 30.04 KG/M2 | HEIGHT: 65 IN | WEIGHT: 180.3 LBS

## 2018-12-23 DIAGNOSIS — D50.0: ICD-10-CM

## 2018-12-23 DIAGNOSIS — R15.9: ICD-10-CM

## 2018-12-23 DIAGNOSIS — E11.59: ICD-10-CM

## 2018-12-23 DIAGNOSIS — R13.10: ICD-10-CM

## 2018-12-23 DIAGNOSIS — R60.9: ICD-10-CM

## 2018-12-23 DIAGNOSIS — J44.9: ICD-10-CM

## 2018-12-23 DIAGNOSIS — Z99.81: ICD-10-CM

## 2018-12-23 DIAGNOSIS — I50.9: ICD-10-CM

## 2018-12-23 DIAGNOSIS — R19.7: ICD-10-CM

## 2018-12-23 DIAGNOSIS — B96.5: ICD-10-CM

## 2018-12-23 DIAGNOSIS — I48.0: ICD-10-CM

## 2018-12-23 DIAGNOSIS — Z95.1: ICD-10-CM

## 2018-12-23 DIAGNOSIS — F03.90: ICD-10-CM

## 2018-12-23 DIAGNOSIS — K57.30: ICD-10-CM

## 2018-12-23 DIAGNOSIS — N20.0: ICD-10-CM

## 2018-12-23 DIAGNOSIS — G72.89: Primary | ICD-10-CM

## 2018-12-23 DIAGNOSIS — Z93.1: ICD-10-CM

## 2018-12-23 DIAGNOSIS — J15.212: ICD-10-CM

## 2018-12-23 DIAGNOSIS — Z79.4: ICD-10-CM

## 2018-12-23 DIAGNOSIS — E11.40: ICD-10-CM

## 2018-12-23 DIAGNOSIS — N13.9: ICD-10-CM

## 2018-12-23 DIAGNOSIS — I11.0: ICD-10-CM

## 2018-12-23 DIAGNOSIS — K64.1: ICD-10-CM

## 2018-12-23 DIAGNOSIS — R32: ICD-10-CM

## 2018-12-23 DIAGNOSIS — J96.10: ICD-10-CM

## 2018-12-23 DIAGNOSIS — I42.9: ICD-10-CM

## 2018-12-23 DIAGNOSIS — I25.10: ICD-10-CM

## 2018-12-23 DIAGNOSIS — N39.0: ICD-10-CM

## 2018-12-23 DIAGNOSIS — K29.61: ICD-10-CM

## 2018-12-23 DIAGNOSIS — K21.0: ICD-10-CM

## 2018-12-23 PROCEDURE — 88342 IMHCHEM/IMCYTCHM 1ST ANTB: CPT

## 2018-12-23 PROCEDURE — 71046 X-RAY EXAM CHEST 2 VIEWS: CPT

## 2018-12-23 PROCEDURE — 88305 TISSUE EXAM BY PATHOLOGIST: CPT

## 2018-12-23 PROCEDURE — 83540 ASSAY OF IRON: CPT

## 2018-12-23 PROCEDURE — 85652 RBC SED RATE AUTOMATED: CPT

## 2018-12-23 PROCEDURE — 94640 AIRWAY INHALATION TREATMENT: CPT

## 2018-12-23 PROCEDURE — 82274 ASSAY TEST FOR BLOOD FECAL: CPT

## 2018-12-23 PROCEDURE — 94760 N-INVAS EAR/PLS OXIMETRY 1: CPT

## 2018-12-23 PROCEDURE — 80053 COMPREHEN METABOLIC PANEL: CPT

## 2018-12-23 PROCEDURE — 86901 BLOOD TYPING SEROLOGIC RH(D): CPT

## 2018-12-23 PROCEDURE — 85025 COMPLETE CBC W/AUTO DIFF WBC: CPT

## 2018-12-23 PROCEDURE — 82728 ASSAY OF FERRITIN: CPT

## 2018-12-23 PROCEDURE — 82962 GLUCOSE BLOOD TEST: CPT

## 2018-12-23 PROCEDURE — 86920 COMPATIBILITY TEST SPIN: CPT

## 2018-12-23 PROCEDURE — 80202 ASSAY OF VANCOMYCIN: CPT

## 2018-12-23 PROCEDURE — 85027 COMPLETE CBC AUTOMATED: CPT

## 2018-12-23 PROCEDURE — 86900 BLOOD TYPING SEROLOGIC ABO: CPT

## 2018-12-23 PROCEDURE — 36415 COLL VENOUS BLD VENIPUNCTURE: CPT

## 2018-12-23 PROCEDURE — 86850 RBC ANTIBODY SCREEN: CPT

## 2018-12-23 NOTE — NUR
alejandro sarkar admitted to room 226-1, with an admitting diagnosis of aspiration pneumonia, 
uti, on 12/23/18 from  [place], accompanied by ems.ALEJANDRO SARKAR introduced to 
surroundings, call light, bed controls, phone, TV, temperature control, lights, meal times, 
smoking policy, visitor policy, side rail policy, bathrooms and showers.  Patient Rights 
given to patient in the handbook.ALEJANDRO SARKAR verbalizes understanding that [facility] 
is not responsible for the loss or damage to any personal effects or valuables that are kept 
in the patients posession during their hospitalization.  The following Patient Care Plans 
were discussed with the : Discharge Planning, ,, and . ALEJANDRO SARKAR verbalizes 
understanding of Interdisciplinary Patient Education. Patient was informed about the Rapid 
Response Team and its purpose. Patient received Patient Rights Booklet, which includes 
Privacy Act Statement and Data Collection Information Summary.

## 2018-12-24 VITALS — SYSTOLIC BLOOD PRESSURE: 130 MMHG | DIASTOLIC BLOOD PRESSURE: 63 MMHG

## 2018-12-24 VITALS — DIASTOLIC BLOOD PRESSURE: 82 MMHG | SYSTOLIC BLOOD PRESSURE: 135 MMHG

## 2018-12-24 VITALS — SYSTOLIC BLOOD PRESSURE: 111 MMHG | DIASTOLIC BLOOD PRESSURE: 57 MMHG

## 2018-12-24 LAB
ALBUMIN SERPL-MCNC: 3.2 GM/DL (ref 3.2–4.5)
ALP SERPL-CCNC: 93 U/L (ref 40–136)
ALT SERPL-CCNC: 12 U/L (ref 0–55)
BASOPHILS # BLD AUTO: 0 10^3/UL (ref 0–0.1)
BASOPHILS NFR BLD AUTO: 0 % (ref 0–10)
BILIRUB SERPL-MCNC: 0.4 MG/DL (ref 0.1–1)
BUN/CREAT SERPL: 23
CALCIUM SERPL-MCNC: 9.6 MG/DL (ref 8.5–10.1)
CHLORIDE SERPL-SCNC: 101 MMOL/L (ref 98–107)
CO2 SERPL-SCNC: 29 MMOL/L (ref 21–32)
CREAT SERPL-MCNC: 0.94 MG/DL (ref 0.6–1.3)
EOSINOPHIL # BLD AUTO: 0.1 10^3/UL (ref 0–0.3)
EOSINOPHIL NFR BLD AUTO: 1 % (ref 0–10)
ERYTHROCYTE [DISTWIDTH] IN BLOOD BY AUTOMATED COUNT: 18.6 % (ref 10–14.5)
ERYTHROCYTE [SEDIMENTATION RATE] IN BLOOD: 74 MM/HR (ref 0–30)
GFR SERPLBLD BASED ON 1.73 SQ M-ARVRAT: > 60 ML/MIN
GLUCOSE SERPL-MCNC: 124 MG/DL (ref 70–105)
HCT VFR BLD CALC: 35 % (ref 40–54)
HGB BLD-MCNC: 11.1 G/DL (ref 13.3–17.7)
LYMPHOCYTES # BLD AUTO: 1 X 10^3 (ref 1–4)
LYMPHOCYTES NFR BLD AUTO: 12 % (ref 12–44)
MANUAL DIFFERENTIAL PERFORMED BLD QL: NO
MCH RBC QN AUTO: 29 PG (ref 25–34)
MCHC RBC AUTO-ENTMCNC: 32 G/DL (ref 32–36)
MCV RBC AUTO: 93 FL (ref 80–99)
MONOCYTES # BLD AUTO: 1 X 10^3 (ref 0–1)
MONOCYTES NFR BLD AUTO: 12 % (ref 0–12)
NEUTROPHILS # BLD AUTO: 6.4 X 10^3 (ref 1.8–7.8)
NEUTROPHILS NFR BLD AUTO: 74 % (ref 42–75)
PLATELET # BLD: 219 10^3/UL (ref 130–400)
PMV BLD AUTO: 11.3 FL (ref 7.4–10.4)
POTASSIUM SERPL-SCNC: 4 MMOL/L (ref 3.6–5)
PROT SERPL-MCNC: 8.2 GM/DL (ref 6.4–8.2)
RBC # BLD AUTO: 3.77 10^6/UL (ref 4.35–5.85)
SODIUM SERPL-SCNC: 140 MMOL/L (ref 135–145)
WBC # BLD AUTO: 8.6 10^3/UL (ref 4.3–11)

## 2018-12-24 RX ADMIN — POTASSIUM CHLORIDE SCH MEQ: 1500 TABLET, EXTENDED RELEASE ORAL at 07:00

## 2018-12-24 RX ADMIN — MEXILETINE HYDROCHLORIDE SCH MG: 150 CAPSULE ORAL at 16:39

## 2018-12-24 RX ADMIN — PHENAZOPYRIDINE HYDROCHLORIDE SCH MG: 100 TABLET ORAL at 14:10

## 2018-12-24 RX ADMIN — INSULIN ASPART SCH UNIT: 100 INJECTION, SOLUTION INTRAVENOUS; SUBCUTANEOUS at 10:48

## 2018-12-24 RX ADMIN — DIGOXIN SCH MG: 125 TABLET ORAL at 10:54

## 2018-12-24 RX ADMIN — DILTIAZEM HYDROCHLORIDE SCH MG: 60 TABLET, FILM COATED ORAL at 10:56

## 2018-12-24 RX ADMIN — ANORECTAL OINTMENT SCH APPLIC: 15.7; .44; 24; 20.6 OINTMENT TOPICAL at 21:27

## 2018-12-24 RX ADMIN — DILTIAZEM HYDROCHLORIDE SCH MG: 60 TABLET, FILM COATED ORAL at 06:03

## 2018-12-24 RX ADMIN — INSULIN ASPART SCH UNIT: 100 INJECTION, SOLUTION INTRAVENOUS; SUBCUTANEOUS at 06:03

## 2018-12-24 RX ADMIN — GUAIFENESIN AND DEXTROMETHORPHAN SCH ML: 100; 10 SYRUP ORAL at 10:54

## 2018-12-24 RX ADMIN — SODIUM CHLORIDE SCH MLS/HR: 900 INJECTION INTRAVENOUS at 21:27

## 2018-12-24 RX ADMIN — INSULIN ASPART SCH UNIT: 100 INJECTION, SOLUTION INTRAVENOUS; SUBCUTANEOUS at 21:26

## 2018-12-24 RX ADMIN — FUROSEMIDE SCH MG: 20 TABLET ORAL at 06:02

## 2018-12-24 RX ADMIN — DOCUSATE SODIUM SCH MG: 100 CAPSULE ORAL at 21:24

## 2018-12-24 RX ADMIN — GUAIFENESIN AND DEXTROMETHORPHAN SCH ML: 100; 10 SYRUP ORAL at 14:10

## 2018-12-24 RX ADMIN — DILTIAZEM HYDROCHLORIDE SCH MG: 60 TABLET, FILM COATED ORAL at 21:25

## 2018-12-24 RX ADMIN — METOPROLOL TARTRATE SCH MG: 25 TABLET, FILM COATED ORAL at 10:55

## 2018-12-24 RX ADMIN — MEXILETINE HYDROCHLORIDE SCH MG: 150 CAPSULE ORAL at 10:55

## 2018-12-24 RX ADMIN — Medication SCH EACH: at 21:24

## 2018-12-24 RX ADMIN — ANORECTAL OINTMENT SCH APPLIC: 15.7; .44; 24; 20.6 OINTMENT TOPICAL at 14:14

## 2018-12-24 RX ADMIN — AMIODARONE HYDROCHLORIDE SCH MG: 200 TABLET ORAL at 21:25

## 2018-12-24 RX ADMIN — AMIODARONE HYDROCHLORIDE SCH MG: 200 TABLET ORAL at 14:10

## 2018-12-24 RX ADMIN — ASPIRIN 81 MG CHEWABLE TABLET SCH MG: 81 TABLET CHEWABLE at 10:55

## 2018-12-24 RX ADMIN — DOCUSATE SODIUM SCH MG: 100 CAPSULE ORAL at 10:55

## 2018-12-24 RX ADMIN — SODIUM CHLORIDE SCH MLS/HR: 900 INJECTION INTRAVENOUS at 14:15

## 2018-12-24 RX ADMIN — PHENAZOPYRIDINE HYDROCHLORIDE SCH MG: 100 TABLET ORAL at 17:45

## 2018-12-24 RX ADMIN — ATORVASTATIN CALCIUM SCH MG: 40 TABLET, FILM COATED ORAL at 21:24

## 2018-12-24 RX ADMIN — VANCOMYCIN HYDROCHLORIDE SCH MLS/HR: 500 INJECTION, POWDER, LYOPHILIZED, FOR SOLUTION INTRAVENOUS at 18:04

## 2018-12-24 RX ADMIN — FUROSEMIDE SCH MG: 20 TABLET ORAL at 16:39

## 2018-12-24 RX ADMIN — PANTOPRAZOLE SODIUM SCH MG: 40 TABLET, DELAYED RELEASE ORAL at 10:55

## 2018-12-24 RX ADMIN — Medication SCH EACH: at 10:55

## 2018-12-24 RX ADMIN — PHENAZOPYRIDINE HYDROCHLORIDE SCH MG: 100 TABLET ORAL at 10:54

## 2018-12-24 RX ADMIN — GUAIFENESIN AND DEXTROMETHORPHAN SCH ML: 100; 10 SYRUP ORAL at 21:45

## 2018-12-24 RX ADMIN — INSULIN ASPART SCH UNIT: 100 INJECTION, SOLUTION INTRAVENOUS; SUBCUTANEOUS at 16:35

## 2018-12-24 RX ADMIN — METOPROLOL TARTRATE SCH MG: 25 TABLET, FILM COATED ORAL at 21:25

## 2018-12-24 RX ADMIN — IPRATROPIUM BROMIDE AND ALBUTEROL SULFATE SCH ML: .5; 3 SOLUTION RESPIRATORY (INHALATION) at 21:51

## 2018-12-24 NOTE — PHYSICAL THERAPY EVALUATION
PT Evaluation-General


Medical Diagnosis


Admission Date


Dec 23, 2018 at 20:05


Medical Diagnosis:  disuse myopathy


Onset Date:  Dec 23, 2018





Therapy Diagnosis


Therapy Diagnosis:  weakness; abn gait





Height/Weight


Height (Feet):  5


Height (Inches):  5.00


Weight (Pounds):  167


Weight (Ounces):  0.7





Precautions


Precautions/Isolations:  Airborne Isolation, Contact Isolation





Referral


Physician:  thierno


Reason for Referral:  Evaluation/Treatment





Medical History


Pertinent Medical History:  Atrial Fib, Arthritis, CABG, CAD, COPD, Dementia, 

GERD, MI, Neuropathy


Current History


Pt was admitted to [facility] acute hospital for sepsis at this hospital.  He was transferred to 

[facility] for medical managment and then returned to this unit for continued 

medical care  skilled therapy services intervention.


Reviewed History:  Yes





Social History


Home:  Apartment


Current Living Status:  Spouse


Entry Into Home:  Level Entry


Pt lives in senior housing with his wife.





Prior/Core FIM


Prior Level of Function


Therapy Code Descriptions/Definitions 





Functional [place] Measure:


0=Not Assessed/NA        4=Minimal Assistance


1=Total Assistance        5=Supervision or Setup


2=Maximal Assistance  6=Modified [place]


3=Moderate Assistance 7=Complete [place]








Therapy Quality Codes:


6    Independent with activity with or without an assistive device


5    Patient requires set up or clean up by helper.  Patient completes activity

  by  themselves


4    Supervision or touching assist (CGA). Roslyn provide cues , steadying 

assist


3    The helper provides less than half the effort to complete the activity


2    The helper provides more than half the effort to complete the activity


1    Dependent.  The helper does all the effort to complete an activity 


7    Patient refused to complete or attempt activity


9    The patient did not perform the activity before the current illness or 

injury


88  Not attempted due to Medical conditions or safety concerns





Functional Abilities and Goals:


Independent: Patient completed the activities by him/herself, with or without 

an assistive device, with no assistance from a helper.


Needed Some Help: Patient needed partial assistance from another person to 

complete activities.


Dependent: A helper completed the activities for the patient. 


Unknown:


Not Applicable:


Bed Mobility:  0 (Pt unable to report, but I believe pt sleeps in a lift 

recliner at home. )


Transfers (B,C,W/C) (FIM):  5 (Pt's wife present at all times for supervision 

with functional transfers. )


Gait:  2 (Pt only walks short distances (50 ft  at most in his apartment) with 

FWW wi th supervision; pt is primarily at a wc level at home. )


Stairs:  0 (pt does not perform)


Wheelchair Mobility:  6


Indoor Mobility (Ambulation):  Needed Some Help


Stairs:  Not App[place]


Prior Devices Use:  Manual wheelchair, Walker


Pt uses his wc at home for mobility; limited walking and only for short 

distances.  


This patient is known to this therapist from previous hospital stays .





PT Evaluation-Current


Subjective


Pt calls out during therapy sessions.  "I want to go home."  Pt asks for his 

mother as well.  Completed as much of assessment as pt would allow, adamently 

refused to walk further after he sat down the last time.





Pain





   Comment:  unable to report; does not appear to be in pain.





Pt/Family Goals


Since pt is on an ARU, it is expected that his goal is to return home, but he 

is not able to cognitively discuss goals.  Wife not present at this time.





Objective


Patient Orientation:  Person, Confused


Problem Solving:  Poor


Attachments:  Oxygen, PEG Tube





ROM/Strength


ROM Lower Extremities


B LE WNL


Strenght Lower Extremities


Unable to specifically test, but B LE strength is grossly 4/5 as he is able to 

transfer and stand and ambulate.





Integumentary/Posture


Integumentary


refer to nursing notes for full assessment.


Bowel Incontinence:  Yes


Bladder Incontinence:  Yes


Posture


slight thoracic kyphosis and head down.  does not stand straight in his walker.

  Tends to lean forward when sitting in a chair.





Neuromuscular


(Tone, Coordination, Reflexes)


appear intact and functional





Sensory


Vision:  Functional


Hearing:  Impaired





Transfers


Therapy Code Descriptions/Definitions 





Functional [place] Measure:


0=Not Assessed/NA        4=Minimal Assistance


1=Total Assistance        5=Supervision or Setup


2=Maximal Assistance  6=Modified [place]


3=Moderate Assistance 7=Complete [place]








Therapy Quality Codes:


6    Independent with activity with or without an assistive device


5    Patient requires set up or clean up by helper.  Patient completes activity

  by  themselves


4    Supervision or touching assist (CGA). Roslyn provide cues , steadying 

assist


3    The helper provides less than half the effort to complete the activity


2    The helper provides more than half the effort to complete the activity


1    Dependent.  The helper does all the effort to complete an activity 


7    Patient refused to complete or attempt activity


9    The patient did not perform the activity before the current illness or 

injury


88  Not attempted due to Medical conditions or safety concerns


Transfers (B, C, W/C) (FIM):  4 (due to safety concerns and impaired judgement/

rationale for unsafe sitting. )


Scootin


Roll Left to Right (QC):  4


Supine to/from Sit:  4 (for safety; pt able to complete without assist other 

than guidance for safety. )


Sit to/from Stand:  4


Sit to Lying (QC):  4


Lying to Sitting/Side of Bed(Q:  4


Sit to Stand (QC):  4


Chair/Bed-to-Chair Xfer(QC):  4


Car Transfer (QC):  88 (pt is agitaated and unsafe to attempt to perform car 

transfer at this time; he is impulsive and difficult to manage; attempts at 

this transfer is not safe currently. )


Pt agitated with impaired safety judegemnt/awareness.  Difficult to manage due 

to impulsivity.  Pt tried to sit unsafely and does not follow safety cues well.





Gait


Does the Patient Walk?:  Yes


Mode of Locomotion:  Both


Anticipated Mode of Locomotion:  Both


Gait (FIM):  2


Distance (FIM):  1=up to 49 ft


Walk 10 feet (QC):  4


Walk 50 ft with 2 Turns(QC):  88 (pt resistant to going further and attempts to 

sit in the air.  Had to hastily pull a chair behind him so as for him not to 

sit on the ground.  )


Walk 150 ft (QC):  88


Walking 10ft/uneven surface-QC:  3 (very unsafe; pt lifts walker off the ground

; attempts to sit without a chair.  )


Distance:  15 ft


Gait Assistive Device:  FWW


Comments/Gait Description


forward flexed at hips and knees; head down; unsafe and impulsive.  needs 

assist of 1 person for safety but also needs a second person due to impulsivity 

and impaired judgement.





Wheelchair Training


Does the Pt Use a Wheelchair?:  Yes


Wheelchair (FIM):  0 (pt would not cooperate to assess this method of mobility 

at this time. )





Stairs


Stairs (FIM):  0 (unsafe; pt lifts walker in the air; impulsive.  fall risk, )


1 Step (curb) (QC):  88


4 Steps (QC):  88


12 Steps (QC):  88


placed curb step in front of patient and he lifted walker in the air and tried 

to sit without a chair behind him.  unsafe to attempt due to confusion and 

impaired safety awareness.





Balance


Sitting Static:  Fair


Sitting Dynamic:  Fair


Standing Static:  Poor


 Standing Dynamic:  Poor





Treatment


Stood pt to perform per icare; assist to maintain standing for safety and due 

to impulsive sitting.  


Pt up in chair with legs elevated, oxygen in situ and chair alarm activated.  

Nursing aware.





Assessment/Needs


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  He 

is confused and agitated at this time with limited effective participation with 

skilled intervention.  he lives at home with his wife and wc is his primary 

mobility.  He walks short distances at times and with supervision.  His 

confused state and agitation impair functional safety this visit.  Generally, 

he mobilizes well, his primary limit is confusion.


Rehab Potential:  Guarded





PT Short Term Goals


Short Term Goals


Time Frame:  Dec 31, 2018


Transfers (B,C,W/C) (FIM):  5


Gait (FIM):  2


Distance (FIM):  1=up to 49 ft


Gait Assistive Device:  FWW





PT Long Term Goals


Long Term Goals


PT Long Term Goals Time Frame:  2019


Transfers (B,C,W/C) (FIM):  6


Sit to Lying (QC):  6


Lying-Sitting on Side/Bed(QC):  6


Sit to Stand (QC):  6


Roll Left to Right (QC):  6


Chair/Bed-to-Chair Xfer(QC):  6


Car Transfer (QC):  5


Does the Patient Walk:  Yes


Gait (FIM):  5 (household)


Gait distance (FIM):  6=792-84 ft


Walk 10 feet (QC):  6


Walk 10ft-Uneven Surface(QC):  6


Walk 50ft with 2 Turns (QC):  6


Walk 150 ft (QC):  88


Gait Level of Assist:  6


Gait Assistive Device:  FWW


Does the Pt use WC or Scooter?:  Yes


Wheelchair (FIM):  6


Wheelchair distance (FIM):  3=150 ft


Wheel 50 feet with 2 turns (QC:  6


Stairs (FIM):  88


1 Step (curb) (QC):  88


4 Steps (QC):  88


12 Steps (QC):  88


Picking up an Object (QC):  88





PT Plan


Problem List


Problem List:  Activity Tolerance, Functional Strength, Safety, Balance, Gait, 

Transfer, Bed Mobility





Treatment/Plan


Treatment Plan:  Continue Plan of Care


Treatment Plan:  Bed Mobility, Education, Functional Activity [place], Functional 

Strength, Group Therapy, Gait, Safety, Therapeutic Exercise, Transfers


Treatment Duration:  2019


Frequency:  At least 5 of 7 days/Wk (IRF)


Estimated Hrs Per Day:  1.5 hours per day


Patient and/or Family Agrees t:  Yes





Safety Risks/Education


Patient Education:  Gait Training, Transfer Techniques, Safety Issues


Teaching Recipient:  Patient


Teaching Methods:  Demonstration, Discussion


Response to Teaching:  Reinforcement Needed





Time/GCodes


Time In:  800


Time Out:  900


Total Billed Treatment Time:  60


Total Billed Treatment


visit


EVM 30


FA 30











ELMER CATALAN PT Dec 24, 2018 08:51

## 2018-12-24 NOTE — CONSULTATION-HOSPITALIST
HPI


History of Present Illness:


HPI/Chief Complaint


CC: Severe debility





HPI: This is a 78-year-old white male [facility] patient was 

admitted to inpatient rehabilitation for IV medication and severe debility 

resolution.  He was admitted to [facility] for 11 days requiring PEG 

tube placement due to silent aspiration and urinary stent placement due to 

obstructive stone in ureter and maintained on antibiotic regimen with 

aggressive physical therapy.  I reviewed old records and culture results from 

infectious disease and reviewed current lab results along with vital signs.  He 

is sitting up in chair drowsy but oriented 3 but appears to be severely 

chronically ill.  Her goal is to return to independent ADL function along with 

completion of IV antibiotics.


Source:  patient, RN/MD, old records


Exam Limitations:  clinical condition


Date Seen


18


Attending Physician


James Smith MD


PCP


Raúl Gonzalez MD


Referring Physician





Date of Admission


Dec 23, 2018 at 20:05





Home Medications & Allergies


Home Medications


Reviewed patient Home Medication Reconciliation performed by pharmacy 

medication reconciliations technician and/or nursing.


Patients Allergies have been reviewed.





Allergies





Allergies


Coded Allergies


  Penicillins (Verified Allergy, Severe, HIVES, SOB (Pt has received Cefepime & 

Ceftriaxone), 18)


  codeine (Verified Allergy, Severe, SOB, HIVES, 18)








Past Medical-Social-Family Hx


Past Med/Social Hx:  Reviewed Nursing Past Med/Soc Hx, Reviewed and Corrections 

made


Patient Social History


Marrital Status:   (56 yrs)


Employed/Student:  retired


Alcohol Use:  Denies Use


Recreational Drug Use:  No


Smoking Status:  Former Smoker


Former Smoker, Quit:  Dec 24, 1988


Type Used:  Cigarettes


2nd Hand Smoke Exposure:  No


Physical Abuse Screen:  No


Sexual Abuse:  No


Recent Foreign Travel:  No


Contact w/other who traveled:  No


Recent Hopitalizations:  Yes


Recent Infectious Disease Expo:  No





Immunizations Up To Date


Tetanus Booster (TDap):  More than 5yrs


Date of Pneumonia Vaccine:  Oct 14, 2018


Date of Influenza Vaccine:  Oct 10, 2018





Seasonal Allergies


Seasonal Allergies:  No





Past Medical History


Surgeries:  Cardiac, CABG, Coronary Stent, Defibrillator, Eye Surgery, 

Orthopedic, Renal


Respiratory:  COPD, Sleep Apnea


Currently Using CPAP:  No


Currently Using BIPAP:  No


Cardiac:  Cardiomyopathy, Chronic Edema/Swelling, Coronary Artery Disease, 

Heart Attack, High Cholesterol, Hypertension, Irregular Heartbeat


Neurological:  Dementia, Neuropathy


Reproductive:  Yes (unable to have children- mumps as a child)


Sexually Transmitted Disease:  No


HIV/AIDS:  No


Genitourinary:  Kidney Infection, Bladder Infection, Kidney Stones


Gastrointestinal:  Gastroesophageal Reflux, Ulcer


Musculoskeletal:  Arthritis, Chronic Back Pain, Fractures


Endocrine:  Diabetes, Insulin dep


HEENT:  Cataract, Glaucoma


Loss of Vision:  Bilateral


Hearing Impairment:  Hard of Hearing


Cancer:  Skin


Did You Recieve Any Treatments:  Yes


What Type of Treatment Did You:  Surgical Intervention


Psychosocial:  Anxiety, Depression


History of Blood Disorders:  No


Adverse Reaction to Blood Ervin:  No





Family History





Cardiovascular disease


  19 MOTHER


  G8 BROTHER


  G8 SISTER


Cervical cancer


  19 MOTHER


Heart Disease





Review of Systems


Constitutional:  see HPI, dizziness, malaise, weakness


EENTM:  hearing loss


Respiratory:  dyspnea on exertion


Cardiovascular:  no symptoms reported


Gastrointestinal:  loss of appetite


Genitourinary:  no symptoms reported


Musculoskeletal:  no symptoms reported


Skin:  no symptoms reported


Psychiatric/Neurological:  No Symptoms Reported


All Other Systems Reviewed


Negative Unless Noted:  Yes





Physical Exam


Physical Exam


Vital Signs





Vital Signs - First Documented








 18





 21:50


 


Temp 97.4


 


Pulse 90


 


Resp 16


 


B/P (MAP) 137/80 (99)


 


Pulse Ox 99


 


O2 Delivery Nasal Cannula


 


O2 Flow Rate 2.50





Capillary Refill :


Height, Weight, BMI


Height: 5'5.00"


Weight: 167lbs. 0.7oz. [id number]; 27.8 BMI


Method:Stated


General Appearance:  No Apparent Distress, WD/WN, Chronically ill


Eyes:  Bilateral Eye Normal Inspection, Bilateral Eye PERRL


HEENT:  PERRL/EOMI, Normal ENT Inspection, Pharynx Normal


Neck:  Full Range of Motion, Normal Inspection, Non Tender, Supple, Carotid 

Bruit


Respiratory:  Chest Non Tender, Lungs Clear, Normal Breath Sounds, No Accessory 

Muscle Use, No Respiratory Distress


Cardiovascular:  Regular Rate, Rhythm, No Edema, No Gallop, No JVD, No Murmur, 

Normal Peripheral Pulses


Gastrointestinal:  Normal Bowel Sounds, No Organomegaly, No Pulsatile Mass, Non 

Tender, Soft, Other (PEG tube in place)


Back:  Normal Inspection, No CVA Tenderness, No Vertebral Tenderness


Extremity:  Normal Capillary Refill, Normal Inspection, Normal Range of Motion, 

Non Tender, No Calf Tenderness, No Pedal Edema


Neurologic/Psychiatric:  Alert, Oriented x3, No Motor/Sensory Deficits, Normal 

Mood/Affect


Skin:  Normal Color, Warm/Dry


Lymphatic:  No Adenopathy





Results


Results/Procedures


Labs


Laboratory Tests


18 06:42








Patient resulted labs reviewed.





Assessment/Plan


Assessment and Plan


Assess & Plan/Chief Complaint


Assessment:


MRSA pneumonia


Pseudomonas UTI


Severe debilitated state


CAD previous bypass graft


ICD defibrillator in place


Presbycusis


Sleep apnea


Chronic respiratory failure





Plan:


Monitor labs


PEG tube feedings


Speech therapy


PT/OT


Long recovery expected


Consult Dr Chin is appreciated





Diagnosis/Problems


Diagnosis/Problems





(1) MRSA pneumonia


Status:  Acute


Qualifiers:  


   Laterality:  unspecified laterality  Lung location:  unspecified part of 

lung  Qualified Codes:  J15.212 - Pneumonia due to methicillin resistant 

Staphylococcus aureus


(2) Pseudomonas urinary tract infection


Status:  Acute


(3) Debility


Status:  Acute


(4) Pyelonephritis


Status:  Acute


(5) Renal calculus, left


Status:  Acute


(6) Acute renal insufficiency


Status:  Resolved


Resolution Date/Time:  18 @ 17:54


(7) Diabetes mellitus, type 2


Status:  Chronic


Qualifiers:  


   Diabetes mellitus long term insulin use:  with long term use  Diabetes 

mellitus complication status:  with circulatory complication  Diabetes mellitus 

complication detail:  with other circulatory complications  Qualified Codes:  

E11.59 - Type 2 diabetes mellitus with other circulatory complications; Z79.4 - 

Long term (current) use of insulin


(8) Congestive heart failure


Status:  Chronic


Qualifiers:  


   Heart failure type:  unspecified  


(9) COPD (chronic obstructive pulmonary disease)


Status:  Chronic


Qualifiers:  


   COPD type:  unspecified COPD  Qualified Codes:  J44.9 - Chronic obstructive 

pulmonary disease, unspecified


(10) Paroxysmal atrial fibrillation


Status:  Chronic


(11) CAD (coronary artery disease)


Status:  Chronic


Qualifiers:  


   Coronary Disease-Associated Artery/Lesion type:  native artery  Native vs. 

transplanted heart:  native heart  Associated angina:  without angina  

Qualified Codes:  I25.10 - Atherosclerotic heart disease of native coronary 

artery without angina pectoris


(12) ICD (implantable cardioverter-defibrillator) in place


Status:  Chronic





Clinical Quality Measures


DVT/VTE Risk/Contraindication:


Risk Factor Score Per Nursin


RFS Level Per Nursing on Admit:  4+=Very High











MICHELE WILKINS DO Dec 24, 2018 11:21

## 2018-12-24 NOTE — HISTORY AND PHYSICAL
DATE OF SERVICE:  12/24/2018



CHIEF COMPLAINT:

Difficulty with walking.



HISTORY OF PRESENT ILLNESS:

The patient is a 78-year-old male who was admitted to [facility] for treatment

of sepsis due to UTI and aspiration pneumonia.  The patient was treated but

had resulting disuse myopathy from this and was referred to inpatient 
rehabilitation unit.  Sepsis

was due to methicillin-resistant Staphylococcus aureus.  His pneumonia was due

to pseudomonas.  The patient has paroxysmal atrial fibrillation which is

controlled with medication.  He had been modified independent to supervision

prior to this and living with his spouse in a [facility] apartment in [place].

 PCP is Dr. Gonzalez.  Currently, he is min assist for transfers and gait, short

distances.  He has limited endurance.  He is O2 dependent, has dyspnea on

exertion.  He is fairly dependent for toileting and dressing.  He is somewhat

discouraged and hopes to return home soon with his spouse.



PAST MEDICAL HISTORY:

Sepsis due to UTI with history of kidney stone and nephrostomy tube, managed by

Dr. Tawil, coronary artery disease, congestive heart failure, paroxysmal atrial

fibrillation, COPD, chronic pedal edema, diabetes mellitus.  The patient's

spouse indicates that he had been on O2 at home until recently.



PAST SURGICAL HISTORY:

CABG.



ALLERGIES:

PENICILLIN, CODEINE.



FAMILY HISTORY:

Heart disease, cervical cancer.



REVIEW OF SYSTEMS:

A 10-point review of systems significant for confusion, weakness, dyspnea on

exertion.



MEDICATIONS:

Amiodarone 200 mg p.o. daily, Lipitor 40 mg p.o. each day at bedtime, vancomycin

IV daily, cefepime IV q.8 hours, ASA 81 mg p.o. daily, Robitussin-DM 10 mL p.o.

t.i.d., acidophilus 1 tablet p.o. b.i.d., Colace 100 mg p.o. b.i.d., digoxin

0.125 mg p.o. daily, Protonix 40 mg p.o. daily, Calmoseptine ointment apply to

affected area b.i.d., Lopressor 12.5 mg p.o. b.i.d., Pyridium 200 mg p.o. t.i.d.

p.c., mexiletine 150 mg p.o. q.8 hours, furosemide 20 mg p.o. b.i.d., K-Dur 15

mEq p.o. daily, NovoLog insulin sliding scale regimen A, DuoNeb treatments 3 mL

q.6h. p.r.n. shortness of breath.



PHYSICAL EXAMINATION:

GENERAL:  Significant for a frail elderly man lying in bed, quite a bit of

congestion, in no acute distress.

VITAL SIGNS:  He is afebrile, pulse is 91 and regular, respirations 16, blood

pressure 111/57, O2 sat 99% on 2 liters of O2 by nasal cannula.

HEENT:  Vision, speech, hearing appear functional.  No oral lesion is noted.

NECK:  Supple without mass.

HEART:  Irregular rhythm.

CHEST:  Congestion audible with some rhonchi.

ABDOMEN:  Soft, nontender.  Bowel sounds present.  PEG tube in place.

EXTREMITIES:  Trace edema, both ankles.  No calf tenderness.

MUSCULOSKELETAL:  The patient has functional passive range of motion in all 4

limbs.  NEUROLOGIC:  Speech therapy notes confusion with memory impairment. 

Sensation is grossly intact to touch.  He is somewhat hard of hearing.  He is

reported to have some bowel and bladder incontinence.  Strength in lower limbs

proximally 4/5, 4-/5 both upper limbs.  He has difficulty sometimes

participating in exam.



IMPRESSION:

1.  Ambulatory dysfunction secondary to disuse myopathy.

2.  Status post respiratory failure due to sepsis with pneumonia.

3.  Sepsis due to urinary tract infection, treated, status post nephrostomy.

4.  Atrial fibrillation, controlled with medication.

5.  Dysphagia with silent aspiration , status post PEG tube placement, n.p.o.,

6.    Confusion multifactorial.



PLAN:

The patient will have a comprehensive program of inpatient rehabilitation with

goal of maximizing level of functional independence prior to discharge home with

spouse.  The patient will have PT, OT 90 minutes per day each discipline 5 days

a week for 18 days with the above goals in mind.  Specifically, return to prior

level of function or better.  Speech therapy has done cognitive assessment and

treat as indicated 30 to 45 minutes per day 5 days a week for 1 to 2 weeks. 

Rehabilitation nursing assist with bowel, bladder, skin care, tube feed

administration, pain management, medication administration.   for

discharge planning, community reentry.  Follow up with hospitalist service in

lieu of [facility].  Follow up with Dr. Chin cardiology as per his

schedule. Increase DuoNeb treatments to q.4 hours scheduled.Dietary Consult re 
Tube feeds.



ESTIMATED LENGTH OF STAY:

18 days.



PROGNOSIS:

Rehab prognosis appears good for goal of discharging home with spouse at prior

level of function.



DIET:  N.p.o. on tube feedings.



CODE STATUS:

Full code.





Job ID: [id number]

DocumentID: [id number]

Dictated Date:  12/24/2018 21:26:11

Transcription Date: 12/24/2018 22:55:26

Dictated By: MESERET GONZALES MD

MTDD

## 2018-12-24 NOTE — OCCUPATIONAL THERAPY EVAL
OT Evaluation-General/PLF


Medical Diagnosis


Admission Date


Dec 23, 2018 at 20:05


Medical Diagnosis:  disuse myopathy


Onset Date:  Dec 23, 2018





Therapy Diagnosis


Therapy Diagnosis:  Weakness





Height/Weight


Height (Feet):  5


Height (Inches):  5.00


Weight (Pounds):  167


Weight (Ounces):  0.7





Precautions


Precautions/Isolations:  Airborne Isolation, Contact Isolation


Safety Interventions:  Notify Family, Bed Exit Alarm, Reorient-Attempt, Reorient

-PRN





Referral


Physician:  thierno


Referral Reason:  Activity Tolerance, Self Care, Evaluation/Treatment, 

Strengthening/ROM





Medical History


Pertinent Medical History:  Atrial Fib, Arthritis, CABG, CAD, COPD, Dementia, 

GERD, MI, Neuropathy





Social History


Home:  Apartment


Current Living Status:  Spouse


Entry Into Home:  Level Entry





ADL-Prior Level of Function


Therapy Code Descriptions/Definitions 





Functional [place] Measure:


0=Not Assessed/NA        4=Minimal Assistance


1=Total Assistance        5=Supervision or Setup


2=Maximal Assistance  6=Modified [place]


3=Moderate Assistance 7=Complete [place]








Therapy Quality Codes:


6    Independent with activity with or without an assistive device


5    Patient requires set up or clean up by helper.  Patient completes activity

  by  themselves


4    Supervision or touching assist (CGA). [place] provide cues , steadying 

assist


3    The helper provides less than half the effort to complete the activity


2    The helper provides more than half the effort to complete the activity


1    Dependent.  The helper does all the effort to complete an activity 


7    Patient refused to complete or attempt activity


9    The patient did not perform the activity before the current illness or 

injury


88  Not attempted due to Medical conditions or safety concerns





Functional Abilities and Goals:


Independent: Patient completed the activities by him/herself, with or without 

an assistive device, with no assistance from a helper.


Needed Some Help: Patient needed partial assistance from another person to 

complete activities.


Dependent: A helper completed the activities for the patient. 


Unknown:


Not Applicable:


ADL PLOF Comments


Pt. is unable to state.





OT Current Status


Subjective


Pt. yells out, "I want to go home," multiple times throughout treatment.





Mental Status/Objective


Patient Orientation:  Confused





ADL-Treatment


Bathing (FIM):  1


Shower/Bathe Self (QC):  1


Toileting (FIM):  1


Toileting Hygiene (QC):  1


Transfers (B, C, W/C) (FIM):  3





Other Treatments


OT checked on pt. early this a.m., as he was in his room yelling out that he 

wanted to go home.  Pt. very confused.  OT attempted to talk with him, make him 

comfortable, but pt. continues to scream.  Let nursing know.  Went back into 

room for scheduled time.  Pt. in chair and attempting to aggressively rock self 

in chair.  OT attempts to calm him down, encourage him.  Unable to re-direct 

pt.  Pt. very confused.  OT hands pt. washcloth and he is asked to wash face.  

Pt. swipes at his own face and then throws washcloth at therapist.  OT hands 

him another one and asks him to wash under his arms.  Pt states that he is not 

going to do it.  OT asks if she can assist.  He states yes, but then in process 

of being bathed, becomes physical and attempts to hit OT.  No street clothing 

available.  Did transfer to wheelchair and during transfer noted pt. 

incontinent of stool in chair.  OT cleansed pt.  Transferred to wheelchair and 

taken out of room in hopes that pt. would enjoy seeing other people.  Pt. is 

wheeled to dining table and states that he would like coffee when it is 

offered.  OT gets pt. coffee but pt. refuses to drink it.  Pt. pushed table 

very hard and yells again that he wants to go home.  This is witnessed by 

multiple people.  Pt. is taken back to his room and transfers back to chair 

with chair alarm in.  Nursing is notified.





Education


OT Patient Education:  Correct positioning, Modified ADL techniques, Progress 

toward Goal/Update tx plan, Purpose of tx/functional activities, Reviewed 

precautions, Rehab process, Transfer techniques


Teaching Recipient:  Patient


Teaching Methods:  Demonstration, Discussion


Response to Teaching:  Unable to Return Demonstration, Unable to Comprehend, 

Reinforcement Needed





OT Short Term Goals


Short Term Goals


Time Frame:  Dec 31, 2018


Eating(FIM):  4


Grooming(FIM):  3


Upper Body Dressing(FIM):  3


Lower Body Dressing(FIM):  3


Toileting(FIM):  4


Transfers (B,C,W/C) (FIM):  4


Toilet/Commode Transfer(FIM):  4


Additional Short Term Goals:  1-Demonstrate ADL Tasks, 2-Verbalize Understanding

, 3-ImproveStrength/Farnaz


1=Demonstrate adherence to instructed precautions during ADL tasks.


2=Patient will verbalize/demonstrate understanding of assistive devices/

modifications for ADL.


3=Patient will improve strength/tolerance for activity to enable patient to 

perform ADL's.





OT Long Term Goals


Long Term Goals


Time Frame:  2019


Eating (FIM):  5


Eating (QC):  5


Groomin


Oral Hygiene (QC):  4


Bathing(FIM):  3


Shower/Bathe Self (QC):  3


Upper Body Dressing(FIM):  5


Upper Body Dressing (QC):  4


Lower Body Dressing(FIM):  4


Lower Body Dressing (QC):  4


On/Off Footwear (QC):  4


Toileting(FIM):  5


Toileting Hygiene (QC):  5


Transfers (B,C,W/C) (FIM):  5


Toilet/Commode Transfer(FIM):  5


Toilet/Commode Transfer (QC):  4


Additional Goals:  1-Demonstrate ADL Tasks, 2-Verbalize Understanding, 3-

ImproveStrength/Farnaz


1=Demonstrate adherence to instructed precautions during ADL tasks.


2=Patient will verbalize/demonstrate understanding of assistive devices/

modifications for ADL.


3=Patient will improve strength/tolerance for activity to enable patient to 

perform ADL's.





OT Education/Plan


Problem List/Assessment


Assessment:  Decreased Activ Tolerance, Decreased Safety Aware, Decreased UE 

Strength, Dependent Transfers, Impaired Bed Mobility, Impaired Cognition, 

Impaired Coordination, Impaired Funct Balance, Impaired I ADL's, Impaired Self-

Care Skills, Restricted Funct UE ROM





Discharge Recommendations


Plan/Recommendations:  Continue POC


Therapy D/C Recommendations:  24 hr Supervision


Barriers to Progress


Pt. is very confused and agitated.  Unable to process what is being told to him

, and is unable to demonstrate tasks.





Treatment Plan/Plan of Care


Treatment,Training & Education:  Yes


Patient would benefit from OT for education, treatment and training to promote 

independence in ADL's, mobility, safety and/or upper extremity function for ADL'

s.


Plan of Care:  ADL Retraining, Functional Mobility, Group Exercise/Act as Ind, 

UE Funct Exercise/Act


Treatment Duration:  2019


Frequency:  At least 5 of 7 days/Wk (IRF)


Estimated Hrs Per Day:  1.5 hours per day


Agreement:  Yes


Rehab Potential:  Guarded





Time/GCodes


Start Time:  09:15


Stop Time:  09:40


Total Time Billed (hr/min):  25


Billed Treatment Time


1, EVH x 10minutes, ADL x 15minutes











ALCIDES OSUNA OT Dec 24, 2018 09:53

## 2018-12-24 NOTE — PM&R POST ADMISSION ASSESSMENT
Post Admission Physician Asses


Date seen by provider:  Dec 24, 2018


Time seen by provider:  20:30


The preadmission screen agrees with the post admission assessment that the 

patient is a good candidate for inpatient rehabilitation.





The patient will have a comprehensive program of inpatient rehabilitation with 

a goal of maximizing level of functional independence prior to discharge home 

with spouse.   The patient will have PT/OT ninety minutes per day, each 

discipline, five days a week for 2 weeks for gait, strengthening, conditioning, 

balance, ADLs, any patient/family/caregiver training as necessary.  Speech 

therapy to do cognitive assessment and treat as indicated for 1 to 2 we3ks for 

30 to 45 min per day 5 days a week. Rehabilitation nursing to assist with bowel

, bladder, skin, wound care, medication administration, pain management.  

 to assist with discharge planning, community reentry. SCD's for 

DVT prophylaxis.





He appears to be well motivated to participate in three hours of therapy a day.

  He should be able to tolerate three hours of therapy a day from a medical 

standpoint.  He should benefit from the three hours of therapy a day.  He has a 

reasonable discharge plan, reasonable discharge rehabilitation goals and a 

supportive family. He has various comorbidities that need to be closely 

monitored with medications and treatments adjusted on a daily basis as needed. 


These include:


O2 dependence


Debility


PAF


Dysphagia NPO on Tube feeds


COPD


Chronic pedal edema


DM





[facility] code 03.3


Etiologic DX Disuse myopathy





Barriers to discharge for this patient who had been Modified independent to 

supervision  prior to this are for him to be modified independent to 

supervision for ADLs and mobility skills prior to discharge home with spouse, 

so as to lessen the burden of the caregivers. 





Risks for this patient include:


 1.  Fall


 2.  Fracture


 3.  DVT


 4.  Pulmonary embolism


 5.  Wound infection


 6.  Skin breakdown


 7.  Contractures


 8.  Poorly controlled pain


 9.  Urinary retention


10.  UTI


11.  Recurrent pneumonia


12.  Aspiration


13.  poorly controlled DM





Estimated Length of Stay:  18 days





Prognosis:  Rehab prognosis appears good for goal of discharge home with spouse 

modified independent to supervision for ADLs and mobility skills.


Date Identified:  Dec 24, 2018


Time Identified:  20:30


Action Plan to Resolve CSMI:  


Transfer meds from OSH reviewed


Resp treatments adjusted


General:  Alert, Cooperative, No Acute Distress


HEENT:  Atraumatic, PERRLA, EOMI, Mucous Memb Moist/Pink, Other (02 by N/C in 

place)


Lungs:  Other (Plus congestion)


Heart:  Regular Rate


Abdomen:  Normal Bowel Sounds, Soft, No Tenderness, Other (Peg Tube in place)


Extremities:  Other (Plus edema)


Neuro:  Other (Confusion strength 4/5 LES 4-/5 Upper limbs)











MESERET GONZALES MD Dec 24, 2018 21:09

## 2018-12-24 NOTE — NUR
MSW attempted to meet with patient to complete initial assessment; however, staff reports 
patient is confused.  Due to witnessed combativeness with therapy staff, MSW attempted to 
contact patient's spouse to request visit; however, MSW was unable to reach spouse.  MSW 
will reattempt later.

## 2018-12-24 NOTE — CONSULTATION-CARDIOLOGY
HPI-Cardiology


Cardiology Consultation


Date of Consultation


18


Date of Admission





Time Seen by Provider:  13:00





HPI


78 year old gentleman with history of Coronary artery disease, atrial 

fibrillation and obstructive uropathy, seen last month when he was hospitalized 

then transferred to [place], he follows with a cardiologist in [place], had 

aspiration pneumonia and silent aspiration, has been on antibiotics and 

transferred for rehab, having severe debility and confusion, unable to provide 

any history. The history was obtained by reviewing his records





Home Medications & Allergies


Allergies:  


Coded Allergies:  


     Penicillins (Verified  Allergy, Severe, HIVES, SOB (Pt has received 

Cefepime & Ceftriaxone), 18)


     codeine (Verified  Allergy, Severe, SOB, HIVES, 18)


Home Medication List Reviewed:  Yes





PMH-Social-Family Hx


Patient Social History


Marital Status:   (54 yrs)


Employed/Student:  retired


Alcohol Use:  Denies Use


Recreational Drug Use:  No


Smoking Status:  Former Smoker


Former smoker/When Quit:  1988


Type Used:  Cigarettes


2nd Hand Smoke Exposure:  No


Recent Foreign Travel:  No


Recent Infectious Disease Expo:  No


Recent Hopitalizations:  Yes


Physical Abuse Screen:  No


Sexual Abuse:  No





Immunizations Up To Date


Tetanus Booster (TDap):  More than 5yrs


Date of Pneumonia Vaccine:  Oct 14, 2018


Date of Influenza Vaccine:  Oct 10, 2018





Past Medical History


Discussed below





Family Medical History


Significant Family History:  Heart Disease


Family History:  


Cardiovascular disease


  19 MOTHER


  G8 BROTHER


  G8 SISTER


Cervical cancer


  19 MOTHER





Review of Systems


Constitutional:  malaise, weakness, other (Unable to provide any review of 

systems)





Reviewed Test Results


Reviewed Test Results


Lab





Laboratory Tests








Test


 18


05:52 18


06:42 18


10:13 18


15:50 Range/Units


 


 


Glucometer 124 H  90  128 H   MG/DL


 


White Blood Count


 


 8.6 


 


 


 4.3-11.0


10^3/uL


 


Red Blood Count


 


 3.77 L


 


 


 4.35-5.85


10^6/uL


 


Hemoglobin  11.1 L   13.3-17.7  G/DL


 


Hematocrit  35 L   40-54  %


 


Mean Corpuscular Volume  93    80-99  FL


 


Mean Corpuscular Hemoglobin  29    25-34  PG


 


Mean Corpuscular Hemoglobin


Concent 


 32 


 


 


 32-36  G/DL





 


Red Cell Distribution Width  18.6 H   10.0-14.5  %


 


Platelet Count


 


 219 


 


 


 130-400


10^3/uL


 


Mean Platelet Volume  11.3 H   7.4-10.4  FL


 


Neutrophils (%) (Auto)  74    42-75  %


 


Lymphocytes (%) (Auto)  12    12-44  %


 


Monocytes (%) (Auto)  12    0-12  %


 


Eosinophils (%) (Auto)  1    0-10  %


 


Basophils (%) (Auto)  0    0-10  %


 


Neutrophils # (Auto)  6.4    1.8-7.8  X 10^3


 


Lymphocytes # (Auto)  1.0    1.0-4.0  X 10^3


 


Monocytes # (Auto)  1.0    0.0-1.0  X 10^3


 


Eosinophils # (Auto)


 


 0.1 


 


 


 0.0-0.3


10^3/uL


 


Basophils # (Auto)


 


 0.0 


 


 


 0.0-0.1


10^3/uL


 


Erythrocyte Sedimentation Rate  74 H   0-30  MM/HR


 


Sodium Level  140    135-145  MMOL/L


 


Potassium Level  4.0    3.6-5.0  MMOL/L


 


Chloride Level  101      MMOL/L


 


Carbon Dioxide Level  29    21-32  MMOL/L


 


Anion Gap  10    5-14  MMOL/L


 


Blood Urea Nitrogen  22 H   7-18  MG/DL


 


Creatinine


 


 0.94 


 


 


 0.60-1.30


MG/DL


 


Estimat Glomerular Filtration


Rate 


 > 60 


 


 


  





 


BUN/Creatinine Ratio  23     


 


Glucose Level  124 H     MG/DL


 


Calcium Level  9.6    8.5-10.1  MG/DL


 


Corrected Calcium  10.2 H   8.5-10.1  MG/DL


 


Total Bilirubin  0.4    0.1-1.0  MG/DL


 


Aspartate Amino Transf


(AST/SGOT) 


 23 


 


 


 5-34  U/L





 


Alanine Aminotransferase


(ALT/SGPT) 


 12 


 


 


 0-55  U/L





 


Alkaline Phosphatase  93      U/L


 


Total Protein  8.2    6.4-8.2  GM/DL


 


Albumin  3.2    3.2-4.5  GM/DL











Physical Exam


Vital Signs





Vital Signs - First Documented








 18





 21:50


 


Temp 97.4


 


Pulse 90


 


Resp 16


 


B/P (MAP) 137/80 (99)


 


Pulse Ox 99


 


O2 Delivery Nasal Cannula


 


O2 Flow Rate 2.50





Capillary Refill :


Height, Weight, BMI


Height: 5'5.00"


Weight: 167lbs. 0.7oz. [id number]; 27.8 BMI


Method:Stated


General Appearance:  No Apparent Distress, WD/WN, Moderate Distress


Eyes:  Bilateral Eye Normal Inspection, Bilateral Eye PERRL, Bilateral Eye EOMI


HEENT:  TMs Normal, Pharynx Normal


Neck:  Normal Inspection, Supple


Respiratory:  Chest Non Tender, No Respiratory Distress, Crackles, Decreased 

Breath Sounds


Cardiovascular:  Regular Rate, Rhythm, No Gallop, Normal Peripheral Pulses, 

Systolic Murmur


Gastrointestinal:  No Organomegaly, Non Tender, Soft, Abnormal Bowel Sounds


Back:  Normal Inspection


Extremity:  Normal Capillary Refill, Normal Inspection


Neurologic/Psychiatric:  Alert, Motor Weakness


Skin:  Normal Color, Warm/Dry


Lymphatic:  No Adenopathy





A/P-Cardiology


Admission Diagnosis


Debility


CAD


HTN


Chronic atrial fibrillation





Assessment/Plan


Debility, receiving physical therapy





Status post respiratory failure, pneumonia, silent aspiration, on Antibiotics, 

followed by primary care physician





Urosepsis, history of kidney stone and nephrostomy tube, on antibiotics and 

managed by primary care team





Silent aspiration, status post feeding tube placement





Coronary artery disease, history of CABG done in the remote past.  Patient had 

a stent done in 2002 using MultiLink 2.518 mm to the proximal right 

coronary artery, 2017 had a Cypher stent 3.530 mm to the proximal and mid 

circumflex artery.  Has been followed and managed by primary cardiologist Dr. David in [place].  Continue to follow





History of congestive heart failure, reported ejection fraction 54 percent.  

MPI in , continue to monitor





Paroxysmal atrial fibrillation maintained on amiodarone, intolerant to oral 

anticoagulation secondary to recurrent nosebleed, on his transfer from [facility] 

patient is still on Loading dose of Amiodarone until , Patient is 

maintained on Amlodipine, Cardizem, Lopressor and Digoxin. I will continue to 

monitor heart rate and blood pressure and try to get him off Amlodipine, 

monitor blood pressure and heart rate and check digoxin level





Patient has been maintained on mexiletine.  Probably due to ventricular 

fibrillation and history of shock from his defibrillator, known to have [facility] 

[facility] ICD implanted by Dr. stevens in [place]. Continue on Mexiletine





COPD, chronic dyspnea, followed by primary care physician





Chronic pedal edema, diuresis and monitor electrolytes





Diabetes mellitus, followed and managed by primary care physician





Clinical Quality Measures


DVT/VTE Risk/Contraindication:


Risk Factor Score Per Nursin


RFS Level Per Nursing on Admit:  4+=Very High











MARINA KUMAR MD Dec 24, 2018 16:33

## 2018-12-24 NOTE — ST COGNITIVE LINGUISTIC EVAL
Speech Evaluation-General


Medical Diagnosis


disuse myopathy


Onset Date:  Dec 23, 2018





Therapy Diagnosis


Therapy Diagnosis:  Cognitive-communication





Precautions


Precautions:  Fall


Precautions/Isolations:  Airborne Isolation, Contact Isolation





Medical History


Pertinent Medical History:  Atrial Fib, Arthritis, CABG, CAD, COPD, Dementia, 

GERD, MI, Neuropathy


Reviewed History:  Yes





Social History


Current Living Status:  Spouse





Speech PLF-Current Status


Prior Level of Function





Patient lived at home with his wife prior to rehab admission.





Subjective


Patient is very confused and frequently states "I just want to go home"





Language Eval: Auditory


Comprehends Simple Yes/No Ques:  Mild


Indent/Objects Multiple Fields:  Mild


Ident/Pics in Multiple Fields:  Moderate


Follows 1-Step Commands:  Moderate


Follows Complex Directions:  Severe


Follows General Conversations:  Moderate





Language Eval: Verbal Language


Completes Spontaneous Greeting:  Mild


Produces Auto, Serial Info:  Severe


Imitates Simple Words/Phrases:  Mild


Word Finding:  Moderate


Requests Basic Needs:  Mild


States Basic Personal Info:  Moderate


Expresses Complex Ideas:  Severe





Cognitive


Patient Orientation


Person, place





Objective Cognitive Domain


Attention:  Moderate


Memory:  Moderate


Problem Solving:  Moderate


Executive Functions:  Moderate





Objective


Formal/Standardized Tests


[facility] Cognitive-communication


Results


Memory: Moderate to severe deficit, Problem solving: Moderate to severe deficit

, Auditory Processing: Moderate to severe deficit


Oral Motor/Speech Production


Decreased with production rate


Impression


Patient is a 79 year old male who presents with moderate to severe deficits for 

memory, problem solving and auditory processing. Patient is also hard of hearing

, which makes communication more challenging. Patient is very confused as to 

why he is here even though he does appear to know where he is. He frequently 

requests to go home. Patient was noted to calm down when his wife was present. 

Patient is recommended for skilled ST services due to deficits.





Communication/Social Cognition


Comprehension:  2


Expression:  2


Social Interaction:  2


Problem Solvin


Memory:  2





Speech Patient Assess


Expression of Ideas/Wants:  Rarely/Never (1)


Understanding Verbal Content:  Sometimes Understands(2)


Brief Interview-Mental Status:  Yes


Repetition of Three Words:  One (1)


Temporal Orientation: Year:  No answer (0)


Temporal Orientation: Month:  No answer (0)


Temporal Orientation: Day:  Incorrect or No Answer(0)


Recall : Wear to say "Sock":  No, could not recall (0)


Recall : Color:  No, could not recall (0)


Recall : Bed:  No, could not recall (0)





Speech Short Term Goals


Short Term Goals


Short Term Goals


1) Patient will be able to recall new information with 80% or greater given 

minimal cues.


2) Patient will be able to name items/pictures presented with 80% or greater 

given minimal cues.


3) Patient will utilize compensatory strategies for safety within his room with 

80% or greater given minimal cues.





Speech Long Term Goals


Long Term Goals


Patient will improve memory, problem solving and auditory processing in order 

to return safely home.





Speech-Plan


Patient/Family Goals


Patient/Family Goals:  


Patient plans to return home with his wife post rehab.





Treatment Plan


Speech Therapy Treatment Plan:  Continue Plan of Care


Patient is recommended for skilled ST services.


Treatment Duration:  Dec 28, 2018


Frequency:  5 times per week


Estimated Hrs Per Day:  .5 hour per day


Rehab Potential:  Guarded


Barriers to Learning:  


Patient has involved deficits.


Pt/Family Agrees to Plan:  Yes





Safety Risks/Education


Teaching Recipient:  Patient


Teaching Methods:  Discussion


Response to Teaching:  Verbalize Understanding


Education Topics Provided:  


Safety within his room.





Time


Speech Therapy Time In:  10:30


Speech Therapy Time Out:  10:45


Total Billed Time:  15


Billed Treatment Time


1, [id number] De La O Dec 24, 2018 13:08

## 2018-12-24 NOTE — PHYSICAL THERAPY DAILY NOTE
PT Daily Note-Current


Subjective


Agrees and verbalizes understanding.  Wife present.





Transfers


Therapy Code Descriptions/Definitions 





Functional [place] Measure:


0=Not Assessed/NA        4=Minimal Assistance


1=Total Assistance        5=Supervision or Setup


2=Maximal Assistance  6=Modified [place]


3=Moderate Assistance 7=Complete [place]








Therapy Quality Codes:


6    Independent with activity with or without an assistive device


5    Patient requires set up or clean up by helper.  Patient completes activity

  by  themselves


4    Supervision or touching assist (CGA). [place] provide cues , steadying 

assist


3    The helper provides less than half the effort to complete the activity


2    The helper provides more than half the effort to complete the activity


1    Dependent.  The helper does all the effort to complete an activity 


7    Patient refused to complete or attempt activity


9    The patient did not perform the activity before the current illness or 

injury


88  Not attempted due to Medical conditions or safety concerns





Treatments


Spent time with patient and wife and educating on ARU and how the unit works.  

Pt was participatory and able to respond appropriately to discussion.  

Education on safety in the room and safety with transfers and gait.





Assessment


Less agitated this afternoon after group.  Feel that the social aspect was 

beneficial.  Seemed calmer with his wife present.





PT Short Term Goals


Short Term Goals


Time Frame:  Dec 31, 2018


Transfers (B,C,W/C) (FIM):  5


Gait (FIM):  2


Distance (FIM):  1=up to 49 ft


Gait Assistive Device:  FWW





PT Long Term Goals


Long Term Goals


PT Long Term Goals Time Frame:  Jan 14, 2019


Transfers (B,C,W/C) (FIM):  6


Sit to Lying (QC):  6


Lying-Sitting on Side/Bed(QC):  6


Sit to Stand (QC):  6


Roll Left to Right (QC):  6


Chair/Bed-to-Chair Xfer(QC):  6


Car Transfer (QC):  5


Does the Patient Walk:  Yes


Gait (FIM):  5 (household)


Gait distance (FIM):  8=055-62 ft


Walk 10 feet (QC):  6


Walk 10ft-Uneven Surface(QC):  6


Walk 50ft with 2 Turns (QC):  6


Walk 150 ft (QC):  88


Gait Level of Assist:  6


Gait Assistive Device:  FWW


Does the Pt use WC or Scooter?:  Yes


Wheelchair (FIM):  6


Wheelchair distance (FIM):  3=150 ft


Wheel 50 feet with 2 turns (QC:  6


Stairs (FIM):  88


1 Step (curb) (QC):  88


4 Steps (QC):  88


12 Steps (QC):  88


Picking up an Object (QC):  88





PT Plan


Problem List


Problem List:  Activity Tolerance, Functional Strength, Safety, Balance, Gait, 

Transfer, Bed Mobility





Treatment/Plan


Treatment Plan:  Continue Plan of Care


Treatment Plan:  Bed Mobility, Education, Functional Activity Farnaz, Functional 

Strength, Group Therapy, Gait, Safety, Therapeutic Exercise, Transfers


Treatment Duration:  Jan 14, 2019


Frequency:  At least 5 of 7 days/Wk (IRF)


Estimated Hrs Per Day:  1.5 hours per day


Patient and/or Family Agrees t:  Yes





Safety Risks/Education


Patient Education:  Safety Issues


Teaching Recipient:  Patient, Significant Other


Teaching Methods:  Discussion


Response to Teaching:  Reinforcement Needed





Time/GCodes


Time In:  1305


Time Out:  1315


Total Billed Treatment Time:  10


Total Billed Treatment


visit


FA 10











ELMER CATALAN PT Dec 24, 2018 13:34

## 2018-12-24 NOTE — NUR
PTD VANCOMYCIN, 

PER YO RN (IRF NURSE) PATIENT RECEIVED LAST DOSE OF VANCOMYCIN ON 12/23 @ 1830PM.  PLAN 
WILL TO RESUME HERE TONIGHT AT 1800, WILL CHECK A TROUGH LEVEL BEFORE TODAYS DOSE.

## 2018-12-25 VITALS — DIASTOLIC BLOOD PRESSURE: 75 MMHG | SYSTOLIC BLOOD PRESSURE: 124 MMHG

## 2018-12-25 VITALS — SYSTOLIC BLOOD PRESSURE: 122 MMHG | DIASTOLIC BLOOD PRESSURE: 70 MMHG

## 2018-12-25 RX ADMIN — INSULIN ASPART SCH UNIT: 100 INJECTION, SOLUTION INTRAVENOUS; SUBCUTANEOUS at 11:29

## 2018-12-25 RX ADMIN — Medication SCH EACH: at 21:34

## 2018-12-25 RX ADMIN — MEXILETINE HYDROCHLORIDE SCH MG: 150 CAPSULE ORAL at 17:22

## 2018-12-25 RX ADMIN — GUAIFENESIN AND DEXTROMETHORPHAN SCH ML: 100; 10 SYRUP ORAL at 21:34

## 2018-12-25 RX ADMIN — METOPROLOL TARTRATE SCH MG: 25 TABLET, FILM COATED ORAL at 21:35

## 2018-12-25 RX ADMIN — GUAIFENESIN AND DEXTROMETHORPHAN SCH ML: 100; 10 SYRUP ORAL at 08:56

## 2018-12-25 RX ADMIN — PHENAZOPYRIDINE HYDROCHLORIDE SCH MG: 100 TABLET ORAL at 05:38

## 2018-12-25 RX ADMIN — POTASSIUM CHLORIDE SCH MEQ: 1500 TABLET, EXTENDED RELEASE ORAL at 05:37

## 2018-12-25 RX ADMIN — IPRATROPIUM BROMIDE AND ALBUTEROL SULFATE SCH ML: .5; 3 SOLUTION RESPIRATORY (INHALATION) at 14:36

## 2018-12-25 RX ADMIN — IPRATROPIUM BROMIDE AND ALBUTEROL SULFATE SCH ML: .5; 3 SOLUTION RESPIRATORY (INHALATION) at 19:08

## 2018-12-25 RX ADMIN — IPRATROPIUM BROMIDE AND ALBUTEROL SULFATE SCH ML: .5; 3 SOLUTION RESPIRATORY (INHALATION) at 06:56

## 2018-12-25 RX ADMIN — DILTIAZEM HYDROCHLORIDE SCH MG: 60 TABLET, FILM COATED ORAL at 13:51

## 2018-12-25 RX ADMIN — SODIUM CHLORIDE SCH MLS/HR: 900 INJECTION INTRAVENOUS at 13:32

## 2018-12-25 RX ADMIN — ATORVASTATIN CALCIUM SCH MG: 40 TABLET, FILM COATED ORAL at 21:34

## 2018-12-25 RX ADMIN — AMIODARONE HYDROCHLORIDE SCH MG: 200 TABLET ORAL at 21:35

## 2018-12-25 RX ADMIN — DILTIAZEM HYDROCHLORIDE SCH MG: 60 TABLET, FILM COATED ORAL at 21:35

## 2018-12-25 RX ADMIN — SODIUM CHLORIDE SCH MLS/HR: 900 INJECTION INTRAVENOUS at 21:34

## 2018-12-25 RX ADMIN — ASPIRIN 81 MG CHEWABLE TABLET SCH MG: 81 TABLET CHEWABLE at 08:55

## 2018-12-25 RX ADMIN — AMIODARONE HYDROCHLORIDE SCH MG: 200 TABLET ORAL at 08:56

## 2018-12-25 RX ADMIN — DIGOXIN SCH MG: 125 TABLET ORAL at 08:55

## 2018-12-25 RX ADMIN — FUROSEMIDE SCH MG: 20 TABLET ORAL at 05:38

## 2018-12-25 RX ADMIN — MEXILETINE HYDROCHLORIDE SCH MG: 150 CAPSULE ORAL at 08:55

## 2018-12-25 RX ADMIN — MEXILETINE HYDROCHLORIDE SCH MG: 150 CAPSULE ORAL at 23:46

## 2018-12-25 RX ADMIN — DOCUSATE SODIUM SCH MG: 100 CAPSULE ORAL at 08:56

## 2018-12-25 RX ADMIN — SODIUM CHLORIDE SCH MLS/HR: 900 INJECTION INTRAVENOUS at 05:37

## 2018-12-25 RX ADMIN — DILTIAZEM HYDROCHLORIDE SCH MG: 60 TABLET, FILM COATED ORAL at 05:38

## 2018-12-25 RX ADMIN — PHENAZOPYRIDINE HYDROCHLORIDE SCH MG: 100 TABLET ORAL at 17:21

## 2018-12-25 RX ADMIN — PHENAZOPYRIDINE HYDROCHLORIDE SCH MG: 100 TABLET ORAL at 13:32

## 2018-12-25 RX ADMIN — IPRATROPIUM BROMIDE AND ALBUTEROL SULFATE SCH ML: .5; 3 SOLUTION RESPIRATORY (INHALATION) at 02:07

## 2018-12-25 RX ADMIN — INSULIN ASPART SCH UNIT: 100 INJECTION, SOLUTION INTRAVENOUS; SUBCUTANEOUS at 05:39

## 2018-12-25 RX ADMIN — IPRATROPIUM BROMIDE AND ALBUTEROL SULFATE SCH ML: .5; 3 SOLUTION RESPIRATORY (INHALATION) at 22:14

## 2018-12-25 RX ADMIN — METOPROLOL TARTRATE SCH MG: 25 TABLET, FILM COATED ORAL at 08:55

## 2018-12-25 RX ADMIN — IPRATROPIUM BROMIDE AND ALBUTEROL SULFATE SCH ML: .5; 3 SOLUTION RESPIRATORY (INHALATION) at 10:27

## 2018-12-25 RX ADMIN — GUAIFENESIN AND DEXTROMETHORPHAN SCH ML: 100; 10 SYRUP ORAL at 13:32

## 2018-12-25 RX ADMIN — MEXILETINE HYDROCHLORIDE SCH MG: 150 CAPSULE ORAL at 00:42

## 2018-12-25 RX ADMIN — PANTOPRAZOLE SODIUM SCH MG: 40 TABLET, DELAYED RELEASE ORAL at 08:55

## 2018-12-25 RX ADMIN — INSULIN ASPART SCH UNIT: 100 INJECTION, SOLUTION INTRAVENOUS; SUBCUTANEOUS at 17:22

## 2018-12-25 RX ADMIN — Medication SCH EACH: at 08:55

## 2018-12-25 RX ADMIN — FUROSEMIDE SCH MG: 20 TABLET ORAL at 17:21

## 2018-12-25 RX ADMIN — INSULIN ASPART SCH UNIT: 100 INJECTION, SOLUTION INTRAVENOUS; SUBCUTANEOUS at 20:41

## 2018-12-25 RX ADMIN — VANCOMYCIN HYDROCHLORIDE SCH MLS/HR: 500 INJECTION, POWDER, LYOPHILIZED, FOR SOLUTION INTRAVENOUS at 17:22

## 2018-12-25 RX ADMIN — DOCUSATE SODIUM SCH MG: 100 CAPSULE ORAL at 21:35

## 2018-12-25 RX ADMIN — ANORECTAL OINTMENT SCH APPLIC: 15.7; .44; 24; 20.6 OINTMENT TOPICAL at 08:58

## 2018-12-25 RX ADMIN — ANORECTAL OINTMENT SCH APPLIC: 15.7; .44; 24; 20.6 OINTMENT TOPICAL at 21:34

## 2018-12-25 NOTE — INDIVIDUALIZED PLAN OF CARE
Individualized Plan of Care


Rehab Nursing IPOC Order


Admission Date


Dec 23, 2018 at 20:05


Current Orders





Orders


Amiodarone Tablet (Cordarone Tablet) (12/28/18 09:00)


Aspirin Chewable Tablet (Baby Aspirin Ch (12/24/18 09:00)


Atorvastatin Tablet (Lipitor) (12/24/18 21:00)


Diltiazem Tablet (Cardizem Tablet) (12/23/18 22:00)


Guaifenesin/Dm Syrup (Robitussin Dm Syru (12/24/18 09:00)


Lactobacillus Acidophilus Cap (Acidophil (12/24/18 09:00)


Albuterol/Ipra Inhalation Soln (Duoneb I (12/23/18 21:45)


Svn Small Volume Nebulizer (12/23/18 21:44)


Metoprolol Tartrate (Ir) Tab (Lopressor (12/24/18 09:00)


Docusate Sodium Capsule (Colace Capsule) (12/24/18 09:00)


Digoxin Tablet (Lanoxin Tablet) (12/24/18 09:00)


Furosemide  Tablet (Lasix  Tablet) (12/24/18 07:00)


Amlodipine Tablet (Norvasc Tablet) (12/24/18 09:00)


Potassium Chloride (Tablet) (K Dur Table (12/24/18 07:00)


Pantoprazole Tablet (Protonix Tablet) (12/24/18 09:00)


Mexiletine Capsule (Mexiletine Capsule) (12/23/18 22:00)


Magnesium Oxide Tablet (Mag Ox Tablet) (12/24/18 09:00)


Phenazopyridine Tablet (Pyridium Tablet) (12/24/18 08:00)


Insulin Aspart (Novolog) (Novolog (Charg (12/24/18 06:00)


Consult Physician (12/23/18 22:36)


Nothing By Mouth (12/24/18 Breakfast)


Admission Order(Inpt,Obs,Sdc) (12/23/18 22:49)


Code/Resuscitation (12/23/18 22:49)


Initiate Admission Nursing Pro .admission (12/23/18 22:49)


Isolation Central Supply Req (12/23/18 22:49)


Diltiazem Tablet (Cardizem Tablet) (12/24/18 06:00)


Accucheck Achs ACHS (12/24/18 01:38)


Comprehensive Metabolic Panel (12/24/18 06:13)


Mexiletine Capsule (Mexiletine Capsule) (12/24/18 08:00)


Erythrocyte Sedimentation Rate (12/24/18 07:51)


Cbc With Automated Diff (12/24/18 07:51)


Physical Therapy Oder (12/24/18 07:51)


Occupational Therapy Order (12/24/18 07:51)


Speech Therapy Orders (12/24/18 07:51)


Menthol/Zinc Oxide Ointment (Calmoseptin (12/24/18 09:00)


Cefepime Injection (Maxipime Injection) (12/24/18 14:00)


Vancomycin Injection (Vancomycin Injecti (12/24/18 18:00)


Amiodarone Tablet (Cordarone Tablet) (12/24/18 09:00)


Metoprolol Tartrate (Ir) Tab (Lopressor (12/24/18 09:00)


Ondansetron  Oral Dissolve Tab (Zofran (12/24/18 08:30)


Metoprolol Tartrate (Ir) Tab (Lopressor (12/24/18 09:00)


Nursing Communication (Order) (12/24/18 08:31)


Tube Feeding (Diet) (12/24/18 08:51)


Amiodarone Tablet (Cordarone Tablet) (12/28/18 09:00)


Amiodarone Tablet (Cordarone Tablet) (12/24/18 09:00)


Magnesium Hydroxide Oral Susp (Mom Oral (12/24/18 10:45)


Consult Physician (12/24/18 11:22)


Trough Order (Trough Order-Pharmacy Orde (12/24/18 17:00)


Vancomycin,Trough (12/24/18 17:00)


Patient Visit (12/24/18 )


Pt Eval Moderate Complexity (12/24/18 )


Functional Activities, Ea 15 (12/24/18 )


Patient Visit (12/24/18 )


Speech Sound Lang Comp (12/24/18 )


Tube Feeding (Diet) 06,10,14,18,22 (12/24/18 14:00)


Telesitter Service Q4HR (12/24/18 13:45)


Patient Visit (12/24/18 )


Functional Activities, Ea 15 (12/24/18 )


Therapeutic, Group (12/24/18 )


Cbc With Automated Diff (12/31/18 06:00)


Comprehensive Metabolic Panel (12/31/18 06:00)


Erythrocyte Sedimentation Rate (12/31/18 06:00)


Consult Physician (12/24/18 19:19)


Albuterol/Ipra Inhalation Soln (Duoneb I (12/24/18 22:00)


Svn Small Volume Nebulizer (12/24/18 21:14)





Rehab Nursing Orders:  Ongoing Assess. of Cognitive Status, Ongoing Assess. of 

Function Status, Disease Management & Educaiton, DVT Prophylaxis, Fall 

Prevention, Fluid/Electrolyte/Nutrition Mgmt, Infection Prevention, Medication 

Management & Education, Management of Risks & Complications, Management of Skin 

Intergrity, Nutrition Management, Pain Management, Patient/Family Support, 

Safety Management, Swallow Precautions





PT IPOC


Problem List:  Activity Tolerance, Functional Strength, Safety, Balance, Gait, 

Transfer, Bed Mobility


Treatment Plan:  Continue Plan of Care


Bed Mobility, Education, Functional Activity Farnaz, Functional Strength, Group 

Therapy, Gait, Safety, Therapeutic Exercise, Transfers


Treatment Duration:  Jan 14, 2019


Frequency:  At least 5 of 7 days/Wk (IRF)


Estimated Hrs Per Day:  1.5 hours per day





OT IPOC


Problems:  Decreased Activ Tolerance, Decreased Safety Aware, Decreased UE 

Strength, Dependent Transfers, Impaired Bed Mobility, Impaired Cognition, 

Impaired Coordination, Impaired Funct Balance, Impaired I ADL's, Impaired Self-

Care Skills, Restricted Funct UE ROM


OT Treatment, Training and Edu:  Yes


Plan of Care:  ADL Retraining, Functional Mobility, Group Exercise/Act as Ind, 

UE Funct Exercise/Act


Treatment Duration:  Jan 21, 2019


Frequency:  At least 5 of 7 days/Wk (IRF)


Estimated Hrs Per Day:  1.5 hours per day





ST IPOC


Speech Therapy Treatment Plan:  Continue Plan of Care


Treatment Duration:  Dec 28, 2018


Frequency:  5 times per week


Estimated Hrs Per Day:  .5 hour per day





/Case Mgmt


/Case Managemen:  Discharge Planning, Patient/Family Counseling





Dietitian/Nutritionist


Dietitian/Nutritionist to monitor nutritional status and make changes and/or 

recommendations as needed and work with speech pathology on dietary upgrades as 

the occur.





Physician IPOC


Medical Issues being managed closely and that require the 24 hour availability 

of a physician:


A FIB


CAD


HTN


Dysphagia NPO on tube feeds


COPD


DM


IGC code 03.3


Etiologic DX Disuse myopathy


Medical Issues:  DVT Prophylaxis, Falls Precautions, Fluid/Electrolyte/

Nutrition Balance, Infection Protection, Pain Management, Swallowing Precautions

, Other (List) (as per above)


Brief Synthesis of Preadmission Screen, Post-Admission Evaluation, and Therapy 


Evaluations:77 yo male who developed sepsis from UTI and Pneumonia and found to 

have silent aspiration .Peg tube placed and patient NPO and on Tube feeds.Had 

been Modified Independent to supervision prior to this and living with spouse 

PCP DR Gonzalez Has Memory impairment and ST following patient as well.PMH as 

per above Cardiology following.Has contact precautions


Medical Prognosis:  good


Anticipated Length of Stay:  01-21-19


return to [facility] or better


Anticipated d/c Destination:  Home with spouse and [facility] [place]











MESERET GONZALES MD Dec 25, 2018 07:04

## 2018-12-25 NOTE — PROGRESS NOTE-HOSPITALIST
Subjective


HPI/CC On Admission


Date Seen by Provider:  Dec 25, 2018


Time Seen by Provider:  11:00


CC: Severe debility





HPI: This is a 78-year-old white male [facility] patient was 

admitted to inpatient rehabilitation for IV medication and severe debility 

resolution.  He was admitted to [facility] for 11 days requiring PEG 

tube placement due to silent aspiration and urinary stent placement due to 

obstructive stone in ureter and maintained on antibiotic regimen with 

aggressive physical therapy.  I reviewed old records and culture results from 

infectious disease and reviewed current lab results along with vital signs.  He 

is sitting up in chair drowsy but oriented 3 but appears to be severely 

chronically ill.  Her goal is to return to independent ADL function along with 

completion of IV antibiotics.


Subjective/Events-last exam


Patient is sleeping


Wife at bedside


No pain is reported


Delirium at night she reports


Wants him to go home and I am unsure that will be possible but will try to help 

him in any way possible





Review of Systems


General:  Fatigue


Pulmonary:  Cough


Neurological:  Confusion





Objective


Exam


Vital Signs





Vital Signs








  Date Time  Temp Pulse Resp B/P (MAP) Pulse Ox O2 Delivery O2 Flow Rate FiO2


 


18 10:27     96 Nasal Cannula 2.00 


 


18 04:55 96.8 90 20 124/75 (91)    





Capillary Refill :


General Appearance:  No Apparent Distress, WD/WN, Chronically ill, Other (

sleeping)


Respiratory:  No Accessory Muscle Use, No Respiratory Distress, Crackles, 

Decreased Breath Sounds


Cardiovascular:  Regular Rate, Rhythm, No Edema, No Gallop, No JVD, No Murmur, 

Normal Peripheral Pulses





Results/Procedures


Lab


Patient resulted labs reviewed.





Assessment/Plan


Assessment and Plan


Assess & Plan/Chief Complaint


Assessment:


MRSA pneumonia


Pseudomonas UTI


Severe debilitated state


CAD previous bypass graft


ICD defibrillator in place


Presbycusis


Sleep apnea


Chronic respiratory failure





Plan:


Monitor labs


PEG tube feedings


Speech therapy


PT/OT


Long recovery expected


Consult Dr Chin is appreciated





Diagnosis/Problems


Diagnosis/Problems





(1) MRSA pneumonia


Status:  Acute


Qualifiers:  


   Laterality:  unspecified laterality  Lung location:  unspecified part of 

lung  Qualified Codes:  J15.212 - Pneumonia due to methicillin resistant 

Staphylococcus aureus


(2) Pseudomonas urinary tract infection


Status:  Acute


(3) Debility


Status:  Acute


(4) Pyelonephritis


Status:  Acute


(5) Renal calculus, left


Status:  Acute


(6) Acute renal insufficiency


Status:  Resolved


Resolution Date/Time:  18 @ 17:54


(7) Diabetes mellitus, type 2


Status:  Chronic


Qualifiers:  


   Diabetes mellitus long term insulin use:  with long term use  Diabetes 

mellitus complication status:  with circulatory complication  Diabetes mellitus 

complication detail:  with other circulatory complications  Qualified Codes:  

E11.59 - Type 2 diabetes mellitus with other circulatory complications; Z79.4 - 

Long term (current) use of insulin


(8) Congestive heart failure


Status:  Chronic


Qualifiers:  


   Heart failure type:  unspecified  


(9) COPD (chronic obstructive pulmonary disease)


Status:  Chronic


Qualifiers:  


   COPD type:  unspecified COPD  Qualified Codes:  J44.9 - Chronic obstructive 

pulmonary disease, unspecified


(10) Paroxysmal atrial fibrillation


Status:  Chronic


(11) CAD (coronary artery disease)


Status:  Chronic


Qualifiers:  


   Coronary Disease-Associated Artery/Lesion type:  native artery  Native vs. 

transplanted heart:  native heart  Associated angina:  without angina  

Qualified Codes:  I25.10 - Atherosclerotic heart disease of native coronary 

artery without angina pectoris


(12) ICD (implantable cardioverter-defibrillator) in place


Status:  Chronic





Clinical Quality Measures


DVT/VTE Risk/Contraindication:


Risk Factor Score Per Nursin


RFS Level Per Nursing on Admit:  4+=Very High











MICHELE WILKINS DO Dec 25, 2018 12:29

## 2018-12-25 NOTE — CARDIOLOGY PROGRESS NOTE
Subjective


Date Seen by Provider:  Dec 25, 2018


Time Seen by Provider:  08:30


Subjective/Events-last exam


patient was seen at bedside, he was comfortable, denied any active pain.  More 

awake today and responding appropriately, still have some confusion


Review of Systems


General:  No Chills, No Night Sweats; Fatigue, Malaise; No Appetite, No Other


HEENT:  No Head Aches, No Visual Changes, No Eye Pain, No Ear Pain, No Dysphasia

, No Sinus Congestion, No Post Nasal Drip, No Sore Throat, No Other


Pulmonary:  Dyspnea; No Cough, No Pleuritic Chest Pain, No Other


Cardiovascular:  No: Chest Pain, Palpitations, Orthopnea, Paroxysmal Noc. 

Dyspnea, Edema, Lt Headedness, Other





Objective-Cardiology


Exam


Last Set of Vital Signs





Vital Signs








 18





 04:55 10:27


 


Temp 96.8 


 


Pulse 90 


 


Resp 20 


 


B/P (MAP) 124/75 (91) 


 


Pulse Ox  96


 


O2 Delivery  Nasal Cannula


 


O2 Flow Rate  2.00





Capillary Refill :


I&O











Intake and Output 


 


 18





 00:00


 


Intake Total 1440 ml


 


Balance 1440 ml


 


 


 


Intake Oral 0 ml


 


IV Total 50 ml


 


Tube Feeding 1190 ml


 


Other 200 ml


 


# Voids 9








General:  Alert, Cooperative, No Acute Distress


HEENT:  Atraumatic, PERRLA, EOMI, Mucous Memb Moist/Pink, Other (02 by N/C in 

place)


Lungs:  Other (Plus congestion)


Heart:  Regular Rate, Normal S1, Normal S2


Abdomen:  Normal Bowel Sounds, Soft, No Tenderness, Other (Peg Tube in place)


Extremities:  No Clubbing, Other (Plus edema)


Skin:  No Rashes


Neuro:  Normal Speech, Other (Confusion strength 4/5 LES 4-/5 Upper limbs)





Results


Lab








Laboratory Tests








Test


 18


15:50 18


16:55 18


20:13 18


04:59 Range/Units


 


 


Glucometer 128 H  140 H 108    MG/DL


 


Vancomycin Level Trough


 


 12.9 


 


 


 10.0-20.0


UG/ML


 


Test


 18


11:05 


 


 


 Range/Units


 


 


Glucometer 136 H      MG/DL











A/P-Cardiology


Admission Diagnosis


Debility


CAD


HTN


Chronic atrial fibrillation





Assessment/Plan


Debility, continue on physical therapy.





Status post respiratory failure, pneumonia, silent aspiration, on Antibiotics, 

followed by primary care physician





Urosepsis, history of kidney stone and nephrostomy tube, on antibiotics and 

managed by primary care team





Silent aspiration, status post feeding tube placement





Coronary artery disease, history of CABG done in the remote past.  Patient had 

a stent done in 2002 using MultiLink 2.518 mm to the proximal right 

coronary artery, 2017 had a Cypher stent 3.530 mm to the proximal and mid 

circumflex artery.  Has been followed and managed by primary cardiologist Dr. David in [place].  Continue to follow





History of congestive heart failure, reported ejection fraction 54 percent.  

MPI in , continue to monitor





Paroxysmal atrial fibrillation maintained on amiodarone, intolerant to oral 

anticoagulation secondary to recurrent nosebleed, on his transfer from [facility] 

patient is still on Loading dose of Amiodarone until , Patient is 

maintained on Cardizem, Lopressor and Digoxin, continue to monitor, no changes 

are recommended





Patient has been maintained on mexiletine.  Probably due to ventricular 

fibrillation and history of shock from his defibrillator, known to have [facility] 

[facility] ICD implanted by Dr. stevens in [place]. Continue on Mexiletine





COPD, chronic dyspnea, followed by primary care physician





Chronic pedal edema, diuresis and monitor electrolytes





Diabetes mellitus, followed and managed by primary care physician





Clinical Quality Measures


DVT/VTE Risk/Contraindication:


Risk Factor Score Per Nursin


RFS Level Per Nursing on Admit:  4+=Very High











MARINA KUMAR MD Dec 25, 2018 13:29

## 2018-12-26 VITALS — SYSTOLIC BLOOD PRESSURE: 126 MMHG | DIASTOLIC BLOOD PRESSURE: 65 MMHG

## 2018-12-26 VITALS — DIASTOLIC BLOOD PRESSURE: 64 MMHG | SYSTOLIC BLOOD PRESSURE: 126 MMHG

## 2018-12-26 VITALS — DIASTOLIC BLOOD PRESSURE: 78 MMHG | SYSTOLIC BLOOD PRESSURE: 135 MMHG

## 2018-12-26 VITALS — SYSTOLIC BLOOD PRESSURE: 109 MMHG | DIASTOLIC BLOOD PRESSURE: 68 MMHG

## 2018-12-26 LAB
ALBUMIN SERPL-MCNC: 3.1 GM/DL (ref 3.2–4.5)
ALP SERPL-CCNC: 89 U/L (ref 40–136)
ALT SERPL-CCNC: 13 U/L (ref 0–55)
BASOPHILS # BLD AUTO: 0 10^3/UL (ref 0–0.1)
BASOPHILS NFR BLD AUTO: 0 % (ref 0–10)
BILIRUB SERPL-MCNC: 0.4 MG/DL (ref 0.1–1)
BUN/CREAT SERPL: 24
CALCIUM SERPL-MCNC: 9.1 MG/DL (ref 8.5–10.1)
CHLORIDE SERPL-SCNC: 97 MMOL/L (ref 98–107)
CO2 SERPL-SCNC: 32 MMOL/L (ref 21–32)
CREAT SERPL-MCNC: 0.94 MG/DL (ref 0.6–1.3)
EOSINOPHIL # BLD AUTO: 0.1 10^3/UL (ref 0–0.3)
EOSINOPHIL NFR BLD AUTO: 1 % (ref 0–10)
ERYTHROCYTE [DISTWIDTH] IN BLOOD BY AUTOMATED COUNT: 18.7 % (ref 10–14.5)
GFR SERPLBLD BASED ON 1.73 SQ M-ARVRAT: > 60 ML/MIN
GLUCOSE SERPL-MCNC: 115 MG/DL (ref 70–105)
HCT VFR BLD CALC: 31 % (ref 40–54)
HGB BLD-MCNC: 9.8 G/DL (ref 13.3–17.7)
LYMPHOCYTES # BLD AUTO: 1.3 X 10^3 (ref 1–4)
LYMPHOCYTES NFR BLD AUTO: 15 % (ref 12–44)
MANUAL DIFFERENTIAL PERFORMED BLD QL: NO
MCH RBC QN AUTO: 29 PG (ref 25–34)
MCHC RBC AUTO-ENTMCNC: 31 G/DL (ref 32–36)
MCV RBC AUTO: 93 FL (ref 80–99)
MONOCYTES # BLD AUTO: 0.9 X 10^3 (ref 0–1)
MONOCYTES NFR BLD AUTO: 11 % (ref 0–12)
NEUTROPHILS # BLD AUTO: 6.1 X 10^3 (ref 1.8–7.8)
NEUTROPHILS NFR BLD AUTO: 73 % (ref 42–75)
PLATELET # BLD: 198 10^3/UL (ref 130–400)
PMV BLD AUTO: 11.3 FL (ref 7.4–10.4)
POTASSIUM SERPL-SCNC: 4.2 MMOL/L (ref 3.6–5)
PROT SERPL-MCNC: 7.8 GM/DL (ref 6.4–8.2)
RBC # BLD AUTO: 3.37 10^6/UL (ref 4.35–5.85)
SODIUM SERPL-SCNC: 137 MMOL/L (ref 135–145)
WBC # BLD AUTO: 8.4 10^3/UL (ref 4.3–11)

## 2018-12-26 RX ADMIN — DILTIAZEM HYDROCHLORIDE SCH MG: 60 TABLET, FILM COATED ORAL at 22:25

## 2018-12-26 RX ADMIN — METOPROLOL TARTRATE SCH MG: 25 TABLET, FILM COATED ORAL at 20:25

## 2018-12-26 RX ADMIN — METOPROLOL TARTRATE SCH MG: 25 TABLET, FILM COATED ORAL at 08:29

## 2018-12-26 RX ADMIN — ASPIRIN 81 MG CHEWABLE TABLET SCH MG: 81 TABLET CHEWABLE at 08:30

## 2018-12-26 RX ADMIN — DILTIAZEM HYDROCHLORIDE SCH MG: 60 TABLET, FILM COATED ORAL at 13:23

## 2018-12-26 RX ADMIN — FUROSEMIDE SCH MG: 20 TABLET ORAL at 16:49

## 2018-12-26 RX ADMIN — SODIUM CHLORIDE SCH MLS/HR: 900 INJECTION INTRAVENOUS at 05:45

## 2018-12-26 RX ADMIN — IPRATROPIUM BROMIDE AND ALBUTEROL SULFATE SCH ML: .5; 3 SOLUTION RESPIRATORY (INHALATION) at 02:53

## 2018-12-26 RX ADMIN — MEXILETINE HYDROCHLORIDE SCH MG: 150 CAPSULE ORAL at 08:30

## 2018-12-26 RX ADMIN — DIGOXIN SCH MG: 125 TABLET ORAL at 08:29

## 2018-12-26 RX ADMIN — Medication SCH EACH: at 08:29

## 2018-12-26 RX ADMIN — DILTIAZEM HYDROCHLORIDE SCH MG: 60 TABLET, FILM COATED ORAL at 05:45

## 2018-12-26 RX ADMIN — INSULIN ASPART SCH UNIT: 100 INJECTION, SOLUTION INTRAVENOUS; SUBCUTANEOUS at 21:00

## 2018-12-26 RX ADMIN — IPRATROPIUM BROMIDE AND ALBUTEROL SULFATE SCH ML: .5; 3 SOLUTION RESPIRATORY (INHALATION) at 10:20

## 2018-12-26 RX ADMIN — IPRATROPIUM BROMIDE AND ALBUTEROL SULFATE SCH ML: .5; 3 SOLUTION RESPIRATORY (INHALATION) at 06:30

## 2018-12-26 RX ADMIN — SODIUM CHLORIDE SCH MLS/HR: 900 INJECTION INTRAVENOUS at 13:24

## 2018-12-26 RX ADMIN — SODIUM CHLORIDE SCH MLS/HR: 900 INJECTION INTRAVENOUS at 22:24

## 2018-12-26 RX ADMIN — ANORECTAL OINTMENT SCH APPLIC: 15.7; .44; 24; 20.6 OINTMENT TOPICAL at 08:40

## 2018-12-26 RX ADMIN — IPRATROPIUM BROMIDE AND ALBUTEROL SULFATE SCH ML: .5; 3 SOLUTION RESPIRATORY (INHALATION) at 22:00

## 2018-12-26 RX ADMIN — INSULIN ASPART SCH UNIT: 100 INJECTION, SOLUTION INTRAVENOUS; SUBCUTANEOUS at 06:03

## 2018-12-26 RX ADMIN — PHENAZOPYRIDINE HYDROCHLORIDE SCH MG: 100 TABLET ORAL at 17:38

## 2018-12-26 RX ADMIN — PHENAZOPYRIDINE HYDROCHLORIDE SCH MG: 100 TABLET ORAL at 08:29

## 2018-12-26 RX ADMIN — INSULIN ASPART SCH UNIT: 100 INJECTION, SOLUTION INTRAVENOUS; SUBCUTANEOUS at 11:48

## 2018-12-26 RX ADMIN — INSULIN ASPART SCH UNIT: 100 INJECTION, SOLUTION INTRAVENOUS; SUBCUTANEOUS at 16:13

## 2018-12-26 RX ADMIN — GUAIFENESIN AND DEXTROMETHORPHAN SCH ML: 100; 10 SYRUP ORAL at 20:25

## 2018-12-26 RX ADMIN — POTASSIUM CHLORIDE SCH MEQ: 1500 TABLET, EXTENDED RELEASE ORAL at 06:03

## 2018-12-26 RX ADMIN — VANCOMYCIN HYDROCHLORIDE SCH MLS/HR: 500 INJECTION, POWDER, LYOPHILIZED, FOR SOLUTION INTRAVENOUS at 17:38

## 2018-12-26 RX ADMIN — DOCUSATE SODIUM SCH MG: 50 LIQUID ORAL at 10:53

## 2018-12-26 RX ADMIN — ATORVASTATIN CALCIUM SCH MG: 40 TABLET, FILM COATED ORAL at 20:24

## 2018-12-26 RX ADMIN — DOCUSATE SODIUM SCH MG: 100 CAPSULE ORAL at 08:40

## 2018-12-26 RX ADMIN — AMIODARONE HYDROCHLORIDE SCH MG: 200 TABLET ORAL at 20:25

## 2018-12-26 RX ADMIN — Medication SCH EACH: at 20:25

## 2018-12-26 RX ADMIN — PANTOPRAZOLE SODIUM SCH MG: 40 TABLET, DELAYED RELEASE ORAL at 08:29

## 2018-12-26 RX ADMIN — IPRATROPIUM BROMIDE AND ALBUTEROL SULFATE SCH ML: .5; 3 SOLUTION RESPIRATORY (INHALATION) at 15:30

## 2018-12-26 RX ADMIN — PHENAZOPYRIDINE HYDROCHLORIDE SCH MG: 100 TABLET ORAL at 13:23

## 2018-12-26 RX ADMIN — ANORECTAL OINTMENT SCH APPLIC: 15.7; .44; 24; 20.6 OINTMENT TOPICAL at 20:26

## 2018-12-26 RX ADMIN — FUROSEMIDE SCH MG: 20 TABLET ORAL at 06:03

## 2018-12-26 RX ADMIN — AMIODARONE HYDROCHLORIDE SCH MG: 200 TABLET ORAL at 10:54

## 2018-12-26 RX ADMIN — GUAIFENESIN AND DEXTROMETHORPHAN SCH ML: 100; 10 SYRUP ORAL at 08:40

## 2018-12-26 RX ADMIN — MEXILETINE HYDROCHLORIDE SCH MG: 150 CAPSULE ORAL at 16:49

## 2018-12-26 RX ADMIN — GUAIFENESIN AND DEXTROMETHORPHAN SCH ML: 100; 10 SYRUP ORAL at 13:24

## 2018-12-26 RX ADMIN — DOCUSATE SODIUM SCH MG: 50 LIQUID ORAL at 21:00

## 2018-12-26 NOTE — SPEECH THERAPY DAILY NOTE
Speech Daily Progress Note


Subjective


Date Seen by Provider:  Dec 26, 2018


Time Seen by Provider:  00:30


Patient noted to have a lot of loose coughing, utilized breathing devices as 

directed.





Objective


Patient completed memory tasks related to self with 75% accuracy given mod v/c'

s and processing time.





Assessment


Assessment Current Status:  Fair Progress





Treatment Plan


Continue Plan of Care





Communication


Comprehension:  2


Expression:  2





Social Cognition


Social Interaction:  2


Problem Solvin


Memory:  2





Speech Short Term Goals


Short Term Goals


Short Term Goals


1) Patient will be able to recall new information with 80% or greater given 

minimal cues.


2) Patient will be able to name items/pictures presented with 80% or greater 

given minimal cues.


3) Patient will utilize compensatory strategies for safety within his room with 

80% or greater given minimal cues.





Speech Long Term Goals


Long Term Goals


Patient will improve memory, problem solving and auditory processing in order 

to return safely home.





Speech-Plan


Patient/Family Goals


Patient/Family Goals:  


Patient plans to return home with his wife post rehab.





Treatment Plan


Speech Therapy Treatment Plan:  Continue Plan of Care


Patient has calmed down a lot from 2 days ago.


Treatment Duration:  Dec 28, 2018


Frequency:  5 times per week


Estimated Hrs Per Day:  .5 hour per day


Rehab Potential:  Guarded


Barriers to Learning:  


Patient has memory deficits.





Safety Risks/Education


Teaching Recipient:  Patient


Teaching Methods:  Discussion


Response to Teaching:  Verbalize Understanding


Education Topics Provided:  


Safety and procedures within his room.





Time


Speech Therapy Time In:  10:30


Speech Therapy Time Out:  11:00


Total Billed Time:  30


Billed Treatment Time


1 ANNABEL


SHERIN McdanielsHANELKE KNIGHT Dec 26, 2018 12:49

## 2018-12-26 NOTE — PHYSICAL THERAPY DAILY NOTE
PT Daily Note-Current


Subjective


Pt laying Supine in bed upon arrival.  PA for Dr Chin arrives to see pt as 

well.  Nurse arrives to give morning meds and assist with transfer from bed due 

to Sp's report of pt being wet.





Pain





   Location:  No Pain Reported





Mental Status


Patient Orientation:  Person, Place


Attachments:  Oxygen





Transfers


Therapy Code Descriptions/Definitions 





Functional [place] Measure:


0=Not Assessed/NA        4=Minimal Assistance


1=Total Assistance        5=Supervision or Setup


2=Maximal Assistance  6=Modified [place]


3=Moderate Assistance 7=Complete [place]








Therapy Quality Codes:


6    Independent with activity with or without an assistive device


5    Patient requires set up or clean up by helper.  Patient completes activity

  by  themselves


4    Supervision or touching assist (CGA). [place] provide cues , steadying 

assist


3    The helper provides less than half the effort to complete the activity


2    The helper provides more than half the effort to complete the activity


1    Dependent.  The helper does all the effort to complete an activity 


7    Patient refused to complete or attempt activity


9    The patient did not perform the activity before the current illness or 

injury


88  Not attempted due to Medical conditions or safety concerns


Scooting:  3


Rollin


Supine to/from Sit:  4


Sit to/from Stand:  4


Sit to Stand (QC):  4





Gait Training


Does the Patient Walk?:  Yes


Distance (FIM):  1=up to 49 ft


Distance:  30'


Walk 10 feet (QC):  4


Gait Level of Assist:  4


Gait Persons Needed:  1


Gait Assistive Device:  FWW


Pt has slow ruma and wobbly, unsteady gait.





Wheelchair Training


Does the Pt Use a Wheelchair?:  Yes


Wheelchair Distance:  1=up to 49 ft


Distance:  30'


Wheelchair Level of Assist:  5


Type of Wheelchair:  Manual





Exercises


Seated Therapy Exercises:  Ankle pumps, Long arc quads, Hip flexion, Kicking 

activity, Hip abd/add


Seated Reps:  15





Treatments


Pt transfers from Supine to EOB at CGA-Min A then EOB to Standing using FWW at 

CGA due to bed pad being wet.  Pt ambulates to restroom to attempt toileting.  

Pt is given morning meds by Nurse and pt returns to [facility] to rest.  Pt completes 

Seated Ex in [facility] with rest break as needed.  Pt has all needs met at end of tx, 

Nurse present.





Assessment


Current Status:  Fair Progress


Pt demonstrates a lack of social awareness and safety during tx.





PT Short Term Goals


Short Term Goals


Time Frame:  Dec 31, 2018


Transfers (B,C,W/C) (FIM):  5


Gait (FIM):  2


Distance (FIM):  1=up to 49 ft


Gait Assistive Device:  FWW





PT Long Term Goals


Long Term Goals


PT Long Term Goals Time Frame:  2019


Transfers (B,C,W/C) (FIM):  6


Sit to Lying (QC):  6


Lying-Sitting on Side/Bed(QC):  6


Sit to Stand (QC):  6


Roll Left to Right (QC):  6


Chair/Bed-to-Chair Xfer(QC):  6


Car Transfer (QC):  5


Does the Patient Walk:  Yes


Gait (FIM):  5 (household)


Gait distance (FIM):  4=700-72 ft


Walk 10 feet (QC):  6


Walk 10ft-Uneven Surface(QC):  6


Walk 50ft with 2 Turns (QC):  6


Walk 150 ft (QC):  88


Gait Level of Assist:  6


Gait Assistive Device:  FWW


Does the Pt use WC or Scooter?:  Yes


Wheelchair (FIM):  6


Wheelchair distance (FIM):  3=150 ft


Wheel 50 feet with 2 turns (QC:  6


Stairs (FIM):  88


1 Step (curb) (QC):  88


4 Steps (QC):  88


12 Steps (QC):  88


Picking up an Object (QC):  88





PT Plan


Problem List


Problem List:  Activity Tolerance, Functional Strength, Safety, Balance, Gait, 

Transfer





Treatment/Plan


Treatment Plan:  Continue Plan of Care


Treatment Plan:  Bed Mobility, Education, Functional Activity Farnaz, Functional 

Strength, Group Therapy, Gait, Safety, Therapeutic Exercise, Transfers


Treatment Duration:  2019


Frequency:  At least 5 of 7 days/Wk (IRF)


Estimated Hrs Per Day:  1.5 hours per day


Patient and/or Family Agrees t:  Yes





Safety Risks/Education


Patient Education:  Gait Training, Transfer Techniques, Correct Positioning, 

Safety Issues


Teaching Recipient:  Patient, Significant Other


Teaching Methods:  Discussion


Response to Teaching:  Reinforcement Needed





Time/GCodes


Time In:  815


Time Out:  900


Total Billed Treatment Time:  45


Total Billed Treatment


1, FA x2 (30m) & EX (15m)


G Codes Necessary:  ANA Lozoya PTA Dec 26, 2018 08:58

## 2018-12-26 NOTE — NUR
assessments & interventions completed, see assessments & inventions, held Colace due to 
loose stools, voiding clear bright yellow urine incontinent, wife at bedside, meds crushed & 
given per peg tube

## 2018-12-26 NOTE — NUR
MSW met with patient and spouse to review team conference summary.  As patient remains 
standby assistance for all transfers, contact guard assistance for short distance 
ambulation, max assist for bathing and constant cues for safety team, has recommended 
patient be reevaluate at next team conference on 1/2  Patient spouse are agreeable to this. 
Although patient was drowsy throughout discussion, he had no objections at this time. 
Therapy previously reported agitation; however, this is reduced when spouse is present. MSW 
will continue to follow for additional needs.

## 2018-12-26 NOTE — THERAPY GROUP DAILY NOTE
Therapy Daily Group Note


Patient Education Topic


Other List Below (Using everyday items for strength & resistance training)





Exercises


LE Seated Exercise, UE Exercise





Other/Notes


Pt was propelled in [facility] to PT/OT Group.  Group consisted of Introductions (Name

, How many siblings you have & Favorite [place] present ever received), 

Socialization, UE/LE Seated Exercises as well as Finding Examples of everyday 

items that could be used for inexpensive ways of strength & resistance training 

once home.  Pt did not listen well during Group.  Pt was trying to propel back 

to room but Staff encouraged pt to stay.  Pt did not give his own example and 

was very quiet during Group.  Pt is not very socially aware of what is going on 

around him.  Pt returned to room at end of Group to rest with all needs met.


Start Time:  13:00


Stop Time:  14:00


Total Billed Treatment Time:  60


Total Billed Treatment


1, GRP (60m)











ANA GANT PTA Dec 26, 2018 15:00

## 2018-12-26 NOTE — PROGRESS NOTE-HOSPITALIST
Subjective


HPI/CC On Admission


Date Seen by Provider:  Dec 26, 2018


Time Seen by Provider:  09:15


CC: Severe debility





HPI: This is a 78-year-old white male [facility] patient was 

admitted to inpatient rehabilitation for IV medication and severe debility 

resolution.  He was admitted to [facility] for 11 days requiring PEG 

tube placement due to silent aspiration and urinary stent placement due to 

obstructive stone in ureter and maintained on antibiotic regimen with 

aggressive physical therapy.  I reviewed old records and culture results from 

infectious disease and reviewed current lab results along with vital signs.  He 

is sitting up in chair drowsy but oriented 3 but appears to be severely 

chronically ill.  Her goal is to return to independent ADL function along with 

completion of IV antibiotics.


Subjective/Events-last exam


Patient just got done with working out in gym


Overall feels improved


Remembered me taking care of him on fourth floor many weeks ago


Coarse breath sounds left side


Tolerating nebulizers


Will check labs and chest x-ray


IV antibiotics maintained





Review of Systems


General:  Fatigue





Objective


Exam


Vital Signs





Vital Signs








  Date Time  Temp Pulse Resp B/P (MAP) Pulse Ox O2 Delivery O2 Flow Rate FiO2


 


18 10:21     97 Nasal Cannula 2.00 


 


18 08:25  90  126/64 (84)    


 


18 06:00 97.6  20     





Capillary Refill :


General Appearance:  No Apparent Distress, WD/WN, Chronically ill


Respiratory:  Chest Non Tender, No Accessory Muscle Use, No Respiratory Distress

, Crackles, Decreased Breath Sounds, Wheezing


Cardiovascular:  Regular Rate, Rhythm, No Edema, No Gallop, No JVD, No Murmur, 

Normal Peripheral Pulses


Neurologic/Psychiatric:  Alert, Oriented x3, No Motor/Sensory Deficits, Normal 

Mood/Affect





Results/Procedures


Lab


Laboratory Tests


18 11:00








Patient resulted labs reviewed.





Assessment/Plan


Assessment and Plan


Assess & Plan/Chief Complaint


Assessment:


MRSA pneumonia


Pseudomonas UTI


Severe debilitated state


CAD previous bypass graft


ICD defibrillator in place


Presbycusis


Sleep apnea


Chronic respiratory failure





Plan:


Check labs


PEG tube feedings


Speech therapy


PT/OT


Long recovery expected


Consult Dr Chin is appreciated


Check CXR and maintain Nebs





Diagnosis/Problems


Diagnosis/Problems





(1) MRSA pneumonia


Status:  Acute


Qualifiers:  


   Laterality:  unspecified laterality  Lung location:  unspecified part of 

lung  Qualified Codes:  J15.212 - Pneumonia due to methicillin resistant 

Staphylococcus aureus


(2) Pseudomonas urinary tract infection


Status:  Acute


(3) Debility


Status:  Acute


(4) Pyelonephritis


Status:  Acute


(5) Renal calculus, left


Status:  Acute


(6) Acute renal insufficiency


Status:  Resolved


Resolution Date/Time:  18 @ 17:54


(7) Diabetes mellitus, type 2


Status:  Chronic


Qualifiers:  


   Diabetes mellitus long term insulin use:  with long term use  Diabetes 

mellitus complication status:  with circulatory complication  Diabetes mellitus 

complication detail:  with other circulatory complications  Qualified Codes:  

E11.59 - Type 2 diabetes mellitus with other circulatory complications; Z79.4 - 

Long term (current) use of insulin


(8) Congestive heart failure


Status:  Chronic


Qualifiers:  


   Heart failure type:  unspecified  


(9) COPD (chronic obstructive pulmonary disease)


Status:  Chronic


Qualifiers:  


   COPD type:  unspecified COPD  Qualified Codes:  J44.9 - Chronic obstructive 

pulmonary disease, unspecified


(10) Paroxysmal atrial fibrillation


Status:  Chronic


(11) CAD (coronary artery disease)


Status:  Chronic


Qualifiers:  


   Coronary Disease-Associated Artery/Lesion type:  native artery  Native vs. 

transplanted heart:  native heart  Associated angina:  without angina  

Qualified Codes:  I25.10 - Atherosclerotic heart disease of native coronary 

artery without angina pectoris


(12) ICD (implantable cardioverter-defibrillator) in place


Status:  Chronic





Clinical Quality Measures


DVT/VTE Risk/Contraindication:


Risk Factor Score Per Nursin


RFS Level Per Nursing on Admit:  4+=Very High











MICHELE WILKINS DO Dec 26, 2018 10:30

## 2018-12-26 NOTE — CARDIOLOGY PROGRESS NOTE
Subjective


Date Seen by Provider:  Dec 26, 2018


Time Seen by Provider:  13:32


Subjective/Events-last exam


patient was seen and evaluated, sitting up in a chair, feeling better, still 

having some dyspnea and cough.


Review of Systems


General:  No Chills, No Night Sweats, No Fatigue, No Malaise, No Appetite, No 

Other


HEENT:  No Head Aches, No Visual Changes, No Eye Pain, No Ear Pain, No Dysphasia

, No Sinus Congestion, No Post Nasal Drip, No Sore Throat, No Other


Pulmonary:  Dyspnea, Cough; No Pleuritic Chest Pain, No Other


Cardiovascular:  Edema; No: Chest Pain, Palpitations, Orthopnea, Paroxysmal 

Noc. Dyspnea, Lt Headedness, Other





Objective-Cardiology


Exam


Last Set of Vital Signs





Vital Signs








 18





 06:00 08:25 10:21


 


Temp 97.6  


 


Pulse  90 


 


Resp 20  


 


B/P (MAP)  126/64 (84) 


 


Pulse Ox   97


 


O2 Delivery   Nasal Cannula


 


O2 Flow Rate   2.00





Capillary Refill :


I&O











Intake and Output 


 


 18





 00:00


 


Intake Total 2825 ml


 


Output Total 350 ml


 


Balance 2475 ml


 


 


 


Intake Oral 0 ml


 


IV Total 615 ml


 


Tube Feeding 1440 ml


 


Other 770 ml


 


Output Urine Total 350 ml


 


# Voids 9


 


# Bowel Movements 1








General:  Alert, Cooperative, No Acute Distress


HEENT:  Atraumatic, PERRLA, EOMI, Mucous Memb Moist/Pink


Lungs:  Other (bilat rhonchi)


Heart:  Regular Rate, Normal S1, Normal S2


Abdomen:  Normal Bowel Sounds, Soft, No Tenderness, Other (Peg Tube in place)


Extremities:  No Clubbing, Other (+1 edema BLE)


Skin:  No Rashes


Neuro:  Normal Speech, Other (Confusion strength 4/5 LES 4-/5 Upper limbs)





Results


Lab


Laboratory Tests


18 11:00














A/P-Cardiology


Admission Diagnosis


Debility


CAD


HTN


Chronic atrial fibrillation





Assessment/Plan


Debility, continue on physical therapy.





Status post respiratory failure, pneumonia, silent aspiration, on Antibiotics, 

followed by primary care physician





Urosepsis, history of kidney stone and nephrostomy tube, on antibiotics and 

managed by primary care team





Silent aspiration, status post feeding tube placement





Coronary artery disease, history of CABG done in the remote past.  Patient had 

a stent done in 2002 using MultiLink 2.518 mm to the proximal right 

coronary artery, 2017 had a Cypher stent 3.530 mm to the proximal and mid 

circumflex artery.  Has been followed and managed by primary cardiologist Dr. David in [place].  Continue to follow





History of congestive heart failure, reported ejection fraction 54 percent.  

MPI in , continue to monitor





Paroxysmal atrial fibrillation maintained on amiodarone, intolerant to oral 

anticoagulation secondary to recurrent nosebleed, on his transfer from [facility] 

patient is still on Loading dose of Amiodarone until , Patient is 

maintained on Cardizem, Lopressor and Digoxin, continue to monitor, no changes 

are recommended





Patient has been maintained on mexiletine.  Probably due to ventricular 

fibrillation and history of shock from his defibrillator, known to have [facility] 

[facility] ICD implanted by Dr. stevens in [place]. Continue on Mexiletine





COPD, chronic dyspnea, followed by primary care physician





Chronic pedal edema, diuresis and monitor electrolytes





Diabetes mellitus, followed and managed by primary care physician





Clinical Quality Measures


DVT/VTE Risk/Contraindication:


Risk Factor Score Per Nursin


RFS Level Per Nursing on Admit:  4+=Very High











MARINA KUMAR MD Dec 26, 2018 13:33

## 2018-12-26 NOTE — OCCUPATIONAL THER DAILY NOTE
OT Current Status-Daily Note


Subjective


Pt. does not report pain, but does state multiple times during treatment that 

he wants to "go to my room."





Appearance


Pt. up in chair.  Pt. declines showering but does agree to spongebathe.





Mental Status/Objective


Patient Orientation:  Unable to Assess


Therapy Code Descriptions/Definitions 





Functional [place] Measure:


0=Not Assessed/NA        4=Minimal Assistance


1=Total Assistance        5=Supervision or Setup


2=Maximal Assistance  6=Modified [place]


3=Moderate Assistance 7=Complete [place]





ADL-Treatment


Therapy Code Descriptions/Definitions 





Functional [place] Measure:


0=Not Assessed/NA        4=Minimal Assistance


1=Total Assistance        5=Supervision or Setup


2=Maximal Assistance  6=Modified [place]


3=Moderate Assistance 7=Complete [place]








Therapy Quality Codes:


6    Independent with activity with or without an assistive device


5    Patient requires set up or clean up by helper.  Patient completes activity

  by  themselves


4    Supervision or touching assist (CGA). [place] provide cues , steadying 

assist


3    The helper provides less than half the effort to complete the activity


2    The helper provides more than half the effort to complete the activity


1    Dependent.  The helper does all the effort to complete an activity 


7    Patient refused to complete or attempt activity


9    The patient did not perform the activity before the current illness or 

injury


88  Not attempted due to Medical conditions or safety concerns


Bathing (FIM):  3 (Pt. washes chest and under arms.  Attempts to wash feet but 

can't reach them.  OT does this for him. OT washes netta area at a later time 

for him during transfer. )


Shower/Bathe Self (QC):  3


Lower Body Dressing (FIM):  2 (Pt. does not attempt to doff/don slipper socks.  

Holds up feet for OT to do this for him.  When pt. is encouraged, he states 

that he can't.)


Lower Body Dressing (QC):  2


On/Off Footwear (QC):  2


Toileting (FIM):  1 (Pt. requests urinal multiple times but refuses to attempt 

placing it himself.  OT does it for him.)


Toileting Hygiene (QC):  2


Transfers (B, C, W/C) (FIM):  4 (Min assist for sit-stand and transfer to chair.

)


After ADLs in room, pt. agrees to leave room via wheelchair.  Pt. does self 

propel with cues at first, but then states, "push me."  Pt. taken to therapy 

gym.  Pt. encouraged to complete UE exercises with nut/bolt activity, to 

increase UE strength and fine motor coordination.  Pt. does this for awhile, 

but then starts to request to go back to his room.  Pt. states this several 

times, and is taken back to room.  Transfers to reclining chair with min 

assist.  All needs are met.





Education


OT Patient Education:  Correct positioning, Exercise program, Modified ADL 

techniques, Progress toward Goal/Update tx plan, Purpose of tx/functional 

activities, Reviewed precautions, Rehab process, Transfer techniques


Teaching Recipient:  Patient


Teaching Methods:  Demonstration, Discussion


Response to Teaching:  Verbalize Understanding, Return Demonstration





OT Short Term Goals


Short Term Goals


Time Frame:  Dec 31, 2018


Eating(FIM):  4


Grooming(FIM):  3


Upper Body Dressing(FIM):  3


Lower Body Dressing(FIM):  3


Toileting(FIM):  4


Transfers (B,C,W/C) (FIM):  5


Toilet/Commode Transfer(FIM):  4


Additional Short Term Goals:  1-Demonstrate ADL Tasks, 2-Verbalize Understanding

, 3-ImproveStrength/Farnaz


1=Demonstrate adherence to instructed precautions during ADL tasks.


2=Patient will verbalize/demonstrate understanding of assistive devices/

modifications for ADL.


3=Patient will improve strength/tolerance for activity to enable patient to 

perform ADL's.





OT Long Term Goals


Long Term Goals


Time Frame:  2019


Eating (FIM):  5


Eating (QC):  5


Groomin


Oral Hygiene (QC):  4


Bathing(FIM):  3


Shower/Bathe Self (QC):  3


Upper Body Dressing(FIM):  5


Upper Body Dressing (QC):  4


Lower Body Dressing(FIM):  4


Lower Body Dressing (QC):  4


On/Off Footwear (QC):  4


Toileting(FIM):  5


Toileting Hygiene (QC):  5


Transfers (B,C,W/C) (FIM):  5


Toilet/Commode Transfer(FIM):  5


Toilet/Commode Transfer (QC):  4


Additional Goals:  1-Demonstrate ADL Tasks, 2-Verbalize Understanding, 3-

ImproveStrength/Farnaz


1=Demonstrate adherence to instructed precautions during ADL tasks.


2=Patient will verbalize/demonstrate understanding of assistive devices/

modifications for ADL.


3=Patient will improve strength/tolerance for activity to enable patient to 

perform ADL's.





OT Education/Plan


Problem List/Assessment


Assessment:  Decreased Activ Tolerance, Decreased Safety Aware, Decreased UE 

Strength, Dependent Transfers, Impaired Bed Mobility, Impaired Funct Balance, 

Impaired I ADL's, Impaired Self-Care Skills, Restricted Funct UE ROM





Discharge Recommendations


Plan/Recommendations:  Continue POC


Therapy D/C Recommendations:  24 hr Supervision





Treatment Plan/Plan of Care


Treatment,Training & Education:  Yes


Patient would benefit from OT for education, treatment and training to promote 

independence in ADL's, mobility, safety and/or upper extremity function for ADL'

s.


Plan of Care:  ADL Retraining, Functional Mobility, Group Exercise/Act as Ind, 

UE Funct Exercise/Act


Treatment Duration:  2019


Frequency:  At least 5 of 7 days/Wk (IRF)


Estimated Hrs Per Day:  1.5 hours per day


Agreement:  Yes


Rehab Potential:  Guarded





Time/GCodes


Start Time:  09:00


Stop Time:  10:00


Total Time Billed (hr/min):  60


Billed Treatment Time


1, ADL x 30minutes, FA x 30minutes











ALCIDES OSUNA OT Dec 26, 2018 14:25

## 2018-12-26 NOTE — NUR
UPDATED MED REC WITH THE DISCHARGE MEDICATION LIST TO WHAT THE PATIENT WAS TAKING PRIOR TO 
DISCHARGE FROM [facility]. NOTE THE FOLLOWING CHANGES WERE MADE WHEN THE PATIENT DISCHARGED TO 
[facility] THAT ARE NOT CURRENTLY ON THE HOME MED REC.



START TAKING:

ASPIRIN 81MG DAILY CHEWABLE

LIPITOR 40MG HS

CEFEPIME 2000MG Q8H X 5 DAYS

DILTIAZEM 60MG Q8H

GUAIFENESIN 100MG/5ML 10ML TID

HYDROPHILIC WOUND DRESSING APPLY TO AFFECTED AREA BID

DUONEB Q6H PRN

CULTURELLE BID

METOPROLOL TARTRATE 25MG 1/2 TAB BID

PHENOL SPRAY 2 SPRAYS EVERY 2 HOURS PRN

VANCOMYCIN 1500MG DAILY X 8 DAYS



CHANGE HOW YOU TAKE:

AMIODARONE - TAKE 400MG BID UNTIL 12-27-18 THEN TAKE 200MG BID



STOP TAKING:

METOPROLOL SUCCINATE

## 2018-12-26 NOTE — DIAGNOSTIC IMAGING REPORT
INDICATION: Rales in the left lower lobe.



TIME OF EXAM: 2:44 PM



COMPARISON: Correlation is made with prior chest from 12/08/2018.



FINDINGS: The heart is enlarged but stable. There are changes of

median sternotomy. Cardiac defibrillator remains in place. There

are central congestive changes without evidence of overt failure.

No effusion or pneumothorax is seen.



IMPRESSION: Central congestion without overt failure.



Dictated by: 



  Dictated on workstation # [id number]

## 2018-12-27 VITALS — DIASTOLIC BLOOD PRESSURE: 68 MMHG | SYSTOLIC BLOOD PRESSURE: 113 MMHG

## 2018-12-27 VITALS — SYSTOLIC BLOOD PRESSURE: 144 MMHG | DIASTOLIC BLOOD PRESSURE: 86 MMHG

## 2018-12-27 VITALS — DIASTOLIC BLOOD PRESSURE: 64 MMHG | SYSTOLIC BLOOD PRESSURE: 113 MMHG

## 2018-12-27 VITALS — SYSTOLIC BLOOD PRESSURE: 136 MMHG | DIASTOLIC BLOOD PRESSURE: 82 MMHG

## 2018-12-27 VITALS — SYSTOLIC BLOOD PRESSURE: 112 MMHG | DIASTOLIC BLOOD PRESSURE: 55 MMHG

## 2018-12-27 RX ADMIN — IPRATROPIUM BROMIDE AND ALBUTEROL SULFATE SCH ML: .5; 3 SOLUTION RESPIRATORY (INHALATION) at 00:38

## 2018-12-27 RX ADMIN — PHENAZOPYRIDINE HYDROCHLORIDE SCH MG: 100 TABLET ORAL at 09:16

## 2018-12-27 RX ADMIN — IPRATROPIUM BROMIDE AND ALBUTEROL SULFATE SCH ML: .5; 3 SOLUTION RESPIRATORY (INHALATION) at 06:12

## 2018-12-27 RX ADMIN — DIGOXIN SCH MG: 125 TABLET ORAL at 09:16

## 2018-12-27 RX ADMIN — IPRATROPIUM BROMIDE AND ALBUTEROL SULFATE SCH ML: .5; 3 SOLUTION RESPIRATORY (INHALATION) at 02:32

## 2018-12-27 RX ADMIN — FUROSEMIDE SCH MG: 20 TABLET ORAL at 07:46

## 2018-12-27 RX ADMIN — POTASSIUM CHLORIDE SCH MEQ: 1.5 POWDER, FOR SOLUTION ORAL at 07:46

## 2018-12-27 RX ADMIN — INSULIN ASPART SCH UNIT: 100 INJECTION, SOLUTION INTRAVENOUS; SUBCUTANEOUS at 16:56

## 2018-12-27 RX ADMIN — DILTIAZEM HYDROCHLORIDE SCH MG: 60 TABLET, FILM COATED ORAL at 22:26

## 2018-12-27 RX ADMIN — ASPIRIN 81 MG CHEWABLE TABLET SCH MG: 81 TABLET CHEWABLE at 09:16

## 2018-12-27 RX ADMIN — MEXILETINE HYDROCHLORIDE SCH MG: 150 CAPSULE ORAL at 08:00

## 2018-12-27 RX ADMIN — DOCUSATE SODIUM SCH MG: 50 LIQUID ORAL at 09:17

## 2018-12-27 RX ADMIN — INSULIN ASPART SCH UNIT: 100 INJECTION, SOLUTION INTRAVENOUS; SUBCUTANEOUS at 20:58

## 2018-12-27 RX ADMIN — Medication SCH EACH: at 09:16

## 2018-12-27 RX ADMIN — GUAIFENESIN AND DEXTROMETHORPHAN SCH ML: 100; 10 SYRUP ORAL at 09:16

## 2018-12-27 RX ADMIN — AMIODARONE HYDROCHLORIDE SCH MG: 200 TABLET ORAL at 09:16

## 2018-12-27 RX ADMIN — GUAIFENESIN AND DEXTROMETHORPHAN SCH ML: 100; 10 SYRUP ORAL at 20:45

## 2018-12-27 RX ADMIN — SODIUM BICARBONATE SCH ML: 84 INJECTION, SOLUTION INTRAVENOUS at 09:00

## 2018-12-27 RX ADMIN — DOCUSATE SODIUM SCH MG: 50 LIQUID ORAL at 18:33

## 2018-12-27 RX ADMIN — IPRATROPIUM BROMIDE AND ALBUTEROL SULFATE SCH ML: .5; 3 SOLUTION RESPIRATORY (INHALATION) at 13:59

## 2018-12-27 RX ADMIN — ATORVASTATIN CALCIUM SCH MG: 40 TABLET, FILM COATED ORAL at 20:44

## 2018-12-27 RX ADMIN — DILTIAZEM HYDROCHLORIDE SCH MG: 60 TABLET, FILM COATED ORAL at 13:16

## 2018-12-27 RX ADMIN — IPRATROPIUM BROMIDE AND ALBUTEROL SULFATE SCH ML: .5; 3 SOLUTION RESPIRATORY (INHALATION) at 11:58

## 2018-12-27 RX ADMIN — INSULIN ASPART SCH UNIT: 100 INJECTION, SOLUTION INTRAVENOUS; SUBCUTANEOUS at 06:00

## 2018-12-27 RX ADMIN — SODIUM CHLORIDE SCH MLS/HR: 900 INJECTION INTRAVENOUS at 22:30

## 2018-12-27 RX ADMIN — FUROSEMIDE SCH MG: 20 TABLET ORAL at 18:32

## 2018-12-27 RX ADMIN — ANORECTAL OINTMENT SCH APPLIC: 15.7; .44; 24; 20.6 OINTMENT TOPICAL at 20:59

## 2018-12-27 RX ADMIN — VANCOMYCIN HYDROCHLORIDE SCH MLS/HR: 500 INJECTION, POWDER, LYOPHILIZED, FOR SOLUTION INTRAVENOUS at 18:33

## 2018-12-27 RX ADMIN — METOPROLOL TARTRATE SCH MG: 25 TABLET, FILM COATED ORAL at 09:16

## 2018-12-27 RX ADMIN — PHENAZOPYRIDINE HYDROCHLORIDE SCH MG: 100 TABLET ORAL at 18:33

## 2018-12-27 RX ADMIN — IPRATROPIUM BROMIDE AND ALBUTEROL SULFATE SCH ML: .5; 3 SOLUTION RESPIRATORY (INHALATION) at 00:37

## 2018-12-27 RX ADMIN — SODIUM CHLORIDE SCH MLS/HR: 900 INJECTION INTRAVENOUS at 06:06

## 2018-12-27 RX ADMIN — Medication SCH EACH: at 20:44

## 2018-12-27 RX ADMIN — GUAIFENESIN AND DEXTROMETHORPHAN SCH ML: 100; 10 SYRUP ORAL at 13:12

## 2018-12-27 RX ADMIN — DILTIAZEM HYDROCHLORIDE SCH MG: 60 TABLET, FILM COATED ORAL at 06:06

## 2018-12-27 RX ADMIN — PHENAZOPYRIDINE HYDROCHLORIDE SCH MG: 100 TABLET ORAL at 13:12

## 2018-12-27 RX ADMIN — INSULIN ASPART SCH UNIT: 100 INJECTION, SOLUTION INTRAVENOUS; SUBCUTANEOUS at 11:41

## 2018-12-27 RX ADMIN — ANORECTAL OINTMENT SCH APPLIC: 15.7; .44; 24; 20.6 OINTMENT TOPICAL at 09:17

## 2018-12-27 RX ADMIN — IPRATROPIUM BROMIDE AND ALBUTEROL SULFATE SCH ML: .5; 3 SOLUTION RESPIRATORY (INHALATION) at 20:05

## 2018-12-27 RX ADMIN — AMIODARONE HYDROCHLORIDE SCH MG: 200 TABLET ORAL at 20:44

## 2018-12-27 RX ADMIN — MEXILETINE HYDROCHLORIDE SCH MG: 150 CAPSULE ORAL at 00:14

## 2018-12-27 RX ADMIN — METOPROLOL TARTRATE SCH MG: 25 TABLET, FILM COATED ORAL at 20:44

## 2018-12-27 RX ADMIN — MEXILETINE HYDROCHLORIDE SCH MG: 150 CAPSULE ORAL at 18:32

## 2018-12-27 RX ADMIN — SODIUM CHLORIDE SCH MLS/HR: 900 INJECTION INTRAVENOUS at 13:12

## 2018-12-27 RX ADMIN — IPRATROPIUM BROMIDE AND ALBUTEROL SULFATE SCH ML: .5; 3 SOLUTION RESPIRATORY (INHALATION) at 22:31

## 2018-12-27 NOTE — PROGRESS NOTE-HOSPITALIST
Subjective


HPI/CC On Admission


Date Seen by Provider:  Dec 27, 2018


Time Seen by Provider:  10:00


CC: Severe debility





HPI: This is a 78-year-old white male [facility] patient was 

admitted to inpatient rehabilitation for IV medication and severe debility 

resolution.  He was admitted to [facility] for 11 days requiring PEG 

tube placement due to silent aspiration and urinary stent placement due to 

obstructive stone in ureter and maintained on antibiotic regimen with 

aggressive physical therapy.  I reviewed old records and culture results from 

infectious disease and reviewed current lab results along with vital signs.  He 

is sitting up in chair drowsy but oriented 3 but appears to be severely 

chronically ill.  Her goal is to return to independent ADL function along with 

completion of IV antibiotics.


Subjective/Events-last exam


Patient doing well


Speech therapy working with him currently


Aspiration risk remains


Feeding tube functioning properly


We'll consult Dr. Timmons


Chest x-ray labs reviewed from yesterday





Review of Systems


Pulmonary:  Cough





Objective


Exam


Vital Signs





Vital Signs








  Date Time  Temp Pulse Resp B/P (MAP) Pulse Ox O2 Delivery O2 Flow Rate FiO2


 


18 08:00      Nasal Cannula 2.00 


 


18 06:13     92   


 


18 06:00  90 20 136/82 (100)    


 


18 17:18 97.6       





Capillary Refill :


General Appearance:  No Apparent Distress, WD/WN, Chronically ill


Respiratory:  Chest Non Tender, No Accessory Muscle Use, No Respiratory Distress

, Crackles, Wheezing


Cardiovascular:  Regular Rate, Rhythm, No Edema, No Gallop, No JVD, No Murmur, 

Normal Peripheral Pulses


Neurologic/Psychiatric:  Alert, Oriented x3, No Motor/Sensory Deficits, Normal 

Mood/Affect





Results/Procedures


Lab


Patient resulted labs reviewed.





Assessment/Plan


Assessment and Plan


Assess & Plan/Chief Complaint


Assessment:


MRSA pneumonia


Pseudomonas UTI


Severe debilitated state


CAD previous bypass graft


ICD defibrillator in place


Presbycusis


Sleep apnea


Chronic respiratory failure





Plan:


PEG tube feedings


Speech therapy


PT/OT


Long recovery expected


Consult Dr Chin is appreciated


Consult Dr Timmons





Diagnosis/Problems


Diagnosis/Problems





(1) MRSA pneumonia


Status:  Acute


Qualifiers:  


   Laterality:  unspecified laterality  Lung location:  unspecified part of 

lung  Qualified Codes:  J15.212 - Pneumonia due to methicillin resistant 

Staphylococcus aureus


(2) Pseudomonas urinary tract infection


Status:  Acute


(3) Debility


Status:  Acute


(4) Pyelonephritis


Status:  Acute


(5) Renal calculus, left


Status:  Acute


(6) Acute renal insufficiency


Status:  Resolved


Resolution Date/Time:  18 @ 17:54


(7) Diabetes mellitus, type 2


Status:  Chronic


Qualifiers:  


   Diabetes mellitus long term insulin use:  with long term use  Diabetes 

mellitus complication status:  with circulatory complication  Diabetes mellitus 

complication detail:  with other circulatory complications  Qualified Codes:  

E11.59 - Type 2 diabetes mellitus with other circulatory complications; Z79.4 - 

Long term (current) use of insulin


(8) Congestive heart failure


Status:  Chronic


Qualifiers:  


   Heart failure type:  unspecified  


(9) COPD (chronic obstructive pulmonary disease)


Status:  Chronic


Qualifiers:  


   COPD type:  unspecified COPD  Qualified Codes:  J44.9 - Chronic obstructive 

pulmonary disease, unspecified


(10) Paroxysmal atrial fibrillation


Status:  Chronic


(11) CAD (coronary artery disease)


Status:  Chronic


Qualifiers:  


   Coronary Disease-Associated Artery/Lesion type:  native artery  Native vs. 

transplanted heart:  native heart  Associated angina:  without angina  

Qualified Codes:  I25.10 - Atherosclerotic heart disease of native coronary 

artery without angina pectoris


(12) ICD (implantable cardioverter-defibrillator) in place


Status:  Chronic





Clinical Quality Measures


DVT/VTE Risk/Contraindication:


Risk Factor Score Per Nursin


RFS Level Per Nursing on Admit:  4+=Very High











MICHELE WILKINS DO Dec 27, 2018 11:08

## 2018-12-27 NOTE — NUR
gastric residual 5ml, jevity 1.5 1 can given per g-tube & water flush 50ml before & after 
tube feeding, pt changed again & repositioned

## 2018-12-27 NOTE — NUR
assessments & interventions completed, see assessments & interventions, back to bed from 
chair with 1 person assist & walker, had soft moderate brown stool, cleaned & Calmoseptine 
to bottom

## 2018-12-27 NOTE — PHYSICAL THERAPY DAILY NOTE
PT Daily Note-Current


Subjective


Agrees to walking, c/o fatigue ,





Pain





   Numeric Pain Scale:  0-No Pain





Transfers


Therapy Code Descriptions/Definitions 





Functional [place] Measure:


0=Not Assessed/NA        4=Minimal Assistance


1=Total Assistance        5=Supervision or Setup


2=Maximal Assistance  6=Modified [place]


3=Moderate Assistance 7=Complete [place]








Therapy Quality Codes:


6    Independent with activity with or without an assistive device


5    Patient requires set up or clean up by helper.  Patient completes activity

  by  themselves


4    Supervision or touching assist (CGA). [place] provide cues , steadying 

assist


3    The helper provides less than half the effort to complete the activity


2    The helper provides more than half the effort to complete the activity


1    Dependent.  The helper does all the effort to complete an activity 


7    Patient refused to complete or attempt activity


9    The patient did not perform the activity before the current illness or 

injury


88  Not attempted due to Medical conditions or safety concerns


sit to stand x 5 SBA, SPTs w/c to chair, SBA





Gait Training


Gait Assistive Device:  FWW


55ftx2, 65ftx2 FWW CGA w/c to follow, O2 2L, mask insitu





Exercises


Seated Therapy Exercises:  Ankle pumps, Sit to stand, Long arc quads, Hip 

flexion


Seated Reps:  12





Assessment


Current Status:  Good Progress





PT Short Term Goals


Short Term Goals


Time Frame:  Dec 31, 2018


Transfers (B,C,W/C) (FIM):  5


Gait (FIM):  2


Distance (FIM):  1=up to 49 ft


Gait Assistive Device:  FWW


Wheelchair Distance:  30'





PT Long Term Goals


Long Term Goals


PT Long Term Goals Time Frame:  2019


Transfers (B,C,W/C) (FIM):  6


Sit to Lying (QC):  6


Lying-Sitting on Side/Bed(QC):  6


Sit to Stand (QC):  6


Rollin


Roll Left to Right (QC):  6


Chair/Bed-to-Chair Xfer(QC):  6


Car Transfer (QC):  5


Does the Patient Walk:  Yes


Gait (FIM):  5 (household)


Gait distance (FIM):  9=919-90 ft


Walk 10 feet (QC):  6


Walk 10ft-Uneven Surface(QC):  6


Walk 50ft with 2 Turns (QC):  6


Walk 150 ft (QC):  88


Gait Level of Assist:  6


Gait Assistive Device:  FWW


Does the Pt use WC or Scooter?:  Yes


Wheelchair (FIM):  6


Wheelchair distance (FIM):  3=150 ft


Wheel 50 feet with 2 turns (QC:  6


Stairs (FIM):  88


1 Step (curb) (QC):  88


4 Steps (QC):  88


12 Steps (QC):  88


Picking up an Object (QC):  88





PT Plan


Treatment/Plan


Treatment Plan:  Continue Plan of Care


Treatment Plan:  Bed Mobility, Education, Functional Activity Farnaz, Functional 

Strength, Group Therapy, Gait, Safety, Therapeutic Exercise, Transfers


Treatment Duration:  2019


Frequency:  At least 5 of 7 days/Wk (IRF)


Estimated Hrs Per Day:  1.5 hours per day


Patient and/or Family Agrees t:  Yes





Safety Risks/Education


Patient Education:  Gait Training, Transfer Techniques, Correct Positioning, 

Safety Issues


Response to Teaching:  Reinforcement Needed





Time/GCodes


Time In:  1300


Time Out:  1330


Total Billed Treatment Time:  30


Total Billed Treatment


1,EX8m,GT12m


G Codes Necessary:  ROBERT Wooten PTA Dec 27, 2018 13:28

## 2018-12-27 NOTE — OCCUPATIONAL THER DAILY NOTE
OT Current Status-Daily Note


Subjective


Pt. up in chair.  Declines coming out of his room.





Appearance


Pt. is encouraged to walk, go to gym.  Pt. refuses.  Does agree to do therapy 

in his room.





Mental Status/Objective


Patient Orientation:  Unable to Assess


Therapy Code Descriptions/Definitions 





Functional [place] Measure:


0=Not Assessed/NA        4=Minimal Assistance


1=Total Assistance        5=Supervision or Setup


2=Maximal Assistance  6=Modified [place]


3=Moderate Assistance 7=Complete [place]





ADL-Treatment


Therapy Code Descriptions/Definitions 





Functional [place] Measure:


0=Not Assessed/NA        4=Minimal Assistance


1=Total Assistance        5=Supervision or Setup


2=Maximal Assistance  6=Modified [place]


3=Moderate Assistance 7=Complete [place]








Therapy Quality Codes:


6    Independent with activity with or without an assistive device


5    Patient requires set up or clean up by helper.  Patient completes activity

  by  themselves


4    Supervision or touching assist (CGA). [place] provide cues , steadying 

assist


3    The helper provides less than half the effort to complete the activity


2    The helper provides more than half the effort to complete the activity


1    Dependent.  The helper does all the effort to complete an activity 


7    Patient refused to complete or attempt activity


9    The patient did not perform the activity before the current illness or 

injury


88  Not attempted due to Medical conditions or safety concerns


Pt. participates in multiple UE/LE exercises while seated in chair.  Completes 

shoulder flexion, elbow flexion, finger flexion, supination exercises, as well 

as multiple ankle pumps, leg lifts, and quad sets.  Pt. is encouraged to do10-

15 of each exercise, but will do several less and then say, "okay."  OT also 

brings in therapy hand sponge and pt. does squeeze this 20 times for increased 

hand strength.  Pt. also completes 3 bilateral UE exercises with red theraband 

x 10 reps in all planes.  Pt. will answer most questions with one word answers.

  Whenever pt. is asked how he does something particular, such as putting on 

shoes or shaving face, pt. states, "my wife."  All needs are met with chair 

alarm on after session.





Education


OT Patient Education:  Correct positioning, Exercise program, Home exercise 

program, Progress toward Goal/Update tx plan, Purpose of tx/functional 

activities, Reviewed precautions, Rehab process, Transfer techniques


Teaching Recipient:  Patient


Teaching Methods:  Demonstration, Discussion


Response to Teaching:  Verbalize Understanding, Return Demonstration





OT Short Term Goals


Short Term Goals


Time Frame:  Dec 31, 2018


Eating(FIM):  4


Grooming(FIM):  3


Upper Body Dressing(FIM):  3


Lower Body Dressing(FIM):  3


Toileting(FIM):  4


Transfers (B,C,W/C) (FIM):  5


Toilet/Commode Transfer(FIM):  4


Additional Short Term Goals:  1-Demonstrate ADL Tasks, 2-Verbalize Understanding

, 3-ImproveStrength/Farnaz


1=Demonstrate adherence to instructed precautions during ADL tasks.


2=Patient will verbalize/demonstrate understanding of assistive devices/

modifications for ADL.


3=Patient will improve strength/tolerance for activity to enable patient to 

perform ADL's.





OT Long Term Goals


Long Term Goals


Time Frame:  2019


Eating (FIM):  5


Eating (QC):  5


Groomin


Oral Hygiene (QC):  4


Bathing(FIM):  3


Shower/Bathe Self (QC):  3


Upper Body Dressing(FIM):  5


Upper Body Dressing (QC):  4


Lower Body Dressing(FIM):  4


Lower Body Dressing (QC):  4


On/Off Footwear (QC):  4


Toileting(FIM):  5


Toileting Hygiene (QC):  5


Transfers (B,C,W/C) (FIM):  5


Toilet/Commode Transfer(FIM):  5


Toilet/Commode Transfer (QC):  4


Additional Goals:  1-Demonstrate ADL Tasks, 2-Verbalize Understanding, 3-

ImproveStrength/Farnaz


1=Demonstrate adherence to instructed precautions during ADL tasks.


2=Patient will verbalize/demonstrate understanding of assistive devices/

modifications for ADL.


3=Patient will improve strength/tolerance for activity to enable patient to 

perform ADL's.





OT Education/Plan


Problem List/Assessment


Assessment:  Decreased Activ Tolerance, Decreased Safety Aware, Decreased UE 

Strength, Dependent Transfers, Impaired Cognition, Impaired Funct Balance, 

Impaired I ADL's, Impaired Self-Care Skills





Discharge Recommendations


Plan/Recommendations:  Continue POC


Therapy D/C Recommendations:  24 hr Supervision, Home w/ Family Support, 

Scheduled Assistance





Treatment Plan/Plan of Care


Treatment,Training & Education:  Yes


Patient would benefit from OT for education, treatment and training to promote 

independence in ADL's, mobility, safety and/or upper extremity function for ADL'

s.


Plan of Care:  ADL Retraining, Functional Mobility, Group Exercise/Act as Ind, 

UE Funct Exercise/Act


Treatment Duration:  2019


Frequency:  At least 5 of 7 days/Wk (IRF)


Estimated Hrs Per Day:  1.5 hours per day


Agreement:  Yes


Rehab Potential:  Guarded





Time/GCodes


Start Time:  14:00


Stop Time:  14:45


Total Time Billed (hr/min):  45


Billed Treatment Time


1, Ex x 3











ALCIDES OSUNA OT Dec 27, 2018 16:01

## 2018-12-27 NOTE — NUR
fsbs 98 no ss insulin req, colace held due to loose stools, meds crushed & given per g-tube, 
bed alarm on as wife not staying tonight

## 2018-12-27 NOTE — PHYSICAL THERAPY DAILY NOTE
PT Daily Note-Current


Subjective


Pt. and wife in room. Pt. doesnt want his wife to leave to go home.  Wife 

encourages pt. to work hard to get gem and that staff will care for him in her 

absence. Pt. agrees to Rx





Pain





   Location:  No Pain Reported





Mental Status


Patient Orientation:  Person, Place


Attachments:  Oxygen (2L)





Transfers


Therapy Code Descriptions/Definitions 





Functional [place] Measure:


0=Not Assessed/NA        4=Minimal Assistance


1=Total Assistance        5=Supervision or Setup


2=Maximal Assistance  6=Modified [place]


3=Moderate Assistance 7=Complete [place]








Therapy Quality Codes:


6    Independent with activity with or without an assistive device


5    Patient requires set up or clean up by helper.  Patient completes activity

  by  themselves


4    Supervision or touching assist (CGA). [place] provide cues , steadying 

assist


3    The helper provides less than half the effort to complete the activity


2    The helper provides more than half the effort to complete the activity


1    Dependent.  The helper does all the effort to complete an activity 


7    Patient refused to complete or attempt activity


9    The patient did not perform the activity before the current illness or 

injury


88  Not attempted due to Medical conditions or safety concerns


Transfers (B, C, W/C) (FIM):  5


Scootin


Rollin


Supine to/from Sit:  5


Sit to/from Stand:  5


Bed to/from Chair:  5





Gait Training


Does the Patient Walk?:  Yes


Gait (FIM):  2


Distance (FIM):  8=493-40 ft (50x2,40x2)


Gait Level of Assist:  4


Gait Persons Needed:  1


Gait Assistive Device:  FWW


assist for O2 and w/c f/u.  min SOB, mask on pt.  narrow [place] with feet 

shuffling against one another, pt. attempts to broaden with cues





Wheelchair Training


Does the Pt Use a Wheelchair?:  Yes


Wheelchair (FIM):  3


Wheelchair Distance:  3=150 ft (150,100)


Wheelchair Level of Assist:  5


Type of Wheelchair:  Manual


reminders to lock brakes when still and when getting up down





Exercises


Supine Ex:  Bridging, Ankle pumps, Quad Set, Rolling, Heel Slides, Scooting, 

Straight leg raise, Hip abd/add


Supine Reps:  15


Seated Therapy Exercises:  Ankle pumps, Sit to stand, Long arc quads, Hip 

flexion


Seated Reps:  12





Treatments


up in recliner after Rx with alarm insitu and call bell at hand





Assessment


Current Status:  Good Progress


motivated to get home with his wife. responds to encouragement, doesnt like the 

idea of having to be on O2 at home





PT Short Term Goals


Short Term Goals


Time Frame:  Dec 31, 2018


Transfers (B,C,W/C) (FIM):  5


Gait (FIM):  2


Distance (FIM):  1=up to 49 ft


Gait Assistive Device:  FWW


Wheelchair Distance:  30'





PT Long Term Goals


Long Term Goals


PT Long Term Goals Time Frame:  2019


Transfers (B,C,W/C) (FIM):  6


Sit to Lying (QC):  6


Lying-Sitting on Side/Bed(QC):  6


Sit to Stand (QC):  6


Rollin


Roll Left to Right (QC):  6


Chair/Bed-to-Chair Xfer(QC):  6


Car Transfer (QC):  5


Does the Patient Walk:  Yes


Gait (FIM):  5 (household)


Gait distance (FIM):  9=364-48 ft


Walk 10 feet (QC):  6


Walk 10ft-Uneven Surface(QC):  6


Walk 50ft with 2 Turns (QC):  6


Walk 150 ft (QC):  88


Gait Level of Assist:  6


Gait Assistive Device:  FWW


Does the Pt use WC or Scooter?:  Yes


Wheelchair (FIM):  6


Wheelchair distance (FIM):  3=150 ft


Wheel 50 feet with 2 turns (QC:  6


Stairs (FIM):  88


1 Step (curb) (QC):  88


4 Steps (QC):  88


12 Steps (QC):  88


Picking up an Object (QC):  88





PT Plan


Treatment/Plan


Treatment Plan:  Continue Plan of Care


Treatment Plan:  Bed Mobility, Education, Functional Activity Farnaz, Functional 

Strength, Group Therapy, Gait, Safety, Therapeutic Exercise, Transfers


Treatment Duration:  2019


Frequency:  At least 5 of 7 days/Wk (IRF)


Estimated Hrs Per Day:  1.5 hours per day


Patient and/or Family Agrees t:  Yes





Safety Risks/Education


Patient Education:  Gait Training, Transfer Techniques, Correct Positioning, W/

C Management, Disease Process, Safety Issues


Teaching Recipient:  Patient


Teaching Methods:  Demonstration, Discussion


Response to Teaching:  Verbalize Understanding, Return Demonstration, 

Reinforcement Needed





Time/GCodes


Time In:  800


Time Out:  900


Total Billed Treatment Time:  60


Total Billed Treatment


1,WC10m,EX20m,GT15m,FA15m


G Codes Necessary:  ROBERT Wooten PTA Dec 27, 2018 08:55

## 2018-12-27 NOTE — OCCUPATIONAL THER DAILY NOTE
OT Current Status-Daily Note


Subjective


Pt. does not report pain.





Appearance


Pt. adamant that he does not want to shower.  Pt. encouraged.  Does report he 

will take a spongebath.





Mental Status/Objective


Patient Orientation:  Unable to Assess


Therapy Code Descriptions/Definitions 





Functional [place] Measure:


0=Not Assessed/NA        4=Minimal Assistance


1=Total Assistance        5=Supervision or Setup


2=Maximal Assistance  6=Modified [place]


3=Moderate Assistance 7=Complete [place]





ADL-Treatment


Therapy Code Descriptions/Definitions 





Functional [place] Measure:


0=Not Assessed/NA        4=Minimal Assistance


1=Total Assistance        5=Supervision or Setup


2=Maximal Assistance  6=Modified [place]


3=Moderate Assistance 7=Complete [place]








Therapy Quality Codes:


6    Independent with activity with or without an assistive device


5    Patient requires set up or clean up by helper.  Patient completes activity

  by  themselves


4    Supervision or touching assist (CGA). [place] provide cues , steadying 

assist


3    The helper provides less than half the effort to complete the activity


2    The helper provides more than half the effort to complete the activity


1    Dependent.  The helper does all the effort to complete an activity 


7    Patient refused to complete or attempt activity


9    The patient did not perform the activity before the current illness or 

injury


88  Not attempted due to Medical conditions or safety concerns


Bathing (FIM):  3 (Pt. washes chest and under arms.  OT washes rear netta area 

and legs.  Pt. washes front netta area.  Declines doffing shoes and socks stating

, "my feet are clean.")


Shower/Bathe Self (QC):  3


Upper Body (FIM):  4 (Pt. requires assistance orienting shirt, and turning it 

right side out.  Pt. able to don after this is done for him.)


Upper Body Dressing (QC):  4


Lower Body Dressing (FIM):  2 (Pt. refuses to doff/don socks to wash feet.  

States that his wife does this for him.  OT offers to assist him with this but 

pt. states, "no."  Pt. requires assistance to don pants and pull over hips.  OT 

is informed from nursing that spouse does not want pt. to have depends on, as 

he is allergic.)


Lower Body Dressing (QC):  2


Toileting (FIM):  1 (Noted when pt. stood that he was incontinent of bowel.  OT 

cleanses him from this.  Pt. also requests to have urinal several times, but can

't place the urinal himself without spilling everywhere.)


Toileting Hygiene (QC):  1


Transfers (B, C, W/C) (FIM):  4





Education


OT Patient Education:  Modified ADL techniques, Progress toward Goal/Update tx 

plan, Purpose of tx/functional activities, Reviewed precautions, Rehab process, 

Transfer techniques


Teaching Recipient:  Patient


Teaching Methods:  Demonstration


Response to Teaching:  Return Demonstration





OT Short Term Goals


Short Term Goals


Time Frame:  Dec 31, 2018


Eating(FIM):  4


Grooming(FIM):  3


Upper Body Dressing(FIM):  3


Lower Body Dressing(FIM):  3


Toileting(FIM):  4


Transfers (B,C,W/C) (FIM):  5


Toilet/Commode Transfer(FIM):  4


Additional Short Term Goals:  1-Demonstrate ADL Tasks, 2-Verbalize Understanding

, 3-ImproveStrength/Farnaz


1=Demonstrate adherence to instructed precautions during ADL tasks.


2=Patient will verbalize/demonstrate understanding of assistive devices/

modifications for ADL.


3=Patient will improve strength/tolerance for activity to enable patient to 

perform ADL's.





OT Long Term Goals


Long Term Goals


Time Frame:  2019


Eating (FIM):  5


Eating (QC):  5


Groomin


Oral Hygiene (QC):  4


Bathing(FIM):  3


Shower/Bathe Self (QC):  3


Upper Body Dressing(FIM):  5


Upper Body Dressing (QC):  4


Lower Body Dressing(FIM):  4


Lower Body Dressing (QC):  4


On/Off Footwear (QC):  4


Toileting(FIM):  5


Toileting Hygiene (QC):  5


Transfers (B,C,W/C) (FIM):  5


Toilet/Commode Transfer(FIM):  5


Toilet/Commode Transfer (QC):  4


Additional Goals:  1-Demonstrate ADL Tasks, 2-Verbalize Understanding, 3-

ImproveStrength/Farnaz


1=Demonstrate adherence to instructed precautions during ADL tasks.


2=Patient will verbalize/demonstrate understanding of assistive devices/

modifications for ADL.


3=Patient will improve strength/tolerance for activity to enable patient to 

perform ADL's.





OT Education/Plan


Problem List/Assessment


Assessment:  Decreased Activ Tolerance, Decreased Safety Aware, Decreased UE 

Strength, Dependent Transfers, Impaired Cognition, Impaired Coordination, 

Impaired Funct Balance, Impaired I ADL's, Impaired Self-Care Skills





Discharge Recommendations


Plan/Recommendations:  Continue POC


Therapy D/C Recommendations:  24 hr Supervision, Home w/ Family Support, 

Scheduled Assistance





Treatment Plan/Plan of Care


Treatment,Training & Education:  Yes


Patient would benefit from OT for education, treatment and training to promote 

independence in ADL's, mobility, safety and/or upper extremity function for ADL'

s.


Plan of Care:  ADL Retraining, Functional Mobility, Group Exercise/Act as Ind, 

UE Funct Exercise/Act


Treatment Duration:  2019


Frequency:  At least 5 of 7 days/Wk (IRF)


Estimated Hrs Per Day:  1.5 hours per day


Agreement:  Yes


Rehab Potential:  Guarded





Time/GCodes


Start Time:  09:30


Stop Time:  10:00


Total Time Billed (hr/min):  30


Billed Treatment Time


1, ADL x 2











ALCIDES OSUNA OT Dec 27, 2018 15:56

## 2018-12-27 NOTE — ST DYSPHAGIA EVALUATION
Speech Evaluation-General


Medical Diagnosis


disuse myopathy


Onset Date:  Dec 23, 2018





Therapy Diagnosis


Therapy Diagnosis:  Oropharyngeal dysphagia





Precautions


Precautions:  Fall


Precautions/Isolations:  Droplet Isolation, Fall Prevention





Medical History


Pertinent Medical History:  Atrial Fib, Arthritis, CABG, CAD, COPD, Dementia, 

GERD, MI, Neuropathy


Reviewed History:  Yes





Social History


Current Living Status:  Spouse





Speech PLF/Current-Dysphagia


Prior Level of Function


Patient states he was eating a soft diet prior to his hospitalization for 

pneumonia. While hospitalized at [facility] he was rendered unsafe for oral 

intake and had a PEG tube placement.





Subjective


Patient was pleasant and cooperative with evaluation.





Oral Motor Skills


Dentition:  Edentalous


Denture Type:  Full- Upper & Lower


Ability to Follow Directions:  Good


Oral Expression Ability:  Mild Impairment





Voice


Voice Phonatory-Based Quality:  Hoarse


Voice Pitch:  Mildly Low


Voice Loudness:  Limited Variation





Face


Facial Symmetry:  Symmetrical





Oral-Facial Assessment


Oral-Facial Dentition:  Normal


Labial Seal Description:  Reduced ROM


Smile:  Reduced ROM


Puff Cheeks:  Reduced Strength


Lingual Protrusion:  Abnormal


Lingual ROM:  Abnormal


Lingual Strength:  Abnormal


Pharynx Velopharyngeal Move.:  Normal


Volitional Dry Swallow:  Yes


Voluntary Cough:  Yes


Productive Cough:  Yes


Patient is noted to cough frequently with reported coarse lung sounds.


Productive Throat Clear:  Yes





Dysphagia Evaluation


Pharyngeal Phase:  Reduced Laryngeal Elevation


Funct. Velo/Pharyngeal Symptom:  Cough After Swallow, Wet Voice


Dietary Recommendations:  NPO


Liquid Recommendations:  NPO


Dysphagia Evaluation Summary


Patient is a 78 year old male who was recently hospitalized for pneumonia which 

resulted in a PEG tube placement. Trials of honey consistency liquid (1/2 tsp) 

and puree (1/2) were swallowed within normal limits. Patient coughs 

continuously and is still not a safe oral feeder. Patient's swallow trials will 

be repeated in a few sessions. Strengthening exercises will be completed in the 

mean time with his NPO status to continue.


Barriers to Learning


Patient has decreased cognition.





Speech Short Term Goals


Short Term Goals


Short Term Goals


1) Patient will be able to recall new information with 80% or greater given 

minimal cues.


2) Patient will be able to name items/pictures presented with 80% or greater 

given minimal cues.


3) Patient will utilize compensatory strategies for safety within his room with 

80% or greater given minimal cues.


4) Patient will complete pharyngeal exercises 5x/week in order to return to PO 

status.


5) Patient will tolerate trials of least restrictive diet level without s/s of 

aspiration.





Speech Long Term Goals


Long Term Goals


1) Patient will improve memory, problem solving and auditory processing in 

order to return safely home.


2) Patient will demonstrate a safe swallow function for oral intake for 

maintaining nutrition/hydration.





Speech-Plan


Patient/Family Goals


Patient/Family Goals:  


Patient plans to return home with his wife post rehab. Currently patient


has caregivers in the home for himself and his wife.





Treatment Plan


Speech Therapy Treatment Plan:  Continue Plan of Care


Patient continues to be NPO


Treatment Duration:  Dec 28, 2018


Frequency:  5 times per week


Estimated Hrs Per Day:  .5 hour per day


Rehab Potential:  Guarded


Barriers to Learning:  


Patient has decreased cognition.


Pt/Family Agrees to Plan:  Yes





Safety Risks/Education


Teaching Recipient:  Patient


Teaching Methods:  Discussion


Response to Teaching:  Verbalize Understanding


Education Topics Provided:  


Safety of oral intake and the reasons for his PEG tube placement.





Time


Speech Therapy Time In:  10:00


Speech Therapy Time Out:  10:30


Total Billed Time:  30


Billed Treatment Time


1ROSALBA BETHANIA ST Dec 27, 2018 15:23

## 2018-12-27 NOTE — CARDIOLOGY PROGRESS NOTE
Subjective


Date Seen by Provider:  Dec 27, 2018


Time Seen by Provider:  08:20


Subjective/Events-last exam


Patient is sitting up in bed, no new complaints. Reports dyspnea slightly 

improved today.


Review of Systems


General:  No Night Sweats, No Fatigue, No Malaise


HEENT:  No Visual Changes, No Dysphasia


Pulmonary:  Dyspnea, Cough


Cardiovascular:  Edema; No: Chest Pain, Palpitations, Paroxysmal Noc. Dyspnea


Gastrointestinal:  No: Nausea, Vomiting, Abdominal Pain


Genitourinary:  No Dysuria, No Frequency


Musculoskeletal:  No: neck pain, back pain


Neurological:  No: Weakness, Numbness





Objective-Cardiology


Exam


Last Set of Vital Signs





Vital Signs








 18





 17:18 06:00 06:13


 


Temp 97.6  


 


Pulse  90 


 


Resp  20 


 


B/P (MAP)  136/82 (100) 


 


Pulse Ox   92


 


O2 Delivery   Nasal Cannula


 


O2 Flow Rate   2.00





Capillary Refill :


I&O











Intake and Output 


 


 18





 00:00


 


Intake Total 3315 ml


 


Output Total 600 ml


 


Balance 2715 ml


 


 


 


Intake Oral 0 ml


 


IV Total 565 ml


 


Tube Feeding 2750 ml


 


Output Urine Total 600 ml


 


# Voids 8


 


# Urine Diapers 6


 


# Bowel Movements 2








General:  Alert, Cooperative, No Acute Distress


HEENT:  Atraumatic, PERRLA, EOMI, Mucous Memb Moist/Pink


Lungs:  Other (bilat rhonchi)


Heart:  Regular Rate, Normal S1, Normal S2


Abdomen:  Normal Bowel Sounds, Soft, No Tenderness, Other (Peg Tube in place)


Extremities:  No Clubbing, Other (+1 edema BLE)


Skin:  No Rashes


Neuro:  Normal Speech, Other (Confusion strength 4/5 LES 4-/5 Upper limbs)





Results


Lab


Laboratory Tests


18 11:00














A/P-Cardiology


Admission Diagnosis


Debility


CAD


HTN


Chronic atrial fibrillation





Assessment/Plan


Debility, continue on physical therapy.





Status post respiratory failure, pneumonia, silent aspiration, on Antibiotics, 

followed by primary care physician





Urosepsis, history of kidney stone and nephrostomy tube, on antibiotics and 

managed by primary care team





Silent aspiration, status post feeding tube placement





Coronary artery disease, history of CABG done in the remote past.  Patient had 

a stent done in 2002 using MultiLink 2.518 mm to the proximal right 

coronary artery, 2017 had a Cypher stent 3.530 mm to the proximal and mid 

circumflex artery.  Has been followed and managed by primary cardiologist Dr. David in [place].  Continue to follow





History of congestive heart failure, reported ejection fraction 54 percent.  

MPI in , continue to monitor





Paroxysmal atrial fibrillation maintained on amiodarone, intolerant to oral 

anticoagulation secondary to recurrent nosebleed, on his transfer from [facility] 

patient is still on Loading dose of Amiodarone until , Patient is 

maintained on Cardizem, Lopressor and Digoxin, continue to monitor, no changes 

are recommended





Patient has been maintained on mexiletine.  Probably due to ventricular 

fibrillation and history of shock from his defibrillator, known to have [employer] 

[facility] ICD implanted by Dr. stevens in [place]. Continue on Mexiletine





COPD, chronic dyspnea, followed by primary care physician





Chronic pedal edema,continue to diurese and monitor electrolytes





Diabetes mellitus, followed and managed by primary care physician





Clinical Quality Measures


DVT/VTE Risk/Contraindication:


Risk Factor Score Per Nursin


RFS Level Per Nursing on Admit:  4+=Very High











ABELARDO BLACKBURN Dec 27, 2018 08:22

## 2018-12-28 VITALS — DIASTOLIC BLOOD PRESSURE: 76 MMHG | SYSTOLIC BLOOD PRESSURE: 112 MMHG

## 2018-12-28 VITALS — DIASTOLIC BLOOD PRESSURE: 89 MMHG | SYSTOLIC BLOOD PRESSURE: 151 MMHG

## 2018-12-28 RX ADMIN — GUAIFENESIN AND DEXTROMETHORPHAN SCH ML: 100; 10 SYRUP ORAL at 14:49

## 2018-12-28 RX ADMIN — METOPROLOL TARTRATE SCH MG: 25 TABLET, FILM COATED ORAL at 08:26

## 2018-12-28 RX ADMIN — IPRATROPIUM BROMIDE AND ALBUTEROL SULFATE SCH ML: .5; 3 SOLUTION RESPIRATORY (INHALATION) at 06:00

## 2018-12-28 RX ADMIN — ANORECTAL OINTMENT SCH APPLIC: 15.7; .44; 24; 20.6 OINTMENT TOPICAL at 09:00

## 2018-12-28 RX ADMIN — Medication SCH EACH: at 08:20

## 2018-12-28 RX ADMIN — MEXILETINE HYDROCHLORIDE SCH MG: 150 CAPSULE ORAL at 00:10

## 2018-12-28 RX ADMIN — METOPROLOL TARTRATE SCH MG: 25 TABLET, FILM COATED ORAL at 21:52

## 2018-12-28 RX ADMIN — IPRATROPIUM BROMIDE AND ALBUTEROL SULFATE SCH ML: .5; 3 SOLUTION RESPIRATORY (INHALATION) at 10:43

## 2018-12-28 RX ADMIN — SODIUM CHLORIDE SCH MLS/HR: 900 INJECTION INTRAVENOUS at 06:07

## 2018-12-28 RX ADMIN — INSULIN ASPART SCH UNIT: 100 INJECTION, SOLUTION INTRAVENOUS; SUBCUTANEOUS at 21:21

## 2018-12-28 RX ADMIN — IPRATROPIUM BROMIDE AND ALBUTEROL SULFATE SCH ML: .5; 3 SOLUTION RESPIRATORY (INHALATION) at 15:26

## 2018-12-28 RX ADMIN — MEXILETINE HYDROCHLORIDE SCH MG: 150 CAPSULE ORAL at 08:20

## 2018-12-28 RX ADMIN — SODIUM CHLORIDE SCH MLS/HR: 900 INJECTION INTRAVENOUS at 21:52

## 2018-12-28 RX ADMIN — VANCOMYCIN HYDROCHLORIDE SCH MLS/HR: 500 INJECTION, POWDER, LYOPHILIZED, FOR SOLUTION INTRAVENOUS at 18:04

## 2018-12-28 RX ADMIN — PHENAZOPYRIDINE HYDROCHLORIDE SCH MG: 100 TABLET ORAL at 10:01

## 2018-12-28 RX ADMIN — GUAIFENESIN AND DEXTROMETHORPHAN SCH ML: 100; 10 SYRUP ORAL at 21:49

## 2018-12-28 RX ADMIN — GUAIFENESIN AND DEXTROMETHORPHAN SCH ML: 100; 10 SYRUP ORAL at 08:20

## 2018-12-28 RX ADMIN — PHENAZOPYRIDINE HYDROCHLORIDE SCH MG: 100 TABLET ORAL at 18:08

## 2018-12-28 RX ADMIN — PHENAZOPYRIDINE HYDROCHLORIDE SCH MG: 100 TABLET ORAL at 14:49

## 2018-12-28 RX ADMIN — IPRATROPIUM BROMIDE AND ALBUTEROL SULFATE SCH ML: .5; 3 SOLUTION RESPIRATORY (INHALATION) at 19:27

## 2018-12-28 RX ADMIN — DILTIAZEM HYDROCHLORIDE SCH MG: 60 TABLET, FILM COATED ORAL at 06:50

## 2018-12-28 RX ADMIN — DILTIAZEM HYDROCHLORIDE SCH MG: 60 TABLET, FILM COATED ORAL at 21:53

## 2018-12-28 RX ADMIN — IPRATROPIUM BROMIDE AND ALBUTEROL SULFATE SCH ML: .5; 3 SOLUTION RESPIRATORY (INHALATION) at 22:19

## 2018-12-28 RX ADMIN — IPRATROPIUM BROMIDE AND ALBUTEROL SULFATE SCH ML: .5; 3 SOLUTION RESPIRATORY (INHALATION) at 03:26

## 2018-12-28 RX ADMIN — ANORECTAL OINTMENT SCH APPLIC: 15.7; .44; 24; 20.6 OINTMENT TOPICAL at 21:52

## 2018-12-28 RX ADMIN — SODIUM BICARBONATE SCH ML: 84 INJECTION, SOLUTION INTRAVENOUS at 09:00

## 2018-12-28 RX ADMIN — DILTIAZEM HYDROCHLORIDE SCH MG: 60 TABLET, FILM COATED ORAL at 14:49

## 2018-12-28 RX ADMIN — INSULIN ASPART SCH UNIT: 100 INJECTION, SOLUTION INTRAVENOUS; SUBCUTANEOUS at 16:00

## 2018-12-28 RX ADMIN — INSULIN ASPART SCH UNIT: 100 INJECTION, SOLUTION INTRAVENOUS; SUBCUTANEOUS at 11:50

## 2018-12-28 RX ADMIN — DOCUSATE SODIUM SCH MG: 50 LIQUID ORAL at 21:49

## 2018-12-28 RX ADMIN — DOCUSATE SODIUM SCH MG: 50 LIQUID ORAL at 09:00

## 2018-12-28 RX ADMIN — Medication SCH EACH: at 21:49

## 2018-12-28 RX ADMIN — MEXILETINE HYDROCHLORIDE SCH MG: 150 CAPSULE ORAL at 16:59

## 2018-12-28 RX ADMIN — FUROSEMIDE SCH MG: 20 TABLET ORAL at 16:59

## 2018-12-28 RX ADMIN — ATORVASTATIN CALCIUM SCH MG: 40 TABLET, FILM COATED ORAL at 21:49

## 2018-12-28 RX ADMIN — DIGOXIN SCH MG: 125 TABLET ORAL at 08:20

## 2018-12-28 RX ADMIN — INSULIN ASPART SCH UNIT: 100 INJECTION, SOLUTION INTRAVENOUS; SUBCUTANEOUS at 06:00

## 2018-12-28 RX ADMIN — POTASSIUM CHLORIDE SCH MEQ: 1.5 POWDER, FOR SOLUTION ORAL at 06:50

## 2018-12-28 RX ADMIN — SODIUM CHLORIDE SCH MLS/HR: 900 INJECTION INTRAVENOUS at 14:50

## 2018-12-28 RX ADMIN — FUROSEMIDE SCH MG: 20 TABLET ORAL at 06:50

## 2018-12-28 RX ADMIN — ASPIRIN 81 MG CHEWABLE TABLET SCH MG: 81 TABLET CHEWABLE at 08:20

## 2018-12-28 RX ADMIN — AMIODARONE HYDROCHLORIDE SCH MG: 200 TABLET ORAL at 08:20

## 2018-12-28 NOTE — CARDIOLOGY PROGRESS NOTE
Subjective


Date Seen by Provider:  Dec 28, 2018


Time Seen by Provider:  12:46


Subjective/Events-last exam


Patient is sitting in a chair, feeling better, denied any chest pain.


Review of Systems


General:  No Chills, No Night Sweats, No Fatigue, No Malaise, No Appetite, No 

Other


HEENT:  No Head Aches, No Visual Changes, No Eye Pain, No Ear Pain, No Dysphasia

, No Sinus Congestion, No Post Nasal Drip, No Sore Throat, No Other


Pulmonary:  No Dyspnea, No Cough, No Pleuritic Chest Pain, No Other


Cardiovascular:  No: Chest Pain, Palpitations, Orthopnea, Paroxysmal Noc. 

Dyspnea, Edema, Lt Headedness, Other





Objective-Cardiology


Exam


Last Set of Vital Signs





Vital Signs








 18





 05:30 10:43


 


Temp 97.4 


 


Pulse 90 


 


Resp 20 


 


B/P (MAP) 112/76 (88) 


 


Pulse Ox  96


 


O2 Delivery  Nasal Cannula


 


O2 Flow Rate  2.00





Capillary Refill :


I&O











Intake and Output 


 


 18





 00:00


 


Intake Total 3430 ml


 


Output Total 1000 ml


 


Balance 2430 ml


 


 


 


Intake Oral 0 ml


 


IV Total 565 ml


 


Tube Feeding 2465 ml


 


Other 400 ml


 


Output Urine Total 1000 ml


 


# Urine Diapers 11


 


# Bowel Movements 6








General:  Alert, Cooperative, No Acute Distress


HEENT:  Atraumatic, PERRLA, EOMI, Mucous Memb Moist/Pink


Neck:  Supple, No JVD


Lungs:  Other (bilat rhonchi)


Heart:  Regular Rate, Normal S1, Normal S2


Abdomen:  Normal Bowel Sounds, Soft, No Tenderness, Other (Peg Tube in place)


Extremities:  No Clubbing, Other (+1 edema BLE)


Skin:  No Rashes


Neuro:  Normal Speech, Other (Confusion strength 4/5 LES 4-/5 Upper limbs)





A/P-Cardiology


Admission Diagnosis


Debility


CAD


HTN


Chronic atrial fibrillation





Assessment/Plan


Debility, continue on physical therapy.





Status post respiratory failure, pneumonia, silent aspiration, on Antibiotics, 

followed by primary care physician





Urosepsis, history of kidney stone and nephrostomy tube, on antibiotics and 

managed by primary care team





Silent aspiration, status post feeding tube placement





Coronary artery disease, history of CABG done in the remote past.  Patient had 

a stent done in 2002 using MultiLink 2.518 mm to the proximal right 

coronary artery, 2017 had a Cypher stent 3.530 mm to the proximal and mid 

circumflex artery.  Has been followed and managed by primary cardiologist Dr. David in [place].  Continue to follow





History of congestive heart failure, reported ejection fraction 54 percent.  

MPI in , continue to monitor





Paroxysmal atrial fibrillation maintained on amiodarone, intolerant to oral 

anticoagulation secondary to recurrent nosebleed, on his transfer from [facility] 

patient is still on Loading dose of Amiodarone until , Patient is 

maintained on Cardizem, Lopressor and Digoxin, continue to monitor, no changes 

are recommended





Patient has been maintained on mexiletine.  Probably due to ventricular 

fibrillation and history of shock from his defibrillator, known to have [employer] 

[facility] ICD implanted by Dr. stevens in [place]. Continue on Mexiletine





COPD, chronic dyspnea, followed by primary care physician





Chronic pedal edema,continue to diurese and monitor electrolytes





Diabetes mellitus, followed and managed by primary care physician





Clinical Quality Measures


DVT/VTE Risk/Contraindication:


Risk Factor Score Per Nursin


RFS Level Per Nursing on Admit:  4+=Very High











MARINA KUMAR MD Dec 28, 2018 12:47 pm

## 2018-12-28 NOTE — OCCUPATIONAL THER DAILY NOTE
OT Current Status-Daily Note


Subjective


Pt sitting in chair with spouse present. Pt denied pain.





Mental Status/Objective


Therapy Code Descriptions/Definitions 





Functional [place] Measure:


0=Not Assessed/NA        4=Minimal Assistance


1=Total Assistance        5=Supervision or Setup


2=Maximal Assistance  6=Modified [place]


3=Moderate Assistance 7=Complete [place]


Attachments:  Oxygen





ADL-Treatment


Spouse present and states she would like pt to shave. Pt attempted to shave 

using electric razor, but after minimal effort states he can't do anymore. Pt 

requires max assist to complete task. Pt states he does not want to shower today

, but does agree to sponge bath. Pt washed face, arms, chest, abdomen, 

bilateral upper legs, and netta area, but required assist for buttocks and 

bilateral lower legs. Donned pullover shirt with minimal assistance to orient 

and to pull down in back. Pt required assist to start pants over feet. Stood 

with minimal assistance for balance. Pt requires assist to pull pants up in 

back. Assist required to don socks. Pt states his wife usually does this for 

him. Min assist to don Velcro fasten shoes. Pt declined to complete oral 

hygiene at this time. Combed hair with minimal assistance. Pt requires 

encouragement to attempt tasks prior to receiving assistance.


Therapy Code Descriptions/Definitions 





Functional [place] Measure:


0=Not Assessed/NA        4=Minimal Assistance


1=Total Assistance        5=Supervision or Setup


2=Maximal Assistance  6=Modified [place]


3=Moderate Assistance 7=Complete [place]








Therapy Quality Codes:


6    Independent with activity with or without an assistive device


5    Patient requires set up or clean up by helper.  Patient completes activity

  by  themselves


4    Supervision or touching assist (CGA). [place] provide cues , steadying 

assist


3    The helper provides less than half the effort to complete the activity


2    The helper provides more than half the effort to complete the activity


1    Dependent.  The helper does all the effort to complete an activity 


7    Patient refused to complete or attempt activity


9    The patient did not perform the activity before the current illness or 

injury


88  Not attempted due to Medical conditions or safety concerns


Grooming (FIM):  2


Bathing (FIM):  3


Shower/Bathe Self (QC):  3


Upper Body (FIM):  4


Lower Body Dressing (FIM):  2


Lower Body Dressing (QC):  2





Other Treatment


Pt completed bilateral UE exercises to increase strength for ADLs and 

transfers. Pt completed two exercises x10 reps with red theraband. Pt requires 

cues for participation and correct exercise technique. Bilateral hand  

exercises x20 reps with mild resistance therapy foam to increase  strength. 

Pt sitting in chair with needs met, spouse present, chair alarm in place, and 

telesitter in room.





OT Short Term Goals


Short Term Goals


Time Frame:  Dec 31, 2018


Eating(FIM):  4


Grooming(FIM):  3


Upper Body Dressing(FIM):  3


Lower Body Dressing(FIM):  3


Toileting(FIM):  4


Transfers (B,C,W/C) (FIM):  5


Toilet/Commode Transfer(FIM):  4


Additional Short Term Goals:  1-Demonstrate ADL Tasks, 2-Verbalize Understanding

, 3-ImproveStrength/Farnaz


1=Demonstrate adherence to instructed precautions during ADL tasks.


2=Patient will verbalize/demonstrate understanding of assistive devices/

modifications for ADL.


3=Patient will improve strength/tolerance for activity to enable patient to 

perform ADL's.





OT Long Term Goals


Long Term Goals


Time Frame:  2019


Eating (FIM):  5


Eating (QC):  5


Groomin


Oral Hygiene (QC):  4


Bathing(FIM):  3


Shower/Bathe Self (QC):  3


Upper Body Dressing(FIM):  5


Upper Body Dressing (QC):  4


Lower Body Dressing(FIM):  4


Lower Body Dressing (QC):  4


On/Off Footwear (QC):  4


Toileting(FIM):  5


Toileting Hygiene (QC):  5


Transfers (B,C,W/C) (FIM):  5


Toilet/Commode Transfer(FIM):  5


Toilet/Commode Transfer (QC):  4


Additional Goals:  1-Demonstrate ADL Tasks, 2-Verbalize Understanding, 3-

ImproveStrength/Farnaz


1=Demonstrate adherence to instructed precautions during ADL tasks.


2=Patient will verbalize/demonstrate understanding of assistive devices/

modifications for ADL.


3=Patient will improve strength/tolerance for activity to enable patient to 

perform ADL's.





OT Education/Plan


Discharge Recommendations


Plan/Recommendations:  Continue POC





Treatment Plan/Plan of Care


Patient would benefit from OT for education, treatment and training to promote 

independence in ADL's, mobility, safety and/or upper extremity function for ADL'

s.


Plan of Care:  ADL Retraining, Functional Mobility, Group Exercise/Act as Ind, 

UE Funct Exercise/Act


Treatment Duration:  2019


Frequency:  At least 5 of 7 days/Wk (IRF)


Estimated Hrs Per Day:  1.5 hours per day


Agreement:  Yes


Rehab Potential:  Guarded





Time/GCodes


Start Time:  11:15


Stop Time:  12:00


Total Time Billed (hr/min):  45


Billed Treatment Time


1 visit, ADLx2(35minutes), EX(10minutes)











AMY GONZALEZ OT Dec 28, 2018 13:06

## 2018-12-28 NOTE — NUR
PRIOR TO B/P MEDICATIONS PULSE WAS 91 AND B/P /72. PATIENT ALSO REPOSITIONED AND 
CHANGED PER THIS RN AT THIS TIME.

## 2018-12-28 NOTE — THERAPY GROUP DAILY NOTE
Therapy Daily Group Note


Patient Education Topic


Other List Below (memory strategies)





Exercises


Other (breathing exercises)





Other/Notes


Pt. participated in group PT OT session. Pt. came and went via w/c and assist 

for portable O2 at 2L and mask insitu.  Pts. introduced themselves, shared 

their best joke and also shared their own favorite memory strategies for 

medications, appointments etc.  Pts. all participated in memory activity with 

matching images.  Some review of ARU practices was done with regard to Saturday 

schedule etc.  Pt. to room after with bell at hand, O2 insitu, wife at side and 

needs met.


Start Time:  13:00


Stop Time:  14:20


Total Billed Treatment Time:  80


Total Billed Treatment


1,GRP











ROBERT FOREMAN PTA Dec 28, 2018 14:48

## 2018-12-28 NOTE — PROGRESS NOTE-HOSPITALIST
Subjective


HPI/CC On Admission


Date Seen by Provider:  Dec 28, 2018


Time Seen by Provider:  11:00


CC: Severe debility





HPI: This is a 78-year-old white male [facility] patient was 

admitted to inpatient rehabilitation for IV medication and severe debility 

resolution.  He was admitted to [facility] for 11 days requiring PEG 

tube placement due to silent aspiration and urinary stent placement due to 

obstructive stone in ureter and maintained on antibiotic regimen with 

aggressive physical therapy.  I reviewed old records and culture results from 

infectious disease and reviewed current lab results along with vital signs.  He 

is sitting up in chair drowsy but oriented 3 but appears to be severely 

chronically ill.  Her goal is to return to independent ADL function along with 

completion of IV antibiotics.


Subjective/Events-last exam


Patient doing well


Wife thinks everything is going better


Still nothing by mouth


PEG tube functioning properly


Bowels are moving





Review of Systems


General:  Fatigue


Pulmonary:  Cough





Objective


Exam


Vital Signs





Vital Signs








  Date Time  Temp Pulse Resp B/P (MAP) Pulse Ox O2 Delivery O2 Flow Rate FiO2


 


18 10:43     96 Nasal Cannula 2.00 


 


18 05:30 97.4 90 20 112/76 (88)    





Capillary Refill :


General Appearance:  No Apparent Distress, WD/WN, Chronically ill


Respiratory:  Chest Non Tender, Normal Breath Sounds, No Accessory Muscle Use, 

No Respiratory Distress, Crackles, Decreased Breath Sounds


Cardiovascular:  Regular Rate, Rhythm, No Edema, No Gallop, No JVD, No Murmur, 

Normal Peripheral Pulses


Neurologic/Psychiatric:  Alert, Oriented x3, No Motor/Sensory Deficits, Normal 

Mood/Affect





Results/Procedures


Lab


Patient resulted labs reviewed.





Assessment/Plan


Assessment and Plan


Assess & Plan/Chief Complaint


Assessment:


MRSA pneumonia


Pseudomonas UTI


Severe debilitated state


CAD previous bypass graft


ICD defibrillator in place


Presbycusis


Sleep apnea


Chronic respiratory failure





Plan:


PEG tube feedings


Speech therapy


PT/OT


Long recovery expected


Consult Dr Chin is appreciated


Consult Dr Timmons





Diagnosis/Problems


Diagnosis/Problems





(1) MRSA pneumonia


Status:  Acute


Qualifiers:  


   Laterality:  unspecified laterality  Lung location:  unspecified part of 

lung  Qualified Codes:  J15.212 - Pneumonia due to methicillin resistant 

Staphylococcus aureus


(2) Pseudomonas urinary tract infection


Status:  Acute


(3) Debility


Status:  Acute


(4) Pyelonephritis


Status:  Acute


(5) Renal calculus, left


Status:  Acute


(6) Acute renal insufficiency


Status:  Resolved


Resolution Date/Time:  18 @ 17:54


(7) Diabetes mellitus, type 2


Status:  Chronic


Qualifiers:  


   Diabetes mellitus long term insulin use:  with long term use  Diabetes 

mellitus complication status:  with circulatory complication  Diabetes mellitus 

complication detail:  with other circulatory complications  Qualified Codes:  

E11.59 - Type 2 diabetes mellitus with other circulatory complications; Z79.4 - 

Long term (current) use of insulin


(8) Congestive heart failure


Status:  Chronic


Qualifiers:  


   Heart failure type:  unspecified  


(9) COPD (chronic obstructive pulmonary disease)


Status:  Chronic


Qualifiers:  


   COPD type:  unspecified COPD  Qualified Codes:  J44.9 - Chronic obstructive 

pulmonary disease, unspecified


(10) Paroxysmal atrial fibrillation


Status:  Chronic


(11) CAD (coronary artery disease)


Status:  Chronic


Qualifiers:  


   Coronary Disease-Associated Artery/Lesion type:  native artery  Native vs. 

transplanted heart:  native heart  Associated angina:  without angina  

Qualified Codes:  I25.10 - Atherosclerotic heart disease of native coronary 

artery without angina pectoris


(12) ICD (implantable cardioverter-defibrillator) in place


Status:  Chronic





Clinical Quality Measures


DVT/VTE Risk/Contraindication:


Risk Factor Score Per Nursin


RFS Level Per Nursing on Admit:  4+=Very High











MICHELE WILKINS DO Dec 28, 2018 11:31

## 2018-12-28 NOTE — PHYSICAL THERAPY DAILY NOTE
PT Daily Note-Current


Subjective


Pt. i bed upon arrival.  Shakes his head "no" when he sees this PTA but is 

cooperative with encouragement.  This PTA reminding pt. that working and 

getting stronger gets him closer to home.  Wife enters during Rx with apparent 

home caregiver.  Pt.s states this caregiver is with them 6 hrs per day.  When 

pt. is asked if he uses O2 at home her remarks "No" but wife interjects that he 

does in fact have O2 at home.





Pain





   Location:  No Pain Reported





Mental Status


Patient Orientation:  Person, Place


Attachments:  Oxygen (2L)





Transfers


Therapy Code Descriptions/Definitions 





Functional [place] Measure:


0=Not Assessed/NA        4=Minimal Assistance


1=Total Assistance        5=Supervision or Setup


2=Maximal Assistance  6=Modified [place]


3=Moderate Assistance 7=Complete [place]








Therapy Quality Codes:


6    Independent with activity with or without an assistive device


5    Patient requires set up or clean up by helper.  Patient completes activity

  by  themselves


4    Supervision or touching assist (CGA). [place] provide cues , steadying 

assist


3    The helper provides less than half the effort to complete the activity


2    The helper provides more than half the effort to complete the activity


1    Dependent.  The helper does all the effort to complete an activity 


7    Patient refused to complete or attempt activity


9    The patient did not perform the activity before the current illness or 

injury


88  Not attempted due to Medical conditions or safety concerns


Transfers (B, C, W/C) (FIM):  4


Scootin


Rollin


Supine to/from Sit:  5


Sit to/from Stand:  4


Bed to/from Chair:  4


pt. comes from supine to sit SBA and SPTs bed to w/c and w/c to chair with FWW 

CGA and some instruction to safeguard O2 as pt. moves as if he is not attached 

to it.  Needs safety cues for all mobility





Gait Training


Does the Patient Walk?:  Yes


Gait (FIM):  2


Distance (FIM):  6=166-87 ft (60ftx3)


Gait Level of Assist:  4


Gait Persons Needed:  1


Gait Assistive Device:  FWW


pt. needs CGA and instruction for turns and safety and broader MARIBELL. Pt. again 

needs assist for w/c and O2 and doesnt give notice, just sitting when he is 

ready.  NEEDS CGA TO MIN ASSIST FOR ALL MOBILITY.





Wheelchair Training


Does the Pt Use a Wheelchair?:  Yes


Wheelchair (FIM):  5


Wheelchair Distance:  3=150 ft (150,100)


Wheelchair Level of Assist:  5 (NEEDS REMINDED OFTEN TO LOCK BRAKES BEFORE 

ENTERING OR EXITING W/C)


Type of Wheelchair:  Manual





Exercises


Supine Ex:  Bridging, Ankle pumps, Rolling, Heel Slides, Short Arc Quads, 

Scooting, Straight leg raise, Hip abd/add


Supine Reps:  10 (x2)


Seated Therapy Exercises:  Ankle pumps, Sit to stand, Long arc quads


Seated Reps:  8





Treatments


needed cleaned of BM and urine, pts wife refuses briefs stating she feels he is 

allergic. Pt is incont of BM and urine.





Assessment


Current Status:  Good Progress


pt. giving good effort in hopes he can return home soon, this appears possible 

if caregiver plays very active role





PT Short Term Goals


Short Term Goals


Time Frame:  Dec 31, 2018


Transfers (B,C,W/C) (FIM):  5


Gait (FIM):  2


Distance (FIM):  1=up to 49 ft


Gait Assistive Device:  FWW


Wheelchair Distance:  30'





PT Long Term Goals


Long Term Goals


PT Long Term Goals Time Frame:  2019


Transfers (B,C,W/C) (FIM):  6


Sit to Lying (QC):  6


Lying-Sitting on Side/Bed(QC):  6


Sit to Stand (QC):  6


Rollin


Roll Left to Right (QC):  6


Chair/Bed-to-Chair Xfer(QC):  6


Car Transfer (QC):  5


Does the Patient Walk:  Yes


Gait (FIM):  5 (household)


Gait distance (FIM):  7=989-35 ft


Walk 10 feet (QC):  6


Walk 10ft-Uneven Surface(QC):  6


Walk 50ft with 2 Turns (QC):  6


Walk 150 ft (QC):  88


Gait Level of Assist:  6


Gait Assistive Device:  FWW


Does the Pt use WC or Scooter?:  Yes


Wheelchair (FIM):  6


Wheelchair distance (FIM):  3=150 ft


Wheel 50 feet with 2 turns (QC:  6


Stairs (FIM):  88


1 Step (curb) (QC):  88


4 Steps (QC):  88


12 Steps (QC):  88


Picking up an Object (QC):  88





PT Plan


Treatment/Plan


Treatment Plan:  Continue Plan of Care


Treatment Plan:  Bed Mobility, Education, Functional Activity Farnaz, Functional 

Strength, Group Therapy, Gait, Safety, Therapeutic Exercise, Transfers


Treatment Duration:  2019


Frequency:  At least 5 of 7 days/Wk (IRF)


Estimated Hrs Per Day:  1.5 hours per day


Patient and/or Family Agrees t:  Yes





Safety Risks/Education


Patient Education:  Gait Training, Transfer Techniques, Correct Positioning, W/

C Management, Disease Process, Safety Issues


Teaching Recipient:  Patient, Significant Other


Teaching Methods:  Demonstration, Discussion


Response to Teaching:  Verbalize Understanding, Return Demonstration, 

Reinforcement Needed





Time/GCodes


Time In:  900


Time Out:  1000


Total Billed Treatment Time:  60


Total Billed Treatment


1,EX15m,FA25m,GT20m


G Codes Necessary:  ROBERT Wooten PTA Dec 28, 2018 09:50

## 2018-12-28 NOTE — SPEECH THERAPY DAILY NOTE
Speech Daily Progress Note


Subjective


Date Seen by Provider:  Dec 28, 2018


Time Seen by Provider:  00:30


Patient was just finishing up his feeding by PEG tube when I arrived.





Objective


Patient completed glottal attack exercises for strengthening pharyngeal area to 

improve swallow function at 75% with moderate verbal cues.





Assessment


Assessment Current Status:  Fair Progress





Treatment Plan


Continue Plan of Care





Communication


Comprehension:  2


Expression:  2





Social Cognition


Social Interaction:  2


Problem Solvin


Memory:  2





Speech Short Term Goals


Short Term Goals


Short Term Goals


1) Patient will be able to recall new information with 80% or greater given 

minimal cues.


2) Patient will be able to name items/pictures presented with 80% or greater 

given minimal cues.


3) Patient will utilize compensatory strategies for safety within his room with 

80% or greater given minimal cues.


4) Patient will complete pharyngeal exercises 5x/week in order to return to PO 

status.


5) Patient will tolerate trials of least restrictive diet level without s/s of 

aspiration.





Speech Long Term Goals


Long Term Goals


1) Patient will improve memory, problem solving and auditory processing in 

order to return safely home.


2) Patient will demonstrate a safe swallow function for oral intake for 

maintaining nutrition/hydration.





Speech-Plan


Patient/Family Goals


Patient/Family Goals:  


Patient's plans are to return home with his wife and caregiver post rehab.





Treatment Plan


Speech Therapy Treatment Plan:  Continue Plan of Care


Patient is making slight progress with skilled ST services.


Treatment Duration:  Dec 31, 2018


Frequency:  5 times per week


Estimated Hrs Per Day:  .5 hour per day


Rehab Potential:  Guarded


Barriers to Learning:  


Patient has difficulty retaining new information.


Pt/Family Agrees to Plan:  Yes





Time


Speech Therapy Time In:  10:00


Speech Therapy Time Out:  10:30


Total Billed Time:  30


Billed Treatment Time


1, KAMRON Singh Dec 28, 2018 11:57

## 2018-12-28 NOTE — PULMONARY CONSULTATION
History of Present Illness


History of Present Illness


Date of Consultation


12/28/18


 11:12


Time Seen by Provider:  11:13


Date of Admission





History of Present Illness


79yo with hx of COPD and debility he currently undergoing inpatient rehab 

secondary to severe debility. He was admitted to [facility] in [place] for 11 days 

and required PEG tube placement secondary to silent aspiration. He also has a 

urinary stent placed secondary to obstructive nephrolithiasis. Pt is requiring 

2 liters of oxygen. I am consulted for pulmonary management.





Allergies and Home Medications


Allergies


Coded Allergies:  


     Penicillins (Verified  Allergy, Severe, HIVES, SOB (Pt has received 

Cefepime & Ceftriaxone), 12/5/18)


     codeine (Verified  Allergy, Severe, SOB, HIVES, 6/8/18)





Home Medications


Albuterol Sulfate 18 Gm Hfa.aer.ad, 2 PUFF IH Q6H PRN for SHORTNESS OF BREATH, (

Reported)


Amiodarone HCl 200 Mg Tablet, PO UD, (Reported)


Aspirin 81 Mg Tab.chew, 81 MG PO DAILY


   Prescribed by: MICHELE WILKINS on 1/7/19 0812


Atorvastatin Calcium 40 Mg Tablet, 40 MG PO HS


   Prescribed by: MICHELE WILKINS on 1/7/19 0812


Cholestyramine/Aspartame 4 Gm Powd.pack, 4 GM PO 1000,2300


   Prescribed by: MICHELE WILKINS on 1/7/19 0812


Digoxin 125 Mcg Tablet, 125 MCG PO DAILY, (Reported)


Diltiazem HCl 60 Mg Tablet, 60 MG PO Q8HR


   Prescribed by: MICHELE WILKINS on 1/7/19 0812


Furosemide 40 Mg Tablet, 20 MG PO BID, (Reported)


Insulin Aspart 100 Unit/1 Ml Susp, SQ UD, (Reported)


Magnesium Hydroxide 400 Mg/5 Ml Oral.susp, 30 ML PO DAILY PRN for CONSTIPATION-

7TH LINE


   Prescribed by: MICHELE WILKINS on 1/7/19 0812


Menthol/Lanolin/Calamine/Znox 71 Gm Oint, 0 GM TOP BID


   Prescribed by: MICHELE WILKINS on 1/7/19 0812


Metoprolol Tartrate 25 Mg Tablet, 12.5 MG PO BID


   Prescribed by: MICHELE WILKINS on 1/7/19 0914


Mexiletine HCl 150 Mg Cap, 150 MG PO TID, (Reported)


Phenazopyridine HCl 200 Mg Tablet, 200 MG PO TID, (Reported)


Potassium Chloride 20 Meq Packet, 15 MEQ PEG DAILY@0700


   Prescribed by: MICHELE WILKINS on 1/7/19 0812





Past Medical-Social-Family Hx


Past Med/Social Hx:  Reviewed Nursing Past Med/Soc Hx, Reviewed and Corrections 

made


Patient Social History


Alcohol Use:  Denies Use


Recreational Drug Use:  No


Smoking Status:  Former Smoker


Type Used:  Cigarettes


Former Smoker, Quit:  Dec 24, 1988


2nd Hand Smoke Exposure:  No


Recent Foreign Travel:  No


Contact w/Someone Who Travel:  No


Recent Infectious Disease Expo:  No


Recent Hopitalizations:  Yes





Immunizations Up To Date


Tetanus Booster (TDap):  More than 5yrs


Date of Pneumonia Vaccine:  Oct 14, 2018


Date of Influenza Vaccine:  Oct 10, 2018





Seasonal Allergies


Seasonal Allergies:  No





Past Medical History


Surgeries:  Yes (skin cancer removal, cataract surgery, cardiac stents )


Cardiac, CABG, Coronary Stent, Defibrillator, Eye Surgery, Orthopedic, Renal


Respiratory:  Yes (O2 3L/NC AT HS-now off all oxygen 12/2018; CHRONIC 

RESPIRATORY FAILURE)


Asthma, Pneumonia, Chronic Bronchitis, Sleep Apnea, COPD


Currently Using CPAP:  No


Currently Using BIPAP:  No


Cardiac:  Yes (CABG)


Cardiomyopathy, Chronic Edema/Swelling, Coronary Artery Disease, Heart Attack, 

High Cholesterol, Hypertension, Irregular Heartbeat


Neurological:  Yes


Dementia, Neuropathy


Reproductive Disorders:  Yes (unable to have children- mumps as a child)


Sexually Transmitted Disease:  No


HIV/AIDS:  No


Genitourinary:  Yes (renal insufficiency-renal stent placement)


Kidney Infection, Bladder Infection, Kidney Stones


Gastrointestinal:  Yes (peg tube [id number] aspiration)


Gastroesophageal Reflux, Ulcer


Musculoskeletal:  Yes (POOR MOBILITY; LEFT HIP FRACTURE)


Arthritis, Chronic Back Pain, Fractures


Endocrine:  Yes


Diabetes, Insulin dep


HEENT:  Yes


Cataract, Glaucoma


Loss of Vision:  Bilateral


Hearing Impairment:  Hard of Hearing


Cancer:  Yes


Skin


Did You Recieve Any Treatments:  Yes


What Type of Treatment Did You:  Surgical Intervention


Psychosocial:  Yes (combative at times)


Anxiety, Depression


Integumentary:  Yes (shingles , SKIN CANCER; LEG CELLULITIS/ STASIS DERMATITIS)


Blood Disorders:  No


Adverse Reaction/Blood Tranf:  No





Family Medical History





Cardiovascular disease


  19 MOTHER


  G8 BROTHER


  G8 SISTER


Cervical cancer


  19 MOTHER


Heart Disease





Review of Systems


Time Seen by Provider:  08:54





Sepsis Event


Evaluation


Height, Weight, BMI


Height: 5'5.00"


Weight: 178lbs. 14.4oz. 81.991604pv; 27.8 BMI


Method:Stated





Exam


Exam





Vital Signs








  Date Time  Temp Pulse Resp B/P (MAP) Pulse Ox O2 Delivery O2 Flow Rate FiO2


 


12/28/18 10:43     96 Nasal Cannula 2.00 


 


12/28/18 05:30 97.4 90 20 112/76 (88) 95 Nasal Cannula 2.00 


 


12/27/18 22:31     90 Nasal Cannula 2.00 


 


12/27/18 22:00  90 20 112/55 (74) 94 Nasal Cannula 2.00 


 


12/27/18 20:35  91 18 113/64 (80) 96 Nasal Cannula 2.00 


 


12/27/18 20:00      Nasal Cannula 2.00 


 


12/27/18 18:00 97.6 90 20 113/68 (83) 98 Nasal Cannula 2.00 


 


12/27/18 13:59     95 Nasal Cannula 2.00 














I & O 


 


 12/28/18





 07:00


 


Intake Total 3430 ml


 


Output Total 1100 ml


 


Balance 2330 ml








Height & Weight


Height: 5'5.00"


Weight: 178lbs. 14.4oz. 81.242846wj; 27.8 BMI


Method:Stated


General Appearance:  No Apparent Distress, WD/WN, Chronically ill


HEENT:  TMs Normal, Pharynx Normal


Neck:  Normal Inspection, Supple


Respiratory:  Chest Non Tender, No Accessory Muscle Use, No Respiratory Distress

, Crackles, Wheezing


Cardiovascular:  Regular Rate, Rhythm, No Edema, No Gallop, No JVD, No Murmur, 

Normal Peripheral Pulses


Extremity:  Normal Capillary Refill, Normal Inspection


Neurologic/Psychiatric:  Alert, Oriented x3, No Motor/Sensory Deficits, Normal 

Mood/Affect


Skin:  Normal Color, Warm/Dry


Lymphatic:  No Adenopathy





Assessment/Plan


Assessment/Plan


MRSA pneumonia with bacteremia


   -Vancomycin 


   -Labs and CXR reviewed. 


Pseudomonas UTI


   -Cefepime


Debility 


CHF


COPD with hypoxia


   -SVNs 


   -Monitor











JOSE A ZAMORA DO Dec 28, 2018 11:17

## 2018-12-29 VITALS — DIASTOLIC BLOOD PRESSURE: 61 MMHG | SYSTOLIC BLOOD PRESSURE: 123 MMHG

## 2018-12-29 VITALS — DIASTOLIC BLOOD PRESSURE: 54 MMHG | SYSTOLIC BLOOD PRESSURE: 113 MMHG

## 2018-12-29 RX ADMIN — MEXILETINE HYDROCHLORIDE SCH MG: 150 CAPSULE ORAL at 09:01

## 2018-12-29 RX ADMIN — SODIUM BICARBONATE SCH ML: 84 INJECTION, SOLUTION INTRAVENOUS at 09:13

## 2018-12-29 RX ADMIN — IPRATROPIUM BROMIDE AND ALBUTEROL SULFATE SCH ML: .5; 3 SOLUTION RESPIRATORY (INHALATION) at 16:00

## 2018-12-29 RX ADMIN — INSULIN ASPART SCH UNIT: 100 INJECTION, SOLUTION INTRAVENOUS; SUBCUTANEOUS at 06:03

## 2018-12-29 RX ADMIN — ASPIRIN 81 MG CHEWABLE TABLET SCH MG: 81 TABLET CHEWABLE at 09:02

## 2018-12-29 RX ADMIN — INSULIN ASPART SCH UNIT: 100 INJECTION, SOLUTION INTRAVENOUS; SUBCUTANEOUS at 11:16

## 2018-12-29 RX ADMIN — SODIUM CHLORIDE SCH MLS/HR: 900 INJECTION INTRAVENOUS at 06:00

## 2018-12-29 RX ADMIN — IPRATROPIUM BROMIDE AND ALBUTEROL SULFATE SCH ML: .5; 3 SOLUTION RESPIRATORY (INHALATION) at 02:32

## 2018-12-29 RX ADMIN — IPRATROPIUM BROMIDE AND ALBUTEROL SULFATE SCH ML: .5; 3 SOLUTION RESPIRATORY (INHALATION) at 21:11

## 2018-12-29 RX ADMIN — IPRATROPIUM BROMIDE AND ALBUTEROL SULFATE SCH ML: .5; 3 SOLUTION RESPIRATORY (INHALATION) at 07:53

## 2018-12-29 RX ADMIN — FUROSEMIDE SCH MG: 20 TABLET ORAL at 06:01

## 2018-12-29 RX ADMIN — PHENAZOPYRIDINE HYDROCHLORIDE SCH MG: 100 TABLET ORAL at 18:06

## 2018-12-29 RX ADMIN — DILTIAZEM HYDROCHLORIDE SCH MG: 60 TABLET, FILM COATED ORAL at 06:01

## 2018-12-29 RX ADMIN — AMIODARONE HYDROCHLORIDE SCH MG: 200 TABLET ORAL at 09:02

## 2018-12-29 RX ADMIN — IPRATROPIUM BROMIDE AND ALBUTEROL SULFATE SCH ML: .5; 3 SOLUTION RESPIRATORY (INHALATION) at 20:21

## 2018-12-29 RX ADMIN — DOCUSATE SODIUM SCH MG: 50 LIQUID ORAL at 09:13

## 2018-12-29 RX ADMIN — ATORVASTATIN CALCIUM SCH MG: 40 TABLET, FILM COATED ORAL at 21:52

## 2018-12-29 RX ADMIN — INSULIN ASPART SCH UNIT: 100 INJECTION, SOLUTION INTRAVENOUS; SUBCUTANEOUS at 16:00

## 2018-12-29 RX ADMIN — GUAIFENESIN AND DEXTROMETHORPHAN SCH ML: 100; 10 SYRUP ORAL at 13:54

## 2018-12-29 RX ADMIN — PHENAZOPYRIDINE HYDROCHLORIDE SCH MG: 100 TABLET ORAL at 09:01

## 2018-12-29 RX ADMIN — PHENAZOPYRIDINE HYDROCHLORIDE SCH MG: 100 TABLET ORAL at 13:53

## 2018-12-29 RX ADMIN — ANORECTAL OINTMENT SCH APPLIC: 15.7; .44; 24; 20.6 OINTMENT TOPICAL at 09:14

## 2018-12-29 RX ADMIN — METOPROLOL TARTRATE SCH MG: 25 TABLET, FILM COATED ORAL at 09:02

## 2018-12-29 RX ADMIN — GUAIFENESIN AND DEXTROMETHORPHAN SCH ML: 100; 10 SYRUP ORAL at 09:01

## 2018-12-29 RX ADMIN — VANCOMYCIN HYDROCHLORIDE SCH MLS/HR: 500 INJECTION, POWDER, LYOPHILIZED, FOR SOLUTION INTRAVENOUS at 18:06

## 2018-12-29 RX ADMIN — DIGOXIN SCH MG: 125 TABLET ORAL at 09:01

## 2018-12-29 RX ADMIN — MEXILETINE HYDROCHLORIDE SCH MG: 150 CAPSULE ORAL at 16:55

## 2018-12-29 RX ADMIN — GUAIFENESIN AND DEXTROMETHORPHAN SCH ML: 100; 10 SYRUP ORAL at 21:51

## 2018-12-29 RX ADMIN — INSULIN ASPART SCH UNIT: 100 INJECTION, SOLUTION INTRAVENOUS; SUBCUTANEOUS at 21:52

## 2018-12-29 RX ADMIN — ANORECTAL OINTMENT SCH APPLIC: 15.7; .44; 24; 20.6 OINTMENT TOPICAL at 21:53

## 2018-12-29 RX ADMIN — METOPROLOL TARTRATE SCH MG: 25 TABLET, FILM COATED ORAL at 21:51

## 2018-12-29 RX ADMIN — DILTIAZEM HYDROCHLORIDE SCH MG: 60 TABLET, FILM COATED ORAL at 21:52

## 2018-12-29 RX ADMIN — Medication SCH EACH: at 21:52

## 2018-12-29 RX ADMIN — DILTIAZEM HYDROCHLORIDE SCH MG: 60 TABLET, FILM COATED ORAL at 13:53

## 2018-12-29 RX ADMIN — POTASSIUM CHLORIDE SCH MEQ: 1.5 POWDER, FOR SOLUTION ORAL at 06:01

## 2018-12-29 RX ADMIN — FUROSEMIDE SCH MG: 20 TABLET ORAL at 16:55

## 2018-12-29 RX ADMIN — Medication SCH EACH: at 09:01

## 2018-12-29 RX ADMIN — IPRATROPIUM BROMIDE AND ALBUTEROL SULFATE SCH ML: .5; 3 SOLUTION RESPIRATORY (INHALATION) at 10:53

## 2018-12-29 RX ADMIN — MEXILETINE HYDROCHLORIDE SCH MG: 150 CAPSULE ORAL at 01:01

## 2018-12-29 RX ADMIN — DOCUSATE SODIUM SCH MG: 50 LIQUID ORAL at 21:50

## 2018-12-29 NOTE — PHYSICAL THERAPY DAILY NOTE
PT Daily Note-Current


Subjective


Pt. up in w/c and agrees to Rx.  Wife present and encouraging





Pain





   Location:  No Pain Reported





Mental Status


Patient Orientation:  Person, Place


Attachments:  PEG Tube, IV





Transfers


Therapy Code Descriptions/Definitions 





Functional Marion Measure:


0=Not Assessed/NA        4=Minimal Assistance


1=Total Assistance        5=Supervision or Setup


2=Maximal Assistance  6=Modified Marion


3=Moderate Assistance 7=Complete Marion








Therapy Quality Codes:


6    Independent with activity with or without an assistive device


5    Patient requires set up or clean up by helper.  Patient completes activity

  by  themselves


4    Supervision or touching assist (CGA). [place] provide cues , steadying 

assist


3    The helper provides less than half the effort to complete the activity


2    The helper provides more than half the effort to complete the activity


1    Dependent.  The helper does all the effort to complete an activity 


7    Patient refused to complete or attempt activity


9    The patient did not perform the activity before the current illness or 

injury


88  Not attempted due to Medical conditions or safety concerns


Transfers (B, C, W/C) (FIM):  5


Scootin


Rollin


Supine to/from Sit:  6


Sit to/from Stand:  5


Bed to/from Chair:  5


Car Transfer (QC):  5





Gait Training


Does the Patient Walk?:  Yes


Gait (FIM):  2


Distance (FIM):  6=998-98 ft (100x2, 120)


Gait Level of Assist:  4


Gait Persons Needed:  1


Gait Assistive Device:  FWW


w/c to follow close, pt. sits without notice, fatigues, head down, feet shuffle

, narrow MARIBELL





Wheelchair Training


Does the Pt Use a Wheelchair?:  Yes


Wheelchair (FIM):  2


Wheelchair Distance:  7=152-28 ft (125x2)


Wheelchair Level of Assist:  4 (pt. needs assist in tight areas for turning and 

backing)


Type of Wheelchair:  Manual





Exercises


Supine Ex:  Ankle pumps, Quad Set, Heel Slides, Scooting, Straight leg raise, 

Hip abd/add


Supine Reps:  12


Seated Therapy Exercises:  Ankle pumps, Sit to stand, Long arc quads, Hip 

flexion, Hip abd/add


Seated Reps:  12


NuStep Minutes:  10


 NuStep Workload:  2





Assessment


Current Status:  Good Progress


giving full effort, motivated to get home, wife assures caregiver can help with 

anything they need in day time hrs as she works nights





PT Short Term Goals


Short Term Goals


Time Frame:  Dec 31, 2018


Transfers (B,C,W/C) (FIM):  5


Gait (FIM):  2


Distance (FIM):  1=up to 49 ft


Gait Assistive Device:  FWW


Wheelchair Distance:  30'





PT Long Term Goals


Long Term Goals


PT Long Term Goals Time Frame:  2019


Transfers (B,C,W/C) (FIM):  6


Sit to Lying (QC):  6


Lying-Sitting on Side/Bed(QC):  6


Sit to Stand (QC):  6


Rollin


Roll Left to Right (QC):  6


Chair/Bed-to-Chair Xfer(QC):  6


Car Transfer (QC):  5


Does the Patient Walk:  Yes


Gait (FIM):  5 (household)


Gait distance (FIM):  7=306-16 ft


Walk 10 feet (QC):  6


Walk 10ft-Uneven Surface(QC):  6


Walk 50ft with 2 Turns (QC):  6


Walk 150 ft (QC):  88


Gait Level of Assist:  6


Gait Assistive Device:  FWW


Does the Pt use WC or Scooter?:  Yes


Wheelchair (FIM):  6


Wheelchair distance (FIM):  3=150 ft


Wheel 50 feet with 2 turns (QC:  6


Stairs (FIM):  88


1 Step (curb) (QC):  88


4 Steps (QC):  88


12 Steps (QC):  88


Picking up an Object (QC):  88





PT Plan


Treatment/Plan


Treatment Plan:  Continue Plan of Care


Treatment Plan:  Bed Mobility, Education, Functional Activity Farnaz, Functional 

Strength, Group Therapy, Gait, Safety, Therapeutic Exercise, Transfers


Treatment Duration:  2019


Frequency:  At least 5 of 7 days/Wk (IRF)


Estimated Hrs Per Day:  1.5 hours per day


Patient and/or Family Agrees t:  Yes





Safety Risks/Education


Patient Education:  Gait Training, Transfer Techniques, Correct Positioning, W/

C Management, Disease Process, Safety Issues


Teaching Recipient:  Patient


Teaching Methods:  Demonstration, Discussion


Response to Teaching:  Verbalize Understanding, Return Demonstration, 

Reinforcement Needed





Time/GCodes


Time In:  900


Time Out:  1000


Total Billed Treatment Time:  60


Total Billed Treatment


1,EX25m,FA15,GT20m


G Codes Necessary:  No











ROBERT FOREMAN PTA Dec 29, 2018 09:55

## 2018-12-29 NOTE — CARDIOLOGY PROGRESS NOTE
Subjective


Date Seen by Provider:  Dec 29, 2018


Time Seen by Provider:  11:00


Subjective/Events-last exam


Patient is sitting in a chair, receiving breathing treatment.  Feeling better, 

improving.  No active chest pain


Review of Systems


General:  No Chills, No Night Sweats; Fatigue; No Malaise, No Appetite, No Other


HEENT:  No Head Aches, No Visual Changes, No Eye Pain, No Ear Pain, No Dysphasia

, No Sinus Congestion, No Post Nasal Drip, No Sore Throat, No Other


Pulmonary:  Dyspnea; No Cough, No Pleuritic Chest Pain, No Other


Cardiovascular:  No: Chest Pain, Palpitations, Orthopnea, Paroxysmal Noc. 

Dyspnea, Edema, Lt Headedness, Other





Objective-Cardiology


Exam


Last Set of Vital Signs





Vital Signs








 18





 06:20 11:00


 


Temp 97.8 


 


Pulse 89 


 


Resp 20 


 


B/P (MAP) 123/61 (81) 


 


Pulse Ox  95


 


O2 Delivery  Nasal Cannula


 


O2 Flow Rate  2.00





Capillary Refill :


I&O











Intake and Output 


 


 18





 00:00


 


Intake Total 3880 ml


 


Output Total 675 ml


 


Balance 3205 ml


 


 


 


Intake Oral 0 ml


 


IV Total 615 ml


 


Tube Feeding 2465 ml


 


Other 800 ml


 


Output Urine Total 675 ml


 


# Urine Diapers 6


 


# Bowel Movements 2








General:  Alert, Cooperative, No Acute Distress


HEENT:  Atraumatic, PERRLA, EOMI, Mucous Memb Moist/Pink


Neck:  Supple, No JVD


Lungs:  Other (bilat rhonchi)


Heart:  Regular Rate, Normal S1, Normal S2


Abdomen:  Normal Bowel Sounds, Soft, No Tenderness, Other (Peg Tube in place)


Extremities:  No Clubbing, Other (+1 edema BLE)


Skin:  No Rashes


Neuro:  Normal Speech, Other (Confusion strength 4/5 LES 4-/5 Upper limbs)





Results


Lab








Laboratory Tests








Test


 18


16:05 18


20:14 18


06:02 18


11:09 Range/Units


 


 


Glucometer 131 H 133 H 96  138 H   MG/DL











A/P-Cardiology


Admission Diagnosis


Debility


CAD


HTN


Chronic atrial fibrillation





Assessment/Plan


Debility, continue on physical therapy.





Status post respiratory failure, pneumonia, silent aspiration, on Antibiotics, 

followed by primary care physician





Urosepsis, history of kidney stone and nephrostomy tube, on antibiotics and 

managed by primary care team





Silent aspiration, status post feeding tube placement





Coronary artery disease, history of CABG done in the remote past.  Patient had 

a stent done in 2002 using MultiLink 2.518 mm to the proximal right 

coronary artery, 2017 had a Cypher stent 3.530 mm to the proximal and mid 

circumflex artery.  Has been followed and managed by primary cardiologist Dr. David in [place].  Continue to follow





History of congestive heart failure, reported ejection fraction 54 percent.  

MPI in , continue to monitor





Paroxysmal atrial fibrillation maintained on amiodarone, intolerant to oral 

anticoagulation secondary to recurrent nosebleed, on his transfer from [facility] 

patient is still on Loading dose of Amiodarone until , Patient is 

maintained on Cardizem, Lopressor and Digoxin, continue to monitor, no changes 

are recommended





Patient has been maintained on mexiletine.  Probably due to ventricular 

fibrillation and history of shock from his defibrillator, known to have [facility] 

[facility] ICD implanted by Dr. stevens in [place]. Continue on Mexiletine





COPD, chronic dyspnea, followed by primary care physician





Chronic pedal edema,continue to diurese and monitor electrolytes





Diabetes mellitus, followed and managed by primary care physician





Clinical Quality Measures


DVT/VTE Risk/Contraindication:


Risk Factor Score Per Nursin


RFS Level Per Nursing on Admit:  4+=Very High











MARINA KUMAR MD Dec 29, 2018 11:43

## 2018-12-29 NOTE — THERAPY GROUP DAILY NOTE
Therapy Daily Group Note


Patient Education Topic


Other List Below ("explain pain" and management , Transfer skills)





Exercises


Other (vestibular visual exercises)





Other/Notes


Pt. attended PT group session.  Pt. came went via w/c with assist for portable 

O2 at 2 L and mask insitu.  Pts introduced selves and laughed sharing the first 

car they had ever driven and how that experience unfolded.  Pts. shared during 

the education portion regarding pain and and how they manage pain and 

understand pain.  TRF skills for pushing up in bed, rolling, supine to side to 

sit and sit to stand were all demonstrated and taught.  Vestibular dysfunction 

was discussed as well as several basic eye exercises for vestibular rehab.  Pt. 

to room after group with chair alarm on, bell at hand and O2 insitu 2 L.


Start Time:  11:00


Stop Time:  12:00


Total Billed Treatment Time:  60


Total Billed Treatment


1,GRP











ROBERT FOREMAN PTA Dec 29, 2018 12:29

## 2018-12-29 NOTE — PROGRESS NOTE-HOSPITALIST
Subjective


HPI/CC On Admission


Date Seen by Provider:  Dec 29, 2018


Time Seen by Provider:  11:30


CC: Severe debility





HPI: This is a 78-year-old white male [facility] patient was 

admitted to inpatient rehabilitation for IV medication and severe debility 

resolution.  He was admitted to [facility] for 11 days requiring PEG 

tube placement due to silent aspiration and urinary stent placement due to 

obstructive stone in ureter and maintained on antibiotic regimen with 

aggressive physical therapy.  I reviewed old records and culture results from 

infectious disease and reviewed current lab results along with vital signs.  He 

is sitting up in chair drowsy but oriented 3 but appears to be severely 

chronically ill.  Her goal is to return to independent ADL function along with 

completion of IV antibiotics.


Subjective/Events-last exam


Patient participating in therapy


Lungs are much improved today


Due for labs tomorrow


Bowels are moving


PEG tube is functioning properly


Overall improved





Review of Systems


Pulmonary:  Cough





Objective


Exam


Vital Signs





Vital Signs








  Date Time  Temp Pulse Resp B/P (MAP) Pulse Ox O2 Delivery O2 Flow Rate FiO2


 


18 11:00     95 Nasal Cannula 2.00 


 


18 06:20 97.8 89 20 123/61 (81)    





Capillary Refill :


General Appearance:  No Apparent Distress, WD/WN


Neck:  Full Range of Motion, Normal Inspection, Non Tender, Supple, Carotid 

Bruit


Respiratory:  Chest Non Tender, Normal Breath Sounds, No Accessory Muscle Use, 

No Respiratory Distress, Crackles (Much improved today)


Neurologic/Psychiatric:  Alert, Oriented x3, No Motor/Sensory Deficits, Normal 

Mood/Affect





Results/Procedures


Lab


Patient resulted labs reviewed.





Assessment/Plan


Assessment and Plan


Assess & Plan/Chief Complaint


Assessment:


MRSA pneumonia


Pseudomonas UTI


Severe debilitated state


CAD previous bypass graft


ICD defibrillator in place


Presbycusis


Sleep apnea


Chronic respiratory failure





Plan:


PEG tube feedings


Speech therapy


PT/OT


Long recovery expected


Consult Dr Chin is appreciated


Consult Dr Timmons


Check labs tomorrow





Diagnosis/Problems


Diagnosis/Problems





(1) MRSA pneumonia


Status:  Acute


Qualifiers:  


   Laterality:  unspecified laterality  Lung location:  unspecified part of 

lung  Qualified Codes:  J15.212 - Pneumonia due to methicillin resistant 

Staphylococcus aureus


(2) Pseudomonas urinary tract infection


Status:  Acute


(3) Debility


Status:  Acute


(4) Pyelonephritis


Status:  Acute


(5) Renal calculus, left


Status:  Acute


(6) Acute renal insufficiency


Status:  Resolved


Resolution Date/Time:  18 @ 17:54


(7) Diabetes mellitus, type 2


Status:  Chronic


Qualifiers:  


   Diabetes mellitus long term insulin use:  with long term use  Diabetes 

mellitus complication status:  with circulatory complication  Diabetes mellitus 

complication detail:  with other circulatory complications  Qualified Codes:  

E11.59 - Type 2 diabetes mellitus with other circulatory complications; Z79.4 - 

Long term (current) use of insulin


(8) Congestive heart failure


Status:  Chronic


Qualifiers:  


   Heart failure type:  unspecified  


(9) COPD (chronic obstructive pulmonary disease)


Status:  Chronic


Qualifiers:  


   COPD type:  unspecified COPD  Qualified Codes:  J44.9 - Chronic obstructive 

pulmonary disease, unspecified


(10) Paroxysmal atrial fibrillation


Status:  Chronic


(11) CAD (coronary artery disease)


Status:  Chronic


Qualifiers:  


   Coronary Disease-Associated Artery/Lesion type:  native artery  Native vs. 

transplanted heart:  native heart  Associated angina:  without angina  

Qualified Codes:  I25.10 - Atherosclerotic heart disease of native coronary 

artery without angina pectoris


(12) ICD (implantable cardioverter-defibrillator) in place


Status:  Chronic





Clinical Quality Measures


DVT/VTE Risk/Contraindication:


Risk Factor Score Per Nursin


RFS Level Per Nursing on Admit:  4+=Very High











MICHELE WILKINS DO Dec 29, 2018 12:31

## 2018-12-29 NOTE — OCCUPATIONAL THER DAILY NOTE
OT Current Status-Daily Note


Subjective


Pt alert, lying in bed.  Wife present in room.  When asking pt questions, wife 

would answer.  Wife asked for adult diapers instead of briefs.  Agrees to 

therapy.





Mental Status/Objective


Patient Orientation:  Person, Place, Time, Situation


Therapy Code Descriptions/Definitions 





Functional [place] Measure:


0=Not Assessed/NA        4=Minimal Assistance


1=Total Assistance        5=Supervision or Setup


2=Maximal Assistance  6=Modified [place]


3=Moderate Assistance 7=Complete [place]


Attachments:  IV, Oxygen, PEG Tube





ADL-Treatment


Pt states he does not want to shower today, but does agree to sponge bath. Pt 

washed face, arms, chest, abdomen, bilateral upper legs, and netta area, but 

required assist for buttocks and bilateral lower legs. Donned pullover shirt 

with minimal assistance to orient and to pull down in back. Pt required assist 

to start pants over feet. Stood with minimal assistance for balance. Pt 

requires assist to pull pants up in back.  Assist to don adult diapers.  Assist 

required to don socks. Pt states his wife usually does this for him. Min assist 

to don Velcro fasten shoes. Pt declined to complete oral hygiene at this time. 

Combed hair with minimal assistance. Pt requires encouragement to attempt tasks 

prior to receiving assistance.


Therapy Code Descriptions/Definitions 





Functional [place] Measure:


0=Not Assessed/NA        4=Minimal Assistance


1=Total Assistance        5=Supervision or Setup


2=Maximal Assistance  6=Modified [place]


3=Moderate Assistance 7=Complete [place]








Therapy Quality Codes:


6    Independent with activity with or without an assistive device


5    Patient requires set up or clean up by helper.  Patient completes activity

  by  themselves


4    Supervision or touching assist (CGA). [place] provide cues , steadying 

assist


3    The helper provides less than half the effort to complete the activity


2    The helper provides more than half the effort to complete the activity


1    Dependent.  The helper does all the effort to complete an activity 


7    Patient refused to complete or attempt activity


9    The patient did not perform the activity before the current illness or 

injury


88  Not attempted due to Medical conditions or safety concerns


Grooming (FIM):  4


Bathing (FIM):  3


Bathing Location:  L Arm, R Arm, L Upper Leg, R Upper Leg, Chest, Abdomen, 

Perineal Area


Shower/Bathe Self (QC):  3


Upper Body (FIM):  4


Upper Body Dressing (QC):  3


Lower Body Dressing (FIM):  2


Lower Body Dressing (QC):  2


On/Off Footwear (QC):  2





Other Treatment


UE exercises with medium resistance theraband and light resistance therapy 

foam. Tolerated well and minimal verbal cues needed for proper positioning 

during exercises.  After therapy, pt sitting in w/c with call light/phone in 

reach.  Wife present in room.  All needs met.





OT Short Term Goals


Short Term Goals


Time Frame:  Dec 31, 2018


Eating(FIM):  4


Grooming(FIM):  3


Upper Body Dressing(FIM):  3


Lower Body Dressing(FIM):  3


Toileting(FIM):  4


Transfers (B,C,W/C) (FIM):  5


Toilet/Commode Transfer(FIM):  4


Additional Short Term Goals:  1-Demonstrate ADL Tasks, 2-Verbalize Understanding

, 3-ImproveStrength/Farnaz


1=Demonstrate adherence to instructed precautions during ADL tasks.


2=Patient will verbalize/demonstrate understanding of assistive devices/

modifications for ADL.


3=Patient will improve strength/tolerance for activity to enable patient to 

perform ADL's.





OT Long Term Goals


Long Term Goals


Time Frame:  2019


Eating (FIM):  5


Eating (QC):  5


Groomin


Oral Hygiene (QC):  4


Bathing(FIM):  3


Shower/Bathe Self (QC):  3


Upper Body Dressing(FIM):  5


Upper Body Dressing (QC):  4


Lower Body Dressing(FIM):  4


Lower Body Dressing (QC):  4


On/Off Footwear (QC):  4


Toileting(FIM):  5


Toileting Hygiene (QC):  5


Transfers (B,C,W/C) (FIM):  5


Toilet/Commode Transfer(FIM):  5


Toilet/Commode Transfer (QC):  4


Additional Goals:  1-Demonstrate ADL Tasks, 2-Verbalize Understanding, 3-

ImproveStrength/Farnaz


1=Demonstrate adherence to instructed precautions during ADL tasks.


2=Patient will verbalize/demonstrate understanding of assistive devices/

modifications for ADL.


3=Patient will improve strength/tolerance for activity to enable patient to 

perform ADL's.





OT Education/Plan


Discharge Recommendations


Plan/Recommendations:  Continue POC





Treatment Plan/Plan of Care


Patient would benefit from OT for education, treatment and training to promote 

independence in ADL's, mobility, safety and/or upper extremity function for ADL'

s.


Plan of Care:  ADL Retraining, Functional Mobility, Group Exercise/Act as Ind, 

UE Funct Exercise/Act


Treatment Duration:  2019


Frequency:  At least 5 of 7 days/Wk (IRF)


Estimated Hrs Per Day:  1.5 hours per day


Agreement:  Yes


Rehab Potential:  Guarded





Time/GCodes


Start Time:  08:00


Stop Time:  09:00


Total Time Billed (hr/min):  60


Billed Treatment Time


1 visit-ADL 1 (50 min)  EX 1 (10 min)











ELMER WHITING Dec 29, 2018 09:03

## 2018-12-29 NOTE — PM&R PROGRESS NOTE
Subjective


This was a face to face visit with the patient.


Date Seen by Provider:  Dec 29, 2018


Time Seen by Provider:  07:50


Subjective/Events-last exam


Patient was seen in his room this AM Patient SBA for transfers Appreciate 

Therapy and cardiology notes


Review of Systems


Neurological:  Weakness, Confusion





Objective


Physician Exam


Last Set of Vital Signs





Vital Signs








  Date Time  Temp Pulse Resp B/P (MAP) Pulse Ox O2 Delivery O2 Flow Rate FiO2


 


12/29/18 11:00     95 Nasal Cannula 2.00 


 


12/29/18 06:20 97.8 89 20 123/61 (81)    





Capillary Refill :


I&O











Intake and Output 


 


 12/28/18





 23:59


 


Intake Total 3880 ml


 


Output Total 675 ml


 


Balance 3205 ml


 


 


 


Intake Oral 0 ml


 


IV Total 615 ml


 


Tube Feeding 2465 ml


 


Other 800 ml


 


Output Urine Total 675 ml


 


# Urine Diapers 6


 


# Bowel Movements 2








General:  Alert, Cooperative, No Acute Distress


HEENT:  Atraumatic, PERRLA, EOMI, Mucous Memb Moist/Pink


Neck:  Supple, No JVD


Lungs:  Other (bilat rhonchi)


Heart:  Regular Rate, Normal S1, Normal S2


Abdomen:  Normal Bowel Sounds, Soft, No Tenderness, Other (Peg Tube in place)


Extremities:  No Clubbing, Other (+1 edema BLE)


Skin:  No Rashes


Neuro:  Normal Speech, Other (Confusion strength 4/5 LES 4-/5 Upper limbs)





Results


Lab Data


Laboratory Tests


12/26/18 15:57: Glucometer 139H


12/26/18 20:38: Glucometer 121H


12/27/18 05:58: Glucometer 109


12/27/18 11:04: Glucometer 139H


12/27/18 16:46: Glucometer 99


12/27/18 20:39: Glucometer 98


12/28/18 05:49: Glucometer 114H


12/28/18 11:13: Glucometer 129H


12/28/18 16:05: Glucometer 131H


12/28/18 20:14: Glucometer 133H


12/29/18 06:02: Glucometer 96


12/29/18 11:09: 





Assessment/Plan


Assessment and Plan


Disuse myopathy continue PT/OT


S/P resp failure due to pneumonia with sepsis


Sepsis due to UTI treated with hx of Kidney stone and Nephrostomy tube


A Fib controlled with med


Dysphagia NPO on tube feeds


Confusion multifactorial


Plan Continue PT/OT/ST and current meds


F/U with Hospitalist and Cardiology


Team Conference 1-2-19


Co-Morbidities that are continuing to impact the rehab process: (include details

)











MESERET GONZALES MD Dec 29, 2018 11:20

## 2018-12-30 VITALS — SYSTOLIC BLOOD PRESSURE: 127 MMHG | DIASTOLIC BLOOD PRESSURE: 77 MMHG

## 2018-12-30 VITALS — DIASTOLIC BLOOD PRESSURE: 70 MMHG | SYSTOLIC BLOOD PRESSURE: 128 MMHG

## 2018-12-30 VITALS — DIASTOLIC BLOOD PRESSURE: 70 MMHG | SYSTOLIC BLOOD PRESSURE: 117 MMHG

## 2018-12-30 VITALS — DIASTOLIC BLOOD PRESSURE: 56 MMHG | SYSTOLIC BLOOD PRESSURE: 108 MMHG

## 2018-12-30 LAB
ALBUMIN SERPL-MCNC: 3 GM/DL (ref 3.2–4.5)
ALP SERPL-CCNC: 87 U/L (ref 40–136)
ALT SERPL-CCNC: 14 U/L (ref 0–55)
BASOPHILS # BLD AUTO: 0 10^3/UL (ref 0–0.1)
BASOPHILS NFR BLD AUTO: 0 % (ref 0–10)
BILIRUB SERPL-MCNC: 0.4 MG/DL (ref 0.1–1)
BUN/CREAT SERPL: 24
CALCIUM SERPL-MCNC: 9 MG/DL (ref 8.5–10.1)
CHLORIDE SERPL-SCNC: 99 MMOL/L (ref 98–107)
CO2 SERPL-SCNC: 31 MMOL/L (ref 21–32)
CREAT SERPL-MCNC: 0.92 MG/DL (ref 0.6–1.3)
EOSINOPHIL # BLD AUTO: 0.1 10^3/UL (ref 0–0.3)
EOSINOPHIL NFR BLD AUTO: 2 % (ref 0–10)
ERYTHROCYTE [DISTWIDTH] IN BLOOD BY AUTOMATED COUNT: 19 % (ref 10–14.5)
GFR SERPLBLD BASED ON 1.73 SQ M-ARVRAT: > 60 ML/MIN
GLUCOSE SERPL-MCNC: 105 MG/DL (ref 70–105)
HCT VFR BLD CALC: 28 % (ref 40–54)
HGB BLD-MCNC: 8.4 G/DL (ref 13.3–17.7)
LYMPHOCYTES # BLD AUTO: 1 X 10^3 (ref 1–4)
LYMPHOCYTES NFR BLD AUTO: 15 % (ref 12–44)
MANUAL DIFFERENTIAL PERFORMED BLD QL: NO
MCH RBC QN AUTO: 29 PG (ref 25–34)
MCHC RBC AUTO-ENTMCNC: 31 G/DL (ref 32–36)
MCV RBC AUTO: 96 FL (ref 80–99)
MONOCYTES # BLD AUTO: 0.9 X 10^3 (ref 0–1)
MONOCYTES NFR BLD AUTO: 13 % (ref 0–12)
NEUTROPHILS # BLD AUTO: 4.8 X 10^3 (ref 1.8–7.8)
NEUTROPHILS NFR BLD AUTO: 69 % (ref 42–75)
PLATELET # BLD: 141 10^3/UL (ref 130–400)
PMV BLD AUTO: 12.2 FL (ref 7.4–10.4)
POTASSIUM SERPL-SCNC: 4.8 MMOL/L (ref 3.6–5)
PROT SERPL-MCNC: 7.2 GM/DL (ref 6.4–8.2)
RBC # BLD AUTO: 2.86 10^6/UL (ref 4.35–5.85)
SODIUM SERPL-SCNC: 138 MMOL/L (ref 135–145)
WBC # BLD AUTO: 6.9 10^3/UL (ref 4.3–11)

## 2018-12-30 RX ADMIN — Medication SCH EACH: at 09:01

## 2018-12-30 RX ADMIN — DILTIAZEM HYDROCHLORIDE SCH MG: 60 TABLET, FILM COATED ORAL at 20:49

## 2018-12-30 RX ADMIN — IPRATROPIUM BROMIDE AND ALBUTEROL SULFATE SCH ML: .5; 3 SOLUTION RESPIRATORY (INHALATION) at 06:40

## 2018-12-30 RX ADMIN — FUROSEMIDE SCH MG: 20 TABLET ORAL at 06:10

## 2018-12-30 RX ADMIN — ASPIRIN 81 MG CHEWABLE TABLET SCH MG: 81 TABLET CHEWABLE at 14:30

## 2018-12-30 RX ADMIN — POTASSIUM CHLORIDE SCH MEQ: 1.5 POWDER, FOR SOLUTION ORAL at 06:10

## 2018-12-30 RX ADMIN — IPRATROPIUM BROMIDE AND ALBUTEROL SULFATE SCH ML: .5; 3 SOLUTION RESPIRATORY (INHALATION) at 15:25

## 2018-12-30 RX ADMIN — IPRATROPIUM BROMIDE AND ALBUTEROL SULFATE SCH ML: .5; 3 SOLUTION RESPIRATORY (INHALATION) at 23:02

## 2018-12-30 RX ADMIN — SODIUM BICARBONATE SCH ML: 84 INJECTION, SOLUTION INTRAVENOUS at 09:00

## 2018-12-30 RX ADMIN — IPRATROPIUM BROMIDE AND ALBUTEROL SULFATE SCH ML: .5; 3 SOLUTION RESPIRATORY (INHALATION) at 09:33

## 2018-12-30 RX ADMIN — MEXILETINE HYDROCHLORIDE SCH MG: 150 CAPSULE ORAL at 00:50

## 2018-12-30 RX ADMIN — IPRATROPIUM BROMIDE AND ALBUTEROL SULFATE SCH ML: .5; 3 SOLUTION RESPIRATORY (INHALATION) at 02:41

## 2018-12-30 RX ADMIN — MEXILETINE HYDROCHLORIDE SCH MG: 150 CAPSULE ORAL at 09:01

## 2018-12-30 RX ADMIN — DOCUSATE SODIUM SCH MG: 50 LIQUID ORAL at 09:00

## 2018-12-30 RX ADMIN — PHENAZOPYRIDINE HYDROCHLORIDE SCH MG: 100 TABLET ORAL at 14:00

## 2018-12-30 RX ADMIN — ATORVASTATIN CALCIUM SCH MG: 40 TABLET, FILM COATED ORAL at 20:49

## 2018-12-30 RX ADMIN — INSULIN ASPART SCH UNIT: 100 INJECTION, SOLUTION INTRAVENOUS; SUBCUTANEOUS at 11:00

## 2018-12-30 RX ADMIN — Medication SCH EACH: at 20:49

## 2018-12-30 RX ADMIN — INSULIN ASPART SCH UNIT: 100 INJECTION, SOLUTION INTRAVENOUS; SUBCUTANEOUS at 06:39

## 2018-12-30 RX ADMIN — VANCOMYCIN HYDROCHLORIDE SCH MLS/HR: 500 INJECTION, POWDER, LYOPHILIZED, FOR SOLUTION INTRAVENOUS at 17:58

## 2018-12-30 RX ADMIN — MEXILETINE HYDROCHLORIDE SCH MG: 150 CAPSULE ORAL at 16:56

## 2018-12-30 RX ADMIN — DILTIAZEM HYDROCHLORIDE SCH MG: 60 TABLET, FILM COATED ORAL at 06:10

## 2018-12-30 RX ADMIN — DILTIAZEM HYDROCHLORIDE SCH MG: 60 TABLET, FILM COATED ORAL at 14:00

## 2018-12-30 RX ADMIN — GUAIFENESIN AND DEXTROMETHORPHAN SCH ML: 100; 10 SYRUP ORAL at 14:06

## 2018-12-30 RX ADMIN — DOCUSATE SODIUM SCH MG: 50 LIQUID ORAL at 20:49

## 2018-12-30 RX ADMIN — GUAIFENESIN AND DEXTROMETHORPHAN SCH ML: 100; 10 SYRUP ORAL at 20:49

## 2018-12-30 RX ADMIN — DIGOXIN SCH MG: 125 TABLET ORAL at 09:02

## 2018-12-30 RX ADMIN — ANORECTAL OINTMENT SCH APPLIC: 15.7; .44; 24; 20.6 OINTMENT TOPICAL at 20:49

## 2018-12-30 RX ADMIN — PHENAZOPYRIDINE HYDROCHLORIDE SCH MG: 100 TABLET ORAL at 09:01

## 2018-12-30 RX ADMIN — AMIODARONE HYDROCHLORIDE SCH MG: 200 TABLET ORAL at 09:01

## 2018-12-30 RX ADMIN — CHOLESTYRAMINE SCH GM: 4 POWDER, FOR SUSPENSION ORAL at 23:28

## 2018-12-30 RX ADMIN — FUROSEMIDE SCH MG: 20 TABLET ORAL at 16:56

## 2018-12-30 RX ADMIN — METOPROLOL TARTRATE SCH MG: 25 TABLET, FILM COATED ORAL at 09:01

## 2018-12-30 RX ADMIN — ANORECTAL OINTMENT SCH APPLIC: 15.7; .44; 24; 20.6 OINTMENT TOPICAL at 09:00

## 2018-12-30 RX ADMIN — PHENAZOPYRIDINE HYDROCHLORIDE SCH MG: 100 TABLET ORAL at 17:58

## 2018-12-30 RX ADMIN — METOPROLOL TARTRATE SCH MG: 25 TABLET, FILM COATED ORAL at 20:49

## 2018-12-30 RX ADMIN — GUAIFENESIN AND DEXTROMETHORPHAN SCH ML: 100; 10 SYRUP ORAL at 09:01

## 2018-12-30 RX ADMIN — INSULIN ASPART SCH UNIT: 100 INJECTION, SOLUTION INTRAVENOUS; SUBCUTANEOUS at 16:26

## 2018-12-30 NOTE — NUR
Dr. Monte to floor. Informed of dropping HGB. Also, diarrhea with tarry looking stools. 
Orders to hold ASA this AM and check stool for occult blood. Questran BID. Orders to consult 
Dr. Mantilla if OB stool is positive. 

-------------------------------------------------------------------------------

Addendum: 12/30/18 at 1634 by ANA GRUBER RN

-------------------------------------------------------------------------------

Also, orders to DC accuchecks and SSI.

## 2018-12-30 NOTE — CONSULTATION REPORT
DATE OF SERVICE:  12/30/2018



ATTENDING PRIMARY CARE PHYSICIAN:

Dr. Gonzalez.



CONSULTING PHYSICIAN:

Dr. Monte.



HISTORY OF PRESENT ILLNESS:

The patient is a 78-year-old male with an extensive past medical history as well

as severe debility.  He was admitted to [facility] for 11 days due to

aspiration pneumonia as well as placement of a urinary stent for obstructive

uropathy.  He also was on aggressive antibiotic support.  Due to his aspiration,

a percutaneous gastrostomy tube was placed and has been receiving alimentation

through that modality.  Since that time, he has been referred over to [facility] to be closer to home.  He is improving; however, his

hemoglobin has slowly been trending downward initially at 11.1; however, today

it is 8.4 over 3 days.  His stools have been dark as well and he was Hemoccult

positive.  Upon further questioning with the patient and his wife, he has had a

history of peptic ulcer disease.  He also has had a colonoscopy; however, they

state that this was many years ago.  They did not report any red blood per

rectum.  They also did not report any issues of nausea or vomiting that they can

recall.  He is currently on Nexium.



PAST MEDICAL HISTORY:

Coronary artery disease, cardiac arrhythmia, nephrolithiasis, sleep apnea, COPD,

bilateral lower extremity edema, hypercholesterolemia, hypertension,

degenerative joint disease, gastroesophageal reflux disease, diabetes, glaucoma,

cataracts, hearing loss, dementia, anxiety, and depression.



PAST SURGICAL HISTORY:

Left hip open reduction and internal fixation, defibrillator implantation, PEG

tube placement, and coronary artery bypass grafting in the 80s.



ALLERGIES:

PENICILLIN, CODEINE.



MEDICATIONS:

Albuterol 2 puffs q.6 hours p.r.n., amiodarone 200 mg daily, amlodipine 5 mg

daily, digoxin 125 mcg daily, Colace 100 mg b.i.d., furosemide 40 mg daily,

insulin q.a.c. and at bedtime, metoprolol 25 mg daily, mexiletine 150 mg t.i.d.,

Zofran 4 mg q.8 hours p.r.n., Protonix 40 mg daily, phenazopyridine 200 mg

t.i.d., and potassium 20 mEq daily.



SOCIAL HISTORY:

Previous smoker, quit in 1988.  Negative alcohol.



FAMILY HISTORY:

Noncontributory.



REVIEW OF SYSTEMS:

This is a well-nourished elderly male who does answer the majority of questions

appropriately; however, is confused at times.  He is not experiencing any

shortness of breath or difficulty in breathing.  No chest pain, palpitations or

diaphoresis.  No nausea or vomiting; however, he is n.p.o. and being fed through

a gastrostomy tube.  He has had some loose stools, which have been darker in the

past several days.  No red blood per rectum.  No fever or chills, no recent

inadvertent weight loss.  All other review of systems negative.



PHYSICAL EXAMINATION:

VITAL SIGNS:  Temperature 97.7, blood pressure 128/70, pulse 89, respirations

20, pulse ox 97% on 2 liters nasal cannula.

CHEST:  Few scattered rales and rhonchi bilaterally.

HEART:  Regular, no murmurs.

EXTREMITIES:  No lower extremity edema, negative Homans sign.

HEENT:  No scleral icterus.

NECK:  No cervical lymphadenopathy.

ABDOMEN:  Soft, nontender, nondistended.

SKIN:  Warm and dry.



LABORATORY DATA:

WBC 6.9, hemoglobin 8.4, hematocrit 28, platelets 141.  BUN 22, creatinine 0.92.



ASSESSMENT AND PLAN:

A 78-year-old male with anemia with history of peptic ulcer disease and absence

from colonoscopy for what appears to be greater than 10 to 20 years and also

found to be Hemoccult positive.  We will proceed with an EGD and colonoscopy on

this admission.





Job ID: [id number]

DocumentID: [id number]

Dictated Date:  12/30/2018 17:27:32

Transcription Date: 12/30/2018 18:18:45

Dictated By: HARMONY CAMPO MD

## 2018-12-30 NOTE — NUR
1/2 bottle of Mag Citrate given through PEG tube per orders. Patient tolerated well. Consent 
for EGD and Colonoscopy signed and placed in chart.

## 2018-12-30 NOTE — PROGRESS NOTE-HOSPITALIST
Subjective


HPI/CC On Admission


Date Seen by Provider:  Dec 30, 2018


Time Seen by Provider:  11:45


CC: Severe debility





HPI: This is a 78-year-old white male [facility] patient was 

admitted to inpatient rehabilitation for IV medication and severe debility 

resolution.  He was admitted to [facility] for 11 days requiring PEG 

tube placement due to silent aspiration and urinary stent placement due to 

obstructive stone in ureter and maintained on antibiotic regimen with 

aggressive physical therapy.  I reviewed old records and culture results from 

infectious disease and reviewed current lab results along with vital signs.  He 

is sitting up in chair drowsy but oriented 3 but appears to be severely 

chronically ill.  Her goal is to return to independent ADL function along with 

completion of IV antibiotics.


Subjective/Events-last exam


Multiple issues occurring but he remains stable


Having a lot of diarrhea that started yesterday that appears to be tarry and 

dark and may be containing blood


We will Hemoccult stools and if positive will consult general surgery for 

endoscopy


We will discontinue Accu-Cheks


Maintain Questran twice daily


Holding aspirin due to possible GI bleed


Wife reports that he has had gastric ulcers





Review of Systems


General:  Fatigue


Gastrointestinal:  Diarrhea, Melena





Objective


Exam


Vital Signs





Vital Signs








  Date Time  Temp Pulse Resp B/P (MAP) Pulse Ox O2 Delivery O2 Flow Rate FiO2


 


18 15:54 97.7 89 20 128/70 (89) 97 Nasal Cannula 2.00 





Capillary Refill :


General Appearance:  No Apparent Distress, WD/WN, Chronically ill


Respiratory:  Chest Non Tender, Normal Breath Sounds, No Accessory Muscle Use, 

No Respiratory Distress, Crackles, Wheezing


Cardiovascular:  Regular Rate, Rhythm, No Edema, No Gallop, No JVD, No Murmur, 

Normal Peripheral Pulses


Neurologic/Psychiatric:  Alert, Oriented x3, No Motor/Sensory Deficits, Normal 

Mood/Affect





Results/Procedures


Lab


Laboratory Tests


18 05:40








Patient resulted labs reviewed.





Assessment/Plan


Assessment and Plan


Assess & Plan/Chief Complaint


Assessment:


Anemia with presumed GI bleed with hemoccult positive and Dr Mantilla consult for 

EGD/Colon tomorrow


MRSA pneumonia


Pseudomonas UTI


Severe debilitated state


CAD previous bypass graft


ICD defibrillator in place


Presbycusis


Sleep apnea


Chronic respiratory failure





Plan:


Scopes tomorrow


Hold ASA, DC accuchecks


PEG tube feedings


Speech therapy


PT/OT


Long recovery expected


Consult Dr Chin is appreciated


Consult Dr Shanelle Mantilla is appreciated


Check labs tomorrow





Diagnosis/Problems


Diagnosis/Problems





(1) GI bleed


Status:  Acute


Qualifiers:  


   GI bleed type/associated pathology:  unspecified gastrointestinal hemorrhage 

type  Qualified Codes:  K92.2 - Gastrointestinal hemorrhage, unspecified


(2) MRSA pneumonia


Status:  Acute


Qualifiers:  


   Laterality:  unspecified laterality  Lung location:  unspecified part of 

lung  Qualified Codes:  J15.212 - Pneumonia due to methicillin resistant 

Staphylococcus aureus


(3) Pseudomonas urinary tract infection


Status:  Acute


(4) Debility


Status:  Acute


(5) Pyelonephritis


Status:  Acute


(6) Renal calculus, left


Status:  Acute


(7) Acute renal insufficiency


Status:  Resolved


Resolution Date/Time:  18 @ 17:54


(8) Diabetes mellitus, type 2


Status:  Chronic


Qualifiers:  


   Diabetes mellitus long term insulin use:  with long term use  Diabetes 

mellitus complication status:  with circulatory complication  Diabetes mellitus 

complication detail:  with other circulatory complications  Qualified Codes:  

E11.59 - Type 2 diabetes mellitus with other circulatory complications; Z79.4 - 

Long term (current) use of insulin


(9) Congestive heart failure


Status:  Chronic


Qualifiers:  


   Heart failure type:  unspecified  


(10) COPD (chronic obstructive pulmonary disease)


Status:  Chronic


Qualifiers:  


   COPD type:  unspecified COPD  Qualified Codes:  J44.9 - Chronic obstructive 

pulmonary disease, unspecified


(11) Paroxysmal atrial fibrillation


Status:  Chronic


(12) CAD (coronary artery disease)


Status:  Chronic


Qualifiers:  


   Coronary Disease-Associated Artery/Lesion type:  native artery  Native vs. 

transplanted heart:  native heart  Associated angina:  without angina  

Qualified Codes:  I25.10 - Atherosclerotic heart disease of native coronary 

artery without angina pectoris


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


Status:  Chronic


(14) Melena


Status:  Acute


(15) Diarrhea


Status:  Acute


Qualifiers:  


   Diarrhea type:  unspecified type  Qualified Codes:  R19.7 - Diarrhea, 

unspecified


(16) Anemia


Status:  Acute


Qualifiers:  


   Anemia type:  iron deficiency  Iron deficiency anemia type:  chronic blood 

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

chronic)





Clinical Quality Measures


DVT/VTE Risk/Contraindication:


Risk Factor Score Per Nursin


RFS Level Per Nursing on Admit:  4+=Very High











MICHELE WILKINS DO Dec 30, 2018 13:06

## 2018-12-30 NOTE — CARDIOLOGY PROGRESS NOTE
Subjective


Date Seen by Provider:  Dec 30, 2018


Time Seen by Provider:  11:49


Subjective/Events-last exam


Patient is sitting in a chair, feeling better.  Denied any chest pain.  Asking 

to go home


Review of Systems


General:  No Chills, No Night Sweats, No Fatigue, No Malaise, No Appetite, No 

Other


HEENT:  No Head Aches, No Visual Changes, No Eye Pain, No Ear Pain, No Dysphasia

, No Sinus Congestion, No Post Nasal Drip, No Sore Throat, No Other


Pulmonary:  No Dyspnea, No Cough, No Pleuritic Chest Pain, No Other


Cardiovascular:  No: Chest Pain, Palpitations, Orthopnea, Paroxysmal Noc. 

Dyspnea, Edema, Lt Headedness, Other





Objective-Cardiology


Exam


Last Set of Vital Signs





Vital Signs








 18





 06:00 06:41 08:00 09:05


 


Temp 98.6   


 


Pulse    93


 


Resp 20   


 


B/P (MAP)    117/70 (86)


 


Pulse Ox  92  


 


O2 Delivery   Nasal Cannula 


 


O2 Flow Rate   2.00 





Capillary Refill :


I&O











Intake and Output 


 


 18





 00:00


 


Intake Total 2970 ml


 


Output Total 455 ml


 


Balance 2515 ml


 


 


 


Intake Oral 0 ml


 


IV Total 50 ml


 


Tube Feeding 2120 ml


 


Other 800 ml


 


Output Urine Total 455 ml


 


# Urine Diapers 7


 


# Bowel Movements 3








General:  Alert, Cooperative, No Acute Distress


HEENT:  Atraumatic, PERRLA, EOMI, Mucous Memb Moist/Pink


Neck:  Supple, No JVD


Lungs:  Other (bilat rhonchi)


Heart:  Regular Rate, Normal S1, Normal S2


Abdomen:  Normal Bowel Sounds, Soft, No Tenderness, Other (Peg Tube in place)


Extremities:  No Clubbing, Other (+1 edema BLE)


Skin:  No Rashes


Neuro:  Normal Speech, Other (Confusion strength 4/5 LES 4-/5 Upper limbs)





Results


Lab


Laboratory Tests


18 05:40














A/P-Cardiology


Admission Diagnosis


Debility


CAD


HTN


Chronic atrial fibrillation





Assessment/Plan


Debility, reporting improvement, asking to go home.





Status post respiratory failure, pneumonia, silent aspiration, on Antibiotics, 

followed by primary care physician





Urosepsis, history of kidney stone and nephrostomy tube, on antibiotics and 

managed by primary care team





Silent aspiration, status post feeding tube placement





Coronary artery disease, history of CABG done in the remote past.  Patient had 

a stent done in 2002 using MultiLink 2.518 mm to the proximal right 

coronary artery, 2017 had a Cypher stent 3.530 mm to the proximal and mid 

circumflex artery.  Has been followed and managed by primary cardiologist Dr. David in [place].  Continue to follow





History of congestive heart failure, reported ejection fraction 54 percent.  

MPI in , continue to monitor





Paroxysmal atrial fibrillation maintained on amiodarone, intolerant to oral 

anticoagulation secondary to recurrent nosebleed, on his transfer from [facility] 

patient is still on Loading dose of Amiodarone until , Patient is 

maintained on Cardizem, Lopressor and Digoxin, continue to monitor, no changes 

are recommended





Patient has been maintained on mexiletine.  Probably due to ventricular 

fibrillation and history of shock from his defibrillator, known to have [facility] 

[facility] ICD implanted by Dr. stevens in [place]. Continue on Mexiletine





COPD, chronic dyspnea, followed by primary care physician





Chronic pedal edema,continue to diurese and monitor electrolytes





Diabetes mellitus, followed and managed by primary care physician





Clinical Quality Measures


DVT/VTE Risk/Contraindication:


Risk Factor Score Per Nursin


RFS Level Per Nursing on Admit:  4+=Very High











MARINA KUMAR MD Dec 30, 2018 11:50 am

## 2018-12-31 VITALS — SYSTOLIC BLOOD PRESSURE: 116 MMHG | DIASTOLIC BLOOD PRESSURE: 87 MMHG

## 2018-12-31 VITALS — DIASTOLIC BLOOD PRESSURE: 50 MMHG | SYSTOLIC BLOOD PRESSURE: 97 MMHG

## 2018-12-31 VITALS — DIASTOLIC BLOOD PRESSURE: 77 MMHG | SYSTOLIC BLOOD PRESSURE: 144 MMHG

## 2018-12-31 VITALS — SYSTOLIC BLOOD PRESSURE: 133 MMHG | DIASTOLIC BLOOD PRESSURE: 69 MMHG

## 2018-12-31 VITALS — DIASTOLIC BLOOD PRESSURE: 74 MMHG | SYSTOLIC BLOOD PRESSURE: 123 MMHG

## 2018-12-31 LAB
ALBUMIN SERPL-MCNC: 3 GM/DL (ref 3.2–4.5)
ALP SERPL-CCNC: 90 U/L (ref 40–136)
ALT SERPL-CCNC: 14 U/L (ref 0–55)
BASOPHILS # BLD AUTO: 0 10^3/UL (ref 0–0.1)
BASOPHILS NFR BLD AUTO: 0 % (ref 0–10)
BILIRUB SERPL-MCNC: 0.5 MG/DL (ref 0.1–1)
BUN/CREAT SERPL: 22
CALCIUM SERPL-MCNC: 9.1 MG/DL (ref 8.5–10.1)
CHLORIDE SERPL-SCNC: 99 MMOL/L (ref 98–107)
CO2 SERPL-SCNC: 29 MMOL/L (ref 21–32)
CREAT SERPL-MCNC: 0.92 MG/DL (ref 0.6–1.3)
EOSINOPHIL # BLD AUTO: 0.2 10^3/UL (ref 0–0.3)
EOSINOPHIL NFR BLD AUTO: 2 % (ref 0–10)
ERYTHROCYTE [DISTWIDTH] IN BLOOD BY AUTOMATED COUNT: 19.1 % (ref 10–14.5)
ERYTHROCYTE [SEDIMENTATION RATE] IN BLOOD: 126 MM/HR (ref 0–30)
GFR SERPLBLD BASED ON 1.73 SQ M-ARVRAT: > 60 ML/MIN
GLUCOSE SERPL-MCNC: 94 MG/DL (ref 70–105)
HCT VFR BLD CALC: 26 % (ref 40–54)
HGB BLD-MCNC: 8 G/DL (ref 13.3–17.7)
LYMPHOCYTES # BLD AUTO: 1 X 10^3 (ref 1–4)
LYMPHOCYTES NFR BLD AUTO: 15 % (ref 12–44)
MANUAL DIFFERENTIAL PERFORMED BLD QL: NO
MCH RBC QN AUTO: 29 PG (ref 25–34)
MCHC RBC AUTO-ENTMCNC: 31 G/DL (ref 32–36)
MCV RBC AUTO: 96 FL (ref 80–99)
MONOCYTES # BLD AUTO: 0.8 X 10^3 (ref 0–1)
MONOCYTES NFR BLD AUTO: 12 % (ref 0–12)
NEUTROPHILS # BLD AUTO: 4.7 X 10^3 (ref 1.8–7.8)
NEUTROPHILS NFR BLD AUTO: 70 % (ref 42–75)
PLATELET # BLD: 142 10^3/UL (ref 130–400)
PMV BLD AUTO: 11.7 FL (ref 7.4–10.4)
POTASSIUM SERPL-SCNC: 4.3 MMOL/L (ref 3.6–5)
PROT SERPL-MCNC: 7.2 GM/DL (ref 6.4–8.2)
RBC # BLD AUTO: 2.72 10^6/UL (ref 4.35–5.85)
SODIUM SERPL-SCNC: 137 MMOL/L (ref 135–145)
WBC # BLD AUTO: 6.6 10^3/UL (ref 4.3–11)

## 2018-12-31 PROCEDURE — 0DJD8ZZ INSPECTION OF LOWER INTESTINAL TRACT, VIA NATURAL OR ARTIFICIAL OPENING ENDOSCOPIC: ICD-10-PCS | Performed by: SURGERY

## 2018-12-31 PROCEDURE — 0DB48ZX EXCISION OF ESOPHAGOGASTRIC JUNCTION, VIA NATURAL OR ARTIFICIAL OPENING ENDOSCOPIC, DIAGNOSTIC: ICD-10-PCS | Performed by: SURGERY

## 2018-12-31 PROCEDURE — 0DB78ZX EXCISION OF STOMACH, PYLORUS, VIA NATURAL OR ARTIFICIAL OPENING ENDOSCOPIC, DIAGNOSTIC: ICD-10-PCS | Performed by: SURGERY

## 2018-12-31 RX ADMIN — POTASSIUM CHLORIDE SCH MEQ: 1.5 POWDER, FOR SOLUTION ORAL at 05:58

## 2018-12-31 RX ADMIN — CHOLESTYRAMINE SCH GM: 4 POWDER, FOR SUSPENSION ORAL at 10:49

## 2018-12-31 RX ADMIN — ASPIRIN 81 MG CHEWABLE TABLET SCH MG: 81 TABLET CHEWABLE at 09:00

## 2018-12-31 RX ADMIN — IPRATROPIUM BROMIDE AND ALBUTEROL SULFATE SCH ML: .5; 3 SOLUTION RESPIRATORY (INHALATION) at 08:03

## 2018-12-31 RX ADMIN — MEXILETINE HYDROCHLORIDE SCH MG: 150 CAPSULE ORAL at 22:24

## 2018-12-31 RX ADMIN — IPRATROPIUM BROMIDE AND ALBUTEROL SULFATE SCH ML: .5; 3 SOLUTION RESPIRATORY (INHALATION) at 22:20

## 2018-12-31 RX ADMIN — IPRATROPIUM BROMIDE AND ALBUTEROL SULFATE SCH ML: .5; 3 SOLUTION RESPIRATORY (INHALATION) at 03:05

## 2018-12-31 RX ADMIN — DIGOXIN SCH MG: 125 TABLET ORAL at 13:34

## 2018-12-31 RX ADMIN — PHENAZOPYRIDINE HYDROCHLORIDE SCH MG: 100 TABLET ORAL at 08:00

## 2018-12-31 RX ADMIN — PHENAZOPYRIDINE HYDROCHLORIDE SCH MG: 100 TABLET ORAL at 17:59

## 2018-12-31 RX ADMIN — ATORVASTATIN CALCIUM SCH MG: 40 TABLET, FILM COATED ORAL at 19:52

## 2018-12-31 RX ADMIN — IPRATROPIUM BROMIDE AND ALBUTEROL SULFATE SCH ML: .5; 3 SOLUTION RESPIRATORY (INHALATION) at 13:52

## 2018-12-31 RX ADMIN — MEXILETINE HYDROCHLORIDE SCH MG: 150 CAPSULE ORAL at 08:00

## 2018-12-31 RX ADMIN — DILTIAZEM HYDROCHLORIDE SCH MG: 60 TABLET, FILM COATED ORAL at 22:24

## 2018-12-31 RX ADMIN — DOCUSATE SODIUM SCH MG: 50 LIQUID ORAL at 09:00

## 2018-12-31 RX ADMIN — PHENAZOPYRIDINE HYDROCHLORIDE SCH MG: 100 TABLET ORAL at 13:34

## 2018-12-31 RX ADMIN — DILTIAZEM HYDROCHLORIDE SCH MG: 60 TABLET, FILM COATED ORAL at 13:34

## 2018-12-31 RX ADMIN — SODIUM BICARBONATE SCH ML: 84 INJECTION, SOLUTION INTRAVENOUS at 09:00

## 2018-12-31 RX ADMIN — Medication SCH EACH: at 09:00

## 2018-12-31 RX ADMIN — DOCUSATE SODIUM SCH MG: 50 LIQUID ORAL at 19:59

## 2018-12-31 RX ADMIN — VANCOMYCIN HYDROCHLORIDE SCH MLS/HR: 500 INJECTION, POWDER, LYOPHILIZED, FOR SOLUTION INTRAVENOUS at 17:59

## 2018-12-31 RX ADMIN — FUROSEMIDE SCH MG: 20 TABLET ORAL at 05:58

## 2018-12-31 RX ADMIN — DILTIAZEM HYDROCHLORIDE SCH MG: 60 TABLET, FILM COATED ORAL at 05:58

## 2018-12-31 RX ADMIN — FUROSEMIDE SCH MG: 20 TABLET ORAL at 17:59

## 2018-12-31 RX ADMIN — IPRATROPIUM BROMIDE AND ALBUTEROL SULFATE SCH ML: .5; 3 SOLUTION RESPIRATORY (INHALATION) at 12:00

## 2018-12-31 RX ADMIN — METOPROLOL TARTRATE SCH MG: 25 TABLET, FILM COATED ORAL at 19:52

## 2018-12-31 RX ADMIN — AMIODARONE HYDROCHLORIDE SCH MG: 200 TABLET ORAL at 13:34

## 2018-12-31 RX ADMIN — MEXILETINE HYDROCHLORIDE SCH MG: 150 CAPSULE ORAL at 13:34

## 2018-12-31 RX ADMIN — Medication SCH EACH: at 19:59

## 2018-12-31 RX ADMIN — ANORECTAL OINTMENT SCH APPLIC: 15.7; .44; 24; 20.6 OINTMENT TOPICAL at 22:22

## 2018-12-31 RX ADMIN — GUAIFENESIN AND DEXTROMETHORPHAN SCH ML: 100; 10 SYRUP ORAL at 09:00

## 2018-12-31 RX ADMIN — IPRATROPIUM BROMIDE AND ALBUTEROL SULFATE SCH ML: .5; 3 SOLUTION RESPIRATORY (INHALATION) at 20:15

## 2018-12-31 RX ADMIN — CHOLESTYRAMINE SCH GM: 4 POWDER, FOR SUSPENSION ORAL at 23:30

## 2018-12-31 RX ADMIN — GUAIFENESIN AND DEXTROMETHORPHAN SCH ML: 100; 10 SYRUP ORAL at 13:34

## 2018-12-31 RX ADMIN — ANORECTAL OINTMENT SCH APPLIC: 15.7; .44; 24; 20.6 OINTMENT TOPICAL at 08:00

## 2018-12-31 RX ADMIN — METOPROLOL TARTRATE SCH MG: 25 TABLET, FILM COATED ORAL at 13:34

## 2018-12-31 RX ADMIN — GUAIFENESIN AND DEXTROMETHORPHAN SCH ML: 100; 10 SYRUP ORAL at 19:52

## 2018-12-31 RX ADMIN — MEXILETINE HYDROCHLORIDE SCH MG: 150 CAPSULE ORAL at 01:11

## 2018-12-31 NOTE — NUR
meds given crushed in water per peg tube, gastric residual 3ml, jevity 1.5 1 can given per 
peg tube, mexiletine given early due to Questran due at 2300

## 2018-12-31 NOTE — CONSCIOUS SEDATION/ASA
Conscious Sedation Pre-Proced


Time


09:00





ASA Score


3


For ASA 3 and 4: Consider anesthesia and medical clearance. Also, for 

patients with a history of failed moderate sedation consider anesthesia.

















Airway 


 


Lungs 


 


Heart 


 


 ASA score


 


 ASA 1: a normal healthy patient


 


 ASA 2:  a patient with a mild systemic disease (mid diabetes, controlled 

hypertension, obesity 


 


 ASA 3:  a patient with a severe systemic disease that limits activity  (angina

, COPD, prior Myocardial infarction)


 


 ASA 4:  a patient with an incapacitating disease that is a constant threat to 

life (CHF, renal failure)


 


 ASA 5:  a moribund patient not expected to survive 24 hrs.  (ruptured aneurysm)


 


 ASA 6:  a declared brain dead patient whose organs are being harvested.


 


 For emergent operations, add the letter E after the classification











Mallampati Classification


Grade 3





Sedation Plan


Analgesia, Amnesia, Plan communicated to team members, Discussed options with 

patient/fam, Discussed risks with patient/fam


The patient is an appropriate candidate to undergo the planned procedure, 

sedation, and anesthesia.





The patient immediately re-assessed prior to indication.











HARMONY CAMPO MD Dec 31, 2018 10:00

## 2018-12-31 NOTE — NUR
MSW received notification from therapy that patient's wife has requested no therapies for 
patient, MSW met with patient's wife; however, she expressed a desire for all therapies in 
order to return patient to baseline functioning.  Status will be reevaluated at Team 
Conference on Wednesday.

## 2018-12-31 NOTE — PM&R PROGRESS NOTE
Subjective


This was a face to face visit with the patient.


Date Seen by Provider:  Dec 31, 2018


Time Seen by Provider:  07:45


Subjective/Events-last exam


Patient was seen in his room this AM Patient SBA for transfers Patient with 

Guiac positive stools Discussed case with RN Patient to have colonoscopy today 

HGB 8.0 AlB 3.0 BUN 20





Objective


Physician Exam


Last Set of Vital Signs





Vital Signs








  Date Time  Temp Pulse Resp B/P (MAP) Pulse Ox O2 Delivery O2 Flow Rate FiO2


 


12/31/18 08:04     97 Nasal Cannula 2.00 


 


12/31/18 05:57 97.4 90 18 144/77 (99)    





Capillary Refill :


I&O











Intake and Output 


 


 12/31/18





 00:00


 


Intake Total 2569 ml


 


Output Total 600 ml


 


Balance 1969 ml


 


 


 


Intake Oral 0 ml


 


Tube Feeding 1719 ml


 


Other 850 ml


 


Output Urine Total 600 ml


 


# Urine Diapers 11


 


# Bowel Movements 6








General:  Alert, Cooperative, No Acute Distress


HEENT:  Atraumatic, PERRLA, EOMI, Mucous Memb Moist/Pink


Neck:  Supple, No JVD


Lungs:  Other (bilat rhonchi)


Heart:  Regular Rate, Normal S1, Normal S2


Abdomen:  Normal Bowel Sounds, Soft, No Tenderness, Other (Peg Tube in place)


Extremities:  No Clubbing, Other (+1 edema BLE)


Skin:  No Rashes


Neuro:  Normal Speech, Other (Confusion strength 4/5 LES 4-/5 Upper limbs)





Results


Lab Data


Laboratory Tests


12/28/18 11:13: Glucometer 129H


12/28/18 16:05: Glucometer 131H


12/28/18 20:14: Glucometer 133H


12/29/18 06:02: Glucometer 96


12/29/18 11:09: Glucometer 138H


12/29/18 16:17: Glucometer 111H


12/29/18 21:07: Glucometer 96


12/30/18 05:40: 


White Blood Count 6.9, Red Blood Count 2.86L, Hemoglobin 8.4L, Hematocrit 28L, 

Mean Corpuscular Volume 96, Mean Corpuscular Hemoglobin 29, Mean Corpuscular 

Hemoglobin Concent 31L, Red Cell Distribution Width 19.0H, Platelet Count 141, 

Mean Platelet Volume 12.2H, Neutrophils (%) (Auto) 69, Lymphocytes (%) (Auto) 15

, Monocytes (%) (Auto) 13H, Eosinophils (%) (Auto) 2, Basophils (%) (Auto) 0, 

Neutrophils # (Auto) 4.8, Lymphocytes # (Auto) 1.0, Monocytes # (Auto) 0.9, 

Eosinophils # (Auto) 0.1, Basophils # (Auto) 0.0, Sodium Level 138, Potassium 

Level 4.8, Chloride Level 99, Carbon Dioxide Level 31, Anion Gap 8, Blood Urea 

Nitrogen 22H, Creatinine 0.92, Estimat Glomerular Filtration Rate > 60, BUN/

Creatinine Ratio 24, Glucose Level 105, Calcium Level 9.0, Corrected Calcium 9.8

, Total Bilirubin 0.4, Aspartate Amino Transf (AST/SGOT) 23, Alanine 

Aminotransferase (ALT/SGPT) 14, Alkaline Phosphatase 87, Total Protein 7.2, 

Albumin 3.0L


12/30/18 11:35: Glucometer 132H


12/30/18 16:04: Stool Occult Blood Immunoassay POSITIVEH


12/31/18 06:15: 


White Blood Count 6.6, Red Blood Count 2.72L, Hemoglobin 8.0L, Hematocrit 26L, 

Mean Corpuscular Volume 96, Mean Corpuscular Hemoglobin 29, Mean Corpuscular 

Hemoglobin Concent 31L, Red Cell Distribution Width 19.1H, Platelet Count 142, 

Mean Platelet Volume 11.7H, Neutrophils (%) (Auto) 70, Lymphocytes (%) (Auto) 15

, Monocytes (%) (Auto) 12, Eosinophils (%) (Auto) 2, Basophils (%) (Auto) 0, 

Neutrophils # (Auto) 4.7, Lymphocytes # (Auto) 1.0, Monocytes # (Auto) 0.8, 

Eosinophils # (Auto) 0.2, Basophils # (Auto) 0.0, Erythrocyte Sedimentation 

Rate 126H, Sodium Level 137, Potassium Level 4.3, Chloride Level 99, Carbon 

Dioxide Level 29, Anion Gap 9, Blood Urea Nitrogen 20H, Creatinine 0.92, 

Estimat Glomerular Filtration Rate > 60, BUN/Creatinine Ratio 22, Glucose Level 

94, Calcium Level 9.1, Corrected Calcium 9.9, Total Bilirubin 0.5, Aspartate 

Amino Transf (AST/SGOT) 21, Alanine Aminotransferase (ALT/SGPT) 14, Alkaline 

Phosphatase 90, Total Protein 7.2, Albumin 3.0L





Assessment/Plan


Assessment and Plan


Disuse myopathy Continue PT/OT


S/P resp failure with due to MRSA pneumonia with bacteremia resolving-vancomycin


Pseudomonas UTI treated with HX of Kidney stones and Nephrostomy tube


A FIB controlled with med


Dysphagia NPO on tube feeds


Confusion Multifactorial appears to be improving ST following


Guiac Positive stools with anemia to have Colonoscopy today patient with tube 

feeds on hold preop





Plan Continue PT/OT/ST


Patient to have Colonoscopy with DR Mantilla today re Anemia and Guiac Positive 

stools


Team Conference 1-2-19


Last day for me here


Hospitalist service to assume care as per Administrators request


Co-Morbidities that are continuing to impact the rehab process: (include details

)











MESERET GONZALES MD Dec 31, 2018 08:38

## 2018-12-31 NOTE — SPEECH THERAPY DAILY NOTE
Speech Daily Progress Note


Subjective


Date Seen by Provider:  Dec 31, 2018


Time Seen by Provider:  00:30


Patient was taking a nap in his recliner when I arrived for therapy.





Objective


OME and memory exercises completed with 80% accuracy given minimal verbal cues.





Treatment Plan


Continue Plan of Care





Communication


Comprehension:  2


Expression:  2





Social Cognition


Social Interaction:  2


Problem Solvin


Memory:  2





Speech Short Term Goals


Short Term Goals


Short Term Goals


1) Patient will be able to recall new information with 80% or greater given 

minimal cues.


2) Patient will be able to name items/pictures presented with 80% or greater 

given minimal cues.


3) Patient will utilize compensatory strategies for safety within his room with 

80% or greater given minimal cues.


4) Patient will complete pharyngeal exercises 5x/week in order to return to PO 

status.


5) Patient will tolerate trials of least restrictive diet level without s/s of 

aspiration.





Speech Long Term Goals


Long Term Goals


1) Patient will improve memory, problem solving and auditory processing in 

order to return safely home.


2) Patient will demonstrate a safe swallow function for oral intake for 

maintaining nutrition/hydration.





Speech-Plan


Patient/Family Goals


Patient/Family Goals:  


Patient plans to return home with his wife and caregiver post rehab.





Treatment Plan


Speech Therapy Treatment Plan:  Continue Plan of Care


Patient is progressing with skilled ST services. He had a colonoscopy procedure 

completed today.


Treatment Duration:  2019


Frequency:  5 times per week


Estimated Hrs Per Day:  .5 hour per day


Rehab Potential:  Guarded


Barriers to Learning:  


Patient has decreased memory and problem solving deficits.


Pt/Family Agrees to Plan:  Yes





Safety Risks/Education


Teaching Recipient:  Patient, Significant Other


Teaching Methods:  Discussion


Response to Teaching:  Verbalize Understanding


Education Topics Provided:  


Safety and reason for NPO status.





Time


Speech Therapy Time In:  11:30


Speech Therapy Time Out:  11:50


Total Billed Time:  20


Billed Treatment Time


1. SLTS, 1, DYST


No


Patient was seen from 14:00 to 14:10 for 10 additional minutes due to 

colonoscopy procedure.











KAMRON COVINGTON Dec 31, 2018 14:28

## 2018-12-31 NOTE — PHYSICAL THERAPY DAILY NOTE
PT Daily Note-Current


Subjective


Pt. in room with wife. Wife states he will have a scope this morning .  Pt. 

agrees to Rx.  Repeats that he wants to go home.





Pain





   Location:  No Pain Reported





Mental Status


Patient Orientation:  Person, Place


Attachments:  Oxygen, PEG Tube





Transfers


Therapy Code Descriptions/Definitions 





Functional [place] Measure:


0=Not Assessed/NA        4=Minimal Assistance


1=Total Assistance        5=Supervision or Setup


2=Maximal Assistance  6=Modified [place]


3=Moderate Assistance 7=Complete [place]








Therapy Quality Codes:


6    Independent with activity with or without an assistive device


5    Patient requires set up or clean up by helper.  Patient completes activity

  by  themselves


4    Supervision or touching assist (CGA). [place] provide cues , steadying 

assist


3    The helper provides less than half the effort to complete the activity


2    The helper provides more than half the effort to complete the activity


1    Dependent.  The helper does all the effort to complete an activity 


7    Patient refused to complete or attempt activity


9    The patient did not perform the activity before the current illness or 

injury


88  Not attempted due to Medical conditions or safety concerns


Transfers (B, C, W/C) (FIM):  5


Scootin


Rollin


Supine to/from Sit:  6


Sit to/from Stand:  5


Bed to/from Chair:  5





Gait Training


Does the Patient Walk?:  Yes


Gait (FIM):  2


Distance (FIM):  9=258-20 ft (80,100,50)


Gait Level of Assist:  4


Gait Persons Needed:  1


Gait Assistive Device:  FWW


feet shuffle against each other , pt. instructed to attempt to abduct but 

unable to.  No LOB, pt. sits without notice and needs w/c follow up as well as 

assist for O2.





Wheelchair Training


Does the Pt Use a Wheelchair?:  Yes


Wheelchair (FIM):  5


Wheelchair Distance:  3=150 ft


Wheelchair Level of Assist:  5


Type of Wheelchair:  Manual


needs reminded occas to lock brakes





Exercises


Supine Ex:  Bridging, Ankle pumps, Quad Set, Rolling, Glut sets, Heel Slides, 

Short Arc Quads, Scooting, Straight leg raise, Hip abd/add


Supine Reps:  15


Seated Therapy Exercises:  Ankle pumps, Sit to stand, Long arc quads, Hip 

flexion, Hip abd/add


Seated Reps:  15


Standing:  Hip Abduction, Heel/toe raises, Marching


Standing Reps:  12


NuStep Minutes:  10


 NuStep Workload:  1





Treatments


discussed home situation and safety with wife and pt.  She feels secure about 

all aspects of home [place] with assist of 





Assessment


Current Status:  Good Progress


possibly at PLOF





PT Short Term Goals


Short Term Goals


Time Frame:  Dec 31, 2018


Transfers (B,C,W/C) (FIM):  5


Gait (FIM):  2


Distance (FIM):  1=up to 49 ft


Gait Assistive Device:  FWW


Wheelchair Distance:  30'





PT Long Term Goals


Long Term Goals


PT Long Term Goals Time Frame:  2019


Transfers (B,C,W/C) (FIM):  6


Sit to Lying (QC):  6


Lying-Sitting on Side/Bed(QC):  6


Sit to Stand (QC):  6


Rollin


Roll Left to Right (QC):  6


Chair/Bed-to-Chair Xfer(QC):  6


Car Transfer (QC):  5


Does the Patient Walk:  Yes


Gait (FIM):  5 (household)


Gait distance (FIM):  0=881-42 ft


Walk 10 feet (QC):  6


Walk 10ft-Uneven Surface(QC):  6


Walk 50ft with 2 Turns (QC):  6


Walk 150 ft (QC):  88


Gait Level of Assist:  6


Gait Assistive Device:  FWW


Does the Pt use WC or Scooter?:  Yes


Wheelchair (FIM):  6


Wheelchair distance (FIM):  3=150 ft


Wheel 50 feet with 2 turns (QC:  6


Stairs (FIM):  88


1 Step (curb) (QC):  88


4 Steps (QC):  88


12 Steps (QC):  88


Picking up an Object (QC):  88





PT Plan


Treatment/Plan


Treatment Plan:  Continue Plan of Care


Treatment Plan:  Bed Mobility, Education, Functional Activity Farnaz, Functional 

Strength, Group Therapy, Gait, Safety, Therapeutic Exercise, Transfers


Treatment Duration:  2019


Frequency:  At least 5 of 7 days/Wk (IRF)


Estimated Hrs Per Day:  1.5 hours per day


Patient and/or Family Agrees t:  Yes





Safety Risks/Education


Patient Education:  Gait Training, Transfer Techniques, Correct Positioning, W/

C Management, Disease Process, Safety Issues


Teaching Recipient:  Patient


Teaching Methods:  Demonstration, Discussion


Response to Teaching:  Verbalize Understanding, Return Demonstration, 

Reinforcement Needed





Time/GCodes


Time In:  900


Time Out:  1030


Total Billed Treatment Time:  90


Total Billed Treatment


1,GT25m,FA30m,EX35m


G Codes Necessary:  ROBERT Wooten PTA Dec 31, 2018 10:29

## 2018-12-31 NOTE — OCCUPATIONAL THER DAILY NOTE
OT Current Status-Daily Note


Mental Status/Objective


Therapy Code Descriptions/Definitions 





Functional [place] Measure:


0=Not Assessed/NA        4=Minimal Assistance


1=Total Assistance        5=Supervision or Setup


2=Maximal Assistance  6=Modified [place]


3=Moderate Assistance 7=Complete [place]





ADL-Treatment


Pt states he does not want to shower today, but does agree to sponge bath. Pt 

washed face, arms, chest, abdomen, bilateral upper legs, and netta area, but 

required assist for buttocks and feet. Donned pullover shirt by self after set 

up. Pt required assist to start pants over feet. Pt to have procedure in am, 

wife requests no pants at this time.  Assist required to don socks. Pt states 

his wife usually does this for him.  Pt declined to complete oral hygiene at 

this time. Combed hair with minimal assistance. Pt requires encouragement to 

attempt tasks prior to receiving assistance.  [place] [facility] [place] 

FW[place].  Pt cleansed netta area, assist to cleanse buttocks.  Transferred back to 

recliner.  Max A to shave due to increased tremors in B hands.  After therapy, 

pt sitting in recliner with call light/phone in reach.  All needs met in room.


Therapy Code Descriptions/Definitions 





Functional [place] Measure:


0=Not Assessed/NA        4=Minimal Assistance


1=Total Assistance        5=Supervision or Setup


2=Maximal Assistance  6=Modified [place]


3=Moderate Assistance 7=Complete [place]








Therapy Quality Codes:


6    Independent with activity with or without an assistive device


5    Patient requires set up or clean up by helper.  Patient completes activity

  by  themselves


4    Supervision or touching assist (CGA). [place] provide cues , steadying 

assist


3    The helper provides less than half the effort to complete the activity


2    The helper provides more than half the effort to complete the activity


1    Dependent.  The helper does all the effort to complete an activity 


7    Patient refused to complete or attempt activity


9    The patient did not perform the activity before the current illness or 

injury


88  Not attempted due to Medical conditions or safety concerns


Bathing (FIM):  3


Bathing Location:  L Arm, R Arm, L Upper Leg, R Upper Leg, Chest, Abdomen, 

Perineal Area


Shower/Bathe Self (QC):  3


Upper Body (FIM):  5


Upper Body Dressing (QC):  5


On/Off Footwear (QC):  2


Toileting (FIM):  3


Toileting Hygiene (QC):  3


Toilet/Commode Transfer (FIM):  4


Toilet Transfer (QC):  4





OT Short Term Goals


Short Term Goals


Time Frame:  Dec 31, 2018


Eating(FIM):  4


Grooming(FIM):  3


Upper Body Dressing(FIM):  3


Lower Body Dressing(FIM):  3


Toileting(FIM):  4


Transfers (B,C,W/C) (FIM):  5


Toilet/Commode Transfer(FIM):  4


Additional Short Term Goals:  1-Demonstrate ADL Tasks, 2-Verbalize Understanding

, 3-ImproveStrength/Farnaz


1=Demonstrate adherence to instructed precautions during ADL tasks.


2=Patient will verbalize/demonstrate understanding of assistive devices/

modifications for ADL.


3=Patient will improve strength/tolerance for activity to enable patient to 

perform ADL's.





OT Long Term Goals


Long Term Goals


Time Frame:  2019


Eating (FIM):  5


Eating (QC):  5


Groomin


Oral Hygiene (QC):  4


Bathing(FIM):  3


Shower/Bathe Self (QC):  3


Upper Body Dressing(FIM):  5


Upper Body Dressing (QC):  4


Lower Body Dressing(FIM):  4


Lower Body Dressing (QC):  4


On/Off Footwear (QC):  4


Toileting(FIM):  5


Toileting Hygiene (QC):  5


Transfers (B,C,W/C) (FIM):  5


Toilet/Commode Transfer(FIM):  5


Toilet/Commode Transfer (QC):  4


Additional Goals:  1-Demonstrate ADL Tasks, 2-Verbalize Understanding, 3-

ImproveStrength/Farnaz


1=Demonstrate adherence to instructed precautions during ADL tasks.


2=Patient will verbalize/demonstrate understanding of assistive devices/

modifications for ADL.


3=Patient will improve strength/tolerance for activity to enable patient to 

perform ADL's.





OT Education/Plan


Discharge Recommendations


Plan/Recommendations:  Continue POC





Treatment Plan/Plan of Care


Patient would benefit from OT for education, treatment and training to promote 

independence in ADL's, mobility, safety and/or upper extremity function for ADL'

s.


Plan of Care:  ADL Retraining, Functional Mobility, Group Exercise/Act as Ind, 

UE Funct Exercise/Act


Treatment Duration:  2019


Frequency:  At least 5 of 7 days/Wk (IRF)


Estimated Hrs Per Day:  1.5 hours per day


Agreement:  Yes


Rehab Potential:  Guarded





Time/GCodes


Start Time:  08:00


Stop Time:  09:00


Total Time Billed (hr/min):  60


Billed Treatment Time


1 visit-ADL 4 (60 min)











ELMER WHITING Dec 31, 2018 08:58

## 2018-12-31 NOTE — CARDIOLOGY PROGRESS NOTE
Subjective


Date Seen by Provider:  Dec 31, 2018


Time Seen by Provider:  09:54


Subjective/Events-last exam


Patient is receiving physical therapy, feeling well.  Denied any chest pain


Review of Systems


General:  No Chills, No Night Sweats, No Fatigue, No Malaise, No Appetite, No 

Other


HEENT:  No Head Aches, No Visual Changes, No Eye Pain, No Ear Pain, No Dysphasia

, No Sinus Congestion, No Post Nasal Drip, No Sore Throat, No Other


Pulmonary:  No Dyspnea, No Cough, No Pleuritic Chest Pain, No Other


Cardiovascular:  No: Chest Pain, Palpitations, Orthopnea, Paroxysmal Noc. 

Dyspnea, Edema, Lt Headedness, Other





Objective-Cardiology


Exam


Last Set of Vital Signs





Vital Signs








 18





 05:57 08:04


 


Temp 97.4 


 


Pulse 90 


 


Resp 18 


 


B/P (MAP) 144/77 (99) 


 


Pulse Ox  97


 


O2 Delivery  Nasal Cannula


 


O2 Flow Rate  2.00





Capillary Refill :


I&O











Intake and Output 


 


 18





 00:00


 


Intake Total 2569 ml


 


Output Total 600 ml


 


Balance 1969 ml


 


 


 


Intake Oral 0 ml


 


Tube Feeding 1719 ml


 


Other 850 ml


 


Output Urine Total 600 ml


 


# Urine Diapers 11


 


# Bowel Movements 6








General:  Alert, Cooperative, No Acute Distress


HEENT:  Atraumatic, PERRLA, EOMI, Mucous Memb Moist/Pink


Neck:  Supple, No JVD


Lungs:  Other


Heart:  Regular Rate, Normal S1, Normal S2


Abdomen:  Normal Bowel Sounds, Soft, No Tenderness, Other (Peg Tube in place)


Extremities:  No Clubbing, Other (+1 edema BLE)


Skin:  No Rashes


Neuro:  Normal Speech, Other (Confusion strength 4/5 LES 4-/5 Upper limbs)





Results


Lab


Laboratory Tests


18 06:15














A/P-Cardiology


Admission Diagnosis


Debility


CAD


HTN


Chronic atrial fibrillation





Assessment/Plan


Debility, reporting improvement, asking to go home.





Status post respiratory failure, pneumonia, silent aspiration, on Antibiotics, 

followed by primary care physician





Urosepsis, history of kidney stone and nephrostomy tube, on antibiotics and 

managed by primary care team





Silent aspiration, status post feeding tube placement





Coronary artery disease, history of CABG done in the remote past.  Patient had 

a stent done in 2002 using MultiLink 2.518 mm to the proximal right 

coronary artery, 2017 had a Cypher stent 3.530 mm to the proximal and mid 

circumflex artery.  Has been followed and managed by primary cardiologist Dr. David in [place].  Continue to follow





History of congestive heart failure, reported ejection fraction 54 percent.  

MPI in , continue to monitor





Paroxysmal atrial fibrillation maintained on amiodarone, intolerant to oral 

anticoagulation secondary to recurrent nosebleed and anemia,  Patient is 

maintained on Cardizem, Amiodarone,Lopressor and Digoxin, continue to monitor, 

no changes are recommended





Patient has been maintained on mexiletine.  Probably due to ventricular 

fibrillation and history of shock from his defibrillator, known to have [facility] 

[facility] ICD implanted by Dr. stevens in [place]. Continue on Mexiletine





COPD, chronic dyspnea, followed by primary care physician





Chronic pedal edema,continue to diurese and monitor electrolytes





Diabetes mellitus, followed and managed by primary care physician





Anemia- worsening. Hemoccult positive. History of gastric ulcer. Dr. Mantilla 

consulted, planning to EGD and colonoscopy later this morning.





Clinical Quality Measures


DVT/VTE Risk/Contraindication:


Risk Factor Score Per Nursin


RFS Level Per Nursing on Admit:  4+=Very High











MARINA KUMAR MD Dec 31, 2018 9:54 am

## 2018-12-31 NOTE — PROGRESS NOTE-HOSPITALIST
Subjective


HPI/CC On Admission


Date Seen by Provider:  Dec 31, 2018


Time Seen by Provider:  13:00


CC: Severe debility





HPI: This is a 78-year-old white male [facility] patient was 

admitted to inpatient rehabilitation for IV medication and severe debility 

resolution.  He was admitted to [facility] for 11 days requiring PEG 

tube placement due to silent aspiration and urinary stent placement due to 

obstructive stone in ureter and maintained on antibiotic regimen with 

aggressive physical therapy.  I reviewed old records and culture results from 

infectious disease and reviewed current lab results along with vital signs.  He 

is sitting up in chair drowsy but oriented 3 but appears to be severely 

chronically ill.  Her goal is to return to independent ADL function along with 

completion of IV antibiotics.


Subjective/Events-last exam


Patient doing well


EGD and colonoscopy revealed no source of bleeding


Proton pump inhibitor maintained


No pain is reported


Cough is still present


Aspiration risk and remains nothing by mouth and PEG tube feedings are tolerated





Review of Systems


General:  Fatigue


Pulmonary:  Cough





Objective


Exam


Vital Signs





Vital Signs








  Date Time  Temp Pulse Resp B/P (MAP) Pulse Ox O2 Delivery O2 Flow Rate FiO2


 


18 13:52     95 Nasal Cannula 2.00 


 


18 13:30  81  133/69 (90)    


 


18 05:57 97.4  18     





Capillary Refill :


General Appearance:  No Apparent Distress, WD/WN


Respiratory:  Chest Non Tender, No Accessory Muscle Use, No Respiratory Distress

, Crackles, Decreased Breath Sounds, Wheezing


Cardiovascular:  Regular Rate, Rhythm, No Edema, No Gallop, No JVD, No Murmur, 

Normal Peripheral Pulses


Neurologic/Psychiatric:  Alert, Oriented x3, No Motor/Sensory Deficits, Normal 

Mood/Affect





Results/Procedures


Lab


Laboratory Tests


18 06:15








Patient resulted labs reviewed.





Assessment/Plan


Assessment and Plan


Assess & Plan/Chief Complaint


Assessment:


Anemia with presumed GI bleed with hemoccult positive and Dr Mantilla consult for 

EGD/Colon tomorrow


MRSA pneumonia


Pseudomonas UTI


Severe debilitated state


CAD previous bypass graft


ICD defibrillator in place


Presbycusis


Sleep apnea


Chronic respiratory failure





Plan:


Scopes reviewed


Hold ASA, DC accuchecks


PEG tube feedings


Speech therapy


PT/OT


Long recovery expected


Consult Dr Chin is appreciated


Consult Dr Shanelle Mantilla is appreciated





Diagnosis/Problems


Diagnosis/Problems





(1) GI bleed


Status:  Acute


Qualifiers:  


   GI bleed type/associated pathology:  unspecified gastrointestinal hemorrhage 

type  Qualified Codes:  K92.2 - Gastrointestinal hemorrhage, unspecified


(2) MRSA pneumonia


Status:  Acute


Qualifiers:  


   Laterality:  unspecified laterality  Lung location:  unspecified part of 

lung  Qualified Codes:  J15.212 - Pneumonia due to methicillin resistant 

Staphylococcus aureus


(3) Pseudomonas urinary tract infection


Status:  Acute


(4) Debility


Status:  Acute


(5) Pyelonephritis


Status:  Acute


(6) Renal calculus, left


Status:  Acute


(7) Acute renal insufficiency


Status:  Resolved


Resolution Date/Time:  18 @ 17:54


(8) Diabetes mellitus, type 2


Status:  Chronic


Qualifiers:  


   Diabetes mellitus long term insulin use:  with long term use  Diabetes 

mellitus complication status:  with circulatory complication  Diabetes mellitus 

complication detail:  with other circulatory complications  Qualified Codes:  

E11.59 - Type 2 diabetes mellitus with other circulatory complications; Z79.4 - 

Long term (current) use of insulin


(9) Congestive heart failure


Status:  Chronic


Qualifiers:  


   Heart failure type:  unspecified  


(10) COPD (chronic obstructive pulmonary disease)


Status:  Chronic


Qualifiers:  


   COPD type:  unspecified COPD  Qualified Codes:  J44.9 - Chronic obstructive 

pulmonary disease, unspecified


(11) Paroxysmal atrial fibrillation


Status:  Chronic


(12) CAD (coronary artery disease)


Status:  Chronic


Qualifiers:  


   Coronary Disease-Associated Artery/Lesion type:  native artery  Native vs. 

transplanted heart:  native heart  Associated angina:  without angina  

Qualified Codes:  I25.10 - Atherosclerotic heart disease of native coronary 

artery without angina pectoris


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


Status:  Chronic


(14) Melena


Status:  Acute


(15) Diarrhea


Status:  Acute


Qualifiers:  


   Diarrhea type:  unspecified type  Qualified Codes:  R19.7 - Diarrhea, 

unspecified


(16) Anemia


Status:  Acute


Qualifiers:  


   Anemia type:  iron deficiency  Iron deficiency anemia type:  chronic blood 

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

chronic)





Clinical Quality Measures


DVT/VTE Risk/Contraindication:


Risk Factor Score Per Nursin


RFS Level Per Nursing on Admit:  4+=Very High











MICHELE WILKINS DO Dec 31, 2018 13:18

## 2018-12-31 NOTE — CARDIOLOGY PROGRESS NOTE
Subjective


Date Seen by Provider:  Dec 31, 2018


Time Seen by Provider:  08:31


Subjective/Events-last exam


Patient sitting up in chair, c/o weakness, reports dyspnea is continuing to 

improve.


Review of Systems


General:  No Night Sweats; Fatigue; No Malaise


HEENT:  No Visual Changes, No Dysphasia, No Sore Throat


Pulmonary:  Dyspnea, Cough; No Pleuritic Chest Pain


Cardiovascular:  Edema; No: Chest Pain, Palpitations, Orthopnea


Gastrointestinal:  No: Nausea, Vomiting, Abdominal Pain


Genitourinary:  No Dysuria, No Frequency


Musculoskeletal:  No: neck pain, back pain


Neurological:  Weakness; No: Numbness, Change in speech, Confusion





Objective-Cardiology


Exam


Last Set of Vital Signs





Vital Signs








 18





 05:57 08:04


 


Temp 97.4 


 


Pulse 90 


 


Resp 18 


 


B/P (MAP) 144/77 (99) 


 


Pulse Ox  97


 


O2 Delivery  Nasal Cannula


 


O2 Flow Rate  2.00





Capillary Refill :


I&O











Intake and Output 


 


 18





 00:00


 


Intake Total 2569 ml


 


Output Total 600 ml


 


Balance 1969 ml


 


 


 


Intake Oral 0 ml


 


Tube Feeding 1719 ml


 


Other 850 ml


 


Output Urine Total 600 ml


 


# Urine Diapers 11


 


# Bowel Movements 6








General:  Alert, Cooperative, No Acute Distress


HEENT:  Atraumatic, PERRLA, EOMI, Mucous Memb Moist/Pink


Neck:  Supple, No JVD


Lungs:  Other (bilat wheezing)


Heart:  Regular Rate, Normal S1, Normal S2


Abdomen:  Normal Bowel Sounds, Soft, No Tenderness, Other (Peg Tube in place)


Extremities:  No Clubbing, Other (+1 edema BLE)


Skin:  No Rashes


Neuro:  Normal Speech, Other (Confusion strength 4/5 LES 4-/5 Upper limbs)





Results


Lab


Laboratory Tests


18 06:15














A/P-Cardiology


Admission Diagnosis


Debility


CAD


HTN


Chronic atrial fibrillation





Assessment/Plan


Debility, reporting improvement, asking to go home.





Status post respiratory failure, pneumonia, silent aspiration, on Antibiotics, 

followed by primary care physician





Urosepsis, history of kidney stone and nephrostomy tube, on antibiotics and 

managed by primary care team





Silent aspiration, status post feeding tube placement





Coronary artery disease, history of CABG done in the remote past.  Patient had 

a stent done in 2002 using MultiLink 2.518 mm to the proximal right 

coronary artery, 2017 had a Cypher stent 3.530 mm to the proximal and mid 

circumflex artery.  Has been followed and managed by primary cardiologist Dr. David in [place].  Continue to follow





History of congestive heart failure, reported ejection fraction 54 percent.  

MPI in , continue to monitor





Paroxysmal atrial fibrillation maintained on amiodarone, intolerant to oral 

anticoagulation secondary to recurrent nosebleed and anemia,  Patient is 

maintained on Cardizem, Amiodarone,Lopressor and Digoxin, continue to monitor, 

no changes are recommended





Patient has been maintained on mexiletine.  Probably due to ventricular 

fibrillation and history of shock from his defibrillator, known to have [facility] 

[facility] ICD implanted by Dr. stevens in [place]. Continue on Mexiletine





COPD, chronic dyspnea, followed by primary care physician





Chronic pedal edema,continue to diurese and monitor electrolytes





Diabetes mellitus, followed and managed by primary care physician





Anemia- worsening. Hemoccult positive. History of gastric ulcer. Dr. Mantilla 

consulted, planning to EGD and colonoscopy later this morning.





Clinical Quality Measures


DVT/VTE Risk/Contraindication:


Risk Factor Score Per Nursin


RFS Level Per Nursing on Admit:  4+=Very High











ABELARDO BLACKBURN Dec 31, 2018 08:33

## 2018-12-31 NOTE — PULMONARY PROGRESS NOTE
Subjective


Time Seen by a Provider:  07:45


Subjective/Events-last exam


No complications noted.





Sepsis Event


Evaluation


Height, Weight, BMI


Height: 5'5.00"


Weight: 181lbs. 5.0oz. 82.261034mi; 27.8 BMI


Method:Stated





Exam


Exam





Vital Signs








  Date Time  Temp Pulse Resp B/P (MAP) Pulse Ox O2 Delivery O2 Flow Rate FiO2


 


12/31/18 05:57 97.4 90 18 144/77 (99) 93 Nasal Cannula 2.00 


 


12/30/18 20:47  92  108/56 (73)    


 


12/30/18 20:00      Nasal Cannula 2.00 


 


12/30/18 15:54 97.7 89 20 128/70 (89) 97 Nasal Cannula 2.00 


 


12/30/18 09:05  93  117/70 (86)    


 


12/30/18 08:00      Nasal Cannula 2.00 














I & O 


 


 12/31/18





 07:00


 


Intake Total 2490 ml


 


Output Total 650 ml


 


Balance 1840 ml








Height & Weight


Height: 5'5.00"


Weight: 181lbs. 5.0oz. 82.180117yz; 27.8 BMI


Method:Stated


General Appearance:  No Apparent Distress, WD/WN, Chronically ill


HEENT:  TMs Normal, Pharynx Normal


Neck:  Full Range of Motion, Normal Inspection, Non Tender, Supple, Carotid 

Bruit


Respiratory:  Chest Non Tender, Normal Breath Sounds, No Accessory Muscle Use, 

No Respiratory Distress, Crackles, Wheezing


Cardiovascular:  Regular Rate, Rhythm, No Edema, No Gallop, No JVD, No Murmur, 

Normal Peripheral Pulses


Extremity:  Normal Capillary Refill, Normal Inspection


Neurologic/Psychiatric:  Alert, Oriented x3, No Motor/Sensory Deficits, Normal 

Mood/Affect


Skin:  Normal Color, Warm/Dry


Lymphatic:  No Adenopathy





Results


Lab


Laboratory Tests


12/30/18 05:40








12/31/18 06:15











Assessment/Plan


Assessment/Plan


MRSA pneumonia with bacteremia- resolving 


   -Vancomycin 


Pseudomonas UTI


   -Cefepime now d/c'd


Debility 


CHF


COPD with hypoxia


   -SVNs 


   -Monitor











JOSE A ZAMORA DO Dec 31, 2018 07:45

## 2018-12-31 NOTE — NUR
assessments & interventions completed, see assessments & interventions, hs meds given early 
due to [place] due to 2300, colace held due to loose stools

## 2018-12-31 NOTE — PROGRESS NOTE-PRE OPERATIVE
Pre-Operative Progress Note


H&P Reviewed


The H&P was reviewed, patient examined and no changes noted.


Date Seen by Provider:  Dec 31, 2018


Time Seen by Provider:  09:00


Date H&P Reviewed:  Dec 31, 2018


Time H&P Reviewed:  09:00


Pre-Operative Diagnosis:  anemia











HARMONY CAMPO MD Dec 31, 2018 10:02

## 2019-01-01 VITALS — DIASTOLIC BLOOD PRESSURE: 59 MMHG | SYSTOLIC BLOOD PRESSURE: 114 MMHG

## 2019-01-01 VITALS — SYSTOLIC BLOOD PRESSURE: 107 MMHG | DIASTOLIC BLOOD PRESSURE: 71 MMHG

## 2019-01-01 RX ADMIN — CHOLESTYRAMINE SCH GM: 4 POWDER, FOR SUSPENSION ORAL at 22:57

## 2019-01-01 RX ADMIN — IPRATROPIUM BROMIDE AND ALBUTEROL SULFATE SCH ML: .5; 3 SOLUTION RESPIRATORY (INHALATION) at 02:50

## 2019-01-01 RX ADMIN — MEXILETINE HYDROCHLORIDE SCH MG: 150 CAPSULE ORAL at 08:12

## 2019-01-01 RX ADMIN — FUROSEMIDE SCH MG: 20 TABLET ORAL at 16:19

## 2019-01-01 RX ADMIN — FUROSEMIDE SCH MG: 20 TABLET ORAL at 06:07

## 2019-01-01 RX ADMIN — DOCUSATE SODIUM SCH MG: 50 LIQUID ORAL at 20:24

## 2019-01-01 RX ADMIN — GUAIFENESIN AND DEXTROMETHORPHAN SCH ML: 100; 10 SYRUP ORAL at 13:18

## 2019-01-01 RX ADMIN — Medication SCH EACH: at 08:12

## 2019-01-01 RX ADMIN — PHENAZOPYRIDINE HYDROCHLORIDE SCH MG: 100 TABLET ORAL at 16:19

## 2019-01-01 RX ADMIN — GUAIFENESIN AND DEXTROMETHORPHAN SCH ML: 100; 10 SYRUP ORAL at 20:24

## 2019-01-01 RX ADMIN — ASPIRIN 81 MG CHEWABLE TABLET SCH MG: 81 TABLET CHEWABLE at 08:12

## 2019-01-01 RX ADMIN — MEXILETINE HYDROCHLORIDE SCH MG: 150 CAPSULE ORAL at 16:19

## 2019-01-01 RX ADMIN — IPRATROPIUM BROMIDE AND ALBUTEROL SULFATE SCH ML: .5; 3 SOLUTION RESPIRATORY (INHALATION) at 07:01

## 2019-01-01 RX ADMIN — GUAIFENESIN AND DEXTROMETHORPHAN SCH ML: 100; 10 SYRUP ORAL at 08:10

## 2019-01-01 RX ADMIN — Medication SCH EACH: at 20:24

## 2019-01-01 RX ADMIN — ANORECTAL OINTMENT SCH APPLIC: 15.7; .44; 24; 20.6 OINTMENT TOPICAL at 20:25

## 2019-01-01 RX ADMIN — DOCUSATE SODIUM SCH MG: 50 LIQUID ORAL at 08:13

## 2019-01-01 RX ADMIN — PHENAZOPYRIDINE HYDROCHLORIDE SCH MG: 100 TABLET ORAL at 08:12

## 2019-01-01 RX ADMIN — PHENAZOPYRIDINE HYDROCHLORIDE SCH MG: 100 TABLET ORAL at 13:18

## 2019-01-01 RX ADMIN — ANORECTAL OINTMENT SCH APPLIC: 15.7; .44; 24; 20.6 OINTMENT TOPICAL at 08:12

## 2019-01-01 RX ADMIN — DIGOXIN SCH MG: 125 TABLET ORAL at 08:12

## 2019-01-01 RX ADMIN — DILTIAZEM HYDROCHLORIDE SCH MG: 60 TABLET, FILM COATED ORAL at 13:22

## 2019-01-01 RX ADMIN — IPRATROPIUM BROMIDE AND ALBUTEROL SULFATE SCH ML: .5; 3 SOLUTION RESPIRATORY (INHALATION) at 10:29

## 2019-01-01 RX ADMIN — SODIUM BICARBONATE SCH ML: 84 INJECTION, SOLUTION INTRAVENOUS at 08:13

## 2019-01-01 RX ADMIN — CHOLESTYRAMINE SCH GM: 4 POWDER, FOR SUSPENSION ORAL at 08:13

## 2019-01-01 RX ADMIN — DILTIAZEM HYDROCHLORIDE SCH MG: 60 TABLET, FILM COATED ORAL at 06:07

## 2019-01-01 RX ADMIN — IPRATROPIUM BROMIDE AND ALBUTEROL SULFATE SCH ML: .5; 3 SOLUTION RESPIRATORY (INHALATION) at 14:04

## 2019-01-01 RX ADMIN — METOPROLOL TARTRATE SCH MG: 25 TABLET, FILM COATED ORAL at 08:13

## 2019-01-01 RX ADMIN — METOPROLOL TARTRATE SCH MG: 25 TABLET, FILM COATED ORAL at 20:24

## 2019-01-01 RX ADMIN — ATORVASTATIN CALCIUM SCH MG: 40 TABLET, FILM COATED ORAL at 20:24

## 2019-01-01 RX ADMIN — DILTIAZEM HYDROCHLORIDE SCH MG: 60 TABLET, FILM COATED ORAL at 22:03

## 2019-01-01 RX ADMIN — AMIODARONE HYDROCHLORIDE SCH MG: 200 TABLET ORAL at 08:16

## 2019-01-01 RX ADMIN — POTASSIUM CHLORIDE SCH MEQ: 1.5 POWDER, FOR SOLUTION ORAL at 06:07

## 2019-01-01 NOTE — PM&R PROGRESS NOTE
Subjective


This was a face to face visit with the patient.


Date Seen by Provider:  Jan 1, 2019


Time Seen by Provider:  11:45


Subjective/Events-last exam


Patient was seen in his room, wife at bedside


Patient really working hard with therapy


Cough and coarse breath sounds continue


Completed abx for MRSA pneumonia


Bowels are moving without blood


Recovering from scopes which revealed no evidence of any active bleeding


Maintain on proton pump inhibitor


Overall feels good


Bowels are moving


Denies any pain


PEG functioning well


NPO status maintained


Review of Systems


General:  Fatigue


HEENT:  Dysphasia


Pulmonary:  Cough





Objective


Physician Exam


Last Set of Vital Signs





Vital Signs








  Date Time  Temp Pulse Resp B/P (MAP) Pulse Ox O2 Delivery O2 Flow Rate FiO2


 


1/1/19 10:29     95 Nasal Cannula 2.00 


 


1/1/19 06:00 98.2 90 20 107/71 (83)    





Capillary Refill :


I&O











Intake and Output 


 


 1/1/19





 00:00


 


Intake Total 2500 ml


 


Output Total 1150 ml


 


Balance 1350 ml


 


 


 


Intake Oral 0 ml


 


IV Total 500 ml


 


Tube Feeding 1200 ml


 


Other 800 ml


 


Output Urine Total 1150 ml


 


# Urine Diapers 9


 


# Bowel Movements 9








General:  Alert, Oriented X3, Cooperative, No Acute Distress


HEENT:  Atraumatic, PERRLA, EOMI, Mucous Memb Moist/Pink


Neck:  Supple, No JVD


Lungs:  Other (coarse breath sounds)


Heart:  Regular Rate, Normal S1, Normal S2


Abdomen:  Normal Bowel Sounds, Soft, No Tenderness, Other (Peg Tube in place)


Extremities:  No Clubbing, Other (+1 edema BLE)


Skin:  No Rashes


Neuro:  Normal Speech, Other (Confusion strength 4/5 LES 4-/5 Upper limbs)


Psych/Mental Status:  Mental Status NL, Mood NL, Other (subtle memory loss)





Results


Lab Data


Laboratory Tests


12/29/18 16:17: Glucometer 111H


12/29/18 21:07: Glucometer 96


12/30/18 05:40: 


White Blood Count 6.9, Red Blood Count 2.86L, Hemoglobin 8.4L, Hematocrit 28L, 

Mean Corpuscular Volume 96, Mean Corpuscular Hemoglobin 29, Mean Corpuscular 

Hemoglobin Concent 31L, Red Cell Distribution Width 19.0H, Platelet Count 141, 

Mean Platelet Volume 12.2H, Neutrophils (%) (Auto) 69, Lymphocytes (%) (Auto) 15

, Monocytes (%) (Auto) 13H, Eosinophils (%) (Auto) 2, Basophils (%) (Auto) 0, 

Neutrophils # (Auto) 4.8, Lymphocytes # (Auto) 1.0, Monocytes # (Auto) 0.9, 

Eosinophils # (Auto) 0.1, Basophils # (Auto) 0.0, Sodium Level 138, Potassium 

Level 4.8, Chloride Level 99, Carbon Dioxide Level 31, Anion Gap 8, Blood Urea 

Nitrogen 22H, Creatinine 0.92, Estimat Glomerular Filtration Rate > 60, BUN/

Creatinine Ratio 24, Glucose Level 105, Calcium Level 9.0, Corrected Calcium 9.8

, Total Bilirubin 0.4, Aspartate Amino Transf (AST/SGOT) 23, Alanine 

Aminotransferase (ALT/SGPT) 14, Alkaline Phosphatase 87, Total Protein 7.2, 

Albumin 3.0L


12/30/18 11:35: Glucometer 132H


12/30/18 16:04: Stool Occult Blood Immunoassay POSITIVEH


12/31/18 06:15: 


White Blood Count 6.6, Red Blood Count 2.72L, Hemoglobin 8.0L, Hematocrit 26L, 

Mean Corpuscular Volume 96, Mean Corpuscular Hemoglobin 29, Mean Corpuscular 

Hemoglobin Concent 31L, Red Cell Distribution Width 19.1H, Platelet Count 142, 

Mean Platelet Volume 11.7H, Neutrophils (%) (Auto) 70, Lymphocytes (%) (Auto) 15

, Monocytes (%) (Auto) 12, Eosinophils (%) (Auto) 2, Basophils (%) (Auto) 0, 

Neutrophils # (Auto) 4.7, Lymphocytes # (Auto) 1.0, Monocytes # (Auto) 0.8, 

Eosinophils # (Auto) 0.2, Basophils # (Auto) 0.0, Erythrocyte Sedimentation 

Rate 126H, Sodium Level 137, Potassium Level 4.3, Chloride Level 99, Carbon 

Dioxide Level 29, Anion Gap 9, Blood Urea Nitrogen 20H, Creatinine 0.92, 

Estimat Glomerular Filtration Rate > 60, BUN/Creatinine Ratio 22, Glucose Level 

94, Calcium Level 9.1, Corrected Calcium 9.9, Iron Level 50, Total Bilirubin 0.5

, Aspartate Amino Transf (AST/SGOT) 21, Alanine Aminotransferase (ALT/SGPT) 14, 

Alkaline Phosphatase 90, Total Protein 7.2, Albumin 3.0L


12/31/18 11:01: Glucometer 101





Current Funtional Status


Participating in therapy


Mostly wheelchair-bound


Overall improving enough to be discharged on home health as long as 2 feedings 

can be arranged


Denies any pain


Reviewed physical therapy and occupational therapy notes





Progress Toward Rehab Goals


Discharge home on home health


PEG tube feedings will be provided by caregiver once education completed





Assessment/Plan


Assessment and Plan





(1) Debility


Status:  Acute


(2) GI bleed


Qualifiers:  


   Qualified Codes:  K92.2 - Gastrointestinal hemorrhage, unspecified


Status:  Acute


(3) MRSA pneumonia


Qualifiers:  


   Qualified Codes:  J15.212 - Pneumonia due to methicillin resistant 

Staphylococcus aureus


Status:  Resolved


(4) Pseudomonas urinary tract infection


Status:  Acute


(5) Pyelonephritis


Status:  Resolved


(6) Renal calculus, left


Status:  Resolved


(7) Acute renal insufficiency


Status:  Resolved


(8) Diabetes mellitus, type 2


Qualifiers:  


   Qualified Codes:  E11.59 - Type 2 diabetes mellitus with other circulatory 

complications; Z79.4 - Long term (current) use of insulin


Status:  Chronic


(9) Congestive heart failure


Qualifiers:  


   Qualified Codes:  I50.9 - Heart failure, unspecified


Status:  Chronic


(10) COPD (chronic obstructive pulmonary disease)


Qualifiers:  


   Qualified Codes:  J44.9 - Chronic obstructive pulmonary disease, unspecified


Status:  Chronic


(11) Paroxysmal atrial fibrillation


Status:  Chronic


(12) CAD (coronary artery disease)


Qualifiers:  


   Qualified Codes:  I25.10 - Atherosclerotic heart disease of native coronary 

artery without angina pectoris


Status:  Chronic


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


Status:  Chronic


(14) Melena


Status:  Acute


(15) Diarrhea


Qualifiers:  


   Qualified Codes:  R19.7 - Diarrhea, unspecified


Status:  Acute


(16) Anemia


Qualifiers:  


   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

chronic)


Status:  Acute


Co-Morbidities that are continuing to impact the rehab process: (include details

)











MICHELE WILKINS DO Jan 1, 2019 12:13

## 2019-01-01 NOTE — PROGRESS NOTE-POST OPERATIVE
Post-Operative Progess Note


Surgeon (s)/Assistant (s)


Surgeon


HARMONY CAMPO MD


Assistant:  none





Pre-Operative Diagnosis


anemia





Post-Operative Diagnosis





reflux esophagits(stage 2), moderate diffuse gastritis, no active bleed.


mild chronic  stage 2 ext and int hemorrhoids, mild-mod sigmoid


diverticulosis.





Procedure & Operative Findings


Date of Procedure


1/1/19


Procedure Performed/Findings


EGD with bx.


Colonoscopy.


Anesthesia Type


CS





Estimated Blood Loss


Estimated blood loss (mL):  minimal





Specimens/Packing


Specimens Removed


GE jxn, antrum











HARMONY CAMPO MD Jan 1, 2019 10:19

## 2019-01-01 NOTE — OCCUPATIONAL THER DAILY NOTE
OT Current Status-Daily Note


Subjective


Pt sitting in chair, agrees to therapy. Pt has no c/o pain.





Mental Status/Objective


Therapy Code Descriptions/Definitions 





Functional [place] Measure:


0=Not Assessed/NA        4=Minimal Assistance


1=Total Assistance        5=Supervision or Setup


2=Maximal Assistance  6=Modified [place]


3=Moderate Assistance 7=Complete [place]





ADL-Treatment


Pt declined shower today, but agrees to sponge bath. Pt doffed shirt with SBA. 

Pt washed bilateral UE, chest, and abdomen with SBA and increased time, cues to 

wash all areas. Pt washed bilateral upper legs with verbal cues for completion. 

Assist required for bilateral lower legs/feet and buttocks. Pt donned pullover 

shirt with SBA. Able to thread bilateral LE into pant legs. Stood with minimal 

assistance for balance during pant hike. Pt required assist to don socks and 

shoes, states spouse does this for him at home. Pt declined to shave today. 

Combed hair with mod assist. Pt practiced transfer to [facility] to increase strength 

and safety for transfers. Pt performed transfer bed to C x3 trials with CGA 

and cues for safety and hand placement. Pt fatigues during activity and 

requires occasional rest breaks throughout ADLs.


Therapy Code Descriptions/Definitions 





Functional [place] Measure:


0=Not Assessed/NA        4=Minimal Assistance


1=Total Assistance        5=Supervision or Setup


2=Maximal Assistance  6=Modified [place]


3=Moderate Assistance 7=Complete [place]








Therapy Quality Codes:


6    Independent with activity with or without an assistive device


5    Patient requires set up or clean up by helper.  Patient completes activity

  by  themselves


4    Supervision or touching assist (CGA). [place] provide cues , steadying 

assist


3    The helper provides less than half the effort to complete the activity


2    The helper provides more than half the effort to complete the activity


1    Dependent.  The helper does all the effort to complete an activity 


7    Patient refused to complete or attempt activity


9    The patient did not perform the activity before the current illness or 

injury


88  Not attempted due to Medical conditions or safety concerns


Bathing (FIM):  3


Shower/Bathe Self (QC):  3


Upper Body (FIM):  5


Upper Body Dressing (QC):  4


Lower Body Dressing (FIM):  3


Toilet/Commode Transfer (FIM):  4 (CGA)





Other Treatment


Pt performed bilateral UE exercises to increase strength needed for ADLs and 

transfers. Pt performed 5 exercises x10 reps with red theraband. Pt requires 

multiple cues for proper exercise technique. Rest breaks between exercises. 

Bilateral hand  exercises x20 reps with mild resistance therapy foam. Pt 

completed putty activity with bilateral hands to increase /pinch strength. 

Pt then removed small beads from putty to increase manipulation/coordination 

skills. Pt sitting in chair with needs met and chair alarm in place after 

session.





OT Short Term Goals


Short Term Goals


Time Frame:  Dec 31, 2018


Eating(FIM):  4


Grooming(FIM):  3


Upper Body Dressing(FIM):  3


Lower Body Dressing(FIM):  3


Toileting(FIM):  4


Transfers (B,C,W/C) (FIM):  5


Toilet/Commode Transfer(FIM):  4


Additional Short Term Goals:  1-Demonstrate ADL Tasks, 2-Verbalize Understanding

, 3-ImproveStrength/Farnaz


1=Demonstrate adherence to instructed precautions during ADL tasks.


2=Patient will verbalize/demonstrate understanding of assistive devices/

modifications for ADL.


3=Patient will improve strength/tolerance for activity to enable patient to 

perform ADL's.





OT Long Term Goals


Long Term Goals


Time Frame:  2019


Eating (FIM):  5


Eating (QC):  5


Groomin


Oral Hygiene (QC):  4


Bathing(FIM):  3


Shower/Bathe Self (QC):  3


Upper Body Dressing(FIM):  5


Upper Body Dressing (QC):  4


Lower Body Dressing(FIM):  4


Lower Body Dressing (QC):  4


On/Off Footwear (QC):  4


Toileting(FIM):  5


Toileting Hygiene (QC):  5


Transfers (B,C,W/C) (FIM):  5


Toilet/Commode Transfer(FIM):  5


Toilet/Commode Transfer (QC):  4


Additional Goals:  1-Demonstrate ADL Tasks, 2-Verbalize Understanding, 3-

ImproveStrength/Farnaz


1=Demonstrate adherence to instructed precautions during ADL tasks.


2=Patient will verbalize/demonstrate understanding of assistive devices/

modifications for ADL.


3=Patient will improve strength/tolerance for activity to enable patient to 

perform ADL's.





OT Education/Plan


Discharge Recommendations


Plan/Recommendations:  Continue POC





Treatment Plan/Plan of Care


Patient would benefit from OT for education, treatment and training to promote 

independence in ADL's, mobility, safety and/or upper extremity function for ADL'

s.


Plan of Care:  ADL Retraining, Functional Mobility, Group Exercise/Act as Ind, 

UE Funct Exercise/Act


Treatment Duration:  2019


Frequency:  At least 5 of 7 days/Wk (IRF)


Estimated Hrs Per Day:  1.5 hours per day


Agreement:  Yes


Rehab Potential:  Guarded





Time/GCodes


Start Time:  11:00


Stop Time:  12:00


Total Time Billed (hr/min):  60


Billed Treatment Time


1 visit, ADLx2(35minutes), EXx2(25minutes)











AMY GONZALEZ OT 2019 12:07

## 2019-01-01 NOTE — OPERATIVE REPORT
DATE OF SERVICE:  12/31/2018



ATTENDING PRIMARY CARE PHYSICIAN:

Dr. Gonzalez.



CONSULTING PHYSICIAN:

Dr. Monte.



PREOPERATIVE DIAGNOSES:

Anemia with history of peptic ulcer disease and Hemoccult positive stools.



POSTOPERATIVE DIAGNOSES:

Reflux esophagitis stage II, no hiatal hernia, moderate gastritis.  No formal

ulcerations or bleeding.  Mild stage II chronic external and internal

hemorrhoids, mild to moderate sigmoid diverticulosis.



PROCEDURE:

EGD with biopsy, colonoscopy.



SURGEON:

Harmony Mantilla MD



ANESTHESIA:

Conscious sedation.



ESTIMATED BLOOD LOSS:

Minimal.



FINDINGS:

EGD, reflux esophagitis stage II, no significant hiatal hernia.  Moderate

severity gastritis which was diffuse with no formal ulcerations or polyps or any

bleeding.  Pylorus and duodenum appeared normal.  Colonoscopy, chronic stage II

external and internal hemorrhoids, not actively edematous nor inflamed and no

bleeding.  Prostate gland was palpable and appeared to be normal.  There was

mild to moderate sigmoid diverticulosis with no signs of inflammation or active

bleeding.



DISPOSITION:

The patient tolerated the procedure well.



The patient is a 78-year-old male with an extensive past medical history and

severe debility.  He was admitted to [facility] for 11 days due to aspiration

pneumonia as well as placement of a urinary stent for obstructive uropathy.  He

was also on aggressive antibiotic therapy.  Due to his aspiration, a

percutaneous gastrostomy tube was placed and he has been receiving alimentation

through this modality.  He was transferred to [facility] to be

closer to home.  He is improving; however, his hemoglobin has slowly been

trending downward from initial 11.1 to 8.4 over three days.  His stools have

been dark and he did have a Hemoccult positive stool.  Upon further questioning

with the patient and his wife, he has a history of peptic ulcer disease and has

not had a colonoscopy for greater than 10 to 20 years.



DESCRIPTION OF PROCEDURE:

The patient was brought to the endoscopy suite, laid in the left lateral

decubitus position with head slightly elevated.  After adequate IV pain and

sedating medications and conscious sedation anesthesia, the mouthpiece was

applied.



The endoscope was placed in the mouth, visualizing the pharynx and

hypopharyngeal region.  Vocal cords, epiglottis and vallecula identified and

appeared to be normal.  The endoscope was then gently intubated at the

esophageal opening and esophagus insufflated.  The endoscope was then advanced

to the first, second and third portions of the esophagus at the level of the GE

junction, a reflux esophagitis stage II identified.  There were no ulcers or

strictures identified in this region.  A biopsy was taken using forceps with

visualization of good hemostasis.



The endoscope was then easily advanced into the stomach.  The endoscope

retroflexed visualizing no significant hiatal hernia.  A moderate severity

diffuse gastritis was noted throughout the stomach including around the

gastrostomy tube site.  There were no formal ulcerations or active bleeding

identified.  A biopsy was taken of the antrum with forceps to rule out H. pylori

with visualization of good hemostasis.  The endoscope was then advanced to the

pylorus and the first and second portions of the duodenum, which appeared normal

with no active bleeding sources and no distal obstructions.  The endoscope was

then slowly withdrawn while taking a second look and suctioning of residual air

with no additional findings.



The patient tolerated this portion of the procedure well.  We feel that the

anemia is most likely due to stress-induced gastritis and we will recommend

changing his proton pump inhibitor to Protonix 40 mg daily.



Under the same anesthesia, we then proceeded with the colonoscopy portion of the

procedure.  A digital rectal examination was performed, which revealed mild

chronic stage II external and internal hemorrhoids, not actively edematous nor

inflamed and no active bleeding.  Normal sphincter tone was felt and there were

no palpable masses.  Prostate gland was palpable and appeared to be normal.



The endoscope was then intubated into the anus and rectum gently insufflated. 

The endoscope was then advanced through the valves of Fisher in the rectum with

no polyps or any neoplasms identified.  We then proceeded through the sigmoid

colon where a mild to moderate sigmoid diverticulosis was identified.  There

were no signs of inflammation as well as no active bleeding source.  The

endoscope was then advanced to the remainder of the descending, transverse and

ascending colon to the cecum.  These segments were normal.  There were no

bleeding sources identified as well as no polyps or any neoplasms.  The

endoscope was then slowly withdrawn with taking a second look and suctioning of

residual air with no additional findings.



The patient tolerated the procedure well.  We will recommend continued medical

management with a high fiber diet with at least 30 grams of fiber per day as

well as copious amounts of water to promote soft stools on a daily basis.





Job ID: [id number]

DocumentID: [id number]

Dictated Date:  01/01/2019 10:19:12

Transcription Date: 01/01/2019 11:19:22

Dictated By: HARMONY MANTILLA MD

## 2019-01-01 NOTE — THERAPY GROUP DAILY NOTE
Therapy Daily Group Note


Patient Education Topic


Home Safety, Fall Prevention





Exercises


Walking





Other/Notes


Pt ambulated minimal assist with FWW and O2 3L NC in place to [facility] 

for group.  Group consisted of introductions (name and home safety concerns), 

ARU FIM description and home safety education.  Pt introduced self 

appropriately and actively listened to peers.   Pt verbalized understanding of 

educational topics and was able to contribute own strategies for home safety.  

Pt initiated discussions and was able to contribute to peer conversations 

throughout group.  After therapy, patient ambulated with FWW, minimal assist to 

room and  returned to recliner with call light/phone in reach and O2 2L in 

place.  All needs met in room.


Start Time:  13:00


Stop Time:  14:00


Total Billed Treatment Time:  60


Total Billed Treatment


1 visit


GRP 60 min











MIKAYLA JOHNSON PT Jan 1, 2019 14:22

## 2019-01-01 NOTE — PHYSICAL THERAPY DAILY NOTE
PT Daily Note-Current


Subjective


Pt in chair upon arrival.  Pt agreeable to treatment.  Pt denies pain. Post 

therapy session, pt requests urinal but then states "Nevermind, it is too late.

"



Mental Status


Patient Orientation:  Person





Transfers


Therapy Code Descriptions/Definitions 





Functional Brantley Measure:


0=Not Assessed/NA        4=Minimal Assistance


1=Total Assistance        5=Supervision or Setup


2=Maximal Assistance  6=Modified Br[place]ley


3=Moderate Assistance 7=Complete Br[place]ley








Therapy Quality Codes:


6    Independent with activity with or without an assistive device


5    Patient requires set up or clean up by helper.  Patient completes activity

  by  themselves


4    Supervision or touching assist (CGA). [place] provide cues , steadying 

assist


3    The helper provides less than half the effort to complete the activity


2    The helper provides more than half the effort to complete the activity


1    Dependent.  The helper does all the effort to complete an activity 


7    Patient refused to complete or attempt activity


9    The patient did not perform the activity before the current illness or 

injury


88  Not attempted due to Medical conditions or safety concerns


Pt performed all sit-stand transfers with SBA-CGA, vc's for hand placement as 

needed.





Gait Training


Gait Assistive Device:  FWW


Pt amb with FWW, O2 at 2L/min, CGA and close f/u of w/c short distances of 10ft

- 40ft at a time before requiring seated rest break.  Total distance walked 

265ft.  PT tends to sit without warning, care taken to keep w/c in close 

proximity to pt.  Pt did announce "I need to sit" approximately 75% of the time 

with prompting.





Exercises


Seated Therapy Exercises:  Ankle pumps, Long arc quads, Hip flexion, Hip abd/add

, Glut set


Seated Reps:  20


NuStep Minutes:  10


 NuStep Workload:  1





Treatments


Pt wore mask outside of room, O2 at 2L/min per portable O2.





Assessment


Current Status:  Good Progress


Pt soniya well.  Pt required frequent rest breaks during gait training.  Pt 

required total assist changing depends post therapy session but able to stand 

and maintain balance as needed.  Pt in bedside chair with legs elevated and 

floating on pillow.  O2 insitu and call light in lap post therapy session.  

Ambu alarm activated.





PT Short Term Goals


Short Term Goals


Time Frame:  Dec 31, 2018


Transfers (B,C,W/C) (FIM):  5


Gait (FIM):  2


Distance (FIM):  1=up to 49 ft


Gait Assistive Device:  FWW


Wheelchair Distance:  30'





PT Long Term Goals


Long Term Goals


PT Long Term Goals Time Frame:  2019


Transfers (B,C,W/C) (FIM):  6


Sit to Lying (QC):  6


Lying-Sitting on Side/Bed(QC):  6


Sit to Stand (QC):  6


Rollin


Roll Left to Right (QC):  6


Chair/Bed-to-Chair Xfer(QC):  6


Car Transfer (QC):  5


Does the Patient Walk:  Yes


Gait (FIM):  5 (household)


Gait distance (FIM):  0=012-47 ft


Walk 10 feet (QC):  6


Walk 10ft-Uneven Surface(QC):  6


Walk 50ft with 2 Turns (QC):  6


Walk 150 ft (QC):  88


Gait Level of Assist:  6


Gait Assistive Device:  FWW


Does the Pt use WC or Scooter?:  Yes


Wheelchair (FIM):  6


Wheelchair distance (FIM):  3=150 ft


Wheel 50 feet with 2 turns (QC:  6


Stairs (FIM):  88


1 Step (curb) (QC):  88


4 Steps (QC):  88


12 Steps (QC):  88


Picking up an Object (QC):  88





PT Plan


Treatment/Plan


Treatment Plan:  Continue Plan of Care


Treatment Plan:  Bed Mobility, Education, Functional Activity Farnaz, Functional 

Strength, Group Therapy, Gait, Safety, Therapeutic Exercise, Transfers


Treatment Duration:  2019


Frequency:  At least 5 of 7 days/Wk (IRF)


Estimated Hrs Per Day:  1.5 hours per day


Patient and/or Family Agrees t:  Yes





Time/GCodes


Time In:  900


Time Out:  1000


Total Billed Treatment Time:  60


Total Billed Treatment


1, ther ex 30min, Gait 25min, FA 5min











GOPI JULES 2019 12:19

## 2019-01-02 VITALS — SYSTOLIC BLOOD PRESSURE: 148 MMHG | DIASTOLIC BLOOD PRESSURE: 67 MMHG

## 2019-01-02 VITALS — SYSTOLIC BLOOD PRESSURE: 149 MMHG | DIASTOLIC BLOOD PRESSURE: 77 MMHG

## 2019-01-02 VITALS — DIASTOLIC BLOOD PRESSURE: 59 MMHG | SYSTOLIC BLOOD PRESSURE: 114 MMHG

## 2019-01-02 RX ADMIN — MEXILETINE HYDROCHLORIDE SCH MG: 150 CAPSULE ORAL at 09:23

## 2019-01-02 RX ADMIN — GUAIFENESIN AND DEXTROMETHORPHAN SCH ML: 100; 10 SYRUP ORAL at 09:21

## 2019-01-02 RX ADMIN — PHENAZOPYRIDINE HYDROCHLORIDE SCH MG: 100 TABLET ORAL at 18:15

## 2019-01-02 RX ADMIN — MEXILETINE HYDROCHLORIDE SCH MG: 150 CAPSULE ORAL at 18:15

## 2019-01-02 RX ADMIN — PHENAZOPYRIDINE HYDROCHLORIDE SCH MG: 100 TABLET ORAL at 15:17

## 2019-01-02 RX ADMIN — DOCUSATE SODIUM SCH MG: 50 LIQUID ORAL at 21:54

## 2019-01-02 RX ADMIN — IPRATROPIUM BROMIDE AND ALBUTEROL SULFATE SCH ML: .5; 3 SOLUTION RESPIRATORY (INHALATION) at 23:04

## 2019-01-02 RX ADMIN — AMIODARONE HYDROCHLORIDE SCH MG: 200 TABLET ORAL at 09:16

## 2019-01-02 RX ADMIN — CHOLESTYRAMINE SCH GM: 4 POWDER, FOR SUSPENSION ORAL at 10:54

## 2019-01-02 RX ADMIN — GUAIFENESIN AND DEXTROMETHORPHAN SCH ML: 100; 10 SYRUP ORAL at 21:53

## 2019-01-02 RX ADMIN — ANORECTAL OINTMENT SCH APPLIC: 15.7; .44; 24; 20.6 OINTMENT TOPICAL at 09:22

## 2019-01-02 RX ADMIN — IPRATROPIUM BROMIDE AND ALBUTEROL SULFATE SCH ML: .5; 3 SOLUTION RESPIRATORY (INHALATION) at 02:14

## 2019-01-02 RX ADMIN — MEXILETINE HYDROCHLORIDE SCH MG: 150 CAPSULE ORAL at 00:17

## 2019-01-02 RX ADMIN — DOCUSATE SODIUM SCH MG: 50 LIQUID ORAL at 09:22

## 2019-01-02 RX ADMIN — SODIUM BICARBONATE SCH ML: 84 INJECTION, SOLUTION INTRAVENOUS at 09:30

## 2019-01-02 RX ADMIN — DILTIAZEM HYDROCHLORIDE SCH MG: 60 TABLET, FILM COATED ORAL at 06:24

## 2019-01-02 RX ADMIN — IPRATROPIUM BROMIDE AND ALBUTEROL SULFATE SCH ML: .5; 3 SOLUTION RESPIRATORY (INHALATION) at 19:34

## 2019-01-02 RX ADMIN — IPRATROPIUM BROMIDE AND ALBUTEROL SULFATE SCH ML: .5; 3 SOLUTION RESPIRATORY (INHALATION) at 00:11

## 2019-01-02 RX ADMIN — POTASSIUM CHLORIDE SCH MEQ: 1.5 POWDER, FOR SOLUTION ORAL at 06:24

## 2019-01-02 RX ADMIN — DIGOXIN SCH MG: 125 TABLET ORAL at 09:16

## 2019-01-02 RX ADMIN — GUAIFENESIN AND DEXTROMETHORPHAN SCH ML: 100; 10 SYRUP ORAL at 15:18

## 2019-01-02 RX ADMIN — Medication SCH EACH: at 21:53

## 2019-01-02 RX ADMIN — ANORECTAL OINTMENT SCH APPLIC: 15.7; .44; 24; 20.6 OINTMENT TOPICAL at 21:54

## 2019-01-02 RX ADMIN — FUROSEMIDE SCH MG: 20 TABLET ORAL at 06:24

## 2019-01-02 RX ADMIN — DILTIAZEM HYDROCHLORIDE SCH MG: 60 TABLET, FILM COATED ORAL at 21:53

## 2019-01-02 RX ADMIN — PHENAZOPYRIDINE HYDROCHLORIDE SCH MG: 100 TABLET ORAL at 09:18

## 2019-01-02 RX ADMIN — Medication SCH EACH: at 09:16

## 2019-01-02 RX ADMIN — METOPROLOL TARTRATE SCH MG: 25 TABLET, FILM COATED ORAL at 09:17

## 2019-01-02 RX ADMIN — ATORVASTATIN CALCIUM SCH MG: 40 TABLET, FILM COATED ORAL at 21:53

## 2019-01-02 RX ADMIN — CHOLESTYRAMINE SCH GM: 4 POWDER, FOR SUSPENSION ORAL at 23:55

## 2019-01-02 RX ADMIN — IPRATROPIUM BROMIDE AND ALBUTEROL SULFATE SCH ML: .5; 3 SOLUTION RESPIRATORY (INHALATION) at 11:53

## 2019-01-02 RX ADMIN — METOPROLOL TARTRATE SCH MG: 25 TABLET, FILM COATED ORAL at 21:53

## 2019-01-02 RX ADMIN — FUROSEMIDE SCH MG: 20 TABLET ORAL at 18:15

## 2019-01-02 RX ADMIN — ASPIRIN 81 MG CHEWABLE TABLET SCH MG: 81 TABLET CHEWABLE at 09:17

## 2019-01-02 RX ADMIN — DILTIAZEM HYDROCHLORIDE SCH MG: 60 TABLET, FILM COATED ORAL at 15:17

## 2019-01-02 RX ADMIN — MEXILETINE HYDROCHLORIDE SCH MG: 150 CAPSULE ORAL at 23:04

## 2019-01-02 NOTE — NUR
1000 tube feeding held D/T gastric residual greater than 60 mls. Dr. Monte notified. No new 
orders rec'd.

## 2019-01-02 NOTE — CARDIOLOGY PROGRESS NOTE
Subjective


Date Seen by Provider:  2019


Time Seen by Provider:  11:35


Subjective/Events-last exam


Patient is sitting in a chair, feeling better, no new complaint.


Review of Systems


General:  No Chills, No Night Sweats, No Fatigue, No Malaise, No Appetite, No 

Other


HEENT:  No Head Aches, No Visual Changes, No Eye Pain, No Ear Pain, No Dysphasia

, No Sinus Congestion, No Post Nasal Drip, No Sore Throat, No Other


Pulmonary:  No Dyspnea, No Cough, No Pleuritic Chest Pain, No Other


Cardiovascular:  No: Chest Pain, Palpitations, Orthopnea, Paroxysmal Noc. 

Dyspnea, Edema, Lt Headedness, Other





Objective-Cardiology


Exam


Last Set of Vital Signs





Vital Signs








 19





 06:00 09:59


 


Temp 98.3 


 


Pulse 91 


 


Resp 20 


 


B/P (MAP) 114/59 (77) 


 


Pulse Ox 95 


 


O2 Delivery  Nasal Cannula


 


O2 Flow Rate  2.00





Capillary Refill :


I&O











Intake and Output 


 


 19





 00:00


 


Intake Total 2530 ml


 


Output Total 980 ml


 


Balance 1550 ml


 


 


 


Intake Oral 0 ml


 


Tube Feeding 1780 ml


 


Other 750 ml


 


Output Urine Total 980 ml


 


# Urine Diapers 5


 


# Bowel Movements 2








General:  Alert, Oriented X3, Cooperative, No Acute Distress


HEENT:  Atraumatic, PERRLA, EOMI, Mucous Memb Moist/Pink


Neck:  Supple, No JVD


Lungs:  Other (coarse breath sounds)


Heart:  Regular Rate, Normal S1, Normal S2


Abdomen:  Normal Bowel Sounds, Soft, No Tenderness, Other (Peg Tube in place)


Extremities:  No Clubbing, Other (+1 edema BLE)


Skin:  No Rashes


Neuro:  Normal Speech, Other (Confusion strength 4/5 LES 4-/5 Upper limbs)


Psych/Mental Status:  Mental Status NL, Mood NL, Other (subtle memory loss)





A/P-Cardiology


Admission Diagnosis


Debility


CAD


HTN


Chronic atrial fibrillation





Assessment/Plan


Debility, reporting improvement, asking to go home.





Anemia, worsening, I will evaluate H&H





Status post respiratory failure, pneumonia, silent aspiration, on Antibiotics, 

followed by primary care physician





Urosepsis, history of kidney stone and nephrostomy tube, on antibiotics and 

managed by primary care team





Silent aspiration, status post feeding tube placement





Coronary artery disease, history of CABG done in the remote past.  Patient had 

a stent done in 2002 using MultiLink 2.518 mm to the proximal right 

coronary artery, 2017 had a Cypher stent 3.530 mm to the proximal and mid 

circumflex artery.  Has been followed and managed by primary cardiologist Dr. David in [place].  Continue to follow





History of congestive heart failure, reported ejection fraction 54 percent.  

MPI in , continue to monitor





Paroxysmal atrial fibrillation maintained on amiodarone, intolerant to oral 

anticoagulation secondary to recurrent nosebleed and anemia,  Patient is 

maintained on Cardizem, Amiodarone,Lopressor and Digoxin, continue to monitor, 

no changes are recommended





Patient has been maintained on mexiletine.  Probably due to ventricular 

fibrillation and history of shock from his defibrillator, known to have [facility] 

[facility] ICD implanted by Dr. stevens in [place]. Continue on Mexiletine





COPD, chronic dyspnea, followed by primary care physician





Chronic pedal edema,continue to diurese and monitor electrolytes





Diabetes mellitus, followed and managed by primary care physician





Anemia-  Hemoccult positive. History of gastric ulcer. Underwent EGD and 

colonoscopy revealing gastritis and esophagitis with no active bleed. Continue 

to monitor H/H.





Clinical Quality Measures


DVT/VTE Risk/Contraindication:


Risk Factor Score Per Nursin


RFS Level Per Nursing on Admit:  4+=Very High











MARINA KUMAR MD 2019 11:36

## 2019-01-02 NOTE — PHYSICAL THERAPY DAILY NOTE
PT Daily Note-Current


Subjective


Pt laying Supine in bed upon arrival.  Pt agrees to PT but does not want to 

leave room.  Finally does with encouragement.





Pain





   Location:  No Pain Reported





Mental Status


Patient Orientation:  Person, Place


Pt continues to demonstrate being behavioral when asked to participate in 

Therapy.





Transfers


Therapy Code Descriptions/Definitions 





Functional [place] Measure:


0=Not Assessed/NA        4=Minimal Assistance


1=Total Assistance        5=Supervision or Setup


2=Maximal Assistance  6=Modified [place]


3=Moderate Assistance 7=Complete [place]








Therapy Quality Codes:


6    Independent with activity with or without an assistive device


5    Patient requires set up or clean up by helper.  Patient completes activity

  by  themselves


4    Supervision or touching assist (CGA). [place] provide cues , steadying 

assist


3    The helper provides less than half the effort to complete the activity


2    The helper provides more than half the effort to complete the activity


1    Dependent.  The helper does all the effort to complete an activity 


7    Patient refused to complete or attempt activity


9    The patient did not perform the activity before the current illness or 

injury


88  Not attempted due to Medical conditions or safety concerns


Scootin


Supine to/from Sit:  5


Sit to/from Stand:  4





Gait Training


Does the Patient Walk?:  Yes


Distance (FIM):  3=661-51 ft


Distance:  30', 30'





Wheelchair Training


Does the Pt Use a Wheelchair?:  Yes


Wheelchair Distance:  3=150 ft


Distance:  200'


Wheelchair Level of Assist:  3


Wheel 50 ft with 2 turns (QC):  3


Wheel 150 ft (QC):  3


Type of Wheelchair:  Manual


Pt needs assistance with turns and is not aware of surroundings.





Exercises


Seated Therapy Exercises:  Ankle pumps, Long arc quads, Hip flexion, Kicking 

activity, Hip abd/add


Seated Reps:  15


NuStep Minutes:  15


 NuStep Workload:  4





Treatments


Pt transfers from bed to standing, using FWW pt ambulates short distance in 

hallway before needing to sit in [facility].  Pt propels [facility] to Therapy Gym before 

transferring to [facility] and using for 15m at  4.  Pt then transfers to chair 

and completes Seated Ex followed by short rest.  Pt then ambulates short 

distance again before returning to [facility] and propelling in hallway to room.  Pt 

resting in [facility] in room at end of tx with all needs met.





Assessment


Current Status:  Fair Progress


Pt is confused at times and does not push self to complete tasks.  Pt fatigues 

easily.





PT Short Term Goals


Short Term Goals


Time Frame:  Dec 31, 2018


Transfers (B,C,W/C) (FIM):  5


Gait (FIM):  2


Distance (FIM):  1=up to 49 ft


Gait Assistive Device:  FWW


Wheelchair Distance:  30'





PT Long Term Goals


Long Term Goals


PT Long Term Goals Time Frame:  2019


Transfers (B,C,W/C) (FIM):  6


Sit to Lying (QC):  6


Lying-Sitting on Side/Bed(QC):  6


Sit to Stand (QC):  6


Rollin


Roll Left to Right (QC):  6


Chair/Bed-to-Chair Xfer(QC):  6


Car Transfer (QC):  5


Does the Patient Walk:  Yes


Gait (FIM):  5 (household)


Gait distance (FIM):  3=046-14 ft


Walk 10 feet (QC):  6


Walk 10ft-Uneven Surface(QC):  6


Walk 50ft with 2 Turns (QC):  6


Walk 150 ft (QC):  88


Gait Level of Assist:  6


Gait Assistive Device:  FWW


Does the Pt use WC or Scooter?:  Yes


Wheelchair (FIM):  6


Wheelchair distance (FIM):  3=150 ft


Wheel 50 feet with 2 turns (QC:  6


Stairs (FIM):  88


1 Step (curb) (QC):  88


4 Steps (QC):  88


12 Steps (QC):  88


Picking up an Object (QC):  88





PT Plan


Problem List


Problem List:  Activity Tolerance, Functional Strength, Safety, Balance, Gait, 

Transfer





Treatment/Plan


Treatment Plan:  Continue Plan of Care


Treatment Plan:  Bed Mobility, Education, Functional Activity Farnaz, Functional 

Strength, Group Therapy, Gait, Safety, Therapeutic Exercise, Transfers


Treatment Duration:  2019


Frequency:  At least 5 of 7 days/Wk (IRF)


Estimated Hrs Per Day:  1.5 hours per day


Patient and/or Family Agrees t:  Yes





Safety Risks/Education


Patient Education:  Gait Training, Transfer Techniques, Correct Positioning, 

Safety Issues


Teaching Recipient:  Patient


Teaching Methods:  Discussion


Response to Teaching:  Reinforcement Needed





Time/GCodes


Time In:  1330


Time Out:  1400


Total Billed Treatment Time:  30


Total Billed Treatment


1, [facility] (20m), GT (10m) & EX x2 (30m)


G Codes Necessary:  ANA Lozoya PTA 2019 10:58

## 2019-01-02 NOTE — OCCUPATIONAL THER DAILY NOTE
OT Current Status-Daily Note


Subjective


Pt sitting in chair, agrees to treatment. Pt has no c/o pain.





Mental Status/Objective


Therapy Code Descriptions/Definitions 





Functional Mel[place] Measure:


0=Not Assessed/NA        4=Minimal Assistance


1=Total Assistance        5=Supervision or Setup


2=Maximal Assistance  6=Modified [place]


3=Moderate Assistance 7=Complete [place]





ADL-Treatment


Therapy Code Descriptions/Definitions 





Functional [place] Measure:


0=Not Assessed/NA        4=Minimal Assistance


1=Total Assistance        5=Supervision or Setup


2=Maximal Assistance  6=Modified [place]


3=Moderate Assistance 7=Complete [place]








Therapy Quality Codes:


6    Independent with activity with or without an assistive device


5    Patient requires set up or clean up by helper.  Patient completes activity

  by  themselves


4    Supervision or touching assist (CGA). [place] provide cues , steadying 

assist


3    The helper provides less than half the effort to complete the activity


2    The helper provides more than half the effort to complete the activity


1    Dependent.  The helper does all the effort to complete an activity 


7    Patient refused to complete or attempt activity


9    The patient did not perform the activity before the current illness or 

injury


88  Not attempted due to Medical conditions or safety concerns





Other Treatment


Pt completed bilateral UE activity to increase strength needed for ADLs and 

transfers. Pt performed four exercises x15 reps with dowel mani. Rest breaks 

between exercises. Pt is impulsive with exercise movement and requires multiple 

cues to perform exercises with proper technique. Bilateral hand  exercises 

x20 reps with mild resistance therapy foam to increase  strength for 

functional tasks. Pt completed putty activity with bilateral hands to increase 

strength and coordination/manipulation skills. Pt able to remove small beads 

from putty with increased time. Pt sitting in chair with needs met and spouse 

present after session.





OT Short Term Goals


Short Term Goals


Time Frame:  Dec 31, 2018


Eating(FIM):  4


Grooming(FIM):  3


Upper Body Dressing(FIM):  3


Lower Body Dressing(FIM):  3


Toileting(FIM):  4


Transfers (B,C,W/C) (FIM):  5


Toilet/Commode Transfer(FIM):  4


Additional Short Term Goals:  1-Demonstrate ADL Tasks, 2-Verbalize Understanding

, 3-ImproveStrength/Farnaz


1=Demonstrate adherence to instructed precautions during ADL tasks.


2=Patient will verbalize/demonstrate understanding of assistive devices/

modifications for ADL.


3=Patient will improve strength/tolerance for activity to enable patient to 

perform ADL's.





OT Long Term Goals


Long Term Goals


Time Frame:  2019


Eating (FIM):  5


Eating (QC):  5


Groomin


Oral Hygiene (QC):  4


Bathing(FIM):  3


Shower/Bathe Self (QC):  3


Upper Body Dressing(FIM):  5


Upper Body Dressing (QC):  4


Lower Body Dressing(FIM):  4


Lower Body Dressing (QC):  4


On/Off Footwear (QC):  4


Toileting(FIM):  5


Toileting Hygiene (QC):  5


Transfers (B,C,W/C) (FIM):  5


Toilet/Commode Transfer(FIM):  5


Toilet/Commode Transfer (QC):  4


Additional Goals:  1-Demonstrate ADL Tasks, 2-Verbalize Understanding, 3-

ImproveStrength/Farnaz


1=Demonstrate adherence to instructed precautions during ADL tasks.


2=Patient will verbalize/demonstrate understanding of assistive devices/

modifications for ADL.


3=Patient will improve strength/tolerance for activity to enable patient to 

perform ADL's.





OT Education/Plan


Discharge Recommendations


Plan/Recommendations:  Continue POC





Treatment Plan/Plan of Care


Patient would benefit from OT for education, treatment and training to promote 

independence in ADL's, mobility, safety and/or upper extremity function for ADL'

s.


Plan of Care:  ADL Retraining, Functional Mobility, Group Exercise/Act as Ind, 

UE Funct Exercise/Act


Treatment Duration:  2019


Frequency:  At least 5 of 7 days/Wk (IRF)


Estimated Hrs Per Day:  1.5 hours per day


Agreement:  Yes


Rehab Potential:  Fair





Time/GCodes


Start Time:  14:00


Stop Time:  14:35


Total Time Billed (hr/min):  35


Billed Treatment Time


1 visit, EXx2(35minutes)











AMY GONZALEZ OT 2019 14:40

## 2019-01-02 NOTE — CARDIOLOGY PROGRESS NOTE
Subjective


Date Seen by Provider:  2019


Time Seen by Provider:  08:16


Subjective/Events-last exam


Patient in bed, denies any pain. Continues to complain of productive cough.


Review of Systems


General:  No Night Sweats; Fatigue; No Malaise


HEENT:  No Visual Changes, No Dysphasia


Pulmonary:  Dyspnea, Cough


Cardiovascular:  No: Chest Pain, Palpitations


Gastrointestinal:  No: Nausea, Vomiting, Abdominal Pain


Genitourinary:  No Dysuria, No Frequency


Musculoskeletal:  No: neck pain


Neurological:  Weakness; No: Numbness, Change in speech, Confusion





Objective-Cardiology


Exam


Last Set of Vital Signs





Vital Signs








 19





 06:00


 


Temp 98.3


 


Pulse 91


 


Resp 20


 


B/P (MAP) 114/59 (77)


 


Pulse Ox 95


 


O2 Delivery Nasal Cannula


 


O2 Flow Rate 2.00





Capillary Refill :


I&O











Intake and Output 


 


 19





 00:00


 


Intake Total 2530 ml


 


Output Total 980 ml


 


Balance 1550 ml


 


 


 


Intake Oral 0 ml


 


Tube Feeding 1780 ml


 


Other 750 ml


 


Output Urine Total 980 ml


 


# Urine Diapers 5


 


# Bowel Movements 2








General:  Alert, Oriented X3, Cooperative, No Acute Distress


HEENT:  Atraumatic, PERRLA, EOMI, Mucous Memb Moist/Pink


Neck:  Supple, No JVD


Lungs:  Other (coarse breath sounds)


Heart:  Regular Rate, Normal S1, Normal S2


Abdomen:  Normal Bowel Sounds, Soft, No Tenderness, Other (Peg Tube in place)


Extremities:  No Clubbing, Other (+1 edema BLE)


Skin:  No Rashes


Neuro:  Normal Speech, Other (Confusion strength 4/5 LES 4-/5 Upper limbs)


Psych/Mental Status:  Mental Status NL, Mood NL, Other (subtle memory loss)





A/P-Cardiology


Admission Diagnosis


Debility


CAD


HTN


Chronic atrial fibrillation





Assessment/Plan


Debility, reporting improvement, asking to go home.





Status post respiratory failure, pneumonia, silent aspiration, on Antibiotics, 

followed by primary care physician





Urosepsis, history of kidney stone and nephrostomy tube, on antibiotics and 

managed by primary care team





Silent aspiration, status post feeding tube placement





Coronary artery disease, history of CABG done in the remote past.  Patient had 

a stent done in 2002 using MultiLink 2.518 mm to the proximal right 

coronary artery, 2017 had a Cypher stent 3.530 mm to the proximal and mid 

circumflex artery.  Has been followed and managed by primary cardiologist Dr. David in [place].  Continue to follow





History of congestive heart failure, reported ejection fraction 54 percent.  

MPI in , continue to monitor





Paroxysmal atrial fibrillation maintained on amiodarone, intolerant to oral 

anticoagulation secondary to recurrent nosebleed and anemia,  Patient is 

maintained on Cardizem, Amiodarone,Lopressor and Digoxin, continue to monitor, 

no changes are recommended





Patient has been maintained on mexiletine.  Probably due to ventricular 

fibrillation and history of shock from his defibrillator, known to have [employer] 

[employer] ICD implanted by Dr. stevens in [place]. Continue on Mexiletine





COPD, chronic dyspnea, followed by primary care physician





Chronic pedal edema,continue to diurese and monitor electrolytes





Diabetes mellitus, followed and managed by primary care physician





Anemia-  Hemoccult positive. History of gastric ulcer. Underwent EGD and 

colonoscopy revealing gastritis and esophagitis with no active bleed. Continue 

to monitor H/H.





Clinical Quality Measures


DVT/VTE Risk/Contraindication:


Risk Factor Score Per Nursin


RFS Level Per Nursing on Admit:  4+=Very High











ABELARDO BLACKBURN 2019 08:18

## 2019-01-02 NOTE — OCCUPATIONAL THER DAILY NOTE
OT Current Status-Daily Note


Subjective


No pain reported.





Appearance


Pt. in chair.  Declines bathing or showering.  States that he is in clean 

clothing.  Pt. had had PT earlier in day.  Does reluctantly agree to work with 

OT.





Mental Status/Objective


Therapy Code Descriptions/Definitions 





Functional [place] Measure:


0=Not Assessed/NA        4=Minimal Assistance


1=Total Assistance        5=Supervision or Setup


2=Maximal Assistance  6=Modified [place]


3=Moderate Assistance 7=Complete [place]





ADL-Treatment


Therapy Code Descriptions/Definitions 





Functional [place] Measure:


0=Not Assessed/NA        4=Minimal Assistance


1=Total Assistance        5=Supervision or Setup


2=Maximal Assistance  6=Modified [place]


3=Moderate Assistance 7=Complete [place]








Therapy Quality Codes:


6    Independent with activity with or without an assistive device


5    Patient requires set up or clean up by helper.  Patient completes activity

  by  themselves


4    Supervision or touching assist (CGA). [place] provide cues , steadying 

assist


3    The helper provides less than half the effort to complete the activity


2    The helper provides more than half the effort to complete the activity


1    Dependent.  The helper does all the effort to complete an activity 


7    Patient refused to complete or attempt activity


9    The patient did not perform the activity before the current illness or 

injury


88  Not attempted due to Medical conditions or safety concerns





Other Treatment


Pt. is seen twice this morning.  Pt. agreed to self propel to therapy gym.  Pt. 

did this with cues to propel with feet and with UE.  Once pt. came to therapy 

gym, nursing stated that they needed to complete tube feeding for medications.  

Pt. was able to self propel back to room with assistance at times.  Therapy 

session suspended at this time.  After this, pt. agreed again to work with OT.  

Self propelled again for increased independence and strength.  Spouse came with 

pt. at this time.  Pt. had kept taking oxygen off, so OT kept it off and 

monitored oxygen levels.  Sats at 95-98% with activity.  Pt. came to dining 

area and rested in chair.  Propelled back to room with min assist at times, but 

mainly cues to do for self.  All needs are met in room.





Education


OT Patient Education:  Correct positioning, Modified ADL techniques, Progress 

toward Goal/Update tx plan, Purpose of tx/functional activities, Reviewed 

precautions, Rehab process, Transfer techniques


Teaching Recipient:  Patient


Teaching Methods:  Demonstration, Discussion


Response to Teaching:  Verbalize Understanding, Return Demonstration





OT Short Term Goals


Short Term Goals


Time Frame:  Dec 31, 2018


Eating(FIM):  4


Grooming(FIM):  3


Upper Body Dressing(FIM):  3


Lower Body Dressing(FIM):  3


Toileting(FIM):  4


Transfers (B,C,W/C) (FIM):  5


Toilet/Commode Transfer(FIM):  4


Additional Short Term Goals:  1-Demonstrate ADL Tasks, 2-Verbalize Understanding

, 3-ImproveStrength/Farnaz


1=Demonstrate adherence to instructed precautions during ADL tasks.


2=Patient will verbalize/demonstrate understanding of assistive devices/

modifications for ADL.


3=Patient will improve strength/tolerance for activity to enable patient to 

perform ADL's.





OT Long Term Goals


Long Term Goals


Time Frame:  2019


Eating (FIM):  5


Eating (QC):  5


Groomin


Oral Hygiene (QC):  4


Bathing(FIM):  3


Shower/Bathe Self (QC):  3


Upper Body Dressing(FIM):  5


Upper Body Dressing (QC):  4


Lower Body Dressing(FIM):  4


Lower Body Dressing (QC):  4


On/Off Footwear (QC):  4


Toileting(FIM):  5


Toileting Hygiene (QC):  5


Transfers (B,C,W/C) (FIM):  5


Toilet/Commode Transfer(FIM):  5


Toilet/Commode Transfer (QC):  4


Additional Goals:  1-Demonstrate ADL Tasks, 2-Verbalize Understanding, 3-

ImproveStrength/Farnaz


1=Demonstrate adherence to instructed precautions during ADL tasks.


2=Patient will verbalize/demonstrate understanding of assistive devices/

modifications for ADL.


3=Patient will improve strength/tolerance for activity to enable patient to 

perform ADL's.





OT Education/Plan


Problem List/Assessment


Assessment:  Decreased Activ Tolerance, Impaired Cognition, Impaired I ADL's, 

Impaired Self-Care Skills





Discharge Recommendations


Plan/Recommendations:  Continue POC


Therapy D/C Recommendations:  24 hr Supervision, Home w/ Family Support, 

Scheduled Assistance





Treatment Plan/Plan of Care


Treatment,Training & Education:  Yes


Patient would benefit from OT for education, treatment and training to promote 

independence in ADL's, mobility, safety and/or upper extremity function for ADL'

s.


Plan of Care:  ADL Retraining, Functional Mobility, Group Exercise/Act as Ind, 

UE Funct Exercise/Act


Treatment Duration:  2019


Frequency:  At least 5 of 7 days/Wk (IRF)


Estimated Hrs Per Day:  1.5 hours per day


Agreement:  Yes


Rehab Potential:  Fair





Time/GCodes


Start Time:  09:00


Stop Time:  10:00


Total Time Billed (hr/min):  40


Billed Treatment Time


0405-5933 1, FA x 15minutes


2387-8071 1, FA x 25minutes











ALCIDES OSUNA OT 2019 14:22

## 2019-01-02 NOTE — PHYSICAL THERAPY DAILY NOTE
PT Daily Note-Current


Subjective


Pt sitting in recliner with Sp present upon arrival.  Pt is using urinal with Sp

's assistance.  Pt agrees to PT.





Mental Status


Patient Orientation:  Person, Confused, Place


Attachments:  Oxygen, PEG Tube





Transfers


Therapy Code Descriptions/Definitions 





Functional [place] Measure:


0=Not Assessed/NA        4=Minimal Assistance


1=Total Assistance        5=Supervision or Setup


2=Maximal Assistance  6=Modified [place]


3=Moderate Assistance 7=Complete [place]








Therapy Quality Codes:


6    Independent with activity with or without an assistive device


5    Patient requires set up or clean up by helper.  Patient completes activity

  by  themselves


4    Supervision or touching assist (CGA). [place] provide cues , steadying 

assist


3    The helper provides less than half the effort to complete the activity


2    The helper provides more than half the effort to complete the activity


1    Dependent.  The helper does all the effort to complete an activity 


7    Patient refused to complete or attempt activity


9    The patient did not perform the activity before the current illness or 

injury


88  Not attempted due to Medical conditions or safety concerns





Exercises


Seated Therapy Exercises:  Ankle pumps, Long arc quads, Hip flexion, Kicking 

activity, Hip abd/add


Seated Reps:  15 (2 sets of 15 reps)





Treatments


Pt completes Seated Ex in recliner with a couple short rest breaks.  Pt resting 

in recliner at end of tx with all needs met.





Assessment


Current Status:  Fair Progress


Pt is confused at times but with VC as well as Phy. Cues pt is able to complete 

Exercises.





PT Short Term Goals


Short Term Goals


Time Frame:  Dec 31, 2018


Transfers (B,C,W/C) (FIM):  5


Gait (FIM):  2


Distance (FIM):  1=up to 49 ft


Gait Assistive Device:  FWW


Wheelchair Distance:  30'





PT Long Term Goals


Long Term Goals


PT Long Term Goals Time Frame:  2019


Transfers (B,C,W/C) (FIM):  6


Sit to Lying (QC):  6


Lying-Sitting on Side/Bed(QC):  6


Sit to Stand (QC):  6


Rollin


Roll Left to Right (QC):  6


Chair/Bed-to-Chair Xfer(QC):  6


Car Transfer (QC):  5


Does the Patient Walk:  Yes


Gait (FIM):  5 (household)


Gait distance (FIM):  0=280-58 ft


Walk 10 feet (QC):  6


Walk 10ft-Uneven Surface(QC):  6


Walk 50ft with 2 Turns (QC):  6


Walk 150 ft (QC):  88


Gait Level of Assist:  6


Gait Assistive Device:  FWW


Does the Pt use WC or Scooter?:  Yes


Wheelchair (FIM):  6


Wheelchair distance (FIM):  3=150 ft


Wheel 50 feet with 2 turns (QC:  6


Stairs (FIM):  88


1 Step (curb) (QC):  88


4 Steps (QC):  88


12 Steps (QC):  88


Picking up an Object (QC):  88





PT Plan


Problem List


Problem List:  Activity Tolerance, Functional Strength, Safety, Balance, Gait, 

Transfer, Bed Mobility





Treatment/Plan


Treatment Plan:  Continue Plan of Care


Treatment Plan:  Bed Mobility, Education, Functional Activity Farnaz, Functional 

Strength, Group Therapy, Gait, Safety, Therapeutic Exercise, Transfers


Treatment Duration:  2019


Frequency:  At least 5 of 7 days/Wk (IRF)


Estimated Hrs Per Day:  1.5 hours per day


Patient and/or Family Agrees t:  Yes





Safety Risks/Education


Patient Education:  Correct Positioning, Safety Issues


Teaching Recipient:  Patient


Teaching Methods:  Discussion


Response to Teaching:  Reinforcement Needed





Time/GCodes


Time In:  1330


Time Out:  1400


Total Billed Treatment Time:  30


Total Billed Treatment


1, EX (20m) & FA (10m)


G Codes Necessary:  ANA Lozoya PTA 2019 16:30

## 2019-01-02 NOTE — SPEECH THERAPY DAILY NOTE
Speech Daily Progress Note


Subjective


Date Seen by Provider:  2019


Time Seen by Provider:  00:30


Patient and wife were sitting in his room resting when I arrived.





Objective


Patient completed pharyngeal exercises for strengthening swallow function at 80

% with min to mod verbal cues.





Assessment


Assessment Current Status:  Fair Progress





Treatment Plan


Continue Plan of Care





Communication


Comprehension:  2


Expression:  2





Social Cognition


Social Interaction:  2


Problem Solvin


Memory:  2





Speech Short Term Goals


Short Term Goals


Short Term Goals


1) Patient will be able to recall new information with 80% or greater given 

minimal cues.


2) Patient will be able to name items/pictures presented with 80% or greater 

given minimal cues.


3) Patient will utilize compensatory strategies for safety within his room with 

80% or greater given minimal cues.


4) Patient will complete pharyngeal exercises 5x/week in order to return to PO 

status.


5) Patient will tolerate trials of least restrictive diet level without s/s of 

aspiration.





Speech Long Term Goals


Long Term Goals


1) Patient will improve memory, problem solving and auditory processing in 

order to return safely home.


2) Patient will demonstrate a safe swallow function for oral intake for 

maintaining nutrition/hydration.





Speech-Plan


Patient/Family Goals


Patient/Family Goals:  


Patient plans to return home with his wife post rehab.





Treatment Plan


Speech Therapy Treatment Plan:  Continue Plan of Care


Patient is noted to be coughing much less.


Treatment Duration:  2019


Frequency:  5 times per week


Estimated Hrs Per Day:  .5 hour per day


Rehab Potential:  Fair


Barriers to Learning:  


Memory deficits.


Pt/Family Agrees to Plan:  Yes





Safety Risks/Education


Teaching Recipient:  Patient, Significant Other


Teaching Methods:  Discussion


Response to Teaching:  Verbalize Understanding


Education Topics Provided:  


Safety, reason for NPO





Time


Speech Therapy Time In:  14:35


Speech Therapy Time Out:  15:05


Total Billed Time:  30


Billed Treatment Time


1, KAMRON Singh 2019 15:39

## 2019-01-02 NOTE — PM&R PROGRESS NOTE
Subjective


This was a face to face visit with the patient.


Date Seen by Provider:  Jan 2, 2019


Time Seen by Provider:  10:00


Subjective/Events-last exam


Patient was seen in his room with wife at chair side


PEG tube feedings will need to be provided by caregiver and education will be 

arranged


Cough has no significant change


Bowels are moving and hopefully tube feeding change will help make that more 

solid


Denies any pain


Review of Systems


General:  Fatigue


HEENT:  Dysphasia


Pulmonary:  Cough





Objective


Physician Exam


Last Set of Vital Signs





Vital Signs








  Date Time  Temp Pulse Resp B/P (MAP) Pulse Ox O2 Delivery O2 Flow Rate FiO2


 


1/2/19 06:00 98.3 91 20 114/59 (77) 95 Nasal Cannula 2.00 





Capillary Refill :


I&O











Intake and Output 


 


 1/2/19





 00:00


 


Intake Total 2530 ml


 


Output Total 980 ml


 


Balance 1550 ml


 


 


 


Intake Oral 0 ml


 


Tube Feeding 1780 ml


 


Other 750 ml


 


Output Urine Total 980 ml


 


# Urine Diapers 5


 


# Bowel Movements 2








General:  Alert, Oriented X3, Cooperative, No Acute Distress


HEENT:  Atraumatic, PERRLA, EOMI, Mucous Memb Moist/Pink


Neck:  Supple, No JVD


Lungs:  Other (coarse breath sounds)


Heart:  Regular Rate, Normal S1, Normal S2


Abdomen:  Normal Bowel Sounds, Soft, No Tenderness, Other (Peg Tube in place)


Extremities:  No Clubbing, Other (+1 edema BLE)


Skin:  No Rashes


Neuro:  Normal Speech, Other (Confusion strength 4/5 LES 4-/5 Upper limbs)


Psych/Mental Status:  Mental Status NL, Mood NL, Other (subtle memory loss)





Results


Lab Data


Laboratory Tests


12/30/18 11:35: Glucometer 132H


12/30/18 16:04: Stool Occult Blood Immunoassay POSITIVEH


12/31/18 06:15: 


White Blood Count 6.6, Red Blood Count 2.72L, Hemoglobin 8.0L, Hematocrit 26L, 

Mean Corpuscular Volume 96, Mean Corpuscular Hemoglobin 29, Mean Corpuscular 

Hemoglobin Concent 31L, Red Cell Distribution Width 19.1H, Platelet Count 142, 

Mean Platelet Volume 11.7H, Neutrophils (%) (Auto) 70, Lymphocytes (%) (Auto) 15

, Monocytes (%) (Auto) 12, Eosinophils (%) (Auto) 2, Basophils (%) (Auto) 0, 

Neutrophils # (Auto) 4.7, Lymphocytes # (Auto) 1.0, Monocytes # (Auto) 0.8, 

Eosinophils # (Auto) 0.2, Basophils # (Auto) 0.0, Erythrocyte Sedimentation 

Rate 126H, Sodium Level 137, Potassium Level 4.3, Chloride Level 99, Carbon 

Dioxide Level 29, Anion Gap 9, Blood Urea Nitrogen 20H, Creatinine 0.92, 

Estimat Glomerular Filtration Rate > 60, BUN/Creatinine Ratio 22, Glucose Level 

94, Calcium Level 9.1, Corrected Calcium 9.9, Iron Level 50, Total Bilirubin 0.5

, Aspartate Amino Transf (AST/SGOT) 21, Alanine Aminotransferase (ALT/SGPT) 14, 

Alkaline Phosphatase 90, Total Protein 7.2, Albumin 3.0L


12/31/18 11:01: Glucometer 101





Current Funtional Status


Reviewed physical therapy and occupational therapy and speech therapy


Participating in all orders but needs cues


Setting up for discharge with home health services and PEG tube feeding 

management by caregiver





Progress Toward Rehab Goals


Discharge home with home health services once PEG tube feedings can be provided 

by caregiver





Assessment/Plan


Assessment and Plan





(1) Debility


Status:  Acute


(2) GI bleed


Qualifiers:  


   Qualified Codes:  K92.2 - Gastrointestinal hemorrhage, unspecified


Status:  Acute


(3) MRSA pneumonia


Qualifiers:  


   Qualified Codes:  J15.212 - Pneumonia due to methicillin resistant 

Staphylococcus aureus


Status:  Resolved


(4) Pseudomonas urinary tract infection


Status:  Acute


(5) Pyelonephritis


Status:  Resolved


(6) Renal calculus, left


Status:  Resolved


(7) Acute renal insufficiency


Status:  Resolved


(8) Diabetes mellitus, type 2


Qualifiers:  


   Qualified Codes:  E11.59 - Type 2 diabetes mellitus with other circulatory 

complications; Z79.4 - Long term (current) use of insulin


Status:  Chronic


(9) Congestive heart failure


Qualifiers:  


   Qualified Codes:  I50.9 - Heart failure, unspecified


Status:  Chronic


(10) COPD (chronic obstructive pulmonary disease)


Qualifiers:  


   Qualified Codes:  J44.9 - Chronic obstructive pulmonary disease, unspecified


Status:  Chronic


(11) Paroxysmal atrial fibrillation


Status:  Chronic


(12) CAD (coronary artery disease)


Qualifiers:  


   Qualified Codes:  I25.10 - Atherosclerotic heart disease of native coronary 

artery without angina pectoris


Status:  Chronic


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


Status:  Chronic


(14) Melena


Status:  Acute


(15) Diarrhea


Qualifiers:  


   Qualified Codes:  R19.7 - Diarrhea, unspecified


Status:  Acute


(16) Anemia


Qualifiers:  


   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

chronic)


Status:  Acute


Co-Morbidities that are continuing to impact the rehab process: (include details

)











MICHELE WILKINS DO Jan 2, 2019 11:06

## 2019-01-03 VITALS — SYSTOLIC BLOOD PRESSURE: 148 MMHG | DIASTOLIC BLOOD PRESSURE: 85 MMHG

## 2019-01-03 VITALS — SYSTOLIC BLOOD PRESSURE: 151 MMHG | DIASTOLIC BLOOD PRESSURE: 73 MMHG

## 2019-01-03 VITALS — DIASTOLIC BLOOD PRESSURE: 85 MMHG | SYSTOLIC BLOOD PRESSURE: 148 MMHG

## 2019-01-03 VITALS — DIASTOLIC BLOOD PRESSURE: 85 MMHG | SYSTOLIC BLOOD PRESSURE: 149 MMHG

## 2019-01-03 VITALS — DIASTOLIC BLOOD PRESSURE: 81 MMHG | SYSTOLIC BLOOD PRESSURE: 132 MMHG

## 2019-01-03 VITALS — SYSTOLIC BLOOD PRESSURE: 136 MMHG | DIASTOLIC BLOOD PRESSURE: 79 MMHG

## 2019-01-03 VITALS — DIASTOLIC BLOOD PRESSURE: 60 MMHG | SYSTOLIC BLOOD PRESSURE: 143 MMHG

## 2019-01-03 VITALS — DIASTOLIC BLOOD PRESSURE: 76 MMHG | SYSTOLIC BLOOD PRESSURE: 124 MMHG

## 2019-01-03 LAB
ALBUMIN SERPL-MCNC: 3.1 GM/DL (ref 3.2–4.5)
ALP SERPL-CCNC: 91 U/L (ref 40–136)
ALT SERPL-CCNC: 16 U/L (ref 0–55)
BILIRUB SERPL-MCNC: 0.4 MG/DL (ref 0.1–1)
BUN/CREAT SERPL: 20
CALCIUM SERPL-MCNC: 9 MG/DL (ref 8.5–10.1)
CHLORIDE SERPL-SCNC: 100 MMOL/L (ref 98–107)
CO2 SERPL-SCNC: 30 MMOL/L (ref 21–32)
CREAT SERPL-MCNC: 0.86 MG/DL (ref 0.6–1.3)
ERYTHROCYTE [DISTWIDTH] IN BLOOD BY AUTOMATED COUNT: 20.2 % (ref 10–14.5)
GFR SERPLBLD BASED ON 1.73 SQ M-ARVRAT: > 60 ML/MIN
GLUCOSE SERPL-MCNC: 89 MG/DL (ref 70–105)
HCT VFR BLD CALC: 24 % (ref 40–54)
HGB BLD-MCNC: 7.2 G/DL (ref 13.3–17.7)
MCH RBC QN AUTO: 30 PG (ref 25–34)
MCHC RBC AUTO-ENTMCNC: 31 G/DL (ref 32–36)
MCV RBC AUTO: 98 FL (ref 80–99)
PLATELET # BLD: 156 10^3/UL (ref 130–400)
PMV BLD AUTO: 11.2 FL (ref 7.4–10.4)
POTASSIUM SERPL-SCNC: 4.4 MMOL/L (ref 3.6–5)
PROT SERPL-MCNC: 7.2 GM/DL (ref 6.4–8.2)
RBC # BLD AUTO: 2.42 10^6/UL (ref 4.35–5.85)
SODIUM SERPL-SCNC: 138 MMOL/L (ref 135–145)
WBC # BLD AUTO: 7.4 10^3/UL (ref 4.3–11)

## 2019-01-03 RX ADMIN — CHOLESTYRAMINE SCH GM: 4 POWDER, FOR SUSPENSION ORAL at 11:00

## 2019-01-03 RX ADMIN — AMIODARONE HYDROCHLORIDE SCH MG: 200 TABLET ORAL at 09:04

## 2019-01-03 RX ADMIN — GUAIFENESIN AND DEXTROMETHORPHAN SCH ML: 100; 10 SYRUP ORAL at 20:56

## 2019-01-03 RX ADMIN — CHOLESTYRAMINE SCH GM: 4 POWDER, FOR SUSPENSION ORAL at 23:59

## 2019-01-03 RX ADMIN — PHENAZOPYRIDINE HYDROCHLORIDE SCH MG: 100 TABLET ORAL at 17:21

## 2019-01-03 RX ADMIN — MEXILETINE HYDROCHLORIDE SCH MG: 150 CAPSULE ORAL at 09:06

## 2019-01-03 RX ADMIN — IPRATROPIUM BROMIDE AND ALBUTEROL SULFATE SCH ML: .5; 3 SOLUTION RESPIRATORY (INHALATION) at 07:02

## 2019-01-03 RX ADMIN — Medication SCH EACH: at 09:04

## 2019-01-03 RX ADMIN — FUROSEMIDE SCH MG: 20 TABLET ORAL at 06:29

## 2019-01-03 RX ADMIN — Medication SCH EACH: at 20:57

## 2019-01-03 RX ADMIN — METOPROLOL TARTRATE SCH MG: 25 TABLET, FILM COATED ORAL at 09:04

## 2019-01-03 RX ADMIN — PHENAZOPYRIDINE HYDROCHLORIDE SCH MG: 100 TABLET ORAL at 09:04

## 2019-01-03 RX ADMIN — IPRATROPIUM BROMIDE AND ALBUTEROL SULFATE SCH ML: .5; 3 SOLUTION RESPIRATORY (INHALATION) at 15:29

## 2019-01-03 RX ADMIN — DIGOXIN SCH MG: 125 TABLET ORAL at 09:04

## 2019-01-03 RX ADMIN — IPRATROPIUM BROMIDE AND ALBUTEROL SULFATE SCH ML: .5; 3 SOLUTION RESPIRATORY (INHALATION) at 20:14

## 2019-01-03 RX ADMIN — ASPIRIN 81 MG CHEWABLE TABLET SCH MG: 81 TABLET CHEWABLE at 09:04

## 2019-01-03 RX ADMIN — DILTIAZEM HYDROCHLORIDE SCH MG: 60 TABLET, FILM COATED ORAL at 21:04

## 2019-01-03 RX ADMIN — ANORECTAL OINTMENT SCH APPLIC: 15.7; .44; 24; 20.6 OINTMENT TOPICAL at 20:58

## 2019-01-03 RX ADMIN — GUAIFENESIN AND DEXTROMETHORPHAN SCH ML: 100; 10 SYRUP ORAL at 13:21

## 2019-01-03 RX ADMIN — IPRATROPIUM BROMIDE AND ALBUTEROL SULFATE SCH ML: .5; 3 SOLUTION RESPIRATORY (INHALATION) at 01:48

## 2019-01-03 RX ADMIN — DILTIAZEM HYDROCHLORIDE SCH MG: 60 TABLET, FILM COATED ORAL at 06:28

## 2019-01-03 RX ADMIN — ATORVASTATIN CALCIUM SCH MG: 40 TABLET, FILM COATED ORAL at 20:57

## 2019-01-03 RX ADMIN — POTASSIUM CHLORIDE SCH MEQ: 1.5 POWDER, FOR SOLUTION ORAL at 06:28

## 2019-01-03 RX ADMIN — ANORECTAL OINTMENT SCH APPLIC: 15.7; .44; 24; 20.6 OINTMENT TOPICAL at 09:04

## 2019-01-03 RX ADMIN — DOCUSATE SODIUM SCH MG: 50 LIQUID ORAL at 09:04

## 2019-01-03 RX ADMIN — GUAIFENESIN AND DEXTROMETHORPHAN SCH ML: 100; 10 SYRUP ORAL at 09:04

## 2019-01-03 RX ADMIN — DOCUSATE SODIUM SCH MG: 50 LIQUID ORAL at 20:56

## 2019-01-03 RX ADMIN — DILTIAZEM HYDROCHLORIDE SCH MG: 60 TABLET, FILM COATED ORAL at 13:21

## 2019-01-03 RX ADMIN — MEXILETINE HYDROCHLORIDE SCH MG: 150 CAPSULE ORAL at 23:07

## 2019-01-03 RX ADMIN — MEXILETINE HYDROCHLORIDE SCH MG: 150 CAPSULE ORAL at 17:21

## 2019-01-03 RX ADMIN — PHENAZOPYRIDINE HYDROCHLORIDE SCH MG: 100 TABLET ORAL at 13:21

## 2019-01-03 RX ADMIN — METOPROLOL TARTRATE SCH MG: 25 TABLET, FILM COATED ORAL at 20:57

## 2019-01-03 RX ADMIN — SODIUM BICARBONATE SCH ML: 84 INJECTION, SOLUTION INTRAVENOUS at 09:06

## 2019-01-03 RX ADMIN — FUROSEMIDE SCH MG: 20 TABLET ORAL at 17:21

## 2019-01-03 NOTE — CARDIOLOGY PROGRESS NOTE
Subjective


Date Seen by Provider:  Prince 3, 2019


Time Seen by Provider:  15:40


Subjective/Events-last exam


Patient is sitting in a chair, feeling better, no new complaint.


Review of Systems


General:  No Chills, No Night Sweats, No Fatigue, No Malaise, No Appetite, No 

Other


HEENT:  No Head Aches, No Visual Changes, No Eye Pain, No Ear Pain, No Dysphasia

, No Sinus Congestion, No Post Nasal Drip, No Sore Throat, No Other


Pulmonary:  No Dyspnea, No Cough, No Pleuritic Chest Pain, No Other


Cardiovascular:  No: Chest Pain, Palpitations, Orthopnea, Paroxysmal Noc. 

Dyspnea, Edema, Lt Headedness, Other





Objective-Cardiology


Exam


Last Set of Vital Signs





Vital Signs








 1/3/19 1/3/19





 05:51 15:29


 


Temp 97.6 


 


Pulse 90 


 


Resp 18 


 


B/P (MAP) 143/60 (87) 


 


Pulse Ox  95


 


O2 Delivery  Nasal Cannula


 


O2 Flow Rate  2.00





Capillary Refill :


I&O











Intake and Output 


 


 1/3/19





 00:00


 


Intake Total 2440 ml


 


Output Total 450 ml


 


Balance 1990 ml


 


 


 


Intake Oral 0 ml


 


Tube Feeding 1440 ml


 


Other 1000 ml


 


Output Urine Total 450 ml


 


# Urine Diapers 3


 


# Bowel Movements 2








General:  Alert, Oriented X3, Cooperative, No Acute Distress


HEENT:  Atraumatic, PERRLA, EOMI, Mucous Memb Moist/Pink


Neck:  Supple, No JVD


Lungs:  Other (coarse breath sounds)


Heart:  Regular Rate, Normal S1, Normal S2


Abdomen:  Normal Bowel Sounds, Soft, No Tenderness, Other (Peg Tube in place)


Extremities:  No Clubbing, Other (+1 edema BLE)


Skin:  No Rashes


Neuro:  Normal Speech, Other (Confusion strength 4/5 LES 4-/5 Upper limbs)


Psych/Mental Status:  Mental Status NL, Mood NL, Other (subtle memory loss)





Results


Lab


Laboratory Tests


1/3/19 06:30














A/P-Cardiology


Admission Diagnosis


Debility


CAD


HTN


Chronic atrial fibrillation





Assessment/Plan


Debility, reporting improvement, asking to go home.





Anemia, worsening, planning for blood transfusion. Underwent EGD and 

colonoscopy revealing gastritis and esophagitis with no active bleed. Continue 

to monitor H/H.





Status post respiratory failure, pneumonia, silent aspiration, on Antibiotics, 

followed by primary care physician





Urosepsis, history of kidney stone and nephrostomy tube, on antibiotics and 

managed by primary care team





Silent aspiration, status post feeding tube placement





Coronary artery disease, history of CABG done in the remote past.  Patient had 

a stent done in 2002 using MultiLink 2.518 mm to the proximal right 

coronary artery, 2017 had a Cypher stent 3.530 mm to the proximal and mid 

circumflex artery.  Has been followed and managed by primary cardiologist Dr. David in [place].  Continue to follow





History of congestive heart failure, reported ejection fraction 54 percent.  

MPI in , continue to monitor





Paroxysmal atrial fibrillation maintained on amiodarone, intolerant to oral 

anticoagulation secondary to recurrent nosebleed and anemia,  Patient is 

maintained on Cardizem, Amiodarone,Lopressor and Digoxin, continue to monitor, 

no changes are recommended





Patient has been maintained on mexiletine.  Probably due to ventricular 

fibrillation and history of shock from his defibrillator, known to have [employer] 

[employer] ICD implanted by Dr. stevens in [place]. Continue on Mexiletine





COPD, chronic dyspnea, followed by primary care physician





Chronic pedal edema,continue to diurese and monitor electrolytes





Diabetes mellitus, followed and managed by primary care physician





Clinical Quality Measures


DVT/VTE Risk/Contraindication:


Risk Factor Score Per Nursin


RFS Level Per Nursing on Admit:  4+=Very High











MARINA KUMAR MD Prince 3, 2019 3:40 pm

## 2019-01-03 NOTE — OCCUPATIONAL THER DAILY NOTE
OT Current Status-Daily Note


Subjective


No pain reported.





Appearance


Pt. up in chair.  Agrees to work with OT.





Mental Status/Objective


Patient Orientation:  Person


Therapy Code Descriptions/Definitions 





Functional [place] Measure:


0=Not Assessed/NA        4=Minimal Assistance


1=Total Assistance        5=Supervision or Setup


2=Maximal Assistance  6=Modified Spray


3=Moderate Assistance 7=Complete Spray





ADL-Treatment


Therapy Code Descriptions/Definitions 





Functional [place] Measure:


0=Not Assessed/NA        4=Minimal Assistance


1=Total Assistance        5=Supervision or Setup


2=Maximal Assistance  6=Modified [place]ay


3=Moderate Assistance 7=Complete [place]








Therapy Quality Codes:


6    Independent with activity with or without an assistive device


5    Patient requires set up or clean up by helper.  Patient completes activity

  by  themselves


4    Supervision or touching assist (CGA). [place] provide cues , steadying 

assist


3    The helper provides less than half the effort to complete the activity


2    The helper provides more than half the effort to complete the activity


1    Dependent.  The helper does all the effort to complete an activity 


7    Patient refused to complete or attempt activity


9    The patient did not perform the activity before the current illness or 

injury


88  Not attempted due to Medical conditions or safety concerns


Transfers (B, C, W/C) (FIM):  4 (CGA to stand from wheelchair and ambulate with 

walker.  Pt. ambulated throughout halls, approximately 100 feet x 3 with rest 

breaks in between.  Self propelled wheelchair back to room.  Transferred to 

recliner chair with CGA.  All needs met.)





Education


OT Patient Education:  Correct positioning, Exercise program, Progress toward 

Goal/Update tx plan, Purpose of tx/functional activities, Reviewed precautions, 

Rehab process, Transfer techniques


Teaching Recipient:  Patient


Teaching Methods:  Demonstration, Discussion


Response to Teaching:  Verbalize Understanding, Return Demonstration





OT Short Term Goals


Short Term Goals


Time Frame:  Dec 31, 2018


Eating(FIM):  4


Grooming(FIM):  3


Upper Body Dressing(FIM):  3


Lower Body Dressing(FIM):  3


Toileting(FIM):  4


Transfers (B,C,W/C) (FIM):  5


Toilet/Commode Transfer(FIM):  4


Additional Short Term Goals:  1-Demonstrate ADL Tasks, 2-Verbalize Understanding

, 3-ImproveStrength/Farnaz


1=Demonstrate adherence to instructed precautions during ADL tasks.


2=Patient will verbalize/demonstrate understanding of assistive devices/

modifications for ADL.


3=Patient will improve strength/tolerance for activity to enable patient to 

perform ADL's.





OT Long Term Goals


Long Term Goals


Time Frame:  2019


Eating (FIM):  5


Eating (QC):  5


Groomin


Oral Hygiene (QC):  4


Bathing(FIM):  3


Shower/Bathe Self (QC):  3


Upper Body Dressing(FIM):  5


Upper Body Dressing (QC):  4


Lower Body Dressing(FIM):  4


Lower Body Dressing (QC):  4


On/Off Footwear (QC):  4


Toileting(FIM):  5


Toileting Hygiene (QC):  5


Transfers (B,C,W/C) (FIM):  5


Toilet/Commode Transfer(FIM):  5


Toilet/Commode Transfer (QC):  4


Additional Goals:  1-Demonstrate ADL Tasks, 2-Verbalize Understanding, 3-

ImproveStrength/Farnaz


1=Demonstrate adherence to instructed precautions during ADL tasks.


2=Patient will verbalize/demonstrate understanding of assistive devices/

modifications for ADL.


3=Patient will improve strength/tolerance for activity to enable patient to 

perform ADL's.





OT Education/Plan


Problem List/Assessment


Assessment:  Decreased Activ Tolerance, Impaired Cognition, Impaired I ADL's, 

Impaired Self-Care Skills





Discharge Recommendations


Plan/Recommendations:  Continue POC


Therapy D/C Recommendations:  Home w/ Family Support, Scheduled Assistance





Treatment Plan/Plan of Care


Treatment,Training & Education:  Yes


Patient would benefit from OT for education, treatment and training to promote 

independence in ADL's, mobility, safety and/or upper extremity function for ADL'

s.


Plan of Care:  ADL Retraining, Functional Mobility, Group Exercise/Act as Ind, 

UE Funct Exercise/Act


Treatment Duration:  2019


Frequency:  At least 5 of 7 days/Wk (IRF)


Estimated Hrs Per Day:  1.5 hours per day


Agreement:  Yes


Rehab Potential:  Fair





Time/GCodes


Start Time:  13:30


Stop Time:  13:45


Total Time Billed (hr/min):  15


Billed Treatment Time


1, ALCIDES LOCKETT OT Prince 3, 2019 14:29

## 2019-01-03 NOTE — PM&R PROGRESS NOTE
Subjective


HPI/CC On Admission


Date Seen by Provider:  Prince 3, 2019


Time Seen by Provider:  07:45


CC: Severe debility





HPI: This is a 78-year-old white male [facility] patient was 

admitted to inpatient rehabilitation for IV medication and severe debility 

resolution.  He was admitted to [facility] for 11 days requiring PEG 

tube placement due to silent aspiration and urinary stent placement due to 

obstructive stone in ureter and maintained on antibiotic regimen with 

aggressive physical therapy.  I reviewed old records and culture results from 

infectious disease and reviewed current lab results along with vital signs.  He 

is sitting up in chair drowsy but oriented 3 but appears to be severely 

chronically ill.  Her goal is to return to independent ADL function along with 

completion of IV antibiotics.


Subjective/Events-last exam


Patient doing very well today


Hemoglobin down to 7.2 and I did confer with Dr. Graham who recommended to give 

2 units of blood and check ferritin level and he will see him in consultation 

later this evening.


Denies any pain


Getting close to discharge likely on Monday as long as PEG tube feedings can be 

taught to the caregiver


Bowels are moving okay





Review of Systems


General:  Fatigue


Pulmonary:  Cough





Objective


Exam


Vital Signs





Vital Signs








  Date Time  Temp Pulse Resp B/P (MAP) Pulse Ox O2 Delivery O2 Flow Rate FiO2


 


1/3/19 20:14     94 Nasal Cannula 2.00 


 


1/3/19 18:57 98.2 91 18 151/73    





Capillary Refill :


General Appearance:  No Apparent Distress, WD/WN, Chronically ill


Respiratory:  Chest Non Tender, No Accessory Muscle Use, No Respiratory Distress

, Crackles, Wheezing


Cardiovascular:  Regular Rate, Rhythm, No Edema, No Gallop, No JVD, No Murmur, 

Normal Peripheral Pulses


Neurologic/Psychiatric:  Alert, Oriented x3, No Motor/Sensory Deficits, Normal 

Mood/Affect


Skin:  Normal Color, Warm/Dry





Results/Procedures


Lab


Laboratory Tests


1/3/19 06:30








Patient resulted labs reviewed.





Assessment/Plan


Assessment and Plan


Assess & Plan/Chief Complaint


Assessment:


Anemia with presumed GI bleed with hemoccult positive but Dr Mantilla performed EGD/

Colon last week without source of loss identified and iron 50 on check to 

receive 2 units of blood today and Hematology consultation by Dr Graham


MRSA pneumonia s/p treatment completion


Pseudomonas UTI s/p treatment with Gent


Severe debilitated state


CAD previous bypass graft


ICD defibrillator in place


Presbycusis


Sleep apnea


Chronic respiratory failure


Aspiration with PEG tube placement and NPO status





Plan:


Dr Graham consultation


Hold ASA, DC accuchecks


PEG tube feedings


Speech therapy


PT/OT


DC Monday


Consult Dr Chin is appreciated


Consult Dr Shanelle Mantilla is appreciated


Consult Dr Graham is appreciated


Give 2 units of blood today and check ferritin





Diagnosis/Problems


Diagnosis/Problems





(1) Debility


Status:  Acute


(2) GI bleed


Status:  Acute


Qualifiers:  


   GI bleed type/associated pathology:  unspecified gastrointestinal hemorrhage 

type  Qualified Codes:  K92.2 - Gastrointestinal hemorrhage, unspecified


(3) MRSA pneumonia


Status:  Resolved


Qualifiers:  


   Laterality:  unspecified laterality  Lung location:  unspecified part of 

lung  Qualified Codes:  J15.212 - Pneumonia due to methicillin resistant 

Staphylococcus aureus


Resolution Date/Time:  19 @ 14:23


(4) Pseudomonas urinary tract infection


Status:  Resolved


Resolution Date/Time:  1/3/19 @ 20:39


(5) Pyelonephritis


Status:  Resolved


Resolution Date/Time:  19 @ 14:23


(6) Renal calculus, left


Status:  Resolved


Resolution Date/Time:  19 @ 14:23


(7) Acute renal insufficiency


Status:  Resolved


Resolution Date/Time:  18 @ 17:54


(8) Diabetes mellitus, type 2


Status:  Chronic


Qualifiers:  


   Diabetes mellitus long term insulin use:  with long term use  Diabetes 

mellitus complication status:  with circulatory complication  Diabetes mellitus 

complication detail:  with other circulatory complications  Qualified Codes:  

E11.59 - Type 2 diabetes mellitus with other circulatory complications; Z79.4 - 

Long term (current) use of insulin


(9) Congestive heart failure


Status:  Chronic


Qualifiers:  


   Heart failure type:  unspecified  Heart failure chronicity:  acute on 

chronic  Qualified Codes:  I50.9 - Heart failure, unspecified


(10) COPD (chronic obstructive pulmonary disease)


Status:  Chronic


Qualifiers:  


   COPD type:  unspecified COPD  Qualified Codes:  J44.9 - Chronic obstructive 

pulmonary disease, unspecified


(11) Paroxysmal atrial fibrillation


Status:  Chronic


(12) CAD (coronary artery disease)


Status:  Chronic


Qualifiers:  


   Coronary Disease-Associated Artery/Lesion type:  native artery  Native vs. 

transplanted heart:  native heart  Associated angina:  without angina  

Qualified Codes:  I25.10 - Atherosclerotic heart disease of native coronary 

artery without angina pectoris


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


Status:  Chronic


(14) Melena


Status:  Acute


(15) Diarrhea


Status:  Acute


Qualifiers:  


   Diarrhea type:  unspecified type  Qualified Codes:  R19.7 - Diarrhea, 

unspecified


(16) Anemia


Status:  Acute


Qualifiers:  


   Anemia type:  iron deficiency  Iron deficiency anemia type:  chronic blood 

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

chronic)


(17) At risk for aspiration


Status:  Chronic


(18) PEG (percutaneous endoscopic gastrostomy) status


Status:  Chronic


(19) Rales


Status:  Chronic


(20) Transfusion of blood during current hospitalization


Status:  Acute





Clinical Quality Measures


DVT/VTE Risk/Contraindication:


Risk Factor Score Per Nursin


RFS Level Per Nursing on Admit:  4+=Very High











MICHELE WILKINS DO Prince 3, 2019 12:12

## 2019-01-03 NOTE — PHYSICAL THERAPY DAILY NOTE
PT Daily Note-Current


Subjective


Pt laying Supine in bed upon arrival.  Sp reports pt is wet and needs changed.  

Pt agrees to PT.





Mental Status


Patient Orientation:  Person, Place


Attachments:  Oxygen





Transfers


Therapy Code Descriptions/Definitions 





Functional [place] Measure:


0=Not Assessed/NA        4=Minimal Assistance


1=Total Assistance        5=Supervision or Setup


2=Maximal Assistance  6=Modified [place]


3=Moderate Assistance 7=Complete [place]








Therapy Quality Codes:


6    Independent with activity with or without an assistive device


5    Patient requires set up or clean up by helper.  Patient completes activity

  by  themselves


4    Supervision or touching assist (CGA). [place] provide cues , steadying 

assist


3    The helper provides less than half the effort to complete the activity


2    The helper provides more than half the effort to complete the activity


1    Dependent.  The helper does all the effort to complete an activity 


7    Patient refused to complete or attempt activity


9    The patient did not perform the activity before the current illness or 

injury


88  Not attempted due to Medical conditions or safety concerns


Scootin


Rollin


Roll Left to Right (QC):  4


Supine to/from Sit:  4


Sit to/from Stand:  3


Sit to Stand (QC):  3





Weight Bearing


Right Lower Extremity:  Right


Weight Bearing/Tolerated


Left Lower Extremity:  Left


Weight Bearing/Tolerated





Gait Training


Does the Patient Walk?:  Yes


Distance (FIM):  1=up to 49 ft


Distance:  10'


Walk 10 feet (QC):  4


Gait Assistive Device:  FWW





Exercises


Seated Therapy Exercises:  Ankle pumps, Long arc quads, Hip flexion, Kicking 

activity, Hip abd/add


Seated Reps:  15





Treatments


Pt rolls side to side to allow PTA to wipe and apply new brief.  Pt then stands 

from EOB using FWW at Min A.  Pt ambulates to [facility] and attempts to sit before 

brakes are applied and only sit on edge of [facility].  PTA has pt transfer to 

recliner since there will be time between PT & OT tx and recliner will have 

chair alarm.  Pt completes Seated Ex in recliner then rests at end of tx with 

all needs met.





Assessment


Current Status:  Fair Progress


Pt continues to struggle with social awareness especially concerning safety.





PT Short Term Goals


Short Term Goals


Time Frame:  Dec 31, 2018


Transfers (B,C,W/C) (FIM):  5


Gait (FIM):  2


Distance (FIM):  1=up to 49 ft


Gait Assistive Device:  FWW


Wheelchair Distance:  200'





PT Long Term Goals


Long Term Goals


PT Long Term Goals Time Frame:  2019


Transfers (B,C,W/C) (FIM):  6


Sit to Lying (QC):  6


Lying-Sitting on Side/Bed(QC):  6


Sit to Stand (QC):  6


Rollin


Roll Left to Right (QC):  6


Chair/Bed-to-Chair Xfer(QC):  6


Car Transfer (QC):  5


Does the Patient Walk:  Yes


Gait (FIM):  5 (household)


Gait distance (FIM):  4=122-59 ft


Walk 10 feet (QC):  6


Walk 10ft-Uneven Surface(QC):  6


Walk 50ft with 2 Turns (QC):  6


Walk 150 ft (QC):  88


Gait Level of Assist:  6


Gait Assistive Device:  FWW


Does the Pt use WC or Scooter?:  Yes


Wheelchair (FIM):  6


Wheelchair distance (FIM):  3=150 ft


Wheel 50 feet with 2 turns (QC:  6


Stairs (FIM):  88


1 Step (curb) (QC):  88


4 Steps (QC):  88


12 Steps (QC):  88


Picking up an Object (QC):  88





PT Plan


Problem List


Problem List:  Activity Tolerance, Functional Strength, Safety, Balance, Gait, 

Transfer, Bed Mobility





Treatment/Plan


Treatment Plan:  Continue Plan of Care


Treatment Plan:  Bed Mobility, Education, Functional Activity Farnaz, Functional 

Strength, Group Therapy, Gait, Safety, Therapeutic Exercise, Transfers


Treatment Duration:  2019


Frequency:  At least 5 of 7 days/Wk (IRF)


Estimated Hrs Per Day:  1.5 hours per day


Patient and/or Family Agrees t:  Yes





Safety Risks/Education


Patient Education:  Gait Training, Transfer Techniques, Correct Positioning, 

Safety Issues


Teaching Recipient:  Patient


Teaching Methods:  Discussion


Response to Teaching:  Reinforcement Needed





Time/GCodes


Time In:  815


Time Out:  900


Total Billed Treatment Time:  45


Total Billed Treatment


1, FA x 2 (25m) & EX (20m)


G Codes Necessary:  ANA Lozoya PTA Prince 3, 2019 09:01

## 2019-01-03 NOTE — OCCUPATIONAL THER DAILY NOTE
OT Current Status-Daily Note


Subjective


No pain reported.





Appearance


Pt. up in chair in room.  Declines bathing but agrees to get dressed.





Mental Status/Objective


Patient Orientation:  Person


Therapy Code Descriptions/Definitions 





Functional [place] Measure:


0=Not Assessed/NA        4=Minimal Assistance


1=Total Assistance        5=Supervision or Setup


2=Maximal Assistance  6=Modified [place]


3=Moderate Assistance 7=Complete [place]


Attachments:  Oxygen





ADL-Treatment


Therapy Code Descriptions/Definitions 





Functional [place] Measure:


0=Not Assessed/NA        4=Minimal Assistance


1=Total Assistance        5=Supervision or Setup


2=Maximal Assistance  6=Modified [place]


3=Moderate Assistance 7=Complete [place]








Therapy Quality Codes:


6    Independent with activity with or without an assistive device


5    Patient requires set up or clean up by helper.  Patient completes activity

  by  themselves


4    Supervision or touching assist (CGA). [place] provide cues , steadying 

assist


3    The helper provides less than half the effort to complete the activity


2    The helper provides more than half the effort to complete the activity


1    Dependent.  The helper does all the effort to complete an activity 


7    Patient refused to complete or attempt activity


9    The patient did not perform the activity before the current illness or 

injury


88  Not attempted due to Medical conditions or safety concerns


Grooming (FIM):  4 (Pt. given comb and swiped at hair.  OT went back over this 

for him.)


Lower Body Dressing (FIM):  4 (Pt. able to doff/don shoes.  Requires min assist 

to don pants over right foot.  Able to get pants over left foot and able to 

pull over hips in stance with CGA for stability.)


Lower Body Dressing (QC):  4


On/Off Footwear (QC):  5


Transfers (B, C, W/C) (FIM):  4 (Min assist sit-stand out of wheelchair to 

ambulate with walker.)


Pt. agrees to dress.  Pt. does not have his own shirt available, and a rehab 

shirt can't be found.  Pt. agrees to don clean pants and a hospital gown.  

After this, Pt. agrees to ambulate toward therapy gym.  Pt. is able to ambulate 

with CGA with walker approximately 100 feet.  Requests wheelchair.  Pt. self 

propelled to therapy gym.  Donned 1 lb. wrist weights and completed peg 

activity with reaching.  Worked on this for increased overall strength and 

independence.  Pt. states that he is "done" long-term through.  Completed 5 

bilateral UE exercises x 10 reps with 1 lb. wrist weights on in all planes to 

increase overall strength and independence.  After this, pt. agreed to ambulate 

again with walker.  Tolerated approximately 100 feet at walker with CGA.  Sat 

in wheelchair and propelled back to room.  Once in room, pt. transferred with 

CGA to reclining chair.  All needs met in room.





Education


OT Patient Education:  Correct positioning, Modified ADL techniques, Progress 

toward Goal/Update tx plan, Purpose of tx/functional activities, Reviewed 

precautions, Rehab process, Transfer techniques


Teaching Recipient:  Patient


Teaching Methods:  Demonstration


Response to Teaching:  Return Demonstration





OT Short Term Goals


Short Term Goals


Time Frame:  Dec 31, 2018


Eating(FIM):  4


Grooming(FIM):  3


Upper Body Dressing(FIM):  3


Lower Body Dressing(FIM):  3


Toileting(FIM):  4


Transfers (B,C,W/C) (FIM):  5


Toilet/Commode Transfer(FIM):  4


Additional Short Term Goals:  1-Demonstrate ADL Tasks, 2-Verbalize Understanding

, 3-ImproveStrength/Farnaz


1=Demonstrate adherence to instructed precautions during ADL tasks.


2=Patient will verbalize/demonstrate understanding of assistive devices/

modifications for ADL.


3=Patient will improve strength/tolerance for activity to enable patient to 

perform ADL's.





OT Long Term Goals


Long Term Goals


Time Frame:  2019


Eating (FIM):  5


Eating (QC):  5


Groomin


Oral Hygiene (QC):  4


Bathing(FIM):  3


Shower/Bathe Self (QC):  3


Upper Body Dressing(FIM):  5


Upper Body Dressing (QC):  4


Lower Body Dressing(FIM):  4


Lower Body Dressing (QC):  4


On/Off Footwear (QC):  4


Toileting(FIM):  5


Toileting Hygiene (QC):  5


Transfers (B,C,W/C) (FIM):  5


Toilet/Commode Transfer(FIM):  5


Toilet/Commode Transfer (QC):  4


Additional Goals:  1-Demonstrate ADL Tasks, 2-Verbalize Understanding, 3-

ImproveStrength/Farnaz


1=Demonstrate adherence to instructed precautions during ADL tasks.


2=Patient will verbalize/demonstrate understanding of assistive devices/

modifications for ADL.


3=Patient will improve strength/tolerance for activity to enable patient to 

perform ADL's.





OT Education/Plan


Problem List/Assessment


Assessment:  Decreased Activ Tolerance, Decreased Safety Aware, Decreased UE 

Strength, Impaired I ADL's, Impaired Self-Care Skills, Restricted Funct UE ROM





Discharge Recommendations


Plan/Recommendations:  Continue POC


Therapy D/C Recommendations:  Home w/ Family Support, Scheduled Assistance





Treatment Plan/Plan of Care


Treatment,Training & Education:  Yes


Patient would benefit from OT for education, treatment and training to promote 

independence in ADL's, mobility, safety and/or upper extremity function for ADL'

s.


Plan of Care:  ADL Retraining, Functional Mobility, Group Exercise/Act as Ind, 

UE Funct Exercise/Act


Treatment Duration:  2019


Frequency:  At least 5 of 7 days/Wk (IRF)


Estimated Hrs Per Day:  1.5 hours per day


Agreement:  Yes


Rehab Potential:  Fair





Time/GCodes


Start Time:  09:15


Stop Time:  10:15


Total Time Billed (hr/min):  60


Billed Treatment Time


1, ADL [id number], FA x 15minutes, Ex x 15minutes











ALCIDES OSUNA OT Prince 3, 2019 12:18

## 2019-01-03 NOTE — NUR
MSW met with patient and spouse to review team conference summary.  Team has recommended 
discharge on 1/7 if patient's spouse and caregiver can obtain and demonstrate knowledge of 
bolus tube feeding administration.  Although patient's wife is hesitant to complete this 
process at home, she is willing to complete education with nursing staff.  MSW contacted 
caregiver, Ally to request presence for education, as well.  Ally states she will 
contact MSW tomorrow in regards to timing availability for education.  Due to need for 
elevation of at least 30 patient would benefit from a hospital bed; however, patient and 
spouse would like to utilize recliner chair upon discharge and obtain hospital bed at later 
date, if needed.  MSW contacted patient's [place] [employer] , Annmarie to inform 
discharge planning.  Patient has no additional equipment needs and would like to resume home 
health services with [facility] at discharge.  MSW will notify [facility] of tentative 
discharge date.

## 2019-01-03 NOTE — PHYSICAL THERAPY DAILY NOTE
PT Daily Note-Current


Subjective


Pt sitting in recliner upon arrival.  Pt agrees to PT for Seated Ex.





Mental Status


Patient Orientation:  Person, Place


Attachments:  Oxygen





Transfers


Therapy Code Descriptions/Definitions 





Functional Jolon Measure:


0=Not Assessed/NA        4=Minimal Assistance


1=Total Assistance        5=Supervision or Setup


2=Maximal Assistance  6=Modified Jolon


3=Moderate Assistance 7=Complete Jolon








Therapy Quality Codes:


6    Independent with activity with or without an assistive device


5    Patient requires set up or clean up by helper.  Patient completes activity

  by  themselves


4    Supervision or touching assist (CGA). [place] provide cues , steadying 

assist


3    The helper provides less than half the effort to complete the activity


2    The helper provides more than half the effort to complete the activity


1    Dependent.  The helper does all the effort to complete an activity 


7    Patient refused to complete or attempt activity


9    The patient did not perform the activity before the current illness or 

injury


88  Not attempted due to Medical conditions or safety concerns





Weight Bearing


Right Lower Extremity:  Right


Weight Bearing/Tolerated


Left Lower Extremity:  Left


Weight Bearing/Tolerated





Exercises


Seated Therapy Exercises:  Ankle pumps, Long arc quads, Hip flexion, Kicking 

activity, Hip abd/add


Seated Reps:  20 (2 sets of 20 reps)





Treatments


Pt completes Seated Ex in recliner with several rest breaks to increase 

strength and activity tolerance especially for ambulation.





Assessment


Current Status:  Fair Progress


Pt is confused at times while completing Exercises.  Pt needs both VC & Phy. 

Cues to complete.





PT Short Term Goals


Short Term Goals


Time Frame:  Dec 31, 2018


Transfers (B,C,W/C) (FIM):  5


Gait (FIM):  2


Distance (FIM):  1=up to 49 ft


Gait Assistive Device:  FWW


Wheelchair Distance:  200'





PT Long Term Goals


Long Term Goals


PT Long Term Goals Time Frame:  2019


Transfers (B,C,W/C) (FIM):  6


Sit to Lying (QC):  6


Lying-Sitting on Side/Bed(QC):  6


Sit to Stand (QC):  6


Rollin


Roll Left to Right (QC):  6


Chair/Bed-to-Chair Xfer(QC):  6


Car Transfer (QC):  5


Does the Patient Walk:  Yes


Gait (FIM):  5 (household)


Gait distance (FIM):  3=677-04 ft


Walk 10 feet (QC):  6


Walk 10ft-Uneven Surface(QC):  6


Walk 50ft with 2 Turns (QC):  6


Walk 150 ft (QC):  88


Gait Level of Assist:  6


Gait Assistive Device:  FWW


Does the Pt use WC or Scooter?:  Yes


Wheelchair (FIM):  6


Wheelchair distance (FIM):  3=150 ft


Wheel 50 feet with 2 turns (QC:  6


Stairs (FIM):  88


1 Step (curb) (QC):  88


4 Steps (QC):  88


12 Steps (QC):  88


Picking up an Object (QC):  88





PT Plan


Problem List


Problem List:  Activity Tolerance, Functional Strength, Safety





Treatment/Plan


Treatment Plan:  Continue Plan of Care


Treatment Plan:  Bed Mobility, Education, Functional Activity Farnaz, Functional 

Strength, Group Therapy, Gait, Safety, Therapeutic Exercise, Transfers


Treatment Duration:  2019


Frequency:  At least 5 of 7 days/Wk (IRF)


Estimated Hrs Per Day:  1.5 hours per day


Patient and/or Family Agrees t:  Yes





Safety Risks/Education


Patient Education:  Correct Positioning, Safety Issues


Teaching Recipient:  Patient


Teaching Methods:  Discussion


Response to Teaching:  Reinforcement Needed





Time/GCodes


Time In:  1130


Time Out:  1200


Total Billed Treatment Time:  30


Total Billed Treatment


1, EX x2 (30m)


G Codes Necessary:  ANA Lozoya PTA Prince 3, 2019 11:54

## 2019-01-03 NOTE — NUR
PT TOLERATING TUBE FEEDINGS WELL.  WEIGHT STABLE.  TUBE FEEDINGS MEETING NEEDS AT THIS TIME. 
 CONT TO FOLLOW.

## 2019-01-03 NOTE — CARDIOLOGY PROGRESS NOTE
Subjective


Date Seen by Provider:  Prince 3, 2019


Time Seen by Provider:  12:29


Subjective/Events-last exam


Patient is sitting up in chair, complains of mild dyspnea. Denies any chest pain





Objective-Cardiology


Exam


Last Set of Vital Signs





Vital Signs








 1/3/19 1/3/19





 05:51 07:23


 


Temp 97.6 


 


Pulse 90 


 


Resp 18 


 


B/P (MAP) 143/60 (87) 


 


Pulse Ox  90


 


O2 Delivery  Nasal Cannula


 


O2 Flow Rate  2.00





Capillary Refill :


I&O











Intake and Output 


 


 1/3/19





 00:00


 


Intake Total 2440 ml


 


Output Total 450 ml


 


Balance 1990 ml


 


 


 


Intake Oral 0 ml


 


Tube Feeding 1440 ml


 


Other 1000 ml


 


Output Urine Total 450 ml


 


# Urine Diapers 3


 


# Bowel Movements 2








General:  Alert, Oriented X3, Cooperative, No Acute Distress


HEENT:  Atraumatic, PERRLA, EOMI, Mucous Memb Moist/Pink


Neck:  Supple, No JVD


Lungs:  Other (coarse breath sounds)


Heart:  Regular Rate, Normal S1, Normal S2


Abdomen:  Normal Bowel Sounds, Soft, No Tenderness, Other (Peg Tube in place)


Extremities:  No Clubbing, Other (+1 edema BLE)


Skin:  No Rashes


Neuro:  Normal Speech, Other (Confusion strength 4/5 LES 4-/5 Upper limbs)


Psych/Mental Status:  Mental Status NL, Mood NL, Other (subtle memory loss)





Results


Lab


Laboratory Tests


1/3/19 06:30














A/P-Cardiology


Admission Diagnosis


Debility


CAD


HTN


Chronic atrial fibrillation





Assessment/Plan


Debility, reporting improvement, asking to go home.





Anemia, worsening, planning for blood transfusion. Underwent EGD and 

colonoscopy revealing gastritis and esophagitis with no active bleed. Continue 

to monitor H/H.





Status post respiratory failure, pneumonia, silent aspiration, on Antibiotics, 

followed by primary care physician





Urosepsis, history of kidney stone and nephrostomy tube, on antibiotics and 

managed by primary care team





Silent aspiration, status post feeding tube placement





Coronary artery disease, history of CABG done in the remote past.  Patient had 

a stent done in 2002 using MultiLink 2.518 mm to the proximal right 

coronary artery, 2017 had a Cypher stent 3.530 mm to the proximal and mid 

circumflex artery.  Has been followed and managed by primary cardiologist Dr. David in [place].  Continue to follow





History of congestive heart failure, reported ejection fraction 54 percent.  

MPI in , continue to monitor





Paroxysmal atrial fibrillation maintained on amiodarone, intolerant to oral 

anticoagulation secondary to recurrent nosebleed and anemia,  Patient is 

maintained on Cardizem, Amiodarone,Lopressor and Digoxin, continue to monitor, 

no changes are recommended





Patient has been maintained on mexiletine.  Probably due to ventricular 

fibrillation and history of shock from his defibrillator, known to have [facility] 

[facility] ICD implanted by Dr. stevens in [place]. Continue on Mexiletine





COPD, chronic dyspnea, followed by primary care physician





Chronic pedal edema,continue to diurese and monitor electrolytes





Diabetes mellitus, followed and managed by primary care physician





Clinical Quality Measures


DVT/VTE Risk/Contraindication:


Risk Factor Score Per Nursin


RFS Level Per Nursing on Admit:  4+=Very High











ABELARDO BLACKBURN Prince 3, 2019 12:32

## 2019-01-03 NOTE — SPEECH THERAPY DAILY NOTE
Speech Daily Progress Note


Subjective


Date Seen by Provider:  Prince 3, 2019


Time Seen by Provider:  00:30


Patient was pleasant and attentive to OME tasks.





Objective


Patient completed OME for pharyngeal strengthening at 80% with minimal verbal 

cues and/or repetition.





Assessment


Assessment Current Status:  Fair Progress





Treatment Plan


Continue Plan of Care





Communication


Comprehension:  2


Expression:  2





Social Cognition


Social Interaction:  2


Problem Solvin


Memory:  2





Speech Short Term Goals


Short Term Goals


Short Term Goals


1) Patient will be able to recall new information with 80% or greater given 

minimal cues.


2) Patient will be able to name items/pictures presented with 80% or greater 

given minimal cues.


3) Patient will utilize compensatory strategies for safety within his room with 

80% or greater given minimal cues.


4) Patient will complete pharyngeal exercises 5x/week in order to return to PO 

status.


5) Patient will tolerate trials of least restrictive diet level without s/s of 

aspiration.





Speech Long Term Goals


Long Term Goals


1) Patient will improve memory, problem solving and auditory processing in 

order to return safely home.


2) Patient will demonstrate a safe swallow function for oral intake for 

maintaining nutrition/hydration.





Speech-Plan


Patient/Family Goals


Patient/Family Goals:  


Patient plans to return home with his wife next week post rehab.





Treatment Plan


Speech Therapy Treatment Plan:  Continue Plan of Care


Patient is coughing significantly less.


Treatment Duration:  2019


Frequency:  5 times per week


Estimated Hrs Per Day:  .5 hour per day


Rehab Potential:  Fair


Barriers to Learning:  


Patient has cognitive deficits.


Pt/Family Agrees to Plan:  Yes





Safety Risks/Education


Teaching Recipient:  Patient


Teaching Methods:  Discussion


Response to Teaching:  Verbalize Understanding


Education Topics Provided:  


Safety, NPO education





Time


Speech Therapy Time In:  10:30


Speech Therapy Time Out:  11:00


Total Billed Time:  30


Billed Treatment Time


1, KAMRON Singh Prince 3, 2019 11:03

## 2019-01-04 VITALS — SYSTOLIC BLOOD PRESSURE: 116 MMHG | DIASTOLIC BLOOD PRESSURE: 56 MMHG

## 2019-01-04 VITALS — DIASTOLIC BLOOD PRESSURE: 80 MMHG | SYSTOLIC BLOOD PRESSURE: 148 MMHG

## 2019-01-04 VITALS — SYSTOLIC BLOOD PRESSURE: 115 MMHG | DIASTOLIC BLOOD PRESSURE: 59 MMHG

## 2019-01-04 VITALS — SYSTOLIC BLOOD PRESSURE: 162 MMHG | DIASTOLIC BLOOD PRESSURE: 84 MMHG

## 2019-01-04 VITALS — SYSTOLIC BLOOD PRESSURE: 119 MMHG | DIASTOLIC BLOOD PRESSURE: 58 MMHG

## 2019-01-04 VITALS — DIASTOLIC BLOOD PRESSURE: 58 MMHG | SYSTOLIC BLOOD PRESSURE: 120 MMHG

## 2019-01-04 LAB
ALBUMIN SERPL-MCNC: 3.4 GM/DL (ref 3.2–4.5)
ALP SERPL-CCNC: 92 U/L (ref 40–136)
ALT SERPL-CCNC: 17 U/L (ref 0–55)
BILIRUB SERPL-MCNC: 0.7 MG/DL (ref 0.1–1)
BUN/CREAT SERPL: 22
CALCIUM SERPL-MCNC: 9.6 MG/DL (ref 8.5–10.1)
CHLORIDE SERPL-SCNC: 99 MMOL/L (ref 98–107)
CO2 SERPL-SCNC: 30 MMOL/L (ref 21–32)
CREAT SERPL-MCNC: 0.98 MG/DL (ref 0.6–1.3)
ERYTHROCYTE [DISTWIDTH] IN BLOOD BY AUTOMATED COUNT: 19.9 % (ref 10–14.5)
GFR SERPLBLD BASED ON 1.73 SQ M-ARVRAT: > 60 ML/MIN
GLUCOSE SERPL-MCNC: 102 MG/DL (ref 70–105)
HCT VFR BLD CALC: 32 % (ref 40–54)
HGB BLD-MCNC: 10.2 G/DL (ref 13.3–17.7)
MCH RBC QN AUTO: 30 PG (ref 25–34)
MCHC RBC AUTO-ENTMCNC: 32 G/DL (ref 32–36)
MCV RBC AUTO: 96 FL (ref 80–99)
PLATELET # BLD: 159 10^3/UL (ref 130–400)
PMV BLD AUTO: 10.9 FL (ref 7.4–10.4)
POTASSIUM SERPL-SCNC: 4.7 MMOL/L (ref 3.6–5)
PROT SERPL-MCNC: 8 GM/DL (ref 6.4–8.2)
RBC # BLD AUTO: 3.36 10^6/UL (ref 4.35–5.85)
SODIUM SERPL-SCNC: 138 MMOL/L (ref 135–145)
WBC # BLD AUTO: 7.1 10^3/UL (ref 4.3–11)

## 2019-01-04 RX ADMIN — DILTIAZEM HYDROCHLORIDE SCH MG: 60 TABLET, FILM COATED ORAL at 22:21

## 2019-01-04 RX ADMIN — IPRATROPIUM BROMIDE AND ALBUTEROL SULFATE SCH ML: .5; 3 SOLUTION RESPIRATORY (INHALATION) at 20:32

## 2019-01-04 RX ADMIN — SODIUM BICARBONATE SCH ML: 84 INJECTION, SOLUTION INTRAVENOUS at 09:00

## 2019-01-04 RX ADMIN — METOPROLOL TARTRATE SCH MG: 25 TABLET, FILM COATED ORAL at 20:26

## 2019-01-04 RX ADMIN — Medication SCH EACH: at 20:26

## 2019-01-04 RX ADMIN — MEXILETINE HYDROCHLORIDE SCH MG: 150 CAPSULE ORAL at 16:23

## 2019-01-04 RX ADMIN — DOCUSATE SODIUM SCH MG: 50 LIQUID ORAL at 20:26

## 2019-01-04 RX ADMIN — PHENAZOPYRIDINE HYDROCHLORIDE SCH MG: 100 TABLET ORAL at 09:03

## 2019-01-04 RX ADMIN — DILTIAZEM HYDROCHLORIDE SCH MG: 60 TABLET, FILM COATED ORAL at 13:47

## 2019-01-04 RX ADMIN — ASPIRIN 81 MG CHEWABLE TABLET SCH MG: 81 TABLET CHEWABLE at 09:04

## 2019-01-04 RX ADMIN — AMIODARONE HYDROCHLORIDE SCH MG: 200 TABLET ORAL at 09:03

## 2019-01-04 RX ADMIN — DILTIAZEM HYDROCHLORIDE SCH MG: 60 TABLET, FILM COATED ORAL at 06:15

## 2019-01-04 RX ADMIN — PHENAZOPYRIDINE HYDROCHLORIDE SCH MG: 100 TABLET ORAL at 13:47

## 2019-01-04 RX ADMIN — PHENAZOPYRIDINE HYDROCHLORIDE SCH MG: 100 TABLET ORAL at 18:31

## 2019-01-04 RX ADMIN — DIGOXIN SCH MG: 125 TABLET ORAL at 09:04

## 2019-01-04 RX ADMIN — FUROSEMIDE SCH MG: 20 TABLET ORAL at 16:23

## 2019-01-04 RX ADMIN — IPRATROPIUM BROMIDE AND ALBUTEROL SULFATE SCH ML: .5; 3 SOLUTION RESPIRATORY (INHALATION) at 14:33

## 2019-01-04 RX ADMIN — METOPROLOL TARTRATE SCH MG: 25 TABLET, FILM COATED ORAL at 09:04

## 2019-01-04 RX ADMIN — POTASSIUM CHLORIDE SCH MEQ: 1.5 POWDER, FOR SOLUTION ORAL at 06:15

## 2019-01-04 RX ADMIN — MEXILETINE HYDROCHLORIDE SCH MG: 150 CAPSULE ORAL at 09:03

## 2019-01-04 RX ADMIN — MEXILETINE HYDROCHLORIDE SCH MG: 150 CAPSULE ORAL at 22:21

## 2019-01-04 RX ADMIN — Medication SCH EACH: at 09:03

## 2019-01-04 RX ADMIN — GUAIFENESIN AND DEXTROMETHORPHAN SCH ML: 100; 10 SYRUP ORAL at 09:03

## 2019-01-04 RX ADMIN — GUAIFENESIN AND DEXTROMETHORPHAN SCH ML: 100; 10 SYRUP ORAL at 20:26

## 2019-01-04 RX ADMIN — CHOLESTYRAMINE SCH GM: 4 POWDER, FOR SUSPENSION ORAL at 11:00

## 2019-01-04 RX ADMIN — IPRATROPIUM BROMIDE AND ALBUTEROL SULFATE SCH ML: .5; 3 SOLUTION RESPIRATORY (INHALATION) at 07:50

## 2019-01-04 RX ADMIN — GUAIFENESIN AND DEXTROMETHORPHAN SCH ML: 100; 10 SYRUP ORAL at 13:47

## 2019-01-04 RX ADMIN — DOCUSATE SODIUM SCH MG: 50 LIQUID ORAL at 09:03

## 2019-01-04 RX ADMIN — CHOLESTYRAMINE SCH GM: 4 POWDER, FOR SUSPENSION ORAL at 23:12

## 2019-01-04 RX ADMIN — ANORECTAL OINTMENT SCH APPLIC: 15.7; .44; 24; 20.6 OINTMENT TOPICAL at 09:00

## 2019-01-04 RX ADMIN — ATORVASTATIN CALCIUM SCH MG: 40 TABLET, FILM COATED ORAL at 20:26

## 2019-01-04 RX ADMIN — ANORECTAL OINTMENT SCH APPLIC: 15.7; .44; 24; 20.6 OINTMENT TOPICAL at 20:36

## 2019-01-04 RX ADMIN — FUROSEMIDE SCH MG: 20 TABLET ORAL at 06:15

## 2019-01-04 NOTE — PHYSICAL THERAPY DAILY NOTE
PT Daily Note-Current


Subjective


Pt. and wife in room.  Wife encourages pt. to participate.  Pt. then 

cooperative.  No c/o





Pain





   Location:  No Pain Reported





Mental Status


Patient Orientation:  Person, Place


Attachments:  Oxygen (2L)





Transfers


Therapy Code Descriptions/Definitions 





Functional Staley Measure:


0=Not Assessed/NA        4=Minimal Assistance


1=Total Assistance        5=Supervision or Setup


2=Maximal Assistance  6=Modified Staley


3=Moderate Assistance 7=Complete Staley








Therapy Quality Codes:


6    Independent with activity with or without an assistive device


5    Patient requires set up or clean up by helper.  Patient completes activity

  by  themselves


4    Supervision or touching assist (CGA). [place] provide cues , steadying 

assist


3    The helper provides less than half the effort to complete the activity


2    The helper provides more than half the effort to complete the activity


1    Dependent.  The helper does all the effort to complete an activity 


7    Patient refused to complete or attempt activity


9    The patient did not perform the activity before the current illness or 

injury


88  Not attempted due to Medical conditions or safety concerns


Transfers (B, C, W/C) (FIM):  4


Scootin


Rollin


Supine to/from Sit:  5


Sit to/from Stand:  4


Bed to/from Chair:  4





Weight Bearing


Right Lower Extremity:  Right


Weight Bearing/Tolerated


Left Lower Extremity:  Left


Weight Bearing/Tolerated





Gait Training


Does the Patient Walk?:  Yes


Gait (FIM):  2


Distance (FIM):  4=271-46 ft (50ft,110ft,25ft)


Gait Level of Assist:  4


Gait Persons Needed:  1


Gait Assistive Device:  FWW


narrow MARIBELL, needs cues to steer clear on left as he gets near to objects on 

left with FWW and w/c





Wheelchair Training


Does the Pt Use a Wheelchair?:  Yes


Wheelchair (FIM):  5


Wheelchair Distance:  3=150 ft (175)


Wheelchair Level of Assist:  5


Type of Wheelchair:  Manual


verbal guidance around objects on left





Exercises


Seated Therapy Exercises:  Ankle pumps, Sit to stand, Long arc quads, Hip 

flexion


Seated Reps:  10


NuStep Minutes:  10


 NuStep Workload:  4





Treatments


mod to max assist to angel brief, pants and tshirt and shoes and socks





Assessment


Current Status:  Good Progress


gives full effort, good support from wife





PT Short Term Goals


Short Term Goals


Time Frame:  Dec 31, 2018


Transfers (B,C,W/C) (FIM):  5


Gait (FIM):  2


Distance (FIM):  1=up to 49 ft


Gait Assistive Device:  FWW


Wheelchair Distance:  200'





PT Long Term Goals


Long Term Goals


PT Long Term Goals Time Frame:  2019


Transfers (B,C,W/C) (FIM):  6


Sit to Lying (QC):  6


Lying-Sitting on Side/Bed(QC):  6


Sit to Stand (QC):  6


Rollin


Roll Left to Right (QC):  6


Chair/Bed-to-Chair Xfer(QC):  6


Car Transfer (QC):  5


Does the Patient Walk:  Yes


Gait (FIM):  5 (household)


Gait distance (FIM):  3=003-30 ft


Walk 10 feet (QC):  6


Walk 10ft-Uneven Surface(QC):  6


Walk 50ft with 2 Turns (QC):  6


Walk 150 ft (QC):  88


Gait Level of Assist:  6


Gait Assistive Device:  FWW


Does the Pt use WC or Scooter?:  Yes


Wheelchair (FIM):  6


Wheelchair distance (FIM):  3=150 ft


Wheel 50 feet with 2 turns (QC:  6


Stairs (FIM):  88


1 Step (curb) (QC):  88


4 Steps (QC):  88


12 Steps (QC):  88


Picking up an Object (QC):  88





PT Plan


Treatment/Plan


Treatment Plan:  Continue Plan of Care


Treatment Plan:  Bed Mobility, Education, Functional Activity Farnaz, Functional 

Strength, Group Therapy, Gait, Safety, Therapeutic Exercise, Transfers


Treatment Duration:  2019


Frequency:  At least 5 of 7 days/Wk (IRF)


Estimated Hrs Per Day:  1.5 hours per day


Patient and/or Family Agrees t:  Yes





Safety Risks/Education


Patient Education:  Gait Training, Transfer Techniques, Correct Positioning, W/

C Management, Disease Process, Safety Issues


Teaching Recipient:  Patient


Teaching Methods:  Demonstration, Discussion


Response to Teaching:  Verbalize Understanding, Return Demonstration, 

Reinforcement Needed





Time/GCodes


Time In:  800


Time Out:  900


Total Billed Treatment Time:  60


Total Billed Treatment


1,FA25m,GT15m,EX20m


G Codes Necessary:  ROBERT Wooten PTA 2019 08:56

## 2019-01-04 NOTE — PM&R PROGRESS NOTE
Subjective


HPI/CC On Admission


Date Seen by Provider:  2019


Time Seen by Provider:  07:45


CC: Severe debility





HPI: This is a 78-year-old white male [facility] patient was 

admitted to inpatient rehabilitation for IV medication and severe debility 

resolution.  He was admitted to [facility] for 11 days requiring PEG 

tube placement due to silent aspiration and urinary stent placement due to 

obstructive stone in ureter and maintained on antibiotic regimen with 

aggressive physical therapy.  I reviewed old records and culture results from 

infectious disease and reviewed current lab results along with vital signs.  He 

is sitting up in chair drowsy but oriented 3 but appears to be severely 

chronically ill.  Her goal is to return to independent ADL function along with 

completion of IV antibiotics.


Subjective/Events-last exam


Patient doing well


In the process of discharge on Monday and wife will provide PEG tube feedings


Overall feeling well otherwise


Cough has improved


Bowels are moving


Labs remained stable


Appreciate hematology consultation


Denies any pain





Review of Systems


General:  Fatigue


Pulmonary:  Cough





Objective


Exam


Vital Signs





Vital Signs








  Date Time  Temp Pulse Resp B/P (MAP) Pulse Ox O2 Delivery O2 Flow Rate FiO2


 


19 09:18     97 Nasal Cannula 2.00 


 


19 06:00 98.1 90 20 104/69 (81)    





Capillary Refill :


General Appearance:  No Apparent Distress, WD/WN, Chronically ill


Respiratory:  Chest Non Tender, No Accessory Muscle Use, No Respiratory Distress

, Crackles, Decreased Breath Sounds, Wheezing


Cardiovascular:  Regular Rate, Rhythm, No Edema, No Gallop, No JVD, No Murmur, 

Normal Peripheral Pulses


Neurologic/Psychiatric:  Alert, Oriented x3, No Motor/Sensory Deficits, Normal 

Mood/Affect





Results/Procedures


Lab


Patient resulted labs reviewed.





Assessment/Plan


Assessment and Plan


Assess & Plan/Chief Complaint


Assessment:


Anemia with presumed GI bleed with hemoccult positive but Dr Mantilla performed EGD/

Colon last week without source of loss identified and iron 50 on check to 

receive 2 units of blood 1/3/19 and Hematology consultation completed by Dr Graham


MRSA pneumonia s/p treatment completion


Pseudomonas UTI s/p treatment with Gent


Severe debilitated state


CAD previous bypass graft


ICD defibrillator in place


Presbycusis


Sleep apnea


Chronic respiratory failure


Aspiration with PEG tube placement and NPO status





Plan:


Dr Graham consultation is appreciated


PEG tube feedings


Speech therapy


PT/OT


DC Monday


Consult Dr Chin is appreciated


Consult Dr Shanelle Mantilla is appreciated


Consult Dr Graham is appreciated





Diagnosis/Problems


Diagnosis/Problems





(1) Debility


Status:  Acute


(2) GI bleed


Status:  Chronic


Qualifiers:  


   GI bleed type/associated pathology:  unspecified gastrointestinal hemorrhage 

type  Qualified Codes:  K92.2 - Gastrointestinal hemorrhage, unspecified


(3) MRSA pneumonia


Status:  Resolved


Qualifiers:  


   Laterality:  unspecified laterality  Lung location:  unspecified part of 

lung  Qualified Codes:  J15.212 - Pneumonia due to methicillin resistant 

Staphylococcus aureus


Resolution Date/Time:  19 @ 14:23


(4) Pseudomonas urinary tract infection


Status:  Resolved


Resolution Date/Time:  1/3/19 @ 20:39


(5) Pyelonephritis


Status:  Resolved


Resolution Date/Time:  19 @ 14:23


(6) Renal calculus, left


Status:  Resolved


Resolution Date/Time:  19 @ 14:23


(7) Acute renal insufficiency


Status:  Resolved


Resolution Date/Time:  18 @ 17:54


(8) Diabetes mellitus, type 2


Status:  Chronic


Qualifiers:  


   Diabetes mellitus long term insulin use:  with long term use  Diabetes 

mellitus complication status:  with circulatory complication  Diabetes mellitus 

complication detail:  with other circulatory complications  Qualified Codes:  

E11.59 - Type 2 diabetes mellitus with other circulatory complications; Z79.4 - 

Long term (current) use of insulin


(9) Congestive heart failure


Status:  Chronic


Qualifiers:  


   Heart failure type:  unspecified  Heart failure chronicity:  acute on 

chronic  Qualified Codes:  I50.9 - Heart failure, unspecified


(10) COPD (chronic obstructive pulmonary disease)


Status:  Chronic


Qualifiers:  


   COPD type:  unspecified COPD  Qualified Codes:  J44.9 - Chronic obstructive 

pulmonary disease, unspecified


(11) Paroxysmal atrial fibrillation


Status:  Chronic


(12) CAD (coronary artery disease)


Status:  Chronic


Qualifiers:  


   Coronary Disease-Associated Artery/Lesion type:  native artery  Native vs. 

transplanted heart:  native heart  Associated angina:  without angina  

Qualified Codes:  I25.10 - Atherosclerotic heart disease of native coronary 

artery without angina pectoris


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


Status:  Chronic


(14) Melena


Status:  Acute


(15) Diarrhea


Status:  Acute


Qualifiers:  


   Diarrhea type:  unspecified type  Qualified Codes:  R19.7 - Diarrhea, 

unspecified


(16) Anemia


Status:  Acute


Qualifiers:  


   Anemia type:  iron deficiency  Iron deficiency anemia type:  chronic blood 

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

chronic)


(17) At risk for aspiration


Status:  Chronic


(18) PEG (percutaneous endoscopic gastrostomy) status


Status:  Chronic


(19) Rales


Status:  Chronic


(20) Transfusion of blood during current hospitalization


Status:  Acute





Clinical Quality Measures


DVT/VTE Risk/Contraindication:


Risk Factor Score Per Nursin


RFS Level Per Nursing on Admit:  4+=Very High











MICHELE WILKINS DO 2019 08:38

## 2019-01-04 NOTE — NUR
This writer performed first peg tube medication and feeding administration education session 
with patient's wife, Jeanette.  Jeanette was able to perform all hands on tasks well with step by 
step verbal instructions by this writer.  Jeanette would benefit from a written, laminated 
instructional page for reminder of how much water to flush and when. Some difficulty 
crushing medications, but persistent in obtaining goal to proper consistency. Discussed 
checking for residual volume, pushing all back in to maintain pH balance, holding for a 
short time and checking again to avoid over filling, importance of flushing before and after 
medications and feedings, crushing medications to fine consistency and dissolving in water, 
also discussed using clean technique to perform administrations. No questions at completion 
of feeding. Will continue to have Jeanette perform task at each administration to promote 
success at home.

## 2019-01-04 NOTE — OCCUPATIONAL THER DAILY NOTE
OT Current Status-Daily Note


Subjective


Pt alert, sitting in recliner.  Nrsg present in room.  Pt agrees to therapy.





Mental Status/Objective


Patient Orientation:  Person, Place, Time, Situation


Therapy Code Descriptions/Definitions 





Functional Mayes Measure:


0=Not Assessed/NA        4=Minimal Assistance


1=Total Assistance        5=Supervision or Setup


2=Maximal Assistance  6=Modified Mayes


3=Moderate Assistance 7=Complete Mayes


Attachments:  IV, Oxygen, PEG Tube





ADL-Treatment


Pt declined shower, agrees to sponge bath.  Pt's wife stated that the pt had 

clean clothes.  Pt set up for bathing upper body, LE's except feet and netta 

area (CGA), all with encouragement to do by self.  Assist to cleanse buttocks 

in standing.  Pt doffed shirt by self then when donning pt was putting on 

backwards assist to turn shirt around then pt donned by self.  In standing pt 

was able to hike pants up/down over buttocks with CGA.  Pt then ambulated to 

bathroom for toileting.  Pt completed toilet transfer using FWW, BSC and 

grabbars with CGA.  Assist to cleanse buttocks in standing and pt hiked pants 

with CGA.  Pt ambulated back to recliner.


Therapy Code Descriptions/Definitions 





Functional Mayes Measure:


0=Not Assessed/NA        4=Minimal Assistance


1=Total Assistance        5=Supervision or Setup


2=Maximal Assistance  6=Modified Mayes


3=Moderate Assistance 7=Complete Mayes








Therapy Quality Codes:


6    Independent with activity with or without an assistive device


5    Patient requires set up or clean up by helper.  Patient completes activity

  by  themselves


4    Supervision or touching assist (CGA). Denhoff provide cues , steadying 

assist


3    The helper provides less than half the effort to complete the activity


2    The helper provides more than half the effort to complete the activity


1    Dependent.  The helper does all the effort to complete an activity 


7    Patient refused to complete or attempt activity


9    The patient did not perform the activity before the current illness or 

injury


88  Not attempted due to Medical conditions or safety concerns


Bathing (FIM):  3


Bathing Location:  L Arm, R Arm, L Upper Leg, R Upper Leg, Chest, Abdomen, 

Perineal Area


Shower/Bathe Self (QC):  3


Upper Body (FIM):  5


Upper Body Dressing (QC):  5


Lower Body Dressing (FIM):  3 (Assist to don/doff shoes/socks.  Assist to 

thread over feet.)


Lower Body Dressing (QC):  3


On/Off Footwear (QC):  3


Toileting (FIM):  2


Toileting Hygiene (QC):  2


Toilet/Commode Transfer (FIM):  4


Toilet Transfer (QC):  4





Other Treatment


Pt completed UE exercise with medium resistance theraband and light resistance 

therapy sponge, 1 set 20 reps each.  After therapy, pt sitting in recliner with 

call light/phone in reach.  All needs met in room.





OT Short Term Goals


Short Term Goals


Time Frame:  Dec 31, 2018


Eating(FIM):  4


Grooming(FIM):  3


Upper Body Dressing(FIM):  3


Lower Body Dressing(FIM):  3


Toileting(FIM):  4


Transfers (B,C,W/C) (FIM):  5


Toilet/Commode Transfer(FIM):  4


Additional Short Term Goals:  1-Demonstrate ADL Tasks, 2-Verbalize Understanding

, 3-ImproveStrength/Farnaz


1=Demonstrate adherence to instructed precautions during ADL tasks.


2=Patient will verbalize/demonstrate understanding of assistive devices/

modifications for ADL.


3=Patient will improve strength/tolerance for activity to enable patient to 

perform ADL's.





OT Long Term Goals


Long Term Goals


Time Frame:  2019


Eating (FIM):  5


Eating (QC):  5


Groomin


Oral Hygiene (QC):  4


Bathing(FIM):  3


Shower/Bathe Self (QC):  3


Upper Body Dressing(FIM):  5


Upper Body Dressing (QC):  4


Lower Body Dressing(FIM):  4


Lower Body Dressing (QC):  4


On/Off Footwear (QC):  4


Toileting(FIM):  5


Toileting Hygiene (QC):  5


Transfers (B,C,W/C) (FIM):  5


Toilet/Commode Transfer(FIM):  5


Toilet/Commode Transfer (QC):  4


Additional Goals:  1-Demonstrate ADL Tasks, 2-Verbalize Understanding, 3-

ImproveStrength/Farnaz


1=Demonstrate adherence to instructed precautions during ADL tasks.


2=Patient will verbalize/demonstrate understanding of assistive devices/

modifications for ADL.


3=Patient will improve strength/tolerance for activity to enable patient to 

perform ADL's.





OT Education/Plan


Discharge Recommendations


Plan/Recommendations:  Continue POC





Treatment Plan/Plan of Care


Patient would benefit from OT for education, treatment and training to promote 

independence in ADL's, mobility, safety and/or upper extremity function for ADL'

s.


Plan of Care:  ADL Retraining, Functional Mobility, Group Exercise/Act as Ind, 

UE Funct Exercise/Act


Treatment Duration:  2019


Frequency:  At least 5 of 7 days/Wk (IRF)


Estimated Hrs Per Day:  1.5 hours per day


Agreement:  Yes


Rehab Potential:  Fair





Time/GCodes


Start Time:  09:00


Stop Time:  10:15


Total Time Billed (hr/min):  75


Billed Treatment Time


1 visit-ADL 4 (60 min) EX 1 (15 min)











ELMER WHITING 2019 09:57

## 2019-01-04 NOTE — CONSULTATION REPORT
DATE OF SERVICE:  01/03/2019



The patient was admitted to room 226.



REFERRING PHYSICIAN:

Belkys Monte DO.



IMPRESSION:

1.  A 78-year-old male admitted to the rehabilitation unit because of

deconditioning.

2.  Worsening anemia with Hemoccult positive stools.  Status post EGD and

colonoscopy with evidence of significant esophagitis and gastritis, which is

probably the source of blood loss.

3.  History of recurrent aspiration with pneumonia, status post PEG tube

placement 2 weeks ago at [facility] in [place].

4.  History of coronary artery disease since diagnosed in 1986, status post CABG

followed by multiple stent placements over the last several years as well as

angioplasties.

5.  History of atrial fibrillation, congestive heart failure due to

cardiomyopathy and defibrillator placement.

6.  History of nephrolithiasis and obstructive uropathy, status post

percutaneous nephrostomy tube placement.

7.  History of chronic obstructive pulmonary disease with oxygen dependence.

8.  History of bleeding peptic ulcer in the past.



RECOMMENDATIONS:

1.  Agree with packed red blood cell transfusion to maintain hemoglobin more

than 8 grams per deciliter because of cardiac problems.

2.  Continue proton pump inhibitors daily and consider Carafate slurry through

the PEG tube q.i.d.

3.  Avoid anticoagulation because of recurrent history of bleeding.

4.  Continue management of rest of the medical problems as you are doing.

5.  Continue strengthening and rehabilitation as you are doing.



BRIEF HISTORY:

The patient is a 78-year-old male admitted to inpatient rehabilitation because

of severe debility as well as IV antibiotic therapy.  The patient had developed

aspiration pneumonia and was admitted to [facility] in [place] with IV

antibiotics for 11 days.  During his admission, and a PEG tube was placed

because of his aspiration.  During the hospitalization, he was noted to have

worsening anemia and fecal occult blood tests were positive.  He underwent an

EGD and colonoscopy with the EGD showing esophagitis and gastritis without

active bleeding.  Hematology consultation was obtained for concurrent

management.



PAST MEDICAL HISTORY:

Significant for coronary artery disease diagnosed in 1986 requiring a CABG. 

Since then, he has required stent placements on three separate occasions and

multiple angioplasties.  He was unable to tolerate any type of anticoagulation

because of bleeding.  He gives a history of bleeding peptic ulcer disease in the

past and has been on Nexium chronically.  Other significant past medical history

includes COPD with oxygen dependence.  Atrial fibrillation, congestive heart

failure and cardiomyopathy requiring a defibrillator placement, recent diagnosis

of silent aspiration requiring a PEG tube placement.  History of obstructive

nephropathy requiring a percutaneous nephrostomy tube placement.  He gives a

history of a left hip fracture requiring ORIF in the past.  Also, has a history

of sleep apnea.  History of hypertension and hypercholesterolemia.  He has mild

dementia, which is gradually worsening.



SOCIAL HISTORY:

The patient is  and lives near [place], [place].  The patient does not

have any children of his own, but has 3 stepchildren.  He gives a history of

60-pack-year tobacco use, smoking 2 packs a day x30 years.  He quit smoking 32

years ago.  History of alcohol use in the past, which was also stopped

approximately 32 years ago.  No history of recreational drug use.  The patient

is currently retired.  Previously, he worked at [employer] in

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



FAMILY HISTORY:

Unremarkable, with his only relative is being a brother and three nephews and

nieces.



PHYSICAL EXAMINATION:

GENERAL:  Today showed an elderly male sitting in a chair, awake and answering

simple questions appropriately, although having difficulty providing any

details.  History was obtained from his wife who was present in the room.

VITAL SIGNS:  Temperature was 97.7, pulse rate 91, respirations 18, blood

pressure 136/79, oxygen saturation 95% on 2 liters of oxygen by nasal cannula.

HEENT:  Normocephalic.  Male pattern baldness.  Few healed scars from surgical

resection of skin cancers on the scalp.  Extraocular muscles intact,

conjunctivae are pale, oral mucosa moist.

NECK:  Supple, with no JVD.  No cervical, supraclavicular or axillary

lymphadenopathy palpable.

CHEST:  Today showed an implanted defibrillator/pacemaker present.

LUNGS:  With slightly diminished breath sounds bilaterally without wheezes or

rales.

CARDIOVASCULAR:  Irregularly irregular with a controlled rate.

ABDOMEN:  Soft, nontender with no hepatosplenomegaly or other masses palpable.

EXTREMITIES:  Showed 1+ edema around the ankles.

NEUROLOGIC:  Showed overall motor strength 4/5.  No focal motor deficits were

noted.



LABORATORY DATA:

CBC done today showed WBC 7.4, hemoglobin 7.2, MCV 98, RDW 20.2, platelet count

156,000.  Chemistry panel today showed normal electrolytes.  BUN was 17 and

creatinine 0.86 with GFR more than 60 mL per minute.  Liver function studies

were within normal limits except albumin level of 3.1.  Serum ferritin level

drawn today is pending.  CBC done on 12/24/2018 had shown hemoglobin level of

11.1, which has been steadily declining since then.  Fecal occult blood test

done on 12/30/2018 was positive.  EGD and colonoscopy done on 12/31/2018 showed

stage II reflux esophagitis with moderate gastritis.  No definite ulcerations or

active bleeding noted.  He also had stage II chronic external and internal

hemorrhoids on colonoscopy with mild to moderate diverticulosis without

diverticulitis.



Thank you for allowing me to participate in this patient's care.  I will follow

the patient with you.





Job ID: [id number]

DocumentID: [id number]

Dictated Date:  01/03/2019 18:50:53

Transcription Date: 01/04/2019 01:27:52

Dictated By: RADHAMES CARROLL MD

## 2019-01-04 NOTE — NUR
assessments & interventions completed, see assessments & interventions, wife at bedside, up 
in chair, chair alarm on

## 2019-01-04 NOTE — SPEECH THERAPY DAILY NOTE
Speech Daily Progress Note


Subjective


Date Seen by Provider:  2019


Time Seen by Provider:  00:30


Patient sitting in his recliner resting. His wife was present for the entire 

session.





Objective


Patient completed pharyngeal exercises with 80% accuracy given minimal cues.





Assessment


Assessment Current Status:  Good Progress





Treatment Plan


Continue Plan of Care





Communication


Comprehension:  2


Expression:  2





Social Cognition


Social Interaction:  2


Problem Solvin


Memory:  2





Speech Short Term Goals


Short Term Goals


Short Term Goals


1) Patient will be able to recall new information with 80% or greater given 

minimal cues.


2) Patient will be able to name items/pictures presented with 80% or greater 

given minimal cues.


3) Patient will utilize compensatory strategies for safety within his room with 

80% or greater given minimal cues.


4) Patient will complete pharyngeal exercises 5x/week in order to return to PO 

status.


5) Patient will tolerate trials of least restrictive diet level without s/s of 

aspiration.





Speech Long Term Goals


Long Term Goals


1) Patient will improve memory, problem solving and auditory processing in 

order to return safely home.


2) Patient will demonstrate a safe swallow function for oral intake for 

maintaining nutrition/hydration.





Speech-Plan


Patient/Family Goals


Patient/Family Goals:  


Patient is scheduled to return home on Monday, 19 with his wife and


caregiver.





Treatment Plan


Speech Therapy Treatment Plan:  Continue Plan of Care


Patient has progressed well with skilled ST services.


Treatment Duration:  2019


Frequency:  5 times per week


Estimated Hrs Per Day:  .5 hour per day


Rehab Potential:  Fair


Barriers to Learning:  


Patient has decreased cognitive function.


Pt/Family Agrees to Plan:  Yes





Safety Risks/Education


Teaching Recipient:  Patient, Significant Other


Teaching Methods:  Discussion


Response to Teaching:  Verbalize Understanding


Education Topics Provided:  


Safety





Time


Speech Therapy Time In:  13:00


Speech Therapy Time Out:  13:30


Total Billed Time:  30


Billed Treatment Time


1, KAMRON Singh 2019 13:37

## 2019-01-04 NOTE — CARDIOLOGY PROGRESS NOTE
Subjective


Date Seen by Provider:  2019


Time Seen by Provider:  14:38


Subjective/Events-last exam


patient is sitting in a chair, feeling better.  No new complaint


Review of Systems


General:  No Chills, No Night Sweats, No Fatigue, No Malaise, No Appetite, No 

Other


HEENT:  No Head Aches, No Visual Changes, No Eye Pain, No Ear Pain, No Dysphasia

, No Sinus Congestion, No Post Nasal Drip, No Sore Throat, No Other


Pulmonary:  No Dyspnea, No Cough, No Pleuritic Chest Pain, No Other


Cardiovascular:  No: Chest Pain, Palpitations, Orthopnea, Paroxysmal Noc. 

Dyspnea, Edema, Lt Headedness, Other





Objective-Cardiology


Exam


Last Set of Vital Signs





Vital Signs








 19





 05:04 07:50 08:00 09:00


 


Temp 98.0   


 


Pulse    90


 


Resp    18


 


B/P (MAP)    115/59 (77)


 


Pulse Ox  95  


 


O2 Delivery   Nasal Cannula 


 


O2 Flow Rate   2.00 





Capillary Refill :


I&O











Intake and Output 


 


 19





 00:00


 


Intake Total 2940 ml


 


Output Total 950 ml


 


Balance 1990 ml


 


 


 


Intake Oral 0 ml


 


Tube Feeding 1440 ml


 


Other 1500 ml


 


Output Urine Total 950 ml


 


# Urine Diapers 5








General:  Alert, Oriented X3, Cooperative, No Acute Distress


HEENT:  Atraumatic, PERRLA, EOMI, Mucous Memb Moist/Pink


Neck:  Supple, No JVD


Lungs:  Other (coarse breath sounds)


Heart:  Regular Rate, Normal S1, Normal S2


Abdomen:  Normal Bowel Sounds, Soft, No Tenderness, Other (Peg Tube in place)


Extremities:  No Clubbing, Other (+1 edema BLE)


Skin:  No Rashes


Neuro:  Normal Speech, Other (Confusion strength 4/5 LES 4-/5 Upper limbs)


Psych/Mental Status:  Mental Status NL, Mood NL, Other (subtle memory loss)





Results


Lab


Laboratory Tests


19 09:30














A/P-Cardiology


Admission Diagnosis


Debility


CAD


HTN


Chronic atrial fibrillation





Assessment/Plan


Debility, reporting improvement, managed by primary care physician





Anemia, status post transfusion, EGD showed esophagitis and gastritis, followed 

and managed by primary care physician





Status post respiratory failure, pneumonia, silent aspiration, on Antibiotics, 

followed by primary care physician





Urosepsis, history of kidney stone and nephrostomy tube, on antibiotics and 

managed by primary care team





Silent aspiration, status post feeding tube placement





Coronary artery disease, history of CABG done in the remote past.  Patient had 

a stent done in 2002 using MultiLink 2.518 mm to the proximal right 

coronary artery, 2017 had a Cypher stent 3.530 mm to the proximal and mid 

circumflex artery.  Has been followed and managed by primary cardiologist Dr. David in [place].  Continue to follow





History of congestive heart failure, reported ejection fraction 54 percent.  

MPI in , continue to monitor





Paroxysmal atrial fibrillation maintained on amiodarone, intolerant to oral 

anticoagulation secondary to recurrent nosebleed and anemia,  Patient is 

maintained on Cardizem, Amiodarone,Lopressor and Digoxin, continue to monitor, 

no changes are recommended





Patient has been maintained on mexiletine.  Probably due to ventricular 

fibrillation and history of shock from his defibrillator, known to have [facility] 

[facility] ICD implanted by Dr. stevens in [place]. Continue on Mexiletine





COPD, chronic dyspnea, followed by primary care physician





Chronic pedal edema,continue to diurese and monitor electrolytes





Diabetes mellitus, followed and managed by primary care physician





Clinical Quality Measures


DVT/VTE Risk/Contraindication:


Risk Factor Score Per Nursin


RFS Level Per Nursing on Admit:  4+=Very High











MARINA KUMAR MD 2019 14:39

## 2019-01-04 NOTE — NUR
asked wife did she want to crush pt meds & give to him per g-tube stated she had done that 
today so she would let this nurse do it, meds crushed & given per g-tube, back to bed with 1 
person assist & walker, bed alarm on

## 2019-01-04 NOTE — PHYSICAL THERAPY DAILY NOTE
PT Daily Note-Current


Subjective


Pt. and wife present. Pt. agrees to walk and exercise. Wife and pt. state they 

would like to see if pt. can be off O2





Pain





   Location:  No Pain Reported





Mental Status


Patient Orientation:  Person, Place


Attachments:  Oxygen (2L)





Transfers


Therapy Code Descriptions/Definitions 





Functional Rocklin Measure:


0=Not Assessed/NA        4=Minimal Assistance


1=Total Assistance        5=Supervision or Setup


2=Maximal Assistance  6=Modified Rocklin


3=Moderate Assistance 7=Complete Rocklin








Therapy Quality Codes:


6    Independent with activity with or without an assistive device


5    Patient requires set up or clean up by helper.  Patient completes activity

  by  themselves


4    Supervision or touching assist (CGA). Aladdin provide cues , steadying 

assist


3    The helper provides less than half the effort to complete the activity


2    The helper provides more than half the effort to complete the activity


1    Dependent.  The helper does all the effort to complete an activity 


7    Patient refused to complete or attempt activity


9    The patient did not perform the activity before the current illness or 

injury


88  Not attempted due to Medical conditions or safety concerns


sit to stand x 5 trials all SBA





Weight Bearing


Right Lower Extremity:  Right


Weight Bearing/Tolerated


Left Lower Extremity:  Left


Weight Bearing/Tolerated





Gait Training


Gait Assistive Device:  FWW


FWW SBA to CGA 75ft, 50 ft on room air with sats 96%





Exercises


Supine Ex:  Ankle pumps, Quad Set, Heel Slides, Scooting, Straight leg raise, 

Hip abd/add


Supine Reps:  15


Seated Therapy Exercises:  Long arc quads, Hip flexion


Seated Reps:  15


Standing:  Hip Abduction, Marching


Standing Reps:  15





Assessment


Current Status:  Good Progress


This PTA communicated with nursing regarding possible titration or trial off 

O2. On 2 L at rest sats 96%, on room air at rest sats 96%. gait on room air 75 

ft and 50 ft O2 sats at 96%, no c/o SOB, O2 insitu after Rx at 2 L with bell at 

hand and findings reported to nurse





PT Short Term Goals


Short Term Goals


Time Frame:  Dec 31, 2018


Transfers (B,C,W/C) (FIM):  5


Gait (FIM):  2


Distance (FIM):  1=up to 49 ft


Gait Assistive Device:  FWW


Wheelchair Distance:  200'





PT Long Term Goals


Long Term Goals


PT Long Term Goals Time Frame:  2019


Transfers (B,C,W/C) (FIM):  6


Sit to Lying (QC):  6


Lying-Sitting on Side/Bed(QC):  6


Sit to Stand (QC):  6


Rollin


Roll Left to Right (QC):  6


Chair/Bed-to-Chair Xfer(QC):  6


Car Transfer (QC):  5


Does the Patient Walk:  Yes


Gait (FIM):  5 (household)


Gait distance (FIM):  3=869-15 ft


Walk 10 feet (QC):  6


Walk 10ft-Uneven Surface(QC):  6


Walk 50ft with 2 Turns (QC):  6


Walk 150 ft (QC):  88


Gait Level of Assist:  6


Gait Assistive Device:  FWW


Does the Pt use WC or Scooter?:  Yes


Wheelchair (FIM):  6


Wheelchair distance (FIM):  3=150 ft


Wheel 50 feet with 2 turns (QC:  6


Stairs (FIM):  88


1 Step (curb) (QC):  88


4 Steps (QC):  88


12 Steps (QC):  88


Picking up an Object (QC):  88





PT Plan


Treatment/Plan


Treatment Plan:  Continue Plan of Care


Treatment Plan:  Bed Mobility, Education, Functional Activity Farnaz, Functional 

Strength, Group Therapy, Gait, Safety, Therapeutic Exercise, Transfers


Treatment Duration:  2019


Frequency:  At least 5 of 7 days/Wk (IRF)


Estimated Hrs Per Day:  1.5 hours per day


Patient and/or Family Agrees t:  Yes





Safety Risks/Education


Patient Education:  Gait Training, Transfer Techniques, Correct Positioning, 

Disease Process, Safety Issues


Teaching Recipient:  Patient


Teaching Methods:  Demonstration, Discussion


Response to Teaching:  Verbalize Understanding, Return Demonstration, 

Reinforcement Needed





Time/GCodes


Time In:  1030


Time Out:  1100


Total Billed Treatment Time:  30


Total Billed Treatment


1,GT15,EX15


G Codes Necessary:  ROBERT Wooten PTA 2019 10:57

## 2019-01-04 NOTE — NUR
MSW sent preliminary information for tube feeding product to [facility] to anticipate 
discharge on Monday.  DME reports of current formula Jevity 1.5; however, dietitianJenny 
has improved Osmolite 1.5 for short-term usage.  MSW communicated this information to Dr. Monte to request a walker.  RN reports spouse completed tube feeding administration and 
performed well. Patient will proceed with discharge on Monday.

## 2019-01-05 VITALS — DIASTOLIC BLOOD PRESSURE: 69 MMHG | SYSTOLIC BLOOD PRESSURE: 104 MMHG

## 2019-01-05 VITALS — SYSTOLIC BLOOD PRESSURE: 113 MMHG | DIASTOLIC BLOOD PRESSURE: 80 MMHG

## 2019-01-05 VITALS — DIASTOLIC BLOOD PRESSURE: 81 MMHG | SYSTOLIC BLOOD PRESSURE: 123 MMHG

## 2019-01-05 VITALS — DIASTOLIC BLOOD PRESSURE: 83 MMHG | SYSTOLIC BLOOD PRESSURE: 124 MMHG

## 2019-01-05 VITALS — SYSTOLIC BLOOD PRESSURE: 136 MMHG | DIASTOLIC BLOOD PRESSURE: 89 MMHG

## 2019-01-05 RX ADMIN — ANORECTAL OINTMENT SCH APPLIC: 15.7; .44; 24; 20.6 OINTMENT TOPICAL at 09:07

## 2019-01-05 RX ADMIN — METOPROLOL TARTRATE SCH MG: 25 TABLET, FILM COATED ORAL at 08:45

## 2019-01-05 RX ADMIN — MEXILETINE HYDROCHLORIDE SCH MG: 150 CAPSULE ORAL at 22:14

## 2019-01-05 RX ADMIN — AMIODARONE HYDROCHLORIDE SCH MG: 200 TABLET ORAL at 08:45

## 2019-01-05 RX ADMIN — MEXILETINE HYDROCHLORIDE SCH MG: 150 CAPSULE ORAL at 15:35

## 2019-01-05 RX ADMIN — Medication SCH EACH: at 20:25

## 2019-01-05 RX ADMIN — FUROSEMIDE SCH MG: 20 TABLET ORAL at 06:56

## 2019-01-05 RX ADMIN — PHENAZOPYRIDINE HYDROCHLORIDE SCH MG: 100 TABLET ORAL at 08:45

## 2019-01-05 RX ADMIN — POTASSIUM CHLORIDE SCH MEQ: 1.5 POWDER, FOR SOLUTION ORAL at 06:56

## 2019-01-05 RX ADMIN — IPRATROPIUM BROMIDE AND ALBUTEROL SULFATE SCH ML: .5; 3 SOLUTION RESPIRATORY (INHALATION) at 19:28

## 2019-01-05 RX ADMIN — GUAIFENESIN AND DEXTROMETHORPHAN SCH ML: 100; 10 SYRUP ORAL at 12:13

## 2019-01-05 RX ADMIN — GUAIFENESIN AND DEXTROMETHORPHAN SCH ML: 100; 10 SYRUP ORAL at 08:44

## 2019-01-05 RX ADMIN — ATORVASTATIN CALCIUM SCH MG: 40 TABLET, FILM COATED ORAL at 20:25

## 2019-01-05 RX ADMIN — MEXILETINE HYDROCHLORIDE SCH MG: 150 CAPSULE ORAL at 08:44

## 2019-01-05 RX ADMIN — ASPIRIN 81 MG CHEWABLE TABLET SCH MG: 81 TABLET CHEWABLE at 08:45

## 2019-01-05 RX ADMIN — DOCUSATE SODIUM SCH MG: 50 LIQUID ORAL at 20:44

## 2019-01-05 RX ADMIN — METOPROLOL TARTRATE SCH MG: 25 TABLET, FILM COATED ORAL at 20:25

## 2019-01-05 RX ADMIN — SODIUM BICARBONATE SCH ML: 84 INJECTION, SOLUTION INTRAVENOUS at 08:55

## 2019-01-05 RX ADMIN — FUROSEMIDE SCH MG: 20 TABLET ORAL at 17:57

## 2019-01-05 RX ADMIN — DILTIAZEM HYDROCHLORIDE SCH MG: 60 TABLET, FILM COATED ORAL at 15:35

## 2019-01-05 RX ADMIN — IPRATROPIUM BROMIDE AND ALBUTEROL SULFATE SCH ML: .5; 3 SOLUTION RESPIRATORY (INHALATION) at 15:47

## 2019-01-05 RX ADMIN — PHENAZOPYRIDINE HYDROCHLORIDE SCH MG: 100 TABLET ORAL at 12:13

## 2019-01-05 RX ADMIN — DILTIAZEM HYDROCHLORIDE SCH MG: 60 TABLET, FILM COATED ORAL at 05:53

## 2019-01-05 RX ADMIN — IPRATROPIUM BROMIDE AND ALBUTEROL SULFATE SCH ML: .5; 3 SOLUTION RESPIRATORY (INHALATION) at 08:52

## 2019-01-05 RX ADMIN — DOCUSATE SODIUM SCH MG: 50 LIQUID ORAL at 08:44

## 2019-01-05 RX ADMIN — IPRATROPIUM BROMIDE AND ALBUTEROL SULFATE SCH ML: .5; 3 SOLUTION RESPIRATORY (INHALATION) at 09:18

## 2019-01-05 RX ADMIN — CHOLESTYRAMINE SCH GM: 4 POWDER, FOR SUSPENSION ORAL at 23:07

## 2019-01-05 RX ADMIN — DIGOXIN SCH MG: 125 TABLET ORAL at 08:45

## 2019-01-05 RX ADMIN — CHOLESTYRAMINE SCH GM: 4 POWDER, FOR SUSPENSION ORAL at 10:42

## 2019-01-05 RX ADMIN — DILTIAZEM HYDROCHLORIDE SCH MG: 60 TABLET, FILM COATED ORAL at 22:13

## 2019-01-05 RX ADMIN — ANORECTAL OINTMENT SCH APPLIC: 15.7; .44; 24; 20.6 OINTMENT TOPICAL at 20:25

## 2019-01-05 RX ADMIN — PHENAZOPYRIDINE HYDROCHLORIDE SCH MG: 100 TABLET ORAL at 17:57

## 2019-01-05 RX ADMIN — Medication SCH EACH: at 08:45

## 2019-01-05 RX ADMIN — GUAIFENESIN AND DEXTROMETHORPHAN SCH ML: 100; 10 SYRUP ORAL at 20:25

## 2019-01-05 NOTE — PM&R PROGRESS NOTE
Subjective


This was a face to face visit with the patient.


Date Seen by Provider:  Jan 5, 2019


Time Seen by Provider:  10:00


Subjective/Events-last exam


Patient was seen in his room with wife at bedside


Patient doing well


Participating in all therapies


Strictly nothing by mouth due to aspiration


Lungs are extremely clear today


Maintain on breathing treatments


Oxygen is maintained


Bolus PEG tube feedings will be provided by the wife and she has been educated


Discharge is planned for Monday


Bowels are moving


Denies any pain


Review of Systems


General:  Fatigue


Neurological:  Weakness





Objective


Physician Exam


Last Set of Vital Signs





Vital Signs








  Date Time  Temp Pulse Resp B/P (MAP) Pulse Ox O2 Delivery O2 Flow Rate FiO2


 


1/5/19 09:18     97 Nasal Cannula 2.00 


 


1/5/19 06:00 98.1 90 20 104/69 (81)    





Capillary Refill :


I&O











Intake and Output 


 


 1/5/19





 00:00


 


Intake Total 2440 ml


 


Output Total 1450 ml


 


Balance 990 ml


 


 


 


Intake Oral 0 ml


 


Tube Feeding 1440 ml


 


Other 1000 ml


 


Output Urine Total 1450 ml


 


# Voids 3


 


# Urine Diapers 5








General:  Alert, Oriented X3, Cooperative, No Acute Distress


HEENT:  Atraumatic, PERRLA, EOMI, Mucous Memb Moist/Pink


Neck:  Supple, No JVD


Lungs:  Clear to Auscultation, Normal Air Movement, Other (coarse breath sounds 

are resolved today)


Heart:  Regular Rate, Normal S1, Normal S2


Abdomen:  Normal Bowel Sounds, Soft, No Tenderness, Other (Peg Tube in place)


Extremities:  No Clubbing, Other (+1 edema BLE)


Skin:  No Rashes


Neuro:  Normal Speech, Other (Subtle confusion, strength 4/5 LES 4-/5 Upper 

limbs)


Psych/Mental Status:  Mental Status NL, Mood NL, Other (subtle memory loss)





Results


Lab Data


Laboratory Tests


1/3/19 06:30: 


White Blood Count 7.4, Red Blood Count 2.42L, Hemoglobin 7.2L, Hematocrit 24L, 

Mean Corpuscular Volume 98, Mean Corpuscular Hemoglobin 30, Mean Corpuscular 

Hemoglobin Concent 31L, Red Cell Distribution Width 20.2H, Platelet Count 156, 

Mean Platelet Volume 11.2H, Sodium Level 138, Potassium Level 4.4, Chloride 

Level 100, Carbon Dioxide Level 30, Anion Gap 8, Blood Urea Nitrogen 17, 

Creatinine 0.86, Estimat Glomerular Filtration Rate > 60, BUN/Creatinine Ratio 

20, Glucose Level 89, Calcium Level 9.0, Corrected Calcium 9.7, Total Bilirubin 

0.4, Aspartate Amino Transf (AST/SGOT) 20, Alanine Aminotransferase (ALT/SGPT) 

16, Alkaline Phosphatase 91, Total Protein 7.2, Albumin 3.1L


1/3/19 13:35: Ferritin 429.3H


1/4/19 09:30: 


White Blood Count 7.1, Red Blood Count 3.36L, Hemoglobin 10.2#L, Hematocrit 32L

, Mean Corpuscular Volume 96, Mean Corpuscular Hemoglobin 30, Mean Corpuscular 

Hemoglobin Concent 32, Red Cell Distribution Width 19.9H, Platelet Count 159, 

Mean Platelet Volume 10.9H, Sodium Level 138, Potassium Level 4.7, Chloride 

Level 99, Carbon Dioxide Level 30, Anion Gap 9, Blood Urea Nitrogen 22H, 

Creatinine 0.98, Estimat Glomerular Filtration Rate > 60, BUN/Creatinine Ratio 

22, Glucose Level 102, Calcium Level 9.6, Corrected Calcium 10.1, Total 

Bilirubin 0.7, Aspartate Amino Transf (AST/SGOT) 23, Alanine Aminotransferase (

ALT/SGPT) 17, Alkaline Phosphatase 92, Total Protein 8.0, Albumin 3.4





Progress Toward Rehab Goals


Continue therapies


Discharge plan for Monday


PEG tube feedings will be performed by wife at home


Will need home health services


Monitor closely


Follow-up with Dr. Graham





Assessment/Plan


Assessment and Plan





(1) Debility


Status:  Acute


(2) GI bleed


Qualifiers:  


   Qualified Codes:  K92.2 - Gastrointestinal hemorrhage, unspecified


Status:  Chronic


(3) MRSA pneumonia


Qualifiers:  


   Qualified Codes:  J15.212 - Pneumonia due to methicillin resistant 

Staphylococcus aureus


Status:  Resolved


(4) Pseudomonas urinary tract infection


Status:  Resolved


(5) Pyelonephritis


Status:  Resolved


(6) Renal calculus, left


Status:  Resolved


(7) Acute renal insufficiency


Status:  Resolved


(8) Diabetes mellitus, type 2


Qualifiers:  


   Qualified Codes:  E11.59 - Type 2 diabetes mellitus with other circulatory 

complications; Z79.4 - Long term (current) use of insulin


Status:  Chronic


(9) Congestive heart failure


Qualifiers:  


   Qualified Codes:  I50.9 - Heart failure, unspecified


Status:  Chronic


(10) COPD (chronic obstructive pulmonary disease)


Qualifiers:  


   Qualified Codes:  J44.9 - Chronic obstructive pulmonary disease, unspecified


Status:  Chronic


(11) Paroxysmal atrial fibrillation


Status:  Chronic


(12) CAD (coronary artery disease)


Qualifiers:  


   Qualified Codes:  I25.10 - Atherosclerotic heart disease of native coronary 

artery without angina pectoris


Status:  Chronic


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


Status:  Chronic


(14) Melena


Status:  Acute


(15) Diarrhea


Qualifiers:  


   Qualified Codes:  R19.7 - Diarrhea, unspecified


Status:  Acute


(16) Anemia


Qualifiers:  


   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

chronic)


Status:  Acute


(17) At risk for aspiration


Status:  Chronic


(18) PEG (percutaneous endoscopic gastrostomy) status


Status:  Chronic


(19) Rales


Status:  Chronic


(20) Transfusion of blood during current hospitalization


Status:  Acute


Co-Morbidities that are continuing to impact the rehab process: see above











MICHELE WILKINS DO Jan 5, 2019 10:31

## 2019-01-05 NOTE — NUR
Weekly Summary: Patient continues to participate and progress with therapy.  Preparing for 
discharge on Monday, January 7th.  Nursing staff working with patient's wife, Jeanette, on PEG 
care, medication and feeding administration. Tube feedings consolidated in preparation for 
going home to 1.5 cans of Jevity q6h. Patient is tolerating feedings well.  Patient has 
progressed to being able to alert staff when needing to toilet, transfers to wheelchair with 
1 assist to bathroom.  Fewer bladder accidents noted.  Patient has had 2 continent bowel 
movements today. Patient is responding more appropriately, however, continuing to use 
bed/chair alarms and tele-sitter. Last Sunday, patient had positive OB stool with EGD and 
Colonoscopy Monday, December 31st. Resulted mild gastritis and hemorrhoids. 2 Units of PRBC 
were administered on 1/3/2019.  On 1/4/2019 HGB 10.2. Not on anticoagulant for DVT 
prophylaxis due to nose bleeds. Continues to require 2 L of Oxygen.  Patient has been able 
to perform IS correctly. Isolation continues with droplet isolation for MRSA in Sputum.  
Treating stage 2 wound on coccyx with pressure reducing CHOLO Siegel.

## 2019-01-05 NOTE — CARDIOLOGY PROGRESS NOTE
Subjective


Date Seen by Provider:  2019


Time Seen by Provider:  11:47


Subjective/Events-last exam


patient is sitting in a chair, feeling better, reporting improvement


Review of Systems


General:  No Chills, No Night Sweats; Fatigue; No Malaise, No Appetite, No Other


HEENT:  No Head Aches, No Visual Changes, No Eye Pain, No Ear Pain, No Dysphasia

, No Sinus Congestion, No Post Nasal Drip, No Sore Throat, No Other


Pulmonary:  Dyspnea; No Cough, No Pleuritic Chest Pain, No Other


Cardiovascular:  No: Chest Pain, Palpitations, Orthopnea, Paroxysmal Noc. 

Dyspnea, Edema, Lt Headedness, Other





Objective-Cardiology


Exam


Last Set of Vital Signs





Vital Signs








 19





 06:00 09:18


 


Temp 98.1 


 


Pulse 90 


 


Resp 20 


 


B/P (MAP) 104/69 (81) 


 


Pulse Ox  97


 


O2 Delivery  Nasal Cannula


 


O2 Flow Rate  2.00





Capillary Refill :


I&O











Intake and Output 


 


 19





 00:00


 


Intake Total 2440 ml


 


Output Total 1450 ml


 


Balance 990 ml


 


 


 


Intake Oral 0 ml


 


Tube Feeding 1440 ml


 


Other 1000 ml


 


Output Urine Total 1450 ml


 


# Voids 3


 


# Urine Diapers 5








General:  Alert, Oriented X3, Cooperative, No Acute Distress


HEENT:  Atraumatic, PERRLA, EOMI, Mucous Memb Moist/Pink


Neck:  Supple, No JVD


Lungs:  Other (coarse breath sounds)


Heart:  Regular Rate, Normal S1, Normal S2


Abdomen:  Normal Bowel Sounds, Soft, No Tenderness, Other (Peg Tube in place)


Extremities:  No Clubbing, Other (+1 edema BLE)


Skin:  No Rashes


Neuro:  Normal Speech, Other (Confusion strength 4/5 LES 4-/5 Upper limbs)


Psych/Mental Status:  Mental Status NL, Mood NL, Other (subtle memory loss)





A/P-Cardiology


Admission Diagnosis


Debility


CAD


HTN


Chronic atrial fibrillation





Assessment/Plan


Debility, reporting improvement, managed by primary care physician





Anemia, status post transfusion, EGD showed esophagitis and gastritis, followed 

and managed by primary care physician





Status post respiratory failure, pneumonia, silent aspiration, on Antibiotics, 

followed by primary care physician





Urosepsis, history of kidney stone and nephrostomy tube, on antibiotics and 

managed by primary care team





Silent aspiration, status post feeding tube placement





Coronary artery disease, history of CABG done in the remote past.  Patient had 

a stent done in 2002 using MultiLink 2.518 mm to the proximal right 

coronary artery, 2017 had a Cypher stent 3.530 mm to the proximal and mid 

circumflex artery.  Has been followed and managed by primary cardiologist Dr. David in [place].  Continue to follow





History of congestive heart failure, reported ejection fraction 54 percent.  

MPI in , continue to monitor





Paroxysmal atrial fibrillation maintained on amiodarone, intolerant to oral 

anticoagulation secondary to recurrent nosebleed and anemia,  Patient is 

maintained on Cardizem, Amiodarone,Lopressor and Digoxin, continue to monitor, 

no changes are recommended





Patient has been maintained on mexiletine.  Probably due to ventricular 

fibrillation and history of shock from his defibrillator, known to have [employer] 

[employer]ro ICD implanted by Dr. stevens in [place]. Continue on Mexiletine





COPD, chronic dyspnea, followed by primary care physician





Chronic pedal edema,continue to diurese and monitor electrolytes





Diabetes mellitus, followed and managed by primary care physician





Clinical Quality Measures


DVT/VTE Risk/Contraindication:


Risk Factor Score Per Nursin


RFS Level Per Nursing on Admit:  4+=Very High











MARINA KUMAR MD 2019 11:47

## 2019-01-05 NOTE — PHYSICAL THERAPY DAILY NOTE
PT Daily Note-Current


Subjective


Agreeable to PT.  Pt and spouse report they are happy to be going home on and 

Monday and feel ready.





Transfers


Therapy Code Descriptions/Definitions 





Functional Liberty Measure:


0=Not Assessed/NA        4=Minimal Assistance


1=Total Assistance        5=Supervision or Setup


2=Maximal Assistance  6=Modified Liberty


3=Moderate Assistance 7=Complete Liberty








Therapy Quality Codes:


6    Independent with activity with or without an assistive device


5    Patient requires set up or clean up by helper.  Patient completes activity

  by  themselves


4    Supervision or touching assist (CGA). [place] provide cues , steadying 

assist


3    The helper provides less than half the effort to complete the activity


2    The helper provides more than half the effort to complete the activity


1    Dependent.  The helper does all the effort to complete an activity 


7    Patient refused to complete or attempt activity


9    The patient did not perform the activity before the current illness or 

injury


88  Not attempted due to Medical conditions or safety concerns


Transfers (B, C, W/C) (FIM):  6


Supine to/from Sit:  6


Sit to/from Stand:  6


Pt able to to get out of bed with extra time but no assist.  Sit to stand with 

extra time but no assist.





Weight Bearing


Right Lower Extremity:  Right


Weight Bearing/Tolerated


Left Lower Extremity:  Left


Weight Bearing/Tolerated





Gait Training


Does the Patient Walk?:  Yes


Gait (FIM):  5 (household distance)


Distance (FIM):  3=381-91 ft


Distance:  50 ft x 3


Gait Assistive Device:  FWW


Mod indep with gait short distances.





Treatments


Pt up in chair post treatment with needs met and chair alarm activated.





Assessment


Current Status:  Good Progress


Transfers and gait improving; mental clarity improved as well.  Making good 

progress towards goals





PT Short Term Goals


Short Term Goals


Time Frame:  Dec 31, 2018


Transfers (B,C,W/C) (FIM):  5 (met)


Gait (FIM):  2 (met)


Distance (FIM):  1=up to 49 ft


Gait Assistive Device:  FWW


Wheelchair Distance:  200'





PT Long Term Goals


Long Term Goals


PT Long Term Goals Time Frame:  2019


Transfers (B,C,W/C) (FIM):  6


Sit to Lying (QC):  6


Lying-Sitting on Side/Bed(QC):  6


Sit to Stand (QC):  6


Rollin


Roll Left to Right (QC):  6


Chair/Bed-to-Chair Xfer(QC):  6


Car Transfer (QC):  5


Does the Patient Walk:  Yes


Gait (FIM):  5 (household)


Gait distance (FIM):  6=392-75 ft


Walk 10 feet (QC):  6


Walk 10ft-Uneven Surface(QC):  6


Walk 50ft with 2 Turns (QC):  6


Walk 150 ft (QC):  88


Gait Level of Assist:  6


Gait Assistive Device:  FWW


Does the Pt use WC or Scooter?:  Yes


Wheelchair (FIM):  6


Wheelchair distance (FIM):  3=150 ft


Wheel 50 feet with 2 turns (QC:  6


Stairs (FIM):  88


1 Step (curb) (QC):  88


4 Steps (QC):  88


12 Steps (QC):  88


Picking up an Object (QC):  88





PT Plan


Problem List


Problem List:  Activity Tolerance, Functional Strength, Safety





Treatment/Plan


Treatment Plan:  Continue Plan of Care


Treatment Plan:  Bed Mobility, Education, Functional Activity [place], Functional 

Strength, Group Therapy, Gait, Safety, Therapeutic Exercise, Transfers


Treatment Duration:  2019


Frequency:  At least 5 of 7 days/Wk (IRF)


Estimated Hrs Per Day:  1.5 hours per day


Patient and/or Family Agrees t:  Yes





Safety Risks/Education


Patient Education:  Transfer Techniques, Safety Issues


Teaching Recipient:  Patient


Teaching Methods:  Discussion


Response to Teaching:  Return Demonstration





Discharge Recommendations


Therapy D/C Recommendations:  Physical Therapy Home Care





Time/GCodes


Time In:  715


Time Out:  745


Total Billed Treatment Time:  30


Total Billed Treatment


visit


GT 30











ELMER CATALAN PT 2019 08:19

## 2019-01-05 NOTE — NUR
TEACHING DONE WITH WIFE THROUGHOUT DAY ON TUBE FEEDING AND MEDICATION ADMINISTRATION THROUGH 
PEG. DOES A FAIR JOB, BUT SHE HAS A HARD TIME WITH ADMINISTRATION FROM HER SITTING POSITION 
IN A WHEELCHAIR. EVEN AFTER REMINDING FREQUENTLY TO HAVE SYRINGE IN PEG TUBING SECURELY, 
SYRINGE WAS NOT SECURE AND SOME TUBE FEEDING CAME OUT. NEEDS REMINDED EACH TIME EACH STEP TO 
DO. WILL CONTINUE WITH TEACHING.

## 2019-01-06 VITALS — DIASTOLIC BLOOD PRESSURE: 73 MMHG | SYSTOLIC BLOOD PRESSURE: 118 MMHG

## 2019-01-06 VITALS — SYSTOLIC BLOOD PRESSURE: 110 MMHG | DIASTOLIC BLOOD PRESSURE: 70 MMHG

## 2019-01-06 VITALS — SYSTOLIC BLOOD PRESSURE: 107 MMHG | DIASTOLIC BLOOD PRESSURE: 63 MMHG

## 2019-01-06 VITALS — DIASTOLIC BLOOD PRESSURE: 77 MMHG | SYSTOLIC BLOOD PRESSURE: 119 MMHG

## 2019-01-06 RX ADMIN — METOPROLOL TARTRATE SCH MG: 25 TABLET, FILM COATED ORAL at 09:28

## 2019-01-06 RX ADMIN — IPRATROPIUM BROMIDE AND ALBUTEROL SULFATE SCH ML: .5; 3 SOLUTION RESPIRATORY (INHALATION) at 06:45

## 2019-01-06 RX ADMIN — DIGOXIN SCH MG: 125 TABLET ORAL at 09:28

## 2019-01-06 RX ADMIN — FUROSEMIDE SCH MG: 20 TABLET ORAL at 17:18

## 2019-01-06 RX ADMIN — ATORVASTATIN CALCIUM SCH MG: 40 TABLET, FILM COATED ORAL at 20:17

## 2019-01-06 RX ADMIN — MEXILETINE HYDROCHLORIDE SCH MG: 150 CAPSULE ORAL at 17:18

## 2019-01-06 RX ADMIN — DOCUSATE SODIUM SCH MG: 50 LIQUID ORAL at 20:24

## 2019-01-06 RX ADMIN — ANORECTAL OINTMENT SCH APPLIC: 15.7; .44; 24; 20.6 OINTMENT TOPICAL at 09:30

## 2019-01-06 RX ADMIN — POTASSIUM CHLORIDE SCH MEQ: 1.5 POWDER, FOR SOLUTION ORAL at 06:47

## 2019-01-06 RX ADMIN — ANORECTAL OINTMENT SCH APPLIC: 15.7; .44; 24; 20.6 OINTMENT TOPICAL at 20:18

## 2019-01-06 RX ADMIN — SODIUM BICARBONATE SCH ML: 84 INJECTION, SOLUTION INTRAVENOUS at 09:30

## 2019-01-06 RX ADMIN — PHENAZOPYRIDINE HYDROCHLORIDE SCH MG: 100 TABLET ORAL at 17:18

## 2019-01-06 RX ADMIN — CHOLESTYRAMINE SCH GM: 4 POWDER, FOR SUSPENSION ORAL at 11:30

## 2019-01-06 RX ADMIN — DILTIAZEM HYDROCHLORIDE SCH MG: 60 TABLET, FILM COATED ORAL at 06:09

## 2019-01-06 RX ADMIN — DILTIAZEM HYDROCHLORIDE SCH MG: 60 TABLET, FILM COATED ORAL at 22:10

## 2019-01-06 RX ADMIN — FUROSEMIDE SCH MG: 20 TABLET ORAL at 06:47

## 2019-01-06 RX ADMIN — GUAIFENESIN AND DEXTROMETHORPHAN SCH ML: 100; 10 SYRUP ORAL at 14:31

## 2019-01-06 RX ADMIN — GUAIFENESIN AND DEXTROMETHORPHAN SCH ML: 100; 10 SYRUP ORAL at 20:18

## 2019-01-06 RX ADMIN — MEXILETINE HYDROCHLORIDE SCH MG: 150 CAPSULE ORAL at 22:10

## 2019-01-06 RX ADMIN — DOCUSATE SODIUM SCH MG: 50 LIQUID ORAL at 09:28

## 2019-01-06 RX ADMIN — IPRATROPIUM BROMIDE AND ALBUTEROL SULFATE SCH ML: .5; 3 SOLUTION RESPIRATORY (INHALATION) at 20:31

## 2019-01-06 RX ADMIN — ASPIRIN 81 MG CHEWABLE TABLET SCH MG: 81 TABLET CHEWABLE at 09:28

## 2019-01-06 RX ADMIN — AMIODARONE HYDROCHLORIDE SCH MG: 200 TABLET ORAL at 09:28

## 2019-01-06 RX ADMIN — METOPROLOL TARTRATE SCH MG: 25 TABLET, FILM COATED ORAL at 20:18

## 2019-01-06 RX ADMIN — GUAIFENESIN AND DEXTROMETHORPHAN SCH ML: 100; 10 SYRUP ORAL at 09:27

## 2019-01-06 RX ADMIN — Medication SCH EACH: at 20:18

## 2019-01-06 RX ADMIN — IPRATROPIUM BROMIDE AND ALBUTEROL SULFATE SCH ML: .5; 3 SOLUTION RESPIRATORY (INHALATION) at 13:57

## 2019-01-06 RX ADMIN — DILTIAZEM HYDROCHLORIDE SCH MG: 60 TABLET, FILM COATED ORAL at 14:31

## 2019-01-06 RX ADMIN — Medication SCH EACH: at 09:28

## 2019-01-06 RX ADMIN — CHOLESTYRAMINE SCH GM: 4 POWDER, FOR SUSPENSION ORAL at 23:10

## 2019-01-06 RX ADMIN — PHENAZOPYRIDINE HYDROCHLORIDE SCH MG: 100 TABLET ORAL at 09:28

## 2019-01-06 RX ADMIN — PHENAZOPYRIDINE HYDROCHLORIDE SCH MG: 100 TABLET ORAL at 14:32

## 2019-01-06 RX ADMIN — MEXILETINE HYDROCHLORIDE SCH MG: 150 CAPSULE ORAL at 09:28

## 2019-01-06 NOTE — PM&R PROGRESS NOTE
Subjective


HPI/CC On Admission


Date Seen by Provider:  2019


Time Seen by Provider:  12:00


CC: Severe debility





HPI: This is a 78-year-old white male [facility] patient was 

admitted to inpatient rehabilitation for IV medication and severe debility 

resolution.  He was admitted to [facility] for 11 days requiring PEG 

tube placement due to silent aspiration and urinary stent placement due to 

obstructive stone in ureter and maintained on antibiotic regimen with 

aggressive physical therapy.  I reviewed old records and culture results from 

infectious disease and reviewed current lab results along with vital signs.  He 

is sitting up in chair drowsy but oriented 3 but appears to be severely 

chronically ill.  Her goal is to return to independent ADL function along with 

completion of IV antibiotics.


Subjective/Events-last exam


Patient doing well


Chronically disabled and has everything set up at home pretty well


Has home oxygen at home with nebulizers


Tube feedings will be performed by wife


Discharge plan for tomorrow





Review of Systems


General:  Fatigue





Objective


Exam


Vital Signs





Vital Signs








  Date Time  Temp Pulse Resp B/P (MAP) Pulse Ox O2 Delivery O2 Flow Rate FiO2


 


19 13:57     95 Nasal Cannula 2.00 


 


19 06:00 97.0 90 20 107/63 (78)    





Capillary Refill :


General Appearance:  No Apparent Distress, WD/WN, Chronically ill


Respiratory:  Chest Non Tender, Lungs Clear, Normal Breath Sounds, No Accessory 

Muscle Use, No Respiratory Distress


Cardiovascular:  Regular Rate, Rhythm, No Edema, No Gallop, No JVD, No Murmur, 

Normal Peripheral Pulses


Neurologic/Psychiatric:  Alert, Oriented x3, No Motor/Sensory Deficits, Normal 

Mood/Affect





Results/Procedures


Lab


Patient resulted labs reviewed.





Assessment/Plan


Assessment and Plan


Assess & Plan/Chief Complaint


Assessment:


Anemia with presumed GI bleed with hemoccult positive but Dr Mantilla performed EGD/

Colon last week without source of loss identified and iron 50 on check to 

receive 2 units of blood 1/3/19 and Hematology consultation completed by Dr Graham


MRSA pneumonia s/p treatment completion


Pseudomonas UTI s/p treatment with Gent


Severe debilitated state


CAD previous bypass graft


ICD defibrillator in place


Presbycusis


Sleep apnea


Chronic respiratory failure


Aspiration with PEG tube placement and NPO status





Plan:


Dr Graham consultation is appreciated


PEG tube feedings


Speech therapy


PT/OT


DC Monday


Consult Dr Chin is appreciated


Consult Dr Shanelle Mantilla is appreciated


Consult Dr Graham is appreciated





Diagnosis/Problems


Diagnosis/Problems





(1) Debility


Status:  Acute


(2) GI bleed


Status:  Chronic


Qualifiers:  


   GI bleed type/associated pathology:  unspecified gastrointestinal hemorrhage 

type  Qualified Codes:  K92.2 - Gastrointestinal hemorrhage, unspecified


(3) MRSA pneumonia


Status:  Resolved


Qualifiers:  


   Laterality:  unspecified laterality  Lung location:  unspecified part of 

lung  Qualified Codes:  J15.212 - Pneumonia due to methicillin resistant 

Staphylococcus aureus


Resolution Date/Time:  19 @ 14:23


(4) Pseudomonas urinary tract infection


Status:  Resolved


Resolution Date/Time:  1/3/19 @ 20:39


(5) Pyelonephritis


Status:  Resolved


Resolution Date/Time:  19 @ 14:23


(6) Renal calculus, left


Status:  Resolved


Resolution Date/Time:  19 @ 14:23


(7) Acute renal insufficiency


Status:  Resolved


Resolution Date/Time:  18 @ 17:54


(8) Diabetes mellitus, type 2


Status:  Chronic


Qualifiers:  


   Diabetes mellitus long term insulin use:  with long term use  Diabetes 

mellitus complication status:  with circulatory complication  Diabetes mellitus 

complication detail:  with other circulatory complications  Qualified Codes:  

E11.59 - Type 2 diabetes mellitus with other circulatory complications; Z79.4 - 

Long term (current) use of insulin


(9) Congestive heart failure


Status:  Chronic


Qualifiers:  


   Heart failure type:  unspecified  Heart failure chronicity:  acute on 

chronic  Qualified Codes:  I50.9 - Heart failure, unspecified


(10) COPD (chronic obstructive pulmonary disease)


Status:  Chronic


Qualifiers:  


   COPD type:  unspecified COPD  Qualified Codes:  J44.9 - Chronic obstructive 

pulmonary disease, unspecified


(11) Paroxysmal atrial fibrillation


Status:  Chronic


(12) CAD (coronary artery disease)


Status:  Chronic


Qualifiers:  


   Coronary Disease-Associated Artery/Lesion type:  native artery  Native vs. 

transplanted heart:  native heart  Associated angina:  without angina  

Qualified Codes:  I25.10 - Atherosclerotic heart disease of native coronary 

artery without angina pectoris


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


Status:  Chronic


(14) Melena


Status:  Acute


(15) Diarrhea


Status:  Acute


Qualifiers:  


   Diarrhea type:  unspecified type  Qualified Codes:  R19.7 - Diarrhea, 

unspecified


(16) Anemia


Status:  Acute


Qualifiers:  


   Anemia type:  iron deficiency  Iron deficiency anemia type:  chronic blood 

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

chronic)


(17) At risk for aspiration


Status:  Chronic


(18) PEG (percutaneous endoscopic gastrostomy) status


Status:  Chronic


(19) Rales


Status:  Chronic


(20) Transfusion of blood during current hospitalization


Status:  Acute





Clinical Quality Measures


DVT/VTE Risk/Contraindication:


Risk Factor Score Per Nursin


RFS Level Per Nursing on Admit:  4+=Very High











MICHELE WILKINS DO 2019 12:54

## 2019-01-06 NOTE — NUR
meds crushed & mixed in water & given in peg tube wife wanted this nurse to do meds this 
evening, colace 100mg po held due to loose stools, back to bed with 1 person assist & walker

## 2019-01-06 NOTE — CARDIOLOGY PROGRESS NOTE
Subjective


Date Seen by Provider:  2019


Time Seen by Provider:  10:16


Subjective/Events-last exam


Patient is laying down in bed, feeling better.  Denied any chest pain.


Review of Systems


General:  No Chills, No Night Sweats; Fatigue, Malaise; No Appetite, No Other


HEENT:  No Head Aches, No Visual Changes, No Eye Pain, No Ear Pain, No Dysphasia

, No Sinus Congestion, No Post Nasal Drip, No Sore Throat, No Other


Pulmonary:  Dyspnea; No Cough, No Pleuritic Chest Pain, No Other


Cardiovascular:  No: Chest Pain, Palpitations, Orthopnea, Paroxysmal Noc. 

Dyspnea, Edema, Lt Headedness, Other





Objective-Cardiology


Exam


Last Set of Vital Signs





Vital Signs








 19





 06:00 06:45 08:00


 


Temp 97.0  


 


Pulse 90  


 


Resp 20  


 


B/P (MAP) 107/63 (78)  


 


Pulse Ox  96 


 


O2 Delivery   Nasal Cannula


 


O2 Flow Rate   2.00





Capillary Refill :


I&O











Intake and Output 


 


 19





 00:00


 


Intake Total 2537 ml


 


Output Total 850 ml


 


Balance 1687 ml


 


 


 


Intake Oral 0 ml


 


Tube Feeding 1557 ml


 


Other 980 ml


 


Output Urine Total 850 ml


 


# Voids 5


 


# Bowel Movements 2








General:  Alert, Oriented X3, Cooperative, No Acute Distress


HEENT:  Atraumatic, PERRLA, EOMI, Mucous Memb Moist/Pink


Neck:  Supple, No JVD


Lungs:  Clear to Auscultation, Normal Air Movement, Other (coarse breath sounds 

are resolved today)


Heart:  Regular Rate, Normal S1, Normal S2


Abdomen:  Normal Bowel Sounds, Soft, No Tenderness, Other (Peg Tube in place)


Extremities:  No Clubbing, Other (+1 edema BLE)


Skin:  No Rashes


Neuro:  Normal Speech, Other (Subtle confusion, strength 4/5 LES 4-/5 Upper 

limbs)


Psych/Mental Status:  Mental Status NL, Mood NL, Other (subtle memory loss)





A/P-Cardiology


Admission Diagnosis


Debility


CAD


HTN


Chronic atrial fibrillation





Assessment/Plan


Debility, reporting improvement, managed by primary care physician





Anemia, status post transfusion, EGD showed esophagitis and gastritis, followed 

and managed by primary care physician





Status post respiratory failure, pneumonia, silent aspiration, on Antibiotics, 

followed by primary care physician





Urosepsis, history of kidney stone and nephrostomy tube, on antibiotics and 

managed by primary care team





Silent aspiration, status post feeding tube placement





Coronary artery disease, history of CABG done in the remote past.  Patient had 

a stent done in 2002 using MultiLink 2.518 mm to the proximal right 

coronary artery, 2017 had a Cypher stent 3.530 mm to the proximal and mid 

circumflex artery.  Has been followed and managed by primary cardiologist Dr. David in [place].  Continue to follow





History of congestive heart failure, reported ejection fraction 54 percent.  

MPI in , continue to monitor





Paroxysmal atrial fibrillation maintained on amiodarone, intolerant to oral 

anticoagulation secondary to recurrent nosebleed and anemia,  Patient is 

maintained on Cardizem, Amiodarone,Lopressor and Digoxin, continue to monitor, 

no changes are recommended





Patient has been maintained on mexiletine.  Probably due to ventricular 

fibrillation and history of shock from his defibrillator, known to have [employer] 

[employer]ro ICD implanted by Dr. stevens in [place]. Continue on Mexiletine





COPD, chronic dyspnea, followed by primary care physician





Chronic pedal edema,continue to diurese and monitor electrolytes





Diabetes mellitus, followed and managed by primary care physician





Clinical Quality Measures


DVT/VTE Risk/Contraindication:


Risk Factor Score Per Nursin


RFS Level Per Nursing on Admit:  4+=Very High











MARINA KUMAR MD 2019 10:17 am

## 2019-01-06 NOTE — NUR
Education given to pts wife giving medications through peg tube. Required set and verbal 
cuing. Pt demonstrated and verbalized understanding but will need more practice.

## 2019-01-06 NOTE — NUR
gastric residual 5 ml tube feeding  jevity 1.5 1 & 1/2 can given with water flush 50ml prior 
to and after

## 2019-01-07 VITALS — DIASTOLIC BLOOD PRESSURE: 72 MMHG | SYSTOLIC BLOOD PRESSURE: 117 MMHG

## 2019-01-07 VITALS — SYSTOLIC BLOOD PRESSURE: 117 MMHG | DIASTOLIC BLOOD PRESSURE: 72 MMHG

## 2019-01-07 LAB
ALBUMIN SERPL-MCNC: 3 GM/DL (ref 3.2–4.5)
ALP SERPL-CCNC: 86 U/L (ref 40–136)
ALT SERPL-CCNC: 14 U/L (ref 0–55)
BASOPHILS # BLD AUTO: 0 10^3/UL (ref 0–0.1)
BASOPHILS NFR BLD AUTO: 0 % (ref 0–10)
BILIRUB SERPL-MCNC: 0.5 MG/DL (ref 0.1–1)
BUN/CREAT SERPL: 22
CALCIUM SERPL-MCNC: 8.9 MG/DL (ref 8.5–10.1)
CHLORIDE SERPL-SCNC: 99 MMOL/L (ref 98–107)
CO2 SERPL-SCNC: 30 MMOL/L (ref 21–32)
CREAT SERPL-MCNC: 0.9 MG/DL (ref 0.6–1.3)
EOSINOPHIL # BLD AUTO: 0.1 10^3/UL (ref 0–0.3)
EOSINOPHIL NFR BLD AUTO: 2 % (ref 0–10)
ERYTHROCYTE [DISTWIDTH] IN BLOOD BY AUTOMATED COUNT: 19.1 % (ref 10–14.5)
ERYTHROCYTE [SEDIMENTATION RATE] IN BLOOD: 83 MM/HR (ref 0–30)
GFR SERPLBLD BASED ON 1.73 SQ M-ARVRAT: > 60 ML/MIN
GLUCOSE SERPL-MCNC: 113 MG/DL (ref 70–105)
HCT VFR BLD CALC: 29 % (ref 40–54)
HGB BLD-MCNC: 9 G/DL (ref 13.3–17.7)
LYMPHOCYTES # BLD AUTO: 1 X 10^3 (ref 1–4)
LYMPHOCYTES NFR BLD AUTO: 15 % (ref 12–44)
MANUAL DIFFERENTIAL PERFORMED BLD QL: NO
MCH RBC QN AUTO: 30 PG (ref 25–34)
MCHC RBC AUTO-ENTMCNC: 31 G/DL (ref 32–36)
MCV RBC AUTO: 98 FL (ref 80–99)
MONOCYTES # BLD AUTO: 0.9 X 10^3 (ref 0–1)
MONOCYTES NFR BLD AUTO: 14 % (ref 0–12)
NEUTROPHILS # BLD AUTO: 4.6 X 10^3 (ref 1.8–7.8)
NEUTROPHILS NFR BLD AUTO: 69 % (ref 42–75)
PLATELET # BLD: 147 10^3/UL (ref 130–400)
PMV BLD AUTO: 10.9 FL (ref 7.4–10.4)
POTASSIUM SERPL-SCNC: 4.3 MMOL/L (ref 3.6–5)
PROT SERPL-MCNC: 7.3 GM/DL (ref 6.4–8.2)
RBC # BLD AUTO: 2.98 10^6/UL (ref 4.35–5.85)
SODIUM SERPL-SCNC: 137 MMOL/L (ref 135–145)
WBC # BLD AUTO: 6.6 10^3/UL (ref 4.3–11)

## 2019-01-07 RX ADMIN — DOCUSATE SODIUM SCH MG: 50 LIQUID ORAL at 09:30

## 2019-01-07 RX ADMIN — DIGOXIN SCH MG: 125 TABLET ORAL at 09:30

## 2019-01-07 RX ADMIN — CHOLESTYRAMINE SCH GM: 4 POWDER, FOR SUSPENSION ORAL at 09:31

## 2019-01-07 RX ADMIN — FUROSEMIDE SCH MG: 20 TABLET ORAL at 06:54

## 2019-01-07 RX ADMIN — DILTIAZEM HYDROCHLORIDE SCH MG: 60 TABLET, FILM COATED ORAL at 14:54

## 2019-01-07 RX ADMIN — ASPIRIN 81 MG CHEWABLE TABLET SCH MG: 81 TABLET CHEWABLE at 09:31

## 2019-01-07 RX ADMIN — DILTIAZEM HYDROCHLORIDE SCH MG: 60 TABLET, FILM COATED ORAL at 05:51

## 2019-01-07 RX ADMIN — IPRATROPIUM BROMIDE AND ALBUTEROL SULFATE SCH ML: .5; 3 SOLUTION RESPIRATORY (INHALATION) at 07:32

## 2019-01-07 RX ADMIN — GUAIFENESIN AND DEXTROMETHORPHAN SCH ML: 100; 10 SYRUP ORAL at 14:54

## 2019-01-07 RX ADMIN — PHENAZOPYRIDINE HYDROCHLORIDE SCH MG: 100 TABLET ORAL at 09:30

## 2019-01-07 RX ADMIN — ANORECTAL OINTMENT SCH APPLIC: 15.7; .44; 24; 20.6 OINTMENT TOPICAL at 09:31

## 2019-01-07 RX ADMIN — SODIUM BICARBONATE SCH ML: 84 INJECTION, SOLUTION INTRAVENOUS at 09:31

## 2019-01-07 RX ADMIN — POTASSIUM CHLORIDE SCH MEQ: 1.5 POWDER, FOR SOLUTION ORAL at 06:55

## 2019-01-07 RX ADMIN — PHENAZOPYRIDINE HYDROCHLORIDE SCH MG: 100 TABLET ORAL at 13:00

## 2019-01-07 RX ADMIN — AMIODARONE HYDROCHLORIDE SCH MG: 200 TABLET ORAL at 09:30

## 2019-01-07 RX ADMIN — METOPROLOL TARTRATE SCH MG: 25 TABLET, FILM COATED ORAL at 09:30

## 2019-01-07 RX ADMIN — GUAIFENESIN AND DEXTROMETHORPHAN SCH ML: 100; 10 SYRUP ORAL at 09:30

## 2019-01-07 RX ADMIN — MEXILETINE HYDROCHLORIDE SCH MG: 150 CAPSULE ORAL at 09:30

## 2019-01-07 RX ADMIN — Medication SCH EACH: at 09:30

## 2019-01-07 NOTE — D/C HH FACE TO FACE ORDER
D/C  Face to Face Orders


Instructions for Patient


[facility], [phone]


Patient Instructions/FollowUp:  


[facility] in 1 week


Dr Graham in 2 weeks


Physician to follow Patient:  [facility]


Discharge Diet for Home:  Tube Feeding, other diet (NPO)


Patient Problems:  


Aspiration


Dysphagia


PEG tube dependent


COPD





Patient Data-Allergies,Ht & Wt


Patient Allergies:  


Coded Allergies:  


     Penicillins (Verified  Allergy, Severe, HIVES, SOB (Pt has received 

Cefepime & Ceftriaxone), 12/5/18)


     codeine (Verified  Allergy, Severe, SOB, HIVES, 6/8/18)


Height (Feet):  5


Height (Inches):  5.00


Weight (Pounds):  180


Weight (Ounces):  4.8





Home Health Need/Face to Face


Date of Face to Face:  Jan 7, 2019


Clinical Findings:  Generalized weakness and fatigue, Immune-compromised, 

Shortness of breath, Unsteady gait


I have seen Pt face-to-face:  Yes


Discharged To:  Home


Diagnosis/Conditions:  


Aspiration


Dysphagia


PEG tube dependent


COPD


Patient is Homebound due to:  CognItive deficits, Qasim fall risk due to 

instabilty, Muscle weakness, Shortness of breath/distress


Homebound Status


   Due to the above stated illness, injury or surgical procedure (medical 

condition or diagnosis) and associated clinical findings, the patient is 

homebound because of his/her inability to leave home except with aid of a 

supportive device and/or person AND leaving the home requires a considerable 

and taxing effort or is medically contraindicated.


Pt req the following assistanc:  Aid of another person, Walker





Home Health Nursing Orders


Home Health Services Order:  Nursing Services, Occupational Ther-Evaluate & 

Treat, Physical Therapy-Evaluate & Treat, Speech Language-Evaluate & Treat





Home Health Infusion Therapy


Line Type:  Midline


Site Location:  Arm-Upper


Type of Feeding Tube:  PEJG


Formula:  Jevity 1.5


Certify Stmt


I certify that this patient is under my care and that I, a nurse practitioner 

or a physician; a assistant working with me, had a face to face encounter that -

meets the physician face to face encounter requirements with this patient as 

dated.











MICHELE WILKINS DO Jan 7, 2019 08:16

## 2019-01-07 NOTE — NUR
Notified DME that pt has not received tube feedings at of this time. Ezequiel stated that he 
would call pts wife back and deliver to address.

## 2019-01-07 NOTE — THERAPY TEAM DISCHARGE SUMMARY
Therapy Discharge Summary


Discharge Recommendations


Date of Discharge


1/7/2019


Therapy D/C Recommendations:  Physical Therapy Home Care





Occupational Therapy


Decreased Activ Tolerance, Impaired Cognition, Impaired I ADL's, Impaired Self-

Care Skills





Speech-Language Pathology


Patient was admitted to the ARU in 12/18. Patient was a transfer from [facility] in [place] after being there for pneumonia. Patient had a PEG tube 

placed while admitted to [facility] due to severe dysphagia. Patient completed 

dysphagia exercises for strengthening his pharyngeal function. He also received 

cognitive exercises for his deficits to train him in retention of OME. He was 

given trial of thickened liquids and puree with severe coughing post intake. 

Patient is currently receiving all meds, hydration and nutrition via PEG tube 

until patient is deemed safe for oral intake. Patient is recommended for an MBS 

at a later date for determining if patient is safe to be an oral feeder. 

Patient is being discharged from his skilled ST at this time.





PT Long Term Goals


Long Term Goals


PT Long Term Goals Time Frame:  Jan 14, 2019


Transfers (B,C,W/C) (FIM):  6 (met)


Roll Left to Right (QC):  6 (met)


Sit to Lying (QC):  6 (met)


Lying-Sitting on Side/Bed(QC):  6 (met)


Sit to Stand (QC):  6


Chair/Bed-to-Chair Xfer(QC):  6


Car Transfer (QC):  5


Does the Patient Walk:  Yes


Gait (FIM):  5 (household)


Gait distance (FIM):  5=797-64 ft


Walk 10 feet (QC):  6


Walk 10ft-Uneven Surface(QC):  6


Walk 50ft with 2 Turns (QC):  6


Walk 150 ft (QC):  88


Gait Level of Assist:  6


Gait Assistive Device:  FWW


Does the Pt use WC or Scooter?:  Yes


Wheelchair (FIM):  6


Wheelchair distance (FIM):  3=150 ft


Wheel 50 feet with 2 turns (QC:  6


Stairs (FIM):  88


1 Step (curb) (QC):  88


4 Steps (QC):  88


12 Steps (QC):  88


Picking up an Object (QC):  88





OT Long Term Goals


Long Term Goals


Time Frame:  Jan 21, 2019


Eating (FIM):  5


Eating (QC):  5


Oral Hygiene (QC):  4


Grooming(FIM):  5


Bathing(FIM):  3


Shower/Bathe Self (QC):  3


Upper Body Dressing(FIM):  5


Upper Body Dressing (QC):  4


Lower Body Dressing(FIM):  4


Lower Body Dressing (QC):  4


On/Off Footwear (QC):  4


Toileting(FIM):  5


Toileting Hygiene (QC):  5


Transfers (B,C,W/C) (FIM):  5


Toilet/Commode Transfer(FIM):  5


Toilet/Commode Transfer (QC):  4


Additional Goals:  1-Demonstrate ADL Tasks, 2-Verbalize Understanding, 3-

ImproveStrength/Farnaz


1=Demonstrate adherence to instructed precautions during ADL tasks.


2=Patient will verbalize/demonstrate understanding of assistive devices/

modifications for ADL.


3=Patient will improve strength/tolerance for activity to enable patient to 

perform ADL's.





Speech Long Term Goals


Long Term Goals


1) Patient will improve memory, problem solving and auditory processing in 

order to return safely home. Met.


2) Patient will demonstrate a safe swallow function for oral intake for 

maintaining nutrition/hydration. Patient is unsafe for oral intake at this time.











KAMRON COVINGTON Jan 7, 2019 12:04

## 2019-01-07 NOTE — SPEECH THERAPY DAILY NOTE
Speech Daily Progress Note


Subjective


Date Seen by Provider:  2019


Time Seen by Provider:  00:15


Patient stated he was ready to go home.





Objective


Patient completed pharyngeal exercises for improved swallow function at 90% 

without verbal cues.





Assessment


Assessment Current Status:  Good Progress, Fair Progress





Treatment Plan


Continue Plan of Care





Communication


Comprehension:  2


Expression:  2





Social Cognition


Social Interaction:  2


Problem Solvin


Memory:  2





Speech Short Term Goals


Short Term Goals


Short Term Goals


1) Patient will be able to recall new information with 80% or greater given 

minimal cues.


2) Patient will be able to name items/pictures presented with 80% or greater 

given minimal cues.


3) Patient will utilize compensatory strategies for safety within his room with 

80% or greater given minimal cues.


4) Patient will complete pharyngeal exercises 5x/week in order to return to PO 

status.


5) Patient will tolerate trials of least restrictive diet level without s/s of 

aspiration.





Speech Long Term Goals


Long Term Goals


1) Patient will improve memory, problem solving and auditory processing in 

order to return safely home.


2) Patient will demonstrate a safe swallow function for oral intake for 

maintaining nutrition/hydration.





Speech-Plan


Patient/Family Goals


Patient/Family Goals:  


Patient is returning home today with his wife and caregiver support.





Treatment Plan


Speech Therapy Treatment Plan:  Discontinue ST, Goals Met


Patient has progressed well with skilled therapy.


Treatment Duration:  2019


Frequency:  5 times per week


Estimated Hrs Per Day:  .5 hour per day


Rehab Potential:  Fair


Barriers to Learning:  


Patient has some mild cognitive deficits.


Pt/Family Agrees to Plan:  Yes





Safety Risks/Education


Teaching Recipient:  Patient


Teaching Methods:  Discussion


Response to Teaching:  Verbalize Understanding


Education Topics Provided:  


Safety and NPO status until cleared. Discussion with the patient and his


wife that the PEG tube will not be removed until he has safe oral intake


with the majority of his nutrition and hydration needs.





Time


Speech Therapy Time In:  11:15


Speech Therapy Time Out:  11:30


Total Billed Time:  15


Billed Treatment Time


1, SHARON


KAMRON Mcdaniels 2019 11:53

## 2019-01-07 NOTE — THERAPY TEAM DISCHARGE SUMMARY
Therapy Discharge Summary


Discharge Recommendations


Date of Discharge


1-7-19


Therapy D/C Recommendations:  Home w/ Family Support, Scheduled Assistance





Occupational Therapy


Pt. has been seen by occupational therapy to increase overall strength and 

independence with daily tasks.  Pt. is at baseline level.  Some goals are not 

met but pt. only requires the same assistance as he was at home, with spouse.  

OT has confirmed this with spouse.  Pt. has all needed equipment.  Pt. to 

discharge today with spouse and caregiving assistance.


Decreased Activ Tolerance, Impaired I ADL's, Impaired Self-Care Skills





PT Long Term Goals


Long Term Goals


PT Long Term Goals Time Frame:  Jan 14, 2019


Transfers (B,C,W/C) (FIM):  6 (met)


Roll Left to Right (QC):  6 (met)


Sit to Lying (QC):  6 (met)


Lying-Sitting on Side/Bed(QC):  6 (met)


Sit to Stand (QC):  6


Chair/Bed-to-Chair Xfer(QC):  6


Car Transfer (QC):  5


Does the Patient Walk:  Yes


Gait (FIM):  5 (household)


Gait distance (FIM):  7=875-18 ft


Walk 10 feet (QC):  6


Walk 10ft-Uneven Surface(QC):  6


Walk 50ft with 2 Turns (QC):  6


Walk 150 ft (QC):  88


Gait Level of Assist:  6


Gait Assistive Device:  FWW


Does the Pt use WC or Scooter?:  Yes


Wheelchair (FIM):  6


Wheelchair distance (FIM):  3=150 ft


Wheel 50 feet with 2 turns (QC:  6


Stairs (FIM):  88


1 Step (curb) (QC):  88


4 Steps (QC):  88


12 Steps (QC):  88


Picking up an Object (QC):  88





OT Long Term Goals


Long Term Goals


Time Frame:  Jan 21, 2019


Eating (FIM):  5 (met)


Eating (QC):  5 (met)


Oral Hygiene (QC):  4 (not met)


Grooming(FIM):  5 (not met)


Bathing(FIM):  3 (met)


Shower/Bathe Self (QC):  3 (not met)


Upper Body Dressing(FIM):  5 (met)


Upper Body Dressing (QC):  4 (met)


Lower Body Dressing(FIM):  4 (not met)


Lower Body Dressing (QC):  4 (not met)


On/Off Footwear (QC):  4 (not met)


Toileting(FIM):  5 (not met)


Toileting Hygiene (QC):  5 (not met)


Transfers (B,C,W/C) (FIM):  5 (not met)


Toilet/Commode Transfer(FIM):  5 (not met)


Toilet/Commode Transfer (QC):  4 (met)


Additional Goals:  1-Demonstrate ADL Tasks, 2-Verbalize Understanding, 3-

ImproveStrength/Farnaz


1=Demonstrate adherence to instructed precautions during ADL tasks.


2=Patient will verbalize/demonstrate understanding of assistive devices/

modifications for ADL.


3=Patient will improve strength/tolerance for activity to enable patient to 

perform ADL's.





Speech Long Term Goals


Long Term Goals


1) Patient will improve memory, problem solving and auditory processing in 

order to return safely home. Met.


2) Patient will demonstrate a safe swallow function for oral intake for 

maintaining nutrition/hydration. Patient is unsafe for oral intake at this time.











ALCIDES OSUNA OT Jan 7, 2019 12:16

## 2019-01-07 NOTE — CARDIOLOGY PROGRESS NOTE
Subjective


Date Seen by Provider:  2019


Time Seen by Provider:  09:51


Subjective/Events-last exam


Patient is sitting in a chair, feeling better, no new complaint


Review of Systems


General:  No Chills, No Night Sweats, No Fatigue, No Malaise, No Appetite, No 

Other


HEENT:  No Head Aches, No Visual Changes, No Eye Pain, No Ear Pain, No Dysphasia

, No Sinus Congestion, No Post Nasal Drip, No Sore Throat, No Other


Pulmonary:  No Dyspnea, No Cough, No Pleuritic Chest Pain, No Other


Cardiovascular:  No: Chest Pain, Palpitations, Orthopnea, Paroxysmal Noc. 

Dyspnea, Edema, Lt Headedness, Other





Objective-Cardiology


Exam


Last Set of Vital Signs





Vital Signs








 19





 06:00 07:41 08:00


 


Temp 98.6  


 


Pulse 89  


 


Resp 20  


 


B/P (MAP) 117/72 (87)  


 


Pulse Ox  92 


 


O2 Delivery   Nasal Cannula


 


O2 Flow Rate   2.00





Capillary Refill :


I&O











Intake and Output 


 


 19





 00:00


 


Intake Total 2600 ml


 


Output Total 1050 ml


 


Balance 1550 ml


 


 


 


Intake Oral 0 ml


 


Tube Feeding 1440 ml


 


Other 1160 ml


 


Output Urine Total 1050 ml


 


# Voids 4


 


# Urine Diapers 4


 


# Bowel Movements 3








General:  Alert, Oriented X3, Cooperative, No Acute Distress


HEENT:  Atraumatic, PERRLA, EOMI, Mucous Memb Moist/Pink


Neck:  Supple, No JVD


Lungs:  Clear to Auscultation, Normal Air Movement, Other (coarse breath sounds 

are resolved today)


Heart:  Regular Rate, Normal S1, Normal S2


Abdomen:  Normal Bowel Sounds, Soft, No Tenderness, Other (Peg Tube in place)


Extremities:  No Clubbing, Other (trace edema BLE)


Skin:  No Rashes


Neuro:  Normal Speech, Cranial Nerves 3-12 NL


Psych/Mental Status:  Mental Status NL, Mood NL, Other (subtle memory loss)





Results


Lab


Laboratory Tests


19 05:40














A/P-Cardiology


Admission Diagnosis


Debility


CAD


HTN


Chronic atrial fibrillation





Assessment/Plan


Debility, reporting improvement, managed by primary care physician





Anemia, status post transfusion, EGD showed esophagitis and gastritis, followed 

and managed by primary care physician





Status post respiratory failure, pneumonia, silent aspiration, on Antibiotics, 

followed by primary care physician





Urosepsis, history of kidney stone and nephrostomy tube, on antibiotics and 

managed by primary care team





Silent aspiration, status post feeding tube placement





Coronary artery disease, history of CABG done in the remote past.  Patient had 

a stent done in 2002 using MultiLink 2.518 mm to the proximal right 

coronary artery, 2017 had a Cypher stent 3.530 mm to the proximal and mid 

circumflex artery.  Has been followed and managed by primary cardiologist Dr. David in [place].  Continue to follow





History of congestive heart failure, reported ejection fraction 54 percent.  

MPI in , continue to monitor





Paroxysmal atrial fibrillation maintained on amiodarone, intolerant to oral 

anticoagulation secondary to recurrent nosebleed and anemia,  Patient is 

maintained on Cardizem, Amiodarone,Lopressor and Digoxin, continue to monitor, 

no changes are recommended





Patient has been maintained on mexiletine.  Probably due to ventricular 

fibrillation and history of shock from his defibrillator, known to have [employer] 

[employer]ro ICD implanted by Dr. stevens in [place]. Continue on Mexiletine, Consult Dr Oglesby





COPD, chronic dyspnea, followed by primary care physician





Chronic pedal edema,continue to diurese and monitor electrolytes





Diabetes mellitus, followed and managed by primary care physician





Clinical Quality Measures


DVT/VTE Risk/Contraindication:


Risk Factor Score Per Nursin


RFS Level Per Nursing on Admit:  4+=Very High











MARINA KUMAR MD 2019 09:52

## 2019-01-07 NOTE — CARDIOLOGY PROGRESS NOTE
Subjective


Date Seen by Provider:  2019


Time Seen by Provider:  08:43


Subjective/Events-last exam


Patient sitting up in chair, denies any chest pain or dyspnea. Ready to go home.


Review of Systems


General:  No Night Sweats, No Fatigue


HEENT:  No Visual Changes, No Dysphasia


Pulmonary:  No Dyspnea, No Cough


Cardiovascular:  No: Chest Pain, Palpitations, Paroxysmal Noc. Dyspnea, Edema


Gastrointestinal:  No: Nausea, Vomiting, Diarrhea, Constipation


Genitourinary:  No Dysuria, No Frequency


Musculoskeletal:  No: neck pain, back pain


Neurological:  No: Weakness, Numbness, Change in speech, Confusion





Objective-Cardiology


Exam


Last Set of Vital Signs





Vital Signs








 19





 06:00 07:41 08:00


 


Temp 98.6  


 


Pulse 89  


 


Resp 20  


 


B/P (MAP) 117/72 (87)  


 


Pulse Ox  92 


 


O2 Delivery   Nasal Cannula


 


O2 Flow Rate   2.00





Capillary Refill :


I&O











Intake and Output 


 


 19





 23:59


 


Intake Total 2600 ml


 


Output Total 1050 ml


 


Balance 1550 ml


 


 


 


Intake Oral 0 ml


 


Tube Feeding 1440 ml


 


Other 1160 ml


 


Output Urine Total 1050 ml


 


# Voids 4


 


# Urine Diapers 4


 


# Bowel Movements 3








General:  Alert, Oriented X3, Cooperative, No Acute Distress


HEENT:  Atraumatic, PERRLA, EOMI, Mucous Memb Moist/Pink


Neck:  Supple, No JVD


Lungs:  Clear to Auscultation, Normal Air Movement, Other (coarse breath sounds 

are resolved today)


Heart:  Regular Rate, Normal S1, Normal S2


Abdomen:  Normal Bowel Sounds, Soft, No Tenderness, Other (Peg Tube in place)


Extremities:  No Clubbing, Other (trace edema BLE)


Skin:  No Rashes


Neuro:  Normal Speech, Cranial Nerves 3-12 NL


Psych/Mental Status:  Mental Status NL, Mood NL, Other (subtle memory loss)





Results


Lab


Laboratory Tests


19 05:40














A/P-Cardiology


Admission Diagnosis


Debility


CAD


HTN


Chronic atrial fibrillation





Assessment/Plan


Debility, reporting improvement, managed by primary care physician





Anemia, status post transfusion, EGD showed esophagitis and gastritis, followed 

and managed by primary care physician





Status post respiratory failure, pneumonia, silent aspiration, on Antibiotics, 

followed by primary care physician





Urosepsis, history of kidney stone and nephrostomy tube, on antibiotics and 

managed by primary care team





Silent aspiration, status post feeding tube placement





Coronary artery disease, history of CABG done in the remote past.  Patient had 

a stent done in 2002 using MultiLink 2.518 mm to the proximal right 

coronary artery, 2017 had a Cypher stent 3.530 mm to the proximal and mid 

circumflex artery.  Has been followed and managed by primary cardiologist Dr. David in [place].  Continue to follow





History of congestive heart failure, reported ejection fraction 54 percent.  

MPI in , continue to monitor





Paroxysmal atrial fibrillation maintained on amiodarone, intolerant to oral 

anticoagulation secondary to recurrent nosebleed and anemia,  Patient is 

maintained on Cardizem, Amiodarone,Lopressor and Digoxin, continue to monitor, 

no changes are recommended





Patient has been maintained on mexiletine.  Probably due to ventricular 

fibrillation and history of shock from his defibrillator, known to have [employer] 

[employer] ICD implanted by Dr. stevens in [place]. Continue on Mexiletine





COPD, chronic dyspnea, followed by primary care physician





Chronic pedal edema,continue to diurese and monitor electrolytes





Diabetes mellitus, followed and managed by primary care physician





Clinical Quality Measures


DVT/VTE Risk/Contraindication:


Risk Factor Score Per Nursin


RFS Level Per Nursing on Admit:  4+=Very High











ABELARDO BLACKBURN 2019 08:45

## 2019-01-07 NOTE — NUR
ALEJANDRO SPIVEY demonstrates understanding of discharge instructions and accurately returns 
instructions upon questioning.  Copy of Post-Discharge Instructions given to pt. 
ALEJANDRO SPIVEY is able to manage continuing needs after discharge with HHC, PT/OT/ST.  
Patients belongings returned to .  Patient discharged from 226[facility] on 1-07-19 at 1420. 
ALEJANDRO SPIVEY left floor via , accompanied by .

## 2019-01-07 NOTE — PHYSICAL THERAPY DAILY NOTE
PT Daily Note-Current


Subjective


Patient in recliner pre tx, agrees to PT, no complaints of pain.





Appearance


Patient in recliner post tx with nurse call, phone, tray, wife in room.





Mental Status


Patient Orientation:  Person, Confused, Place


Attachments:  Oxygen





Transfers


Therapy Code Descriptions/Definitions 





Functional McLeod Measure:


0=Not Assessed/NA        4=Minimal Assistance


1=Total Assistance        5=Supervision or Setup


2=Maximal Assistance  6=Modified McLeod


3=Moderate Assistance 7=Complete McLeod








Therapy Quality Codes:


6    Independent with activity with or without an assistive device


5    Patient requires set up or clean up by helper.  Patient completes activity

  by  themselves


4    Supervision or touching assist (CGA). [place]ston provide cues , steadying 

assist


3    The helper provides less than half the effort to complete the activity


2    The helper provides more than half the effort to complete the activity


1    Dependent.  The helper does all the effort to complete an activity 


7    Patient refused to complete or attempt activity


9    The patient did not perform the activity before the current illness or 

injury


88  Not attempted due to Medical conditions or safety concerns


Transfers (B, C, W/C) (FIM):  5


Scootin


Rollin


Roll Left to Right (QC):  6


Supine to/from Sit:  6


Sit to/from Stand:  5


Sit to Lying (QC):  6


Sit to Stand (QC):  4


Chair/Bed-to-Chair Xfer(QC):  4


Bed to/from Chair:  5


Car Transfer (QC):  3


Patient performs bed mobility with mod I, sit to stand and stand pivot 

transfers with SBA, car transfer with min assist.  Patient needs cues for 

safety and hand placement, tends to lunge toward chair to sit when tired.





Weight Bearing


Right Lower Extremity:  Right


Weight Bearing/Tolerated


Left Lower Extremity:  Left


Weight Bearing/Tolerated





Gait Training


Gait (FIM):  5


Distance:  100'x2


Walk 10 feet (QC):  4


Walk 50 ft with 2 Turns(QC):  4


Walking 10ft/uneven surface-QC:  4


Gait Level of Assist:  4


Gait Persons Needed:  1


Gait Assistive Device:  FWW


Patient can ambulate 100' with a rolling walker with SBA (including 50' with at 

least 2 turns of 90 degrees but needs CGA for 10' over an uneven surface).  

Patient ambulates slowly and has uncoordinated steps, gets fatigued quickly and 

lunges toward chair to sit.  Poor safety and decision making.





Wheelchair Training


Does the Pt Use a Wheelchair?:  No





Stair Training


Patient is not safe to performs stairs, he is impulsive, and has poor safety 

awareness.





Treatments


bed mobility and transfers, ambulation, car transfer





Assessment


Current Status:  Fair Progress


slowly improving mobility





PT Short Term Goals


Short Term Goals


Time Frame:  Dec 31, 2018


Transfers (B,C,W/C) (FIM):  5 (met)


Gait (FIM):  2 (met)


Distance (FIM):  1=up to 49 ft


Gait Assistive Device:  FWW


Wheelchair Distance:  200'





PT Long Term Goals


Long Term Goals


PT Long Term Goals Time Frame:  2019


Transfers (B,C,W/C) (FIM):  6 (met)


Sit to Lying (QC):  6 (met)


Lying-Sitting on Side/Bed(QC):  6 (met)


Sit to Stand (QC):  6


Rollin (met)


Roll Left to Right (QC):  6 (met)


Chair/Bed-to-Chair Xfer(QC):  6


Car Transfer (QC):  5


Does the Patient Walk:  Yes


Gait (FIM):  5 (household)


Gait distance (FIM):  6=860-64 ft


Walk 10 feet (QC):  6


Walk 10ft-Uneven Surface(QC):  6


Walk 50ft with 2 Turns (QC):  6


Walk 150 ft (QC):  88


Gait Level of Assist:  6


Gait Assistive Device:  FWW


Does the Pt use WC or Scooter?:  Yes


Wheelchair (FIM):  6


Wheelchair distance (FIM):  3=150 ft


Wheel 50 feet with 2 turns (QC:  6


Stairs (FIM):  88


1 Step (curb) (QC):  88


4 Steps (QC):  88


12 Steps (QC):  88


Picking up an Object (QC):  88





PT Plan


Problem List


Problem List:  Activity Tolerance, Functional Strength, Safety, Balance, Gait, 

Transfer, Bed Mobility, ROM





Treatment/Plan


Treatment Plan:  Continue Plan of Care


Treatment Plan:  Bed Mobility, Education, Functional Activity Farnaz, Functional 

Strength, Group Therapy, Gait, Safety, Therapeutic Exercise, Transfers


Treatment Duration:  2019


Frequency:  At least 5 of 7 days/Wk (IRF)


Estimated Hrs Per Day:  1.5 hours per day


Patient and/or Family Agrees t:  Yes





Safety Risks/Education


Patient Education:  Gait Training, Transfer Techniques, Steps, Correct 

Positioning, Safety Issues


Teaching Recipient:  Patient


Teaching Methods:  Demonstration, Discussion


Response to Teaching:  Reinforcement Needed





Time/GCodes


Time In:  930


Time Out:  950


Total Billed Treatment Time:  20


Total Billed Treatment


1 visit


GT 20'











JANET PEARSON PT 2019 09:58

## 2019-01-07 NOTE — OCCUPATIONAL THER DAILY NOTE
OT Current Status-Daily Note


Subjective


Pt. smiles when OT comes into room.





Appearance


Pt. is in bed.  Spouse in room.  Pt. agrees to work with OT.  Declines 

showering even with encouragement.  Agrees to spongebathe.





Mental Status/Objective


Patient Orientation:  Person


Therapy Code Descriptions/Definitions 





Functional [place] Measure:


0=Not Assessed/NA        4=Minimal Assistance


1=Total Assistance        5=Supervision or Setup


2=Maximal Assistance  6=Modified [place]


3=Moderate Assistance 7=Complete [place]





ADL-Treatment


Spouse states that pt. can dress himself at home except for donning socks.


Therapy Code Descriptions/Definitions 





Functional [place] Measure:


0=Not Assessed/NA        4=Minimal Assistance


1=Total Assistance        5=Supervision or Setup


2=Maximal Assistance  6=Modified [place]


3=Moderate Assistance 7=Complete [place]








Therapy Quality Codes:


6    Independent with activity with or without an assistive device


5    Patient requires set up or clean up by helper.  Patient completes activity

  by  themselves


4    Supervision or touching assist (CGA). [place] provide cues , steadying 

assist


3    The helper provides less than half the effort to complete the activity


2    The helper provides more than half the effort to complete the activity


1    Dependent.  The helper does all the effort to complete an activity 


7    Patient refused to complete or attempt activity


9    The patient did not perform the activity before the current illness or 

injury


88  Not attempted due to Medical conditions or safety concerns


Grooming (FIM):  88


Oral Hygiene (QC):  88


Bathing (FIM):  3 (Pt. requires CGA in stance and cues to wash netta area.  Pt. 

able to wash legs but requires assistance to wash feet.)


Shower/Bathe Self (QC):  3


Upper Body (FIM):  5


Upper Body Dressing (QC):  4


Lower Body Dressing (FIM):  3 (Pt. is able to don his pants and requires SBA in 

stance to pull them up.  Pt. does attempt to don socks, but is unable to and OT 

does this for him.  Pt. requires min assist to don shoes.)


Lower Body Dressing (QC):  3


On/Off Footwear (QC):  2


Transfers (B, C, W/C) (FIM):  4 (CGA when standing for dynamic balance.)





Education


OT Patient Education:  Correct positioning, Modified ADL techniques, Progress 

toward Goal/Update tx plan, Purpose of tx/functional activities, Reviewed 

precautions, Rehab process, Transfer techniques


Teaching Recipient:  Patient, Significant Other


Teaching Methods:  Demonstration, Discussion


Response to Teaching:  Verbalize Understanding, Return Demonstration





OT Short Term Goals


Short Term Goals


Time Frame:  Dec 31, 2018


Eating(FIM):  4


Grooming(FIM):  3


Upper Body Dressing(FIM):  3


Lower Body Dressing(FIM):  3


Toileting(FIM):  4


Transfers (B,C,W/C) (FIM):  5 (met)


Toilet/Commode Transfer(FIM):  4


Additional Short Term Goals:  1-Demonstrate ADL Tasks, 2-Verbalize Understanding

, 3-ImproveStrength/Farnaz


1=Demonstrate adherence to instructed precautions during ADL tasks.


2=Patient will verbalize/demonstrate understanding of assistive devices/

modifications for ADL.


3=Patient will improve strength/tolerance for activity to enable patient to 

perform ADL's.





OT Long Term Goals


Long Term Goals


Time Frame:  2019


Eating (FIM):  5


Eating (QC):  5


Groomin


Oral Hygiene (QC):  4


Bathing(FIM):  3


Shower/Bathe Self (QC):  3


Upper Body Dressing(FIM):  5


Upper Body Dressing (QC):  4


Lower Body Dressing(FIM):  4


Lower Body Dressing (QC):  4


On/Off Footwear (QC):  4


Toileting(FIM):  5


Toileting Hygiene (QC):  5


Transfers (B,C,W/C) (FIM):  5


Toilet/Commode Transfer(FIM):  5


Toilet/Commode Transfer (QC):  4


Additional Goals:  1-Demonstrate ADL Tasks, 2-Verbalize Understanding, 3-

ImproveStrength/Farnaz


1=Demonstrate adherence to instructed precautions during ADL tasks.


2=Patient will verbalize/demonstrate understanding of assistive devices/

modifications for ADL.


3=Patient will improve strength/tolerance for activity to enable patient to 

perform ADL's.





OT Education/Plan


Problem List/Assessment


Assessment:  Decreased Activ Tolerance, Impaired I ADL's, Impaired Self-Care 

Skills





Discharge Recommendations


Plan/Recommendations:  Discontinue OT


Therapy D/C Recommendations:  Home w/ Family Support, Scheduled Assistance


Target Placement


Pt. is going home with spouse.  Pt. and spouse have caregiving assistance if 

they need it.





Treatment Plan/Plan of Care


Treatment,Training & Education:  Yes


Treatment Duration:  2019


Frequency:  At least 5 of 7 days/Wk (IRF)


Estimated Hrs Per Day:  1.5 hours per day


Agreement:  Yes


Rehab Potential:  Fair





Time/GCodes


Start Time:  08:15


Stop Time:  08:40


Total Time Billed (hr/min):  25


Billed Treatment Time


1, ADL x 2











ALCIDES OSUNA OT 2019 12:09

## 2019-01-07 NOTE — DISCHARGE SUMMARY
Diagnosis/Chief Complaint


Date of Admission


Dec 23, 2018 at 20:05


Date of Discharge


19


Discharge Date:  2019


Discharge Diagnosis


Assessment:


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


Anemia with presumed GI bleed with hemoccult positive but Dr Mantilla performed EGD/

Colon last week without source of loss identified and iron 50 on check to 

receive 2 units of blood 1/3/19 and Hematology consultation completed by Dr Graham


MRSA pneumonia s/p treatment completion


Pseudomonas UTI s/p treatment with Gent


Severe debilitated state


CAD previous bypass graft


ICD defibrillator in place


Presbycusis


Sleep apnea


Chronic respiratory failure


Aspiration with PEG tube placement and NPO status





Discharge Summary


Consultations


Dr Shanelle Graham


Discharge Physical Examination


Allergies:  


Coded Allergies:  


     Penicillins (Verified  Allergy, Severe, HIVES, SOB (Pt has received 

Cefepime & Ceftriaxone), 18)


     codeine (Verified  Allergy, Severe, SOB, HIVES, 18)


Vitals & I&Os





Vital Signs








  Date Time  Temp Pulse Resp B/P (MAP) Pulse Ox O2 Delivery O2 Flow Rate FiO2


 


19 15:24  89 20 117/72 92 Nasal Cannula 2.00 


 


19 06:00 98.6       











Hospital Course


Hospital course: Patient had a lengthy inpatient rehab course.  He was 

transferred from [facility] after undergoing kidney stone surgery 

with percutaneous catheter placement due to Pseudomonas UTI and obstruction.  

He was found to have pneumonia placed on vancomycin and required multiple 

consultations at the tertiary care center.  He became medically stable was 

found to be in need for inpatient rehab for completion of vancomycin and 

gentamicin for MRSA pneumonia and Pseudomonas UTI respectively.  He 

participated in all therapies faithfully although chronic illness precluded 

rapid recovery.  N.p.o. status was maintained after multiple attempts with 

speech therapy to advance diet even to pured and thickened liquids but that 

failed with small amount of aspiration so he was placed strictly n.p.o. 

maintain PEG tube feedings.  He did undergo EGD and colonoscopy to evaluate 

Hemoccult positive stools with declining hemoglobin and that was performed by 

 which revealed no source of bleeding except a gastritis which did not 

show any active bleeding.  Dr. Marks was consulted patient received 2 units 

of blood with good rebound of hemoglobin to 10 and remained stable.  Overall 

patient improved enough to be discharged home as he and his wife wished with 

caregiver support along with PEG tube feedings per his wife and that was 

Osmolite of 1.5 strength with 1-1/2 cans every 6 hours with 50 cc of tap water 

before and after.


Labs (last 24 hrs)


Laboratory Tests


18 05:52: Glucometer 124H


18 06:42: 


White Blood Count 8.6, Red Blood Count 3.77L, Hemoglobin 11.1L, Hematocrit 35L, 

Mean Corpuscular Volume 93, Mean Corpuscular Hemoglobin 29, Mean Corpuscular 

Hemoglobin Concent 32, Red Cell Distribution Width 18.6H, Platelet Count 219, 

Mean Platelet Volume 11.3H, Neutrophils (%) (Auto) 74, Lymphocytes (%) (Auto) 12

, Monocytes (%) (Auto) 12, Eosinophils (%) (Auto) 1, Basophils (%) (Auto) 0, 

Neutrophils # (Auto) 6.4, Lymphocytes # (Auto) 1.0, Monocytes # (Auto) 1.0, 

Eosinophils # (Auto) 0.1, Basophils # (Auto) 0.0, Erythrocyte Sedimentation 

Rate 74H, Sodium Level 140, Potassium Level 4.0, Chloride Level 101, Carbon 

Dioxide Level 29, Anion Gap 10, Blood Urea Nitrogen 22H, Creatinine 0.94, 

Estimat Glomerular Filtration Rate > 60, BUN/Creatinine Ratio 23, Glucose Level 

124H, Calcium Level 9.6, Corrected Calcium 10.2H, Total Bilirubin 0.4, 

Aspartate Amino Transf (AST/SGOT) 23, Alanine Aminotransferase (ALT/SGPT) 12, 

Alkaline Phosphatase 93, Total Protein 8.2, Albumin 3.2


18 10:13: Glucometer 90


18 15:50: Glucometer 128H


18 16:55: Vancomycin Level Trough 12.9


18 20:13: Glucometer 140H


18 04:59: Glucometer 108


18 11:05: Glucometer 136H


18 17:14: Glucometer 119H


18 20:39: Glucometer 132H


18 05:56: Glucometer 93


18 11:00: 


White Blood Count 8.4, Red Blood Count 3.37L, Hemoglobin 9.8L, Hematocrit 31L, 

Mean Corpuscular Volume 93, Mean Corpuscular Hemoglobin 29, Mean Corpuscular 

Hemoglobin Concent 31L, Red Cell Distribution Width 18.7H, Platelet Count 198, 

Mean Platelet Volume 11.3H, Neutrophils (%) (Auto) 73, Lymphocytes (%) (Auto) 15

, Monocytes (%) (Auto) 11, Eosinophils (%) (Auto) 1, Basophils (%) (Auto) 0, 

Neutrophils # (Auto) 6.1, Lymphocytes # (Auto) 1.3, Monocytes # (Auto) 0.9, 

Eosinophils # (Auto) 0.1, Basophils # (Auto) 0.0, Sodium Level 137, Potassium 

Level 4.2, Chloride Level 97L, Carbon Dioxide Level 32, Anion Gap 8, Blood Urea 

Nitrogen 23H, Creatinine 0.94, Estimat Glomerular Filtration Rate > 60, BUN/

Creatinine Ratio 24, Glucose Level 115H, Calcium Level 9.1, Corrected Calcium 

9.8, Total Bilirubin 0.4, Aspartate Amino Transf (AST/SGOT) 21, Alanine 

Aminotransferase (ALT/SGPT) 13, Alkaline Phosphatase 89, Total Protein 7.8, 

Albumin 3.1L


18 15:57: Glucometer 139H


18 20:38: Glucometer 121H


18 05:58: Glucometer 109


18 11:04: Glucometer 139H


18 16:46: Glucometer 99


18 20:39: Glucometer 98


18 05:49: Glucometer 114H


18 11:13: Glucometer 129H


18 16:05: Glucometer 131H


18 20:14: Glucometer 133H


18 06:02: Glucometer 96


18 11:09: Glucometer 138H


18 16:17: Glucometer 111H


18 21:07: Glucometer 96


18 05:40: 


White Blood Count 6.9, Red Blood Count 2.86L, Hemoglobin 8.4L, Hematocrit 28L, 

Mean Corpuscular Volume 96, Mean Corpuscular Hemoglobin 29, Mean Corpuscular 

Hemoglobin Concent 31L, Red Cell Distribution Width 19.0H, Platelet Count 141, 

Mean Platelet Volume 12.2H, Neutrophils (%) (Auto) 69, Lymphocytes (%) (Auto) 15

, Monocytes (%) (Auto) 13H, Eosinophils (%) (Auto) 2, Basophils (%) (Auto) 0, 

Neutrophils # (Auto) 4.8, Lymphocytes # (Auto) 1.0, Monocytes # (Auto) 0.9, 

Eosinophils # (Auto) 0.1, Basophils # (Auto) 0.0, Sodium Level 138, Potassium 

Level 4.8, Chloride Level 99, Carbon Dioxide Level 31, Anion Gap 8, Blood Urea 

Nitrogen 22H, Creatinine 0.92, Estimat Glomerular Filtration Rate > 60, BUN/

Creatinine Ratio 24, Glucose Level 105, Calcium Level 9.0, Corrected Calcium 9.8

, Total Bilirubin 0.4, Aspartate Amino Transf (AST/SGOT) 23, Alanine 

Aminotransferase (ALT/SGPT) 14, Alkaline Phosphatase 87, Total Protein 7.2, 

Albumin 3.0L


18 11:35: Glucometer 132H


18 16:04: Stool Occult Blood Immunoassay POSITIVEH


18 06:15: 


White Blood Count 6.6, Red Blood Count 2.72L, Hemoglobin 8.0L, Hematocrit 26L, 

Mean Corpuscular Volume 96, Mean Corpuscular Hemoglobin 29, Mean Corpuscular 

Hemoglobin Concent 31L, Red Cell Distribution Width 19.1H, Platelet Count 142, 

Mean Platelet Volume 11.7H, Neutrophils (%) (Auto) 70, Lymphocytes (%) (Auto) 15

, Monocytes (%) (Auto) 12, Eosinophils (%) (Auto) 2, Basophils (%) (Auto) 0, 

Neutrophils # (Auto) 4.7, Lymphocytes # (Auto) 1.0, Monocytes # (Auto) 0.8, 

Eosinophils # (Auto) 0.2, Basophils # (Auto) 0.0, Erythrocyte Sedimentation 

Rate 126H, Sodium Level 137, Potassium Level 4.3, Chloride Level 99, Carbon 

Dioxide Level 29, Anion Gap 9, Blood Urea Nitrogen 20H, Creatinine 0.92, 

Estimat Glomerular Filtration Rate > 60, BUN/Creatinine Ratio 22, Glucose Level 

94, Calcium Level 9.1, Corrected Calcium 9.9, Iron Level 50, Total Bilirubin 0.5

, Aspartate Amino Transf (AST/SGOT) 21, Alanine Aminotransferase (ALT/SGPT) 14, 

Alkaline Phosphatase 90, Total Protein 7.2, Albumin 3.0L


18 11:01: Glucometer 101


1/3/19 06:30: 


White Blood Count 7.4, Red Blood Count 2.42L, Hemoglobin 7.2L, Hematocrit 24L, 

Mean Corpuscular Volume 98, Mean Corpuscular Hemoglobin 30, Mean Corpuscular 

Hemoglobin Concent 31L, Red Cell Distribution Width 20.2H, Platelet Count 156, 

Mean Platelet Volume 11.2H, Sodium Level 138, Potassium Level 4.4, Chloride 

Level 100, Carbon Dioxide Level 30, Anion Gap 8, Blood Urea Nitrogen 17, 

Creatinine 0.86, Estimat Glomerular Filtration Rate > 60, BUN/Creatinine Ratio 

20, Glucose Level 89, Calcium Level 9.0, Corrected Calcium 9.7, Total Bilirubin 

0.4, Aspartate Amino Transf (AST/SGOT) 20, Alanine Aminotransferase (ALT/SGPT) 

16, Alkaline Phosphatase 91, Total Protein 7.2, Albumin 3.1L


1/3/19 13:35: Ferritin 429.3H


19 09:30: 


White Blood Count 7.1, Red Blood Count 3.36L, Hemoglobin 10.2#L, Hematocrit 32L

, Mean Corpuscular Volume 96, Mean Corpuscular Hemoglobin 30, Mean Corpuscular 

Hemoglobin Concent 32, Red Cell Distribution Width 19.9H, Platelet Count 159, 

Mean Platelet Volume 10.9H, Sodium Level 138, Potassium Level 4.7, Chloride 

Level 99, Carbon Dioxide Level 30, Anion Gap 9, Blood Urea Nitrogen 22H, 

Creatinine 0.98, Estimat Glomerular Filtration Rate > 60, BUN/Creatinine Ratio 

22, Glucose Level 102, Calcium Level 9.6, Corrected Calcium 10.1, Total 

Bilirubin 0.7, Aspartate Amino Transf (AST/SGOT) 23, Alanine Aminotransferase (

ALT/SGPT) 17, Alkaline Phosphatase 92, Total Protein 8.0, Albumin 3.4


19 05:40: 


White Blood Count 6.6, Red Blood Count 2.98L, Hemoglobin 9.0L, Hematocrit 29L, 

Mean Corpuscular Volume 98, Mean Corpuscular Hemoglobin 30, Mean Corpuscular 

Hemoglobin Concent 31L, Red Cell Distribution Width 19.1H, Platelet Count 147, 

Mean Platelet Volume 10.9H, Sodium Level 137, Potassium Level 4.3, Chloride 

Level 99, Carbon Dioxide Level 30, Anion Gap 8, Blood Urea Nitrogen 20H, 

Creatinine 0.90, Estimat Glomerular Filtration Rate > 60, BUN/Creatinine Ratio 

22, Glucose Level 113H, Calcium Level 8.9, Corrected Calcium 9.7, Total 

Bilirubin 0.5, Aspartate Amino Transf (AST/SGOT) 19, Alanine Aminotransferase (

ALT/SGPT) 14, Alkaline Phosphatase 86, Total Protein 7.3, Albumin 3.0L, 

Neutrophils (%) (Auto) 69, Lymphocytes (%) (Auto) 15, Monocytes (%) (Auto) 14H, 

Eosinophils (%) (Auto) 2, Basophils (%) (Auto) 0, Neutrophils # (Auto) 4.6, 

Lymphocytes # (Auto) 1.0, Monocytes # (Auto) 0.9, Eosinophils # (Auto) 0.1, 

Basophils # (Auto) 0.0, Erythrocyte Sedimentation Rate 83H





Pending Labs


Laboratory Tests


18 05:52: Glucometer 124


18 06:42: 


White Blood Count 8.6, Red Blood Count 3.77, Hemoglobin 11.1, Hematocrit 35, 

Mean Corpuscular Volume 93, Mean Corpuscular Hemoglobin 29, Mean Corpuscular 

Hemoglobin Concent 32, Red Cell Distribution Width 18.6, Platelet Count 219, 

Mean Platelet Volume 11.3, Neutrophils (%) (Auto) 74, Lymphocytes (%) (Auto) 12

, Monocytes (%) (Auto) 12, Eosinophils (%) (Auto) 1, Basophils (%) (Auto) 0, 

Neutrophils # (Auto) 6.4, Lymphocytes # (Auto) 1.0, Monocytes # (Auto) 1.0, 

Eosinophils # (Auto) 0.1, Basophils # (Auto) 0.0, Erythrocyte Sedimentation 

Rate 74, Sodium Level 140, Potassium Level 4.0, Chloride Level 101, Carbon 

Dioxide Level 29, Anion Gap 10, Blood Urea Nitrogen 22, Creatinine 0.94, 

Estimat Glomerular Filtration Rate > 60, BUN/Creatinine Ratio 23, Glucose Level 

124, Calcium Level 9.6, Corrected Calcium 10.2, Total Bilirubin 0.4, Aspartate 

Amino Transf (AST/SGOT) 23, Alanine Aminotransferase (ALT/SGPT) 12, Alkaline 

Phosphatase 93, Total Protein 8.2, Albumin 3.2


18 10:13: Glucometer 90


18 15:50: Glucometer 128


12/24/18 16:55: Vancomycin Level Trough 12.9


18 20:13: Glucometer 140


18 04:59: Glucometer 108


18 11:05: Glucometer 136


18 17:14: Glucometer 119


18 20:39: Glucometer 132


18 05:56: Glucometer 93


18 11:00: 


White Blood Count 8.4, Red Blood Count 3.37, Hemoglobin 9.8, Hematocrit 31, 

Mean Corpuscular Volume 93, Mean Corpuscular Hemoglobin 29, Mean Corpuscular 

Hemoglobin Concent 31, Red Cell Distribution Width 18.7, Platelet Count 198, 

Mean Platelet Volume 11.3, Neutrophils (%) (Auto) 73, Lymphocytes (%) (Auto) 15

, Monocytes (%) (Auto) 11, Eosinophils (%) (Auto) 1, Basophils (%) (Auto) 0, 

Neutrophils # (Auto) 6.1, Lymphocytes # (Auto) 1.3, Monocytes # (Auto) 0.9, 

Eosinophils # (Auto) 0.1, Basophils # (Auto) 0.0, Sodium Level 137, Potassium 

Level 4.2, Chloride Level 97, Carbon Dioxide Level 32, Anion Gap 8, Blood Urea 

Nitrogen 23, Creatinine 0.94, Estimat Glomerular Filtration Rate > 60, BUN/

Creatinine Ratio 24, Glucose Level 115, Calcium Level 9.1, Corrected Calcium 9.8

, Total Bilirubin 0.4, Aspartate Amino Transf (AST/SGOT) 21, Alanine 

Aminotransferase (ALT/SGPT) 13, Alkaline Phosphatase 89, Total Protein 7.8, 

Albumin 3.1


18 15:57: Glucometer 139


18 20:38: Glucometer 121


18 05:58: Glucometer 109


18 11:04: Glucometer 139


18 16:46: Glucometer 99


18 20:39: Glucometer 98


18 05:49: Glucometer 114


18 11:13: Glucometer 129


18 16:05: Glucometer 131


18 20:14: Glucometer 133


18 06:02: Glucometer 96


18 11:09: Glucometer 138


18 16:17: Glucometer 111


18 21:07: Glucometer 96


18 05:40: 


White Blood Count 6.9, Red Blood Count 2.86, Hemoglobin 8.4, Hematocrit 28, 

Mean Corpuscular Volume 96, Mean Corpuscular Hemoglobin 29, Mean Corpuscular 

Hemoglobin Concent 31, Red Cell Distribution Width 19.0, Platelet Count 141, 

Mean Platelet Volume 12.2, Neutrophils (%) (Auto) 69, Lymphocytes (%) (Auto) 15

, Monocytes (%) (Auto) 13, Eosinophils (%) (Auto) 2, Basophils (%) (Auto) 0, 

Neutrophils # (Auto) 4.8, Lymphocytes # (Auto) 1.0, Monocytes # (Auto) 0.9, 

Eosinophils # (Auto) 0.1, Basophils # (Auto) 0.0, Sodium Level 138, Potassium 

Level 4.8, Chloride Level 99, Carbon Dioxide Level 31, Anion Gap 8, Blood Urea 

Nitrogen 22, Creatinine 0.92, Estimat Glomerular Filtration Rate > 60, BUN/

Creatinine Ratio 24, Glucose Level 105, Calcium Level 9.0, Corrected Calcium 9.8

, Total Bilirubin 0.4, Aspartate Amino Transf (AST/SGOT) 23, Alanine 

Aminotransferase (ALT/SGPT) 14, Alkaline Phosphatase 87, Total Protein 7.2, 

Albumin 3.0


18 11:35: Glucometer 132


18 16:04: Stool Occult Blood Immunoassay POSITIVE


18 06:15: 


White Blood Count 6.6, Red Blood Count 2.72, Hemoglobin 8.0, Hematocrit 26, 

Mean Corpuscular Volume 96, Mean Corpuscular Hemoglobin 29, Mean Corpuscular 

Hemoglobin Concent 31, Red Cell Distribution Width 19.1, Platelet Count 142, 

Mean Platelet Volume 11.7, Neutrophils (%) (Auto) 70, Lymphocytes (%) (Auto) 15

, Monocytes (%) (Auto) 12, Eosinophils (%) (Auto) 2, Basophils (%) (Auto) 0, 

Neutrophils # (Auto) 4.7, Lymphocytes # (Auto) 1.0, Monocytes # (Auto) 0.8, 

Eosinophils # (Auto) 0.2, Basophils # (Auto) 0.0, Erythrocyte Sedimentation 

Rate 126, Sodium Level 137, Potassium Level 4.3, Chloride Level 99, Carbon 

Dioxide Level 29, Anion Gap 9, Blood Urea Nitrogen 20, Creatinine 0.92, Estimat 

Glomerular Filtration Rate > 60, BUN/Creatinine Ratio 22, Glucose Level 94, 

Calcium Level 9.1, Corrected Calcium 9.9, Iron Level 50, Total Bilirubin 0.5, 

Aspartate Amino Transf (AST/SGOT) 21, Alanine Aminotransferase (ALT/SGPT) 14, 

Alkaline Phosphatase 90, Total Protein 7.2, Albumin 3.0


18 11:01: Glucometer 101


1/3/19 06:30: 


White Blood Count 7.4, Red Blood Count 2.42, Hemoglobin 7.2, Hematocrit 24, 

Mean Corpuscular Volume 98, Mean Corpuscular Hemoglobin 30, Mean Corpuscular 

Hemoglobin Concent 31, Red Cell Distribution Width 20.2, Platelet Count 156, 

Mean Platelet Volume 11.2, Sodium Level 138, Potassium Level 4.4, Chloride 

Level 100, Carbon Dioxide Level 30, Anion Gap 8, Blood Urea Nitrogen 17, 

Creatinine 0.86, Estimat Glomerular Filtration Rate > 60, BUN/Creatinine Ratio 

20, Glucose Level 89, Calcium Level 9.0, Corrected Calcium 9.7, Total Bilirubin 

0.4, Aspartate Amino Transf (AST/SGOT) 20, Alanine Aminotransferase (ALT/SGPT) 

16, Alkaline Phosphatase 91, Total Protein 7.2, Albumin 3.1


1/3/19 13:35: Ferritin 429.3


19 09:30: 


White Blood Count 7.1, Red Blood Count 3.36, Hemoglobin 10.2, Hematocrit 32, 

Mean Corpuscular Volume 96, Mean Corpuscular Hemoglobin 30, Mean Corpuscular 

Hemoglobin Concent 32, Red Cell Distribution Width 19.9, Platelet Count 159, 

Mean Platelet Volume 10.9, Sodium Level 138, Potassium Level 4.7, Chloride 

Level 99, Carbon Dioxide Level 30, Anion Gap 9, Blood Urea Nitrogen 22, 

Creatinine 0.98, Estimat Glomerular Filtration Rate > 60, BUN/Creatinine Ratio 

22, Glucose Level 102, Calcium Level 9.6, Corrected Calcium 10.1, Total 

Bilirubin 0.7, Aspartate Amino Transf (AST/SGOT) 23, Alanine Aminotransferase (

ALT/SGPT) 17, Alkaline Phosphatase 92, Total Protein 8.0, Albumin 3.4


19 05:40: 


White Blood Count 6.6, Red Blood Count 2.98, Hemoglobin 9.0, Hematocrit 29, 

Mean Corpuscular Volume 98, Mean Corpuscular Hemoglobin 30, Mean Corpuscular 

Hemoglobin Concent 31, Red Cell Distribution Width 19.1, Platelet Count 147, 

Mean Platelet Volume 10.9, Sodium Level 137, Potassium Level 4.3, Chloride 

Level 99, Carbon Dioxide Level 30, Anion Gap 8, Blood Urea Nitrogen 20, 

Creatinine 0.90, Estimat Glomerular Filtration Rate > 60, BUN/Creatinine Ratio 

22, Glucose Level 113, Calcium Level 8.9, Corrected Calcium 9.7, Total 

Bilirubin 0.5, Aspartate Amino Transf (AST/SGOT) 19, Alanine Aminotransferase (

ALT/SGPT) 14, Alkaline Phosphatase 86, Total Protein 7.3, Albumin 3.0, 

Neutrophils (%) (Auto) 69, Lymphocytes (%) (Auto) 15, Monocytes (%) (Auto) 14, 

Eosinophils (%) (Auto) 2, Basophils (%) (Auto) 0, Neutrophils # (Auto) 4.6, 

Lymphocytes # (Auto) 1.0, Monocytes # (Auto) 0.9, Eosinophils # (Auto) 0.1, 

Basophils # (Auto) 0.0, Erythrocyte Sedimentation Rate 83








Discharge


Home Medications:





Active Scripts


Active


Metoprolol Tartrate 25 Mg Tablet 12.5 Mg PO BID


Calmoseptine Ointment (Menthol/Lanolin/Calamine/Znox) 71 Gm Oint 0 Gm TOP BID


Milk of Magnesia (Magnesium Hydroxide) 400 Mg/5 Ml Oral.susp 30 Ml PO DAILY PRN


Potassium Chloride 20 Meq Packet 15 Meq PEG DAILY@0700


Aspirin 81 Mg Tab.chew 81 Mg PO DAILY


Diltiazem HCl 60 Mg Tablet 60 Mg PO Q8HR


Lipitor (Atorvastatin Calcium) 40 Mg Tablet 40 Mg PO HS


Prevalite Packet (Cholestyramine/Aspartame) 4 Gm Powd.pack 4 Gm PO 1000,2300


Reported


Pyridium (Phenazopyridine HCl) 200 Mg Tablet 200 Mg PO TID


Novolog (Insulin Aspart) 100 Unit/1 Ml Susp  SQ UD


Amiodarone HCl 200 Mg Tablet  PO UD


Ventolin Hfa (Albuterol Sulfate) 18 Gm Hfa.aer.ad 2 Puff IH Q6H PRN


Digoxin 125 Mcg Tablet 125 Mcg PO DAILY


Mexiletine HCl 150 Mg Cap 150 Mg PO TID


Furosemide 40 Mg Tablet 20 Mg PO BID





Instructions to patient/family


Please see electronic discharge instructions given to patient.





Diagnosis/Problems


Diagnosis/Problems





(1) Debility


Status:  Acute


(2) GI bleed


Status:  Chronic


Qualifiers:  


   Qualified Codes:  K92.2 - Gastrointestinal hemorrhage, unspecified


(3) MRSA pneumonia


Status:  Resolved


Qualifiers:  


   Qualified Codes:  J15.212 - Pneumonia due to methicillin resistant 

Staphylococcus aureus


Resolution Date/Time:  19 @ 14:23


(4) Pseudomonas urinary tract infection


Status:  Resolved


Resolution Date/Time:  1/3/19 @ 20:39


(5) Pyelonephritis


Status:  Resolved


Resolution Date/Time:  19 @ 14:23


(6) Renal calculus, left


Status:  Resolved


Resolution Date/Time:  19 @ 14:23


(7) Acute renal insufficiency


Status:  Resolved


Resolution Date/Time:  18 @ 17:54


(8) Diabetes mellitus, type 2


Status:  Chronic


Qualifiers:  


   Qualified Codes:  E11.59 - Type 2 diabetes mellitus with other circulatory 

complications; Z79.4 - Long term (current) use of insulin


(9) Congestive heart failure


Status:  Chronic


Qualifiers:  


   Qualified Codes:  I50.9 - Heart failure, unspecified


(10) COPD (chronic obstructive pulmonary disease)


Status:  Chronic


Qualifiers:  


   Qualified Codes:  J44.9 - Chronic obstructive pulmonary disease, unspecified


(11) Paroxysmal atrial fibrillation


Status:  Chronic


(12) CAD (coronary artery disease)


Status:  Chronic


Qualifiers:  


   Qualified Codes:  I25.10 - Atherosclerotic heart disease of native coronary 

artery without angina pectoris


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


Status:  Chronic


(14) Melena


Status:  Acute


(15) Diarrhea


Status:  Acute


Qualifiers:  


   Qualified Codes:  R19.7 - Diarrhea, unspecified


(16) Anemia


Status:  Acute


Qualifiers:  


   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

chronic)


(17) At risk for aspiration


Status:  Chronic


(18) PEG (percutaneous endoscopic gastrostomy) status


Status:  Chronic


(19) Rales


Status:  Chronic


(20) Transfusion of blood during current hospitalization


Status:  Acute





Clinical Quality Measures


DVT/VTE Risk/Contraindication:


Risk Factor Score Per Nursin


RFS Level Per Nursing on Admit:  4+=Very High











MICHELE WILKINS DO 2019 08:18

## 2019-01-26 ENCOUNTER — HOSPITAL ENCOUNTER (OUTPATIENT)
Dept: HOSPITAL 75 - ER | Age: 79
LOS: 3 days | Discharge: HOME HEALTH | End: 2019-01-29
Attending: INTERNAL MEDICINE
Payer: MEDICARE

## 2019-01-26 VITALS — SYSTOLIC BLOOD PRESSURE: 122 MMHG | DIASTOLIC BLOOD PRESSURE: 65 MMHG

## 2019-01-26 VITALS — SYSTOLIC BLOOD PRESSURE: 116 MMHG | DIASTOLIC BLOOD PRESSURE: 91 MMHG

## 2019-01-26 VITALS — DIASTOLIC BLOOD PRESSURE: 55 MMHG | SYSTOLIC BLOOD PRESSURE: 108 MMHG

## 2019-01-26 VITALS — BODY MASS INDEX: 26.66 KG/M2 | HEIGHT: 65 IN | WEIGHT: 160 LBS

## 2019-01-26 VITALS — DIASTOLIC BLOOD PRESSURE: 57 MMHG | SYSTOLIC BLOOD PRESSURE: 190 MMHG

## 2019-01-26 VITALS — SYSTOLIC BLOOD PRESSURE: 129 MMHG | DIASTOLIC BLOOD PRESSURE: 54 MMHG

## 2019-01-26 VITALS — SYSTOLIC BLOOD PRESSURE: 111 MMHG | DIASTOLIC BLOOD PRESSURE: 62 MMHG

## 2019-01-26 VITALS — SYSTOLIC BLOOD PRESSURE: 135 MMHG | DIASTOLIC BLOOD PRESSURE: 50 MMHG

## 2019-01-26 VITALS — SYSTOLIC BLOOD PRESSURE: 126 MMHG | DIASTOLIC BLOOD PRESSURE: 52 MMHG

## 2019-01-26 VITALS — SYSTOLIC BLOOD PRESSURE: 108 MMHG | DIASTOLIC BLOOD PRESSURE: 67 MMHG

## 2019-01-26 VITALS — DIASTOLIC BLOOD PRESSURE: 64 MMHG | SYSTOLIC BLOOD PRESSURE: 120 MMHG

## 2019-01-26 VITALS — SYSTOLIC BLOOD PRESSURE: 127 MMHG | DIASTOLIC BLOOD PRESSURE: 70 MMHG

## 2019-01-26 VITALS — SYSTOLIC BLOOD PRESSURE: 143 MMHG | DIASTOLIC BLOOD PRESSURE: 60 MMHG

## 2019-01-26 VITALS — SYSTOLIC BLOOD PRESSURE: 131 MMHG | DIASTOLIC BLOOD PRESSURE: 75 MMHG

## 2019-01-26 VITALS — DIASTOLIC BLOOD PRESSURE: 71 MMHG | SYSTOLIC BLOOD PRESSURE: 117 MMHG

## 2019-01-26 DIAGNOSIS — Z79.82: ICD-10-CM

## 2019-01-26 DIAGNOSIS — Z95.810: ICD-10-CM

## 2019-01-26 DIAGNOSIS — E83.42: ICD-10-CM

## 2019-01-26 DIAGNOSIS — Z87.891: ICD-10-CM

## 2019-01-26 DIAGNOSIS — R63.4: ICD-10-CM

## 2019-01-26 DIAGNOSIS — Z79.4: ICD-10-CM

## 2019-01-26 DIAGNOSIS — R53.81: ICD-10-CM

## 2019-01-26 DIAGNOSIS — N18.9: ICD-10-CM

## 2019-01-26 DIAGNOSIS — R13.10: ICD-10-CM

## 2019-01-26 DIAGNOSIS — I21.4: Primary | ICD-10-CM

## 2019-01-26 DIAGNOSIS — R79.89: ICD-10-CM

## 2019-01-26 DIAGNOSIS — I25.5: ICD-10-CM

## 2019-01-26 DIAGNOSIS — Z95.1: ICD-10-CM

## 2019-01-26 DIAGNOSIS — Z95.5: ICD-10-CM

## 2019-01-26 DIAGNOSIS — I12.9: ICD-10-CM

## 2019-01-26 DIAGNOSIS — E78.00: ICD-10-CM

## 2019-01-26 DIAGNOSIS — I48.1: ICD-10-CM

## 2019-01-26 DIAGNOSIS — E87.5: ICD-10-CM

## 2019-01-26 DIAGNOSIS — I25.10: ICD-10-CM

## 2019-01-26 DIAGNOSIS — Z79.899: ICD-10-CM

## 2019-01-26 DIAGNOSIS — D64.9: ICD-10-CM

## 2019-01-26 DIAGNOSIS — J44.9: ICD-10-CM

## 2019-01-26 DIAGNOSIS — Z93.1: ICD-10-CM

## 2019-01-26 LAB
ALBUMIN SERPL-MCNC: 3.6 GM/DL (ref 3.2–4.5)
ALP SERPL-CCNC: 106 U/L (ref 40–136)
ALT SERPL-CCNC: 56 U/L (ref 0–55)
APTT BLD: 31 SEC (ref 24–35)
BASOPHILS # BLD AUTO: 0 10^3/UL (ref 0–0.1)
BASOPHILS NFR BLD AUTO: 0 % (ref 0–10)
BILIRUB SERPL-MCNC: 0.8 MG/DL (ref 0.1–1)
BUN/CREAT SERPL: 21
BUN/CREAT SERPL: 23
CALCIUM SERPL-MCNC: 10.1 MG/DL (ref 8.5–10.1)
CALCIUM SERPL-MCNC: 9.8 MG/DL (ref 8.5–10.1)
CHLORIDE SERPL-SCNC: 112 MMOL/L (ref 98–107)
CHLORIDE SERPL-SCNC: 114 MMOL/L (ref 98–107)
CO2 SERPL-SCNC: 20 MMOL/L (ref 21–32)
CO2 SERPL-SCNC: 23 MMOL/L (ref 21–32)
CREAT SERPL-MCNC: 0.97 MG/DL (ref 0.6–1.3)
CREAT SERPL-MCNC: 1.17 MG/DL (ref 0.6–1.3)
EOSINOPHIL # BLD AUTO: 0 10^3/UL (ref 0–0.3)
EOSINOPHIL NFR BLD AUTO: 0 % (ref 0–10)
ERYTHROCYTE [DISTWIDTH] IN BLOOD BY AUTOMATED COUNT: 19 % (ref 10–14.5)
GFR SERPLBLD BASED ON 1.73 SQ M-ARVRAT: > 60 ML/MIN
GFR SERPLBLD BASED ON 1.73 SQ M-ARVRAT: > 60 ML/MIN
GLUCOSE SERPL-MCNC: 101 MG/DL (ref 70–105)
GLUCOSE SERPL-MCNC: 90 MG/DL (ref 70–105)
HCT VFR BLD CALC: 38 % (ref 40–54)
HGB BLD-MCNC: 11.8 G/DL (ref 13.3–17.7)
INR PPP: 1.3 (ref 0.8–1.4)
LYMPHOCYTES # BLD AUTO: 1.4 X 10^3 (ref 1–4)
LYMPHOCYTES NFR BLD AUTO: 12 % (ref 12–44)
MAGNESIUM SERPL-MCNC: 2.5 MG/DL (ref 1.8–2.4)
MANUAL DIFFERENTIAL PERFORMED BLD QL: NO
MCH RBC QN AUTO: 31 PG (ref 25–34)
MCHC RBC AUTO-ENTMCNC: 31 G/DL (ref 32–36)
MCV RBC AUTO: 99 FL (ref 80–99)
MONOCYTES # BLD AUTO: 1.4 X 10^3 (ref 0–1)
MONOCYTES NFR BLD AUTO: 12 % (ref 0–12)
MYOGLOBIN SERPL-MCNC: 55.1 NG/ML (ref 10–92)
NEUTROPHILS # BLD AUTO: 9 X 10^3 (ref 1.8–7.8)
NEUTROPHILS NFR BLD AUTO: 76 % (ref 42–75)
PLATELET # BLD: 170 10^3/UL (ref 130–400)
PMV BLD AUTO: 12.1 FL (ref 7.4–10.4)
POTASSIUM SERPL-SCNC: 4.5 MMOL/L (ref 3.6–5)
POTASSIUM SERPL-SCNC: 6 MMOL/L (ref 3.6–5)
PROT SERPL-MCNC: 8.9 GM/DL (ref 6.4–8.2)
PROTHROMBIN TIME: 15.8 SEC (ref 12.2–14.7)
SODIUM SERPL-SCNC: 145 MMOL/L (ref 135–145)
SODIUM SERPL-SCNC: 146 MMOL/L (ref 135–145)
WBC # BLD AUTO: 11.9 10^3/UL (ref 4.3–11)

## 2019-01-26 PROCEDURE — 36415 COLL VENOUS BLD VENIPUNCTURE: CPT

## 2019-01-26 PROCEDURE — 85347 COAGULATION TIME ACTIVATED: CPT

## 2019-01-26 PROCEDURE — 80053 COMPREHEN METABOLIC PANEL: CPT

## 2019-01-26 PROCEDURE — 83874 ASSAY OF MYOGLOBIN: CPT

## 2019-01-26 PROCEDURE — 83735 ASSAY OF MAGNESIUM: CPT

## 2019-01-26 PROCEDURE — 96374 THER/PROPH/DIAG INJ IV PUSH: CPT

## 2019-01-26 PROCEDURE — 71045 X-RAY EXAM CHEST 1 VIEW: CPT

## 2019-01-26 PROCEDURE — 93306 TTE W/DOPPLER COMPLETE: CPT

## 2019-01-26 PROCEDURE — 36221 PLACE CATH THORACIC AORTA: CPT

## 2019-01-26 PROCEDURE — 85730 THROMBOPLASTIN TIME PARTIAL: CPT

## 2019-01-26 PROCEDURE — 80162 ASSAY OF DIGOXIN TOTAL: CPT

## 2019-01-26 PROCEDURE — 85610 PROTHROMBIN TIME: CPT

## 2019-01-26 PROCEDURE — 80048 BASIC METABOLIC PNL TOTAL CA: CPT

## 2019-01-26 PROCEDURE — 80076 HEPATIC FUNCTION PANEL: CPT

## 2019-01-26 PROCEDURE — 84484 ASSAY OF TROPONIN QUANT: CPT

## 2019-01-26 PROCEDURE — 93041 RHYTHM ECG TRACING: CPT

## 2019-01-26 PROCEDURE — 82962 GLUCOSE BLOOD TEST: CPT

## 2019-01-26 PROCEDURE — 85025 COMPLETE CBC W/AUTO DIFF WBC: CPT

## 2019-01-26 PROCEDURE — 93459 L HRT ART/GRFT ANGIO: CPT

## 2019-01-26 PROCEDURE — 93005 ELECTROCARDIOGRAM TRACING: CPT

## 2019-01-26 RX ADMIN — SODIUM CHLORIDE SCH MLS/HR: 900 INJECTION, SOLUTION INTRAVENOUS at 20:44

## 2019-01-26 RX ADMIN — METOPROLOL TARTRATE SCH MG: 25 TABLET, FILM COATED ORAL at 20:44

## 2019-01-26 RX ADMIN — MEXILETINE HYDROCHLORIDE SCH MG: 150 CAPSULE ORAL at 20:44

## 2019-01-26 RX ADMIN — INSULIN ASPART SCH UNIT: 100 INJECTION, SOLUTION INTRAVENOUS; SUBCUTANEOUS at 20:46

## 2019-01-26 RX ADMIN — INSULIN ASPART SCH UNIT: 100 INJECTION, SOLUTION INTRAVENOUS; SUBCUTANEOUS at 17:10

## 2019-01-26 RX ADMIN — DILTIAZEM HYDROCHLORIDE SCH MG: 60 TABLET, FILM COATED ORAL at 22:12

## 2019-01-26 RX ADMIN — SODIUM CHLORIDE SCH MLS/HR: 900 INJECTION, SOLUTION INTRAVENOUS at 17:10

## 2019-01-26 RX ADMIN — ATORVASTATIN CALCIUM SCH MG: 80 TABLET, FILM COATED ORAL at 20:44

## 2019-01-26 RX ADMIN — DILTIAZEM HYDROCHLORIDE SCH MG: 60 TABLET, FILM COATED ORAL at 17:17

## 2019-01-26 RX ADMIN — TICAGRELOR SCH MG: 90 TABLET ORAL at 20:44

## 2019-01-26 NOTE — ED CHEST PAIN
General


Chief Complaint:  Cardiac/General Problems


Stated Complaint:  MI


Nursing Triage Note:  


EMS CALLED FOR A PT HAVING A STEMI IN INFERIOR LEADS.  DR RUTHERFORD NOTIFIED 


CARDIOLOGIST AND SUPERVISOR PTA.  PT STATES HIS PAIN STARTED AT 6AM ET HAS A 


LONG CARDIAC HX.  PT RECIEVED X3 NITRO ET 324MG ASA BEFORE ARRIVAL.  EMS 

REPORTS 


PT HAS A HX OF MRSA OF SPUTUM.  MASK APPLIED ON PT.


Nursing Sepsis Screen:  No Definite Risk


Source:  patient


Exam Limitations:  no limitations





History of Present Illness


Date Seen by Provider:  Jan 26, 2019


Time Seen by Provider:  10:08


Initial Comments


Patient presents to ER by EMS with chief complaint that at 6 AM he started 

experiencing substernal midline nonradiating chest pain without shoulder or 

neck involvement or nausea, sweats, shortness of breath. The pain is not 

reproducible on deep inspiration or chest palpation. The home health care nurse 

who is helping take care of his PEG tube gave him some Tylenol for his pain and 

then they called ambulance. [place] gave him 325 of aspirin got a EKG that was 

with a lot of artifact with a suspected might of been a STEMI so they activated 

and gave him 3 separate doses of nitroglycerin which brought his pain down from 

a 10 out of 10 to a 4 out of 10 at present.





Allergies and Home Medications


Allergies


Coded Allergies:  


     Penicillins (Verified  Allergy, Severe, HIVES, SOB (Pt has received 

Cefepime & Ceftriaxone), 12/5/18)


     codeine (Verified  Allergy, Severe, SOB, HIVES, 6/8/18)





Home Medications


Albuterol Sulfate 18 Gm Hfa.aer.ad, 2 PUFF IH Q6H PRN for SHORTNESS OF BREATH, (

Reported)


Amiodarone HCl 200 Mg Tablet, PO UD, (Reported)


Aspirin 81 Mg Tab.chew, 81 MG PO DAILY


   Prescribed by: MICHELE WILKINS on 1/7/19 0812


Atorvastatin Calcium 40 Mg Tablet, 40 MG PO HS


   Prescribed by: MICHELE WILKINS on 1/7/19 0812


Cholestyramine/Aspartame 4 Gm Powd.pack, 4 GM PO 1000,2300


   Prescribed by: MICHELE WILKINS on 1/7/19 0812


Digoxin 125 Mcg Tablet, 125 MCG PO DAILY, (Reported)


Diltiazem HCl 60 Mg Tablet, 60 MG PO Q8HR


   Prescribed by: MICHELE WILKINS on 1/7/19 0812


Furosemide 40 Mg Tablet, 20 MG PO BID, (Reported)


Insulin Aspart 100 Unit/1 Ml Susp, SQ UD, (Reported)


Magnesium Hydroxide 400 Mg/5 Ml Oral.susp, 30 ML PO DAILY PRN for CONSTIPATION-

7TH LINE


   Prescribed by: MICHELE WILKINS on 1/7/19 0812


Menthol/Lanolin/Calamine/Znox 71 Gm Oint, 0 GM TOP BID


   Prescribed by: MICHELE WILKINS on 1/7/19 0812


Metoprolol Tartrate 25 Mg Tablet, 12.5 MG PO BID


   Prescribed by: MICHELE WILKINS on 1/7/19 0914


Mexiletine HCl 150 Mg Cap, 150 MG PO TID, (Reported)


Phenazopyridine HCl 200 Mg Tablet, 200 MG PO TID, (Reported)


Potassium Chloride 20 Meq Packet, 15 MEQ PEG DAILY@0700


   Prescribed by: MICHELE WILKINS on 1/7/19 0812





Patient Home Medication List


Home Medication List Reviewed:  Yes





Review of Systems


Review of Systems


Constitutional:  No chills, No fever, No malaise


EENTM:  No Blurred Vision, No Double Vision, No Eye Pain


Respiratory:  Denies Cough, Denies Shortness of Air, Denies Wheezing


Cardiovascular:  See HPI, Chest Pain; Denies Lightheadedness


Gastrointestinal:  Denies Abdomen Distended, Denies Abdominal Pain


Genitourinary:  Denies Burning, Denies Discharge


Musculoskeletal:  No back pain, No joint pain


Skin:  No pruritus, No rash


Psychiatric/Neurological:  Denies Anxiety, Denies Depressed





Past Medical-Social-Family Hx


Patient Social History


Alcohol Use:  Denies Use


Recreational Drug Use:  No


Smoking Status:  Former Smoker


Type Used:  Cigarettes


Former Smoker, Quit:  Dec 24, 1988


2nd Hand Smoke Exposure:  No


Recent Foreign Travel:  No


Contact w/Someone Who Travel:  No


Recent Infectious Disease Expo:  No


Recent Hopitalizations:  Yes





Immunizations Up To Date


Tetanus Booster (TDap):  More than 5yrs


Date of Pneumonia Vaccine:  Oct 14, 2018


Date of Influenza Vaccine:  Oct 10, 2018





Seasonal Allergies


Seasonal Allergies:  No





Past Medical History


Surgeries:  Yes (skin cancer removal, cataract surgery, cardiac stents )


Cardiac, CABG, Coronary Stent, Defibrillator, Eye Surgery, Orthopedic, Renal


Respiratory:  Yes (O2 3L/NC AT HS-now off all oxygen 12/2018; CHRONIC 

RESPIRATORY FAILURE)


Asthma, Pneumonia, Chronic Bronchitis, Sleep Apnea, COPD


Currently Using CPAP:  No


Currently Using BIPAP:  No


Cardiac:  Yes (CABG)


Cardiomyopathy, Chronic Edema/Swelling, Coronary Artery Disease, Heart Attack, 

High Cholesterol, Hypertension, Irregular Heartbeat


Neurological:  Yes


Dementia, Neuropathy


Reproductive Disorders:  Yes (unable to have children- mumps as a child)


Sexually Transmitted Disease:  No


HIV/AIDS:  No


Genitourinary:  Yes (renal insufficiency-renal stent placement)


Kidney Infection, Bladder Infection, Kidney Stones


Gastrointestinal:  Yes (peg tube pjxhewlgy-VLH-ivpmog aspiration)


Gastroesophageal Reflux, Ulcer


Musculoskeletal:  Yes (POOR MOBILITY; LEFT HIP FRACTURE)


Arthritis, Chronic Back Pain, Fractures


Endocrine:  Yes


Diabetes, Insulin dep


HEENT:  Yes


Cataract, Glaucoma


Loss of Vision:  Bilateral


Hearing Impairment:  Hard of Hearing


Cancer:  Yes


Skin


Did You Recieve Any Treatments:  Yes


What Type of Treatment Did You:  Surgical Intervention


Psychosocial:  Yes (combative at times)


Anxiety, Depression


Integumentary:  Yes (shingles , SKIN CANCER; LEG CELLULITIS/ STASIS DERMATITIS)


Blood Disorders:  No


Adverse Reaction/Blood Tranf:  No





Family Medical History





Cardiovascular disease


  19 MOTHER


  G8 BROTHER


  G8 SISTER


Cervical cancer


  19 MOTHER


Heart Disease





Physical Exam


Vital Signs





Vital Signs - First Documented








 1/26/19





 10:11


 


Temp 98.0


 


Pulse 90


 


Resp 16


 


B/P (MAP) 131/77 (95)


 


Pulse Ox 96


 


O2 Delivery Nasal Cannula


 


O2 Flow Rate 2.00





Capillary Refill : Less Than 3 Seconds


Height, Weight, BMI


Height: 5'5.00"


Weight: 160lbs. 4.8oz. 72.960648it; 27.8 BMI


Method:Stated


General Appearance:  No Apparent Distress, Other (peg)


HEENT:  PERRL/EOMI, Pharynx Normal, Moist Mucous Membranes


Neck:  Full Range of Motion, Normal Inspection


Respiratory:  Chest Non Tender, Lungs Clear, Normal Breath Sounds, No Accessory 

Muscle Use, No Respiratory Distress


Cardiovascular:  Regular Rate, Rhythm, Normal Peripheral Pulses


Gastrointestinal:  Normal Bowel Sounds, No Organomegaly, Non Tender, Soft


Extremity:  Normal Capillary Refill, Non Tender, No Calf Tenderness


Neurologic/Psychiatric:  Alert, Oriented x3, Other (poor remote history)





Progress/Results/Core Measures


Results/Orders


Lab Results





Laboratory Tests








Test


 1/26/19


10:16 1/26/19


10:20 1/26/19


10:33 Range/Units


 


 


White Blood Count


 11.9 H


 


 


 4.3-11.0


10^3/uL


 


Red Blood Count


 3.81 L


 


 


 4.35-5.85


10^6/uL


 


Hemoglobin 11.8 L   13.3-17.7  G/DL


 


Hematocrit 38 L   40-54  %


 


Mean Corpuscular Volume 99    80-99  FL


 


Mean Corpuscular Hemoglobin 31    25-34  PG


 


Mean Corpuscular Hemoglobin


Concent 31 L


 


 


 32-36  G/DL





 


Red Cell Distribution Width 19.0 H   10.0-14.5  %


 


Platelet Count


 170 


 


 


 130-400


10^3/uL


 


Mean Platelet Volume 12.1 H   7.4-10.4  FL


 


Neutrophils (%) (Auto) 76 H   42-75  %


 


Lymphocytes (%) (Auto) 12    12-44  %


 


Monocytes (%) (Auto) 12    0-12  %


 


Eosinophils (%) (Auto) 0    0-10  %


 


Basophils (%) (Auto) 0    0-10  %


 


Neutrophils # (Auto) 9.0 H   1.8-7.8  X 10^3


 


Lymphocytes # (Auto) 1.4    1.0-4.0  X 10^3


 


Monocytes # (Auto) 1.4 H   0.0-1.0  X 10^3


 


Eosinophils # (Auto)


 0.0 


 


 


 0.0-0.3


10^3/uL


 


Basophils # (Auto)


 0.0 


 


 


 0.0-0.1


10^3/uL


 


Sodium Level 145    135-145  MMOL/L


 


Potassium Level 6.0 H   3.6-5.0  MMOL/L


 


Chloride Level 112 H     MMOL/L


 


Carbon Dioxide Level 23    21-32  MMOL/L


 


Anion Gap 10    5-14  MMOL/L


 


Blood Urea Nitrogen 25 H   7-18  MG/DL


 


Creatinine


 1.17 


 


 


 0.60-1.30


MG/DL


 


Estimat Glomerular Filtration


Rate > 60 


 


 


  





 


BUN/Creatinine Ratio 21     


 


Glucose Level 101      MG/DL


 


Calcium Level 10.1    8.5-10.1  MG/DL


 


Corrected Calcium 10.4 H   8.5-10.1  MG/DL


 


Magnesium Level 2.5 H   1.8-2.4  MG/DL


 


Total Bilirubin 0.8    0.1-1.0  MG/DL


 


Aspartate Amino Transf


(AST/SGOT) 52 H


 


 


 5-34  U/L





 


Alanine Aminotransferase


(ALT/SGPT) 56 H


 


 


 0-55  U/L





 


Alkaline Phosphatase 106      U/L


 


Myoglobin


 55.1 


 


 


 10.0-92.0


NG/ML


 


Troponin I 0.062    <0.028  NG/ML


 


Total Protein 8.9 H   6.4-8.2  GM/DL


 


Albumin 3.6    3.2-4.5  GM/DL


 


Digoxin Level


 2.13 *H


 


 


 0.80-2.00


NG/ML


 


Prothrombin Time  15.8 H  12.2-14.7  SEC


 


INR Comment  1.3   0.8-1.4  


 


Activated Partial


Thromboplast Time 


 31 


 


 24-35  SEC





 


Glucometer   96    MG/DL








My Orders





Orders - SANGEETA,KYLAH J


Cbc With Automated Diff (1/26/19 10:08)


Magnesium (1/26/19 10:08)


Chest 1 View, Ap/Pa Only (1/26/19 10:08)


Ekg Tracing (1/26/19 10:08)


Cardiac Profile 1 (1/26/19 10:08)


Comprehensive Metabolic Panel (1/26/19 10:08)


Myoglobin Serum (1/26/19 10:08)


Protime With Inr (1/26/19 10:08)


Partial Thromboplastin Time (1/26/19 10:08)


O2 (1/26/19 10:08)


Monitor-Rhythm Ecg Trace Only (1/26/19 10:08)


Lipid Panel (1/27/19 06:00)


Saline Lock/Iv-Start (1/26/19 10:08)


Ticagrelor Tablet (Brilinta Tablet) (1/26/19 10:28)


Heparin Injection (Heparin Injection) (1/26/19 10:30)


Digoxin (1/26/19 10:28)


Accucheck Stat ONCE (1/26/19 10:30)


Lidocaine 1% Inj 20 Ml (Xylocaine 1% Inj (1/26/19 10:32)


Midazolam Injection (Versed Injection) (1/26/19 10:32)


Heparin (Bolus Per Protocol) (Heparin (B (1/26/19 10:32)


Fentanyl  Injection (Sublimaze Injection (1/26/19 10:32)


Heparin (Bolus Per Protocol) (Heparin (B (1/26/19 10:32)


Ns Iv 1000 Ml (Sodium Chloride 0.9%) (1/26/19 10:33)


Heparin (Cath Lab) (Heparin (Cath Lab)) (1/26/19 10:33)


Nitro Drip 12552 Mcg/D5w (Nitroglycerin (1/26/19 10:33)





Medications Given in ED





Current Medications








 Medications  Dose


 Ordered  Sig/Silvana


 Route  Start Time


 Stop Time Status Last Admin


Dose Admin


 


 Heparin Sodium


  (Porcine)  5,000 units  ONCE  ONCE


 IV  1/26/19 10:30


 1/26/19 10:31 DC 1/26/19 10:46


5,000 UNITS


 


 Ticagrelor  180 mg  1028  ONCE


 PO  1/26/19 10:28


 1/26/19 10:31 DC 1/26/19 10:38


180 MG








Vital Signs/I&O











 1/26/19 1/26/19





 10:11 10:17


 


Temp 98.0 


 


Pulse 90 


 


Resp 16 


 


B/P (MAP) 131/77 (95) 


 


Pulse Ox 96 


 


O2 Delivery Nasal Cannula Nasal Cannula


 


O2 Flow Rate 2.00 2.00














Blood Pressure Mean:  95











Progress


Progress Note :  


   Time:  10:27


Progress Note


History of remote CABG and stent placed 2002 follows with Dr. David in 

[place]. Congestive heart failure with ejection fraction 54% on an MPI in 2014. 

Paroxysmal atrial fibrillation on Cardizem, amiodarone, digoxin. Unable to 

tolerate oral anticoagulation secondary to recurrent epistaxis and anemia. Has 

an ICD implanted by Dr. Weill in [place] on mexiletine with history of shock 

from his defibrillator for ventricular fibrillation. History of COPD diabetes.


Discussed case with Dr. Oglesby at 1005 and again at 1010 and he recommends that 

since the Cath Lab was already activated at 0957 he would just plan to catheter 

the patient for an STEMI and have medical admit and he will consult. Give 

Brilinta and heparin.


Initial ECG Impression Date:  Jan 26, 2019


Initial ECG Impression Time:  10:13


Initial ECG Rate:  90


Initial ECG Rhythm:  Normal Sinus


Initial ECG Intervals:  QT (544)


Initial ECG Impression:  Nonspecific Changes


Initial ECG Comparisson:  Changed


Comment


Compared to November 2018 there is a new depression of the ST segment on leads 

V3 and V4. No ST elevation noted.





Diagnostic Imaging





   Diagonstic Imaging:  Xray


   Plain Films/CT/US/NM/MRI:  chest


Comments


No acute intrathoracic pathology noted.


   Reviewed:  Reviewed by Me





Departure


Communication (Admissions)


Time/Spoke to Admitting Phy:  10:50


Discussed case with Dr. Daniels and she agrees to admit the patient for further 

workup.


Time/Spoke to Consulting Phy:  10:10


Discussed the case with Dr. Oglesby and he agrees take patient to Cath Lab since 

they're already assembled for in STEMI. He suspects there is other things going 

on that would explain the situation given the mild elevation of white count and 

aberrations in the electrolytes and would like medicine team to admit and 

further worked up outpatient.





Impression





 Primary Impression:  


 Non-STEMI (non-ST elevated myocardial infarction)


 Additional Impression:  


 Electrolyte disorder


Disposition:  09 ADMITTED AS INPATIENT


Condition:  Stable





Admissions


Decision to Admit Reason:  Admit from ER (General)


Decision to Admit/Date:  Jan 26, 2019


Time/Decision to Admit Time:  10:46





Departure-Patient Inst.


Referrals:  


MAHOGANY GREENWOOD MD (PCP/Family)


Primary Care Physician











KYLAH RUTHERFORD Jan 26, 2019 10:25

## 2019-01-26 NOTE — NUR
Completed holding manual pressure. No hematoma, but bruising noted to rt groin. New drsg 
applied et sandbag to rt groin.

## 2019-01-26 NOTE — NUR
Madie RN house sup et CAYDEN Rowland house sup arrived. Dr. Oglesby called et updated. He stated 
to call cath lab nurse in.

## 2019-01-26 NOTE — CARDIAC PROCEDURE NOTE-CS/ASA
Pre-Procedure Note


Pre-Op Procedure Note


H&P Reviewed


The H&P was reviewed, patient examined and no changes noted.


Date H&P Reviewed:  Jan 26, 2019


Time H&P Reviewed:  11:13





Conscious Sedation Pre-Proced


Time


11:13





ASA Score


3


For ASA 3 and 4: Consider anesthesia and medical clearance. Also, for 

patients with a history of failed moderate sedation consider anesthesia.

















Airway 


 


Lungs 


 


Heart 


 


 ASA score


 


 ASA 1: a normal healthy patient


 


 ASA 2:  a patient with a mild systemic disease (mid diabetes, controlled 

hypertension, obesity 


 


 ASA 3:  a patient with a severe systemic disease that limits activity  (angina

, COPD, prior Myocardial infarction)


 


 ASA 4:  a patient with an incapacitating disease that is a constant threat to 

life (CHF, renal failure)


 


 ASA 5:  a moribund patient not expected to survive 24 hrs.  (ruptured aneurysm)


 


 ASA 6:  a declared brain dead patient whose organs are being harvested.


 


 For emergent operations, add the letter E after the classification











Mallampati Classification


Grade 1





Sedation Plan


Analgesia, Amnesia, Plan communicated to team members, Discussed options with 

patient/fam, Discussed risks with patient/fam


The patient is an appropriate candidate to undergo the planned procedure, 

sedation, and anesthesia.





The patient immediately re-assessed prior to indication.











PEREZ COLLAZO MD Jan 26, 2019 11:13

## 2019-01-26 NOTE — DIAGNOSTIC IMAGING REPORT
INDICATION: Chest pain.



COMPARISON:  12/26/2018.



FINDINGS: Single view of the chest demonstrates cardiac

enlargement with slight central vascular congestion. There is

basilar atelectasis in the right base. Underlying infiltrate not

excluded. Trace effusion seen in the right costophrenic angle.

There is no pneumothorax. Pacemaker is stable.



IMPRESSION:

1. Cardiac enlargement with slight central vascular congestion.

2. Trace effusion, atelectasis and infiltrate right base.

Followup recommended.



Dictated by: 



  Dictated on workstation # [id number]

## 2019-01-26 NOTE — NUR
Pam RN cath lab RN arrived. Hematoma much softer at this point. Manual pressure has 
remain held since initial hematoma noted. RLE discolored et cool to touch. Continue to 
massage hematoma

## 2019-01-26 NOTE — XMS REPORT
[facility]

 Created on: 12/10/2018



Kendall Snowden

External Reference #: [id number]

: 1940

Sex: Male



Demographics







 Address  [address] [place]  [address]

 

 Preferred Language  Unknown

 

 Marital Status  Unknown

 

 Orthodox Affiliation  Unknown

 

 Race  Unknown

 

 Ethnic Group  Unknown





Author







 Author  MAHOGANY GREENWOOD

 

 Organization  [facility]

 

 Address  [address] [place]  [address]



 

 Phone  [phone]







Care Team Providers







 Care Team Member Name  Role  Phone

 

 MAHOGANY GREENWOOD  Unavailable  [phone]







PROBLEMS







 Type  Condition  ICD9-CM Code  GRR70-XR Code  Onset Dates  Condition Status  
SNOMED Code

 

 Problem  Coronary artery disease involving native coronary artery of native 
heart without angina pectoris     I25.10     Active  8372846213811

 

 Problem  Chronic obstructive pulmonary disease, unspecified COPD type     
J44.9     Active  69745539

 

 Problem  Essential hypertension     I10     Active  76241689

 

 Problem  Chronic congestive heart failure, unspecified congestive heart 
failure type     I50.9     Active  17032090

 

 Problem  Cardiac defibrillator in place     Z95.810     Active  100112670

 

 Problem  Constipation, unspecified constipation type     K59.00     Active  
14548457

 

 Problem  Type 2 diabetes mellitus without complications     E11.9     Active  
629133336

 

 Problem  Ventricular arrhythmia     I49.9     Active  71522468

 

 Problem  Stasis dermatitis of both legs     I87.2     Active  60394561

 

 Problem  Long term current use of insulin     Z79.4     Active  368136650

 

 Problem  Other atherosclerosis of native arteries of extremities, right leg   
  I70.291     Active  29801821







ALLERGIES

No Information



ENCOUNTERS







 Encounter  Location  Date  Diagnosis

 

 [facility]  [id number], [place] [address]-
7849  10 Dec, 2018   

 

 [facility]  [id number] [place] [address]-
5054     

 

 [facility]  [address] [place] [address] 78796-
0186     

 

 [facility]  [address] [place] [address] 48743-
3622     

 

 [facility]  [address], [place] [phone]  09 2018  Periumbilical abdominal pain R10.33 and Constipation, 
unspecified constipation type K59.00

 

 [facility]  [id number] [place] [address]25-
9553  24 Oct, 2018   

 

 [facility]  [id number] [place] [address] 23480-
2282  24 Oct, 2018  Type 2 diabetes mellitus without complications E11.9 ; 
Flank pain R10.9 ; Essential hypertension I10 and Coronary artery disease 
involving native coronary artery of native heart without angina pectoris I25.10

 

 [facility]  [id number] [place] [address] 94485-
1633  22 Oct, 2018  Dysuria R30.0

 

 [facility]  [address] [address]-
5412  16 Oct, 2018  Acute cystitis without hematuria N30.00 ; Encounter for 
immunization Z23 and BMI 40.0-44.9, adult Z68.41

 

 [facility]  [id number] [place] [address]-
1815  16 Oct, 2018   

 

 [facility]  [id number] [place] [address] 63288-
9971  26 Sep, 2018   

 

 [facility]  [id number] [place] [address] 64538-
1573  11 Sep, 2018   

 

 [facility]  [id number] [place] [address] 19118-
8738  20 Aug, 2018   

 

 [facility]  [id number] [place] [address] 43890-
5616  16 Aug, 2018   

 

 [facility]  [id number] [place] [address] 68667-
5885  15 Aug, 2018   

 

 [facility]  [address] [address]-
8823     

 

 [facility]  [id number] [place] [address] 58886-
7700     

 

 [facility]  [id number] [place] [address] 70536-
2346    Acute cystitis with hematuria N30.01 and Dysuria R30.0

 

 [facility]  [address], [place] [address]-
7930     

 

 [facility]  [address] [address]-
7929     

 

 [facility]  [address], [place] [address]-
0917     

 

 [facility]  [address] [place] [address] 49187-
2289    Type 2 diabetes mellitus without complications E11.9 ; Long 
term current use of insulin Z79.4 ; History of respiratory failure Z87.09 and 
History of sepsis Z86.19

 

 [facility]  [address], [place] [address]-
1435     

 

 [facility]  [id number] [address]-
5537     

 

 [facility]  [address], [place] [address]-
5794     

 

 [facility]  [address] [address]-
7310     

 

 [facility]  [address] [address]-
1081     

 

 [facility]  [address], [place] [address]-
5322     

 

 [facility]  [address], [place] [address]  25 May, 2018  Acute cystitis without hematuria N30.00

 

 [facility]  [address], [place] [address]  19 May, 2018  Acute cystitis without hematuria N30.00

 

 [facility]  [id number], [place] [address]-
2412  18 May, 2018   

 

 [facility] [address] [address]-
5233  10 2018  Medicare annual wellness visit, initial Z00.00 ; Encounter 
for immunization Z23 ; Chronic obstructive pulmonary disease, unspecified COPD 
type J44.9 ; Coronary artery disease involving native coronary artery of native 
heart without angina pectoris I25.10 ; Chronic congestive heart failure, 
unspecified congestive heart failure type I50.9 ; Essential hypertension I10 ; 
Ventricular arrhythmia I49.9 and Other atherosclerosis of native arteries of 
extremities, right leg I70.291

 

 [place] [id number] [place] [address]-
2164  08 Mar, 2018  Chronic congestive heart failure, unspecified congestive 
heart failure type I50.9

 

 [facility] [id number] [place] [address] 47[address]-
7308     

 

 [facility] [id number] [place] [address] 09825-
4732  13 2018  Chronic congestive heart failure, unspecified congestive 
heart failure type I50.9 ; Chronic obstructive pulmonary disease, unspecified 
COPD type J44.9 and Bilateral impacted cerumen H61.23

 

 [place] [facility] [id number] [place] [address] 29[address]-
3354     

 

 [place] [id number] [place] [address] 46318-
8565     

 

 [place] [address] [place] [address] 64963-
3344  27 Dec, 2017   

 

 [place] [id number] [place] [address] 66045-
8840  20 Dec, 2017   

 

 [place] [id number] [place] [address] 23002-
9257  12 Dec, 2017  Coronary artery disease involving native coronary artery of 
native heart without angina pectoris I25.10

 

 [place] [id number] [place] [address] 14924-
1119  11 Dec, 2017   

 

 [place] [id number] [place] [address] 25007-
7186    Chronic obstructive pulmonary disease, unspecified COPD 
type J44.9 and History of pneumonia Z87.01

 

 [facility]  [address] [place] [address] 
[phone]     

 

 [facility]  [id number] [place] [address] 34274-
0258     

 

 [facility]  [address] [address]-
1017     

 

 [facility]  [id number] [place] [address] 17552-
1547     

 

 [facility]  [id number] [place] [address] 07966-
8885  19 Oct, 2017   

 

 [facility]  [id number] [place] [address] 13381-
5958  17 Oct, 2017  Coronary artery disease involving native coronary artery of 
native heart without angina pectoris I25.10 and Ventricular arrhythmia I49.9

 

 [facility]  [id number] [place] [address] 46073-
1030  09 Oct, 2017   

 

 [facility]  [id number] [place] [address] 17064-
2999  09 Oct, 2017   

 

 [facility]  [id number] [place] [address]  04 Oct, 2017  Dysuria R30.0 and Acute cystitis without hematuria N30.00

 

 [facility]  [id number] [place] [address] 77074-
8660  25 Aug, 2017  Epistaxis R04.0 and Chronic obstructive pulmonary disease, 
unspecified COPD type J44.9

 

 [facility]  [id number] [place] [address] 16876-
8516    Fall from other pedestrian conveyance, initial encounter 
V00.891A

 

 [place] [id number] [place] [address] 89419-
4837    Chronic congestive heart failure, unspecified congestive 
heart failure type I50.9 ; Chronic obstructive pulmonary disease, unspecified 
COPD type J44.9 and Stasis dermatitis of both legs I87.2

 

 [facility]  [address] [address]-
0818  08 May, 2017  Chronic congestive heart failure, unspecified congestive 
heart failure type I50.9

 

 [facility]  [address] [facility] [id number] [address]-
6245  05 May, 2017  Coronary artery disease involving native coronary artery of 
native heart without angina pectoris I25.10 ; Chronic congestive heart failure, 
unspecified congestive heart failure type I50.9 and Chronic obstructive 
pulmonary disease, unspecified COPD type J44.9

 

 [facility]  [address] [address]-
5834     

 

 [facility]  [address] [address]-
8830     

 

 [facility]  [address] [place] [address] 
[id number]     

 

 [facility]  [address] [place] [address] 
[phone]  28 Mar, 2017   

 

 [facility]  [address] [place] 
[id number]  20 Mar, 2017  Essential hypertension I10 and Chronic congestive 
heart failure, unspecified congestive heart failure type I50.9

 

 [facility]  [id number] 
[id number]  13 Mar, 2017   

 

 [facility]  [address], [place] [address]-
6600  09 Mar, 2017   

 

 [facility]  [address] [address]-
4213  19 Dec, 2016   

 

 [facility]  [address] [address]-
9376  19 Dec, 2016   

 

 [facility]  [address] [address]-
6019     

 

 [facility]  [id number] [address]-
7231     

 

 [facility]  [address], [place] [address]-
6063     

 

 [facility]  [address], [place] [address]-
8902     

 

 [facility]  [address], [place] [address]-
0603    Chronic congestive heart failure, unspecified congestive 
heart failure type I50.9

 

 [facility]  [address], [place] [address]    Pain of right lower extremity M79.604 ; History of atrial 
fibrillation without current medication Z86.79 and Acute deep vein thrombosis (
DVT) of femoral vein of right lower extremity I82.411

 

 [facility]  [address] [place] [address] 86898-
8669     

 

 [facility]  [address] [place] [address] 33204-
4192  22 Aug, 2016   

 

 [facility]  [address] [place] [address]64-
9852  22 Aug, 2016  Coronary artery disease involving native coronary artery of 
native heart without angina pectoris I25.10 ; Chronic congestive heart failure, 
unspecified congestive heart failure type I50.9 ; Cardiac defibrillator in 
place Z95.810 and Candidiasis B37.9







IMMUNIZATIONS

No Known Immunizations



SOCIAL HISTORY

Never Assessed



REASON FOR VISIT

ED visit note



PLAN OF CARE





VITAL SIGNS





MEDICATIONS

Unknown Medications



RESULTS

No Results



PROCEDURES

No Known procedures



INSTRUCTIONS





MEDICATIONS ADMINISTERED

No Known Medications



MEDICAL (GENERAL) HISTORY







 Type  Description  Date

 

 Medical History  hypertension   

 

 Medical History  skin cancer-arms, face   

 

 Medical History  MI   

 

 Medical History  Pneumonia   

 

 Medical History  shingles   

 

 Surgical History  heart cath-2 stents, multiple balloons   

 

 Surgical History  open heart surgery  

 

 Surgical History  defibrillator placed  

 

 Hospitalization History  surgery   

 

 Hospitalization History  pneumonia  

 

 Hospitalization History  broken left hip at   March

 

 Hospitalization History  Bronchitis/clinical Pneumonia,hypoxia,sepsis - [facility] 
[facility]  17

 

 Hospitalization History  [facility]- Cardiomyopathy, Defibrillator 
discharge  2018

 

 Hospitalization History  [facility]  2018

## 2019-01-26 NOTE — XMS REPORT
[facility]

 Created on: 2018



Kendall Snowden

External Reference #: [id number]

: 1940

Sex: Male



Demographics







 Address  [address] [place]  [address]

 

 Preferred Language  Unknown

 

 Marital Status  Unknown

 

 Bahai Affiliation  Unknown

 

 Race  Unknown

 

 Ethnic Group  Unknown





Author







 Author  MAHOGANY GREENWOOD

 

 Organization  [facility]

 

 Address  [address] [place]  [address]



 

 Phone  [phone]







Care Team Providers







 Care Team Member Name  Role  Phone

 

 MAHOGANY GREENWOOD  Unavailable  [phone]







PROBLEMS







 Type  Condition  ICD9-CM Code  [id number] Code  Onset Dates  Condition Status  
SNOMED Code

 

 Problem  Coronary artery disease involving native coronary artery of native 
heart without angina pectoris     I25.10     Active  1818026087064

 

 Problem  Chronic obstructive pulmonary disease, unspecified COPD type     
J44.9     Active  93213205

 

 Problem  Essential hypertension     I10     Active  81564153

 

 Problem  Chronic congestive heart failure, unspecified congestive heart 
failure type     I50.9     Active  56079314

 

 Problem  Cardiac defibrillator in place     Z95.810     Active  785633369

 

 Problem  Constipation, unspecified constipation type     K59.00     Active  
41080467

 

 Problem  Type 2 diabetes mellitus without complications     E11.9     Active  
452527452

 

 Problem  Ventricular arrhythmia     I49.9     Active  35018436

 

 Problem  Stasis dermatitis of both legs     I87.2     Active  54446370

 

 Problem  Long term current use of insulin     Z79.4     Active  776815372

 

 Problem  Other atherosclerosis of native arteries of extremities, right leg   
  I70.291     Active  24178411







ALLERGIES

No Information



ENCOUNTERS







 Encounter  Location  Date  Diagnosis

 

 [facility]  [id number] [place] [address]-
[phone]     

 

 [facility]  [id number] [place] [address]446-
4800     

 

 [facility]  [id number] [place] [address]96-
8743     

 

 [facility]  [id number] [place] [address]
-[phone]    Periumbilical abdominal pain R10.33 and Constipation, 
unspecified constipation type K59.00

 

 [facility]  [id number] [place] [address]-
8544  24 Oct, 2018   

 

 [facility]  [address], [place] [address]
5718  24 Oct, 2018  Type 2 diabetes mellitus without complications E11.9 ; 
Flank pain R10.9 ; Essential hypertension I10 and Coronary artery disease 
involving native coronary artery of native heart without angina pectoris I25.10

 

 [facility]  [id number] [place] [address] 038[address]-
3421  22 Oct, 2018  Dysuria R30.0

 

 [facility]  [id number] [place] [address]-
6972  16 Oct, 2018  Acute cystitis without hematuria N30.00 ; Encounter for 
immunization Z23 and BMI 40.0-44.9, adult Z68.41

 

 [facility]  [id number] [address]-
5104  16 Oct, 2018   

 

 [facility]  [id number] [place] [address] 23547-
8635  26 Sep, 2018   

 

 [facility]  [id number] [place] [address] 048[address]-
7232  11 Sep, 2018   

 

 [facility]  [id number] [place] [address] 38495-
8601  20 Aug, 2018   

 

 [facility]  [id number] [address]-
1639  16 Aug, 2018   

 

 [facility]  [id number] [address]-
7848  15 Aug, 2018   

 

 [facility]  [address] [address]-
4618     

 

 [facility]  [id number] [place] [address] [phone]-
9845     

 

 [facility]  [address] [address]-
2226    Acute cystitis with hematuria N30.01 and Dysuria R30.0

 

 [facility]  [address] [address] [address]-
1109     

 

 [facility]  [address] [address]-
7567     

 

 [facility]  [address] [address]-
4684     

 

 [facility]  [address], [place] [address]-
0639    Type 2 diabetes mellitus without complications E11.9 ; Long 
term current use of insulin Z79.4 ; History of respiratory failure Z87.09 and 
History of sepsis Z86.19

 

 [facility]  [address] [address]-
4086     

 

 [facility]  [address] [address]-
2869     

 

 [facility]  [address] [address]-
4814     

 

 [facility]  [address] [address]-
4341     

 

 [facility]  [address], [place] [address]-
6118     

 

 [facility]  [id number] [address]-
5621     

 

 [facility]  [id number] [address]
-[phone]  25 May, 2018  Acute cystitis without hematuria N30.00

 

 [facility]  [id number], [place] [address]
-[phone]  19 May, 2018  Acute cystitis without hematuria N30.00

 

 [facility]  [id number] [address]-
3137  18 May, 2018   

 

 [facility]  [id number] [address]-
2521  10 Apr, 2018  Medicare annual wellness visit, initial Z00.00 ; Encounter 
for immunization Z23 ; Chronic obstructive pulmonary disease, unspecified COPD 
type J44.9 ; Coronary artery disease involving native coronary artery of native 
heart without angina pectoris I25.10 ; Chronic congestive heart failure, 
unspecified congestive heart failure type I50.9 ; Essential hypertension I10 ; 
Ventricular arrhythmia I49.9 and Other atherosclerosis of native arteries of 
extremities, right leg I70.291

 

 [place] [address] [address]-
4036  08 Mar, 2018  Chronic congestive heart failure, unspecified congestive 
heart failure type I50.9

 

 [place] [id number] [place] [address]-
7511     

 

 [place] [address] [address]-
5315  13 2018  Chronic congestive heart failure, unspecified congestive 
heart failure type I50.9 ; Chronic obstructive pulmonary disease, unspecified 
COPD type J44.9 and Bilateral impacted cerumen H61.23

 

 [place] [id number] [place] [address] 100[address]-
1045     

 

 [place] [address] [address]-
7636     

 

 [place] [id number] [place] [address]-
8640  27 Dec, 2017   

 

 [place] [id number] [place] [address]-
3616  20 Dec, 2017   

 

 [place] [id number] [place] [address]-
6163  12 Dec, 2017  Coronary artery disease involving native coronary artery of 
native heart without angina pectoris I25.10

 

 [place] [address] [address]-
2432  11 Dec, 2017   

 

 [place] [id number] [place] [address]-
1526  30 2017  Chronic obstructive pulmonary disease, unspecified COPD 
type J44.9 and History of pneumonia Z87.01

 

 [facility]  [id number] 
[id number]     

 

 [facility]  [id number] [place] [address]-
7465     

 

 [facility]  [id number] [place] [address]-
7430     

 

 [facility]  [address] [address]-
6416     

 

 [facility]  [id number] [place] [address] 08042-
7809  19 Oct, 2017   

 

 [facility]  [id number] [place] [address] 39[address]-
0542  17 Oct, 2017  Coronary artery disease involving native coronary artery of 
native heart without angina pectoris I25.10 and Ventricular arrhythmia I49.9

 

 [facility]  [id number] [place] [address] 22860-
6578  09 Oct, 2017   

 

 [facility]  [id number] [place] [address]-
9297  09 Oct, 2017   

 

 [facility]  [id number] [place] [address] 8[address]
-[phone]  04 Oct, 2017  Dysuria R30.0 and Acute cystitis without hematuria N30.00

 

 [facility]  [id number] [place] [address] 02448-
5757  25 Aug, 2017  Epistaxis R04.0 and Chronic obstructive pulmonary disease, 
unspecified COPD type J44.9

 

 [facility]  [id number] [place] [address] 0[address]-
9377    Fall from other pedestrian conveyance, initial encounter 
V00.891A

 

 [facility]  [address] [place] [address] 08645-
1287    Chronic congestive heart failure, unspecified congestive 
heart failure type I50.9 ; Chronic obstructive pulmonary disease, unspecified 
COPD type J44.9 and Stasis dermatitis of both legs I87.2

 

 [facility]  [address] [facility] [id number] [address]-
2431  08 May, 2017  Chronic congestive heart failure, unspecified congestive 
heart failure type I50.9

 

 [facility]  [address], [place] [address]-
8526  05 May, 2017  Coronary artery disease involving native coronary artery of 
native heart without angina pectoris I25.10 ; Chronic congestive heart failure, 
unspecified congestive heart failure type I50.9 and Chronic obstructive 
pulmonary disease, unspecified COPD type J44.9

 

 [facility]  [address] [facility] [id number] [address]-
6650     

 

 [facility]  [address] [facility] [id number] [address]-
2052     

 

 [facility]  [id number] 
[id number]     

 

 [facility]  [id number] [place] [address] 
[id number]  28 Mar, 2017   

 

 [facility]  [address]   [place] 
[id number]  20 Mar, 2017  Essential hypertension I10 and Chronic congestive 
heart failure, unspecified congestive heart failure type I50.9

 

 [facility]  [id number], [place] 
[id number]  13 Mar, 2017   

 

 [facility]  [id number], [place] [address]-
8018  09 Mar, 2017   

 

 [facility]  [id number], [place] [address]-
7477  19 Dec, 2016   

 

 [facility]  [id number], [place] [address]-
5770  19 Dec, 2016   

 

 [facility]  [address], [place] [address]-
5543     

 

 [facility]  [id number], [place] [address]-
0977     

 

 [facility]  [address]100[place], [place] [address]-
1837     

 

 [facility]  [address], [place] [address]-
5783     

 

 [facility]  [address] [place] [address] 480[address]-
0706    Chronic congestive heart failure, unspecified congestive 
heart failure type I50.9

 

 [facility]  [address] [place] [address]
-9585    Pain of right lower extremity M79.604 ; History of atrial 
fibrillation without current medication Z86.79 and Acute deep vein thrombosis (
DVT) of femoral vein of right lower extremity I82.411

 

 [facility]  [address] [place] [address] 19[address]-
8475     

 

 [facility]  [id number] [place] [address] 95[address]-
1677  22 Aug, 2016   

 

 [place] [address] [place] [address] 874[address]-
7084  22 Aug, 2016  Coronary artery disease involving native coronary artery of 
native heart without angina pectoris I25.10 ; Chronic congestive heart failure, 
unspecified congestive heart failure type I50.9 ; Cardiac defibrillator in 
place Z95.810 and Candidiasis B37.9







IMMUNIZATIONS

No Known Immunizations



SOCIAL HISTORY

Never Assessed



REASON FOR VISIT

FYI



PLAN OF CARE





VITAL SIGNS





MEDICATIONS

Unknown Medications



RESULTS

No Results



PROCEDURES

No Known procedures



INSTRUCTIONS





MEDICATIONS ADMINISTERED

No Known Medications



MEDICAL (GENERAL) HISTORY







 Type  Description  Date

 

 Medical History  hypertension   

 

 Medical History  skin cancer-arms, face   

 

 Medical History  MI   

 

 Medical History  Pneumonia   

 

 Medical History  shingles   

 

 Surgical History  heart cath-2 stents, multiple balloons   

 

 Surgical History  open heart surgery  

 

 Surgical History  defibrillator placed  

 

 Hospitalization History  surgery   

 

 Hospitalization History  pneumonia  

 

 Hospitalization History  broken left hip at   March

 

 Hospitalization History  Bronchitis/clinical Pneumonia,hypoxia,sepsis - [facility] 
[facility]  17

 

 Hospitalization History  [facility] [place]- Cardiomyopathy, Defibrillator 
discharge  2018

 

 Hospitalization History  [facility]  2018
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
## 2019-01-26 NOTE — CONSULTATION-CARDIOLOGY
HPI-Cardiology


Cardiology Consultation:


Date of Consultation


1/26/19


Date of Admission





Attending Physician


PEREZ Oglesby MD


Admitting Physician


Raúl Gonzalez MD


Consulting Physician


PEREZ OGLESBY MD





HPI:


Time Seen by a Provider:  10:50


Chief Complaint:


Ongoing chest pain


This is a 78-year-old gentleman with complex medical and cardiac history.  His 

cardiac history includes CABG and PCI to proximal RCA and proximal/mid left 

circumflex artery with drug-eluting stent.  He also has a biventricular ICD.  

Previous history of ventricular tachycardia/ventricular fibrillation was on 

amiodarone, mexiletine, digoxin.  He was not on oral anticoagulation therapy 

due to bleeding.  The patient also has history of paroxysmal atrial 

fibrillation. 





Multiple medical issues include severe COPD, previous history of tracheostomy.  

PEG tube.  Renal insufficiency, stones.  Recent history of UTI sepsis, 

electrolyte abnormalities.





Fever initially informed by the EMS that the patient has inferior STEMI.  

However review of the EKG did not reveal any significant ST elevation in the 

inferior leads however new deep ST depressions were noted in V3 and V4.  The 

patient had chest pain starting at 6 a.m. which according to the patient was 10/

10.  He finally called the EMS a few hours later.  He was given 3 nitroglycerin 

with some relief.  In the ER we gave him heparin IV 5000 units, aspirin, 

Brilinta 180 mg by mouth bolus.  That gave him some relief however he was still 

having chest pain when I saw the patient.  Therefore at that point in time we 

decided to urgently called the Cath Lab and performed coronary angiography.





Review of Systems-Cardiology


Review of Systems


Constitutional:  As described under HPI; No As described under HPI, No no 

symptoms reported, No chills, No fever, No lightheadedness


Eyes:  No As described under HPI, No no symptoms reported, No blindness, No 

blurred vision, No contact lenses, No drainage, No decreased acuity, No foreign 

body sensation, No pain, No vision change


Ears/Nose/Throat:  No As described under HPI, No no symptoms reported, No 

chronic hearing loss, No ear discharge, No ear pain, No nasal drainage, No 

ulcerations


Respiratory:  No no symptoms reported; As described under HPI; No As described 

under HPI, No cough, No orthopnea, No shortness of breath, No SOB with excertion


Cardiovascular:  No no symptoms reported; As described under HPI; No As 

described under HPI; chest pain; No edema, No irregular heart rate, No 

lightheadedness, No palpitations


Gastrointestinal:  No no symptoms reported, No As described under HPI, No 

abdomen distended, No abdominal pain, No blood streaked bowels, No constipation

, No diarrhea, No nausea, No vomiting, No stool coloration changes


Genitourinary:  No As described under HPI, No burning, No dysuria, No discharge

, No frequency, No flank pain, No hematuria, No urgency


Musculoskeletal:  No no symptoms reported, No As describe under HPI, No back 

pain, No gout, No joint pain, No joint swelling, No muscle pain, No muscle 

stiffness, No neck pain, No other


Skin:  No no symptoms reported, No As described under HPI, No change in color, 

No change in hair/nails, No dryness, No lesions, No lumps, No rash, No other, 

No skin related problems, No ulcerations, No rash on exposed areas, No 

ulcerations on exposed areas


Psychiatric/Neurological:  No anxiety, No depression, No seizure, No focal 

weakness, No syncope


Hematologic:  No bleeding abnormalities





PMH-Social-Family Hx


Patient Social History


Former smoker/When Quit:  Jan 4, 1988


Type Used:  Cigarettes


2nd Hand Smoke Exposure:  No


Recent Foreign Travel:  No


Recent Infectious Disease Expo:  No





Immunizations Up To Date


Tetanus Booster (TDap):  More than 5yrs


Date of Pneumonia Vaccine:  Oct 14, 2018


Date of Influenza Vaccine:  Oct 10, 2018





Past Medical History


PMH


As described under Assessment.





Family Medical History


Family History:  


Cardiovascular disease


  19 MOTHER


  G8 BROTHER


  G8 SISTER


Cervical cancer


  19 MOTHER





Allergies and Home Medications


Allergies


Coded Allergies:  


     Penicillins (Verified  Allergy, Severe, HIVES, SOB (Pt has received 

Cefepime & Ceftriaxone), 12/5/18)


     codeine (Verified  Allergy, Severe, SOB, HIVES, 6/8/18)





Home Medications


Albuterol Sulfate 18 Gm Hfa.aer.ad, 2 PUFF IH Q6H PRN for SHORTNESS OF BREATH, (

Reported)


Amiodarone HCl 200 Mg Tablet, PO UD, (Reported)


Aspirin 81 Mg Tab.chew, 81 MG PO DAILY


   Prescribed by: MICHELE WILKINS on 1/7/19 0812


Atorvastatin Calcium 40 Mg Tablet, 40 MG PO HS


   Prescribed by: MICHELE WILKINS on 1/7/19 0812


Cholestyramine/Aspartame 4 Gm Powd.pack, 4 GM PO 1000,2300


   Prescribed by: MICHELE WILKNIS on 1/7/19 0812


Digoxin 125 Mcg Tablet, 125 MCG PO DAILY, (Reported)


Diltiazem HCl 60 Mg Tablet, 60 MG PO Q8HR


   Prescribed by: MICHELE WILKINS on 1/7/19 0812


Furosemide 40 Mg Tablet, 20 MG PO BID, (Reported)


Insulin Aspart 100 Unit/1 Ml Susp, SQ UD, (Reported)


Magnesium Hydroxide 400 Mg/5 Ml Oral.susp, 30 ML PO DAILY PRN for CONSTIPATION-

7TH LINE


   Prescribed by: MICHELE WILKINS on 1/7/19 0812


Menthol/Lanolin/Calamine/Znox 71 Gm Oint, 0 GM TOP BID


   Prescribed by: MICHELE WILKINS on 1/7/19 0812


Metoprolol Tartrate 25 Mg Tablet, 12.5 MG PO BID


   Prescribed by: MICHELE WILKINS on 1/7/19 0914


Mexiletine HCl 150 Mg Cap, 150 MG PO TID, (Reported)


Phenazopyridine HCl 200 Mg Tablet, 200 MG PO TID, (Reported)


Potassium Chloride 20 Meq Packet, 15 MEQ PEG DAILY@0700


   Prescribed by: MICHELE WILKINS on 1/7/19 0812





Patient Home Medication List


Home Medication List Reviewed:  Yes





Physical Exam-Cardiology


Physical Exam


Vital Signs/I&O











 1/26/19 1/26/19 1/26/19





 10:11 10:17 10:53


 


Temp 98.0  


 


Pulse 90  91


 


Resp 16  16


 


B/P (MAP) 131/77 (95)  129/80 (96)


 


Pulse Ox 96  98


 


O2 Delivery Nasal Cannula Nasal Cannula Room Air


 


O2 Flow Rate 2.00 2.00 





Capillary Refill : Less Than 3 Seconds


Constitutional:  appears stated age, AAO x 3; No apparent distress; well-

developed, well-nourished


HEENT:  PERRL; No normal ENT inspection, No TMs normal, No pharynx normal, No 

scleral icterus (R), No scleral icterus (L), No pale conjunctivae (R), No pale 

conjunctivae (L), No photophobia, No TM abnormal (R), No TM abnormal (L), No 

pharyngeal erythema, No tonsillar exudate, No other, No discharge, No EOMI; 

hearing is well preserved; No hard of hearing; oral hygience is good; No 

ulceration, No xanthelasmas are seen


Neck:  No non-tender, No full range of motion, No supple, No normal inspection, 

No carotid bruit, No limited range of motion, No lymphadenopathy (R), No 

lymphadenopathy (L), No tender lateral, No tender midline, No thyromegaly, No 

other; carotid pulses are 2 + bilaterally; No with good upstrokes


Respiratory:  No accessory muscle use, No respiratory distress, No chest tender

, No chest expansion is symmetric; chest is bilaterally symmetric; No lungs 

clear to percussion; lungs clear to auscultation; No crackles, No rhonchi, No 

rales, No stridor, No wheezing, No pleural rub, No other


Cardiovascular:  regular rate-rhythm; No irregularly irregular, No extra beats, 

No parasternal heave is noted, No JVD, No edema, No bradycardia, No tachycardia

, No point of maximal impulse, No cardiac thrills are palpable; S1 and S2; No 

gallop/S3, No gallop/S4, No diastolic murmur, No systolic murmur, No friction 

rub, No click, No other


Gastrointestinal:  No tender, No soft, No round, No distended, No pulsatile mass

, No organomegaly, No guarding, No rebound, No tenderness, No hernia, No mass, 

No audible bowel sounds, No abnormal bowel sounds, No abdominal bruits, No 

spleenomegaly; other (PEG tube)


Rectal:  deferred


Extremities:  No normal range of motion, No non-tender, No normal inspection, 

No pedal edema, No calf tenderness, No normal capillary refill, No pelvis stable

, No calf tenderness, No inflammation, No pedal edema, No slow capillary refill

, No swelling, No other, No abrasion, No clubbing, No cyanosis, No ecchymosis, 

No laceration, No no lower extremity edema bilateral, No significant edema, No 

tenderness, No wound


Neurologic/Psychiatric:  no motor/sensory deficits, alert, normal mood/affect, 

oriented x 3, power is 5/5 both on sides


Skin:  No normal color, No warm/dry, No cyanosis, No cool, No diaphoresis, No 

damp, No ecchymosis, No jaundice, No mottled, No pallor, No rash, No tattoos/

piercings, No ulcerations, No rash on exposed areas, No ulcerations on exposed 

areas, No other





Data Review


Labs


Laboratory Tests


1/26/19 10:16: 


White Blood Count 11.9H, Red Blood Count 3.81L, Hemoglobin 11.8L, Hematocrit 38L

, Mean Corpuscular Volume 99, Mean Corpuscular Hemoglobin 31, Mean Corpuscular 

Hemoglobin Concent 31L, Red Cell Distribution Width 19.0H, Platelet Count 170, 

Mean Platelet Volume 12.1H, Neutrophils (%) (Auto) 76H, Lymphocytes (%) (Auto) 

12, Monocytes (%) (Auto) 12, Eosinophils (%) (Auto) 0, Basophils (%) (Auto) 0, 

Neutrophils # (Auto) 9.0H, Lymphocytes # (Auto) 1.4, Monocytes # (Auto) 1.4H, 

Eosinophils # (Auto) 0.0, Basophils # (Auto) 0.0, Sodium Level 145, Potassium 

Level 6.0H, Chloride Level 112H, Carbon Dioxide Level 23, Anion Gap 10, Blood 

Urea Nitrogen 25H, Creatinine 1.17, Estimat Glomerular Filtration Rate > 60, BUN

/Creatinine Ratio 21, Glucose Level 101, Calcium Level 10.1, Corrected Calcium 

10.4H, Magnesium Level 2.5H, Total Bilirubin 0.8, Aspartate Amino Transf (AST/

SGOT) 52H, Alanine Aminotransferase (ALT/SGPT) 56H, Alkaline Phosphatase 106, 

Myoglobin 55.1, Troponin I 0.062, Total Protein 8.9H, Albumin 3.6, Digoxin 

Level 2.13*H


1/26/19 10:20: 


Prothrombin Time 15.8H, INR Comment 1.3, Activated Partial Thromboplast Time 31


1/26/19 10:33: Glucometer 96





ECG Impression


ECG


Comment


No significant P waves noted, rhythm is likely atrial fibrillation.  ST 

depressions noted in V3 and V4.





A/P-Cardiology


Assessment/Admission Diagnosis


Acute non-STEMI, refractory to medical therapy,


Atrial fibrillation persistent,


Ischemic cardiomyopathy, biventricular ICD,


History of ventricular tachycardia/ventricular fibrillation,


Chronic kidney injury,


COPD,


History of PEG placement,


Leukocytosis,


Anemia,


Hyperkalemia,


Hypomagnesemia,


Elevated LFTs





Plan


Acute non-STEMI, refractory to medical therapy, ongoing chest pain in the ER 

with some relief with medical therapy.  Urgent coronary angiography is 

recommended.  Patient will be taken from the ER to the Cath Lab.  Given 5000 

units of heparin.  Aspirin and Brilinta bolus.  Echocardiogram.  Informed 

consent was taken and the procedure was discussed at length.  All the risks and 

complication were explained in detail.





Atrial fibrillation persistent, patient is on amiodarone, mexiletine, digoxin, 

verapamil, metoprolol.  Not on oral anticoagulation due to previous history of 

bleeding.





Ischemic cardiomyopathy, biventricular ICD,





History of ventricular tachycardia/ventricular fibrillation, on amiodarone and 

mexiletine.





Elevated digoxin level, hold digoxin.





Chronic kidney injury,





COPD,





History of PEG placement, patient does not know the reason why he has PEG 

placement.





Leukocytosis, will defer to Dr. Khoury.  Patient has recent history of UTI 

sepsis.





Anemia,





Hyperkalemia, defer to Dr. Khoury.





Hypermagnesemia,





Elevated LFTs.





Critical patient.  Over 30 minutes were spent taking care of the patient.








Thank you for your consultation. Please call me if you have any questions.








JULIENNE Oglesby MD, FACP, FACC, FSCAI, FHRS, CCDS


Interventional Cardiology


Cardiac Electrophysiology


Vascular Medicine and Endovascular Interventions











PEREZ OGLESBY MD Jan 26, 2019 11:11

## 2019-01-26 NOTE — NUR
right hand iv dislodged during procedure, dressing applied, dry and intact. using right 
groin venous access at this time per dr. espinoza.
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
## 2019-01-26 NOTE — NUR
Text sent to Dr. Oglesby. No changes in CO remains in 80 et no change in BPs remaain 110-120 
SBP. marie Ramachandran sup notified

## 2019-01-26 NOTE — NUR
Noted large hematoma formig over groin area et inner rt leg. Drsg removed et manual pressure 
placed firmly 2cm above femoral pulse. Faye RN et Evelyn RN in to assist

## 2019-01-26 NOTE — NUR
Telephone update given to Dr Oglesby at this time. Update on groin site. Dr Oglesby asked 
about primary physician seeing the patient, this writer informed Dr that there was no note 
from other physician other than the ED Dr but that Dr Daniels had entered orders earlier on 
this patient. Order for stat lab at this time.

## 2019-01-26 NOTE — CORONARY ANGIOGRAPHY & PCI
Coronary Angiography & PCI


DATE OF PROCEDURE: 1/26/19 





INDICATION:  NSTEMI, refractory to medical therapy, urgently taken to the Cath 

Lab.





PREOPERATIVE DIAGNOSIS:  NSTEMI, refractory to medical therapy.





POSTOPERATIVE DIAGNOSIS: PCI with UVALDO to mid LCX.





HISTORY: This is 78 year old gentleman with complicated medical and cardiac 

history. He has history of CABG and PCI in the past. He also has BiV-ICD. He 

presents with 10/10 chest pain for 4 hours. NTG x 3 given which resulted in 

mild decrease in chest pain 6/10.  Called by the EMS as inferior STEMI however 

when the EKG was reviewed there was no inferior STEMI however the patient had 

new deep ST depressions in V3 and V4.  The patient was already given heparin 

and Brilinta 180 mg bolus however was still having ongoing chest pain when I 

saw the patient in the ER, therefore the patient was taken urgently to the 

catheter lab as a non-STEMI refractory to medical therapy.  Initial troponin 

was positive.  





PROCEDURES PERFORMED: 


1.Coronary angiography. 


2.Left heart catheterization. 


3. Graft angiography.


4. Aortic arch angiography.


3. PCI to the mid left circumflex artery with drug-eluting stent





COMPLICATIONS: None. 


SPECIMENS: None. 


ESTIMATED BLOOD LOSS: 10 mL


ANESTHESIA: Conscious sedation


ANTICOAGULATION: IV heparin


CONTRAST: 180 mL.


FLUOROSCOPY: 13.50 minutes.


FLOUROSCOPY DOSE: 1384 mgy.





PROCEDURE DETAILS: The patient is a 78 male and was brought to the cath lab 

after informed consent was taken. All the risks and complications were 

explained in detail; this included the risk of bleeding, vascular damage, stroke

, MI and even death. The patient was draped and prepped in the usual sterile 

fashion.  Access was gained in the right femoral artery with a 6 Macedonian sheath.

  Coronary angiography, aortic arch angiogram, graft angiography, left heart 

catheterization was performed with a JR4 catheter, JL4 catheter, pigtail 

catheter.  A 5 Macedonian sheath was placed in the right femoral vein since there 

was no working peripheral venous access.





FINDINGS: 


1.Left main: Patent.


2.LAD: Mild diffuse disease.  Transapical vessel.


3.Left circumflex artery: There is a stent in the proximal/mid left circumflex 

artery.  In this distal portion of the stent there is severe in-stent 

restenosis.  Stenosis severity is 80 percent.  The lesion is eccentric and 

therefore in some views, is not very obvious.


4.RCA: Small vessel.  A stent is noted in the proximal segment with no 

significant disease.  Dampening was noted, however no obvious severe ostial 

stenosis noted.  The RCA diameter is less than 2 mm.


5.  Saphenous vein angiography: Occluded.


6.  LIMA angiography: The LIMA is not grafted to the LAD.


7.  Aortic arch angiogram: Aortic arch angiography was done since we could not 

find the ostium of the saphenous vein graft.  Aortic arch angiogram also did 

not reveal any patent saphenous vein graft.  No evidence of dissection or 

aneurysm.  Patent proximal segments of the great arteries.


8.Left heart catheterization: LV pressure 127/3 mmHg.  LVEDP 12 mmHg.  Aortic 

pressure 123/60 mmHg.  Mild to moderate LV systolic dysfunction with an EF of 35

-40 percent with no gradient across the aortic valve.





RECOMMENDATIONS:


PCI to the mid left circumflex artery is recommended.





INTERVENTION DETAILS:


JL4 guide catheter, BMW guidewire, IV heparin for anticoagulation.  ACT was 

done once and was 250 seconds.  The lesion was crossed with the BMW guidewire.  

The tip of the wire was placed in the distal left circumflex artery.  The 

lesion within the distal portion of the previous stent was direct stented with 

the Medtronic resolute integrity 2.75 x 14 drug-eluting stent at 14 oliver for 32 

seconds.  Overlap area was postdilated with the same stent balloon at 18 oliver 

for 32 seconds.  Excellent results with 0 percent residual stenosis and TOVA-3 

flow was noted.  Right femoral angiography showed evidence of PAD therefore no 

closure device was done.  Manual compression is recommended.





CONCLUSIONS: 


1.  PCI with drug-eluting stent to mid left circumflex artery.


2.  Dual antiplatelet therapy for at least 1 year.


3.  Echocardiogram.


4.  Numerous medical issues need to be addressed in the ICU by hospitalist 

service including hyperkalemia, elevated LFTs, increased medication, 

leukocytosis, PEG tube, COPD, renal insufficiency.   





JULIENNE Oglesby MD, FACP, FACC, [facility]


Interventional Cardiology











PEREZ OGLESBY MD Jan 26, 2019 12:49

## 2019-01-27 VITALS — DIASTOLIC BLOOD PRESSURE: 60 MMHG | SYSTOLIC BLOOD PRESSURE: 114 MMHG

## 2019-01-27 VITALS — SYSTOLIC BLOOD PRESSURE: 120 MMHG | DIASTOLIC BLOOD PRESSURE: 51 MMHG

## 2019-01-27 VITALS — SYSTOLIC BLOOD PRESSURE: 115 MMHG | DIASTOLIC BLOOD PRESSURE: 59 MMHG

## 2019-01-27 VITALS — DIASTOLIC BLOOD PRESSURE: 56 MMHG | SYSTOLIC BLOOD PRESSURE: 118 MMHG

## 2019-01-27 VITALS — SYSTOLIC BLOOD PRESSURE: 107 MMHG | DIASTOLIC BLOOD PRESSURE: 53 MMHG

## 2019-01-27 VITALS — SYSTOLIC BLOOD PRESSURE: 110 MMHG | DIASTOLIC BLOOD PRESSURE: 78 MMHG

## 2019-01-27 LAB
ALBUMIN SERPL-MCNC: 3.2 GM/DL (ref 3.2–4.5)
ALBUMIN SERPL-MCNC: 3.3 GM/DL (ref 3.2–4.5)
ALP SERPL-CCNC: 93 U/L (ref 40–136)
ALP SERPL-CCNC: 96 U/L (ref 40–136)
ALT SERPL-CCNC: 44 U/L (ref 0–55)
ALT SERPL-CCNC: 44 U/L (ref 0–55)
BASOPHILS # BLD AUTO: 0 10^3/UL (ref 0–0.1)
BASOPHILS # BLD AUTO: 0 10^3/UL (ref 0–0.1)
BASOPHILS NFR BLD AUTO: 0 % (ref 0–10)
BASOPHILS NFR BLD AUTO: 0 % (ref 0–10)
BILIRUB DIRECT SERPL-MCNC: 0.5 MG/DL (ref 0–0.3)
BILIRUB SERPL-MCNC: 1 MG/DL (ref 0.1–1)
BILIRUB SERPL-MCNC: 1 MG/DL (ref 0.1–1)
BUN/CREAT SERPL: 22
CALCIUM SERPL-MCNC: 9.7 MG/DL (ref 8.5–10.1)
CHLORIDE SERPL-SCNC: 113 MMOL/L (ref 98–107)
CHOLEST SERPL-MCNC: 71 MG/DL (ref ?–200)
CO2 SERPL-SCNC: 24 MMOL/L (ref 21–32)
CREAT SERPL-MCNC: 1 MG/DL (ref 0.6–1.3)
EOSINOPHIL # BLD AUTO: 0 10^3/UL (ref 0–0.3)
EOSINOPHIL # BLD AUTO: 0 10^3/UL (ref 0–0.3)
EOSINOPHIL NFR BLD AUTO: 0 % (ref 0–10)
EOSINOPHIL NFR BLD AUTO: 0 % (ref 0–10)
ERYTHROCYTE [DISTWIDTH] IN BLOOD BY AUTOMATED COUNT: 18.8 % (ref 10–14.5)
ERYTHROCYTE [DISTWIDTH] IN BLOOD BY AUTOMATED COUNT: 19.3 % (ref 10–14.5)
GFR SERPLBLD BASED ON 1.73 SQ M-ARVRAT: > 60 ML/MIN
GLUCOSE SERPL-MCNC: 88 MG/DL (ref 70–105)
HCT VFR BLD CALC: 36 % (ref 40–54)
HCT VFR BLD CALC: 36 % (ref 40–54)
HDLC SERPL-MCNC: 25 MG/DL (ref 40–60)
HGB BLD-MCNC: 10.9 G/DL (ref 13.3–17.7)
HGB BLD-MCNC: 11.1 G/DL (ref 13.3–17.7)
LYMPHOCYTES # BLD AUTO: 1.3 X 10^3 (ref 1–4)
LYMPHOCYTES # BLD AUTO: 1.3 X 10^3 (ref 1–4)
LYMPHOCYTES NFR BLD AUTO: 10 % (ref 12–44)
LYMPHOCYTES NFR BLD AUTO: 11 % (ref 12–44)
MANUAL DIFFERENTIAL PERFORMED BLD QL: NO
MANUAL DIFFERENTIAL PERFORMED BLD QL: NO
MCH RBC QN AUTO: 30 PG (ref 25–34)
MCH RBC QN AUTO: 31 PG (ref 25–34)
MCHC RBC AUTO-ENTMCNC: 30 G/DL (ref 32–36)
MCHC RBC AUTO-ENTMCNC: 31 G/DL (ref 32–36)
MCV RBC AUTO: 100 FL (ref 80–99)
MCV RBC AUTO: 103 FL (ref 80–99)
MONOCYTES # BLD AUTO: 1.4 X 10^3 (ref 0–1)
MONOCYTES # BLD AUTO: 1.8 X 10^3 (ref 0–1)
MONOCYTES NFR BLD AUTO: 12 % (ref 0–12)
MONOCYTES NFR BLD AUTO: 13 % (ref 0–12)
NEUTROPHILS # BLD AUTO: 10.7 X 10^3 (ref 1.8–7.8)
NEUTROPHILS # BLD AUTO: 9 X 10^3 (ref 1.8–7.8)
NEUTROPHILS NFR BLD AUTO: 77 % (ref 42–75)
NEUTROPHILS NFR BLD AUTO: 77 % (ref 42–75)
PLATELET # BLD: 136 10^3/UL (ref 130–400)
PLATELET # BLD: 139 10^3/UL (ref 130–400)
PMV BLD AUTO: 11.1 FL (ref 7.4–10.4)
PMV BLD AUTO: 11.8 FL (ref 7.4–10.4)
POTASSIUM SERPL-SCNC: 4.2 MMOL/L (ref 3.6–5)
PROT SERPL-MCNC: 7.6 GM/DL (ref 6.4–8.2)
PROT SERPL-MCNC: 7.7 GM/DL (ref 6.4–8.2)
SODIUM SERPL-SCNC: 148 MMOL/L (ref 135–145)
TRIGL SERPL-MCNC: 54 MG/DL (ref ?–150)
VLDLC SERPL CALC-MCNC: 11 MG/DL (ref 5–40)
WBC # BLD AUTO: 11.7 10^3/UL (ref 4.3–11)
WBC # BLD AUTO: 13.8 10^3/UL (ref 4.3–11)

## 2019-01-27 RX ADMIN — MEXILETINE HYDROCHLORIDE SCH MG: 150 CAPSULE ORAL at 08:36

## 2019-01-27 RX ADMIN — MEXILETINE HYDROCHLORIDE SCH MG: 150 CAPSULE ORAL at 13:59

## 2019-01-27 RX ADMIN — DILTIAZEM HYDROCHLORIDE SCH MG: 60 TABLET, FILM COATED ORAL at 13:59

## 2019-01-27 RX ADMIN — METOPROLOL TARTRATE SCH MG: 25 TABLET, FILM COATED ORAL at 20:54

## 2019-01-27 RX ADMIN — TICAGRELOR SCH MG: 90 TABLET ORAL at 08:36

## 2019-01-27 RX ADMIN — INSULIN ASPART SCH UNIT: 100 INJECTION, SOLUTION INTRAVENOUS; SUBCUTANEOUS at 16:55

## 2019-01-27 RX ADMIN — LISINOPRIL SCH MG: 5 TABLET ORAL at 08:37

## 2019-01-27 RX ADMIN — INSULIN ASPART SCH UNIT: 100 INJECTION, SOLUTION INTRAVENOUS; SUBCUTANEOUS at 11:37

## 2019-01-27 RX ADMIN — INSULIN ASPART SCH UNIT: 100 INJECTION, SOLUTION INTRAVENOUS; SUBCUTANEOUS at 20:55

## 2019-01-27 RX ADMIN — ASPIRIN SCH MG: 81 TABLET ORAL at 08:02

## 2019-01-27 RX ADMIN — SODIUM CHLORIDE SCH MLS/HR: 900 INJECTION, SOLUTION INTRAVENOUS at 08:01

## 2019-01-27 RX ADMIN — AMIODARONE HYDROCHLORIDE SCH MG: 200 TABLET ORAL at 08:36

## 2019-01-27 RX ADMIN — METOPROLOL TARTRATE SCH MG: 25 TABLET, FILM COATED ORAL at 08:37

## 2019-01-27 RX ADMIN — INSULIN ASPART SCH UNIT: 100 INJECTION, SOLUTION INTRAVENOUS; SUBCUTANEOUS at 06:04

## 2019-01-27 RX ADMIN — ATORVASTATIN CALCIUM SCH MG: 80 TABLET, FILM COATED ORAL at 20:54

## 2019-01-27 RX ADMIN — DILTIAZEM HYDROCHLORIDE SCH MG: 60 TABLET, FILM COATED ORAL at 20:54

## 2019-01-27 RX ADMIN — TICAGRELOR SCH MG: 90 TABLET ORAL at 20:54

## 2019-01-27 RX ADMIN — DILTIAZEM HYDROCHLORIDE SCH MG: 60 TABLET, FILM COATED ORAL at 06:06

## 2019-01-27 RX ADMIN — ASPIRIN SCH MG: 81 TABLET ORAL at 08:36

## 2019-01-27 RX ADMIN — MEXILETINE HYDROCHLORIDE SCH MG: 150 CAPSULE ORAL at 20:55

## 2019-01-27 NOTE — NUR
SPOKE TO PATIENT AND WIFE THEY WERE NOT SURE WHAT PATIENT TOOK. USED ENTERAL MEDICATION 
HISTORY TO DO MEDICATION HISTORY. FILLES AT [facility] THEY ARE CLOSED TODAY. ON 
1/7/19 ENTERAL MEDICATION HISTORY SHOW METOPROLOL TART 25MG BID AND METOPROLOL SUCC 25MG 
DAILY FILLED

## 2019-01-27 NOTE — NUR
PATIENT TX TO . BEDSIDE REPORT GIVEN TO LOTTIE RODARTE. PATIENT DENIES ANY NEEDS/PAIN. SAFETY 
PRECAUTIONS IN PLACE. MAINTAINING POC.

## 2019-01-27 NOTE — NUR
PATIENT TO FLOOR AT THIS TIME VIA W/C ACCOMPANIED BY CALLIE HURST RN.  BEDSIDE REPORT GIVEN AT 
THIS TIME.

## 2019-01-27 NOTE — DIAGNOSTIC IMAGING REPORT
INDICATION: Coronary artery disease, pacemaker.



COMPARISON: 01/26/2019 



FINDINGS:  

Single view of the chest demonstrates stable cardiac enlargement

with persistent but decreased central vascular congestion. There

is continued basilar atelectasis. Effusions are no longer

identified. There is no pneumothorax. The pacemaker is stable.



IMPRESSION:

1. Cardiac enlargement with persistent but decreased central

vascular congestion.

2. Resolved effusions.

3. Stable basilar atelectasis.



Dictated by: 



  Dictated on workstation # [id number]

## 2019-01-27 NOTE — NUR
Telephone call placed to Dr Daniels at this time. Updated Dr on phone conversation that this 
writer had with Dr Oglesby and gave results of all new lab work that was ordered post 
conversation with Dr Oglesby to Dr Daniels at this time. Dr Daniels gave one time order for 
Rocephin 1gm. Verified PCN allergy and okayed by  to administer.

## 2019-01-27 NOTE — CARDIOLOGY PROGRESS NOTE
Cardiology SOAP Progress Note


Subjective:


No further chest pain.





Objective:


I&O/Vital Signs











 1/27/19 1/27/19 1/27/19 1/27/19





 07:00 08:00 08:00 11:00


 


Temp   98.7 


 


Pulse 78   83


 


Resp 19   22


 


B/P (MAP) 118/56 (76)   120/51 (74)


 


Pulse Ox  96  97


 


O2 Delivery Nasal Cannula Nasal Cannula  Nasal Cannula


 


O2 Flow Rate 2.00 2.00  2.00


 


    





 1/27/19 1/27/19 1/27/19 1/27/19





 12:00 12:00 13:13 14:31


 


Temp 99.1   


 


Pulse  83 85 


 


Resp  22  


 


B/P (MAP)  114/60 (78)  


 


Pulse Ox  97  


 


O2 Delivery  Nasal Cannula  Nasal Cannula


 


O2 Flow Rate  2.00  2.00


 


    





 1/27/19   





 15:13   


 


Pulse 78   














 1/27/19





 00:00


 


Intake Total 0 ml


 


Output Total 200 ml


 


Balance -200 ml








Weight (Pounds):  160


Weight (Ounces):  4.8


Weight (Calculated Kilograms):  [phone]


Constitutional:  appears stated age, AAO x 3; No apparent distress; well-

developed, well-nourished


Respiratory:  No accessory muscle use, No respiratory distress, No chest tender

, No chest expansion is symmetric; chest is bilaterally symmetric; No lungs 

clear to percussion; lungs clear to auscultation; No crackles, No rhonchi, No 

rales, No stridor, No wheezing, No pleural rub, No other


Cardiovascular:  regular rate-rhythm; No irregularly irregular, No extra beats, 

No parasternal heave is noted, No JVD, No edema, No bradycardia, No tachycardia

, No point of maximal impulse, No cardiac thrills are palpable; S1 and S2; No 

gallop/S3, No gallop/S4, No diastolic murmur, No systolic murmur, No friction 

rub, No click, No other


Gastrointestional:  No tender, No soft, No round, No distended, No pulsatile 

mass, No organomegaly, No guarding, No rebound, No tenderness, No hernia, No 

mass, No audible bowel sounds, No abnormal bowel sounds, No abdominal bruits, 

No spleenomegaly; other (PEG tube)


Extremities:  No normal range of motion, No non-tender, No normal inspection, 

No pedal edema, No calf tenderness, No normal capillary refill, No pelvis stable

, No calf tenderness, No inflammation, No pedal edema, No slow capillary refill

, No swelling, No other, No abrasion, No clubbing, No cyanosis, No ecchymosis, 

No laceration, No no lower extremity edema bilateral, No significant edema, No 

tenderness, No wound


Neurologic/Psychiatric:  no motor/sensory deficits, alert, normal mood/affect, 

oriented x 3, power is 5/5 both on sides


Skin:  No normal color, No warm/dry, No cyanosis, No cool, No diaphoresis, No 

damp, No ecchymosis, No jaundice, No mottled, No pallor, No rash, No tattoos/

piercings, No ulcerations, No rash on exposed areas, No ulcerations on exposed 

areas, No other





Results/Procedures:


Labs


Laboratory Tests


1/26/19 19:19: Troponin I 0.135


1/26/19 20:46: Glucometer 95


1/26/19 23:25: 


White Blood Count 13.8H, Red Blood Count 3.54L, Hemoglobin 11.1L, Hematocrit 36L

, Mean Corpuscular Volume 103H, Mean Corpuscular Hemoglobin 31, Mean 

Corpuscular Hemoglobin Concent 31L, Red Cell Distribution Width 19.3H, Platelet 

Count 136, Mean Platelet Volume 11.1H, Neutrophils (%) (Auto) 77H, Lymphocytes (

%) (Auto) 10L, Monocytes (%) (Auto) 13H, Eosinophils (%) (Auto) 0, Basophils (%

) (Auto) 0, Neutrophils # (Auto) 10.7H, Lymphocytes # (Auto) 1.3, Monocytes # (

Auto) 1.8H, Eosinophils # (Auto) 0.0, Basophils # (Auto) 0.0, Sodium Level 146H

, Potassium Level 4.5, Chloride Level 114H, Carbon Dioxide Level 20L, Anion Gap 

12, Blood Urea Nitrogen 22H, Creatinine 0.97, Estimat Glomerular Filtration 

Rate > 60, BUN/Creatinine Ratio 23, Glucose Level 90, Calcium Level 9.8, Total 

Bilirubin 1.0, Direct Bilirubin 0.5H, Indirect Bilirubin 0.5, Aspartate Amino 

Transf (AST/SGOT) 34, Alanine Aminotransferase (ALT/SGPT) 44, Alkaline 

Phosphatase 96, Total Protein 7.7, Albumin 3.3


1/27/19 03:00: 


White Blood Count 11.7H, Red Blood Count 3.60L, Hemoglobin 10.9L, Hematocrit 36L

, Mean Corpuscular Volume 100H, Mean Corpuscular Hemoglobin 30, Mean 

Corpuscular Hemoglobin Concent 30L, Red Cell Distribution Width 18.8H, Platelet 

Count 139, Mean Platelet Volume 11.8H, Neutrophils (%) (Auto) 77H, Lymphocytes (

%) (Auto) 11L, Monocytes (%) (Auto) 12, Eosinophils (%) (Auto) 0, Basophils (%) 

(Auto) 0, Neutrophils # (Auto) 9.0H, Lymphocytes # (Auto) 1.3, Monocytes # (Auto

) 1.4H, Eosinophils # (Auto) 0.0, Basophils # (Auto) 0.0, Sodium Level 148H, 

Potassium Level 4.2, Chloride Level 113H, Carbon Dioxide Level 24, Anion Gap 11

, Blood Urea Nitrogen 22H, Creatinine 1.00, Estimat Glomerular Filtration Rate 

> 60, BUN/Creatinine Ratio 22, Glucose Level 88, Calcium Level 9.7, Total 

Bilirubin 1.0, Aspartate Amino Transf (AST/SGOT) 33, Alanine Aminotransferase (

ALT/SGPT) 44, Alkaline Phosphatase 93, Total Protein 7.6, Albumin 3.2, 

Corrected Calcium 10.3H, B-Type Natriuretic Peptide 236.9H, Triglycerides Level 

54, Cholesterol Level 71, LDL Cholesterol Direct 32, VLDL Cholesterol 11, HDL 

Cholesterol 25L


1/27/19 11:36: Glucometer 129H








A/P:


Assessment/Dx:


Acute non-STEMI, refractory to medical therapy,


Atrial fibrillation persistent,


Ischemic cardiomyopathy, biventricular ICD,


History of ventricular tachycardia/ventricular fibrillation,


Chronic kidney injury,


COPD,


History of PEG placement,


Leukocytosis,


Anemia,


Hyperkalemia,


Hypomagnesemia,


Elevated LFTs


Plan:


Acute non-STEMI, refractory to medical therapy, ongoing chest pain in the ER 

with some relief with medical therapy.  Urgent coronary angiography is 

recommended.  Patient was taken directly to the catheter lab from the ER and 

coronary angiography showed severe disease in mid left circumflex artery which 

was treated with a drug-eluting stent 2.75 x 14 mm resolute integrity.  No 

further chest pain.  Mild right groin bruising noted.  No significant change in 

hemoglobin.  Continue dual antiplatelet therapy.





Atrial fibrillation persistent, patient is on amiodarone, mexiletine, digoxin, 

verapamil, metoprolol.  Not on oral anticoagulation due to previous history of 

bleeding.





Ischemic cardiomyopathy, biventricular ICD,





History of ventricular tachycardia/ventricular fibrillation, on amiodarone and 

mexiletine.





Elevated digoxin level, hold digoxin.





Chronic kidney injury,





COPD,





History of PEG placement, patient does not know the reason why he has PEG 

placement.





Leukocytosis, will defer to Dr. Khoury.  Patient has recent history of UTI 

sepsis.





Anemia,





Hyperkalemia, defer to Dr. Khoury.





Hypermagnesemia,





Elevated LFTs.





Patient will like to transition to cardiology/cardiac electrophysiology care in 

[place].  I'll be happy to take care of him as an outpatient as well.








Thank you for your consultation. Please call me if you have any questions.








JULIENNE Oglesby MD, FACP, FACC, FSCAI, FHRS, CCDS


Interventional Cardiology


Cardiac Electrophysiology


Vascular Medicine and Endovascular Interventions











PEREZ OGLESBY MD Jan 27, 2019 4:07 pm

## 2019-01-27 NOTE — HISTORY & PHYSICIAL ([facility])
HPI


History of Present Illness:


77 yo chronically ill elderly male that is well known to me that was admitted 

yesterday for chest pain and found to have NSTEMI. Patient was taken to cath 

lab yesterday with stent placement. Patient states this AM that pain has 

resolved. He is still having left shoulder pain but wife states that this is 

chronic. Patient is PEG tub dependent due to dysphagia and multiple admission 

for aspiration PNA.


Source:  patient, family (wife), RN/MD, old records


Exam Limitations:  clinical condition


Date seen by provider:  Jan 27, 2019


Time Seen by Provider:  11:57


PCP


Raúl Gonzalez MD


Consult





Date of Admission








Home Medications


Home Medications


Reviewed patient Home Medication Reconciliation performed by pharmacy 

medication reconciliations technician and/or nursing.


Patients Allergies have been reviewed.





Allergies


Coded Allergies:  


     Penicillins (Verified  Allergy, Severe, HIVES, SOB (Pt has received 

Cefepime & Ceftriaxone), 12/5/18)


     codeine (Verified  Allergy, Severe, SOB, HIVES, 6/8/18)





PMH-Social-Family Hx


Patient Social History


Living Status:  Lives at home with Wife, Has Home Health


Alcohol Use:  Denies Use


Recreational Drug Use:  No


Smoking Status:  Former Smoker


Former smoker/When Quit:  Jan 4, 1988


Type Used:  Cigarettes


2nd Hand Smoke Exposure:  No


Recent Foreign Travel:  No


Contact w/other who traveled:  No


Recent Hopitalizations:  Yes


Recent Infectious Disease Expo:  No





Immunizations Up To Date


Tetanus Booster (TDap):  More than 5yrs


Date of Pneumonia Vaccine:  Oct 14, 2018


Date of Influenza Vaccine:  Oct 10, 2018





Past Medical History





CAD s/p CABG and stent x2, restenting 1/26/2019


CHF


Hypertension


COPD


hx of ventricular arrhythmia s/p defibrillator placement


Atherosclerosis of the R leg


Stasis dematitis


Dysphagia PEG tube dependent





Family Medical History


Significant Family History:  Heart Disease


Family History:  


Cardiovascular disease


  19 MOTHER


  G8 BROTHER


  G8 SISTER


Cervical cancer


  19 MOTHER





Review of Systems ([facility])


Constitutional:  No dizziness, No fever; weakness (chronic)


EENTM:  no symptoms reported


Respiratory:  cough, dyspnea on exertion, orthopnea, short of breath


Cardiovascular:  chest pain (resolved); No edema, No palpitations


Gastrointestinal:  no symptoms reported; No abdominal pain, No constipation, No 

diarrhea, No nausea, No vomiting


Genitourinary:  no symptoms reported; No dysuria, No frequency, No hematuria


Musculoskeletal:  joint pain (left shoulder pain)


Skin:  no symptoms reported


Psychiatric/Neurological:  No Symptoms Reported





Reviewed Test Results


Reviewed Test Results


Lab





Laboratory Tests








Test


 1/26/19


14:45 1/26/19


19:19 1/26/19


20:46 1/26/19


23:25 Range/Units


 


 


Activated Partial


Thromboplast Time 54 H


 


 


 


 24-35  SEC





 


Troponin I  0.135    <0.028  NG/ML


 


Glucometer   95     MG/DL


 


White Blood Count


 


 


 


 13.8 H


 4.3-11.0


10^3/uL


 


Red Blood Count


 


 


 


 3.54 L


 4.35-5.85


10^6/uL


 


Hemoglobin    11.1 L 13.3-17.7  G/DL


 


Hematocrit    36 L 40-54  %


 


Mean Corpuscular Volume    103 H 80-99  FL


 


Mean Corpuscular Hemoglobin    31  25-34  PG


 


Mean Corpuscular Hemoglobin


Concent 


 


 


 31 L


 32-36  G/DL





 


Red Cell Distribution Width    19.3 H 10.0-14.5  %


 


Platelet Count


 


 


 


 136 


 130-400


10^3/uL


 


Mean Platelet Volume    11.1 H 7.4-10.4  FL


 


Neutrophils (%) (Auto)    77 H 42-75  %


 


Lymphocytes (%) (Auto)    10 L 12-44  %


 


Monocytes (%) (Auto)    13 H 0-12  %


 


Eosinophils (%) (Auto)    0  0-10  %


 


Basophils (%) (Auto)    0  0-10  %


 


Neutrophils # (Auto)    10.7 H 1.8-7.8  X 10^3


 


Lymphocytes # (Auto)    1.3  1.0-4.0  X 10^3


 


Monocytes # (Auto)    1.8 H 0.0-1.0  X 10^3


 


Eosinophils # (Auto)


 


 


 


 0.0 


 0.0-0.3


10^3/uL


 


Basophils # (Auto)


 


 


 


 0.0 


 0.0-0.1


10^3/uL


 


Sodium Level    146 H 135-145  MMOL/L


 


Potassium Level    4.5  3.6-5.0  MMOL/L


 


Chloride Level    114 H   MMOL/L


 


Carbon Dioxide Level    20 L 21-32  MMOL/L


 


Anion Gap    12  5-14  MMOL/L


 


Blood Urea Nitrogen    22 H 7-18  MG/DL


 


Creatinine


 


 


 


 0.97 


 0.60-1.30


MG/DL


 


Estimat Glomerular Filtration


Rate 


 


 


 > 60 


  





 


BUN/Creatinine Ratio    23   


 


Glucose Level    90    MG/DL


 


Calcium Level    9.8  8.5-10.1  MG/DL


 


Total Bilirubin    1.0  0.1-1.0  MG/DL


 


Direct Bilirubin    0.5 H 0.0-0.3  MG/DL


 


Indirect Bilirubin    0.5   MG/DL


 


Aspartate Amino Transf


(AST/SGOT) 


 


 


 34 


 5-34  U/L





 


Alanine Aminotransferase


(ALT/SGPT) 


 


 


 44 


 0-55  U/L





 


Alkaline Phosphatase    96    U/L


 


Total Protein    7.7  6.4-8.2  GM/DL


 


Albumin    3.3  3.2-4.5  GM/DL


 


Test


 1/27/19


03:00 


 


 


 Range/Units


 


 


White Blood Count


 11.7 H


 


 


 


 4.3-11.0


10^3/uL


 


Red Blood Count


 3.60 L


 


 


 


 4.35-5.85


10^6/uL


 


Hemoglobin 10.9 L    13.3-17.7  G/DL


 


Hematocrit 36 L    40-54  %


 


Mean Corpuscular Volume 100 H    80-99  FL


 


Mean Corpuscular Hemoglobin 30     25-34  PG


 


Mean Corpuscular Hemoglobin


Concent 30 L


 


 


 


 32-36  G/DL





 


Red Cell Distribution Width 18.8 H    10.0-14.5  %


 


Platelet Count


 139 


 


 


 


 130-400


10^3/uL


 


Mean Platelet Volume 11.8 H    7.4-10.4  FL


 


Neutrophils (%) (Auto) 77 H    42-75  %


 


Lymphocytes (%) (Auto) 11 L    12-44  %


 


Monocytes (%) (Auto) 12     0-12  %


 


Eosinophils (%) (Auto) 0     0-10  %


 


Basophils (%) (Auto) 0     0-10  %


 


Neutrophils # (Auto) 9.0 H    1.8-7.8  X 10^3


 


Lymphocytes # (Auto) 1.3     1.0-4.0  X 10^3


 


Monocytes # (Auto) 1.4 H    0.0-1.0  X 10^3


 


Eosinophils # (Auto)


 0.0 


 


 


 


 0.0-0.3


10^3/uL


 


Basophils # (Auto)


 0.0 


 


 


 


 0.0-0.1


10^3/uL


 


Sodium Level 148 H    135-145  MMOL/L


 


Potassium Level 4.2     3.6-5.0  MMOL/L


 


Chloride Level 113 H      MMOL/L


 


Carbon Dioxide Level 24     21-32  MMOL/L


 


Anion Gap 11     5-14  MMOL/L


 


Blood Urea Nitrogen 22 H    7-18  MG/DL


 


Creatinine


 1.00 


 


 


 


 0.60-1.30


MG/DL


 


Estimat Glomerular Filtration


Rate > 60 


 


 


 


  





 


BUN/Creatinine Ratio 22      


 


Glucose Level 88       MG/DL


 


Calcium Level 9.7     8.5-10.1  MG/DL


 


Corrected Calcium 10.3 H    8.5-10.1  MG/DL


 


Total Bilirubin 1.0     0.1-1.0  MG/DL


 


Aspartate Amino Transf


(AST/SGOT) 33 


 


 


 


 5-34  U/L





 


Alanine Aminotransferase


(ALT/SGPT) 44 


 


 


 


 0-55  U/L





 


Alkaline Phosphatase 93       U/L


 


B-Type Natriuretic Peptide 236.9 H    <100.0  PG/ML


 


Total Protein 7.6     6.4-8.2  GM/DL


 


Albumin 3.2     3.2-4.5  GM/DL


 


Triglycerides Level 54     <150  MG/DL


 


Cholesterol Level 71     < 200  MG/DL


 


LDL Cholesterol Direct 32     1-129  MG/DL


 


VLDL Cholesterol 11     5-40  MG/DL


 


HDL Cholesterol 25 L    40-60  MG/DL








Radiology


Date of Exam:   01/26/19





CHEST 1 VIEW, AP/PA ONLY


 





INDICATION: Chest pain.





COMPARISON:  12/26/2018.





FINDINGS: Single view of the chest demonstrates cardiac


enlargement with slight central vascular congestion. There is


basilar atelectasis in the right base. Underlying infiltrate not


excluded. Trace effusion seen in the right costophrenic angle.


There is no pneumothorax. Pacemaker is stable.





IMPRESSION:


1. Cardiac enlargement with slight central vascular congestion.


2. Trace effusion, atelectasis and infiltrate right base.


Followup recommended.





Physical Exam-([facility])


Physical Exam


Vital Signs





 VS - Last 72 Hours, by Label








 1/26/19 1/26/19 1/26/19 1/26/19





 10:11 10:17 10:53 13:00


 


Temp 98.0   


 


Pulse 90  91 


 


Resp 16  16 


 


B/P (MAP) 131/77 (95)  129/80 (96) 


 


Pulse Ox 96  98 98


 


O2 Delivery Nasal Cannula Nasal Cannula Room Air Nasal Cannula


 


O2 Flow Rate 2.00 2.00  2.00


 


    





 1/26/19 1/26/19 1/26/19 1/26/19





 13:00 13:15 13:30 13:43


 


Pulse 85 82 77 90


 


Resp 27 24 25 


 


B/P (MAP) 135/50 (78) 129/54 (79) 126/52 (76) 


 


Pulse Ox 97 96 97 


 


O2 Delivery Room Air Room Air Room Air 





 1/26/19 1/26/19 1/26/19 1/26/19





 13:45 14:00 14:30 15:00


 


Temp    97.3


 


Pulse 90 80 90 


 


Resp 18 29 32 


 


B/P (MAP) 127/70 (89) 143/60 (87) 131/75 (93) 


 


Pulse Ox 97 97 97 


 


O2 Delivery Room Air Room Air Room Air 


 


    





 1/26/19 1/26/19 1/26/19 1/26/19





 15:00 15:30 16:00 16:30


 


Pulse 90 90 90 84


 


Resp 20 15 12 20


 


B/P (MAP)  117/71 (86) 116/91 (99) 120/64 (82)


 


Pulse Ox 97 95 99 99


 


O2 Delivery Room Air Room Air Nasal Cannula Nasal Cannula


 


O2 Flow Rate   2.00 2.00





 1/26/19 1/26/19 1/26/19 1/26/19





 17:00 18:00 19:00 19:00


 


Temp    98.1


 


Pulse 84 85 85 82


 


Resp 18 27  24


 


B/P (MAP) 108/67 (81) 111/62 (78)  122/65 (84)


 


Pulse Ox 98 98  98


 


O2 Delivery Nasal Cannula Nasal Cannula  Nasal Cannula


 


O2 Flow Rate 2.00 2.00  2.00


 


    





 1/26/19 1/26/19 1/26/19 1/26/19





 20:00 21:00 21:00 23:20


 


Temp    97.9


 


Pulse   81 81


 


Resp   18 20


 


B/P (MAP)   190/57 (101) 108/55 (72)


 


Pulse Ox 98  99 99


 


O2 Delivery Nasal Cannula Nasal Cannula Nasal Cannula Nasal Cannula


 


O2 Flow Rate 2.00 2.00 2.00 2.00


 


    





 1/27/19 1/27/19 1/27/19 1/27/19





 01:00 04:00 07:00 08:00


 


Temp  97.8  


 


Pulse 81 80 78 


 


Resp  20 19 


 


B/P (MAP)  110/78 (89) 118/56 (76) 


 


Pulse Ox  99  96


 


O2 Delivery  Nasal Cannula Nasal Cannula Nasal Cannula


 


O2 Flow Rate  2.00 2.00 2.00


 


    





 1/27/19   





 08:00   


 


Temp 98.7   





Capillary Refill : Less Than 3 Seconds


General Appearance:  other (Chronically ill appearing elderly male, NAD)


Neck:  non-tender, full range of motion, supple


Respiratory:  normal breath sounds, no respiratory distress, no accessory 

muscle use, decreased breath sounds


Cardiovascular:  normal peripheral pulses, no murmur


Gastrointestinal:  normal bowel sounds, non tender, soft, other (PEG tube site C

/D/I, no erythema associated with site)


Back:  no CVA tenderness, no vertebral tenderness


Extremities:  normal range of motion, non-tender, normal inspection, no pedal 

edema, no calf tenderness, normal capillary refill


Neurologic/Psychiatric:  CNs II-XII nml as tested, alert, normal mood/affect


Lymphatic:  no adenopathy





Assessment/Plan


Assessment/Plan


Admission Status:  Inpatient Order (span 2 midnights)


Reason for Inpatient Admission:  


ICU care with IV antibiotics and frequent lab monitoring due to


hyperkalemia





(1) Non-STEMI (non-ST elevated myocardial infarction)


Status:  Acute


Assessment & Plan:  - Taken to cath lab by Dr Oglesby s/p 1 stent placement, 

cardiology managing





(2) CAD (coronary artery disease)


Status:  Chronic


Qualifiers:  


   Qualified Codes:  I25.110 - Atherosclerotic heart disease of native coronary 

artery with unstable angina pectoris


(3) Persistent atrial fibrillation


Status:  Chronic


Assessment & Plan:  - Continue home meds





(4) Ischemic cardiomyopathy


Status:  Chronic


Assessment & Plan:  - Repeat Echo pending





(5) Dysphagia


Status:  Chronic


Qualifiers:  


   Qualified Codes:  R13.10 - Dysphagia, unspecified


(6) ICD (implantable cardioverter-defibrillator) in place


Status:  Chronic


(7) PEG (percutaneous endoscopic gastrostomy) status


Status:  Chronic


(8) Hyperkalemia


Status:  Resolved


Assessment & Plan:  - Will continue to monitor with daily labs





(9) COPD (chronic obstructive pulmonary disease)


Status:  Chronic


Assessment & Plan:  - MAT protocol


Qualifiers:  


   Qualified Codes:  J44.9 - Chronic obstructive pulmonary disease, unspecified


(10) Debility


Status:  Chronic


Assessment & Plan:  - PT/OT 








Copy


Copies To 1:   Katiuska GUZMÁN HOLLY R MD Jan 27, 2019 11:59

## 2019-01-27 NOTE — NUR
PER CAYDEN DARBY PATIENT WAS FED THIS A.M. (0800) VIA PEG TUBE ONE AND HALF CARTONS (12OZ.) 
OF P[employer]MOC[employer] BID.

## 2019-01-28 VITALS — SYSTOLIC BLOOD PRESSURE: 106 MMHG | DIASTOLIC BLOOD PRESSURE: 55 MMHG

## 2019-01-28 VITALS — DIASTOLIC BLOOD PRESSURE: 57 MMHG | SYSTOLIC BLOOD PRESSURE: 119 MMHG

## 2019-01-28 VITALS — SYSTOLIC BLOOD PRESSURE: 121 MMHG | DIASTOLIC BLOOD PRESSURE: 60 MMHG

## 2019-01-28 VITALS — SYSTOLIC BLOOD PRESSURE: 118 MMHG | DIASTOLIC BLOOD PRESSURE: 57 MMHG

## 2019-01-28 VITALS — DIASTOLIC BLOOD PRESSURE: 54 MMHG | SYSTOLIC BLOOD PRESSURE: 112 MMHG

## 2019-01-28 RX ADMIN — TICAGRELOR SCH MG: 90 TABLET ORAL at 21:13

## 2019-01-28 RX ADMIN — METOPROLOL TARTRATE SCH MG: 25 TABLET, FILM COATED ORAL at 08:35

## 2019-01-28 RX ADMIN — METOPROLOL TARTRATE SCH MG: 25 TABLET, FILM COATED ORAL at 21:13

## 2019-01-28 RX ADMIN — DILTIAZEM HYDROCHLORIDE SCH MG: 60 TABLET, FILM COATED ORAL at 13:33

## 2019-01-28 RX ADMIN — TICAGRELOR SCH MG: 90 TABLET ORAL at 08:35

## 2019-01-28 RX ADMIN — ASPIRIN SCH MG: 81 TABLET ORAL at 08:35

## 2019-01-28 RX ADMIN — INSULIN ASPART SCH UNIT: 100 INJECTION, SOLUTION INTRAVENOUS; SUBCUTANEOUS at 21:43

## 2019-01-28 RX ADMIN — INSULIN ASPART SCH UNIT: 100 INJECTION, SOLUTION INTRAVENOUS; SUBCUTANEOUS at 05:24

## 2019-01-28 RX ADMIN — ATORVASTATIN CALCIUM SCH MG: 80 TABLET, FILM COATED ORAL at 21:12

## 2019-01-28 RX ADMIN — ACETAMINOPHEN PRN MG: 500 TABLET ORAL at 10:11

## 2019-01-28 RX ADMIN — MEXILETINE HYDROCHLORIDE SCH MG: 150 CAPSULE ORAL at 08:47

## 2019-01-28 RX ADMIN — FUROSEMIDE SCH MG: 40 TABLET ORAL at 21:12

## 2019-01-28 RX ADMIN — LISINOPRIL SCH MG: 5 TABLET ORAL at 08:35

## 2019-01-28 RX ADMIN — DILTIAZEM HYDROCHLORIDE SCH MG: 60 TABLET, FILM COATED ORAL at 05:24

## 2019-01-28 RX ADMIN — MEXILETINE HYDROCHLORIDE SCH MG: 150 CAPSULE ORAL at 13:33

## 2019-01-28 RX ADMIN — MEXILETINE HYDROCHLORIDE SCH MG: 150 CAPSULE ORAL at 21:12

## 2019-01-28 RX ADMIN — POTASSIUM CHLORIDE SCH MEQ: 1.5 POWDER, FOR SOLUTION ORAL at 10:11

## 2019-01-28 RX ADMIN — INSULIN ASPART SCH UNIT: 100 INJECTION, SOLUTION INTRAVENOUS; SUBCUTANEOUS at 11:40

## 2019-01-28 RX ADMIN — DILTIAZEM HYDROCHLORIDE SCH MG: 60 TABLET, FILM COATED ORAL at 21:11

## 2019-01-28 RX ADMIN — AMIODARONE HYDROCHLORIDE SCH MG: 200 TABLET ORAL at 08:34

## 2019-01-28 RX ADMIN — INSULIN ASPART SCH UNIT: 100 INJECTION, SOLUTION INTRAVENOUS; SUBCUTANEOUS at 16:48

## 2019-01-28 NOTE — OCCUPATIONAL THERAPY EVAL
OT Evaluation-General/PLF


Medical Diagnosis


Admission Date


1-27-19


Medical Diagnosis:  NSTEMI post stent


Onset Date:  Jan 26, 2019





Therapy Diagnosis


Therapy Diagnosis:  Weakness





Height/Weight


Height (Feet):  5


Height (Inches):  5.00


Weight (Pounds):  160


Weight (Ounces):  4.8





Precautions


Precautions/Isolations:  Droplet Isolation, Fall Prevention, Standard 

Precautions





Weight Bear Status


Weight Bearing Restriction:  Weight Bearing/Tolerated





Referral


Physician:  Mell


Referral Reason:  Activity Tolerance, Self Care, Evaluation/Treatment, 

Strengthening/ROM





Medical History


Pertinent Medical History:  Atrial Fib, Arthritis, CABG, CAD, COPD, Dementia, 

GERD, MI, Neuropathy


Current History


Pt. was recently on rehab unit.  Went home.  Has lost 22 pounds.  Spouse states 

that she is worried about pt. going into nursing home.  Pt. came in with chest 

pains.  Underwent stent x 2.


Reviewed History:  Yes





Social History


Home:  Apartment


Current Living Status:  Spouse


Entry Into Home:  Level Entry





ADL-Prior Level of Function


Therapy Code Descriptions/Definitions 





Functional [place] Measure:


0=Not Assessed/NA        4=Minimal Assistance


1=Total Assistance        5=Supervision or Setup


2=Maximal Assistance  6=Modified [place]


3=Moderate Assistance 7=Complete [place]








Therapy Quality Codes:


6    Independent with activity with or without an assistive device


5    Patient requires set up or clean up by helper.  Patient completes activity

  by  themselves


4    Supervision or touching assist (CGA). [place] provide cues , steadying 

assist


3    The helper provides less than half the effort to complete the activity


2    The helper provides more than half the effort to complete the activity


1    Dependent.  The helper does all the effort to complete an activity 


7    Patient refused to complete or attempt activity


9    The patient did not perform the activity before the current illness or 

injury


88  Not attempted due to Medical conditions or safety concerns





Functional Abilities and Goals:


Independent: Patient completed the activities by him/herself, with or without 

an assistive device, with no assistance from a helper.


Needed Some Help: Patient needed partial assistance from another person to 

complete activities.


Dependent: A helper completed the activities for the patient. 


Unknown:


Not Applicable:


ADL PLOF Comments


Pt. was receiving assistance from spouse and caregiver for daily tasks such as 

bathing and dressing.


Self Care:  Needed Some Help


Functional Cognition:  Needed Some Help


DME/Equipment:  Bath Chair, Shower


DME/Equipment Comments


Walker





OT Current Status


Subjective


Pt. does not report pain.





Appearance


Pt. in bed.  Alert and recognized this OT.  Smiled and agrees to work with 

therapist.





Mental Status/Objective


Patient Orientation:  Person, Place





Current


Glasses/Contacts:  Yes


Hearing Aids:  Yes


Hand Dominance:  Right





ADL-Treatment


Therapy Code Descriptions/Definitions 





Functional [place] Measure:


0=Not Assessed/NA        4=Minimal Assistance


1=Total Assistance        5=Supervision or Setup


2=Maximal Assistance  6=Modified [place]


3=Moderate Assistance 7=Complete [place]








Therapy Quality Codes:


6    Independent with activity with or without an assistive device


5    Patient requires set up or clean up by helper.  Patient completes activity

  by  themselves


4    Supervision or touching assist (CGA). [place] provide cues , steadying 

assist


3    The helper provides less than half the effort to complete the activity


2    The helper provides more than half the effort to complete the activity


1    Dependent.  The helper does all the effort to complete an activity 


7    Patient refused to complete or attempt activity


9    The patient did not perform the activity before the current illness or 

injury


88  Not attempted due to Medical conditions or safety concerns


Lower Body Dressing (FIM):  1


Transfers (B, C, W/C) (FIM):  3 (Pt. required mod assist supine-sit.  Sat on 

side of bed and able to balance self.  Stood x 2 with min assist and able to 

take steps toward HOB.  Min assist needd to get right LE back into bed.  All 

needs met.)





Education


OT Patient Education:  Correct positioning, Modified ADL techniques, Progress 

toward Goal/Update tx plan, Purpose of tx/functional activities, Reviewed 

precautions, Rehab process, Transfer techniques


Teaching Recipient:  Patient


Teaching Methods:  Demonstration, Discussion


Response to Teaching:  Verbalize Understanding, Return Demonstration





OT Short Term Goals


Short Term Goals


1=Demonstrate adherence to instructed precautions during ADL tasks.


2=Patient will verbalize/demonstrate understanding of assistive devices/

modifications for ADL.


3=Patient will improve strength/tolerance for activity to enable patient to 

perform ADL's.





OT Long Term Goals


Long Term Goals


Time Frame:  Feb 11, 2019


Grooming(FIM):  4


Upper Body Dressing(FIM):  4


Toileting(FIM):  5


Transfers (B,C,W/C) (FIM):  6


Toilet/Commode Transfer(FIM):  6


Additional Goals:  1-Demonstrate ADL Tasks, 2-Verbalize Understanding, 3-

ImproveStrength/Farnaz


1=Demonstrate adherence to instructed precautions during ADL tasks.


2=Patient will verbalize/demonstrate understanding of assistive devices/

modifications for ADL.


3=Patient will improve strength/tolerance for activity to enable patient to 

perform ADL's.





OT Education/Plan


Problem List/Assessment


Assessment:  Decreased Activ Tolerance, Dependent Transfers, Impaired Bed 

Mobility, Impaired Cognition, Impaired I ADL's, Impaired Self-Care Skills





Discharge Recommendations


Plan/Recommendations:  Continue POC


Therapy D/C Recommendations:  24 hr Supervision, Home w/ Family Support, 

Scheduled Assistance





Treatment Plan/Plan of Care


Treatment,Training & Education:  Yes


Patient would benefit from OT for education, treatment and training to promote 

independence in ADL's, mobility, safety and/or upper extremity function for ADL'

s.


Plan of Care:  ADL Retraining, Functional Mobility, UE Funct Exercise/Act


Treatment Duration:  Feb 11, 2019


Frequency:  5 times per week


Estimated Hrs Per Day:  .25 hour per day


Agreement:  Yes


Rehab Potential:  Fair





Time/GCodes


Start Time:  14:20


Stop Time:  14:35


Total Time Billed (hr/min):  15


Billed Treatment Time


1, ALCIDES ALVAREZ OT Jan 28, 2019 16:55

## 2019-01-28 NOTE — PROGRESS NOTE-HOSPITALIST
FRANKYMICHELE DO 1/28/19 0929:


Subjective


HPI/CC On Admission


Date Seen by Provider:  Jan 28, 2019


Time Seen by Provider:  09:00


Subjective/Events-last exam


Patient doing well


No complications after stent was placed


22 pound weight loss noted and wife told me she is giving him 1-1/2 cans of 

tube feeding twice daily with fluids and unsure how many calories that is does 

not appear to be enough


Patient has failed home care as predicted when he was discharged from inpatient 

rehabilitation so I will consult social work for nursing home placement


Overall patient appears to be significantly declined


Home meds were restarted


We'll consult dietary


Patient appears in radiology to be a hospice candidate


Needs DO NOT RESUSCITATE status





Review of Systems


General:  Fatigue





Objective


Exam


Vital Signs





Vital Signs








  Date Time  Temp Pulse Resp B/P (MAP) Pulse Ox O2 Delivery O2 Flow Rate FiO2


 


1/28/19 12:53  82      


 


1/28/19 08:27     100 Nasal Cannula 2.00 


 


1/28/19 08:00 97.2  24 106/55 (72)    





Capillary Refill : Less Than 3 Seconds


General Appearance:  No Apparent Distress, WD/WN, Thin


Respiratory:  Chest Non Tender, Lungs Clear, Normal Breath Sounds, No Accessory 

Muscle Use, No Respiratory Distress, Decreased Breath Sounds


Cardiovascular:  Regular Rate, Rhythm, No Edema, No Gallop, No JVD, No Murmur, 

Normal Peripheral Pulses


Neurologic/Psychiatric:  Alert, Oriented x3, No Motor/Sensory Deficits, Normal 

Mood/Affect





Results/Procedures


Lab


Patient resulted labs reviewed.





Assessment/Plan


Assessment and Plan


Assess & Plan/Chief Complaint


Assessment:


Acute non-ST elevation MI status post stent placement


Failed home care with wife needs nursing home placement


Decline status appears to be more of a hospice candidate


Aspiration with dysphagia maintain on chronic PEG tube feeding


Profound weight loss likely not giving patient enough nutrition with tube 

feedings


Chronic and severe debility


Previous MRSA pneumonia


Oxygen dependent








Plan:


Social work for placement


Needs DO NOT RESUSCITATE


Failed home as predicted


Dietary consult


Address profound weight loss which could have placed a lot of stress on the 

patient and subsequent given rise to the non-ST elevation MI





Diagnosis/Problems


Diagnosis/Problems





(1) Non-STEMI (non-ST elevated myocardial infarction)


Status:  Acute


(2) CAD (coronary artery disease)


Status:  Chronic


Qualifiers:  


   Coronary Disease-Associated Artery/Lesion type:  native artery  Native vs. 

transplanted heart:  native heart  Associated angina:  with unstable angina  

Qualified Codes:  I25.110 - Atherosclerotic heart disease of native coronary 

artery with unstable angina pectoris


(3) Persistent atrial fibrillation


Status:  Chronic


(4) Ischemic cardiomyopathy


Status:  Chronic


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


Status:  Chronic


(6) Diabetes mellitus, type 2


Status:  Chronic


Qualifiers:  


   Diabetes mellitus long term insulin use:  with long term use  Diabetes 

mellitus complication status:  with circulatory complication  Diabetes mellitus 

complication detail:  with other circulatory complications  Qualified Codes:  

E11.59 - Type 2 diabetes mellitus with other circulatory complications; Z79.4 - 

Long term (current) use of insulin


(7) Hyperkalemia


Status:  Resolved


Resolution Date/Time:  1/27/19 @ 12:05


(8) Anemia


Status:  Acute


Qualifiers:  


   Anemia type:  unspecified type  Qualified Codes:  D64.9 - Anemia, unspecified


(9) Leukocytosis


Status:  Acute


(10) Dysphagia


Status:  Chronic


Qualifiers:  


   Dysphagia type:  unspecified  Qualified Codes:  R13.10 - Dysphagia, 

unspecified


(11) PEG (percutaneous endoscopic gastrostomy) status


Status:  Chronic


(12) COPD (chronic obstructive pulmonary disease)


Status:  Chronic


Qualifiers:  


   COPD type:  unspecified COPD  Qualified Codes:  J44.9 - Chronic obstructive 

pulmonary disease, unspecified


(13) Debility


Status:  Chronic


(14) DVT prophylaxis


Status:  Acute





JOSÉ LOPZE MEDICAL STUDENT 1/28/19 1216:


Subjective


HPI/CC On Admission


CC: Chest pain





HPI: This is a 78-year-old white male who presented to the ER on Saturday, 1/26/ 19, complaining of chest pain. The pt's wife states he had complained of L 

shoulder pain and tingling on Friday during his therapy exercises. Since he has 

a hx of chronic L shoulder pain, they did not go to the ER until the patient 

began to also experience CP on Saturday. On assessment in the ED, the pt had an 

NSTEMI refractory to medication management. He was taken to cardiac cath by Dr. Oglesby and had a stent placed in the left circumflex coronary artery. The pt 

has a PEG tube placement due to dysphagia and a hx of recurrent aspiration PNA. 

He was most recently discharged to home w/ home health from [facility]patient [facility] 3 

weeks ago. The pt's wife states she has been feeding him "1.5 cans" BID since 

discharge. Notably, the patient has lost 23 pounds over the past 3 weeks. The pt

's wife felt she has been out of the loop concerning the pt's care since 

arriving to the hospital.


Subjective/Events-last exam


Pt denies CP but still experiencing some L shoulder pain


Denies dyspnea, fevers/chills


Denies pain at femoral cath site





Review of Systems


General:  No Chills, No Fatigue


HEENT:  No Head Aches, No Visual Changes


Pulmonary:  No Dyspnea


Cardiovascular:  No: Chest Pain


Gastrointestinal:  No: Nausea


Musculoskeletal:  shoulder pain





Objective


Exam


General Appearance:  No Apparent Distress, WD/WN, Chronically ill


Respiratory:  Chest Non Tender, Lungs Clear, Normal Breath Sounds, No Accessory 

Muscle Use, No Respiratory Distress


Cardiovascular:  Regular Rate, Rhythm, No Edema, No Gallop, No JVD, No Murmur, 

Normal Peripheral Pulses


Gastrointestinal:  Normal Bowel Sounds, No Organomegaly, No Pulsatile Mass, Non 

Tender, Soft, Other (PEG securely in place, C/D/I)


Extremity:  Non Tender, No Calf Tenderness, No Pedal Edema


Neurologic/Psychiatric:  Alert (Wife gave most of history), No Motor/Sensory 

Deficits, Normal Mood/Affect





Assessment/Plan


Assessment and Plan


Assess & Plan/Chief Complaint


Assessment:


NSTEMI s/p cardiac cath


CAD


Chronic atrial fibrillation 


Hx of ventricular tachycardia, ICD in place


Ischemic cardiomyopathy


Dysphagia, PEG in place


COPD


Hyperkalemia, resolved


Macrocytic anemia


Mild leukocytosis


Debility


Rapid weight loss





Plan:


Resume home meds


Pt failed discharge to home and home health, will need  consult 

for SNF placement


F/u w/ cardiology as outpatient 


Monitor glucose and electrolytes, control as needed


Stable, mild leukocytosis w/o fever is likely due to post-MI inflammatory 

response. No e/o acute process on CXR. No need for abx at this time.





Diagnosis/Problems


Diagnosis/Problems





(1) Non-STEMI (non-ST elevated myocardial infarction)


Status:  Acute


(2) CAD (coronary artery disease)


Status:  Chronic


Qualifiers:  


   Coronary Disease-Associated Artery/Lesion type:  native artery  Native vs. 

transplanted heart:  native heart  Associated angina:  with unstable angina  

Qualified Codes:  I25.110 - Atherosclerotic heart disease of native coronary 

artery with unstable angina pectoris


(3) Persistent atrial fibrillation


Status:  Chronic


(4) Ischemic cardiomyopathy


Status:  Chronic


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


Status:  Chronic


(6) Diabetes mellitus, type 2


Status:  Chronic


Qualifiers:  


   Diabetes mellitus long term insulin use:  with long term use  Diabetes 

mellitus complication status:  with circulatory complication  Diabetes mellitus 

complication detail:  with other circulatory complications  Qualified Codes:  

E11.59 - Type 2 diabetes mellitus with other circulatory complications; Z79.4 - 

Long term (current) use of insulin


(7) Hyperkalemia


Status:  Resolved


Resolution Date/Time:  1/27/19 @ 12:05


(8) Anemia


Status:  Acute


Qualifiers:  


   Anemia type:  unspecified type  Qualified Codes:  D64.9 - Anemia, unspecified


(9) Leukocytosis


Status:  Acute


(10) Dysphagia


Status:  Chronic


Qualifiers:  


   Dysphagia type:  unspecified  Qualified Codes:  R13.10 - Dysphagia, 

unspecified


(11) PEG (percutaneous endoscopic gastrostomy) status


Status:  Chronic


(12) COPD (chronic obstructive pulmonary disease)


Status:  Chronic


Qualifiers:  


   COPD type:  unspecified COPD  Qualified Codes:  J44.9 - Chronic obstructive 

pulmonary disease, unspecified


(13) Debility


Status:  Chronic


(14) DVT prophylaxis


Status:  Acute











MICHELE WILIKNS DO Jan 28, 2019 09:29


JOSÉ LOPEZ MEDICAL STUDENT Jan 28, 2019 12:16

## 2019-01-28 NOTE — PHYSICAL THERAPY EVALUATION
PT Evaluation-General


Medical Diagnosis


Admission Date


1/26/2019


Medical Diagnosis:  NSTEMI post stent


Onset Date:  Jan 26, 2019





Therapy Diagnosis


Therapy Diagnosis:  weakness; abn gait





Height/Weight


Height (Feet):  5


Height (Inches):  5.00


Weight (Pounds):  160


Weight (Ounces):  4.8





Precautions


Precautions/Isolations:  Droplet Isolation, Fall Prevention, Standard 

Precautions





Referral


Physician:  Mell


Reason for Referral:  Evaluation/Treatment





Medical History


Pertinent Medical History:  Atrial Fib, Arthritis, CABG, CAD, COPD, Dementia, 

GERD, MI, Neuropathy


Current History


Pt presented to ER and found to have NSTEMI, post stent placement.


Reviewed History:  Yes





Social History


Home:  Apartment


Current Living Status:  Spouse


Entry Into Home:  Level Entry





Prior/Core FIM


Prior Level of Function


Therapy Code Descriptions/Definitions 





Functional [place] Measure:


0=Not Assessed/NA        4=Minimal Assistance


1=Total Assistance        5=Supervision or Setup


2=Maximal Assistance  6=Modified [place]


3=Moderate Assistance 7=Complete [place]








Therapy Quality Codes:


6    Independent with activity with or without an assistive device


5    Patient requires set up or clean up by helper.  Patient completes activity

  by  themselves


4    Supervision or touching assist (CGA). [place] provide cues , steadying 

assist


3    The helper provides less than half the effort to complete the activity


2    The helper provides more than half the effort to complete the activity


1    Dependent.  The helper does all the effort to complete an activity 


7    Patient refused to complete or attempt activity


9    The patient did not perform the activity before the current illness or 

injury


88  Not attempted due to Medical conditions or safety concerns





Functional Abilities and Goals:


Independent: Patient completed the activities by him/herself, with or without 

an assistive device, with no assistance from a helper.


Needed Some Help: Patient needed partial assistance from another person to 

complete activities.


Dependent: A helper completed the activities for the patient. 


Unknown:


Not Applicable:


Bed Mobility:  6


Transfers (B,C,W/C) (FIM):  6


Gait:  6


Indoor Mobility (Ambulation):  Independent


Stairs:  Not [place]


Prior Devices Use:  Manual wheelchair, Walker


pt was mod indep in his home with his wife assisting as needed.  Walks short 

distances and uses a wc.  Pt was receiving [facility] PT prior to admit





PT Evaluation-Current


Subjective


Agreeable to PT and wants to get up to the chair.





Pt/Family Goals


Home with wife when able.





Objective


Patient Orientation:  Person, Confused, Place, Time, Situation


Attachments:  Oxygen, Novoa Catheter





ROM/Strength


ROM Lower Extremities


WFL


Strength Lower Extremities


B LE strength is grossly 4-/5





Integumentary/Posture


Integumentary


Refer to nursing notes; bandage on buttock


Bowel Incontinence:  No


Bladder Incontinence:  Novoa Cath


Posture


rounded shoulders and slight forward head; symmetrical





Neuromuscular


(Tone, Coordination, Reflexes)


intact





Sensory


Vision:  Wears Glasses


Hearing:  Impaired


Hand Dominance:  Right


Sensation Right Lower Extremit:  Intact


Sensation Left Lower Extremity:  Intact





Transfers


Therapy Code Descriptions/Definitions 





Functional [place] Measure:


0=Not Assessed/NA        4=Minimal Assistance


1=Total Assistance        5=Supervision or Setup


2=Maximal Assistance  6=Modified [place]


3=Moderate Assistance 7=Complete [place]


Transfers (B, C, W/C) (FIM):  3


Supine to/from Sit:  3 (mod assist to sit up to EOB; pt able to initiate and 

participate in task)


Sit to/from Stand:  4 (CGA with skilled cues for hand placement. )


Pt took 4-5 steps and transferred to the chair with FWW with close CGA for 

safety.





Balance


Sitting Static:  Good


Sitting Dynamic:  Good


Standing Static:  Fair


 Standing Dynamic:  Fair





Assessment/Needs


Presents post NSTEMI with stent placement.  Pt also has experienced 22 pound 

weight loss recently.  He demonstrates gross functional weakness and requires 

assist with all mobility.  He will benefit from skilled PT to address these 

deficits and improve his mobility to the point he mobilizes without assist.


Rehab Potential:  Good





PT Long Term Goals


Long Term Goals


PT Long Term Goals Time Frame:  Feb 4, 2019


Transfers (B,C,W/C) (FIM):  6


Gait (FIM):  5


Gait distance (FIM):  4=511-65 ft


Gait Assistive Device:  FWW





PT Plan


Problem List


Problem List:  Activity Tolerance, Functional Strength, Safety, Balance, Gait, 

Transfer, Bed Mobility





Treatment/Plan


Treatment Plan:  Continue Plan of Care


Treatment Plan:  Bed Mobility, Education, Functional Activity Farnaz, Functional 

Strength, Gait, Safety, Therapeutic Exercise, Transfers


Treatment Duration:  Feb 4, 2019


Frequency:  6 times per week


Estimated Hrs Per Day:  .5 hour per day


Patient and/or Family Agrees t:  Yes





Safety Risks/Education


Patient Education:  Transfer Techniques, Safety Issues


Teaching Recipient:  Patient, Significant Other


Teaching Methods:  Demonstration, Discussion


Response to Teaching:  Reinforcement Needed





Discharge Recommendations


Therapy D/C Recommendations:  Physical Therapy Home Care





Time/GCodes


Time In:  1515


Time Out:  1540


Total Billed Treatment Time:  25


Total Billed Treatment


visit


EVM 25











ELMER CATALAN PT Jan 28, 2019 15:52

## 2019-01-28 NOTE — NUR
CM/SS spoke with the patient and his wife about the recommendation that they consider SNF at 
discharge. Patient's Wife Jeanette stated that until last Friday he was up and able to get 
around on his own. Patient nor his wife will consider a SNF placement. Patient stated that 
he would run away if placed in a facility. Jeanette was adament that she was able to meet his 
needs at home with his feedings, that he has PT/RN/Speech up to three times a week with [facility].

## 2019-01-28 NOTE — CARDIOLOGY PROGRESS NOTE
Cardiology SOAP Progress Note


Subjective:


No chest pain.





Objective:


I&O/Vital Signs











 1/28/19 1/28/19 1/28/19 1/28/19





 04:08 07:00 08:00 08:27


 


Temp 98.0  97.2 


 


Pulse 71 79 78 


 


Resp 18  24 


 


B/P (MAP) 121/60 (80)  106/55 (72) 


 


Pulse Ox 94  98 100


 


O2 Delivery Nasal Cannula  Nasal Cannula Nasal Cannula


 


O2 Flow Rate 2.00  2.00 2.00


 


    





 1/28/19 1/28/19  





 12:00 12:53  


 


Temp 98.9   


 


Pulse 78 82  


 


Resp 22   


 


B/P (MAP) 119/57 (77)   


 


Pulse Ox 96   


 


O2 Delivery Nasal Cannula   


 


O2 Flow Rate 2.00   














 1/28/19





 00:00


 


Intake Total 0 ml


 


Output Total 350 ml


 


Balance -350 ml








Weight (Pounds):  160


Weight (Ounces):  4.8


Weight (Calculated Kilograms):  72.378631


Constitutional:  appears stated age, AAO x 3; No apparent distress; well-

developed, well-nourished


Respiratory:  No accessory muscle use, No respiratory distress, No chest tender

, No chest expansion is symmetric; chest is bilaterally symmetric; No lungs 

clear to percussion; lungs clear to auscultation; No crackles, No rhonchi, No 

rales, No stridor, No wheezing, No pleural rub, No other


Cardiovascular:  regular rate-rhythm; No irregularly irregular, No extra beats, 

No parasternal heave is noted, No JVD, No edema, No bradycardia, No tachycardia

, No point of maximal impulse, No cardiac thrills are palpable; S1 and S2; No 

gallop/S3, No gallop/S4, No diastolic murmur, No systolic murmur, No friction 

rub, No click, No other


Gastrointestional:  No tender, No soft, No round, No distended, No pulsatile 

mass, No organomegaly, No guarding, No rebound, No tenderness, No hernia, No 

mass, No audible bowel sounds, No abnormal bowel sounds, No abdominal bruits, 

No spleenomegaly; other (PEG tube)


Extremities:  No normal range of motion, No non-tender, No normal inspection, 

No pedal edema, No calf tenderness, No normal capillary refill, No pelvis stable

, No calf tenderness, No inflammation, No pedal edema, No slow capillary refill

, No swelling, No other, No abrasion, No clubbing, No cyanosis, No ecchymosis, 

No laceration, No no lower extremity edema bilateral, No significant edema, No 

tenderness, No wound


Neurologic/Psychiatric:  no motor/sensory deficits, alert, normal mood/affect, 

oriented x 3, power is 5/5 both on sides


Skin:  No normal color, No warm/dry, No cyanosis, No cool, No diaphoresis, No 

damp, No ecchymosis, No jaundice, No mottled, No pallor, No rash, No tattoos/

piercings, No ulcerations, No rash on exposed areas, No ulcerations on exposed 

areas, No other





Results/Procedures:


Labs


Laboratory Tests


1/27/19 16:48: Glucometer 98


1/27/19 20:23: Glucometer 100


1/28/19 11:17: Glucometer 126H








A/P:


Assessment/Dx:


Acute non-STEMI, refractory to medical therapy,


Atrial fibrillation persistent,


Ischemic cardiomyopathy, biventricular ICD,


History of ventricular tachycardia/ventricular fibrillation,


Chronic kidney injury,


COPD,


History of PEG placement,


Leukocytosis,


Anemia,


Hyperkalemia,


Hypomagnesemia,


Elevated LFTs


Plan:


Acute non-STEMI, refractory to medical therapy, ongoing chest pain in the ER 

with some relief with medical therapy.  Urgent coronary angiography is 

recommended.  Patient was taken directly to the catheter lab from the ER on 1/26 /2019 and coronary angiography showed severe disease in mid left circumflex 

artery which was treated with a drug-eluting stent 2.75 x 14 mm resolute 

integrity.  No further chest pain.  Mild right groin bruising noted.  No 

significant change in hemoglobin.  Continue dual antiplatelet therapy.





Atrial fibrillation persistent, patient is on amiodarone, mexiletine, digoxin, 

verapamil, metoprolol.  Not on oral anticoagulation due to previous history of 

bleeding.





Ischemic cardiomyopathy, biventricular ICD,





History of ventricular tachycardia/ventricular fibrillation, on amiodarone and 

mexiletine.





Elevated digoxin level, hold digoxin.





Chronic kidney injury,





COPD,





History of PEG placement, patient does not know the reason why he has PEG 

placement.





Leukocytosis, will defer to Dr. Khoury.  Patient has recent history of UTI 

sepsis.





Anemia,





Hyperkalemia, defer to Dr. Khoury.





Hypermagnesemia,





Elevated LFTs.





Patient will like to transition to cardiology/cardiac electrophysiology care in 

[place].  I'll be happy to take care of him as an outpatient as well.








Thank you for your consultation. Please call me if you have any questions.








JULIENNE Oglesby MD, FACP, FACC, FSCAI, FHRS, CCDS


Interventional Cardiology


Cardiac Electrophysiology


Vascular Medicine and Endovascular Interventions











PEREZ OGLESBY MD Jan 28, 2019 2:47 pm

## 2019-01-29 VITALS — DIASTOLIC BLOOD PRESSURE: 58 MMHG | SYSTOLIC BLOOD PRESSURE: 111 MMHG

## 2019-01-29 VITALS — SYSTOLIC BLOOD PRESSURE: 97 MMHG | DIASTOLIC BLOOD PRESSURE: 57 MMHG

## 2019-01-29 VITALS — DIASTOLIC BLOOD PRESSURE: 56 MMHG | SYSTOLIC BLOOD PRESSURE: 105 MMHG

## 2019-01-29 VITALS — DIASTOLIC BLOOD PRESSURE: 60 MMHG | SYSTOLIC BLOOD PRESSURE: 108 MMHG

## 2019-01-29 LAB
ALBUMIN SERPL-MCNC: 3 GM/DL (ref 3.2–4.5)
ALP SERPL-CCNC: 108 U/L (ref 40–136)
ALT SERPL-CCNC: 40 U/L (ref 0–55)
BASOPHILS # BLD AUTO: 0 10^3/UL (ref 0–0.1)
BASOPHILS NFR BLD AUTO: 0 % (ref 0–10)
BILIRUB SERPL-MCNC: 0.6 MG/DL (ref 0.1–1)
BUN/CREAT SERPL: 29
CALCIUM SERPL-MCNC: 9.3 MG/DL (ref 8.5–10.1)
CHLORIDE SERPL-SCNC: 114 MMOL/L (ref 98–107)
CO2 SERPL-SCNC: 23 MMOL/L (ref 21–32)
CREAT SERPL-MCNC: 1.07 MG/DL (ref 0.6–1.3)
EOSINOPHIL # BLD AUTO: 0.2 10^3/UL (ref 0–0.3)
EOSINOPHIL NFR BLD AUTO: 2 % (ref 0–10)
ERYTHROCYTE [DISTWIDTH] IN BLOOD BY AUTOMATED COUNT: 18.3 % (ref 10–14.5)
GFR SERPLBLD BASED ON 1.73 SQ M-ARVRAT: > 60 ML/MIN
GLUCOSE SERPL-MCNC: 99 MG/DL (ref 70–105)
HCT VFR BLD CALC: 29 % (ref 40–54)
HGB BLD-MCNC: 9 G/DL (ref 13.3–17.7)
LYMPHOCYTES # BLD AUTO: 1.3 X 10^3 (ref 1–4)
LYMPHOCYTES NFR BLD AUTO: 15 % (ref 12–44)
MANUAL DIFFERENTIAL PERFORMED BLD QL: NO
MCH RBC QN AUTO: 30 PG (ref 25–34)
MCHC RBC AUTO-ENTMCNC: 31 G/DL (ref 32–36)
MCV RBC AUTO: 99 FL (ref 80–99)
MONOCYTES # BLD AUTO: 1.1 X 10^3 (ref 0–1)
MONOCYTES NFR BLD AUTO: 12 % (ref 0–12)
NEUTROPHILS # BLD AUTO: 6.3 X 10^3 (ref 1.8–7.8)
NEUTROPHILS NFR BLD AUTO: 71 % (ref 42–75)
PLATELET # BLD: 134 10^3/UL (ref 130–400)
PMV BLD AUTO: 12 FL (ref 7.4–10.4)
POTASSIUM SERPL-SCNC: 4.2 MMOL/L (ref 3.6–5)
PROT SERPL-MCNC: 7.2 GM/DL (ref 6.4–8.2)
SODIUM SERPL-SCNC: 148 MMOL/L (ref 135–145)
WBC # BLD AUTO: 8.8 10^3/UL (ref 4.3–11)

## 2019-01-29 RX ADMIN — TICAGRELOR SCH MG: 90 TABLET ORAL at 09:33

## 2019-01-29 RX ADMIN — DILTIAZEM HYDROCHLORIDE SCH MG: 60 TABLET, FILM COATED ORAL at 05:44

## 2019-01-29 RX ADMIN — POTASSIUM CHLORIDE SCH MEQ: 1.5 POWDER, FOR SOLUTION ORAL at 09:33

## 2019-01-29 RX ADMIN — ASPIRIN SCH MG: 81 TABLET ORAL at 09:34

## 2019-01-29 RX ADMIN — INSULIN ASPART SCH UNIT: 100 INJECTION, SOLUTION INTRAVENOUS; SUBCUTANEOUS at 05:45

## 2019-01-29 RX ADMIN — METOPROLOL TARTRATE SCH MG: 25 TABLET, FILM COATED ORAL at 09:33

## 2019-01-29 RX ADMIN — LISINOPRIL SCH MG: 5 TABLET ORAL at 09:33

## 2019-01-29 RX ADMIN — ACETAMINOPHEN PRN MG: 500 TABLET ORAL at 09:34

## 2019-01-29 RX ADMIN — MEXILETINE HYDROCHLORIDE SCH MG: 150 CAPSULE ORAL at 09:33

## 2019-01-29 RX ADMIN — AMIODARONE HYDROCHLORIDE SCH MG: 200 TABLET ORAL at 09:34

## 2019-01-29 RX ADMIN — FUROSEMIDE SCH MG: 40 TABLET ORAL at 09:33

## 2019-01-29 RX ADMIN — INSULIN ASPART SCH UNIT: 100 INJECTION, SOLUTION INTRAVENOUS; SUBCUTANEOUS at 11:30

## 2019-01-29 NOTE — DISCHARGE SUMMARY-HOSPITALIST
MICHELE WILKINS DO 1/29/19 0901:


Diagnosis/Chief Complaint


Date of Admission





Date of Discharge





Discharge Date:  Jan 29, 2019


Discharge Diagnosis





(1) Non-STEMI (non-ST elevated myocardial infarction)


Status:  Acute


(2) CAD (coronary artery disease)


Status:  Chronic


(3) Persistent atrial fibrillation


Status:  Chronic


(4) Ischemic cardiomyopathy


Status:  Chronic


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


Status:  Chronic


(6) Diabetes mellitus, type 2


Status:  Chronic


(7) Hyperkalemia


Status:  Resolved


(8) Anemia


Status:  Acute


(9) Leukocytosis


Status:  Acute


(10) Dysphagia


Status:  Chronic


(11) PEG (percutaneous endoscopic gastrostomy) status


Status:  Chronic


(12) COPD (chronic obstructive pulmonary disease)


Status:  Chronic


(13) Debility


Status:  Chronic


(14) DVT prophylaxis


Status:  Acute





Discharge Summary


Discharge Physical Exam


Allergies:  


Coded Allergies:  


     Penicillins (Verified  Allergy, Severe, HIVES, SOB (Pt has received 

Cefepime & Ceftriaxone), 12/5/18)


     codeine (Verified  Allergy, Severe, SOB, HIVES, 6/8/18)


Vitals & I&Os





Vital Signs








  Date Time  Temp Pulse Resp B/P (MAP) Pulse Ox O2 Delivery O2 Flow Rate FiO2


 


1/29/19 14:02  82 22 111/58 97 Nasal Cannula 2.00 


 


1/29/19 12:00 97.9       








General Appearance:  No Apparent Distress, WD/WN, Chronically ill, Thin


Respiratory:  Chest Non Tender, Lungs Clear, Normal Breath Sounds, No Accessory 

Muscle Use, No Respiratory Distress


Cardiovascular:  Regular Rate, Rhythm, No Edema, No Gallop, No JVD, No Murmur, 

Normal Peripheral Pulses


Neurologic/Psychiatric:  Alert, Oriented x3, No Motor/Sensory Deficits, Normal 

Mood/Affect





Hospital Course


Was the Problem List Reviewed?:  Yes


Hospital course: Patient had a brief hospital course he was admitted after 

urgent cardiac catheterization was required due to non-ST elevation MI and 

stent was placed in an uncomplicated manner.  Patient was monitored closely and 

placed on Brilinta along with amiodarone.  Overall poor prognosis considering 

the chronic debility of the patient which required 3 weeks of inpatient rehab 

care and discharged to home with home care but wife was giving 1-1/2 cans of 

tube feedings twice daily instead of 4 times daily which resulted in a 22 pound 

weight loss in 3 weeks.  Overall medication was reviewed all deemed correct 

tube feeding frequency was increased and patient was discharged on home care 

which was insisted by patient and wife although I recommend a nursing home 

placement.  We will provide any support that they need to achieve success at 

home.


Labs (last 24 hrs)


Laboratory Tests


1/28/19 20:22: Glucometer 103


1/29/19 05:23: Glucometer 108


1/29/19 05:35: 


White Blood Count 8.8, Red Blood Count 2.96L, Hemoglobin 9.0L, Hematocrit 29L, 

Mean Corpuscular Volume 99, Mean Corpuscular Hemoglobin 30, Mean Corpuscular 

Hemoglobin Concent 31L, Red Cell Distribution Width 18.3H, Platelet Count 134, 

Mean Platelet Volume 12.0H, Neutrophils (%) (Auto) 71, Lymphocytes (%) (Auto) 15

, Monocytes (%) (Auto) 12, Eosinophils (%) (Auto) 2, Basophils (%) (Auto) 0, 

Neutrophils # (Auto) 6.3, Lymphocytes # (Auto) 1.3, Monocytes # (Auto) 1.1H, 

Eosinophils # (Auto) 0.2, Basophils # (Auto) 0.0, Sodium Level 148H, Potassium 

Level 4.2, Chloride Level 114H, Carbon Dioxide Level 23, Anion Gap 11, Blood 

Urea Nitrogen 31H, Creatinine 1.07, Estimat Glomerular Filtration Rate > 60, BUN

/Creatinine Ratio 29, Glucose Level 99, Calcium Level 9.3, Corrected Calcium 

10.1, Total Bilirubin 0.6, Aspartate Amino Transf (AST/SGOT) 39H, Alanine 

Aminotransferase (ALT/SGPT) 40, Alkaline Phosphatase 108, Total Protein 7.2, 

Albumin 3.0L


1/29/19 11:02: Glucometer 141H


Patient resulted labs reviewed.


Pending Labs








Discussion & Recommendations


Discharge Planning:  <30 minutes discharge planning





Discharge


Home Medications:





Active Scripts


Active


Lisinopril 5 Mg Tablet 5 Mg PO DAILY


Amiodarone HCl 200 Mg Tablet 200 Mg PEG DAILY


Brilinta (Ticagrelor) 90 Mg Tablet 90 Mg PO BID


Lipitor (Atorvastatin Calcium) 40 Mg Tablet 40 Mg PO HS


Prevalite Packet (Cholestyramine/Aspartame) 4 Gm Powd.pack 4 Gm PO 1000,2300


Reported


Metoprolol Succinate 25 Mg Tab.er.24h 25 Mg PO DAILY


Aspirin 81 Mg Tab.chew 81 Mg PO DAILY


Milk of Magnesia (Magnesium Hydroxide) 400 Mg/5 Ml Oral.susp 30 Ml PO DAILY PRN


Potassium Chloride 20 Meq Packet 20 Meq PO DAILY


Diltiazem HCl 60 Mg Tablet 60 Mg PO Q8H


Furosemide 40 Mg Tablet 40 Mg PO BID


Mexiletine HCl 150 Mg Cap 150 Mg PO BID


Ventolin Hfa (Albuterol Sulfate) 18 Gm Hfa.aer.ad 2 Puff IH Q6H PRN





Instructions to patient/family


Please see electronic discharge instructions given to patient.





JOSÉ LOPEZ MEDICAL STUDENT 1/29/19 1234:


Diagnosis/Chief Complaint


Date of Admission


1/26/19


Admission Diagnosis


NSTEMI


Discharge Diagnosis


NSTEMI





(1) Non-STEMI (non-ST elevated myocardial infarction)


Status:  Acute


(2) Hyperkalemia


Status:  Resolved


(3) Anemia


Status:  Acute


(4) CAD (coronary artery disease)


Status:  Chronic


(5) Dysphagia


Status:  Chronic


(6) Debility


Status:  Chronic


(7) Ischemic cardiomyopathy


Status:  Chronic


(8) Persistent atrial fibrillation


Status:  Chronic


(9) ICD (implantable cardioverter-defibrillator) in place


Status:  Chronic


(10) Diabetes mellitus, type 2


Status:  Chronic


(11) DVT prophylaxis


Status:  Acute


(12) Leukocytosis


Status:  Acute


(13) COPD (chronic obstructive pulmonary disease)


Status:  Chronic





Discharge Summary


Procedures/Consulations


Cardiology consulted for NSTEMI, resulted in cardiac cath and placement of 

stent in LCX





Discharge Physical Exam


Allergies:  


Coded Allergies:  


     Penicillins (Verified  Allergy, Severe, HIVES, SOB (Pt has received 

Cefepime & Ceftriaxone), 12/5/18)


     codeine (Verified  Allergy, Severe, SOB, HIVES, 6/8/18)


General Appearance:  No Apparent Distress, WD/WN


HEENT:  Moist Mucous Membranes


Respiratory:  Chest Non Tender, Lungs Clear, Normal Breath Sounds, No Accessory 

Muscle Use, No Respiratory Distress


Cardiovascular:  Regular Rate, Rhythm, No Edema, No Gallop, No Murmur, Normal 

Peripheral Pulses


Gastrointestinal:  Normal Bowel Sounds, No Organomegaly, Non Tender, Soft


Extremity:  Normal Capillary Refill, Normal Inspection, Normal Range of Motion, 

Non Tender, No Calf Tenderness, No Pedal Edema


Skin:  Normal Color, Warm/Dry (R groin and wrist catheter sites show some 

ecchymosis but with clean bandages placed)


Neurologic/Psychiatric:  Alert, Oriented x3, No Motor/Sensory Deficits, Normal 

Mood/Affect





Hospital Course


Was the Problem List Reviewed?:  Yes


This is a 78-year-old white male who presented to the ER on Saturday 

complaining of chest pain. The pt was diagnosed with NSTEMI and was refractory 

to medication management. He was taken to cardiac cath and had a stent placed 

in the left circumflex coronary artery. The pt has a PEG tube placement due to 

dysphagia and a hx of recurrent aspiration PNA. He was most recently discharged 

to home w/ home health from [facility] 3 weeks ago. The pt's wife states 

she has been feeding him "1.5 cans" BID since discharge. Notably, the patient 

has lost 23 pounds over the past 3 weeks. Dietary was consulted and reported 

the patient should be receiving 2 cans of formula every 6 hours. These 

instructions were relayed to the wife. The patient's disposition was discussed 

with the pt and wife; he was encouraged to be placed at a skilled nursing 

facility. Both the pt and the pt's wife declined nursing home placement, 

despite pointing out that the pt's rapid decline over the past 3 weeks does not 

katina well for his continuing to live at home. Pt will be discharged to home 

with ongoing home health services. Pt is to f/u w/ Dr. Oglesby in cardiology in 

2 to 3 weeks as outpatient.





Problem Qualifiers





(1) CAD (coronary artery disease):  


Coronary Disease-Associated Artery/Lesion type:  native artery  Native vs. 

transplanted heart:  native heart  Associated angina:  with unstable angina  

Qualified Codes:  I25.110 - Atherosclerotic heart disease of native coronary 

artery with unstable angina pectoris


(2) Diabetes mellitus, type 2:  


Diabetes mellitus long term insulin use:  with long term use  Diabetes mellitus 

complication status:  with circulatory complication  Diabetes mellitus 

complication detail:  with other circulatory complications  Qualified Codes:  

E11.59 - Type 2 diabetes mellitus with other circulatory complications; Z79.4 - 

Long term (current) use of insulin


(3) Anemia:  


Anemia type:  unspecified type  Qualified Codes:  D64.9 - Anemia, unspecified


(4) Dysphagia:  


Dysphagia type:  unspecified  Qualified Codes:  R13.10 - Dysphagia, unspecified


(5) COPD (chronic obstructive pulmonary disease):  


COPD type:  unspecified COPD  Qualified Codes:  J44.9 - Chronic obstructive 

pulmonary disease, unspecified








MICHELE WILKINS DO Jan 29, 2019 09:01


JOSÉ LOPEZ MEDICAL STUDENT Jan 29, 2019 12:34

## 2019-01-29 NOTE — DISCHARGE INST-POST CATH
Discharge Inst-CATH/EP


Post Cardiac Cath/EP D/C Inst


Follow Up/Plan


Dr Oglesby in two to three weeks.


CARDIAC CATH DISCHARGE INSTRUCTIONS





*Hold Metformin for 48 hours post heart cath.





ACTIVITY





* Go Home directly and rest.


* Limit activity of the leg (or wrist if it was used) for 7 days including 

aerobics, swimming,


   jogging, bicycling, etc.


* Restrict stair-climbing for 7 days if possible, if not, climb up with your non

-cath leg, then


   bring together on the same step.


* Avoid lifting, pushing, pulling or excessive movement of the affected 

extremity for 7 days.


* Customary sexual activity may be resumed after 2 days-use caution not to use 

a position  


   that strains or causes pain to the affected extremity.


* No driving for 24 hours.


* NO SMOKING. 


* Avoid straining for bowel movements for 7 days.


* Gentle walking on level ground is allowed.


* Returning to work will depend on the type of procedure and the results. Your 

doctor will discuss


   this with you.





CALL YOUR DOCTOR FOR ANY OF THE FOLLOWING:





*If bleeding from the puncture site occurs- Apply gentle pressure to site with 

clean cloth and call


   your doctor or EMS.


* If a knot or lump forms under the skin, increases in size, or causes pain.


* If bruising appears to be worsening or moving further down your leg instead 

of disappearing.


* Temperature above 101 F.





CARE OF YOUR GROIN INCISION;





* Bruising or purple discoloration of the skin near the puncture site is common.


* You may shower only, no bathtub bathing for 5 days.  Be careful to avoid 

slipping as your


   leg may feel stiff.


* If a closure device was used on your femoral artery, please see the attached 

guide regarding


   care of the device and your leg.


* Leave the dressing on, until removed by office staff.





CARE OF YOUR WRIST INCISION;





* Bruising or purple discoloration of the skin near the puncture site is common.


* You may shower.


* DO NOT submerge wrist.


* Leave dressing on, until removed by office staff..











PEREZ OGLESBY MD Jan 29, 2019 9:21 am

## 2019-01-29 NOTE — CARDIOLOGY PROGRESS NOTE
Cardiology SOAP Progress Note


Subjective:


No chest pain.





Objective:


I&O/Vital Signs











 1/29/19 1/29/19 1/29/19 1/29/19





 00:04 01:00 04:11 07:32


 


Temp 97.6  97.8 


 


Pulse 79 78 82 


 


Resp 16  16 


 


B/P (MAP) 97/57 (70)  108/60 (76) 


 


Pulse Ox 97  96 


 


O2 Delivery Nasal Cannula  Nasal Cannula Nasal Cannula


 


O2 Flow Rate 2.00  2.00 1.00


 


    





 1/29/19   





 08:54   


 


Pulse 78   














 1/29/19





 00:00


 


Intake Total 906 ml


 


Output Total 600 ml


 


Balance 306 ml








Weight (Pounds):  160


Weight (Ounces):  4.8


Weight (Calculated Kilograms):  72.024779


Constitutional:  appears stated age, AAO x 3; No apparent distress; well-

developed, well-nourished


Respiratory:  No accessory muscle use, No respiratory distress, No chest tender

, No chest expansion is symmetric; chest is bilaterally symmetric; No lungs 

clear to percussion; lungs clear to auscultation; No crackles, No rhonchi, No 

rales, No stridor, No wheezing, No pleural rub, No other


Cardiovascular:  regular rate-rhythm; No irregularly irregular, No extra beats, 

No parasternal heave is noted, No JVD, No edema, No bradycardia, No tachycardia

, No point of maximal impulse, No cardiac thrills are palpable; S1 and S2; No 

gallop/S3, No gallop/S4, No diastolic murmur, No systolic murmur, No friction 

rub, No click, No other


Gastrointestional:  No tender, No soft, No round, No distended, No pulsatile 

mass, No organomegaly, No guarding, No rebound, No tenderness, No hernia, No 

mass, No audible bowel sounds, No abnormal bowel sounds, No abdominal bruits, 

No spleenomegaly; other (PEG tube)


Extremities:  No normal range of motion, No non-tender, No normal inspection, 

No pedal edema, No calf tenderness, No normal capillary refill, No pelvis stable

, No calf tenderness, No inflammation, No pedal edema, No slow capillary refill

, No swelling, No other, No abrasion, No clubbing, No cyanosis, No ecchymosis, 

No laceration, No no lower extremity edema bilateral, No significant edema, No 

tenderness, No wound


Neurologic/Psychiatric:  no motor/sensory deficits, alert, normal mood/affect, 

oriented x 3, power is 5/5 both on sides


Skin:  No normal color, No warm/dry, No cyanosis, No cool, No diaphoresis, No 

damp, No ecchymosis, No jaundice, No mottled, No pallor, No rash, No tattoos/

piercings, No ulcerations, No rash on exposed areas, No ulcerations on exposed 

areas, No other





Results/Procedures:


Labs


Laboratory Tests


1/28/19 11:17: Glucometer 126H


1/28/19 16:28: Glucometer 95


1/28/19 20:22: Glucometer 103


1/29/19 05:23: Glucometer 108


1/29/19 05:35: 


White Blood Count 8.8, Red Blood Count 2.96L, Hemoglobin 9.0L, Hematocrit 29L, 

Mean Corpuscular Volume 99, Mean Corpuscular Hemoglobin 30, Mean Corpuscular 

Hemoglobin Concent 31L, Red Cell Distribution Width 18.3H, Platelet Count 134, 

Mean Platelet Volume 12.0H, Neutrophils (%) (Auto) 71, Lymphocytes (%) (Auto) 15

, Monocytes (%) (Auto) 12, Eosinophils (%) (Auto) 2, Basophils (%) (Auto) 0, 

Neutrophils # (Auto) 6.3, Lymphocytes # (Auto) 1.3, Monocytes # (Auto) 1.1H, 

Eosinophils # (Auto) 0.2, Basophils # (Auto) 0.0, Sodium Level 148H, Potassium 

Level 4.2, Chloride Level 114H, Carbon Dioxide Level 23, Anion Gap 11, Blood 

Urea Nitrogen 31H, Creatinine 1.07, Estimat Glomerular Filtration Rate > 60, BUN

/Creatinine Ratio 29, Glucose Level 99, Calcium Level 9.3, Corrected Calcium 

10.1, Total Bilirubin 0.6, Aspartate Amino Transf (AST/SGOT) 39H, Alanine 

Aminotransferase (ALT/SGPT) 40, Alkaline Phosphatase 108, Total Protein 7.2, 

Albumin 3.0L








A/P:


Assessment/Dx:


Acute non-STEMI, refractory to medical therapy,


Atrial fibrillation persistent,


Ischemic cardiomyopathy, biventricular ICD,


History of ventricular tachycardia/ventricular fibrillation,


Chronic kidney injury,


COPD,


History of PEG placement,


Leukocytosis,


Anemia,


Hyperkalemia,


Hypomagnesemia,


Elevated LFTs


Plan:


Acute non-STEMI, refractory to medical therapy, ongoing chest pain in the ER 

with some relief with medical therapy.  Urgent coronary angiography is 

recommended.  Patient was taken directly to the catheter lab from the ER on 1/26 /2019 and coronary angiography showed severe disease in mid left circumflex 

artery which was treated with a drug-eluting stent 2.75 x 14 mm resolute 

integrity.  No further chest pain.  Mild right groin bruising noted.  No 

significant change in hemoglobin.  Continue dual antiplatelet therapy.





Atrial fibrillation persistent, patient is on amiodarone, mexiletine, cardizem, 

metoprolol.  Not on oral anticoagulation due to previous history of bleeding.





Ischemic cardiomyopathy, biventricular ICD,





History of ventricular tachycardia/ventricular fibrillation, on amiodarone and 

mexiletine.





Elevated digoxin level, DC digoxin.





Chronic kidney injury,





COPD,





History of PEG placement, patient does not know the reason why he has PEG 

placement.





Leukocytosis, will defer to Dr. Khoury.  Patient has recent history of UTI 

sepsis.





Anemia,





Hyperkalemia, defer to Dr. Khoury.





Hypermagnesemia,





Elevated LFTs.





Patient will like to transition to cardiology/cardiac electrophysiology care in 

[place].  I'll be happy to take care of him as an outpatient as well and 

follow-up in 2-3 weeks.








Thank you for your consultation. Please call me if you have any questions.








JULIENNE Oglesby MD, FACP, FACC, FSCAI, FHRS, CCDS


Interventional Cardiology


Cardiac Electrophysiology


Vascular Medicine and Endovascular Interventions











PEREZ OGLESBY MD Jan 29, 2019 9:20 am

## 2019-01-29 NOTE — NUR
PT ON PEG FEEDINGS DUE TO SILENTLY ASPIRATING.  DURING RECENT STAY ON IRF,PT WAS GETTING 1.5 
CANS JEVITY 1.5, 4 TIMES PER DAY (6 CANS TOTAL)PROVIDING 2130 KCAL, 91 GRAMS PROTEIN, AND 
1080 ML FREE WATER, AND PT DID WELL.  PT WAS DISCHARGED ON SAME REGIMEN.  WHILE AT HOME PT 
HAS ONLY BEENGETTING 1.5 CANS TWICE PER DAY (3 CANS TOTAL) PROVIDING 1065 KCAL, 45GRAMS 
PROTEIN, 540 ML FREE WATER.



RECOMMENDATIONS: 1.5 CANS JEVITY 1.5, 4 TIMES PER DAY (6 CANS TOTAL) PROVIDING 2130 KCAL, 91 
GRAMS PROTEIN, AND 1080 ML FREE WATER.  PT WILL NEED ADDITIONAL 1100 ML FLUID EACH DAY 
THROUGH FLUSHES.

## 2019-01-29 NOTE — D/C HH FACE TO FACE ORDER
D/C  Face to Face Orders


Instructions for Patient


[facility], [phone]


Patient Instructions/FollowUp:  


[facility] in 1 week


Physician to follow Patient:  [facility]


Discharge Diet for Home:  Cardiac Diet


Patient Problems:  


NSTEMI


Aspiration requiring PEG tube maintenance





Patient Data-Allergies,Ht & Wt


Patient Allergies:  


Coded Allergies:  


     Penicillins (Verified  Allergy, Severe, HIVES, SOB (Pt has received 

Cefepime & Ceftriaxone), 12/5/18)


     codeine (Verified  Allergy, Severe, SOB, HIVES, 6/8/18)


Height (Feet):  5


Height (Inches):  5.00


Weight (Pounds):  160


Weight (Ounces):  4.8





Home Health Need/Face to Face


Date of Face to Face:  Jan 29, 2019


Clinical Findings:  Generalized weakness and fatigue, Muscle weakness


I have seen Pt face-to-face:  Yes


Discharged To:  Home


Diagnosis/Conditions:  


NSTEMI


Aspiration requiring PEG tube maintenance


Patient is Homebound due to:  CognItive deficits, Qasim fall risk due to 

instabilty, Muscle weakness


Homebound Status


   Due to the above stated illness, injury or surgical procedure (medical 

condition or diagnosis) and associated clinical findings, the patient is 

homebound because of his/her inability to leave home except with aid of a 

supportive device and/or person AND leaving the home requires a considerable 

and taxing effort or is medically contraindicated.


Pt req the following assistanc:  Walker





Home Health Nursing Orders


Home Health Services Order:  Nursing Services, Occupational Ther-Evaluate & 

Treat, Physical Therapy-Evaluate & Treat





Home Health Infusion Therapy


Line Type:  Peripheral IV


Site Location:  Antecubital


Type of Feeding Tube:  PEG


Formula:  Pulmocare


Certify Stmt


I certify that this patient is under my care and that I, a nurse practitioner 

or a physician; a assistant working with me, had a face to face encounter that -

meets the physician face to face encounter requirements with this patient as 

dated.











MICHELE WILKINS DO Jan 29, 2019 09:00

## 2019-01-29 NOTE — NUR
CM/SS spoke with Jenny (dietary) he is to have 1 1/2 can 4x a day. 



C resumption information sent to [facility], where they were receiving services.

## 2019-02-15 ENCOUNTER — HOSPITAL ENCOUNTER (OUTPATIENT)
Dept: HOSPITAL 75 - RAD | Age: 79
End: 2019-02-15
Attending: INTERNAL MEDICINE
Payer: MEDICARE

## 2019-02-15 DIAGNOSIS — T17.908A: Primary | ICD-10-CM

## 2019-02-15 PROCEDURE — 74220 X-RAY XM ESOPHAGUS 1CNTRST: CPT

## 2019-02-15 RX ADMIN — BARIUM SULFATE ONE ML: 1.05 SUSPENSION ORAL; RECTAL at 11:10

## 2019-02-15 NOTE — DIAGNOSTIC IMAGING REPORT
INDICATION: Aspiration.



FINDINGS: The patient ingested thick barium. A total of 36

seconds of fluoroscopy was utilized. Initial image demonstrates

good opacification of the esophagus which is unremarkable. Second

swallow did demonstrate aspiration into the trachea with

extension of contrast into the left lower lobe bronchi. Due to

aspiration, the procedure was terminated.



IMPRESSION: Aspiration, as described. Therefore, procedure was

terminated.



Dictated by: 



  Dictated on workstation # [id number]

## 2019-02-25 ENCOUNTER — HOSPITAL ENCOUNTER (OUTPATIENT)
Dept: HOSPITAL 75 - RAD | Age: 79
End: 2019-02-25
Attending: INTERNAL MEDICINE
Payer: MEDICARE

## 2019-02-25 DIAGNOSIS — R13.10: Primary | ICD-10-CM

## 2019-02-25 PROCEDURE — 74230 X-RAY XM SWLNG FUNCJ C+: CPT

## 2019-02-25 NOTE — DIAGNOSTIC IMAGING REPORT
INDICATION: Dysphagia.



TECHNIQUE: The procedure was performed in conjunction with the

members of department of speech pathology. Patient swallowed thin

and thick barium and semisolid and solid foods mixed with barium

paste. The study was recorded on videotape.



FINDINGS: Oral phase of swallowing is grossly unremarkable. There

is early spillover. There is marked residual in the valleculae

and piriform sinuses. Cricopharyngeal function and laryngeal

elevation were virtually absent. No obvious aspiration was seen

on this exam.



IMPRESSION: Markedly abnormal swallow as described. Please

correlate with the formal speech pathology report.



Dictated by: 



  Dictated on workstation # [id number]

## 2019-10-12 ENCOUNTER — HOSPITAL ENCOUNTER (EMERGENCY)
Dept: HOSPITAL 75 - ER | Age: 79
Discharge: HOME | End: 2019-10-12
Payer: MEDICARE

## 2019-10-12 VITALS — WEIGHT: 160.28 LBS | BODY MASS INDEX: 26.7 KG/M2 | HEIGHT: 64.96 IN

## 2019-10-12 VITALS — SYSTOLIC BLOOD PRESSURE: 121 MMHG | DIASTOLIC BLOOD PRESSURE: 81 MMHG

## 2019-10-12 DIAGNOSIS — Z95.810: ICD-10-CM

## 2019-10-12 DIAGNOSIS — I25.2: ICD-10-CM

## 2019-10-12 DIAGNOSIS — Z87.891: ICD-10-CM

## 2019-10-12 DIAGNOSIS — Z79.82: ICD-10-CM

## 2019-10-12 DIAGNOSIS — I10: ICD-10-CM

## 2019-10-12 DIAGNOSIS — F32.9: ICD-10-CM

## 2019-10-12 DIAGNOSIS — F41.9: ICD-10-CM

## 2019-10-12 DIAGNOSIS — F03.90: ICD-10-CM

## 2019-10-12 DIAGNOSIS — Z88.0: ICD-10-CM

## 2019-10-12 DIAGNOSIS — I25.10: ICD-10-CM

## 2019-10-12 DIAGNOSIS — E11.40: ICD-10-CM

## 2019-10-12 DIAGNOSIS — Z95.5: ICD-10-CM

## 2019-10-12 DIAGNOSIS — Z87.442: ICD-10-CM

## 2019-10-12 DIAGNOSIS — J44.9: ICD-10-CM

## 2019-10-12 DIAGNOSIS — Z85.828: ICD-10-CM

## 2019-10-12 DIAGNOSIS — K94.23: Primary | ICD-10-CM

## 2019-10-12 DIAGNOSIS — Z80.49: ICD-10-CM

## 2019-10-12 DIAGNOSIS — Z99.81: ICD-10-CM

## 2019-10-12 DIAGNOSIS — Z95.1: ICD-10-CM

## 2019-10-12 DIAGNOSIS — Z82.49: ICD-10-CM

## 2019-10-12 DIAGNOSIS — E78.00: ICD-10-CM

## 2019-10-12 DIAGNOSIS — K21.9: ICD-10-CM

## 2019-10-12 DIAGNOSIS — Z88.5: ICD-10-CM

## 2019-10-12 PROCEDURE — 43762 RPLC GTUBE NO REVJ TRC: CPT

## 2019-10-12 NOTE — ED GI
General


Chief Complaint:  Catheter/Drain/Tube Problems


Stated Complaint:  PULLED FEEDING TUBE OUT


Nursing Triage Note:  


pulled out feeding tube


Sepsis Screen:  No Definite Risk


Source of Information:  Patient, Family


Exam Limitations:  Other (dementia)





History of Present Illness


Date Seen by Provider:  Oct 12, 2019


Time Seen by Provider:  06:34


Initial Comments


Patient presents to ER by private conveyance with family and chief Girmason chief 

complaint displacing his PEG tube accidentally. He has dementia and is on 

hospice. The PEG tube was placed by a surgeon at [place], [place]. He's 

having no significant pain nausea vomiting fever chills or dysuria. The PEG tube

was placed early in the year greater than 6 months.





Allergies and Home Medications


Allergies


Coded Allergies:  


     Penicillins (Verified  Allergy, Severe, HIVES, SOB (Pt has received 

Cefepime & Ceftriaxone), 12/5/18)


     codeine (Verified  Allergy, Severe, SOB, HIVES, 6/8/18)





Home Medications


Albuterol Sulfate 18 Gm Hfa.aer.ad, 2 PUFF IH Q6H PRN for SHORTNESS OF BREATH, 

(Reported)


Amiodarone HCl 200 Mg Tablet, 200 MG PEG DAILY


   Prescribed by: MICHELE WILKINS on 1/29/19 0858


Aspirin 81 Mg Tab.chew, 81 MG PO DAILY, (Reported)


Atorvastatin Calcium 40 Mg Tablet, 40 MG PO HS


   Prescribed by: MICHELE WILKINS on 1/7/19 0812


Cholestyramine/Aspartame 4 Gm Powd.pack, 4 GM PO 1000,2300


   Prescribed by: MICHELE WILKINS on 1/7/19 0812


Diltiazem HCl 60 Mg Tablet, 60 MG PO Q8H, (Reported)


Furosemide 40 Mg Tablet, 40 MG PO BID, (Reported)


Lisinopril 5 Mg Tablet, 5 MG PO DAILY


   Prescribed by: MICHELE WILKINS on 1/29/19 0858


Magnesium Hydroxide 400 Mg/5 Ml Oral.susp, 30 ML PO DAILY PRN for CONSTIPATION-

1ST LINE, (Reported)


Metoprolol Succinate 25 Mg Tab.er.24h, 25 MG PO DAILY, (Reported)


Mexiletine HCl 150 Mg Cap, 150 MG PO BID, (Reported)


Potassium Chloride 20 Meq Packet, 20 MEQ PO DAILY, (Reported)


Ticagrelor 90 Mg Tablet, 90 MG PO BID


   Prescribed by: MICHELE WILKINS on 1/29/19 0858





Patient Home Medication List


Home Medication List Reviewed:  Yes





Review of Systems


Review of Systems


Constitutional:  No chills, No diaphoresis


EENTM:  No Blurred Vision, No Double Vision


Respiratory:  Denies Cough, Denies Shortness of Air


Cardiovascular:  Denies Chest Pain, Denies Edema


Gastrointestinal:  Denies Constipated, Denies Diarrhea, Denies Nausea





Past Medical-Social-Family Hx


Patient Social History


Alcohol Use:  Denies Use


Recreational Drug Use:  No


Smoking Status:  Former Smoker


Type Used:  Cigarettes


Former Smoker, Quit:  Dec 24, 1988


2nd Hand Smoke Exposure:  No


Recent Foreign Travel:  No


Contact w/Someone Who Travel:  No


Recent Infectious Disease Expo:  No


Recent Hopitalizations:  Yes


Physical Abuse:  No


Sexual Abuse:  No


Mistreated:  No


Fear:  No





Immunizations Up To Date


Tetanus Booster (TDap):  Unknown


Date of Pneumonia Vaccine:  Oct 14, 2018


Date of Influenza Vaccine:  Oct 10, 2018





Seasonal Allergies


Seasonal Allergies:  No





Past Medical History


Surgeries:  Yes (skin cancer removal, cataract surgery, cardiac stents )


Cardiac, CABG, Coronary Stent, Defibrillator, Eye Surgery, Orthopedic, Renal


Respiratory:  Yes (O2 3L/NC AT HS-now off all oxygen 12/2018; CHRONIC 

RESPIRATORY FAILURE)


Asthma, Pneumonia, Chronic Bronchitis, Sleep Apnea, COPD


Currently Using CPAP:  No


Currently Using BIPAP:  No


Cardiac:  Yes (CABG)


Cardiomyopathy, Chronic Edema/Swelling, Coronary Artery Disease, Heart Attack, 

High Cholesterol, Hypertension, Irregular Heartbeat


Neurological:  Yes


Dementia, Neuropathy


Reproductive Disorders:  Yes (unable to have children- mumps as a child)


Sexually Transmitted Disease:  No


HIV/AIDS:  No


Genitourinary:  Yes (renal insufficiency-renal stent placement)


Kidney Infection, Bladder Infection, Kidney Stones


Gastrointestinal:  Yes (peg tube [id number] aspiration)


Gastroesophageal Reflux, Ulcer


Musculoskeletal:  Yes (POOR MOBILITY; LEFT HIP FRACTURE)


Arthritis, Chronic Back Pain, Fractures


Endocrine:  Yes


Diabetes, Insulin dep


HEENT:  Yes


Cataract, Glaucoma


Loss of Vision:  Bilateral


Hearing Impairment:  Hard of Hearing


Cancer:  Yes


Skin


Did You Recieve Any Treatments:  Yes


What Type of Treatment Did You:  Surgical Intervention


Psychosocial:  Yes (combative at times)


Anxiety, Depression


Integumentary:  Yes (shingles , SKIN CANCER; LEG CELLULITIS/ STASIS DERMATITIS)


Blood Disorders:  No


Adverse Reaction/Blood Tranf:  No





Family Medical History





Cardiovascular disease


  19 MOTHER


  G8 BROTHER


  G8 SISTER


Cervical cancer


  19 MOTHER


Heart Disease





Physical Exam


Vital Signs


Capillary Refill : Less Than 3 Seconds


Height/Weight/BMI


Height: 5'5.00"


Weight: 160lbs. 4.8oz. 72.611291qa; 26.00 BMI


Method:Stated


General Appearance:  WD/WN, no apparent distress


Respiratory:  no respiratory distress, no accessory muscle use


Cardiovascular:  normal peripheral pulses, regular rate, rhythm


Gastrointestinal:  normal bowel sounds, non tender, soft, other (well-

established gastrostomy tube with beefy-red granulation tissue.)





Progress/Results/Core Measures


Results/Orders








Blood Pressure Mean:                    94











Progress


Progress Note :  


   Time:  06:58


Progress Note


Attempted to place a 24 Frisian gastrostomy tube unsuccessfully. A 16 Frisian 

coud Novoa catheter was easily passed using lubricant and the balloon was valeria

led. Patient tolerated procedure well.





Departure


Impression





   Primary Impression:  


   Dislodged gastrostomy tube


Disposition:  01 HOME, SELF-CARE


Condition:  Improved





Departure-Patient Inst.


Decision time for Depature:  07:00


Referrals:  


MARIANNA ALLEN DAVID F MD (PCP/Family)


Primary Care Physician


Patient Instructions:  How to Care for Your PEG Tube





Add. Discharge Instructions:  


Keep the site clean with regular soap and water. You may use a gauze dressing.


Call the general surgeon, Dr. Allen at his office and request for an 

appointment to follow-up for replacement of a gastrostomy tube.





All discharge instructions reviewed with patient and/or family. Voiced 

understanding.





Copy


Copies To 1:   MARIANNA ALLEN TITUS J                 Oct 12, 2019 07:01

## 2019-10-18 ENCOUNTER — HOSPITAL ENCOUNTER (EMERGENCY)
Dept: HOSPITAL 75 - ER | Age: 79
Discharge: HOME | End: 2019-10-18
Payer: MEDICAID

## 2019-10-18 VITALS — DIASTOLIC BLOOD PRESSURE: 85 MMHG | SYSTOLIC BLOOD PRESSURE: 125 MMHG

## 2019-10-18 VITALS — BODY MASS INDEX: 23.14 KG/M2 | HEIGHT: 64.96 IN | WEIGHT: 138.89 LBS

## 2019-10-18 DIAGNOSIS — Z87.442: ICD-10-CM

## 2019-10-18 DIAGNOSIS — Z87.891: ICD-10-CM

## 2019-10-18 DIAGNOSIS — E78.00: ICD-10-CM

## 2019-10-18 DIAGNOSIS — Z85.828: ICD-10-CM

## 2019-10-18 DIAGNOSIS — Z88.5: ICD-10-CM

## 2019-10-18 DIAGNOSIS — K94.23: Primary | ICD-10-CM

## 2019-10-18 DIAGNOSIS — Z88.0: ICD-10-CM

## 2019-10-18 DIAGNOSIS — Z95.5: ICD-10-CM

## 2019-10-18 DIAGNOSIS — Z79.82: ICD-10-CM

## 2019-10-18 DIAGNOSIS — Z95.1: ICD-10-CM

## 2019-10-18 DIAGNOSIS — I25.10: ICD-10-CM

## 2019-10-18 DIAGNOSIS — F03.90: ICD-10-CM

## 2019-10-18 DIAGNOSIS — Z80.49: ICD-10-CM

## 2019-10-18 DIAGNOSIS — I25.2: ICD-10-CM

## 2019-10-18 DIAGNOSIS — K21.9: ICD-10-CM

## 2019-10-18 DIAGNOSIS — Z95.810: ICD-10-CM

## 2019-10-18 DIAGNOSIS — Z82.49: ICD-10-CM

## 2019-10-18 DIAGNOSIS — E11.40: ICD-10-CM

## 2019-10-18 DIAGNOSIS — I10: ICD-10-CM

## 2019-10-18 DIAGNOSIS — J44.9: ICD-10-CM

## 2019-10-18 PROCEDURE — 43762 RPLC GTUBE NO REVJ TRC: CPT

## 2019-10-18 NOTE — ED GENERAL
General


Chief Complaint:  Catheter/Drain/Tube Problems


Stated Complaint:  PULLED OUT FEEDING TUBE


Nursing Triage Note:  


PT ARRIVED PER EMS, PT STATES FEEDING TUBE CAME OUT SOMETIME DURING NITE,  RN 


UNABLE TO PUT TUBE BACK IN, SENT TO ED.


Nursing Sepsis Screen:  No Definite Risk


Source of Information:  Patient, EMS


Exam Limitations:  No Limitations





History of Present Illness


Date Seen by Provider:  Oct 18, 2019


Time Seen by Provider:  08:42


Initial Comments


This 79-year-old gentleman presents to the emergency room via EMS because of a 

dislodged feeding tube.  It came out some time in the night.  His home health 

nurse has been unable to reinsert it.  He arrives via EMS because the care 

provider locked her keys in the car.  Patient is having no complications with 

the site.  There is no bleeding or pain.  The tube was dislodged yesterday as 

well and was put back and by Dr. Malone.  There was an attempt to burn some skin

tags around the insertion site with silver nitrate as well.





Allergies and Home Medications


Allergies


Coded Allergies:  


     Penicillins (Verified  Allergy, Severe, HIVES, SOB (Pt has received 

Cefepime & Ceftriaxone), 12/5/18)


     codeine (Verified  Allergy, Severe, SOB, HIVES, 6/8/18)





Home Medications


Albuterol Sulfate 18 Gm Hfa.aer.ad, 2 PUFF IH Q6H PRN for SHORTNESS OF BREATH, 

(Reported)


Amiodarone HCl 200 Mg Tablet, 200 MG PEG DAILY


   Prescribed by: MICHELE WILKINS on 1/29/19 0858


Aspirin 81 Mg Tab.chew, 81 MG PO DAILY, (Reported)


Atorvastatin Calcium 40 Mg Tablet, 40 MG PO HS


   Prescribed by: MICHELE WILKINS on 1/7/19 0812


Cholestyramine/Aspartame 4 Gm Powd.pack, 4 GM PO 1000,2300


   Prescribed by: MICHELE WILKINS on 1/7/19 0812


Diltiazem HCl 60 Mg Tablet, 60 MG PO Q8H, (Reported)


Furosemide 40 Mg Tablet, 40 MG PO BID, (Reported)


Lisinopril 5 Mg Tablet, 5 MG PO DAILY


   Prescribed by: MICHELE WILKINS on 1/29/19 0858


Magnesium Hydroxide 400 Mg/5 Ml Oral.susp, 30 ML PO DAILY PRN for CONSTIPATION-

1ST LINE, (Reported)


Metoprolol Succinate 25 Mg Tab.er.24h, 25 MG PO DAILY, (Reported)


Mexiletine HCl 150 Mg Cap, 150 MG PO BID, (Reported)


Potassium Chloride 20 Meq Packet, 20 MEQ PO DAILY, (Reported)


Ticagrelor 90 Mg Tablet, 90 MG PO BID


   Prescribed by: MICHELE WILKINS on 1/29/19 0858





Patient Home Medication List


Home Medication List Reviewed:  Yes





Review of Systems


Review of Systems


Constitutional:  no symptoms reported


EENTM:  no symptoms reported


Respiratory:  no symptoms reported


Cardiovascular:  no symptoms reported


Gastrointestinal:  see HPI


Genitourinary:  no symptoms reported


Musculoskeletal:  no symptoms reported


Skin:  no symptoms reported


Psychiatric/Neurological:  No Symptoms Reported


Hematologic/Lymphatic:  No Symptoms Reported





Past Medical-Social-Family Hx


Past Med/Social Hx:  Reviewed Nursing Past Med/Soc Hx


Patient Social History


Alcohol Use:  Denies Use


Recreational Drug Use:  No


Smoking Status:  Former Smoker


Type Used:  Cigarettes


Former Smoker, Quit:  Dec 24, 1988


2nd Hand Smoke Exposure:  No


Recent Foreign Travel:  No


Contact w/Someone Who Travel:  No


Recent Infectious Disease Expo:  No


Recent Hopitalizations:  No


Physical Abuse:  No


Sexual Abuse:  No





Immunizations Up To Date


Tetanus Booster (TDap):  Unknown


Date of Pneumonia Vaccine:  Oct 14, 2018


Date of Influenza Vaccine:  Oct 10, 2018





Seasonal Allergies


Seasonal Allergies:  No





Past Medical History


Surgeries:  Yes (skin cancer removal, cataract surgery, cardiac stents )


Abdominal (PEG tube), Cardiac, CABG, Coronary Stent, Defibrillator, Eye Surgery,

Orthopedic, Renal


Respiratory:  Yes (O2 3L/NC AT HS-now off all oxygen 12/2018; CHRONIC 

RESPIRATORY FAILURE)


Asthma, Pneumonia, Chronic Bronchitis, Sleep Apnea, COPD


Currently Using CPAP:  No


Currently Using BIPAP:  No


Cardiac:  Yes (CABG)


Cardiomyopathy, Chronic Edema/Swelling, Coronary Artery Disease, Heart Attack, 

High Cholesterol, Hypertension, Irregular Heartbeat


Neurological:  Yes


Dementia, Neuropathy


Reproductive Disorders:  Yes (unable to have children- mumps as a child)


Sexually Transmitted Disease:  No


HIV/AIDS:  No


Genitourinary:  Yes (renal insufficiency-renal stent placement)


Kidney Infection, Bladder Infection, Kidney Stones


Gastrointestinal:  Yes (peg tube [id number]-mzoitm aspiration)


Gastroesophageal Reflux, Ulcer


Musculoskeletal:  Yes (POOR MOBILITY; LEFT HIP FRACTURE)


Arthritis, Chronic Back Pain, Fractures


Endocrine:  Yes


Diabetes, Insulin dep


HEENT:  Yes


Cataract, Glaucoma


Loss of Vision:  Bilateral


Hearing Impairment:  Hard of Hearing


Cancer:  Yes


Skin


Did You Recieve Any Treatments:  Yes


What Type of Treatment Did You:  Surgical Intervention


Psychosocial:  Yes (combative at times)


Anxiety, Depression


Integumentary:  Yes (shingles , SKIN CANCER; LEG CELLULITIS/ STASIS DERMATITIS)


Blood Disorders:  No


Adverse Reaction/Blood Tranf:  No





Family Medical History





Cardiovascular disease


  19 MOTHER


  G8 BROTHER


  G8 SISTER


Cervical cancer


  19 MOTHER


Heart Disease





Physical Exam


Vital Signs





Vital Signs - First Documented








 10/18/19





 08:48


 


Temp 36.8


 


Pulse 93


 


Resp 18


 


B/P (MAP) 125/85 (98)


 


Pulse Ox 96





Capillary Refill : Less Than 3 Seconds


Height, Weight, BMI


Height: 5'5.00"


Weight: 160lbs. 4.8oz. 72.197474ul; 23.00 BMI


Method:Stated


General Appearance:  No Apparent Distress, WD/WN


Neck:  Normal Inspection


Respiratory:  No Accessory Muscle Use, No Respiratory Distress


Gastrointestinal:  Non Tender, Soft; No Distended; Other (PEG tube dislodged 

from site.  Skin tags surrounding the insertion site)


Neurologic/Psychiatric:  Alert, Oriented x3, No Motor/Sensory Deficits, Normal 

Mood/Affect, CNs II-XII Norm as Tested


Skin:  Normal Color, Warm/Dry





Progress/Results/Core Measures


Suspected Sepsis


Recent Fever Within 48 Hours:  No


Infection Criteria Present:  None


New/Unexplained  Altered Menta:  No


Sepsis Screen:  No Definite Risk


SIRS


Temperature: 


Pulse: 93 


Respiratory Rate: 18


 


Blood Pressure 125 /85 


Mean: 98





Results/Orders


Vital Signs/I&O











 10/18/19





 08:48


 


Temp 36.8


 


Pulse 93


 


Resp 18


 


B/P (MAP) 125/85 (98)


 


Pulse Ox 96





Capillary Refill : Less Than 3 Seconds








Blood Pressure Mean:                    98








Progress Note #1:  


   Time:  09:05


Progress Note


Reinsertion has been complicated by the existence of skin tags and redundant 

tissue at the insertion site.  The opening seems to have tighten.  We will try 

dilating with [place] dilators before attempting again.


Progress Note #2:  


   Time:  11:07


Progress Note


[place] dilators were used up to a 20 Moldovan size.  Insertion was still not 

possible.  I contacted Dr. Malone who presented to the ER and was eventually 

able to place the Emir tube with multiple dilation attempts prior to 

insertion.  There was some mild bleeding which was controlled.  Tube was flushed

with sterile water and stomach contents were aspirated.  There is no pain with 

flush or aspiration.  Family was encouraged to use abdominal binder at night to 

prevent accidental dislodgment of the tube.





Departure


Impression





   Primary Impression:  


   Complication of feeding tube


Disposition:  01 HOME, SELF-CARE


Condition:  Improved





Departure-Patient Inst.


Decision time for Depature:  10:59


Referrals:  


MAHOGANY GREENWOOD MD (PCP/Family)


Primary Care Physician


Patient Instructions:  How to Care for Your PEG Tube





Add. Discharge Instructions:  


Use abdominal binder at night or at other times when feeding tube might become 

dislodged.  Otherwise continue care as previously instructed.  Contact Dr. Malone or return to care if you have any further problems.











All discharge instructions reviewed with patient and/or family. Voiced unders

tanding.





Copy


Copies To 1:   MESERET MALONE JOSHUA T MD        Oct 18, 2019 09:04

## 2019-10-21 NOTE — OPERATIVE REPORT
DATE OF SERVICE:  10/18/2019



PREOPERATIVE DIAGNOSIS:

Gastrostomy malfunction.



POSTOPERATIVE DIAGNOSIS:

Gastrostomy malfunction.



PROCEDURE:

Dilation of gastrostomy.



SURGEON:

James Malone DO.



FIRST ASSISTANT:

None.



ANESTHESIA:

None.



BLOOD LOSS:

Scant.



FLUIDS:

None.



POSTOPERATIVE CONDITION:

Stable.



INDICATION FOR PROCEDURE:

The patient is a 79-year-old male who had actually had a gastrostomy placed the

day before.  He had this, this is the second time, it actually came back in and

it fell out.  He needs to get this replaced, but it had strictured down.



FINDINGS:

The patient had a manual dilation with [place] sounds to be able to replace

his _____ gastrostomy tube.



PROCEDURE NOTE:

After informed consent was obtained in the ER, the ER physician had already

attempted to replace the gastrostomy tube, which I had actually placed the day

before.  He was unsuccessful and asked for me to come down to help, so the area

it appeared that there had been some overgrowth of hypertrophy tissue and the

gastrostomy hole had closed, used the Morris sound going from 16 to 18 to 20

to 22 in a stepwise fashion, minimal pain with this, very scant bleeding.  Once

we dilated large enough, then able to easily replace an 18-Maltese AYALA-KEY tube,

blew up the balloon with 10 mL of saline and then placed a dressing over the top

of this.  The patient tolerated the procedure and told his daughter who is in

the room the entire time to place an abdominal binder when he sleeps, so he did

not pull this out.





Job ID: [id number]

DocumentID: [id number]

Dictated Date:  10/20/2019 21:36:42

Transcription Date: 10/21/2019 00:05:18

Dictated By: JAMES MALONE DO

## 2020-02-05 NOTE — NUR
Pharmacy change from [facility] to [facility];     Disp Refills Start End    hydrochlorothiazide (HYDRODIURIL) 25 MG tablet 90 tablet 1 10/23/2019     Sig - Route: Take 1 tablet by mouth daily. - Oral       Disp Refills Start End    losartan (COZAAR) 100 MG tablet 90 tablet 1 10/23/2019     Sig - Route: Take 1 tablet by mouth daily. - Oral       Disp Refills Start End    buPROPion (WELLBUTRIN XL) 300 MG 24 hr tablet 90 tablet 3 8/12/2019     Sig - Route: Take 1 tablet by mouth daily. - Oral      Confirmed with patient.  Next appointment 2-18-20     mexiletine 150 & Cardizem 60mg given at this time crushed in water & given per g-tube due to 
Questran due to 2300, gastric residual 18ml, tube feeeding jevity 1.5 1 can given, hob 
elevated to 90 degrees

## 2020-02-10 ENCOUNTER — HOSPITAL ENCOUNTER (OUTPATIENT)
Dept: HOSPITAL 75 - LABNPT | Age: 80
End: 2020-02-10
Attending: INTERNAL MEDICINE
Payer: COMMERCIAL

## 2020-02-10 DIAGNOSIS — R30.0: Primary | ICD-10-CM

## 2020-02-10 LAB
APTT PPP: YELLOW S
BACTERIA #/AREA URNS HPF: (no result) /HPF
BILIRUB UR QL STRIP: NEGATIVE
FIBRINOGEN PPP-MCNC: (no result) MG/DL
GLUCOSE UR STRIP-MCNC: NEGATIVE MG/DL
KETONES UR QL STRIP: NEGATIVE
LEUKOCYTE ESTERASE UR QL STRIP: (no result)
NITRITE UR QL STRIP: NEGATIVE
PH UR STRIP: 7.5 [PH] (ref 5–9)
PROT UR QL STRIP: (no result)
RBC #/AREA URNS HPF: (no result) /HPF
SP GR UR STRIP: 1.01 (ref 1.02–1.02)
SQUAMOUS #/AREA URNS HPF: (no result) /HPF
WBC #/AREA URNS HPF: (no result) /HPF

## 2020-02-10 PROCEDURE — 87077 CULTURE AEROBIC IDENTIFY: CPT

## 2020-02-10 PROCEDURE — 87088 URINE BACTERIA CULTURE: CPT

## 2020-02-10 PROCEDURE — 81000 URINALYSIS NONAUTO W/SCOPE: CPT

## 2020-05-11 NOTE — THERAPY TEAM DISCHARGE SUMMARY
Therapy Discharge Summary


Discharge Recommendations


Date of Discharge





Therapy D/C Recommendations:  Skilled Nursing (TCU/NH)





Physical Therapy


Patient admitted to swing bed with Left hip fx, s/p ORIF.  Upon admission 

patient performed bed mobility with mod to max assist and transfers with mod 

assist, and was ambulating just a few steps in the parallel bars.  Patient has 

been performing bed mobility and transfer training, balance and endurance 

training, functional strengthening, and gait training, education.  Patient has 

made fair progress but has not met any of his long term goals.  Now, patient 

performs bed mobility and transfers with CGA/Margaret, ambulates 20' with a rolling 

walker with CGA/Margaret.  Patient is being discharged from this facility to a 

nursing home to continue to work on his mobility.  He will be discharged from 

PT at this time.





PT Long Term Goals


Long Term Goals


PT Long Term Goals Time Frame:  Mar 17, 2017


Transfers (B,C,W/C) (FIM):  5


Sit to Lying (QC):  5


Lying-Sitting on Side/Bed(QC):  5


Sit to Stand (QC):  5


Rolling:  3


Chair/Bed-to-Chair Xfer(QC):  5


Does the Patient Walk:  Yes


Gait (FIM):  4


Gait distance (FIM):  2=516-59 ft


Walk 50ft with 2 Turns (QC):  4


Walk 150 ft (QC):  88


Gait Assistive Device:  FWW


Does the Pt use WC or Scooter?:  Yes


Wheelchair (FIM):  88 (NT)





OT Long Term Goals


Long Term Goals


Time Frame:  Mar 16, 2017


Eating (FIM):  6


Eating (QC):  6


Groomin


Oral Hygiene (QC):  5


Bathing(FIM):  4


Upper Body Dressing(FIM):  5


Lower Body Dressing(FIM):  5


Toileting(FIM):  5


Toileting Hygiene (QC):  5


Transfers (B,C,W/C) (FIM):  5


Toilet/Commode Transfer(FIM):  5


Toilet/Commode Transfer (QC):  5


Additional Goals:  1-Demonstrate ADL Tasks, 2-Verbalize Understanding, 3-

ImproveStrength/Farnaz


1=Demonstrate adherence to instructed precautions during ADL tasks.


2=Patient will verbalize/demonstrate understanding of assistive devices/

modifications for ADL.


3=Patient will improve strength/tolerance for activity to enable patient to 

perform ADL's.





Speech Long Term Goals


Long Term Goals


1. The patient will demonstrate improved cognitive linguistic skills for 

increased function and safety with ADL's.


Time Frame:  One Month








JANET PEARSON PT Mar 8, 2017 13:12 None

## 2022-05-12 NOTE — THERAPY TEAM DISCHARGE SUMMARY
Therapy Discharge Summary


Discharge Recommendations


Date of Discharge





Therapy D/C Recommendations:  Physical Therapy Home Care





Physical Therapy


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  Upon evaluation patient performed 

bed mobility and transfers with CGA/Margaret, no car transfer performed, ambulated 

15' with a rolling walker with poor safety awareness.  Patient has been 

performing bed mobility and transfer training, balance and endurance training, 

functional strengthening, gait training, and education.  Patient has made some 

progress but has only met his long term goals for bed mobility.  Now, patient 

performs bed mobility with mod I, sit to stand with SBA, transfers with SBA, 

car transfer min assist, and can ambulate 100' with a rolling walker with SBA (

including 50' with at least 2 turns of 90 degrees but needs CGA for 10' over an 

uneven surface), patient is unsafe to attempt stairs.  Patient is being 

discharged from this facility today and will be discharged from PT at this time.





Occupational Therapy


Decreased Activ Tolerance, Impaired Cognition, Impaired I ADL's, Impaired Self-

Care Skills





PT Long Term Goals


Long Term Goals


PT Long Term Goals Time Frame:  Jan 14, 2019


Transfers (B,C,W/C) (FIM):  6 (met)


Roll Left to Right (QC):  6 (met)


Sit to Lying (QC):  6 (met)


Lying-Sitting on Side/Bed(QC):  6 (met)


Sit to Stand (QC):  6


Chair/Bed-to-Chair Xfer(QC):  6


Car Transfer (QC):  5


Does the Patient Walk:  Yes


Gait (FIM):  5 (household)


Gait distance (FIM):  2=872-40 ft


Walk 10 feet (QC):  6


Walk 10ft-Uneven Surface(QC):  6


Walk 50ft with 2 Turns (QC):  6


Walk 150 ft (QC):  88


Gait Level of Assist:  6


Gait Assistive Device:  FWW


Does the Pt use WC or Scooter?:  Yes


Wheelchair (FIM):  6


Wheelchair distance (FIM):  3=150 ft


Wheel 50 feet with 2 turns (QC:  6


Stairs (FIM):  88


1 Step (curb) (QC):  88


4 Steps (QC):  88


12 Steps (QC):  88


Picking up an Object (QC):  88





OT Long Term Goals


Long Term Goals


Time Frame:  Jan 21, 2019


Eating (FIM):  5


Eating (QC):  5


Oral Hygiene (QC):  4


Grooming(FIM):  5


Bathing(FIM):  3


Shower/Bathe Self (QC):  3


Upper Body Dressing(FIM):  5


Upper Body Dressing (QC):  4


Lower Body Dressing(FIM):  4


Lower Body Dressing (QC):  4


On/Off Footwear (QC):  4


Toileting(FIM):  5


Toileting Hygiene (QC):  5


Transfers (B,C,W/C) (FIM):  5


Toilet/Commode Transfer(FIM):  5


Toilet/Commode Transfer (QC):  4


Additional Goals:  1-Demonstrate ADL Tasks, 2-Verbalize Understanding, 3-

ImproveStrength/Farnaz


1=Demonstrate adherence to instructed precautions during ADL tasks.


2=Patient will verbalize/demonstrate understanding of assistive devices/

modifications for ADL.


3=Patient will improve strength/tolerance for activity to enable patient to 

perform ADL's.





Speech Long Term Goals


Long Term Goals


1) Patient will improve memory, problem solving and auditory processing in 

order to return safely home.


2) Patient will demonstrate a safe swallow function for oral intake for 

maintaining nutrition/hydration.











JANET PEARSON PT Jan 7, 2019 10:05 Dione Davies, [facility]

## 2023-07-06 NOTE — DIAGNOSTIC IMAGING REPORT
Indication: New dyspnea this morning.



Discussion: Two views of the chest were obtained, comparison

3/2/2017. Improved aeration of the bilateral lungs. No focal

consolidation or alexandru pulmonary edema. No pleural fluid or

pneumothorax. Elevated right hemidiaphragm is stable. The heart

size is normal on today's exam. Left-sided pacemaker and median

sternotomy are stable.



Impression:

1. Improved aeration of the bilateral lungs. Heart is normal in

size on today's exam.



Dictated by:



Dictated on workstation # [id number]
fall precautions

## 2023-10-18 NOTE — NUR
gastric residual 5ml, jevity 1.5 1 container given per g-tube, g-tube flushed with 50ml 
water prior & after feeding Bactrim Pregnancy And Lactation Text: This medication is Pregnancy Category D and is known to cause fetal risk.  It is also excreted in breast milk.

## 2025-03-19 NOTE — NUR
PT INCONTINENT OF URINE. CHUX REPLACED AND CLEANED PT GROIN AREA AND BOTTOM. REPOSITIONED PT 
IN MORE TOWARDS TOP OF BED. PILLOWS IN PLACE UNDER HEAD AND ARMS. Awake

## 2025-05-03 NOTE — THERAPY GROUP DAILY NOTE
Therapy Daily Group Note


Patient Education Topic


Exercises





Exercises


LE Seated Exercise, Stretching, UE Exercise





Other/Notes


Pt participated in group therapy with cues and assist to participate 75% of the 

time.  Pt participated in [place] trivia but was unable to answer any 

questions correctly, although he did make attempts.  This was followed by ther 

ex in which he participated in U/LE strengthening exercises and UE stretching.  

He followed cues and participated fully.  He seemed to enjoy the social 

atmosphere.  He has been in the hospital an extended time and socialization is 

important to clear his cognitive state.  During group, he was not as confused 

and participated appropriately.  Feel that the socialization was beneficial in 

orienting him and decreasing his agitation.


Start Time:  11:30


Stop Time:  12:45


Total Billed Treatment Time:  75


Total Billed Treatment


visit


GRP 75











ELMER CATALAN PT Dec 24, 2018 13:32 Spine appears normal, movement of extremities grossly intact.